# Patient Record
Sex: MALE | Race: WHITE | NOT HISPANIC OR LATINO | Employment: OTHER | URBAN - METROPOLITAN AREA
[De-identification: names, ages, dates, MRNs, and addresses within clinical notes are randomized per-mention and may not be internally consistent; named-entity substitution may affect disease eponyms.]

---

## 2017-01-06 ENCOUNTER — TELEPHONE (OUTPATIENT)
Dept: PAIN MEDICINE | Facility: CLINIC | Age: 77
End: 2017-01-06

## 2017-01-09 DIAGNOSIS — M48.061 LUMBAR SPINAL STENOSIS: Primary | ICD-10-CM

## 2017-01-09 NOTE — TELEPHONE ENCOUNTER
Just called patient and discussed new MRI results.  I recommend he try a caudal pari with catheter for placement of medication at higher levels where his lumbar stenosis is.  He will discuss this with his wife and call back to schedule injection if he   wants to proceed.  He might also call and schedule follow up to switch his care here from [facility].  Either is fine.   Thank you.

## 2017-01-11 RX ORDER — SIMVASTATIN 20 MG
TABLET ORAL
Qty: 90 TABLET | Refills: 0 | Status: SHIPPED | OUTPATIENT
Start: 2017-01-11 | End: 2017-04-10 | Stop reason: SDUPTHER

## 2017-01-24 ENCOUNTER — TELEPHONE (OUTPATIENT)
Dept: PAIN MEDICINE | Facility: CLINIC | Age: 77
End: 2017-01-24

## 2017-01-25 ENCOUNTER — DOCUMENTATION (OUTPATIENT)
Dept: PAIN MEDICINE | Facility: CLINIC | Age: 77
End: 2017-01-25

## 2017-01-25 NOTE — PROGRESS NOTES
"At approximately 11:10a nAt came to my office to inform me about an upset patient that was down in the surgery center, Mr. Derrek Estrada. She informed me that a nurse from the surgery center had arrived for his injection visit with Dr. Apple this afternoon, but that when they spoke to him 2 days ago he informed them he had not stopped his warfarin so they would have to cancel his procedure. About 3 minutes later Ksenia from the front office said there was a patient screaming and yelling at [facility], the MA. I went up there with Dr. Apple and Mr. Estrada and his wife were at the front window. As I walked up I heard Kenia say \"there is not need to get nasty\" and Mr. Estrada's wife started screaming, \"oh yes there is! I will get nasty!\" I walked up and asked what the problem was, and the wife and patient began yelling at me. I interrupted them and said \"okay, we are not going to start the conversation like this.\" To which Mr. Estrada said \"no one called to cancel my appointment! No one told me I had to stop my medicine! I have called up here time and time again and just get transferred all around. Finally someone answered and they said they called me back!\" So I asked, \"no one informed you that you needed to stop your warfarin?\" He said \"no, these nurses don't know their a** hole from a hole in the ground. Now I'm sorry, I am usually a good Hindu but I'm not going to be today.\" I began to tell him and his wife that I had been informed that at least 2 different individuals spoke to him about this situation, when his wife screamed \"so you're saying we are lying!\" I said \"no, I am simply trying to explain the situation as I understand it.\" They both then began to scream again about how they were going to have to wait another month to get the injection. I tried to tell them we would reschedule them for next week but they continued to scream about us calling them \"liars\" and how we don't care about Mr. " "Natalie who is in so much pain. Dr. Apple interrupted asking the patient again if they are sure no one called and told them to stop the medication and he screamed \"no!\" At this point, Max the  told the patient and his wife it's time to go. Max walked them out of the office and out of the building and informed me that they screamed at him the whole time.   "

## 2017-02-01 DIAGNOSIS — E11.9 DIABETES MELLITUS TYPE II, CONTROLLED, WITH NO COMPLICATIONS (HCC): ICD-10-CM

## 2017-02-01 RX ORDER — PIOGLITAZONEHYDROCHLORIDE 45 MG/1
TABLET ORAL
Qty: 90 TABLET | Refills: 1 | Status: SHIPPED | OUTPATIENT
Start: 2017-02-01 | End: 2017-07-21

## 2017-02-09 ENCOUNTER — OFFICE VISIT (OUTPATIENT)
Dept: INTERNAL MEDICINE | Age: 77
End: 2017-02-09

## 2017-02-09 VITALS
BODY MASS INDEX: 23.86 KG/M2 | HEIGHT: 75 IN | WEIGHT: 191.9 LBS | OXYGEN SATURATION: 98 % | TEMPERATURE: 96.7 F | SYSTOLIC BLOOD PRESSURE: 138 MMHG | DIASTOLIC BLOOD PRESSURE: 60 MMHG | HEART RATE: 64 BPM

## 2017-02-09 DIAGNOSIS — F09 MILD COGNITIVE DISORDER: ICD-10-CM

## 2017-02-09 DIAGNOSIS — Z00.00 ROUTINE HEALTH MAINTENANCE: ICD-10-CM

## 2017-02-09 DIAGNOSIS — I10 ESSENTIAL HYPERTENSION: Primary | ICD-10-CM

## 2017-02-09 DIAGNOSIS — E11.9 TYPE 2 DIABETES MELLITUS WITHOUT COMPLICATION, WITHOUT LONG-TERM CURRENT USE OF INSULIN (HCC): ICD-10-CM

## 2017-02-09 LAB — HBA1C MFR BLD: 6.94 % (ref 4.8–5.6)

## 2017-02-09 PROCEDURE — 99213 OFFICE O/P EST LOW 20 MIN: CPT | Performed by: INTERNAL MEDICINE

## 2017-02-09 RX ORDER — FERROUS SULFATE 325(65) MG
325 TABLET ORAL DAILY
Qty: 60 TABLET | Refills: 5 | COMMUNITY
Start: 2017-02-09 | End: 2018-01-19

## 2017-02-09 RX ORDER — DONEPEZIL HYDROCHLORIDE 5 MG/1
5 TABLET, FILM COATED ORAL NIGHTLY
Qty: 90 TABLET | Refills: 3 | Status: SHIPPED | OUTPATIENT
Start: 2017-02-09 | End: 2018-01-19

## 2017-02-09 NOTE — PROGRESS NOTES
"Derrek Ritter / 76 y.o. / male  02/09/2017    VITALS    Visit Vitals   • /60   • Pulse 64   • Temp 96.7 °F (35.9 °C)   • Ht 75\" (190.5 cm)   • Wt 191 lb 14.4 oz (87 kg)   • SpO2 98%   • BMI 23.99 kg/m2     BP Readings from Last 3 Encounters:   02/09/17 138/60   12/16/16 142/69   10/29/16 165/81     Wt Readings from Last 3 Encounters:   02/09/17 191 lb 14.4 oz (87 kg)   12/16/16 197 lb 6.4 oz (89.5 kg)   12/30/16 190 lb (86.2 kg)      Body mass index is 23.99 kg/(m^2).    CC:  Main reason(s) for today's visit: Hypertension and Diabetes      HPI:     Patient presents today for four-month follow-up of several medical issues.    Hypertension: He is compliant with medication, he is compliant also with low-salt diet and occasionally monitors blood pressure at home.  He is active on a regular basis.  There are no medication side effects noted.      Diabetes: He is compliant with medication,, when last done approximately 4 days ago value is 107 mg percent.  He denied any hypoglycemic reactions.    ______________________________________________________    ASSESSMENT & PLAN:    1. Essential hypertension    2. Type 2 diabetes mellitus without complication, without long-term current use of insulin    3. Mild cognitive disorder    4. Routine health maintenance      Orders Placed This Encounter   Procedures   • Hemoglobin A1c       Summary/Discussion:     · Number-one hypertension stable on current medical regimen.  Plan: Same meds, blood pressure check 4 months.  ·   · #2 diabetes type 2 stable based on home glucose monitoring.  Plan: Continue same, check A1c today.  ·   · #3 mild cognitive impairment, refill Aricept as above.  ·   · #4 routine health maintenance, annual wellness visit has been previously scheduled for June of this year.      No Follow-up on file.    Future Appointments  Date Time Provider Department Center   4/25/2017 11:40 AM Sherice Newman MD [facility] None   6/9/2017 1:00 PM Quinton Leal, " MD [facility] None       ______________________________________________________    REVIEW OF SYSTEMS    Review of Systems   Respiratory: Negative for chest tightness and shortness of breath.    Cardiovascular: Negative for chest pain and palpitations.   Neurological: Negative for dizziness, syncope, speech difficulty, weakness, light-headedness and headaches.         PHYSICAL EXAMINATION    Physical Exam   Constitutional: He is oriented to person, place, and time. He appears well-developed and well-nourished. No distress.   Cardiovascular: Normal rate, regular rhythm, normal heart sounds and intact distal pulses.  Exam reveals no gallop and no friction rub.    No murmur heard.  Pulmonary/Chest: Effort normal and breath sounds normal. He has no wheezes. He has no rales.   Musculoskeletal: He exhibits no edema.    Derrek had a diabetic foot exam performed (Vibration sense intact bilaterally great toe) today.   During the foot exam he had a monofilament test performed (Monofilament testing intact).     Neurological: He is alert and oriented to person, place, and time.   Skin: Skin is warm and dry. No rash noted.   Psychiatric: He has a normal mood and affect. His behavior is normal. Judgment and thought content normal.   Nursing note and vitals reviewed.      REVIEWED DATA:    Labs:   Lab Results   Component Value Date     10/29/2016    K 4.2 10/29/2016    AST 16 10/29/2016    ALT 13 10/29/2016    BUN 16 10/29/2016    CREATININE 0.70 12/30/2016    CREATININE 0.75 (L) 10/29/2016    CREATININE 0.75 (L) 10/06/2016    EGFRIFNONA 102 10/29/2016    EGFRIFAFRI 123 10/06/2016       Lab Results   Component Value Date     (H) 10/06/2016     (H) 06/06/2016    HGBA1C 7.08 (H) 10/06/2016    HGBA1C 7.20 (H) 06/06/2016    HGBA1C 7.3 (H) 02/02/2016       Lab Results   Component Value Date    LDL 77 10/06/2016    LDL 68 06/06/2016    LDL 87 09/28/2015    HDL 46 10/06/2016    TRIG 51 10/06/2016       Lab Results    Component Value Date    TSH 1.810 07/11/2016          Lab Results   Component Value Date    WBC 4.71 10/29/2016    HGB 12.2 (L) 10/29/2016     10/29/2016        Imaging:        Medical Tests:        Summary of old records / correspondence / consultant report:        Request outside records:          ALLERGIES:    Review of patient's allergies indicates no known allergies.    MEDICATIONS:    Current Outpatient Prescriptions   Medication Sig Dispense Refill   • ACCU-CHEK FASTCLIX LANCETS misc USE TO TEST TWICE DAILY 102 each 0   • Blood Glucose Monitoring Suppl (ACCU-CHEK FRANCISCO SMARTVIEW) W/DEVICE kit USE TO TEST BLOOD SUGAR TWICE DAILY 1 kit 0   • Blood Glucose Monitoring Suppl (FREESTYLE LITE) device Use to test blood once daily 1 each 0   • brimonidine (ALPHAGAN) 0.2 % ophthalmic solution      • donepezil (ARICEPT) 5 MG tablet Take 1 tablet by mouth Every Night. 90 tablet 3   • dorzolamide (TRUSOPT) 2 % ophthalmic solution Administer 1 drop to both eyes 2 (two) times a day.     • enalapril (VASOTEC) 20 MG tablet TAKE 1 TABLET BY MOUTH TWICE A  tablet 0   • esomeprazole (NexIUM) 40 MG capsule Take 1 capsule by mouth daily. 90 capsule 1   • ferrous sulfate 325 (65 FE) MG tablet Take 1 tablet by mouth Daily. 60 tablet 5   • furosemide (LASIX) 20 MG tablet Take 1 tablet by mouth daily. as needed     • glucose blood (FREESTYLE LITE) test strip Use to test blood once daily 100 each 12   • Glucose Blood disk Inject 1 Device as directed daily. TEST BLOOD SUGAR ONCE DAILY AS DIRECTED 100 each 3   • glucose blood test strip USE TO TEST TWICE DAILY 100 each 12   • Lancets (FREESTYLE) lancets Use to test blood sugar daily 100 each 12   • latanoprost (XALATAN) 0.005 % ophthalmic solution      • metFORMIN (GLUCOPHAGE) 1000 MG tablet 1000 mg in the morning 500 mg at noon and 1000 mg in the evening. 75 tablet 1   • ONE TOUCH ULTRA TEST test strip USE ONCE DAILY AS DIRECTED 100 each 3   • oxyCODONE-acetaminophen  (PERCOCET)  MG per tablet 1 tablet Every 8 (Eight) Hours As Needed.     • pioglitazone (ACTOS) 45 MG tablet TAKE 1 TABLET DAILY 90 tablet 1   • simvastatin (ZOCOR) 20 MG tablet TAKE 1 TABLET DAILY 90 tablet 0   • tamsulosin (FLOMAX) 0.4 MG capsule 24 hr capsule TAKE 2 CAPSULES BY MOUTH DAILY 60 capsule 3   • timolol (TIMOPTIC) 0.5 % ophthalmic solution      • warfarin (COUMADIN) 10 MG tablet TAKE 1 TABLET BY MOUTH EVERY OTHER DAY ,ALTERNATING WITH 1/2 TABLET EVERY OTHER DAY (Patient taking differently: 10 mg. monday through friday, 5 mg saturday and sunday) 90 tablet 2     No current facility-administered medications for this visit.        [facility]    The following portions of the patient's history were reviewed and updated as appropriate: Allergies / Current Medications / Past Medical History / Surgical History / Social History / Family History    PROBLEM LIST:    Patient Active Problem List   Diagnosis   • Cholelithiasis   • Diabetes mellitus   • Diverticulosis of intestine   • Hypercholesterolemia   • Hypertension   • Lumbar spinal stenosis   • Microalbuminuria   • Paroxysmal atrial fibrillation   • Medicare annual wellness visit, subsequent   • Anemia   • Iron deficiency anemia   • Osteoarthritis   • Impotence of organic origin   • Glaucoma   • Hiatal hernia   • Mild cognitive disorder   • Calculus of kidney   • Nocturia   • Chronic bilateral low back pain with bilateral sciatica   • Post laminectomy syndrome   • Routine health maintenance       PAST MEDICAL HX:    Past Medical History   Diagnosis Date   • Acute suppurative otitis media    • Acute upper respiratory infection    • Atrial fibrillation    • Cholelithiasis    • Diabetes mellitus    • Diverticulosis    • Dry eyes    • Earache    • Edema    • Encounter for screening for malignant neoplasm of prostate    • Esophagitis, reflux    • Glaucoma    • Glaucoma    • H/O echocardiogram 09/18/2013   • Health care maintenance    • Hiatal hernia    • History of  EKG 10/06/2015   • History of Holter monitoring 09/16/2013   • Hyperlipidemia    • Hypertension    • Lumbar canal stenosis    • Microalbuminuria    • Nephrolithiasis    • Osteoarthritis of hand      of thumb   • PAF (paroxysmal atrial fibrillation)    • Postoperative examination    • Pre-operative cardiovascular examination    • Spinal stenosis        PAST SURGICAL HX:    Past Surgical History   Procedure Laterality Date   • Cataract extraction     • Hernia repair     • Lumbar laminectomy       10.28.13  bilat L4/5 lami with Nyla and Belen   • Other surgical history       Lithotomy   • Lumbar fusion       Lumbar vertebral fusion   • Arthrodesis  10/28/2013     Spinal / Description: Repair spinal stenosis   • Tonsillectomy     • Amputation digit Left 10/29/2016     Procedure: TENDON AND NERVE REPAIR OF  LEFT THUMB;  Surgeon: Zak Hanson MD;  Location: [facility];  Service:        SOCIAL HX:    Social History     Social History   • Marital status:      Spouse name: Silvia   • Number of children: N/A   • Years of education: College     Occupational History   • Teacher Retired     Social History Main Topics   • Smoking status: Never Smoker   • Smokeless tobacco: None      Comment: caffeine use   • Alcohol use No   • Drug use: No   • Sexual activity: Not Asked     Other Topics Concern   • None     Social History Narrative       FAMILY HX:    Family History   Problem Relation Age of Onset   • Heart attack Mother      acute myocardial infarction   • Stroke Mother      hemorrhagic   • Emphysema Father    • Diabetes Other      mellitus   • Hypertension Other    • Heart disease Other

## 2017-02-20 DIAGNOSIS — K21.9 GASTROESOPHAGEAL REFLUX DISEASE, ESOPHAGITIS PRESENCE NOT SPECIFIED: ICD-10-CM

## 2017-02-20 RX ORDER — ESOMEPRAZOLE MAGNESIUM 40 MG/1
40 CAPSULE, DELAYED RELEASE ORAL DAILY
Qty: 90 CAPSULE | Refills: 1 | Status: SHIPPED | OUTPATIENT
Start: 2017-02-20 | End: 2017-09-11 | Stop reason: SDUPTHER

## 2017-03-06 ENCOUNTER — OFFICE VISIT (OUTPATIENT)
Dept: INTERNAL MEDICINE | Age: 77
End: 2017-03-06

## 2017-03-06 VITALS
TEMPERATURE: 97.6 F | OXYGEN SATURATION: 97 % | BODY MASS INDEX: 22.93 KG/M2 | HEIGHT: 75 IN | DIASTOLIC BLOOD PRESSURE: 58 MMHG | HEART RATE: 65 BPM | SYSTOLIC BLOOD PRESSURE: 120 MMHG | WEIGHT: 184.4 LBS

## 2017-03-06 DIAGNOSIS — R63.4 WEIGHT LOSS: ICD-10-CM

## 2017-03-06 DIAGNOSIS — J06.9 ACUTE URI: Primary | ICD-10-CM

## 2017-03-06 PROCEDURE — 99213 OFFICE O/P EST LOW 20 MIN: CPT | Performed by: INTERNAL MEDICINE

## 2017-03-06 RX ORDER — AZITHROMYCIN 250 MG/1
TABLET, FILM COATED ORAL
Qty: 6 TABLET | Refills: 0 | Status: SHIPPED | OUTPATIENT
Start: 2017-03-06 | End: 2017-04-17

## 2017-03-06 RX ORDER — BENZONATATE 200 MG/1
200 CAPSULE ORAL 3 TIMES DAILY PRN
Qty: 30 CAPSULE | Refills: 0 | Status: SHIPPED | OUTPATIENT
Start: 2017-03-06 | End: 2017-04-17

## 2017-03-06 NOTE — PROGRESS NOTES
Subjective   Derrek Ritter is a 76 y.o. male.     History of Present Illness 5 day history of respiratory illness, he notes nasal drainage, cough.  Nasal drainage is yellow, lung sputum is not discolored.  He denied facial or dental pain with chewing, fever, chills, sweats, anorexia.  He has tried some over-the-counter anti-histamine without any significant improvement.  His wife is not ill with any similar symptoms.    The following portions of the patient's history were reviewed and updated as appropriate: allergies, current medications, past family history, past medical history, past social history, past surgical history and problem list.    Review of Systems   Gastrointestinal: Negative for diarrhea, nausea and vomiting.   Skin: Negative for rash.   Neurological: Negative for headaches.   Hematological: Negative for adenopathy.       Objective   Physical Exam   Constitutional: He is oriented to person, place, and time. He appears well-developed and well-nourished. No distress.   HENT:   Nose: Right sinus exhibits no maxillary sinus tenderness and no frontal sinus tenderness. Left sinus exhibits no maxillary sinus tenderness and no frontal sinus tenderness.   Neck: Normal range of motion. Neck supple.   Pulmonary/Chest: Effort normal and breath sounds normal.   Lymphadenopathy:     He has no cervical adenopathy.   Neurological: He is alert and oriented to person, place, and time.   Skin: Skin is warm and dry. He is not diaphoretic.   Psychiatric: He has a normal mood and affect. His behavior is normal. Judgment and thought content normal.   Nursing note and vitals reviewed.      Assessment/Plan   Derrek was seen today for sinusitis.    Diagnoses and all orders for this visit:    Acute URI  -     benzonatate (TESSALON) 200 MG capsule; Take 1 capsule by mouth 3 (Three) Times a Day As Needed for cough.  -     azithromycin (ZITHROMAX Z-DENISE) 250 MG tablet; Take 2 tablets the first day, then 1 tablet daily for 4  days.    Weight loss      #1 acute upper respiratory infection, because of underlying diabetes, patient will be treated with medication as above.  Follow-up with me in 10 days if symptoms have not improved to his satisfaction.    #2 weight loss, patient has lost 13 pounds since the middle of December while eating approximate same.  At this point, since is less than 10% of his baseline weight, we will observe this and have him come back to see me in 6 weeks for another weight check

## 2017-03-13 RX ORDER — ENALAPRIL MALEATE 20 MG/1
TABLET ORAL
Qty: 180 TABLET | Refills: 0 | Status: SHIPPED | OUTPATIENT
Start: 2017-03-13 | End: 2017-06-12 | Stop reason: SDUPTHER

## 2017-04-10 DIAGNOSIS — E78.00 HYPERCHOLESTEROLEMIA: Primary | ICD-10-CM

## 2017-04-10 RX ORDER — SIMVASTATIN 20 MG
TABLET ORAL
Qty: 90 TABLET | Refills: 0 | Status: SHIPPED | OUTPATIENT
Start: 2017-04-10 | End: 2017-07-03 | Stop reason: SDUPTHER

## 2017-04-17 ENCOUNTER — TELEPHONE (OUTPATIENT)
Dept: INTERNAL MEDICINE | Age: 77
End: 2017-04-17

## 2017-04-17 ENCOUNTER — OFFICE VISIT (OUTPATIENT)
Dept: INTERNAL MEDICINE | Age: 77
End: 2017-04-17

## 2017-04-17 VITALS
TEMPERATURE: 98.2 F | HEART RATE: 56 BPM | BODY MASS INDEX: 23.18 KG/M2 | WEIGHT: 186.4 LBS | SYSTOLIC BLOOD PRESSURE: 140 MMHG | OXYGEN SATURATION: 98 % | DIASTOLIC BLOOD PRESSURE: 54 MMHG | HEIGHT: 75 IN

## 2017-04-17 DIAGNOSIS — Z00.00 ROUTINE HEALTH MAINTENANCE: ICD-10-CM

## 2017-04-17 DIAGNOSIS — I48.0 PAROXYSMAL ATRIAL FIBRILLATION (HCC): ICD-10-CM

## 2017-04-17 DIAGNOSIS — R63.4 WEIGHT LOSS: Primary | ICD-10-CM

## 2017-04-17 DIAGNOSIS — F09 MILD COGNITIVE DISORDER: ICD-10-CM

## 2017-04-17 PROBLEM — J06.9 ACUTE URI: Status: RESOLVED | Noted: 2017-03-06 | Resolved: 2017-04-17

## 2017-04-17 LAB — INR PPP: 2.1 (ref 2–3)

## 2017-04-17 PROCEDURE — 85610 PROTHROMBIN TIME: CPT | Performed by: INTERNAL MEDICINE

## 2017-04-17 PROCEDURE — 99214 OFFICE O/P EST MOD 30 MIN: CPT | Performed by: INTERNAL MEDICINE

## 2017-04-17 PROCEDURE — 36416 COLLJ CAPILLARY BLOOD SPEC: CPT | Performed by: INTERNAL MEDICINE

## 2017-04-17 NOTE — PROGRESS NOTES
Subjective   Derrek Ritter is a 77 y.o. male.     History of Present Illness patient presents today for 6 week follow-up. At our last visit, he has lost approximately 13 pounds.  He was asked by me return today for follow-up.  Patient is actually gained 2 pounds in the last 6 weeks.  We did review control of his glucose, last A1c was 6.97 in February, and his glucose readings at home over the past week and were all under 120 mg percent.  He has been on a  stable dosage of medication, has not made any recent changes.    Patient has paroxysmal atrial fibrillation, is due for an INR today as well.    Mild memory changes, patient's been on Aricept, he believes that the medication is actually helping and sees some improvement in his short-term memory.        The following portions of the patient's history were reviewed and updated as appropriate: allergies, current medications, past medical history and problem list.    Review of Systems   Respiratory: Negative for chest tightness and shortness of breath.         No evidence of PND nor orthopnea   Cardiovascular: Negative for chest pain and palpitations.   Neurological: Negative for dizziness, syncope, speech difficulty, weakness, light-headedness and headaches.       Objective   Physical Exam   Constitutional: He is oriented to person, place, and time. He appears well-developed and well-nourished. No distress.   Cardiovascular: Normal rate, regular rhythm, normal heart sounds and intact distal pulses.  Exam reveals no gallop and no friction rub.    No murmur heard.  Pulmonary/Chest: Effort normal and breath sounds normal. He has no wheezes. He has no rales.   Musculoskeletal: He exhibits edema.   1+ distal pitting edema bilaterally   Neurological: He is alert and oriented to person, place, and time.   Skin: Skin is warm and dry. No rash noted.   Psychiatric: He has a normal mood and affect. His behavior is normal. Judgment and thought content normal.   Nursing note  and vitals reviewed.      Assessment/Plan   Derrek was seen today for hypertension, back pain and weight loss.    Diagnoses and all orders for this visit:    Weight loss    Paroxysmal atrial fibrillation  -     POC INR    Mild cognitive disorder    Routine health maintenance        Number-one weight loss has resolved, patient is actually gaining weight at this time.  We will continue to observe this with subsequent office visits.    #2 paroxysmal atrial fibrillation, we'll check INR today and adjust dosage of Coumadin if indicated.    #3 mild cognitive impairment, improved subjectively while using Aricept.    #4 routine health maintenance follow-up with me 4 months for diabetes check and AWV

## 2017-04-17 NOTE — TELEPHONE ENCOUNTER
----- Message from Quinton Leal MD sent at 4/17/2017 11:07 AM EDT -----  INR is therapeutic, continue present dosage of Coumadin, and repeat INR in 4 weeks.

## 2017-04-21 ENCOUNTER — TELEPHONE (OUTPATIENT)
Dept: CARDIOLOGY | Facility: CLINIC | Age: 77
End: 2017-04-21

## 2017-04-21 NOTE — TELEPHONE ENCOUNTER
Pt called and has an OV on tues 4/25, and he wanted to know if you can also give him clearance for him to have an epidural injection?

## 2017-04-25 ENCOUNTER — OFFICE VISIT (OUTPATIENT)
Dept: CARDIOLOGY | Facility: CLINIC | Age: 77
End: 2017-04-25

## 2017-04-25 VITALS
HEART RATE: 41 BPM | SYSTOLIC BLOOD PRESSURE: 130 MMHG | HEIGHT: 75 IN | WEIGHT: 188 LBS | DIASTOLIC BLOOD PRESSURE: 82 MMHG | BODY MASS INDEX: 23.38 KG/M2

## 2017-04-25 DIAGNOSIS — I48.0 PAROXYSMAL ATRIAL FIBRILLATION (HCC): ICD-10-CM

## 2017-04-25 DIAGNOSIS — E11.9 TYPE 2 DIABETES MELLITUS WITHOUT COMPLICATION, WITHOUT LONG-TERM CURRENT USE OF INSULIN (HCC): ICD-10-CM

## 2017-04-25 DIAGNOSIS — E78.00 HYPERCHOLESTEROLEMIA: ICD-10-CM

## 2017-04-25 DIAGNOSIS — I10 ESSENTIAL HYPERTENSION: Primary | ICD-10-CM

## 2017-04-25 DIAGNOSIS — K44.9 HIATAL HERNIA: ICD-10-CM

## 2017-04-25 PROCEDURE — 93000 ELECTROCARDIOGRAM COMPLETE: CPT | Performed by: INTERNAL MEDICINE

## 2017-04-25 PROCEDURE — 99214 OFFICE O/P EST MOD 30 MIN: CPT | Performed by: INTERNAL MEDICINE

## 2017-04-25 NOTE — PROGRESS NOTES
Date of Office Visit: 2017  Encounter Provider: Sherice Newman MD  Place of Service: [facility] CARDIOLOGY  Patient Name: Derrek Ritter  :1940      Patient ID:  Derrek Ritter is a 77 y.o. male is here for  followup for HTN and PAF.       History of Present Illness  He had been seen in the past by Dr. Figueroa prior to back surgery for spinal stenosis  in . Prior to that surgery, he had a stress nuclear perfusion study performed on  10/01/2013 which showed no evidence of ischemia. He also had a 2-D echocardiogram with  Doppler performed on 2013 which revealed an ejection fraction of 55-60%,  indeterminate diastolic filling pattern, borderline left atrial enlargement, mild  posterior leaflet prolapse of the mitral valve with trace mitral insufficiency. He had  been seen in Dr. Glover's office and Dr. Glover had detected an irregular heart rhythm.  He was found to be in atrial fibrillation. A Holter monitor was performed on 2013  which showed persistent atrial fibrillation, a maximum heart rate of 164 and an average  heart rate of 98 beats per minute. He then had a second Holter recording on 2013  which showed no atrial fibrillation at all. He has been on warfarin since that time. He  was also placed on propafenone and Metoprolol then as well.       He does have a history of diabetes mellitus type 2, hypertension, and hyperlipidemia. He  has a hiatal hernia, history of diverticular disease and gastroesophageal reflux disease.  He has never had any thyroid disease.         He had back surgery in  and it did not help  at all.  He had ankle brachial indices done in 10/2014,  which were normal.     He cares for his wife who has cancer and other medical issues.  He is a retired  and .     He needs epidural therapy.  He has no chest pain or difficulty breathing.  He has had no dizziness or syncope.  He is  off the Toprol entirely and still has bradycardia, so I do think he has some underlying sick sinus syndrome; however, he has no symptoms with this.  He has chronic lower extremity edema, which is unchanged.  He has no orthopnea or paroxysmal nocturnal dyspnea.  His energy level is fair.  He would like to get his back a bit better just so he can do the activities that he wants to do.               Past Medical History:   Diagnosis Date   • Acute suppurative otitis media    • Acute upper respiratory infection    • Atrial fibrillation    • Cholelithiasis    • Diabetes mellitus    • Diverticulosis    • Dry eyes    • Earache    • Edema    • Encounter for screening for malignant neoplasm of prostate    • Esophagitis, reflux    • Glaucoma    • H/O echocardiogram 09/18/2013   • Health care maintenance    • Hiatal hernia    • History of EKG 10/06/2015   • History of Holter monitoring 09/16/2013   • Hyperlipidemia    • Hypertension    • Lumbar canal stenosis    • Microalbuminuria    • Nephrolithiasis    • Osteoarthritis of hand     of thumb   • PAF (paroxysmal atrial fibrillation)    • Postoperative examination    • Pre-operative cardiovascular examination    • Spinal stenosis          Past Surgical History:   Procedure Laterality Date   • AMPUTATION DIGIT Left 10/29/2016    Procedure: TENDON AND NERVE REPAIR OF  LEFT THUMB;  Surgeon: Zak Hanson MD;  Location: [facility];  Service:    • ARTHRODESIS  10/28/2013    Spinal / Description: Repair spinal stenosis   • CATARACT EXTRACTION     • HERNIA REPAIR     • LUMBAR FUSION      Lumbar vertebral fusion   • LUMBAR LAMINECTOMY      10.28.13  bilat L4/5 lami with Nyla and Belen   • OTHER SURGICAL HISTORY      Lithotomy   • TONSILLECTOMY         Current Outpatient Prescriptions on File Prior to Visit   Medication Sig Dispense Refill   • ACCU-CHEK FASTCLIX LANCETS [facility] USE TO TEST TWICE DAILY 102 each 0   • Blood Glucose Monitoring Suppl (ACCU-CHEK FRANCISCO SMARTVIEW)  W/DEVICE kit USE TO TEST BLOOD SUGAR TWICE DAILY 1 kit 0   • Blood Glucose Monitoring Suppl (FREESTYLE LITE) device Use to test blood once daily 1 each 0   • brimonidine (ALPHAGAN) 0.2 % ophthalmic solution      • donepezil (ARICEPT) 5 MG tablet Take 1 tablet by mouth Every Night. 90 tablet 3   • dorzolamide (TRUSOPT) 2 % ophthalmic solution Administer 1 drop to both eyes 2 (two) times a day.     • enalapril (VASOTEC) 20 MG tablet TAKE 1 TABLET BY MOUTH TWICE A  tablet 0   • esomeprazole (nexIUM) 40 MG capsule Take 1 capsule by mouth Daily. 90 capsule 1   • ferrous sulfate 325 (65 FE) MG tablet Take 1 tablet by mouth Daily. 60 tablet 5   • furosemide (LASIX) 20 MG tablet Take 1 tablet by mouth daily. as needed     • glucose blood (FREESTYLE LITE) test strip Use to test blood once daily 100 each 12   • Glucose Blood disk Inject 1 Device as directed daily. TEST BLOOD SUGAR ONCE DAILY AS DIRECTED 100 each 3   • glucose blood test strip USE TO TEST TWICE DAILY 100 each 12   • Lancets (FREESTYLE) lancets Use to test blood sugar daily 100 each 12   • latanoprost (XALATAN) 0.005 % ophthalmic solution      • metFORMIN (GLUCOPHAGE) 1000 MG tablet 1000 mg in the morning 500 mg at noon and 1000 mg in the evening. 75 tablet 1   • ONE TOUCH ULTRA TEST test strip USE ONCE DAILY AS DIRECTED 100 each 3   • oxyCODONE-acetaminophen (PERCOCET)  MG per tablet 1 tablet Every 8 (Eight) Hours As Needed.     • pioglitazone (ACTOS) 45 MG tablet TAKE 1 TABLET DAILY 90 tablet 1   • simvastatin (ZOCOR) 20 MG tablet TAKE 1 TABLET DAILY 90 tablet 0   • tamsulosin (FLOMAX) 0.4 MG capsule 24 hr capsule TAKE 2 CAPSULES BY MOUTH DAILY 60 capsule 3   • timolol (TIMOPTIC) 0.5 % ophthalmic solution      • warfarin (COUMADIN) 10 MG tablet TAKE 1 TABLET BY MOUTH EVERY OTHER DAY ,ALTERNATING WITH 1/2 TABLET EVERY OTHER DAY (Patient taking differently: 10 mg. monday through friday, 5 mg saturday and sunday) 90 tablet 2     No current  "facility-administered medications on file prior to visit.        Social History     Social History   • Marital status:      Spouse name: Silvia   • Number of children: N/A   • Years of education: College     Occupational History   • Teacher Retired     Social History Main Topics   • Smoking status: Never Smoker   • Smokeless tobacco: Not on file      Comment: caffeine use   • Alcohol use No   • Drug use: No   • Sexual activity: Not on file     Other Topics Concern   • Not on file     Social History Narrative           Review of Systems   Constitution: Negative.   HENT: Negative for congestion and headaches.    Eyes: Negative for vision loss in left eye and vision loss in right eye.   Respiratory: Negative.  Negative for cough, hemoptysis, shortness of breath, sleep disturbances due to breathing, snoring, sputum production and wheezing.    Endocrine: Negative.    Hematologic/Lymphatic: Negative.    Skin: Negative for poor wound healing and rash.   Musculoskeletal: Negative for falls, gout, muscle cramps and myalgias.   Gastrointestinal: Negative for abdominal pain, diarrhea, dysphagia, hematemesis, melena, nausea and vomiting.   Neurological: Negative for excessive daytime sleepiness, dizziness, light-headedness, loss of balance, seizures and vertigo.   Psychiatric/Behavioral: Negative for depression and substance abuse. The patient is not nervous/anxious.        Procedures    ECG 12 Lead  Date/Time: 4/25/2017 10:39 AM  Performed by: CHERYL ORTEGA  Authorized by: CHERYL ORTEGA   Comparison: compared with previous ECG   Similar to previous ECG  Rhythm: sinus bradycardia  Conduction: right bundle branch block and LAFB  Clinical impression: abnormal ECG               Objective:      Vitals:    04/25/17 1030   BP: 130/82   BP Location: Right arm   Pulse: (!) 41   Weight: 188 lb (85.3 kg)   Height: 75\" (190.5 cm)     Body mass index is 23.5 kg/(m^2).    Physical Exam   Constitutional: He is oriented to " person, place, and time. He appears well-developed and well-nourished. No distress.   HENT:   Head: Normocephalic and atraumatic.   Eyes: Conjunctivae are normal. No scleral icterus.   Neck: Neck supple. No JVD present. Carotid bruit is not present. No thyromegaly present.   Cardiovascular: Normal rate, regular rhythm, S1 normal, S2 normal, normal heart sounds and intact distal pulses.   No extrasystoles are present. PMI is not displaced.  Exam reveals no gallop.    No murmur heard.  Pulses:       Carotid pulses are 2+ on the right side, and 2+ on the left side.       Radial pulses are 2+ on the right side, and 2+ on the left side.        Dorsalis pedis pulses are 2+ on the right side, and 2+ on the left side.        Posterior tibial pulses are 2+ on the right side, and 2+ on the left side.   2+ RLE edema and 1+ LLE edema   Pulmonary/Chest: Effort normal and breath sounds normal. No respiratory distress. He has no wheezes. He has no rhonchi. He has no rales. He exhibits no tenderness.   Abdominal: Soft. Bowel sounds are normal. He exhibits no distension, no abdominal bruit and no mass. There is no tenderness.   Musculoskeletal: He exhibits no edema or deformity.   Lymphadenopathy:     He has no cervical adenopathy.   Neurological: He is alert and oriented to person, place, and time. No cranial nerve deficit.   Skin: Skin is warm and dry. No rash noted. He is not diaphoretic. No cyanosis. No pallor. Nails show no clubbing.   Psychiatric: He has a normal mood and affect. Judgment normal.   Vitals reviewed.      Lab Review:       Assessment:      Diagnosis Plan   1. Essential hypertension     2. Paroxysmal atrial fibrillation     3. Hypercholesterolemia     4. Hiatal hernia     5. Type 2 diabetes mellitus without complication, without long-term current use of insulin       1. Paroxysmal atrial fibrillation. We will maintain warfarin and metoprolol. His CHADS VASc score is high  with a score of three giving him a 3.2%  risk of stroke per year, and he is asymptomatic  with the atrial fibrillation so warfarin is indicated in this circumstance.   2. Hypertension under fair control.   3. Diabetes mellitus type 2. This has gotten better with weight loss. In fact, he said  his hemoglobin A1C has been running about 6.6%.   4. Hyperlipidemia. He is currently on simvastatin for this.   5. History of hiatal hernia, gastroesophageal reflux disease, and diverticular disease  stable at this time.   6. Chronic lower extremity edema with what appears to be venous insufficiency and may be  lymphedema as well. Stable.  7. RBBB and LAFB, chronic.   8. Bradycardia, off metoprolol due to this. He has no symptoms with this.       Plan:       See back in 6 months, no changes.  He is low CV risk for epidural therapy but needs to off of warfarin for 7 days prior.    Atrial Fibrillation and Atrial Flutter  Assessment  • The patient has paroxysmal atrial fibrillation  • This is non-valvular in etiology  • The patient's CHADS2-VASc score is 3  • A [id number] score of 2 or more is considered a high risk for a thromboembolic event  • Warfarin prescribed    Plan  • Continue in atrial fibrillation with rate control  • Continue warfarin for antithrombotic therapy, bleeding issues discussed

## 2017-04-26 NOTE — TELEPHONE ENCOUNTER
Pt called to make sure clearance letter will be sent to [facility].  Letter does not go to PCP.   I will fax Office note from 4/25/17 to [facility] which has clearance in office note.    Rick SMITH

## 2017-05-02 DIAGNOSIS — E11.9 TYPE 2 DIABETES MELLITUS WITHOUT COMPLICATION, WITHOUT LONG-TERM CURRENT USE OF INSULIN (HCC): Primary | ICD-10-CM

## 2017-06-01 RX ORDER — TAMSULOSIN HYDROCHLORIDE 0.4 MG/1
CAPSULE ORAL
Qty: 180 CAPSULE | Refills: 1 | Status: SHIPPED | OUTPATIENT
Start: 2017-06-01 | End: 2018-01-19

## 2017-06-08 DIAGNOSIS — I48.0 PAROXYSMAL ATRIAL FIBRILLATION (HCC): ICD-10-CM

## 2017-06-08 DIAGNOSIS — E11.9 DIABETES MELLITUS TYPE II, CONTROLLED, WITH NO COMPLICATIONS (HCC): ICD-10-CM

## 2017-06-08 RX ORDER — BLOOD-GLUCOSE METER
EACH MISCELLANEOUS
Qty: 1 KIT | Refills: 0 | Status: SHIPPED | OUTPATIENT
Start: 2017-06-08

## 2017-06-08 RX ORDER — WARFARIN SODIUM 10 MG/1
TABLET ORAL
Qty: 68 TABLET | Refills: 1 | Status: SHIPPED | OUTPATIENT
Start: 2017-06-08 | End: 2017-11-30 | Stop reason: SDUPTHER

## 2017-06-09 ENCOUNTER — OFFICE VISIT (OUTPATIENT)
Dept: INTERNAL MEDICINE | Age: 77
End: 2017-06-09

## 2017-06-09 VITALS
HEIGHT: 75 IN | OXYGEN SATURATION: 99 % | TEMPERATURE: 97.9 F | WEIGHT: 183.6 LBS | HEART RATE: 60 BPM | SYSTOLIC BLOOD PRESSURE: 155 MMHG | DIASTOLIC BLOOD PRESSURE: 80 MMHG | BODY MASS INDEX: 22.83 KG/M2

## 2017-06-09 DIAGNOSIS — E78.00 HYPERCHOLESTEROLEMIA: ICD-10-CM

## 2017-06-09 DIAGNOSIS — I10 ESSENTIAL HYPERTENSION: ICD-10-CM

## 2017-06-09 DIAGNOSIS — I48.91 ATRIAL FIBRILLATION, UNSPECIFIED TYPE (HCC): ICD-10-CM

## 2017-06-09 DIAGNOSIS — E11.9 TYPE 2 DIABETES MELLITUS WITHOUT COMPLICATION, WITHOUT LONG-TERM CURRENT USE OF INSULIN (HCC): ICD-10-CM

## 2017-06-09 DIAGNOSIS — Z00.00 MEDICARE ANNUAL WELLNESS VISIT, SUBSEQUENT: Primary | ICD-10-CM

## 2017-06-09 PROCEDURE — 99214 OFFICE O/P EST MOD 30 MIN: CPT | Performed by: INTERNAL MEDICINE

## 2017-06-09 PROCEDURE — G0439 PPPS, SUBSEQ VISIT: HCPCS | Performed by: INTERNAL MEDICINE

## 2017-06-09 RX ORDER — FUROSEMIDE 20 MG/1
20 TABLET ORAL DAILY
Qty: 90 TABLET | Refills: 3 | Status: SHIPPED | OUTPATIENT
Start: 2017-06-09 | End: 2018-01-01 | Stop reason: SDUPTHER

## 2017-06-09 NOTE — PATIENT INSTRUCTIONS
Medicare Wellness  Personal Prevention Plan of Service     Date of Office Visit:  2017  Encounter Provider:  Quinton Leal MD  Place of Service:  [facility] PRIMARY CARE  Patient Name: Derrek Ritter  :  1940    As part of the Medicare Wellness portion of your visit today, we are providing you with this personalized preventive plan of services (PPPS). This plan is based upon recommendations of the [place] Preventive Services Task Force (USPSTF) and the Advisory Committee on Immunization Practices (ACIP).    This lists the preventive care services that should be considered, and provides dates of when you are due. Items listed as completed are up-to-date and do not require any further intervention.    Health Maintenance   Topic Date Due   • PNEUMOCOCCAL VACCINES (65+ LOW/MEDIUM RISK) (2 of 2 - PPSV23) 2016   • INFLUENZA VACCINE  2017   • HEMOGLOBIN A1C  2017   • LIPID PANEL  10/06/2017   • DIABETIC EYE EXAM  2018   • DIABETIC FOOT EXAM  2018   • MEDICARE ANNUAL WELLNESS  2018   • TDAP/TD VACCINES (2 - Td) 2023   • COLONOSCOPY  2024   • ZOSTER VACCINE  Completed       Orders Placed This Encounter   Procedures   • Comprehensive Metabolic Panel   • Lipid Panel   • Hemoglobin A1c   • Microalbumin / Creatinine Urine Ratio   • Protime-INR   • CBC & Differential     Order Specific Question:   Manual Differential     Answer:   No       No Follow-up on file.

## 2017-06-09 NOTE — PROGRESS NOTES
QUICK REFERENCE INFORMATION:  The ABCs of the Annual Wellness Visit    Subsequent Medicare Wellness Visit    HEALTH RISK ASSESSMENT    1940    Recent Hospitalizations:  No hospitalization(s) within the last year..        Current Medical Providers:  Patient Care Team:  Quinton Leal MD as PCP - General  Quinton Leal MD as PCP - Family Medicine  Madhav Hdez MD as Consulting Physician (Hematology and Oncology)        Smoking Status:  History   Smoking Status   • Never Smoker   Smokeless Tobacco   • Never Used     Comment: caffeine use       Alcohol Consumption:  History   Alcohol Use No       Depression Screen:   PHQ-9 Depression Screening 6/9/2017   Little interest or pleasure in doing things 0   Feeling down, depressed, or hopeless 0   Trouble falling or staying asleep, or sleeping too much -   Feeling tired or having little energy -   Poor appetite or overeating -   Feeling bad about yourself - or that you are a failure or have let yourself or your family down -   Trouble concentrating on things, such as reading the newspaper or watching television -   Moving or speaking so slowly that other people could have noticed. Or the opposite - being so fidgety or restless that you have been moving around a lot more than usual -   Thoughts that you would be better off dead, or of hurting yourself in some way -   PHQ-9 Total Score 0   If you checked off any problems, how difficult have these problems made it for you to do your work, take care of things at home, or get along with other people? -       Health Habits and Functional and Cognitive Screening:  Functional & Cognitive Status 6/9/2017   Do you have difficulty preparing food and eating? No   Do you have difficulty bathing yourself? No   Do you have difficulty getting dressed? No   Do you have difficulty using the toilet? No   Do you have difficulty moving around from place to place? Yes   In the past year have you fallen or experienced a near fall? Yes    Do you need help using the phone?  No   Are you deaf or do you have serious difficulty hearing?  No   Do you need help with transportation? No   Do you need help shopping? No   Do you need help preparing meals?  No   Do you need help with housework?  No   Do you need help with laundry? No   Do you need help taking your medications? No   Do you need help managing money? No   Do you have difficulty concentrating, remembering or making decisions? -              Does the patient have evidence of cognitive impairment? No    Aspirin use counseling: Pt is on Coumadin      Recent Lab Results:  CMP:  Lab Results   Component Value Date     (H) 10/06/2016    BUN 16 10/29/2016    CREATININE 0.70 12/30/2016    EGFRIFNONA 102 10/29/2016    EGFRIFAFRI 123 10/06/2016    BCR 21.3 10/29/2016     10/29/2016    K 4.2 10/29/2016    CO2 27.4 10/29/2016    CALCIUM 9.2 10/29/2016    PROTENTOTREF 5.6 (L) 10/06/2016    ALBUMIN 3.80 10/29/2016    LABGLOBREF 1.9 10/06/2016    LABIL2 1.7 10/29/2016    BILITOT 0.3 10/29/2016    ALKPHOS 34 (L) 10/29/2016    AST 16 10/29/2016    ALT 13 10/29/2016     Lipid Panel:  Lab Results   Component Value Date    CHLPL 133 10/06/2016    TRIG 51 10/06/2016    HDL 46 10/06/2016    VLDL 10.2 10/06/2016    LDL 77 10/06/2016     HbA1c:  Lab Results   Component Value Date    HGBA1C 6.94 (H) 02/09/2017       Visual Acuity:  No exam data present    Age-appropriate Screening Schedule:  Refer to the list below for future screening recommendations based on patient's age, sex and/or medical conditions. Orders for these recommended tests are listed in the plan section. The patient has been provided with a written plan.    Health Maintenance   Topic Date Due   • PNEUMOCOCCAL VACCINES (65+ LOW/MEDIUM RISK) (2 of 2 - PPSV23) 09/28/2016   • INFLUENZA VACCINE  08/01/2017   • HEMOGLOBIN A1C  08/09/2017   • LIPID PANEL  10/06/2017   • DIABETIC EYE EXAM  02/01/2018   • DIABETIC FOOT EXAM  02/09/2018   • TDAP/TD  VACCINES (2 - Td) 09/19/2023   • COLONOSCOPY  07/01/2024   • ZOSTER VACCINE  Completed        Subjective   History of Present Illness    Derrek Ritter is a 77 y.o. male who presents for an Subsequent Wellness Visit.    The following portions of the patient's history were reviewed and updated as appropriate: allergies, current medications, past family history, past medical history, past social history, past surgical history and problem list.    Outpatient Medications Prior to Visit   Medication Sig Dispense Refill   • ACCU-CHEK FASTCLIX LANCETS misc USE TO TEST TWICE DAILY 102 each 0   • Blood Glucose Monitoring Suppl (ACCU-CHEK FRANCISCO SMARTVIEW) W/DEVICE kit USE TO TEST BLOOD SUGAR TWICE DAILY 1 kit 0   • Blood Glucose Monitoring Suppl (FREESTYLE LITE) device Use to test blood once daily 1 each 0   • brimonidine (ALPHAGAN) 0.2 % ophthalmic solution      • donepezil (ARICEPT) 5 MG tablet Take 1 tablet by mouth Every Night. 90 tablet 3   • dorzolamide (TRUSOPT) 2 % ophthalmic solution Administer 1 drop to both eyes 2 (two) times a day.     • enalapril (VASOTEC) 20 MG tablet TAKE 1 TABLET BY MOUTH TWICE A  tablet 0   • esomeprazole (nexIUM) 40 MG capsule Take 1 capsule by mouth Daily. 90 capsule 1   • ferrous sulfate 325 (65 FE) MG tablet Take 1 tablet by mouth Daily. 60 tablet 5   • furosemide (LASIX) 20 MG tablet Take 1 tablet by mouth daily. as needed     • glucose blood (FREESTYLE LITE) test strip Use to test blood once daily 100 each 12   • Glucose Blood disk Inject 1 Device as directed daily. TEST BLOOD SUGAR ONCE DAILY AS DIRECTED 100 each 3   • glucose blood test strip USE TO TEST TWICE DAILY 100 each 12   • Lancets (FREESTYLE) lancets Use to test blood sugar daily 100 each 12   • latanoprost (XALATAN) 0.005 % ophthalmic solution      • metFORMIN (GLUCOPHAGE) 1000 MG tablet Take 1 tablet every morning, 1/2 tablet at noon, and 1 tablet every evening. 75 tablet 2   • ONE TOUCH ULTRA TEST test strip USE  "ONCE DAILY AS DIRECTED 100 each 3   • oxyCODONE-acetaminophen (PERCOCET)  MG per tablet 1 tablet Every 8 (Eight) Hours As Needed.     • pioglitazone (ACTOS) 45 MG tablet TAKE 1 TABLET DAILY 90 tablet 1   • simvastatin (ZOCOR) 20 MG tablet TAKE 1 TABLET DAILY 90 tablet 0   • tamsulosin (FLOMAX) 0.4 MG capsule 24 hr capsule TAKE 2 CAPSULES BY MOUTH DAILY 180 capsule 1   • timolol (TIMOPTIC) 0.5 % ophthalmic solution      • warfarin (COUMADIN) 10 MG tablet TAKE 1 TABLET BY MOUTH EVERY OTHER DAY ,ALTERNATING WITH 1/2 TABLET EVERY OTHER DAY 68 tablet 1   • metFORMIN (GLUCOPHAGE) 1000 MG tablet 1000 mg in the morning 500 mg at noon and 1000 mg in the evening. 75 tablet 1     No facility-administered medications prior to visit.        Patient Active Problem List   Diagnosis   • Cholelithiasis   • Diabetes mellitus   • Diverticulosis of intestine   • Hypercholesterolemia   • Hypertension   • Spinal stenosis   • Microalbuminuria   • Atrial fibrillation   • Medicare annual wellness visit, subsequent   • Anemia   • Iron deficiency anemia   • Osteoarthritis   • Impotence of organic origin   • Glaucoma   • Hiatal hernia   • Mild cognitive disorder   • Calculus of kidney   • Nocturia   • Chronic bilateral low back pain with bilateral sciatica   • Post laminectomy syndrome   • Routine health maintenance   • Weight loss       Advance Care Planning:  has an advance directive - a copy HAS NOT been provided    Identification of Risk Factors:  Risk factors include: None noted.    Review of Systems    Compared to one year ago, the patient feels his physical health is the same.  Compared to one year ago, the patient feels his mental health is the same.    Objective     Physical Exam    Vitals:    06/09/17 1251   BP: 148/60   Pulse: (!) 43   Temp: 97.9 °F (36.6 °C)   SpO2: 99%   Weight: 183 lb 9.6 oz (83.3 kg)   Height: 75\" (190.5 cm)   PainSc:   8   PainLoc: Back       Body mass index is 22.95 kg/(m^2).  Discussed the patient's BMI " with him. The BMI is in the acceptable range.    Assessment/Plan   Patient Self-Management and Personalized Health Advice  The patient has been provided with information about: None needed. and preventive services including:   · Influenza vaccine.    Visit Diagnoses:    ICD-10-CM ICD-9-CM   1. Medicare annual wellness visit, subsequent Z00.00 V70.0   2. Essential hypertension I10 401.9   3. Hypercholesterolemia E78.00 272.0   4. Atrial fibrillation, unspecified type I48.91 427.31   5. Type 2 diabetes mellitus without complication, without long-term current use of insulin E11.9 250.00       No orders of the defined types were placed in this encounter.      Outpatient Encounter Prescriptions as of 6/9/2017   Medication Sig Dispense Refill   • ACCU-CHEK FASTCLIX LANCETS misc USE TO TEST TWICE DAILY 102 each 0   • Blood Glucose Monitoring Suppl (ACCU-CHEK FRANCISCO SMARTVIEW) W/DEVICE kit USE TO TEST BLOOD SUGAR TWICE DAILY 1 kit 0   • Blood Glucose Monitoring Suppl (FREESTYLE LITE) device Use to test blood once daily 1 each 0   • brimonidine (ALPHAGAN) 0.2 % ophthalmic solution      • donepezil (ARICEPT) 5 MG tablet Take 1 tablet by mouth Every Night. 90 tablet 3   • dorzolamide (TRUSOPT) 2 % ophthalmic solution Administer 1 drop to both eyes 2 (two) times a day.     • enalapril (VASOTEC) 20 MG tablet TAKE 1 TABLET BY MOUTH TWICE A  tablet 0   • esomeprazole (nexIUM) 40 MG capsule Take 1 capsule by mouth Daily. 90 capsule 1   • ferrous sulfate 325 (65 FE) MG tablet Take 1 tablet by mouth Daily. 60 tablet 5   • furosemide (LASIX) 20 MG tablet Take 1 tablet by mouth daily. as needed     • glucose blood (FREESTYLE LITE) test strip Use to test blood once daily 100 each 12   • Glucose Blood disk Inject 1 Device as directed daily. TEST BLOOD SUGAR ONCE DAILY AS DIRECTED 100 each 3   • glucose blood test strip USE TO TEST TWICE DAILY 100 each 12   • Lancets (FREESTYLE) lancets Use to test blood sugar daily 100 each 12   •  latanoprost (XALATAN) 0.005 % ophthalmic solution      • metFORMIN (GLUCOPHAGE) 1000 MG tablet Take 1 tablet every morning, 1/2 tablet at noon, and 1 tablet every evening. 75 tablet 2   • ONE TOUCH ULTRA TEST test strip USE ONCE DAILY AS DIRECTED 100 each 3   • oxyCODONE-acetaminophen (PERCOCET)  MG per tablet 1 tablet Every 8 (Eight) Hours As Needed.     • pioglitazone (ACTOS) 45 MG tablet TAKE 1 TABLET DAILY 90 tablet 1   • simvastatin (ZOCOR) 20 MG tablet TAKE 1 TABLET DAILY 90 tablet 0   • tamsulosin (FLOMAX) 0.4 MG capsule 24 hr capsule TAKE 2 CAPSULES BY MOUTH DAILY 180 capsule 1   • timolol (TIMOPTIC) 0.5 % ophthalmic solution      • warfarin (COUMADIN) 10 MG tablet TAKE 1 TABLET BY MOUTH EVERY OTHER DAY ,ALTERNATING WITH 1/2 TABLET EVERY OTHER DAY 68 tablet 1   • [DISCONTINUED] metFORMIN (GLUCOPHAGE) 1000 MG tablet 1000 mg in the morning 500 mg at noon and 1000 mg in the evening. 75 tablet 1   • [DISCONTINUED] metFORMIN (GLUCOPHAGE) 1000 MG tablet 1000 mg in the morning 500 mg at noon and 1000 mg in the evening. 75 tablet 1     No facility-administered encounter medications on file as of 6/9/2017.        Reviewed use of high risk medication in the elderly: yes  Reviewed for potential of harmful drug interactions in the elderly: yes    Follow Up:  1 year    An After Visit Summary and PPPS with all of these plans were given to the patient.

## 2017-06-09 NOTE — PROGRESS NOTES
Subjective   Derrek Ritter is a 77 y.o. male.     History of Present Illness     Hypertension: Patient is compliant with medication and dietary salt restriction.  He is walking for exercise, blood pressure at home runs in the 160/60 range.    Hyperlipidemia: Patient's compliant with medication, dietary fat restriction and is fasting today for lipid.    Atrial fibrillation on Coumadin, Coumadin is managed here at our office.    Diabetes:2, he is compliant with medication, he monitors blood sugar 2-3 times a week, blood sugar typically runs around 105 mg percent.      The following portions of the patient's history were reviewed and updated as appropriate: allergies, current medications, past family history, past medical history, past social history, past surgical history and problem list.    Review of Systems   Constitutional: Negative.    HENT: Negative.         Patient complains of dry mouth   Eyes: Negative.    Respiratory: Negative.    Cardiovascular: Positive for leg swelling.        Noted with use of Actos, this is resolved after sleeping overnight.   Gastrointestinal: Negative.         Constipation alternating with diarrhea with use of iron supplementation   Endocrine: Negative.    Genitourinary: Negative.    Musculoskeletal: Negative.    Skin: Negative.    Allergic/Immunologic: Negative for immunocompromised state.   Neurological: Negative.    Hematological: Negative.    Psychiatric/Behavioral: Negative.        Objective   Physical Exam   Constitutional: He is oriented to person, place, and time. He appears well-developed and well-nourished. No distress.   HENT:   Head: Normocephalic and atraumatic.   Right Ear: Tympanic membrane, external ear and ear canal normal.   Left Ear: Tympanic membrane, external ear and ear canal normal.   Mouth/Throat: Uvula is midline, oropharynx is clear and moist and mucous membranes are normal.   Eyes: Conjunctivae and EOM are normal. Pupils are equal, round, and reactive to  light.   Neck: Normal range of motion. Neck supple. No JVD present. Carotid bruit is not present. No thyromegaly present.   Cardiovascular: Normal rate, regular rhythm, normal heart sounds and intact distal pulses.  Exam reveals no gallop and no friction rub.    No murmur heard.  Pulmonary/Chest: Effort normal and breath sounds normal. He has no wheezes. He has no rales.   Abdominal: Soft. Bowel sounds are normal. There is no hepatosplenomegaly. There is no tenderness.   Genitourinary:   Genitourinary Comments: Deferred to urology   Musculoskeletal: Normal range of motion. He exhibits no edema or deformity.   Lymphadenopathy:     He has no cervical adenopathy.   Neurological: He is alert and oriented to person, place, and time. He has normal strength and normal reflexes. No cranial nerve deficit or sensory deficit. Coordination normal.   Skin: Skin is warm and dry. No rash noted.   Psychiatric: He has a normal mood and affect. His speech is normal and behavior is normal. Judgment and thought content normal. Cognition and memory are normal.   Nursing note and vitals reviewed.      Assessment/Plan   Derrek was seen today for annual exam.    Diagnoses and all orders for this visit:    Medicare annual wellness visit, subsequent  -     CBC & Differential    Essential hypertension  -     Comprehensive Metabolic Panel    Hypercholesterolemia  -     Lipid Panel    Atrial fibrillation, unspecified type    Type 2 diabetes mellitus without complication, without long-term current use of insulin  -     Hemoglobin A1c  -     Microalbumin / Creatinine Urine Ratio    Other orders  -     furosemide (LASIX) 20 MG tablet; Take 1 tablet by mouth Daily. as needed        #1 Medicare subsequent annual wellness visit, clinically stable.  See comments above, recommend repeat exam in one year.  I recommend CBC today, patient is aware this is a noncovered benefit and will sign an ABN for this test.    #2 hypertension stage II 1, needs improved  control, we will increase Lasix to 20 mg per day since patient has not been taking this on a regular basis.  I will not add potassium since he is taking 40 mg of enalapril as well should counteract the hypokalemic fact recommend repeat blood pressure in 6 weeks.    #3 hyperlipidemia on medication, status to be determined.  Plan: Continue same, check lipids, adjust dosage of medication if indicated by clinical results.    Number for atrial fibrillation, rate controlled adequately on current meds, will also check INR today.    #5 diabetes on medication, status to be determined check lab as above, adjust dosage of medication as indicated.

## 2017-06-10 LAB
ALBUMIN SERPL-MCNC: 3.9 G/DL (ref 3.5–4.8)
ALBUMIN/CREAT UR: 85.5 MG/G CREAT (ref 0–30)
ALBUMIN/GLOB SERPL: 1.6 {RATIO} (ref 1.2–2.2)
ALP SERPL-CCNC: 41 IU/L (ref 39–117)
ALT SERPL-CCNC: 12 IU/L (ref 0–44)
AST SERPL-CCNC: 13 IU/L (ref 0–40)
BASOPHILS # BLD AUTO: 0 X10E3/UL (ref 0–0.2)
BASOPHILS NFR BLD AUTO: 1 %
BILIRUB SERPL-MCNC: 0.3 MG/DL (ref 0–1.2)
BUN SERPL-MCNC: 16 MG/DL (ref 8–27)
BUN/CREAT SERPL: 23 (ref 10–24)
CALCIUM SERPL-MCNC: 9.2 MG/DL (ref 8.6–10.2)
CHLORIDE SERPL-SCNC: 105 MMOL/L (ref 96–106)
CHOLEST SERPL-MCNC: 136 MG/DL (ref 100–199)
CO2 SERPL-SCNC: 24 MMOL/L (ref 18–29)
CREAT SERPL-MCNC: 0.69 MG/DL (ref 0.76–1.27)
CREAT UR-MCNC: 51.1 MG/DL
EOSINOPHIL # BLD AUTO: 0.1 X10E3/UL (ref 0–0.4)
EOSINOPHIL NFR BLD AUTO: 1 %
ERYTHROCYTE [DISTWIDTH] IN BLOOD BY AUTOMATED COUNT: 13.5 % (ref 12.3–15.4)
GLOBULIN SER CALC-MCNC: 2.4 G/DL (ref 1.5–4.5)
GLUCOSE SERPL-MCNC: 139 MG/DL (ref 65–99)
HBA1C MFR BLD: 7.4 % (ref 4.8–5.6)
HCT VFR BLD AUTO: 34.3 % (ref 37.5–51)
HDLC SERPL-MCNC: 53 MG/DL
HGB BLD-MCNC: 11.8 G/DL (ref 12.6–17.7)
IMM GRANULOCYTES # BLD: 0 X10E3/UL (ref 0–0.1)
IMM GRANULOCYTES NFR BLD: 0 %
INR PPP: 1.2 (ref 0.8–1.2)
LDLC SERPL CALC-MCNC: 66 MG/DL (ref 0–99)
LYMPHOCYTES # BLD AUTO: 0.9 X10E3/UL (ref 0.7–3.1)
LYMPHOCYTES NFR BLD AUTO: 21 %
MCH RBC QN AUTO: 31.8 PG (ref 26.6–33)
MCHC RBC AUTO-ENTMCNC: 34.4 G/DL (ref 31.5–35.7)
MCV RBC AUTO: 93 FL (ref 79–97)
MICROALBUMIN UR-MCNC: 43.7 UG/ML
MONOCYTES # BLD AUTO: 0.3 X10E3/UL (ref 0.1–0.9)
MONOCYTES NFR BLD AUTO: 7 %
NEUTROPHILS # BLD AUTO: 3.1 X10E3/UL (ref 1.4–7)
NEUTROPHILS NFR BLD AUTO: 70 %
PLATELET # BLD AUTO: 248 X10E3/UL (ref 150–379)
POTASSIUM SERPL-SCNC: 4.1 MMOL/L (ref 3.5–5.2)
PROT SERPL-MCNC: 6.3 G/DL (ref 6–8.5)
PROTHROMBIN TIME: 12.6 SEC (ref 9.1–12)
RBC # BLD AUTO: 3.71 X10E6/UL (ref 4.14–5.8)
SODIUM SERPL-SCNC: 145 MMOL/L (ref 134–144)
TRIGL SERPL-MCNC: 85 MG/DL (ref 0–149)
VLDLC SERPL CALC-MCNC: 17 MG/DL (ref 5–40)
WBC # BLD AUTO: 4.4 X10E3/UL (ref 3.4–10.8)

## 2017-06-11 DIAGNOSIS — E11.9 TYPE 2 DIABETES MELLITUS WITHOUT COMPLICATION, WITHOUT LONG-TERM CURRENT USE OF INSULIN (HCC): Primary | ICD-10-CM

## 2017-06-11 DIAGNOSIS — I48.91 ATRIAL FIBRILLATION, UNSPECIFIED TYPE (HCC): ICD-10-CM

## 2017-06-11 DIAGNOSIS — D50.0 IRON DEFICIENCY ANEMIA DUE TO CHRONIC BLOOD LOSS: ICD-10-CM

## 2017-06-11 RX ORDER — GLIMEPIRIDE 2 MG/1
2 TABLET ORAL
Qty: 30 TABLET | Refills: 2 | Status: SHIPPED | OUTPATIENT
Start: 2017-06-11 | End: 2017-12-27 | Stop reason: SDUPTHER

## 2017-06-12 RX ORDER — ENALAPRIL MALEATE 20 MG/1
TABLET ORAL
Qty: 180 TABLET | Refills: 0 | Status: SHIPPED | OUTPATIENT
Start: 2017-06-12 | End: 2017-09-11 | Stop reason: SDUPTHER

## 2017-06-21 DIAGNOSIS — E11.9 DIABETES MELLITUS TYPE II, CONTROLLED, WITH NO COMPLICATIONS (HCC): ICD-10-CM

## 2017-06-21 RX ORDER — PIOGLITAZONEHYDROCHLORIDE 45 MG/1
TABLET ORAL
Qty: 90 TABLET | Refills: 1 | Status: SHIPPED | OUTPATIENT
Start: 2017-06-21 | End: 2017-12-27 | Stop reason: SDUPTHER

## 2017-07-03 DIAGNOSIS — E78.00 HYPERCHOLESTEROLEMIA: ICD-10-CM

## 2017-07-03 RX ORDER — SIMVASTATIN 20 MG
TABLET ORAL
Qty: 90 TABLET | Refills: 1 | Status: SHIPPED | OUTPATIENT
Start: 2017-07-03 | End: 2018-01-09 | Stop reason: SDUPTHER

## 2017-07-21 ENCOUNTER — OFFICE VISIT (OUTPATIENT)
Dept: INTERNAL MEDICINE | Age: 77
End: 2017-07-21

## 2017-07-21 ENCOUNTER — TELEPHONE (OUTPATIENT)
Dept: INTERNAL MEDICINE | Age: 77
End: 2017-07-21

## 2017-07-21 VITALS
TEMPERATURE: 98.2 F | SYSTOLIC BLOOD PRESSURE: 110 MMHG | HEART RATE: 63 BPM | OXYGEN SATURATION: 98 % | BODY MASS INDEX: 22.65 KG/M2 | HEIGHT: 75 IN | DIASTOLIC BLOOD PRESSURE: 60 MMHG | WEIGHT: 182.2 LBS

## 2017-07-21 DIAGNOSIS — I48.91 ATRIAL FIBRILLATION, UNSPECIFIED TYPE (HCC): ICD-10-CM

## 2017-07-21 DIAGNOSIS — I10 ESSENTIAL HYPERTENSION: Primary | ICD-10-CM

## 2017-07-21 LAB — INR PPP: 1.4 (ref 2–3)

## 2017-07-21 PROCEDURE — 99213 OFFICE O/P EST LOW 20 MIN: CPT | Performed by: INTERNAL MEDICINE

## 2017-07-21 PROCEDURE — 36416 COLLJ CAPILLARY BLOOD SPEC: CPT | Performed by: INTERNAL MEDICINE

## 2017-07-21 PROCEDURE — 85610 PROTHROMBIN TIME: CPT | Performed by: INTERNAL MEDICINE

## 2017-07-21 NOTE — TELEPHONE ENCOUNTER
Pt was made aware of the new orders prior to checking out. Pt also made 1wk f/u as ordered at that time. MM

## 2017-07-21 NOTE — TELEPHONE ENCOUNTER
----- Message from Quinton Leal MD sent at 7/21/2017  3:10 PM EDT -----  INR subtherapeutic, increase Coumadin to 10 mg Monday through Friday, 5 mg Saturday and Sunday, recheck INR one week.

## 2017-07-21 NOTE — PROGRESS NOTES
"Derrek Ritter / 77 y.o. / male  07/21/2017  VITALS    /60  Pulse 63  Temp 98.2 °F (36.8 °C)  Ht 75\" (190.5 cm)  Wt 182 lb 3.2 oz (82.6 kg)  SpO2 98%  BMI 22.77 kg/m2  BP Readings from Last 3 Encounters:   07/21/17 110/60   06/09/17 155/80   04/25/17 130/82     Wt Readings from Last 3 Encounters:   07/21/17 182 lb 3.2 oz (82.6 kg)   06/09/17 183 lb 9.6 oz (83.3 kg)   04/25/17 188 lb (85.3 kg)      Body mass index is 22.77 kg/(m^2).    CC:  Main reason(s) for today's visit: Blood Pressure Check      HPI:     Dense today in follow-up of our last office visit on June 9.  That time his blood pressures elevated 155/80.  Because of that we increase the Lasix to 20 mg daily.  He returns today for follow-up.  He has no side effect of medication, he has been monitoring his blood pressure at home and typically runs in the 120s over 60s range.  There are no medication side effects noted.    Since her last visit he has had epidural injection ×2.    Patient Care Team:  Quinton Leal MD as PCP - General  Quinton Leal MD as PCP - Family Medicine  Madhav Hdez MD as Consulting Physician (Hematology and Oncology)    ____________________________________________________________________    ASSESSMENT & PLAN:    Problem List Items Addressed This Visit        Unprioritized    Hypertension - Primary    Relevant Medications    furosemide (LASIX) 20 MG tablet    enalapril (VASOTEC) 20 MG tablet    Other Relevant Orders    Basic Metabolic Panel    Atrial fibrillation    Relevant Medications    warfarin (COUMADIN) 10 MG tablet    Other Relevant Orders    POC INR        Orders Placed This Encounter   Procedures   • Basic Metabolic Panel   • POC INR       Summary/Discussion:     · Number-one hypertension stable on current medical regimen.  Plan: Same meds, blood pressure check 4 months, we'll check BMP today to assess renal function and potassium status.      · #2 atrial fibrillation on anticoagulant, check INR today and " adjust dosage if indicated.  ·       Return in about 4 months (around 11/21/2017) for Blood pressure check.    Future Appointments  Date Time Provider Department Center   10/25/2017 11:20 AM Sherice Newman MD [facility] None       ______________________________________________________    REVIEW OF SYSTEMS    Review of Systems   Respiratory: Negative for chest tightness and shortness of breath.    Cardiovascular: Negative for chest pain and palpitations.   Neurological: Negative for dizziness, syncope, speech difficulty, weakness, light-headedness and headaches.            PHYSICAL EXAMINATION    Physical Exam   Constitutional: He is oriented to person, place, and time. He appears well-developed and well-nourished. No distress.   Cardiovascular: Normal rate, regular rhythm, normal heart sounds and intact distal pulses.  Exam reveals no gallop and no friction rub.    No murmur heard.  Pulmonary/Chest: Effort normal and breath sounds normal. He has no wheezes. He has no rales.   Musculoskeletal: He exhibits no edema.   Neurological: He is alert and oriented to person, place, and time.   Skin: Skin is warm and dry. No rash noted.   Psychiatric: He has a normal mood and affect. His behavior is normal. Judgment and thought content normal.   Nursing note and vitals reviewed.         REVIEWED DATA:    Labs:   Lab Results   Component Value Date     (H) 06/09/2017    K 4.1 06/09/2017    AST 13 06/09/2017    ALT 12 06/09/2017    BUN 16 06/09/2017    CREATININE 0.69 (L) 06/09/2017    CREATININE 0.70 12/30/2016    CREATININE 0.75 (L) 10/29/2016    EGFRIFNONA 92 06/09/2017    EGFRIFAFRI 107 06/09/2017          Lab Results   Component Value Date    HGBA1C 7.4 (H) 06/09/2017    HGBA1C 6.94 (H) 02/09/2017    HGBA1C 7.08 (H) 10/06/2016     (H) 06/09/2017     (H) 10/06/2016     (H) 06/06/2016    MICROALBUR 43.7 06/09/2017       Lab Results   Component Value Date    LDL 66 06/09/2017    LDL 77  10/06/2016    LDL 68 06/06/2016    HDL 53 06/09/2017    TRIG 85 06/09/2017       Lab Results   Component Value Date    TSH 1.810 07/11/2016          Lab Results   Component Value Date    WBC 4.4 06/09/2017    HGB 11.8 (L) 06/09/2017    HGB 12.2 (L) 10/29/2016    HGB 11.7 (L) 08/23/2016     06/09/2017        Imaging:        Medical Tests:        Summary of old records / correspondence / consultant report:        Request outside records:        No Known Allergies    Current Outpatient Prescriptions   Medication Sig Dispense Refill   • ACCU-CHEK FASTCLIX LANCETS misc USE TO TEST TWICE DAILY 102 each 0   • Blood Glucose Monitoring Suppl (ACCU-CHEK FRANCISCO SMARTVIEW) W/DEVICE kit USE TO TEST BLOOD SUGAR TWICE DAILY 1 kit 0   • Blood Glucose Monitoring Suppl (FREESTYLE LITE) device Use to test blood once daily 1 each 0   • brimonidine (ALPHAGAN) 0.2 % ophthalmic solution      • donepezil (ARICEPT) 5 MG tablet Take 1 tablet by mouth Every Night. 90 tablet 3   • dorzolamide (TRUSOPT) 2 % ophthalmic solution Administer 1 drop to both eyes 2 (two) times a day.     • enalapril (VASOTEC) 20 MG tablet TAKE 1 TABLET BY MOUTH TWICE A  tablet 0   • esomeprazole (nexIUM) 40 MG capsule Take 1 capsule by mouth Daily. 90 capsule 1   • ferrous sulfate 325 (65 FE) MG tablet Take 1 tablet by mouth Daily. 60 tablet 5   • furosemide (LASIX) 20 MG tablet Take 1 tablet by mouth Daily. as needed 90 tablet 3   • glimepiride (AMARYL) 2 MG tablet Take 1 tablet by mouth Every Morning Before Breakfast. 30 tablet 2   • glucose blood (FREESTYLE LITE) test strip Use to test blood once daily 100 each 12   • Glucose Blood disk Inject 1 Device as directed daily. TEST BLOOD SUGAR ONCE DAILY AS DIRECTED 100 each 3   • glucose blood test strip USE TO TEST TWICE DAILY 100 each 12   • Lancets (FREESTYLE) lancets Use to test blood sugar daily 100 each 12   • latanoprost (XALATAN) 0.005 % ophthalmic solution      • metFORMIN (GLUCOPHAGE) 1000 MG  tablet Take 1 tablet every morning, 1/2 tablet at noon, and 1 tablet every evening. 75 tablet 2   • ONE TOUCH ULTRA TEST test strip USE ONCE DAILY AS DIRECTED 100 each 3   • oxyCODONE-acetaminophen (PERCOCET)  MG per tablet 1 tablet Every 8 (Eight) Hours As Needed.     • pioglitazone (ACTOS) 45 MG tablet TAKE 1 TABLET DAILY 90 tablet 1   • simvastatin (ZOCOR) 20 MG tablet TAKE 1 TABLET DAILY 90 tablet 1   • tamsulosin (FLOMAX) 0.4 MG capsule 24 hr capsule TAKE 2 CAPSULES BY MOUTH DAILY 180 capsule 1   • timolol (TIMOPTIC) 0.5 % ophthalmic solution      • warfarin (COUMADIN) 10 MG tablet TAKE 1 TABLET BY MOUTH EVERY OTHER DAY ,ALTERNATING WITH 1/2 TABLET EVERY OTHER DAY 68 tablet 1     No current facility-administered medications for this visit.        PFSH:     The following portions of the patient's history were reviewed and updated as appropriate: Allergies / Current Medications / Past Medical History / Surgical History / Social History / Family History    Patient Active Problem List   Diagnosis   • Cholelithiasis   • DM (diabetes mellitus)   • Diverticulosis of intestine   • Hypercholesterolemia   • Hypertension   • Spinal stenosis   • Microalbuminuria   • Atrial fibrillation   • Medicare annual wellness visit, subsequent   • Anemia   • Iron deficiency anemia   • DJD (degenerative joint disease)   • Impotence of organic origin   • Glaucoma   • Hiatal hernia   • Mild cognitive disorder   • Calculus of kidney   • Nocturia   • Chronic bilateral low back pain with bilateral sciatica   • Post laminectomy syndrome   • Routine health maintenance   • Weight loss       Past Medical History:   Diagnosis Date   • Acute suppurative otitis media    • Acute upper respiratory infection    • Atrial fibrillation    • Cholelithiasis    • Diabetes mellitus    • Diverticulosis    • Dry eyes    • Earache    • Edema    • Encounter for screening for malignant neoplasm of prostate    • Esophagitis, reflux    • Glaucoma    • H/O  echocardiogram 09/18/2013   • Health care maintenance    • Hiatal hernia    • History of EKG 10/06/2015   • History of Holter monitoring 09/16/2013   • Hyperlipidemia    • Hypertension    • Lumbar canal stenosis    • Microalbuminuria    • Nephrolithiasis    • Osteoarthritis of hand     of thumb   • PAF (paroxysmal atrial fibrillation)    • Postoperative examination    • Pre-operative cardiovascular examination    • Spinal stenosis        Past Surgical History:   Procedure Laterality Date   • AMPUTATION DIGIT Left 10/29/2016    Procedure: TENDON AND NERVE REPAIR OF  LEFT THUMB;  Surgeon: Zak Hanson MD;  Location: [facility];  Service:    • ARTHRODESIS  10/28/2013    Spinal / Description: Repair spinal stenosis   • CATARACT EXTRACTION     • HERNIA REPAIR     • LUMBAR FUSION      Lumbar vertebral fusion   • LUMBAR LAMINECTOMY      10.28.13  bilat L4/5 lami with Nyla and Belen   • OTHER SURGICAL HISTORY      Lithotomy   • TONSILLECTOMY         Social History     Social History   • Marital status:      Spouse name: Silvia   • Number of children: 3   • Years of education: College     Occupational History   • Teacher RETIRED Retired     Social History Main Topics   • Smoking status: Never Smoker   • Smokeless tobacco: Never Used      Comment: caffeine use   • Alcohol use No   • Drug use: No   • Sexual activity: Not Asked     Other Topics Concern   • None     Social History Narrative       Family History   Problem Relation Age of Onset   • Heart attack Mother      acute myocardial infarction   • Stroke Mother      hemorrhagic   • Emphysema Father    • Diabetes Other      mellitus   • Hypertension Other    • Heart disease Other          **Dragon Disclaimer:   Much of this encounter note is an electronic transcription/translation of spoken language to printed text. The electronic translation of spoken language may permit erroneous, or at times, nonsensical words or phrases to be inadvertently transcribed.  Although I have reviewed the note for such errors, some may still exist.

## 2017-07-31 ENCOUNTER — ANTICOAGULATION VISIT (OUTPATIENT)
Dept: INTERNAL MEDICINE | Age: 77
End: 2017-07-31

## 2017-07-31 ENCOUNTER — TELEPHONE (OUTPATIENT)
Dept: INTERNAL MEDICINE | Age: 77
End: 2017-07-31

## 2017-07-31 DIAGNOSIS — I48.20 ATRIAL FIBRILLATION, CHRONIC (HCC): ICD-10-CM

## 2017-07-31 LAB — INR PPP: 1.7 (ref 2–3)

## 2017-07-31 PROCEDURE — 36416 COLLJ CAPILLARY BLOOD SPEC: CPT

## 2017-07-31 PROCEDURE — 85610 PROTHROMBIN TIME: CPT

## 2017-07-31 NOTE — TELEPHONE ENCOUNTER
----- Message from Quinton Leal MD sent at 7/31/2017 10:15 AM EDT -----  INR subtherapeutic, increase Coumadin to 10 mg Monday through Saturday, 5 mg on Sunday, repeat INR 2 weeks.

## 2017-08-11 ENCOUNTER — RESULTS ENCOUNTER (OUTPATIENT)
Dept: INTERNAL MEDICINE | Age: 77
End: 2017-08-11

## 2017-08-11 ENCOUNTER — TELEPHONE (OUTPATIENT)
Dept: INTERNAL MEDICINE | Age: 77
End: 2017-08-11

## 2017-08-11 ENCOUNTER — ANTICOAGULATION VISIT (OUTPATIENT)
Dept: INTERNAL MEDICINE | Age: 77
End: 2017-08-11

## 2017-08-11 DIAGNOSIS — E11.9 TYPE 2 DIABETES MELLITUS WITHOUT COMPLICATION, WITHOUT LONG-TERM CURRENT USE OF INSULIN (HCC): ICD-10-CM

## 2017-08-11 DIAGNOSIS — D50.0 IRON DEFICIENCY ANEMIA DUE TO CHRONIC BLOOD LOSS: ICD-10-CM

## 2017-08-11 DIAGNOSIS — I48.20 ATRIAL FIBRILLATION, CHRONIC (HCC): ICD-10-CM

## 2017-08-11 LAB — INR PPP: 2.7 (ref 2–3)

## 2017-08-11 PROCEDURE — 85610 PROTHROMBIN TIME: CPT

## 2017-08-11 PROCEDURE — 36416 COLLJ CAPILLARY BLOOD SPEC: CPT

## 2017-08-11 NOTE — TELEPHONE ENCOUNTER
Called pt, spoke w/ pt's wife regarding PT/INR results. Pt's wife was instructed to have pt continue current dose of coumadin per Dr. Suarez. Pt's wife was instructed to have pt return in 1 week for recheck per Dr. Suarez. Pt's wife demonstrated understanding. Appt was made for 1 week by pt's wife.    BRYCE

## 2017-08-18 ENCOUNTER — ANTICOAGULATION VISIT (OUTPATIENT)
Dept: INTERNAL MEDICINE | Age: 77
End: 2017-08-18

## 2017-08-18 DIAGNOSIS — I48.20 ATRIAL FIBRILLATION, CHRONIC (HCC): ICD-10-CM

## 2017-08-18 LAB — INR PPP: 2.2 (ref 2–3)

## 2017-08-18 PROCEDURE — 36416 COLLJ CAPILLARY BLOOD SPEC: CPT | Performed by: INTERNAL MEDICINE

## 2017-08-18 PROCEDURE — 85610 PROTHROMBIN TIME: CPT | Performed by: INTERNAL MEDICINE

## 2017-08-22 DIAGNOSIS — E11.9 TYPE 2 DIABETES MELLITUS WITHOUT COMPLICATION, WITHOUT LONG-TERM CURRENT USE OF INSULIN (HCC): ICD-10-CM

## 2017-09-11 DIAGNOSIS — K21.9 GASTROESOPHAGEAL REFLUX DISEASE, ESOPHAGITIS PRESENCE NOT SPECIFIED: ICD-10-CM

## 2017-09-11 RX ORDER — ENALAPRIL MALEATE 20 MG/1
TABLET ORAL
Qty: 180 TABLET | Refills: 0 | Status: SHIPPED | OUTPATIENT
Start: 2017-09-11 | End: 2017-12-13 | Stop reason: SDUPTHER

## 2017-09-11 RX ORDER — ESOMEPRAZOLE MAGNESIUM 40 MG/1
CAPSULE, DELAYED RELEASE ORAL
Qty: 90 CAPSULE | Refills: 1 | Status: SHIPPED | OUTPATIENT
Start: 2017-09-11 | End: 2018-01-19

## 2017-09-26 ENCOUNTER — ANTICOAGULATION VISIT (OUTPATIENT)
Dept: INTERNAL MEDICINE | Age: 77
End: 2017-09-26

## 2017-09-26 ENCOUNTER — TELEPHONE (OUTPATIENT)
Dept: INTERNAL MEDICINE | Age: 77
End: 2017-09-26

## 2017-09-26 DIAGNOSIS — I48.20 ATRIAL FIBRILLATION, CHRONIC (HCC): ICD-10-CM

## 2017-09-26 LAB — INR PPP: 2.3 (ref 2–3)

## 2017-09-26 PROCEDURE — 85610 PROTHROMBIN TIME: CPT | Performed by: INTERNAL MEDICINE

## 2017-09-26 PROCEDURE — 36416 COLLJ CAPILLARY BLOOD SPEC: CPT | Performed by: INTERNAL MEDICINE

## 2017-09-26 NOTE — TELEPHONE ENCOUNTER
----- Message from Quinton Leal MD sent at 9/26/2017 11:09 AM EDT -----  INR is therapeutic, continue present dosage of Coumadin, and repeat INR in 4 weeks.

## 2017-10-25 ENCOUNTER — TELEPHONE (OUTPATIENT)
Dept: INTERNAL MEDICINE | Age: 77
End: 2017-10-25

## 2017-10-25 ENCOUNTER — CLINICAL SUPPORT (OUTPATIENT)
Dept: INTERNAL MEDICINE | Age: 77
End: 2017-10-25

## 2017-10-25 ENCOUNTER — OFFICE VISIT (OUTPATIENT)
Dept: CARDIOLOGY | Facility: CLINIC | Age: 77
End: 2017-10-25

## 2017-10-25 VITALS
SYSTOLIC BLOOD PRESSURE: 120 MMHG | HEART RATE: 53 BPM | WEIGHT: 188 LBS | HEIGHT: 75 IN | DIASTOLIC BLOOD PRESSURE: 80 MMHG | BODY MASS INDEX: 23.38 KG/M2

## 2017-10-25 DIAGNOSIS — I10 ESSENTIAL HYPERTENSION: ICD-10-CM

## 2017-10-25 DIAGNOSIS — I48.0 PAROXYSMAL ATRIAL FIBRILLATION (HCC): Primary | ICD-10-CM

## 2017-10-25 DIAGNOSIS — E78.00 HYPERCHOLESTEROLEMIA: ICD-10-CM

## 2017-10-25 DIAGNOSIS — I48.20 ATRIAL FIBRILLATION, CHRONIC (HCC): ICD-10-CM

## 2017-10-25 DIAGNOSIS — E11.9 TYPE 2 DIABETES MELLITUS WITHOUT COMPLICATION, WITHOUT LONG-TERM CURRENT USE OF INSULIN (HCC): ICD-10-CM

## 2017-10-25 LAB — INR PPP: 2.3 (ref 2–3)

## 2017-10-25 PROCEDURE — 99214 OFFICE O/P EST MOD 30 MIN: CPT | Performed by: INTERNAL MEDICINE

## 2017-10-25 PROCEDURE — 85610 PROTHROMBIN TIME: CPT

## 2017-10-25 PROCEDURE — 93000 ELECTROCARDIOGRAM COMPLETE: CPT | Performed by: INTERNAL MEDICINE

## 2017-10-25 PROCEDURE — 36416 COLLJ CAPILLARY BLOOD SPEC: CPT

## 2017-10-25 NOTE — TELEPHONE ENCOUNTER
----- Message from Quinton Leal MD sent at 10/25/2017 10:50 AM EDT -----  INR is therapeutic, continue present dosage of Coumadin, and repeat INR in 4 weeks.

## 2017-10-25 NOTE — PROGRESS NOTES
Date of Office Visit: 10/25/2017  Encounter Provider: Sherice Newman MD  Place of Service: [facility] CARDIOLOGY  Patient Name: Derrek Ritter  :1940      Patient ID:  Derrek Ritter is a 77 y.o. male is here for  followup for PAF and HTN.         History of Present Illness    He had been seen in the past by Dr. Figueroa prior to back surgery for spinal stenosis  in . Prior to that surgery, he had a stress nuclear perfusion study performed on  10/01/2013 which showed no evidence of ischemia. He also had a 2-D echocardiogram with  Doppler performed on 2013 which revealed an ejection fraction of 55-60%,  indeterminate diastolic filling pattern, borderline left atrial enlargement, mild  posterior leaflet prolapse of the mitral valve with trace mitral insufficiency. He had  been seen in Dr. Glover's office and Dr. Glover had detected an irregular heart rhythm.  He was found to be in atrial fibrillation. A Holter monitor was performed on 2013  which showed persistent atrial fibrillation, a maximum heart rate of 164 and an average  heart rate of 98 beats per minute. He then had a second Holter recording on 2013  which showed no atrial fibrillation at all. He has been on warfarin since that time. He  was also placed on propafenone and Metoprolol then as well.       He does have a history of diabetes mellitus type 2, hypertension, and hyperlipidemia. He  has a hiatal hernia, history of diverticular disease and gastroesophageal reflux disease.  He has never had any thyroid disease.         He had back surgery in  and it did not help  at all.  He had ankle brachial indices done in 10/2014,  which were normal.      He cares for his wife who has cancer and other medical issues.  He is a retired  and .      He is feeling well.  He's had no tachycardia, dizziness or syncope.  He has No chest pain or pressure.  His  spinal stenosis bothers him quite a bit.  He is taking his medications as directed as had no issues with warfarin.    Past Medical History:   Diagnosis Date   • Acute suppurative otitis media    • Acute upper respiratory infection    • Atrial fibrillation    • Cholelithiasis    • Diabetes mellitus    • Diverticulosis    • Dry eyes    • Earache    • Edema    • Encounter for screening for malignant neoplasm of prostate    • Esophagitis, reflux    • Glaucoma    • H/O echocardiogram 09/18/2013   • Health care maintenance    • Hiatal hernia    • History of EKG 10/06/2015   • History of Holter monitoring 09/16/2013   • Hyperlipidemia    • Hypertension    • Lumbar canal stenosis    • Microalbuminuria    • Nephrolithiasis    • Osteoarthritis of hand     of thumb   • PAF (paroxysmal atrial fibrillation)    • Postoperative examination    • Pre-operative cardiovascular examination    • Spinal stenosis          Past Surgical History:   Procedure Laterality Date   • AMPUTATION DIGIT Left 10/29/2016    Procedure: TENDON AND NERVE REPAIR OF  LEFT THUMB;  Surgeon: Zak Hanson MD;  Location: [facility];  Service:    • ARTHRODESIS  10/28/2013    Spinal / Description: Repair spinal stenosis   • CATARACT EXTRACTION     • HERNIA REPAIR     • LUMBAR FUSION      Lumbar vertebral fusion   • LUMBAR LAMINECTOMY      10.28.13  bilat L4/5 lami with Nyla and Belen   • OTHER SURGICAL HISTORY      Lithotomy   • TONSILLECTOMY         Current Outpatient Prescriptions on File Prior to Visit   Medication Sig Dispense Refill   • ACCU-CHEK FASTCLIX LANCETS misc USE TO TEST TWICE DAILY 102 each 0   • Blood Glucose Monitoring Suppl (ACCU-CHEK FRANCISCO SMARTVIEW) W/DEVICE kit USE TO TEST BLOOD SUGAR TWICE DAILY 1 kit 0   • Blood Glucose Monitoring Suppl (FREESTYLE LITE) device Use to test blood once daily 1 each 0   • brimonidine (ALPHAGAN) 0.2 % ophthalmic solution      • donepezil (ARICEPT) 5 MG tablet Take 1 tablet by mouth Every Night. 90  tablet 3   • dorzolamide (TRUSOPT) 2 % ophthalmic solution Administer 1 drop to both eyes 2 (two) times a day.     • enalapril (VASOTEC) 20 MG tablet TAKE 1 TABLET BY MOUTH TWICE A  tablet 0   • esomeprazole (nexIUM) 40 MG capsule TAKE 1 CAPSULE DAILY 90 capsule 1   • ferrous sulfate 325 (65 FE) MG tablet Take 1 tablet by mouth Daily. 60 tablet 5   • furosemide (LASIX) 20 MG tablet Take 1 tablet by mouth Daily. as needed 90 tablet 3   • glimepiride (AMARYL) 2 MG tablet Take 1 tablet by mouth Every Morning Before Breakfast. 30 tablet 2   • glucose blood (FREESTYLE LITE) test strip Use to test blood once daily 100 each 12   • Glucose Blood disk Inject 1 Device as directed daily. TEST BLOOD SUGAR ONCE DAILY AS DIRECTED 100 each 3   • glucose blood test strip USE TO TEST TWICE DAILY 100 each 12   • Lancets (FREESTYLE) lancets Use to test blood sugar daily 100 each 12   • latanoprost (XALATAN) 0.005 % ophthalmic solution      • metFORMIN (GLUCOPHAGE) 1000 MG tablet TAKE 1 TABLET EVERY MORNING, 1/2 TABLET AT NOON, AND 1 TABLET EVERY EVENING. 75 tablet 2   • ONE TOUCH ULTRA TEST test strip USE ONCE DAILY AS DIRECTED 100 each 3   • oxyCODONE-acetaminophen (PERCOCET)  MG per tablet 1 tablet Every 8 (Eight) Hours As Needed.     • pioglitazone (ACTOS) 45 MG tablet TAKE 1 TABLET DAILY 90 tablet 1   • simvastatin (ZOCOR) 20 MG tablet TAKE 1 TABLET DAILY 90 tablet 1   • tamsulosin (FLOMAX) 0.4 MG capsule 24 hr capsule TAKE 2 CAPSULES BY MOUTH DAILY 180 capsule 1   • timolol (TIMOPTIC) 0.5 % ophthalmic solution      • warfarin (COUMADIN) 10 MG tablet TAKE 1 TABLET BY MOUTH EVERY OTHER DAY ,ALTERNATING WITH 1/2 TABLET EVERY OTHER DAY 68 tablet 1     No current facility-administered medications on file prior to visit.        Social History     Social History   • Marital status:      Spouse name: Silvia   • Number of children: 3   • Years of education: College     Occupational History   • Teacher RETIRED Retired  "    Social History Main Topics   • Smoking status: Never Smoker   • Smokeless tobacco: Never Used      Comment: caffeine use   • Alcohol use No   • Drug use: No   • Sexual activity: Not on file     Other Topics Concern   • Not on file     Social History Narrative           Review of Systems   Constitution: Negative.   HENT: Negative for congestion.    Eyes: Negative for vision loss in left eye and vision loss in right eye.   Respiratory: Negative.  Negative for cough, hemoptysis, shortness of breath, sleep disturbances due to breathing, snoring, sputum production and wheezing.    Endocrine: Negative.    Hematologic/Lymphatic: Negative.    Skin: Negative for poor wound healing and rash.   Musculoskeletal: Negative for falls, gout, muscle cramps and myalgias.   Gastrointestinal: Negative for abdominal pain, diarrhea, dysphagia, hematemesis, melena, nausea and vomiting.   Neurological: Negative for excessive daytime sleepiness, dizziness, headaches, light-headedness, loss of balance, seizures and vertigo.   Psychiatric/Behavioral: Negative for depression and substance abuse. The patient is not nervous/anxious.        Procedures    ECG 12 Lead  Date/Time: 10/25/2017 11:04 AM  Performed by: CHERYL ORTEGA  Authorized by: CHERYL ORTEGA   Comparison: compared with previous ECG   Similar to previous ECG  Rhythm: sinus rhythm  Conduction: right bundle branch block and LAFB  Clinical impression: abnormal ECG               Objective:      Vitals:    10/25/17 1055   BP: 120/80   BP Location: Right arm   Patient Position: Sitting   Pulse: 53   Weight: 188 lb (85.3 kg)   Height: 75\" (190.5 cm)     Body mass index is 23.5 kg/(m^2).    Physical Exam   Constitutional: He is oriented to person, place, and time. He appears well-developed and well-nourished. No distress.   HENT:   Head: Normocephalic and atraumatic.   Eyes: Conjunctivae are normal. No scleral icterus.   Neck: Neck supple. No JVD present. Carotid bruit is " not present. No thyromegaly present.   Cardiovascular: Normal rate, regular rhythm, S1 normal, S2 normal, normal heart sounds and intact distal pulses.   No extrasystoles are present. PMI is not displaced.  Exam reveals no gallop.    No murmur heard.  Pulses:       Carotid pulses are 2+ on the right side, and 2+ on the left side.       Radial pulses are 2+ on the right side, and 2+ on the left side.        Dorsalis pedis pulses are 2+ on the right side, and 2+ on the left side.        Posterior tibial pulses are 2+ on the right side, and 2+ on the left side.   Pulmonary/Chest: Effort normal and breath sounds normal. No respiratory distress. He has no wheezes. He has no rhonchi. He has no rales. He exhibits no tenderness.   Abdominal: Soft. Bowel sounds are normal. He exhibits no distension, no abdominal bruit and no mass. There is no tenderness.   Musculoskeletal: He exhibits no edema or deformity.   Lymphadenopathy:     He has no cervical adenopathy.   Neurological: He is alert and oriented to person, place, and time. No cranial nerve deficit.   Skin: Skin is warm and dry. No rash noted. He is not diaphoretic. No cyanosis. No pallor. Nails show no clubbing.   Psychiatric: He has a normal mood and affect. Judgment normal.   Vitals reviewed.      Lab Review:       Assessment:      Diagnosis Plan   1. Paroxysmal atrial fibrillation     2. Essential hypertension     3. Hypercholesterolemia     4. Type 2 diabetes mellitus without complication, without long-term current use of insulin       1. Paroxysmal atrial fibrillation. We will maintain warfarin. His CHADS VASc score is high  with a score of three giving him a 3.2% risk of stroke per year, and he is asymptomatic  with the atrial fibrillation so warfarin is indicated in this circumstance.   2. Hypertension under fair control.   3. Diabetes mellitus type 2. This has gotten better with weight loss. In fact, he said  his hemoglobin A1C has been running about 6.6%.   4.  Hyperlipidemia. He is currently on simvastatin for this.   5. History of hiatal hernia, gastroesophageal reflux disease, and diverticular disease  stable at this time.   6. Chronic lower extremity edema with what appears to be venous insufficiency and may be  lymphedema as well. Stable.  7. RBBB and LAFB, chronic.   8. Bradycardia, off metoprolol due to this.      Plan:       See back in 1 year, no changes.     Atrial Fibrillation and Atrial Flutter  Assessment  • The patient has paroxysmal atrial fibrillation  • This is non-valvular in etiology  • The patient's CHADS2-VASc score is 2  • A [id number] score of 2 or more is considered a high risk for a thromboembolic event  • Warfarin prescribed    Plan  • Continue in atrial fibrillation with rate control  • Continue warfarin for antithrombotic therapy, bleeding issues discussed

## 2017-11-02 ENCOUNTER — TELEPHONE (OUTPATIENT)
Dept: INTERNAL MEDICINE | Age: 77
End: 2017-11-02

## 2017-11-02 NOTE — TELEPHONE ENCOUNTER
Pt called Wednesday 11/01/17 afternoon just a few minutes before due to roll the phones at 4pm re: some info needed from  for Spinal Cord Stimulator Trial. I took the contact info from pt & called Nan beard/[facility] today.    Nan, Patient Care Coordinator at [facility] stated that she recd Cardiac Clearance from Dr.Rebecca Newman for the pt to be off coumadin for 7-days for Spinal Cord Stimulator Trial. Nan requested a copy of pt's last INR results to have in pt's chart for their records, so I sent over INR results from 10/25/17.     Nan can be reached #[phone] or via F#[phone].

## 2017-11-10 ENCOUNTER — TELEPHONE (OUTPATIENT)
Dept: INTERNAL MEDICINE | Age: 77
End: 2017-11-10

## 2017-11-10 NOTE — TELEPHONE ENCOUNTER
Patient stopped by the office. Wanted to let you know that he will be off his warfarin for quite a while due to the implant device.

## 2017-11-10 NOTE — TELEPHONE ENCOUNTER
He should not be off the Coumadin for a long period of time, because this increases his risk of stroke.  Find out exactly how long he plans on being off the medication.

## 2017-11-15 ENCOUNTER — TELEPHONE (OUTPATIENT)
Dept: INTERNAL MEDICINE | Age: 77
End: 2017-11-15

## 2017-11-15 NOTE — TELEPHONE ENCOUNTER
Pt called just to inform Dr Leal he started back on Metformin this past Monday 11-13-17.  Any questions call pt at # [phone]  Thanks SP

## 2017-11-27 ENCOUNTER — TELEPHONE (OUTPATIENT)
Dept: INTERNAL MEDICINE | Age: 77
End: 2017-11-27

## 2017-11-27 DIAGNOSIS — G89.29 CHRONIC LOW BACK PAIN, UNSPECIFIED BACK PAIN LATERALITY, WITH SCIATICA PRESENCE UNSPECIFIED: Primary | ICD-10-CM

## 2017-11-27 DIAGNOSIS — M54.5 CHRONIC LOW BACK PAIN, UNSPECIFIED BACK PAIN LATERALITY, WITH SCIATICA PRESENCE UNSPECIFIED: Primary | ICD-10-CM

## 2017-11-27 NOTE — TELEPHONE ENCOUNTER
Pt called wanting Dr. Leal to recommend a few Orthopedic surgeons for his back. He said to call him with those and if he doesn't answer to leave a detailed message.  Pt's # [phone]  Thanks SP

## 2017-11-30 DIAGNOSIS — I48.0 PAROXYSMAL ATRIAL FIBRILLATION (HCC): ICD-10-CM

## 2017-11-30 RX ORDER — WARFARIN SODIUM 10 MG/1
TABLET ORAL
Qty: 68 TABLET | Refills: 0 | Status: SHIPPED | OUTPATIENT
Start: 2017-11-30 | End: 2018-01-19

## 2017-12-05 ENCOUNTER — OFFICE VISIT (OUTPATIENT)
Dept: ORTHOPEDIC SURGERY | Facility: CLINIC | Age: 77
End: 2017-12-05

## 2017-12-05 VITALS — HEIGHT: 71 IN | TEMPERATURE: 98 F

## 2017-12-05 DIAGNOSIS — M43.16 SPONDYLOLISTHESIS OF LUMBAR REGION: ICD-10-CM

## 2017-12-05 DIAGNOSIS — M48.062 SPINAL STENOSIS OF LUMBAR REGION WITH NEUROGENIC CLAUDICATION: ICD-10-CM

## 2017-12-05 DIAGNOSIS — M41.9 ACQUIRED SCOLIOSIS: ICD-10-CM

## 2017-12-05 DIAGNOSIS — M40.15 OTHER SECONDARY KYPHOSIS, THORACOLUMBAR REGION: ICD-10-CM

## 2017-12-05 DIAGNOSIS — M54.50 LUMBAR SPINE PAIN: Primary | ICD-10-CM

## 2017-12-05 PROCEDURE — 72100 X-RAY EXAM L-S SPINE 2/3 VWS: CPT | Performed by: ORTHOPAEDIC SURGERY

## 2017-12-05 PROCEDURE — 99205 OFFICE O/P NEW HI 60 MIN: CPT | Performed by: ORTHOPAEDIC SURGERY

## 2017-12-05 RX ORDER — CEFAZOLIN SODIUM 2 G/100ML
2 INJECTION, SOLUTION INTRAVENOUS ONCE
Status: CANCELLED | OUTPATIENT
Start: 2018-01-29 | End: 2018-01-29

## 2017-12-05 RX ORDER — SODIUM CHLORIDE, SODIUM LACTATE, POTASSIUM CHLORIDE, CALCIUM CHLORIDE 600; 310; 30; 20 MG/100ML; MG/100ML; MG/100ML; MG/100ML
100 INJECTION, SOLUTION INTRAVENOUS CONTINUOUS
Status: CANCELLED | OUTPATIENT
Start: 2018-01-29

## 2017-12-05 NOTE — PROGRESS NOTES
New patient or new problem visit    Chief Complaint   Patient presents with   • Lumbar Spine - Pain       HPI: He complains of severe back pain ongoing for years, unrelieved by a 23 intervention by Dr. Redmond and Shonda which involved an L4 5 laminectomy and fusion with instrumentation and placement of bone stimulator.  He is also had leg pain she is sometimes worse than the back pain.  He has been on Percocet at times for pain and is in pain management were epidurals were tried without relief.  He is done physical therapy.  He's tried a trial spinal cord stimulator but it did not work.    PFSH: See chart- reviewed    Review of Systems   Constitutional: Negative for chills, fever and unexpected weight change.   HENT: Positive for hearing loss. Negative for trouble swallowing and voice change.    Eyes: Negative for visual disturbance.   Respiratory: Negative for cough and shortness of breath.    Cardiovascular: Negative for chest pain and leg swelling.   Gastrointestinal: Negative for abdominal pain, nausea and vomiting.   Endocrine: Negative for cold intolerance and heat intolerance.   Genitourinary: Negative for difficulty urinating, frequency and urgency.   Skin: Negative for rash and wound.   Allergic/Immunologic: Negative for immunocompromised state.   Neurological: Negative for weakness and numbness.   Hematological: Does not bruise/bleed easily.   Psychiatric/Behavioral: Negative for dysphoric mood. The patient is not nervous/anxious.        PE: Constitutional: Vital signs above-noted.  Awake, alert and oriented    Psychiatric: Affect and insight do not appear grossly disturbed.    Pulmonary: Breathing is unlabored, color is good.    Skin: Warm, dry and normal turgor    Cardiac:  pedal pulses intact.  2+ edema in the legs.    Eyesight and hearing appear adequate for examination purposes      Musculoskeletal:  There is moderate tenderness to percussion and palpation of the spine. Motion appears death.  Posture  is kyphotic to coronal and sagittal inspection.    The skin about the area is intact.  There is no palpable or visible deformity.  There is no local spasm.       Neurologic:   Reflexes are absent in the patellae and achilles.   Motor function is undisturbed in quadriceps, EHL, and gastrocnemius      Sensation appears symmetrically intact to light touch   .  In the bilateral lower extremities there is no evidence of atrophy.   Clonus is absent..  Gait appears undisturbed.  SLR test negative      MEDICAL DECISION MAKING    XRAY: Plain film x-rays of the lumbar spine ordered and obtained in my office today 2 views show an L4 5 instrumented healed fusion with the ALEXANDRU stimulator battery and leads in place.  There is a scoliosis above this and a 1 cm spondylolisthesis at 3 for and a 5 mm spondylolisthesis at L2-3.  There is an overall significant loss of sagittal balance because of the lumbar kyphosis.  MRI shows severe stenosis at L3-4 and mild changes at L2-3 and marketed disc degeneration at L5-S1.  I reviewed the radiologist's report with which I agree.    Other: n/a    Impression: Lumbar on the low listhesis, lumbar scoliosis lumbar kyphosis lumbar spinal stenosis status post L4 5 instrumented fusion with worsening pain    Plan: I plan L2-3, L3-4 laminectomy and fusion with instrumentation and we need to pay special attention to correcting the kyphosis and restore lordosis over the short segment.  I believe if we can do so than his back pain will be relieved as well as leg pain, the former probably not 100% but goodly amount.I discussed the risks and benefits of posterior spinal fusion, including where applicable, laminectomy.  Risks include adverse anesthetic events such as death, stroke and myocardial infarction.  More specific surgical risks include infection, nonunion, hardware failure, spinal fluid leakage, nerve root injury or paralysis, visceral or vascular injury, persistent pain, deep venous thrombosis, and  pulmonary embolism among others. There is a 70 to 90 % chance of success.   Alternatives have been discussed.  After careful consideration the patient wishes to proceed with surgery.

## 2017-12-08 ENCOUNTER — TELEPHONE (OUTPATIENT)
Dept: ORTHOPEDIC SURGERY | Facility: CLINIC | Age: 77
End: 2017-12-08

## 2017-12-08 NOTE — TELEPHONE ENCOUNTER
If you have not yet scheduled him, see if we can get him on to do with Dr. Redmond.  If he's already been scheduled I may not be able to do better than that time and just keep him on for me.

## 2017-12-12 DIAGNOSIS — E11.9 TYPE 2 DIABETES MELLITUS WITHOUT COMPLICATION, WITHOUT LONG-TERM CURRENT USE OF INSULIN (HCC): ICD-10-CM

## 2017-12-13 RX ORDER — ENALAPRIL MALEATE 20 MG/1
TABLET ORAL
Qty: 180 TABLET | Refills: 1 | Status: SHIPPED | OUTPATIENT
Start: 2017-12-13 | End: 2018-01-19

## 2017-12-15 DIAGNOSIS — M48.062 SPINAL STENOSIS OF LUMBAR REGION WITH NEUROGENIC CLAUDICATION: ICD-10-CM

## 2017-12-15 DIAGNOSIS — M43.16 SPONDYLOLISTHESIS OF LUMBAR REGION: Primary | ICD-10-CM

## 2017-12-27 DIAGNOSIS — E11.9 DIABETES MELLITUS TYPE II, CONTROLLED, WITH NO COMPLICATIONS (HCC): ICD-10-CM

## 2017-12-27 DIAGNOSIS — E11.9 TYPE 2 DIABETES MELLITUS WITHOUT COMPLICATION, WITHOUT LONG-TERM CURRENT USE OF INSULIN (HCC): ICD-10-CM

## 2017-12-27 RX ORDER — PIOGLITAZONEHYDROCHLORIDE 45 MG/1
TABLET ORAL
Qty: 90 TABLET | Refills: 0 | Status: SHIPPED | OUTPATIENT
Start: 2017-12-27 | End: 2018-01-19

## 2017-12-27 RX ORDER — GLIMEPIRIDE 2 MG/1
TABLET ORAL
Qty: 30 TABLET | Refills: 1 | Status: SHIPPED | OUTPATIENT
Start: 2017-12-27 | End: 2018-01-19

## 2017-12-29 ENCOUNTER — TELEPHONE (OUTPATIENT)
Dept: INTERNAL MEDICINE | Age: 77
End: 2017-12-29

## 2017-12-29 NOTE — TELEPHONE ENCOUNTER
Contact patient: I would suggest discontinuing Coumadin 5 days before surgery.  According to most recent literature, no bridging with heparin is indicated in his particular situation since he has no history of stroke or TIA (please correct me if I am wrong). DR Leal

## 2017-12-29 NOTE — TELEPHONE ENCOUNTER
Pt called asking when he should stop his warfarin before his surgery. He stated at this time it is scheduled for 1-10-18.  Pt's # [phone]  Thanks SP

## 2018-01-01 ENCOUNTER — HOSPITAL ENCOUNTER (OUTPATIENT)
Dept: MRI IMAGING | Facility: HOSPITAL | Age: 78
Discharge: HOME | End: 2018-12-27
Admitting: PSYCHIATRY & NEUROLOGY

## 2018-01-01 ENCOUNTER — TELEPHONE (OUTPATIENT)
Dept: CARDIOLOGY | Facility: CLINIC | Age: 78
End: 2018-01-01

## 2018-01-01 ENCOUNTER — TELEPHONE (OUTPATIENT)
Dept: ORTHOPEDIC SURGERY | Facility: CLINIC | Age: 78
End: 2018-01-01

## 2018-01-01 ENCOUNTER — OFFICE VISIT (OUTPATIENT)
Dept: FAMILY MEDICINE CLINIC | Facility: CLINIC | Age: 78
End: 2018-01-01

## 2018-01-01 ENCOUNTER — OFFICE VISIT (OUTPATIENT)
Dept: NEUROSURGERY | Facility: CLINIC | Age: 78
End: 2018-01-01

## 2018-01-01 ENCOUNTER — ANTICOAGULATION VISIT (OUTPATIENT)
Dept: PHARMACY | Facility: HOSPITAL | Age: 78
End: 2018-01-01

## 2018-01-01 ENCOUNTER — APPOINTMENT (OUTPATIENT)
Dept: PHARMACY | Facility: HOSPITAL | Age: 78
End: 2018-01-01

## 2018-01-01 ENCOUNTER — OFFICE VISIT (OUTPATIENT)
Dept: ORTHOPEDIC SURGERY | Facility: CLINIC | Age: 78
End: 2018-01-01

## 2018-01-01 ENCOUNTER — ANTICOAGULATION VISIT (OUTPATIENT)
Dept: INTERNAL MEDICINE | Age: 78
End: 2018-01-01

## 2018-01-01 ENCOUNTER — TELEPHONE (OUTPATIENT)
Dept: INTERNAL MEDICINE | Age: 78
End: 2018-01-01

## 2018-01-01 ENCOUNTER — HOSPITAL ENCOUNTER (OUTPATIENT)
Dept: GENERAL RADIOLOGY | Facility: HOSPITAL | Age: 78
Discharge: HOME | End: 2018-12-27
Attending: NEUROLOGICAL SURGERY

## 2018-01-01 ENCOUNTER — PREP FOR SURGERY (OUTPATIENT)
Dept: OTHER | Facility: HOSPITAL | Age: 78
End: 2018-01-01

## 2018-01-01 ENCOUNTER — OFFICE VISIT (OUTPATIENT)
Dept: CARDIOLOGY | Facility: CLINIC | Age: 78
End: 2018-01-01

## 2018-01-01 ENCOUNTER — TELEPHONE (OUTPATIENT)
Dept: PHARMACY | Facility: HOSPITAL | Age: 78
End: 2018-01-01

## 2018-01-01 ENCOUNTER — OFFICE VISIT (OUTPATIENT)
Dept: INTERNAL MEDICINE | Age: 78
End: 2018-01-01

## 2018-01-01 ENCOUNTER — HOSPITAL ENCOUNTER (OUTPATIENT)
Dept: CT IMAGING | Facility: HOSPITAL | Age: 78
Discharge: HOME | End: 2018-11-16
Attending: ORTHOPAEDIC SURGERY | Admitting: ORTHOPAEDIC SURGERY

## 2018-01-01 ENCOUNTER — TRANSCRIBE ORDERS (OUTPATIENT)
Dept: ADMINISTRATIVE | Facility: HOSPITAL | Age: 78
End: 2018-01-01

## 2018-01-01 VITALS — WEIGHT: 180 LBS | HEIGHT: 75 IN | TEMPERATURE: 97.8 F | BODY MASS INDEX: 22.38 KG/M2

## 2018-01-01 VITALS — BODY MASS INDEX: 21.76 KG/M2 | TEMPERATURE: 98.1 F | HEIGHT: 75 IN | WEIGHT: 175 LBS

## 2018-01-01 VITALS
BODY MASS INDEX: 23.38 KG/M2 | HEIGHT: 75 IN | HEART RATE: 66 BPM | SYSTOLIC BLOOD PRESSURE: 130 MMHG | DIASTOLIC BLOOD PRESSURE: 60 MMHG | WEIGHT: 188 LBS

## 2018-01-01 VITALS
DIASTOLIC BLOOD PRESSURE: 82 MMHG | HEART RATE: 58 BPM | SYSTOLIC BLOOD PRESSURE: 134 MMHG | BODY MASS INDEX: 24 KG/M2 | TEMPERATURE: 97.4 F | WEIGHT: 193 LBS | HEIGHT: 75 IN | OXYGEN SATURATION: 98 %

## 2018-01-01 VITALS
DIASTOLIC BLOOD PRESSURE: 58 MMHG | SYSTOLIC BLOOD PRESSURE: 120 MMHG | BODY MASS INDEX: 23.7 KG/M2 | TEMPERATURE: 97.3 F | HEART RATE: 82 BPM | HEIGHT: 75 IN | WEIGHT: 190.6 LBS | OXYGEN SATURATION: 98 %

## 2018-01-01 VITALS — HEART RATE: 59 BPM | DIASTOLIC BLOOD PRESSURE: 62 MMHG | SYSTOLIC BLOOD PRESSURE: 157 MMHG

## 2018-01-01 DIAGNOSIS — M48.062 SPINAL STENOSIS OF LUMBAR REGION WITH NEUROGENIC CLAUDICATION: ICD-10-CM

## 2018-01-01 DIAGNOSIS — Z98.890 STATUS POST LUMBAR SURGERY: ICD-10-CM

## 2018-01-01 DIAGNOSIS — I48.0 PAROXYSMAL ATRIAL FIBRILLATION (HCC): ICD-10-CM

## 2018-01-01 DIAGNOSIS — I10 ESSENTIAL HYPERTENSION: Primary | ICD-10-CM

## 2018-01-01 DIAGNOSIS — M40.05 POSTURAL KYPHOSIS OF LUMBAR REGION: Primary | ICD-10-CM

## 2018-01-01 DIAGNOSIS — R94.31 ABNORMAL EKG: ICD-10-CM

## 2018-01-01 DIAGNOSIS — M25.471 SWELLING OF BOTH ANKLES: ICD-10-CM

## 2018-01-01 DIAGNOSIS — I45.2 RBBB (RIGHT BUNDLE BRANCH BLOCK WITH LEFT ANTERIOR FASCICULAR BLOCK): ICD-10-CM

## 2018-01-01 DIAGNOSIS — D50.0 IRON DEFICIENCY ANEMIA DUE TO CHRONIC BLOOD LOSS: ICD-10-CM

## 2018-01-01 DIAGNOSIS — F09 MILD COGNITIVE DISORDER: ICD-10-CM

## 2018-01-01 DIAGNOSIS — Z98.1 HISTORY OF LUMBAR FUSION: Primary | ICD-10-CM

## 2018-01-01 DIAGNOSIS — R26.9 GAIT DISTURBANCE: Primary | ICD-10-CM

## 2018-01-01 DIAGNOSIS — Z79.01 ON WARFARIN THERAPY: ICD-10-CM

## 2018-01-01 DIAGNOSIS — I48.91 ATRIAL FIBRILLATION, UNSPECIFIED TYPE (HCC): Primary | ICD-10-CM

## 2018-01-01 DIAGNOSIS — M43.16 SPONDYLOLISTHESIS OF LUMBAR REGION: Primary | ICD-10-CM

## 2018-01-01 DIAGNOSIS — I48.91 ATRIAL FIBRILLATION, UNSPECIFIED TYPE (HCC): ICD-10-CM

## 2018-01-01 DIAGNOSIS — M43.16 SPONDYLOLISTHESIS OF LUMBAR REGION: ICD-10-CM

## 2018-01-01 DIAGNOSIS — M40.209 KYPHOSIS, UNSPECIFIED KYPHOSIS TYPE, UNSPECIFIED SPINAL REGION: ICD-10-CM

## 2018-01-01 DIAGNOSIS — F04 AMNESTIC DISORDER DUE TO KNOWN PHYSIOLOGICAL CONDITION (HCC): ICD-10-CM

## 2018-01-01 DIAGNOSIS — I48.0 PAROXYSMAL ATRIAL FIBRILLATION (HCC): Primary | ICD-10-CM

## 2018-01-01 DIAGNOSIS — E11.9 TYPE 2 DIABETES MELLITUS WITHOUT COMPLICATION, WITHOUT LONG-TERM CURRENT USE OF INSULIN (HCC): ICD-10-CM

## 2018-01-01 DIAGNOSIS — Z98.890 STATUS POST LUMBAR SURGERY: Primary | ICD-10-CM

## 2018-01-01 DIAGNOSIS — D75.89 MACROCYTOSIS WITHOUT ANEMIA: ICD-10-CM

## 2018-01-01 DIAGNOSIS — E78.00 HYPERCHOLESTEROLEMIA: ICD-10-CM

## 2018-01-01 DIAGNOSIS — F04 AMNESTIC DISORDER DUE TO KNOWN PHYSIOLOGICAL CONDITION (HCC): Primary | ICD-10-CM

## 2018-01-01 DIAGNOSIS — M25.472 SWELLING OF BOTH ANKLES: ICD-10-CM

## 2018-01-01 DIAGNOSIS — I10 ESSENTIAL HYPERTENSION: ICD-10-CM

## 2018-01-01 LAB
ALBUMIN SERPL ELPH-MCNC: 3.4 G/DL (ref 2.9–4.4)
ALBUMIN/GLOB SERPL: 1.2 {RATIO} (ref 0.7–1.7)
ALPHA1 GLOB SERPL ELPH-MCNC: 0.2 G/DL (ref 0–0.4)
ALPHA2 GLOB SERPL ELPH-MCNC: 0.8 G/DL (ref 0.4–1)
B-GLOBULIN SERPL ELPH-MCNC: 1 G/DL (ref 0.7–1.3)
BASOPHILS # BLD AUTO: 0.03 10*3/MM3 (ref 0–0.2)
BASOPHILS # BLD AUTO: 0.04 10*3/MM3 (ref 0–0.2)
BASOPHILS NFR BLD AUTO: 0.7 % (ref 0–1.5)
BASOPHILS NFR BLD AUTO: 1 % (ref 0–1.5)
EOSINOPHIL # BLD AUTO: 0.11 10*3/MM3 (ref 0–0.7)
EOSINOPHIL # BLD AUTO: 0.17 10*3/MM3 (ref 0–0.7)
EOSINOPHIL NFR BLD AUTO: 2.8 % (ref 0.3–6.2)
EOSINOPHIL NFR BLD AUTO: 4.1 % (ref 0.3–6.2)
ERYTHROCYTE [DISTWIDTH] IN BLOOD BY AUTOMATED COUNT: 14.7 % (ref 11.5–14.5)
ERYTHROCYTE [DISTWIDTH] IN BLOOD BY AUTOMATED COUNT: 16.3 % (ref 11.5–14.5)
FOLATE SERPL-MCNC: >20 NG/ML (ref 4.78–24.2)
GAMMA GLOB SERPL ELPH-MCNC: 0.8 G/DL (ref 0.4–1.8)
GLOBULIN SER CALC-MCNC: 2.8 G/DL (ref 2.2–3.9)
HBA1C MFR BLD: 7.1 % (ref 4.8–5.6)
HCT VFR BLD AUTO: 31.2 % (ref 40.4–52.2)
HCT VFR BLD AUTO: 31.9 % (ref 40.4–52.2)
HGB BLD-MCNC: 9.5 G/DL (ref 13.7–17.6)
HGB BLD-MCNC: 9.8 G/DL (ref 13.7–17.6)
IMM GRANULOCYTES # BLD: 0 10*3/MM3 (ref 0–0.03)
IMM GRANULOCYTES # BLD: 0.01 10*3/MM3 (ref 0–0.03)
IMM GRANULOCYTES NFR BLD: 0 % (ref 0–0.5)
IMM GRANULOCYTES NFR BLD: 0.2 % (ref 0–0.5)
INR PPP: 1.7 (ref 0.91–1.09)
INR PPP: 2 (ref 2–3)
INR PPP: 2.2 (ref 0.91–1.09)
INR PPP: 2.2 (ref 2–3)
INR PPP: 2.5 (ref 0.91–1.09)
INR PPP: 2.5 (ref 0.91–1.09)
IRON SATN MFR SERPL: 12 % (ref 20–50)
IRON SERPL-MCNC: 53 MCG/DL (ref 59–158)
LABORATORY COMMENT REPORT: NORMAL
LYMPHOCYTES # BLD AUTO: 0.88 10*3/MM3 (ref 0.9–4.8)
LYMPHOCYTES # BLD AUTO: 0.88 10*3/MM3 (ref 0.9–4.8)
LYMPHOCYTES NFR BLD AUTO: 21.5 % (ref 19.6–45.3)
LYMPHOCYTES NFR BLD AUTO: 22.2 % (ref 19.6–45.3)
Lab: NORMAL
M PROTEIN SERPL ELPH-MCNC: NORMAL G/DL
MCH RBC QN AUTO: 28.4 PG (ref 27–32.7)
MCH RBC QN AUTO: 29.2 PG (ref 27–32.7)
MCHC RBC AUTO-ENTMCNC: 30.4 G/DL (ref 32.6–36.4)
MCHC RBC AUTO-ENTMCNC: 30.7 G/DL (ref 32.6–36.4)
MCV RBC AUTO: 93.4 FL (ref 79.8–96.2)
MCV RBC AUTO: 94.9 FL (ref 79.8–96.2)
METHYLMALONATE SERPL-SCNC: 230 NMOL/L (ref 0–378)
MONOCYTES # BLD AUTO: 0.4 10*3/MM3 (ref 0.2–1.2)
MONOCYTES # BLD AUTO: 0.48 10*3/MM3 (ref 0.2–1.2)
MONOCYTES NFR BLD AUTO: 10.1 % (ref 5–12)
MONOCYTES NFR BLD AUTO: 11.7 % (ref 5–12)
NEUTROPHILS # BLD AUTO: 2.54 10*3/MM3 (ref 1.9–8.1)
NEUTROPHILS # BLD AUTO: 2.54 10*3/MM3 (ref 1.9–8.1)
NEUTROPHILS NFR BLD AUTO: 62 % (ref 42.7–76)
NEUTROPHILS NFR BLD AUTO: 63.9 % (ref 42.7–76)
PLATELET # BLD AUTO: 232 10*3/MM3 (ref 140–500)
PLATELET # BLD AUTO: 264 10*3/MM3 (ref 140–500)
PROT PATTERN SERPL ELPH-IMP: NORMAL
PROT SERPL-MCNC: 6.2 G/DL (ref 6–8.5)
PROTHROMBIN TIME: 21 SECONDS (ref 10–13.8)
PROTHROMBIN TIME: 26.1 SECONDS (ref 10–13.8)
PROTHROMBIN TIME: 29.4 SECONDS (ref 10–13.8)
PROTHROMBIN TIME: 30.2 SECONDS (ref 10–13.8)
RBC # BLD AUTO: 3.34 10*6/MM3 (ref 4.6–6)
RBC # BLD AUTO: 3.36 10*6/MM3 (ref 4.6–6)
RETICS/RBC NFR AUTO: 1.28 % (ref 0.5–1.5)
TIBC SERPL-MCNC: 436 MCG/DL
UIBC SERPL-MCNC: 383 MCG/DL
VIT B12 SERPL-MCNC: 411 PG/ML (ref 211–946)
WBC # BLD AUTO: 3.97 10*3/MM3 (ref 4.5–10.7)
WBC # BLD AUTO: 4.1 10*3/MM3 (ref 4.5–10.7)

## 2018-01-01 PROCEDURE — 85610 PROTHROMBIN TIME: CPT

## 2018-01-01 PROCEDURE — 36416 COLLJ CAPILLARY BLOOD SPEC: CPT

## 2018-01-01 PROCEDURE — 99213 OFFICE O/P EST LOW 20 MIN: CPT | Performed by: ORTHOPAEDIC SURGERY

## 2018-01-01 PROCEDURE — 82565 ASSAY OF CREATININE: CPT

## 2018-01-01 PROCEDURE — 85610 PROTHROMBIN TIME: CPT | Performed by: INTERNAL MEDICINE

## 2018-01-01 PROCEDURE — 99214 OFFICE O/P EST MOD 30 MIN: CPT | Performed by: INTERNAL MEDICINE

## 2018-01-01 PROCEDURE — 36416 COLLJ CAPILLARY BLOOD SPEC: CPT | Performed by: INTERNAL MEDICINE

## 2018-01-01 PROCEDURE — A9577 INJ MULTIHANCE: HCPCS | Performed by: PSYCHIATRY & NEUROLOGY

## 2018-01-01 PROCEDURE — 70553 MRI BRAIN STEM W/O & W/DYE: CPT

## 2018-01-01 PROCEDURE — 72131 CT LUMBAR SPINE W/O DYE: CPT

## 2018-01-01 PROCEDURE — 99214 OFFICE O/P EST MOD 30 MIN: CPT | Performed by: ORTHOPAEDIC SURGERY

## 2018-01-01 PROCEDURE — 72100 X-RAY EXAM L-S SPINE 2/3 VWS: CPT | Performed by: ORTHOPAEDIC SURGERY

## 2018-01-01 PROCEDURE — G0463 HOSPITAL OUTPT CLINIC VISIT: HCPCS

## 2018-01-01 PROCEDURE — 99214 OFFICE O/P EST MOD 30 MIN: CPT | Performed by: NURSE PRACTITIONER

## 2018-01-01 PROCEDURE — 93000 ELECTROCARDIOGRAM COMPLETE: CPT | Performed by: NURSE PRACTITIONER

## 2018-01-01 PROCEDURE — 0 GADOBENATE DIMEGLUMINE 529 MG/ML SOLUTION: Performed by: PSYCHIATRY & NEUROLOGY

## 2018-01-01 PROCEDURE — 99214 OFFICE O/P EST MOD 30 MIN: CPT | Performed by: NEUROLOGICAL SURGERY

## 2018-01-01 PROCEDURE — 72114 X-RAY EXAM L-S SPINE BENDING: CPT

## 2018-01-01 RX ORDER — GLIMEPIRIDE 2 MG/1
2 TABLET ORAL
Qty: 90 TABLET | Refills: 1 | Status: SHIPPED | OUTPATIENT
Start: 2018-01-01 | End: 2019-01-01 | Stop reason: HOSPADM

## 2018-01-01 RX ORDER — PIOGLITAZONEHYDROCHLORIDE 45 MG/1
TABLET ORAL
Qty: 90 TABLET | Refills: 0 | Status: SHIPPED | OUTPATIENT
Start: 2018-01-01 | End: 2018-01-01 | Stop reason: SDUPTHER

## 2018-01-01 RX ORDER — CEFAZOLIN SODIUM 2 G/100ML
2 INJECTION, SOLUTION INTRAVENOUS ONCE
Status: CANCELLED | OUTPATIENT
Start: 2018-01-01 | End: 2018-01-01

## 2018-01-01 RX ORDER — FERROUS SULFATE 325(65) MG
325 TABLET ORAL
Status: ON HOLD
Start: 2018-01-01 | End: 2019-01-01 | Stop reason: ALTCHOICE

## 2018-01-01 RX ORDER — ENALAPRIL MALEATE 20 MG/1
20 TABLET ORAL 2 TIMES DAILY
Qty: 180 TABLET | Refills: 1 | Status: SHIPPED | OUTPATIENT
Start: 2018-01-01 | End: 2019-01-01 | Stop reason: SDUPTHER

## 2018-01-01 RX ORDER — PIOGLITAZONEHYDROCHLORIDE 45 MG/1
45 TABLET ORAL EVERY MORNING
Qty: 90 TABLET | Refills: 0 | Status: SHIPPED | OUTPATIENT
Start: 2018-01-01 | End: 2019-01-01 | Stop reason: SDUPTHER

## 2018-01-01 RX ORDER — DONEPEZIL HYDROCHLORIDE 5 MG/1
5 TABLET, FILM COATED ORAL NIGHTLY
Qty: 90 TABLET | Refills: 2 | Status: SHIPPED | OUTPATIENT
Start: 2018-01-01 | End: 2018-01-01 | Stop reason: SDUPTHER

## 2018-01-01 RX ORDER — FUROSEMIDE 20 MG/1
20 TABLET ORAL DAILY
Qty: 90 TABLET | Refills: 3 | Status: SHIPPED | OUTPATIENT
Start: 2018-01-01 | End: 2019-01-01 | Stop reason: HOSPADM

## 2018-01-01 RX ORDER — ESOMEPRAZOLE MAGNESIUM 40 MG/1
CAPSULE, DELAYED RELEASE ORAL
Qty: 90 CAPSULE | Refills: 1 | Status: SHIPPED | OUTPATIENT
Start: 2018-01-01 | End: 2018-01-01 | Stop reason: SDUPTHER

## 2018-01-01 RX ORDER — ESOMEPRAZOLE MAGNESIUM 40 MG/1
40 CAPSULE, DELAYED RELEASE ORAL DAILY
Qty: 90 CAPSULE | Refills: 1 | Status: SHIPPED | OUTPATIENT
Start: 2018-01-01

## 2018-01-01 RX ORDER — DEXAMETHASONE 4 MG/1
8 TABLET ORAL TAKE AS DIRECTED
Qty: 2 TABLET | Refills: 0 | Status: SHIPPED | OUTPATIENT
Start: 2018-01-01 | End: 2019-01-01 | Stop reason: HOSPADM

## 2018-01-01 RX ADMIN — GADOBENATE DIMEGLUMINE 18 ML: 529 INJECTION, SOLUTION INTRAVENOUS at 17:20

## 2018-01-02 ENCOUNTER — OFFICE VISIT (OUTPATIENT)
Dept: NEUROSURGERY | Facility: CLINIC | Age: 78
End: 2018-01-02

## 2018-01-02 VITALS
SYSTOLIC BLOOD PRESSURE: 164 MMHG | BODY MASS INDEX: 25.48 KG/M2 | HEART RATE: 69 BPM | HEIGHT: 71 IN | WEIGHT: 182 LBS | DIASTOLIC BLOOD PRESSURE: 83 MMHG

## 2018-01-02 DIAGNOSIS — M43.16 SPONDYLOLISTHESIS OF LUMBAR REGION: Primary | ICD-10-CM

## 2018-01-02 DIAGNOSIS — M48.062 SPINAL STENOSIS OF LUMBAR REGION WITH NEUROGENIC CLAUDICATION: ICD-10-CM

## 2018-01-02 PROCEDURE — 99204 OFFICE O/P NEW MOD 45 MIN: CPT | Performed by: NEUROLOGICAL SURGERY

## 2018-01-02 NOTE — PROGRESS NOTES
Subjective   Patient ID: Derrek Ritter is a 77 y.o. male is being seen for consultation today at the request of Barron Knight MD  For back pain that radiates into lower extremity intermittently. He has had 2 ASHLEY.     History of Present Illness    This patient has been having fairly severe pain in his back with radiation into both of his legs for several months now.  The problem has been getting steadily worse.  The pain is located in the lower back on both sides.  Most of the pain is in his back but there is some radiation into his legs particularly when he walks any distance.  He has no difficulty with bowel or bladder control or other associated symptoms.    The following portions of the patient's history were reviewed and updated as appropriate: allergies, current medications, past family history, past medical history, past social history, past surgical history and problem list.    Review of Systems   Constitutional: Positive for activity change.   Respiratory: Negative for chest tightness and shortness of breath.    Cardiovascular: Negative for chest pain.   Musculoskeletal: Positive for back pain and gait problem.        Intermittent leg pain   Neurological: Positive for weakness ( Generalized ). Negative for numbness.   All other systems reviewed and are negative.      Objective   Physical Exam   Constitutional: He is oriented to person, place, and time. He appears well-developed and well-nourished.   HENT:   Head: Normocephalic and atraumatic.   Eyes: Conjunctivae and EOM are normal. Pupils are equal, round, and reactive to light.   Fundoscopic exam:       The right eye shows no papilledema. The right eye shows venous pulsations.        The left eye shows no papilledema. The left eye shows venous pulsations.   Neck: Carotid bruit is not present.   Neurological: He is oriented to person, place, and time. He has a normal Finger-Nose-Finger Test and a normal Heel to Shin Test. Gait normal.   Reflex  Scores:       Tricep reflexes are 2+ on the right side and 2+ on the left side.       Bicep reflexes are 2+ on the right side and 2+ on the left side.       Brachioradialis reflexes are 2+ on the right side and 2+ on the left side.       Patellar reflexes are 2+ on the right side and 2+ on the left side.       Achilles reflexes are 2+ on the right side and 2+ on the left side.  Psychiatric: His speech is normal.     Neurologic Exam     Mental Status   Oriented to person, place, and time.   Registration of memory: Good recent and remote memory.   Attention: normal. Concentration: normal.   Speech: speech is normal   Level of consciousness: alert  Knowledge: consistent with education.     Cranial Nerves     CN II   Visual fields full to confrontation.   Visual acuity: normal    CN III, IV, VI   Pupils are equal, round, and reactive to light.  Extraocular motions are normal.     CN V   Facial sensation intact.   Right corneal reflex: normal  Left corneal reflex: normal    CN VII   Facial expression full, symmetric.   Right facial weakness: none  Left facial weakness: none    CN VIII   Hearing: intact    CN IX, X   Palate: symmetric    CN XI   Right sternocleidomastoid strength: normal  Left sternocleidomastoid strength: normal    CN XII   Tongue: not atrophic  Tongue deviation: none    Motor Exam   Muscle bulk: normal  Right arm tone: normal  Left arm tone: normal  Right leg tone: normal  Left leg tone: normal    Strength   Strength 5/5 except as noted.     Sensory Exam   Light touch normal.     Gait, Coordination, and Reflexes     Gait  Gait: normal    Coordination   Finger to nose coordination: normal  Heel to shin coordination: normal    Reflexes   Right brachioradialis: 2+  Left brachioradialis: 2+  Right biceps: 2+  Left biceps: 2+  Right triceps: 2+  Left triceps: 2+  Right patellar: 2+  Left patellar: 2+  Right achilles: 2+  Left achilles: 2+  Right : 2+  Left : 2+      Assessment/Plan   Independent  Review of Radiographic Studies:      I reviewed an MRI of his lumbar spine done on 30 December myself.  This shows severe stenosis at L3 4 and moderately severe stenosis at L2-3.  He has had previous surgery at L4 5 and according to Dr. Knight's notes had plain films and his office which showed a solid fusion at that level.  There is also a bone growth stimulator still present.    Medical Decision Making:      I told the patient and his wife about the imaging.  I told him I thought it was reasonable to consider surgery at this point.  I would agree that extending the fusion and decompression up 2 levels from the previous surgery would be appropriate.  I told the patient about the risks, complications and expected outcome of the lumbar surgery.  I explained that there was an 80% chance of getting rid of the pain in the leg.  I explained that there would still be back pain after the surgery.  Initially this will be quite severe but will improve over time.  There is a 2 or 3% chance of infection, bleeding, CSF leak, damage to the nerve as a result of surgery, paralysis, as well as anesthetic risk.  There is a 10% chance of recurrent problems.  There is a 10% chance of nonunion or failure of the instrumentation.  We discussed the postoperative hospital and home course.  The patient does ask to proceed.    He will need to be scheduled for a: L2 to L4 laminectomy with a fusion by orthopedics    Derrek was seen today for back pain.    Diagnoses and all orders for this visit:    Spondylolisthesis of lumbar region  -     Case Request; Standing  -     ceFAZolin (ANCEF) 2 g in sodium chloride 0.9 % 100 mL IVPB; Infuse 2 g into a venous catheter 1 (One) Time.  -     Case Request    Spinal stenosis of lumbar region with neurogenic claudication  -     Case Request; Standing  -     ceFAZolin (ANCEF) 2 g in sodium chloride 0.9 % 100 mL IVPB; Infuse 2 g into a venous catheter 1 (One) Time.  -     Case Request    Other orders  -      Follow anesthesia standing orders.  -     Obtain informed consent  -     Follow anesthesia standing orders.; Standing  -     DEANN hose- To be placed on patient in pre-op; Standing  -     SCD (sequential compression device)- to be placed on patient in Pre-op; Standing    Return for 2-3 week post op.

## 2018-01-04 ENCOUNTER — TELEPHONE (OUTPATIENT)
Dept: ORTHOPEDIC SURGERY | Facility: CLINIC | Age: 78
End: 2018-01-04

## 2018-01-04 NOTE — TELEPHONE ENCOUNTER
Have spoken with the patient's wife at great length today.  Apparently has been some confusion about the date of his surgery and his preadmission testing.  Explained to the patient's wife that before the holidays I discussed with her in detail that the reading room did not have anything available on January 10 therefore his surgery would be scheduled for January 29.  His preadmission testing is scheduled for January 16.  All the information has been again discussed with the patient and his wife and they are agreeable to proceed.

## 2018-01-09 DIAGNOSIS — E78.00 HYPERCHOLESTEROLEMIA: ICD-10-CM

## 2018-01-09 RX ORDER — SIMVASTATIN 20 MG
TABLET ORAL
Qty: 90 TABLET | Refills: 1 | Status: SHIPPED | OUTPATIENT
Start: 2018-01-09 | End: 2018-01-19

## 2018-01-16 ENCOUNTER — APPOINTMENT (OUTPATIENT)
Dept: PREADMISSION TESTING | Facility: HOSPITAL | Age: 78
End: 2018-01-16

## 2018-01-16 ENCOUNTER — TELEPHONE (OUTPATIENT)
Dept: INTERNAL MEDICINE | Age: 78
End: 2018-01-16

## 2018-01-16 DIAGNOSIS — I48.19 PERSISTENT ATRIAL FIBRILLATION (HCC): Primary | ICD-10-CM

## 2018-01-16 NOTE — TELEPHONE ENCOUNTER
PT is going to [facility] on Friday for lab work. He would like for Dr. Leal to add on a protime order for him to have it done then.

## 2018-01-19 ENCOUNTER — APPOINTMENT (OUTPATIENT)
Dept: PREADMISSION TESTING | Facility: HOSPITAL | Age: 78
End: 2018-01-19

## 2018-01-19 VITALS
SYSTOLIC BLOOD PRESSURE: 150 MMHG | BODY MASS INDEX: 23 KG/M2 | HEIGHT: 75 IN | HEART RATE: 59 BPM | TEMPERATURE: 98.6 F | OXYGEN SATURATION: 100 % | WEIGHT: 185 LBS | DIASTOLIC BLOOD PRESSURE: 68 MMHG | RESPIRATION RATE: 20 BRPM

## 2018-01-19 LAB
ANION GAP SERPL CALCULATED.3IONS-SCNC: 12.5 MMOL/L
BUN BLD-MCNC: 14 MG/DL (ref 8–23)
BUN/CREAT SERPL: 17.1 (ref 7–25)
CALCIUM SPEC-SCNC: 9.2 MG/DL (ref 8.6–10.5)
CHLORIDE SERPL-SCNC: 104 MMOL/L (ref 98–107)
CO2 SERPL-SCNC: 26.5 MMOL/L (ref 22–29)
CREAT BLD-MCNC: 0.82 MG/DL (ref 0.76–1.27)
DEPRECATED RDW RBC AUTO: 50.2 FL (ref 37–54)
ERYTHROCYTE [DISTWIDTH] IN BLOOD BY AUTOMATED COUNT: 13.6 % (ref 11.5–14.5)
GFR SERPL CREATININE-BSD FRML MDRD: 91 ML/MIN/1.73
GLUCOSE BLD-MCNC: 156 MG/DL (ref 65–99)
HCT VFR BLD AUTO: 38.1 % (ref 40.4–52.2)
HGB BLD-MCNC: 11.8 G/DL (ref 13.7–17.6)
MCH RBC QN AUTO: 31.2 PG (ref 27–32.7)
MCHC RBC AUTO-ENTMCNC: 31 G/DL (ref 32.6–36.4)
MCV RBC AUTO: 100.8 FL (ref 79.8–96.2)
PLATELET # BLD AUTO: 242 10*3/MM3 (ref 140–500)
PMV BLD AUTO: 10.3 FL (ref 6–12)
POTASSIUM BLD-SCNC: 4.4 MMOL/L (ref 3.5–5.2)
RBC # BLD AUTO: 3.78 10*6/MM3 (ref 4.6–6)
SODIUM BLD-SCNC: 143 MMOL/L (ref 136–145)
WBC NRBC COR # BLD: 4.32 10*3/MM3 (ref 4.5–10.7)

## 2018-01-19 PROCEDURE — 36415 COLL VENOUS BLD VENIPUNCTURE: CPT

## 2018-01-19 PROCEDURE — 85027 COMPLETE CBC AUTOMATED: CPT | Performed by: ORTHOPAEDIC SURGERY

## 2018-01-19 PROCEDURE — 80048 BASIC METABOLIC PNL TOTAL CA: CPT | Performed by: ORTHOPAEDIC SURGERY

## 2018-01-19 RX ORDER — SIMVASTATIN 20 MG
20 TABLET ORAL EVERY MORNING
COMMUNITY
End: 2018-08-01 | Stop reason: SDUPTHER

## 2018-01-19 RX ORDER — WARFARIN SODIUM 5 MG/1
5 TABLET ORAL
Status: ON HOLD | COMMUNITY
End: 2018-02-02

## 2018-01-19 RX ORDER — PIOGLITAZONEHYDROCHLORIDE 45 MG/1
45 TABLET ORAL EVERY MORNING
COMMUNITY
End: 2018-04-19 | Stop reason: SDUPTHER

## 2018-01-19 RX ORDER — ESOMEPRAZOLE MAGNESIUM 40 MG/1
40 CAPSULE, DELAYED RELEASE ORAL
COMMUNITY
End: 2018-04-04 | Stop reason: SDUPTHER

## 2018-01-19 RX ORDER — GLIMEPIRIDE 2 MG/1
2 TABLET ORAL
COMMUNITY
End: 2018-01-01 | Stop reason: SDUPTHER

## 2018-01-19 RX ORDER — TAMSULOSIN HYDROCHLORIDE 0.4 MG/1
0.4 CAPSULE ORAL EVERY MORNING
COMMUNITY

## 2018-01-19 RX ORDER — DONEPEZIL HYDROCHLORIDE 5 MG/1
10 TABLET, FILM COATED ORAL EVERY MORNING
COMMUNITY
End: 2018-01-01 | Stop reason: SDUPTHER

## 2018-01-19 RX ORDER — ENALAPRIL MALEATE 20 MG/1
40 TABLET ORAL EVERY MORNING
COMMUNITY
End: 2018-07-05 | Stop reason: SDUPTHER

## 2018-01-19 RX ORDER — WARFARIN SODIUM 10 MG/1
10 TABLET ORAL
Status: ON HOLD | COMMUNITY
End: 2018-02-02

## 2018-01-19 NOTE — DISCHARGE INSTRUCTIONS
Take the following medications the morning of surgery with a small sip of water:NONE        General Instructions:  • Do not eat solid food after midnight the night before surgery.  • You may drink clear liquids day of surgery but must stop at least one hour before your hospital arrival time.  • It is beneficial for you to have a clear drink that contains carbohydrates the day of surgery.  We suggest a 12 to 20 ounce bottle of Gatorade or Powerade for non-diabetic patients or a 12 to 20 ounce bottle of G2 or Powerade Zero for diabetic patients. (Pediatric patients, are not advised to drink a 12 to 20 ounce carbohydrate drink)    Clear liquids are liquids you can see through.  Nothing red in color.     Plain water                               Sports drinks  Sodas                                   Gelatin (Jell-O)  Fruit juices without pulp such as white grape juice and apple juice  Popsicles that contain no fruit or yogurt  Tea or coffee (no cream or milk added)  Gatorade / Powerade  G2 / Powerade Zero    • Infants may have breast milk up to four hours before surgery.  • Infants drinking formula may drink formula up to six hours before surgery.   • Patients who avoid smoking, chewing tobacco and alcohol for 4 weeks prior to surgery have a reduced risk of post-operative complications.  Quit smoking as many days before surgery as you can.  • Do not smoke, use chewing tobacco or drink alcohol the day of surgery.   • If applicable bring your C-PAP/ BI-PAP machine.  • Bring any papers given to you in the doctor’s office.  • Wear clean comfortable clothes and socks.  • Do not wear contact lenses or make-up.  Bring a case for your glasses.   • Bring crutches or walker if applicable.  • Remove all piercings.  Leave jewelry and any other valuables at home.  • Hair extensions with metal clips must be removed prior to surgery.  • The Pre-Admission Testing nurse will instruct you to bring medications if unable to obtain an  accurate list in Pre-Admission Testing.        If you were given a blood bank ID arm band remember to bring it with you the day of surgery.    Preventing a Surgical Site Infection:  • For 2 to 3 days before surgery, avoid shaving with a razor because the razor can irritate skin and make it easier to develop an infection.  • The night prior to surgery sleep in a clean bed with clean clothing.  Do not allow pets to sleep with you.  • Shower on the morning of surgery using a fresh bar of anti-bacterial soap (such as Dial) and clean washcloth.  Dry with a clean towel and dress in clean clothing.  • Ask your surgeon if you will be receiving antibiotics prior to surgery.  • Make sure you, your family, and all healthcare providers clean their hands with soap and water or an alcohol based hand  before caring for you or your wound.    Day of surgery:  Upon arrival, a Pre-op nurse and Anesthesiologist will review your health history, obtain vital signs, and answer questions you may have.  The only belongings needed at this time will be your home medications and if applicable your C-PAP/BI-PAP machine.  If you are staying overnight your family can leave the rest of your belongings in the car and bring them to your room later.  A Pre-op nurse will start an IV and you may receive medication in preparation for surgery, including something to help you relax.  Your family will be able to see you in the Pre-op area.  While you are in surgery your family should notify the waiting room  if they leave the waiting room area and provide a contact phone number.    Please be aware that surgery does come with discomfort.  We want to make every effort to control your discomfort so please discuss any uncontrolled symptoms with your nurse.   Your doctor will most likely have prescribed pain medications.      If you are going home after surgery you will receive individualized written care instructions before being  discharged.  A responsible adult must drive you to and from the hospital on the day of your surgery and stay with you for 24 hours.    If you are staying overnight following surgery, you will be transported to your hospital room following the recovery period.  [facility] has all private rooms.    If you have any questions please call Pre-Admission Testing at [phone].  Deductibles and co-payments are collected on the day of service. Please be prepared to pay the required co-pay, deductible or deposit on the day of service as defined by your plan.

## 2018-01-29 ENCOUNTER — HOSPITAL ENCOUNTER (INPATIENT)
Facility: HOSPITAL | Age: 78
LOS: 4 days | Discharge: NURSING, REHAB OR LONG-TERM CARE | End: 2018-02-02
Attending: ORTHOPAEDIC SURGERY | Admitting: ORTHOPAEDIC SURGERY

## 2018-01-29 ENCOUNTER — ANESTHESIA EVENT (OUTPATIENT)
Dept: PERIOP | Facility: HOSPITAL | Age: 78
End: 2018-01-29

## 2018-01-29 ENCOUNTER — ANESTHESIA (OUTPATIENT)
Dept: PERIOP | Facility: HOSPITAL | Age: 78
End: 2018-01-29

## 2018-01-29 ENCOUNTER — APPOINTMENT (OUTPATIENT)
Dept: GENERAL RADIOLOGY | Facility: HOSPITAL | Age: 78
End: 2018-01-29

## 2018-01-29 DIAGNOSIS — M48.062 SPINAL STENOSIS OF LUMBAR REGION WITH NEUROGENIC CLAUDICATION: ICD-10-CM

## 2018-01-29 DIAGNOSIS — R53.1 DECREASED STRENGTH: Primary | ICD-10-CM

## 2018-01-29 DIAGNOSIS — D62 POSTOPERATIVE ANEMIA DUE TO ACUTE BLOOD LOSS: ICD-10-CM

## 2018-01-29 DIAGNOSIS — M43.16 SPONDYLOLISTHESIS OF LUMBAR REGION: ICD-10-CM

## 2018-01-29 DIAGNOSIS — I48.0 PAROXYSMAL ATRIAL FIBRILLATION (HCC): ICD-10-CM

## 2018-01-29 PROBLEM — M54.50 LUMBAR SPINE PAIN: Status: ACTIVE | Noted: 2018-01-29

## 2018-01-29 LAB
ABO GROUP BLD: NORMAL
BLD GP AB SCN SERPL QL: NEGATIVE
GLUCOSE BLDC GLUCOMTR-MCNC: 108 MG/DL (ref 70–130)
GLUCOSE BLDC GLUCOMTR-MCNC: 137 MG/DL (ref 70–130)
GLUCOSE BLDC GLUCOMTR-MCNC: 154 MG/DL (ref 70–130)
GLUCOSE BLDC GLUCOMTR-MCNC: 93 MG/DL (ref 70–130)
HCT VFR BLD AUTO: 31.9 % (ref 40.4–52.2)
HGB BLD-MCNC: 10 G/DL (ref 13.7–17.6)
INR PPP: 1.13 (ref 0.9–1.1)
PROTHROMBIN TIME: 14.1 SECONDS (ref 11.7–14.2)
RH BLD: NEGATIVE

## 2018-01-29 PROCEDURE — 25010000002 POTASSIUM CHLORIDE PER 2 MEQ OF POTASSIUM: Performed by: ORTHOPAEDIC SURGERY

## 2018-01-29 PROCEDURE — C1713 ANCHOR/SCREW BN/BN,TIS/BN: HCPCS | Performed by: ORTHOPAEDIC SURGERY

## 2018-01-29 PROCEDURE — 76000 FLUOROSCOPY <1 HR PHYS/QHP: CPT

## 2018-01-29 PROCEDURE — 0QP004Z REMOVAL OF INTERNAL FIXATION DEVICE FROM LUMBAR VERTEBRA, OPEN APPROACH: ICD-10-PCS | Performed by: ORTHOPAEDIC SURGERY

## 2018-01-29 PROCEDURE — 07DR3ZZ EXTRACTION OF ILIAC BONE MARROW, PERCUTANEOUS APPROACH: ICD-10-PCS | Performed by: ORTHOPAEDIC SURGERY

## 2018-01-29 PROCEDURE — 25010000002 HYDROMORPHONE PER 4 MG: Performed by: ORTHOPAEDIC SURGERY

## 2018-01-29 PROCEDURE — 22614 ARTHRD PST TQ 1NTRSPC EA ADD: CPT | Performed by: ORTHOPAEDIC SURGERY

## 2018-01-29 PROCEDURE — 63710000001 INSULIN ASPART PER 5 UNITS: Performed by: HOSPITALIST

## 2018-01-29 PROCEDURE — 85014 HEMATOCRIT: CPT | Performed by: ORTHOPAEDIC SURGERY

## 2018-01-29 PROCEDURE — 86850 RBC ANTIBODY SCREEN: CPT | Performed by: ORTHOPAEDIC SURGERY

## 2018-01-29 PROCEDURE — 85018 HEMOGLOBIN: CPT | Performed by: ORTHOPAEDIC SURGERY

## 2018-01-29 PROCEDURE — 63047 LAM FACETEC & FORAMOT LUMBAR: CPT | Performed by: NEUROLOGICAL SURGERY

## 2018-01-29 PROCEDURE — 0QPY0MZ REMOVAL OF BONE GROWTH STIMULATOR FROM LOWER BONE, OPEN APPROACH: ICD-10-PCS | Performed by: NEUROLOGICAL SURGERY

## 2018-01-29 PROCEDURE — 63048 LAM FACETEC &FORAMOT EA ADDL: CPT | Performed by: NEUROLOGICAL SURGERY

## 2018-01-29 PROCEDURE — 25010000002 HEPARIN (PORCINE) PER 1000 UNITS: Performed by: ORTHOPAEDIC SURGERY

## 2018-01-29 PROCEDURE — 25010000002 MIDAZOLAM PER 1 MG: Performed by: ANESTHESIOLOGY

## 2018-01-29 PROCEDURE — 94799 UNLISTED PULMONARY SVC/PX: CPT

## 2018-01-29 PROCEDURE — 25010000003 CEFAZOLIN PER 500 MG: Performed by: ORTHOPAEDIC SURGERY

## 2018-01-29 PROCEDURE — 0SG1071 FUSION OF 2 OR MORE LUMBAR VERTEBRAL JOINTS WITH AUTOLOGOUS TISSUE SUBSTITUTE, POSTERIOR APPROACH, POSTERIOR COLUMN, OPEN APPROACH: ICD-10-PCS | Performed by: ORTHOPAEDIC SURGERY

## 2018-01-29 PROCEDURE — 86900 BLOOD TYPING SEROLOGIC ABO: CPT | Performed by: ORTHOPAEDIC SURGERY

## 2018-01-29 PROCEDURE — 86901 BLOOD TYPING SEROLOGIC RH(D): CPT | Performed by: ORTHOPAEDIC SURGERY

## 2018-01-29 PROCEDURE — 22612 ARTHRD PST TQ 1NTRSPC LUMBAR: CPT | Performed by: ORTHOPAEDIC SURGERY

## 2018-01-29 PROCEDURE — 22842 INSERT SPINE FIXATION DEVICE: CPT | Performed by: ORTHOPAEDIC SURGERY

## 2018-01-29 PROCEDURE — 25010000002 ONDANSETRON PER 1 MG: Performed by: NURSE ANESTHETIST, CERTIFIED REGISTERED

## 2018-01-29 PROCEDURE — 25010000002 NEOSTIGMINE PER 0.5 MG: Performed by: NURSE ANESTHETIST, CERTIFIED REGISTERED

## 2018-01-29 PROCEDURE — 85610 PROTHROMBIN TIME: CPT | Performed by: NEUROLOGICAL SURGERY

## 2018-01-29 PROCEDURE — 82962 GLUCOSE BLOOD TEST: CPT

## 2018-01-29 PROCEDURE — 25010000003 CEFAZOLIN IN DEXTROSE 2-4 GM/100ML-% SOLUTION: Performed by: ORTHOPAEDIC SURGERY

## 2018-01-29 PROCEDURE — 20939 BONE MARROW ASPIR BONE GRFG: CPT | Performed by: ORTHOPAEDIC SURGERY

## 2018-01-29 PROCEDURE — 72100 X-RAY EXAM L-S SPINE 2/3 VWS: CPT

## 2018-01-29 PROCEDURE — 25010000002 FENTANYL CITRATE (PF) 100 MCG/2ML SOLUTION: Performed by: NURSE ANESTHETIST, CERTIFIED REGISTERED

## 2018-01-29 PROCEDURE — S0260 H&P FOR SURGERY: HCPCS | Performed by: ORTHOPAEDIC SURGERY

## 2018-01-29 PROCEDURE — 25010000002 PROPOFOL 10 MG/ML EMULSION: Performed by: NURSE ANESTHETIST, CERTIFIED REGISTERED

## 2018-01-29 DEVICE — NUT EXPEDIUM MCC HEX TI 3/8IN: Type: IMPLANTABLE DEVICE | Status: FUNCTIONAL

## 2018-01-29 DEVICE — SCRW SET EXPEDIUM MCC TI  X25: Type: IMPLANTABLE DEVICE | Status: FUNCTIONAL

## 2018-01-29 DEVICE — PLT EXPEDIUM MCC TI 40TO60: Type: IMPLANTABLE DEVICE | Status: FUNCTIONAL

## 2018-01-29 DEVICE — SCRW INNR EXPEDIUM: Type: IMPLANTABLE DEVICE | Status: FUNCTIONAL

## 2018-01-29 DEVICE — IMPLANTABLE DEVICE: Type: IMPLANTABLE DEVICE | Status: FUNCTIONAL

## 2018-01-29 DEVICE — JHOOK EXPEDIUM MCC  55 TI  X25: Type: IMPLANTABLE DEVICE | Status: FUNCTIONAL

## 2018-01-29 DEVICE — SCRW EXPEDIUM PA TI 7.5X45MM: Type: IMPLANTABLE DEVICE | Status: FUNCTIONAL

## 2018-01-29 DEVICE — STRIP 7800310 MASTERGRAFT STRIP 10CM
Type: IMPLANTABLE DEVICE | Status: FUNCTIONAL
Brand: MASTERGRAFT® STRIP

## 2018-01-29 DEVICE — SCRW EXPEDIUM PA TI 7X45MM: Type: IMPLANTABLE DEVICE | Status: FUNCTIONAL

## 2018-01-29 RX ORDER — SODIUM CHLORIDE AND POTASSIUM CHLORIDE 150; 450 MG/100ML; MG/100ML
100 INJECTION, SOLUTION INTRAVENOUS CONTINUOUS
Status: DISCONTINUED | OUTPATIENT
Start: 2018-01-29 | End: 2018-02-02 | Stop reason: HOSPADM

## 2018-01-29 RX ORDER — CEFAZOLIN SODIUM 2 G/100ML
2 INJECTION, SOLUTION INTRAVENOUS EVERY 8 HOURS
Status: COMPLETED | OUTPATIENT
Start: 2018-01-29 | End: 2018-01-29

## 2018-01-29 RX ORDER — CYCLOBENZAPRINE HCL 10 MG
10 TABLET ORAL 3 TIMES DAILY PRN
Status: DISCONTINUED | OUTPATIENT
Start: 2018-01-29 | End: 2018-01-31

## 2018-01-29 RX ORDER — FENTANYL CITRATE 50 UG/ML
50 INJECTION, SOLUTION INTRAMUSCULAR; INTRAVENOUS
Status: DISCONTINUED | OUTPATIENT
Start: 2018-01-29 | End: 2018-01-29 | Stop reason: HOSPADM

## 2018-01-29 RX ORDER — EPHEDRINE SULFATE 50 MG/ML
5 INJECTION, SOLUTION INTRAVENOUS ONCE AS NEEDED
Status: DISCONTINUED | OUTPATIENT
Start: 2018-01-29 | End: 2018-01-29 | Stop reason: HOSPADM

## 2018-01-29 RX ORDER — DORZOLAMIDE HCL 20 MG/ML
1 SOLUTION/ DROPS OPHTHALMIC 2 TIMES DAILY
Status: DISCONTINUED | OUTPATIENT
Start: 2018-01-29 | End: 2018-02-02 | Stop reason: HOSPADM

## 2018-01-29 RX ORDER — CEFAZOLIN SODIUM 2 G/100ML
2 INJECTION, SOLUTION INTRAVENOUS ONCE
Status: COMPLETED | OUTPATIENT
Start: 2018-01-29 | End: 2018-01-29

## 2018-01-29 RX ORDER — LIDOCAINE HYDROCHLORIDE 10 MG/ML
0.5 INJECTION, SOLUTION EPIDURAL; INFILTRATION; INTRACAUDAL; PERINEURAL ONCE AS NEEDED
Status: DISCONTINUED | OUTPATIENT
Start: 2018-01-29 | End: 2018-01-29 | Stop reason: HOSPADM

## 2018-01-29 RX ORDER — ONDANSETRON 4 MG/1
4 TABLET, FILM COATED ORAL EVERY 6 HOURS PRN
Status: DISCONTINUED | OUTPATIENT
Start: 2018-01-29 | End: 2018-02-02 | Stop reason: HOSPADM

## 2018-01-29 RX ORDER — MIDAZOLAM HYDROCHLORIDE 1 MG/ML
0.5 INJECTION INTRAMUSCULAR; INTRAVENOUS
Status: DISCONTINUED | OUTPATIENT
Start: 2018-01-29 | End: 2018-01-29 | Stop reason: HOSPADM

## 2018-01-29 RX ORDER — NALOXONE HCL 0.4 MG/ML
0.2 VIAL (ML) INJECTION AS NEEDED
Status: DISCONTINUED | OUTPATIENT
Start: 2018-01-29 | End: 2018-01-29 | Stop reason: HOSPADM

## 2018-01-29 RX ORDER — ONDANSETRON 4 MG/1
4 TABLET, ORALLY DISINTEGRATING ORAL EVERY 6 HOURS PRN
Status: DISCONTINUED | OUTPATIENT
Start: 2018-01-29 | End: 2018-02-02 | Stop reason: HOSPADM

## 2018-01-29 RX ORDER — PROPOFOL 10 MG/ML
VIAL (ML) INTRAVENOUS AS NEEDED
Status: DISCONTINUED | OUTPATIENT
Start: 2018-01-29 | End: 2018-01-29 | Stop reason: SURG

## 2018-01-29 RX ORDER — LIDOCAINE HYDROCHLORIDE 20 MG/ML
INJECTION, SOLUTION INFILTRATION; PERINEURAL AS NEEDED
Status: DISCONTINUED | OUTPATIENT
Start: 2018-01-29 | End: 2018-01-29 | Stop reason: SURG

## 2018-01-29 RX ORDER — TIMOLOL MALEATE 5 MG/ML
1 SOLUTION/ DROPS OPHTHALMIC 2 TIMES DAILY
Status: DISCONTINUED | OUTPATIENT
Start: 2018-01-29 | End: 2018-02-02 | Stop reason: HOSPADM

## 2018-01-29 RX ORDER — DEXTROSE MONOHYDRATE 25 G/50ML
25 INJECTION, SOLUTION INTRAVENOUS
Status: DISCONTINUED | OUTPATIENT
Start: 2018-01-29 | End: 2018-02-02 | Stop reason: HOSPADM

## 2018-01-29 RX ORDER — LABETALOL HYDROCHLORIDE 5 MG/ML
5 INJECTION, SOLUTION INTRAVENOUS
Status: DISCONTINUED | OUTPATIENT
Start: 2018-01-29 | End: 2018-01-29 | Stop reason: HOSPADM

## 2018-01-29 RX ORDER — HYDRALAZINE HYDROCHLORIDE 20 MG/ML
5 INJECTION INTRAMUSCULAR; INTRAVENOUS
Status: DISCONTINUED | OUTPATIENT
Start: 2018-01-29 | End: 2018-01-29 | Stop reason: HOSPADM

## 2018-01-29 RX ORDER — SODIUM CHLORIDE 0.9 % (FLUSH) 0.9 %
1-10 SYRINGE (ML) INJECTION AS NEEDED
Status: DISCONTINUED | OUTPATIENT
Start: 2018-01-29 | End: 2018-01-29 | Stop reason: HOSPADM

## 2018-01-29 RX ORDER — SENNA AND DOCUSATE SODIUM 50; 8.6 MG/1; MG/1
1 TABLET, FILM COATED ORAL NIGHTLY
Status: DISCONTINUED | OUTPATIENT
Start: 2018-01-29 | End: 2018-02-02 | Stop reason: HOSPADM

## 2018-01-29 RX ORDER — ONDANSETRON 2 MG/ML
4 INJECTION INTRAMUSCULAR; INTRAVENOUS EVERY 6 HOURS PRN
Status: DISCONTINUED | OUTPATIENT
Start: 2018-01-29 | End: 2018-02-02 | Stop reason: HOSPADM

## 2018-01-29 RX ORDER — TEMAZEPAM 15 MG/1
15 CAPSULE ORAL NIGHTLY PRN
Status: DISCONTINUED | OUTPATIENT
Start: 2018-01-29 | End: 2018-01-31

## 2018-01-29 RX ORDER — ATORVASTATIN CALCIUM 10 MG/1
10 TABLET, FILM COATED ORAL DAILY
Status: DISCONTINUED | OUTPATIENT
Start: 2018-01-29 | End: 2018-02-02 | Stop reason: HOSPADM

## 2018-01-29 RX ORDER — DONEPEZIL HYDROCHLORIDE 5 MG/1
5 TABLET, FILM COATED ORAL EVERY MORNING
Status: DISCONTINUED | OUTPATIENT
Start: 2018-01-30 | End: 2018-02-02 | Stop reason: HOSPADM

## 2018-01-29 RX ORDER — ENALAPRIL MALEATE 20 MG/1
40 TABLET ORAL EVERY MORNING
Status: DISCONTINUED | OUTPATIENT
Start: 2018-01-30 | End: 2018-02-02 | Stop reason: HOSPADM

## 2018-01-29 RX ORDER — PROMETHAZINE HYDROCHLORIDE 25 MG/ML
12.5 INJECTION, SOLUTION INTRAMUSCULAR; INTRAVENOUS ONCE AS NEEDED
Status: DISCONTINUED | OUTPATIENT
Start: 2018-01-29 | End: 2018-01-29 | Stop reason: HOSPADM

## 2018-01-29 RX ORDER — FAMOTIDINE 10 MG/ML
20 INJECTION, SOLUTION INTRAVENOUS ONCE
Status: COMPLETED | OUTPATIENT
Start: 2018-01-29 | End: 2018-01-29

## 2018-01-29 RX ORDER — OXYCODONE AND ACETAMINOPHEN 10; 325 MG/1; MG/1
2 TABLET ORAL EVERY 4 HOURS PRN
Status: DISCONTINUED | OUTPATIENT
Start: 2018-01-29 | End: 2018-01-30

## 2018-01-29 RX ORDER — ONDANSETRON 2 MG/ML
4 INJECTION INTRAMUSCULAR; INTRAVENOUS ONCE AS NEEDED
Status: DISCONTINUED | OUTPATIENT
Start: 2018-01-29 | End: 2018-01-29 | Stop reason: HOSPADM

## 2018-01-29 RX ORDER — SODIUM CHLORIDE, SODIUM LACTATE, POTASSIUM CHLORIDE, CALCIUM CHLORIDE 600; 310; 30; 20 MG/100ML; MG/100ML; MG/100ML; MG/100ML
100 INJECTION, SOLUTION INTRAVENOUS CONTINUOUS
Status: DISCONTINUED | OUTPATIENT
Start: 2018-01-29 | End: 2018-01-31

## 2018-01-29 RX ORDER — DIPHENHYDRAMINE HYDROCHLORIDE 50 MG/ML
12.5 INJECTION INTRAMUSCULAR; INTRAVENOUS
Status: DISCONTINUED | OUTPATIENT
Start: 2018-01-29 | End: 2018-01-29 | Stop reason: HOSPADM

## 2018-01-29 RX ORDER — HYDROMORPHONE HCL IN 0.9% NACL 10 MG/50ML
PATIENT CONTROLLED ANALGESIA SYRINGE INTRAVENOUS CONTINUOUS
Status: DISCONTINUED | OUTPATIENT
Start: 2018-01-29 | End: 2018-01-31

## 2018-01-29 RX ORDER — ONDANSETRON 2 MG/ML
INJECTION INTRAMUSCULAR; INTRAVENOUS AS NEEDED
Status: DISCONTINUED | OUTPATIENT
Start: 2018-01-29 | End: 2018-01-29 | Stop reason: SURG

## 2018-01-29 RX ORDER — GLYCOPYRROLATE 0.2 MG/ML
INJECTION INTRAMUSCULAR; INTRAVENOUS AS NEEDED
Status: DISCONTINUED | OUTPATIENT
Start: 2018-01-29 | End: 2018-01-29 | Stop reason: SURG

## 2018-01-29 RX ORDER — SODIUM CHLORIDE 0.9 % (FLUSH) 0.9 %
1-10 SYRINGE (ML) INJECTION AS NEEDED
Status: DISCONTINUED | OUTPATIENT
Start: 2018-01-29 | End: 2018-02-02 | Stop reason: HOSPADM

## 2018-01-29 RX ORDER — PROMETHAZINE HYDROCHLORIDE 25 MG/1
25 SUPPOSITORY RECTAL ONCE AS NEEDED
Status: DISCONTINUED | OUTPATIENT
Start: 2018-01-29 | End: 2018-01-29 | Stop reason: HOSPADM

## 2018-01-29 RX ORDER — FENTANYL CITRATE 50 UG/ML
INJECTION, SOLUTION INTRAMUSCULAR; INTRAVENOUS AS NEEDED
Status: DISCONTINUED | OUTPATIENT
Start: 2018-01-29 | End: 2018-01-29 | Stop reason: SURG

## 2018-01-29 RX ORDER — EPHEDRINE SULFATE 50 MG/ML
INJECTION, SOLUTION INTRAVENOUS AS NEEDED
Status: DISCONTINUED | OUTPATIENT
Start: 2018-01-29 | End: 2018-01-29 | Stop reason: SURG

## 2018-01-29 RX ORDER — FLUMAZENIL 0.1 MG/ML
0.2 INJECTION INTRAVENOUS AS NEEDED
Status: DISCONTINUED | OUTPATIENT
Start: 2018-01-29 | End: 2018-01-29 | Stop reason: HOSPADM

## 2018-01-29 RX ORDER — HYDROMORPHONE HCL 110MG/55ML
0.5 PATIENT CONTROLLED ANALGESIA SYRINGE INTRAVENOUS
Status: DISCONTINUED | OUTPATIENT
Start: 2018-01-29 | End: 2018-01-29 | Stop reason: HOSPADM

## 2018-01-29 RX ORDER — PANTOPRAZOLE SODIUM 40 MG/1
40 TABLET, DELAYED RELEASE ORAL EVERY MORNING
Status: DISCONTINUED | OUTPATIENT
Start: 2018-01-30 | End: 2018-02-02 | Stop reason: HOSPADM

## 2018-01-29 RX ORDER — LATANOPROST 50 UG/ML
1 SOLUTION/ DROPS OPHTHALMIC NIGHTLY
Status: DISCONTINUED | OUTPATIENT
Start: 2018-01-29 | End: 2018-02-02 | Stop reason: HOSPADM

## 2018-01-29 RX ORDER — ROCURONIUM BROMIDE 10 MG/ML
INJECTION, SOLUTION INTRAVENOUS AS NEEDED
Status: DISCONTINUED | OUTPATIENT
Start: 2018-01-29 | End: 2018-01-29 | Stop reason: SURG

## 2018-01-29 RX ORDER — MAGNESIUM HYDROXIDE 1200 MG/15ML
LIQUID ORAL AS NEEDED
Status: DISCONTINUED | OUTPATIENT
Start: 2018-01-29 | End: 2018-01-29 | Stop reason: HOSPADM

## 2018-01-29 RX ORDER — TAMSULOSIN HYDROCHLORIDE 0.4 MG/1
0.4 CAPSULE ORAL EVERY MORNING
Status: DISCONTINUED | OUTPATIENT
Start: 2018-01-30 | End: 2018-02-02 | Stop reason: HOSPADM

## 2018-01-29 RX ORDER — CEFAZOLIN SODIUM 2 G/100ML
2 INJECTION, SOLUTION INTRAVENOUS ONCE
Status: DISCONTINUED | OUTPATIENT
Start: 2018-01-29 | End: 2018-01-29

## 2018-01-29 RX ORDER — SODIUM CHLORIDE, SODIUM LACTATE, POTASSIUM CHLORIDE, CALCIUM CHLORIDE 600; 310; 30; 20 MG/100ML; MG/100ML; MG/100ML; MG/100ML
9 INJECTION, SOLUTION INTRAVENOUS CONTINUOUS
Status: DISCONTINUED | OUTPATIENT
Start: 2018-01-29 | End: 2018-01-31

## 2018-01-29 RX ORDER — BISACODYL 10 MG
10 SUPPOSITORY, RECTAL RECTAL DAILY PRN
Status: DISCONTINUED | OUTPATIENT
Start: 2018-01-29 | End: 2018-02-02 | Stop reason: HOSPADM

## 2018-01-29 RX ORDER — PROMETHAZINE HYDROCHLORIDE 25 MG/1
25 TABLET ORAL ONCE AS NEEDED
Status: DISCONTINUED | OUTPATIENT
Start: 2018-01-29 | End: 2018-01-29 | Stop reason: HOSPADM

## 2018-01-29 RX ORDER — BRIMONIDINE TARTRATE 2 MG/ML
1 SOLUTION/ DROPS OPHTHALMIC 2 TIMES DAILY
Status: DISCONTINUED | OUTPATIENT
Start: 2018-01-29 | End: 2018-02-02 | Stop reason: HOSPADM

## 2018-01-29 RX ORDER — NALOXONE HCL 0.4 MG/ML
0.1 VIAL (ML) INJECTION
Status: DISCONTINUED | OUTPATIENT
Start: 2018-01-29 | End: 2018-02-02 | Stop reason: HOSPADM

## 2018-01-29 RX ORDER — NICOTINE POLACRILEX 4 MG
15 LOZENGE BUCCAL
Status: DISCONTINUED | OUTPATIENT
Start: 2018-01-29 | End: 2018-02-02 | Stop reason: HOSPADM

## 2018-01-29 RX ADMIN — NEOSTIGMINE METHYLSULFATE 2 MG: 1 INJECTION INTRAMUSCULAR; INTRAVENOUS; SUBCUTANEOUS at 11:23

## 2018-01-29 RX ADMIN — CEFAZOLIN SODIUM 2 G: 2 INJECTION, SOLUTION INTRAVENOUS at 07:54

## 2018-01-29 RX ADMIN — NEOSTIGMINE METHYLSULFATE 3 MG: 1 INJECTION INTRAMUSCULAR; INTRAVENOUS; SUBCUTANEOUS at 11:15

## 2018-01-29 RX ADMIN — POTASSIUM CHLORIDE 100 ML/HR: 2 INJECTION, SOLUTION, CONCENTRATE INTRAVENOUS at 16:09

## 2018-01-29 RX ADMIN — ROCURONIUM BROMIDE 40 MG: 10 INJECTION INTRAVENOUS at 07:43

## 2018-01-29 RX ADMIN — ROCURONIUM BROMIDE 10 MG: 10 INJECTION INTRAVENOUS at 10:10

## 2018-01-29 RX ADMIN — PROPOFOL 150 MG: 10 INJECTION, EMULSION INTRAVENOUS at 07:43

## 2018-01-29 RX ADMIN — ONDANSETRON 4 MG: 2 INJECTION INTRAMUSCULAR; INTRAVENOUS at 10:47

## 2018-01-29 RX ADMIN — FENTANYL CITRATE 50 MCG: 50 INJECTION, SOLUTION INTRAMUSCULAR; INTRAVENOUS at 08:30

## 2018-01-29 RX ADMIN — MIDAZOLAM 0.5 MG: 1 INJECTION INTRAMUSCULAR; INTRAVENOUS at 06:58

## 2018-01-29 RX ADMIN — ATORVASTATIN CALCIUM 10 MG: 10 TABLET, FILM COATED ORAL at 21:35

## 2018-01-29 RX ADMIN — DOCUSATE SODIUM -SENNOSIDES 1 TABLET: 50; 8.6 TABLET, COATED ORAL at 21:05

## 2018-01-29 RX ADMIN — SODIUM CHLORIDE, POTASSIUM CHLORIDE, SODIUM LACTATE AND CALCIUM CHLORIDE 9 ML/HR: 600; 310; 30; 20 INJECTION, SOLUTION INTRAVENOUS at 06:14

## 2018-01-29 RX ADMIN — SODIUM CHLORIDE, POTASSIUM CHLORIDE, SODIUM LACTATE AND CALCIUM CHLORIDE: 600; 310; 30; 20 INJECTION, SOLUTION INTRAVENOUS at 09:45

## 2018-01-29 RX ADMIN — LATANOPROST 1 DROP: 50 SOLUTION OPHTHALMIC at 21:35

## 2018-01-29 RX ADMIN — CEFAZOLIN SODIUM 2 G: 2 INJECTION, SOLUTION INTRAVENOUS at 16:09

## 2018-01-29 RX ADMIN — TEMAZEPAM 15 MG: 15 CAPSULE ORAL at 21:06

## 2018-01-29 RX ADMIN — OXYCODONE HYDROCHLORIDE AND ACETAMINOPHEN 2 TABLET: 10; 325 TABLET ORAL at 21:04

## 2018-01-29 RX ADMIN — ROCURONIUM BROMIDE 20 MG: 10 INJECTION INTRAVENOUS at 09:06

## 2018-01-29 RX ADMIN — FAMOTIDINE 20 MG: 10 INJECTION, SOLUTION INTRAVENOUS at 06:59

## 2018-01-29 RX ADMIN — OXYCODONE HYDROCHLORIDE AND ACETAMINOPHEN 2 TABLET: 10; 325 TABLET ORAL at 16:25

## 2018-01-29 RX ADMIN — BRIMONIDINE TARTRATE 1 DROP: 2 SOLUTION OPHTHALMIC at 21:35

## 2018-01-29 RX ADMIN — LIDOCAINE HYDROCHLORIDE 60 MG: 20 INJECTION, SOLUTION INFILTRATION; PERINEURAL at 07:43

## 2018-01-29 RX ADMIN — GLYCOPYRROLATE 0.2 MG: 0.2 INJECTION INTRAMUSCULAR; INTRAVENOUS at 08:17

## 2018-01-29 RX ADMIN — HYDROMORPHONE HYDROCHLORIDE: 10 INJECTION INTRAMUSCULAR; INTRAVENOUS; SUBCUTANEOUS at 12:00

## 2018-01-29 RX ADMIN — FENTANYL CITRATE 100 MCG: 50 INJECTION, SOLUTION INTRAMUSCULAR; INTRAVENOUS at 07:43

## 2018-01-29 RX ADMIN — SODIUM CHLORIDE, POTASSIUM CHLORIDE, SODIUM LACTATE AND CALCIUM CHLORIDE 100 ML/HR: 600; 310; 30; 20 INJECTION, SOLUTION INTRAVENOUS at 12:04

## 2018-01-29 RX ADMIN — FENTANYL CITRATE 50 MCG: 50 INJECTION, SOLUTION INTRAMUSCULAR; INTRAVENOUS at 10:10

## 2018-01-29 RX ADMIN — CYCLOBENZAPRINE HYDROCHLORIDE 10 MG: 10 TABLET, FILM COATED ORAL at 18:40

## 2018-01-29 RX ADMIN — INSULIN ASPART 2 UNITS: 100 INJECTION, SOLUTION INTRAVENOUS; SUBCUTANEOUS at 21:06

## 2018-01-29 RX ADMIN — EPHEDRINE SULFATE 10 MG: 50 INJECTION INTRAMUSCULAR; INTRAVENOUS; SUBCUTANEOUS at 09:14

## 2018-01-29 RX ADMIN — GLYCOPYRROLATE 0.4 MG: 0.2 INJECTION INTRAMUSCULAR; INTRAVENOUS at 11:23

## 2018-01-29 RX ADMIN — DORZOLAMIDE HYDROCHLORIDE 1 DROP: 20 SOLUTION OPHTHALMIC at 21:35

## 2018-01-29 RX ADMIN — GLYCOPYRROLATE 0.5 MG: 0.2 INJECTION INTRAMUSCULAR; INTRAVENOUS at 11:15

## 2018-01-29 RX ADMIN — FENTANYL CITRATE 50 MCG: 50 INJECTION, SOLUTION INTRAMUSCULAR; INTRAVENOUS at 11:35

## 2018-01-29 RX ADMIN — CEFAZOLIN SODIUM 2 G: 2 INJECTION, SOLUTION INTRAVENOUS at 21:35

## 2018-01-29 NOTE — ANESTHESIA PROCEDURE NOTES
Airway  Urgency: elective    Airway not difficult    General Information and Staff    Patient location during procedure: OR  CRNA: LUCIANA BUTTS    Indications and Patient Condition  Indications for airway management: airway protection    Preoxygenated: yes  Mask difficulty assessment: 1 - vent by mask    Final Airway Details  Final airway type: endotracheal airway      Successful airway: ETT  Cuffed: yes   Successful intubation technique: direct laryngoscopy  Endotracheal tube insertion site: oral  Blade: Sarah  Blade size: #4  ETT size: 8.0 mm  Cormack-Lehane Classification: grade I - full view of glottis  Placement verified by: chest auscultation and capnometry   Measured from: lips  ETT to lips (cm): 23  Number of attempts at approach: 1    Additional Comments   ett cuff up at [facility]OP

## 2018-01-29 NOTE — ANESTHESIA PREPROCEDURE EVALUATION
Anesthesia Evaluation     Patient summary reviewed and Nursing notes reviewed   NPO Solid Status: > 8 hours  NPO Liquid Status: > 8 hours     Airway   Mallampati: II  TM distance: >3 FB  Neck ROM: full  no difficulty expected  Dental - normal exam         Pulmonary - normal exam    breath sounds clear to auscultation  (+) recent URI (sinus drainage no fevers, chills, productive cough) resolved,   (-) shortness of breath  Cardiovascular - normal exam  Exercise tolerance: poor (<4 METS)    ECG reviewed  PT is on anticoagulation therapy  Rhythm: regular  Rate: normal    (+) hypertension poorly controlled, dysrhythmias (last EKG 10/2017 SR, LAFB, RBBB) Atrial Fib, hyperlipidemia  (-) angina, CHF, orthopnea      Neuro/Psych  (+) dementia (mild cognitive disorder),     Weakness: lower extremity pain.  GI/Hepatic/Renal/Endo    (+)  hiatal hernia (takes nexium, controls sx well), diabetes mellitus (on metformin, OHA) type 2 well controlled,   (-) GERD    Musculoskeletal     (+) radiculopathy (spinal stenosis with failed back syndrome) Left lower extremity and Right lower extremity  Abdominal  - normal exam   Substance History      OB/GYN          Other   (+) arthritis (arthritis of hand and DDD spine)     Blood dyscrasia: iron defeciency anemia.                                      Anesthesia Plan    ASA 3     general     intravenous induction   Anesthetic plan and risks discussed with patient.    Plan discussed with CRNA.

## 2018-01-29 NOTE — ANESTHESIA POSTPROCEDURE EVALUATION
"Patient: Derrek Ritter    Procedure Summary     Date Anesthesia Start Anesthesia Stop Room / Location    01/29/18 0735 1136  [facility] / [facility]       Procedure Diagnosis Surgeon Provider    L2 to L5 fusion with instrumentation and removal of implants L4 5 (N/A Spine Lumbar); L2 to L4 laminectomy with a fusion by orthopedics (N/A Spine Lumbar) Spinal stenosis of lumbar region with neurogenic claudication; Spondylolisthesis of lumbar region  (Spinal stenosis of lumbar region with neurogenic claudication [M48.062]; Spondylolisthesis of lumbar region [M43.16]) Barron Knight MD; MD Shelley Colbert MD          Anesthesia Type: general  Last vitals  BP   136/60 (01/29/18 1251)   Temp   36.6 °C (97.8 °F) (01/29/18 1251)   Pulse   69 (01/29/18 1251)   Resp   16 (01/29/18 1251)     SpO2   100 % (01/29/18 1251)     Post Anesthesia Care and Evaluation      Comments: Patient discharged before being evaluated by an Anesthesiologist. No apparent complications per the record.  This case was not medically directed. I am completing this chart for medical records purposes; I personally have no medical involvement with this patient.    /60 (BP Location: Left arm, Patient Position: Lying)  Pulse 69  Temp 36.6 °C (97.8 °F) (Oral)   Resp 16  Ht 190.5 cm (75\")  Wt 89.4 kg (197 lb 1.6 oz)  SpO2 100%  BMI 24.64 kg/m2          "

## 2018-01-30 PROBLEM — G92.8 TOXIC METABOLIC ENCEPHALOPATHY: Status: ACTIVE | Noted: 2018-01-30

## 2018-01-30 LAB
ALBUMIN SERPL-MCNC: 3 G/DL (ref 3.5–5.2)
ALBUMIN/GLOB SERPL: 1.2 G/DL
ALP SERPL-CCNC: 33 U/L (ref 39–117)
ALT SERPL W P-5'-P-CCNC: 14 U/L (ref 1–41)
ANION GAP SERPL CALCULATED.3IONS-SCNC: 9.1 MMOL/L
AST SERPL-CCNC: 22 U/L (ref 1–40)
BACTERIA UR QL AUTO: ABNORMAL /HPF
BASOPHILS # BLD AUTO: 0.02 10*3/MM3 (ref 0–0.2)
BASOPHILS NFR BLD AUTO: 0.2 % (ref 0–1.5)
BILIRUB SERPL-MCNC: 0.4 MG/DL (ref 0.1–1.2)
BILIRUB UR QL STRIP: NEGATIVE
BUN BLD-MCNC: 13 MG/DL (ref 8–23)
BUN/CREAT SERPL: 18.3 (ref 7–25)
CALCIUM SPEC-SCNC: 8.4 MG/DL (ref 8.6–10.5)
CHLORIDE SERPL-SCNC: 101 MMOL/L (ref 98–107)
CLARITY UR: ABNORMAL
CO2 SERPL-SCNC: 27.9 MMOL/L (ref 22–29)
COLOR UR: ABNORMAL
CREAT BLD-MCNC: 0.71 MG/DL (ref 0.76–1.27)
DEPRECATED RDW RBC AUTO: 50.9 FL (ref 37–54)
EOSINOPHIL # BLD AUTO: 0.04 10*3/MM3 (ref 0–0.7)
EOSINOPHIL NFR BLD AUTO: 0.4 % (ref 0.3–6.2)
ERYTHROCYTE [DISTWIDTH] IN BLOOD BY AUTOMATED COUNT: 13.8 % (ref 11.5–14.5)
GFR SERPL CREATININE-BSD FRML MDRD: 108 ML/MIN/1.73
GLOBULIN UR ELPH-MCNC: 2.5 GM/DL
GLUCOSE BLD-MCNC: 136 MG/DL (ref 65–99)
GLUCOSE BLDC GLUCOMTR-MCNC: 120 MG/DL (ref 70–130)
GLUCOSE BLDC GLUCOMTR-MCNC: 131 MG/DL (ref 70–130)
GLUCOSE BLDC GLUCOMTR-MCNC: 138 MG/DL (ref 70–130)
GLUCOSE BLDC GLUCOMTR-MCNC: 177 MG/DL (ref 70–130)
GLUCOSE UR STRIP-MCNC: NEGATIVE MG/DL
HBA1C MFR BLD: 5.9 % (ref 4.8–5.6)
HCT VFR BLD AUTO: 32.5 % (ref 40.4–52.2)
HGB BLD-MCNC: 10.1 G/DL (ref 13.7–17.6)
HGB UR QL STRIP.AUTO: ABNORMAL
HYALINE CASTS UR QL AUTO: ABNORMAL /LPF
IMM GRANULOCYTES # BLD: 0.02 10*3/MM3 (ref 0–0.03)
IMM GRANULOCYTES NFR BLD: 0.2 % (ref 0–0.5)
KETONES UR QL STRIP: NEGATIVE
LEUKOCYTE ESTERASE UR QL STRIP.AUTO: NEGATIVE
LYMPHOCYTES # BLD AUTO: 0.99 10*3/MM3 (ref 0.9–4.8)
LYMPHOCYTES NFR BLD AUTO: 10.5 % (ref 19.6–45.3)
MCH RBC QN AUTO: 31.5 PG (ref 27–32.7)
MCHC RBC AUTO-ENTMCNC: 31.1 G/DL (ref 32.6–36.4)
MCV RBC AUTO: 101.2 FL (ref 79.8–96.2)
MONOCYTES # BLD AUTO: 0.85 10*3/MM3 (ref 0.2–1.2)
MONOCYTES NFR BLD AUTO: 9 % (ref 5–12)
NEUTROPHILS # BLD AUTO: 7.51 10*3/MM3 (ref 1.9–8.1)
NEUTROPHILS NFR BLD AUTO: 79.7 % (ref 42.7–76)
NITRITE UR QL STRIP: NEGATIVE
PH UR STRIP.AUTO: <=5 [PH] (ref 5–8)
PLATELET # BLD AUTO: 163 10*3/MM3 (ref 140–500)
PMV BLD AUTO: 10.2 FL (ref 6–12)
POTASSIUM BLD-SCNC: 4.4 MMOL/L (ref 3.5–5.2)
PROT SERPL-MCNC: 5.5 G/DL (ref 6–8.5)
PROT UR QL STRIP: ABNORMAL
RBC # BLD AUTO: 3.21 10*6/MM3 (ref 4.6–6)
RBC # UR: ABNORMAL /HPF
REF LAB TEST METHOD: ABNORMAL
SODIUM BLD-SCNC: 138 MMOL/L (ref 136–145)
SP GR UR STRIP: 1.02 (ref 1–1.03)
SQUAMOUS #/AREA URNS HPF: ABNORMAL /HPF
UROBILINOGEN UR QL STRIP: ABNORMAL
WBC NRBC COR # BLD: 9.43 10*3/MM3 (ref 4.5–10.7)
WBC UR QL AUTO: ABNORMAL /HPF

## 2018-01-30 PROCEDURE — 97162 PT EVAL MOD COMPLEX 30 MIN: CPT

## 2018-01-30 PROCEDURE — 83036 HEMOGLOBIN GLYCOSYLATED A1C: CPT | Performed by: HOSPITALIST

## 2018-01-30 PROCEDURE — 85025 COMPLETE CBC W/AUTO DIFF WBC: CPT | Performed by: HOSPITALIST

## 2018-01-30 PROCEDURE — 97110 THERAPEUTIC EXERCISES: CPT

## 2018-01-30 PROCEDURE — 63710000001 INSULIN ASPART PER 5 UNITS: Performed by: HOSPITALIST

## 2018-01-30 PROCEDURE — 82962 GLUCOSE BLOOD TEST: CPT

## 2018-01-30 PROCEDURE — 25010000002 DIGOXIN PER 500 MCG: Performed by: INTERNAL MEDICINE

## 2018-01-30 PROCEDURE — 25010000002 POTASSIUM CHLORIDE PER 2 MEQ OF POTASSIUM: Performed by: ORTHOPAEDIC SURGERY

## 2018-01-30 PROCEDURE — 93005 ELECTROCARDIOGRAM TRACING: CPT | Performed by: HOSPITALIST

## 2018-01-30 PROCEDURE — 81001 URINALYSIS AUTO W/SCOPE: CPT | Performed by: HOSPITALIST

## 2018-01-30 PROCEDURE — 99024 POSTOP FOLLOW-UP VISIT: CPT | Performed by: ORTHOPAEDIC SURGERY

## 2018-01-30 PROCEDURE — 80053 COMPREHEN METABOLIC PANEL: CPT | Performed by: HOSPITALIST

## 2018-01-30 PROCEDURE — 93010 ELECTROCARDIOGRAM REPORT: CPT | Performed by: INTERNAL MEDICINE

## 2018-01-30 PROCEDURE — 99024 POSTOP FOLLOW-UP VISIT: CPT | Performed by: PHYSICIAN ASSISTANT

## 2018-01-30 RX ORDER — DIGOXIN 0.25 MG/ML
0.25 INJECTION INTRAMUSCULAR; INTRAVENOUS ONCE
Status: COMPLETED | OUTPATIENT
Start: 2018-01-30 | End: 2018-01-30

## 2018-01-30 RX ORDER — OXYCODONE AND ACETAMINOPHEN 10; 325 MG/1; MG/1
1 TABLET ORAL EVERY 4 HOURS PRN
Status: DISCONTINUED | OUTPATIENT
Start: 2018-01-30 | End: 2018-01-31

## 2018-01-30 RX ADMIN — OXYCODONE HYDROCHLORIDE AND ACETAMINOPHEN 2 TABLET: 10; 325 TABLET ORAL at 05:17

## 2018-01-30 RX ADMIN — POTASSIUM CHLORIDE 100 ML/HR: 2 INJECTION, SOLUTION, CONCENTRATE INTRAVENOUS at 12:31

## 2018-01-30 RX ADMIN — DOCUSATE SODIUM -SENNOSIDES 1 TABLET: 50; 8.6 TABLET, COATED ORAL at 20:43

## 2018-01-30 RX ADMIN — DIGOXIN 0.25 MG: 0.25 INJECTION INTRAMUSCULAR; INTRAVENOUS at 20:26

## 2018-01-30 RX ADMIN — OXYCODONE HYDROCHLORIDE AND ACETAMINOPHEN 2 TABLET: 10; 325 TABLET ORAL at 11:23

## 2018-01-30 RX ADMIN — ENALAPRIL MALEATE 40 MG: 20 TABLET ORAL at 10:54

## 2018-01-30 RX ADMIN — INSULIN ASPART 2 UNITS: 100 INJECTION, SOLUTION INTRAVENOUS; SUBCUTANEOUS at 12:27

## 2018-01-30 RX ADMIN — PIOGLITAZONE 45 MG: 30 TABLET ORAL at 10:55

## 2018-01-30 RX ADMIN — BRIMONIDINE TARTRATE 1 DROP: 2 SOLUTION OPHTHALMIC at 11:00

## 2018-01-30 RX ADMIN — DORZOLAMIDE HYDROCHLORIDE 1 DROP: 20 SOLUTION OPHTHALMIC at 11:00

## 2018-01-30 RX ADMIN — POTASSIUM CHLORIDE 100 ML/HR: 2 INJECTION, SOLUTION, CONCENTRATE INTRAVENOUS at 23:17

## 2018-01-30 RX ADMIN — LATANOPROST 1 DROP: 50 SOLUTION OPHTHALMIC at 20:42

## 2018-01-30 RX ADMIN — PANTOPRAZOLE SODIUM 40 MG: 40 TABLET, DELAYED RELEASE ORAL at 11:23

## 2018-01-30 RX ADMIN — ATORVASTATIN CALCIUM 10 MG: 10 TABLET, FILM COATED ORAL at 10:55

## 2018-01-30 RX ADMIN — TAMSULOSIN HYDROCHLORIDE 0.4 MG: 0.4 CAPSULE ORAL at 10:55

## 2018-01-30 RX ADMIN — CYCLOBENZAPRINE HYDROCHLORIDE 10 MG: 10 TABLET, FILM COATED ORAL at 05:17

## 2018-01-30 RX ADMIN — DONEPEZIL HYDROCHLORIDE 5 MG: 5 TABLET, FILM COATED ORAL at 10:55

## 2018-01-30 RX ADMIN — TIMOLOL MALEATE 1 DROP: 5 SOLUTION OPHTHALMIC at 12:08

## 2018-01-30 RX ADMIN — BRIMONIDINE TARTRATE 1 DROP: 2 SOLUTION OPHTHALMIC at 20:43

## 2018-01-30 RX ADMIN — DORZOLAMIDE HYDROCHLORIDE 1 DROP: 20 SOLUTION OPHTHALMIC at 20:43

## 2018-01-31 ENCOUNTER — APPOINTMENT (OUTPATIENT)
Dept: GENERAL RADIOLOGY | Facility: HOSPITAL | Age: 78
End: 2018-01-31
Attending: HOSPITALIST

## 2018-01-31 ENCOUNTER — APPOINTMENT (OUTPATIENT)
Dept: CT IMAGING | Facility: HOSPITAL | Age: 78
End: 2018-01-31

## 2018-01-31 PROBLEM — D75.89 MACROCYTOSIS WITHOUT ANEMIA: Status: ACTIVE | Noted: 2018-01-31

## 2018-01-31 PROBLEM — R33.9 URINARY RETENTION WITH INCOMPLETE BLADDER EMPTYING: Status: ACTIVE | Noted: 2018-01-31

## 2018-01-31 LAB
ANION GAP SERPL CALCULATED.3IONS-SCNC: 11.8 MMOL/L
BUN BLD-MCNC: 16 MG/DL (ref 8–23)
BUN/CREAT SERPL: 18.8 (ref 7–25)
CALCIUM SPEC-SCNC: 8.9 MG/DL (ref 8.6–10.5)
CHLORIDE SERPL-SCNC: 103 MMOL/L (ref 98–107)
CO2 SERPL-SCNC: 25.2 MMOL/L (ref 22–29)
CREAT BLD-MCNC: 0.85 MG/DL (ref 0.76–1.27)
DEPRECATED RDW RBC AUTO: 52.4 FL (ref 37–54)
ERYTHROCYTE [DISTWIDTH] IN BLOOD BY AUTOMATED COUNT: 14 % (ref 11.5–14.5)
GFR SERPL CREATININE-BSD FRML MDRD: 88 ML/MIN/1.73
GLUCOSE BLD-MCNC: 147 MG/DL (ref 65–99)
GLUCOSE BLDC GLUCOMTR-MCNC: 101 MG/DL (ref 70–130)
GLUCOSE BLDC GLUCOMTR-MCNC: 117 MG/DL (ref 70–130)
GLUCOSE BLDC GLUCOMTR-MCNC: 123 MG/DL (ref 70–130)
GLUCOSE BLDC GLUCOMTR-MCNC: 138 MG/DL (ref 70–130)
GLUCOSE BLDC GLUCOMTR-MCNC: 156 MG/DL (ref 70–130)
GLUCOSE BLDC GLUCOMTR-MCNC: 169 MG/DL (ref 70–130)
HCT VFR BLD AUTO: 33.1 % (ref 40.4–52.2)
HGB BLD-MCNC: 10.2 G/DL (ref 13.7–17.6)
MCH RBC QN AUTO: 31.6 PG (ref 27–32.7)
MCHC RBC AUTO-ENTMCNC: 30.8 G/DL (ref 32.6–36.4)
MCV RBC AUTO: 102.5 FL (ref 79.8–96.2)
PLATELET # BLD AUTO: 172 10*3/MM3 (ref 140–500)
PMV BLD AUTO: 10.1 FL (ref 6–12)
POTASSIUM BLD-SCNC: 4.6 MMOL/L (ref 3.5–5.2)
RBC # BLD AUTO: 3.23 10*6/MM3 (ref 4.6–6)
SODIUM BLD-SCNC: 140 MMOL/L (ref 136–145)
WBC NRBC COR # BLD: 12.92 10*3/MM3 (ref 4.5–10.7)

## 2018-01-31 PROCEDURE — 85027 COMPLETE CBC AUTOMATED: CPT | Performed by: HOSPITALIST

## 2018-01-31 PROCEDURE — 99024 POSTOP FOLLOW-UP VISIT: CPT | Performed by: PHYSICIAN ASSISTANT

## 2018-01-31 PROCEDURE — 71045 X-RAY EXAM CHEST 1 VIEW: CPT

## 2018-01-31 PROCEDURE — 82962 GLUCOSE BLOOD TEST: CPT

## 2018-01-31 PROCEDURE — 80048 BASIC METABOLIC PNL TOTAL CA: CPT | Performed by: HOSPITALIST

## 2018-01-31 PROCEDURE — 25010000002 VANCOMYCIN 10 G RECONSTITUTED SOLUTION: Performed by: PHYSICIAN ASSISTANT

## 2018-01-31 PROCEDURE — 99232 SBSQ HOSP IP/OBS MODERATE 35: CPT | Performed by: INTERNAL MEDICINE

## 2018-01-31 PROCEDURE — 63710000001 INSULIN ASPART PER 5 UNITS: Performed by: HOSPITALIST

## 2018-01-31 PROCEDURE — 97110 THERAPEUTIC EXERCISES: CPT

## 2018-01-31 PROCEDURE — 70450 CT HEAD/BRAIN W/O DYE: CPT

## 2018-01-31 RX ORDER — OXYCODONE AND ACETAMINOPHEN 7.5; 325 MG/1; MG/1
1 TABLET ORAL EVERY 4 HOURS PRN
Status: DISCONTINUED | OUTPATIENT
Start: 2018-01-31 | End: 2018-01-31

## 2018-01-31 RX ORDER — OXYCODONE HYDROCHLORIDE AND ACETAMINOPHEN 5; 325 MG/1; MG/1
1 TABLET ORAL EVERY 4 HOURS PRN
Status: DISCONTINUED | OUTPATIENT
Start: 2018-01-31 | End: 2018-02-01

## 2018-01-31 RX ADMIN — BRIMONIDINE TARTRATE 1 DROP: 2 SOLUTION OPHTHALMIC at 09:05

## 2018-01-31 RX ADMIN — DORZOLAMIDE HYDROCHLORIDE 1 DROP: 20 SOLUTION OPHTHALMIC at 09:06

## 2018-01-31 RX ADMIN — METOPROLOL TARTRATE 25 MG: 25 TABLET ORAL at 21:10

## 2018-01-31 RX ADMIN — CYCLOBENZAPRINE HYDROCHLORIDE 10 MG: 10 TABLET, FILM COATED ORAL at 09:05

## 2018-01-31 RX ADMIN — VANCOMYCIN HYDROCHLORIDE 1500 MG: 10 INJECTION, POWDER, LYOPHILIZED, FOR SOLUTION INTRAVENOUS at 13:29

## 2018-01-31 RX ADMIN — METOPROLOL TARTRATE 25 MG: 25 TABLET ORAL at 11:01

## 2018-01-31 RX ADMIN — POLYETHYLENE GLYCOL 3350 17 G: 17 POWDER, FOR SOLUTION ORAL at 12:11

## 2018-01-31 RX ADMIN — OXYCODONE HYDROCHLORIDE AND ACETAMINOPHEN 1 TABLET: 10; 325 TABLET ORAL at 04:37

## 2018-01-31 RX ADMIN — PIOGLITAZONE 45 MG: 30 TABLET ORAL at 07:36

## 2018-01-31 RX ADMIN — DOCUSATE SODIUM -SENNOSIDES 1 TABLET: 50; 8.6 TABLET, COATED ORAL at 21:10

## 2018-01-31 RX ADMIN — TAMSULOSIN HYDROCHLORIDE 0.4 MG: 0.4 CAPSULE ORAL at 07:36

## 2018-01-31 RX ADMIN — ENALAPRIL MALEATE 40 MG: 20 TABLET ORAL at 07:36

## 2018-01-31 RX ADMIN — OXYCODONE HYDROCHLORIDE AND ACETAMINOPHEN 1 TABLET: 5; 325 TABLET ORAL at 23:41

## 2018-01-31 RX ADMIN — INSULIN ASPART 2 UNITS: 100 INJECTION, SOLUTION INTRAVENOUS; SUBCUTANEOUS at 22:00

## 2018-01-31 RX ADMIN — PANTOPRAZOLE SODIUM 40 MG: 40 TABLET, DELAYED RELEASE ORAL at 07:36

## 2018-01-31 RX ADMIN — ATORVASTATIN CALCIUM 10 MG: 10 TABLET, FILM COATED ORAL at 09:05

## 2018-01-31 RX ADMIN — VANCOMYCIN HYDROCHLORIDE 1500 MG: 10 INJECTION, POWDER, LYOPHILIZED, FOR SOLUTION INTRAVENOUS at 22:41

## 2018-01-31 RX ADMIN — DONEPEZIL HYDROCHLORIDE 5 MG: 5 TABLET, FILM COATED ORAL at 07:36

## 2018-01-31 RX ADMIN — DORZOLAMIDE HYDROCHLORIDE 1 DROP: 20 SOLUTION OPHTHALMIC at 22:00

## 2018-01-31 RX ADMIN — OXYCODONE HYDROCHLORIDE AND ACETAMINOPHEN 1 TABLET: 5; 325 TABLET ORAL at 15:48

## 2018-01-31 RX ADMIN — TIMOLOL MALEATE 1 DROP: 5 SOLUTION OPHTHALMIC at 09:05

## 2018-01-31 RX ADMIN — OXYCODONE HYDROCHLORIDE AND ACETAMINOPHEN 1 TABLET: 10; 325 TABLET ORAL at 09:05

## 2018-02-01 PROBLEM — F05 SUNDOWNING: Status: ACTIVE | Noted: 2018-02-01

## 2018-02-01 PROBLEM — G93.41 METABOLIC ENCEPHALOPATHY: Status: ACTIVE | Noted: 2018-01-30

## 2018-02-01 LAB
ANION GAP SERPL CALCULATED.3IONS-SCNC: 10 MMOL/L
BASOPHILS # BLD AUTO: 0.01 10*3/MM3 (ref 0–0.2)
BASOPHILS NFR BLD AUTO: 0.1 % (ref 0–1.5)
BUN BLD-MCNC: 16 MG/DL (ref 8–23)
BUN/CREAT SERPL: 23.5 (ref 7–25)
CALCIUM SPEC-SCNC: 8.7 MG/DL (ref 8.6–10.5)
CHLORIDE SERPL-SCNC: 106 MMOL/L (ref 98–107)
CO2 SERPL-SCNC: 25 MMOL/L (ref 22–29)
CREAT BLD-MCNC: 0.68 MG/DL (ref 0.76–1.27)
DEPRECATED RDW RBC AUTO: 51.1 FL (ref 37–54)
EOSINOPHIL # BLD AUTO: 0.08 10*3/MM3 (ref 0–0.7)
EOSINOPHIL NFR BLD AUTO: 0.9 % (ref 0.3–6.2)
ERYTHROCYTE [DISTWIDTH] IN BLOOD BY AUTOMATED COUNT: 13.7 % (ref 11.5–14.5)
FOLATE SERPL-MCNC: 16.42 NG/ML (ref 4.78–24.2)
GFR SERPL CREATININE-BSD FRML MDRD: 113 ML/MIN/1.73
GLUCOSE BLD-MCNC: 140 MG/DL (ref 65–99)
GLUCOSE BLDC GLUCOMTR-MCNC: 112 MG/DL (ref 70–130)
GLUCOSE BLDC GLUCOMTR-MCNC: 133 MG/DL (ref 70–130)
GLUCOSE BLDC GLUCOMTR-MCNC: 151 MG/DL (ref 70–130)
GLUCOSE BLDC GLUCOMTR-MCNC: 156 MG/DL (ref 70–130)
GLUCOSE BLDC GLUCOMTR-MCNC: 215 MG/DL (ref 70–130)
HCT VFR BLD AUTO: 29.3 % (ref 40.4–52.2)
HGB BLD-MCNC: 9.1 G/DL (ref 13.7–17.6)
IMM GRANULOCYTES # BLD: 0.02 10*3/MM3 (ref 0–0.03)
IMM GRANULOCYTES NFR BLD: 0.2 % (ref 0–0.5)
INR PPP: 1.2 (ref 0.9–1.1)
LYMPHOCYTES # BLD AUTO: 0.54 10*3/MM3 (ref 0.9–4.8)
LYMPHOCYTES NFR BLD AUTO: 6 % (ref 19.6–45.3)
MCH RBC QN AUTO: 31.4 PG (ref 27–32.7)
MCHC RBC AUTO-ENTMCNC: 31.1 G/DL (ref 32.6–36.4)
MCV RBC AUTO: 101 FL (ref 79.8–96.2)
MONOCYTES # BLD AUTO: 0.56 10*3/MM3 (ref 0.2–1.2)
MONOCYTES NFR BLD AUTO: 6.2 % (ref 5–12)
NEUTROPHILS # BLD AUTO: 7.84 10*3/MM3 (ref 1.9–8.1)
NEUTROPHILS NFR BLD AUTO: 86.6 % (ref 42.7–76)
PLATELET # BLD AUTO: 142 10*3/MM3 (ref 140–500)
PMV BLD AUTO: 10.4 FL (ref 6–12)
POTASSIUM BLD-SCNC: 4 MMOL/L (ref 3.5–5.2)
PROTHROMBIN TIME: 14.8 SECONDS (ref 11.7–14.2)
RBC # BLD AUTO: 2.9 10*6/MM3 (ref 4.6–6)
SODIUM BLD-SCNC: 141 MMOL/L (ref 136–145)
VIT B12 BLD-MCNC: 259 PG/ML (ref 211–946)
WBC NRBC COR # BLD: 9.05 10*3/MM3 (ref 4.5–10.7)

## 2018-02-01 PROCEDURE — 82962 GLUCOSE BLOOD TEST: CPT

## 2018-02-01 PROCEDURE — 82746 ASSAY OF FOLIC ACID SERUM: CPT | Performed by: HOSPITALIST

## 2018-02-01 PROCEDURE — 82607 VITAMIN B-12: CPT | Performed by: HOSPITALIST

## 2018-02-01 PROCEDURE — 80048 BASIC METABOLIC PNL TOTAL CA: CPT | Performed by: HOSPITALIST

## 2018-02-01 PROCEDURE — 85610 PROTHROMBIN TIME: CPT | Performed by: ORTHOPAEDIC SURGERY

## 2018-02-01 PROCEDURE — 97110 THERAPEUTIC EXERCISES: CPT

## 2018-02-01 PROCEDURE — 99024 POSTOP FOLLOW-UP VISIT: CPT | Performed by: PHYSICIAN ASSISTANT

## 2018-02-01 PROCEDURE — 63710000001 INSULIN ASPART PER 5 UNITS: Performed by: HOSPITALIST

## 2018-02-01 PROCEDURE — 99024 POSTOP FOLLOW-UP VISIT: CPT | Performed by: ORTHOPAEDIC SURGERY

## 2018-02-01 PROCEDURE — 25010000002 VANCOMYCIN 10 G RECONSTITUTED SOLUTION: Performed by: PHYSICIAN ASSISTANT

## 2018-02-01 PROCEDURE — 99232 SBSQ HOSP IP/OBS MODERATE 35: CPT | Performed by: INTERNAL MEDICINE

## 2018-02-01 PROCEDURE — 85025 COMPLETE CBC W/AUTO DIFF WBC: CPT | Performed by: PHYSICIAN ASSISTANT

## 2018-02-01 PROCEDURE — 99221 1ST HOSP IP/OBS SF/LOW 40: CPT | Performed by: PSYCHIATRY & NEUROLOGY

## 2018-02-01 RX ORDER — ZIPRASIDONE HYDROCHLORIDE 20 MG/1
20 CAPSULE ORAL
Start: 2018-02-01 | End: 2018-06-11

## 2018-02-01 RX ORDER — ZIPRASIDONE HYDROCHLORIDE 20 MG/1
20 CAPSULE ORAL
Status: DISCONTINUED | OUTPATIENT
Start: 2018-02-01 | End: 2018-02-02 | Stop reason: HOSPADM

## 2018-02-01 RX ORDER — SULFAMETHOXAZOLE AND TRIMETHOPRIM 800; 160 MG/1; MG/1
1 TABLET ORAL EVERY 12 HOURS SCHEDULED
Qty: 9 TABLET | Refills: 0 | Status: SHIPPED | OUTPATIENT
Start: 2018-02-01 | End: 2018-02-06

## 2018-02-01 RX ORDER — SENNA AND DOCUSATE SODIUM 50; 8.6 MG/1; MG/1
1 TABLET, FILM COATED ORAL NIGHTLY
Start: 2018-02-01 | End: 2018-06-11

## 2018-02-01 RX ORDER — HYDROCODONE BITARTRATE AND ACETAMINOPHEN 5; 325 MG/1; MG/1
1 TABLET ORAL EVERY 4 HOURS PRN
Qty: 50 TABLET | Refills: 0 | Status: SHIPPED | OUTPATIENT
Start: 2018-02-01 | End: 2018-02-11

## 2018-02-01 RX ORDER — WARFARIN SODIUM 10 MG/1
10 TABLET ORAL
Status: DISCONTINUED | OUTPATIENT
Start: 2018-02-01 | End: 2018-02-02 | Stop reason: HOSPADM

## 2018-02-01 RX ORDER — WARFARIN SODIUM 5 MG/1
5 TABLET ORAL
Status: DISCONTINUED | OUTPATIENT
Start: 2018-02-03 | End: 2018-02-02 | Stop reason: HOSPADM

## 2018-02-01 RX ORDER — HYDROCODONE BITARTRATE AND ACETAMINOPHEN 5; 325 MG/1; MG/1
1 TABLET ORAL EVERY 4 HOURS PRN
Status: DISCONTINUED | OUTPATIENT
Start: 2018-02-01 | End: 2018-02-02 | Stop reason: HOSPADM

## 2018-02-01 RX ORDER — SULFAMETHOXAZOLE AND TRIMETHOPRIM 800; 160 MG/1; MG/1
1 TABLET ORAL EVERY 12 HOURS SCHEDULED
Status: DISCONTINUED | OUTPATIENT
Start: 2018-02-01 | End: 2018-02-02 | Stop reason: HOSPADM

## 2018-02-01 RX ORDER — OXYCODONE HYDROCHLORIDE AND ACETAMINOPHEN 5; 325 MG/1; MG/1
1 TABLET ORAL EVERY 4 HOURS PRN
Qty: 50 TABLET | Refills: 0 | Status: SHIPPED | OUTPATIENT
Start: 2018-02-01 | End: 2018-02-01 | Stop reason: HOSPADM

## 2018-02-01 RX ORDER — SULFAMETHOXAZOLE AND TRIMETHOPRIM 800; 160 MG/1; MG/1
1 TABLET ORAL 2 TIMES DAILY
Qty: 10 TABLET | Refills: 0 | COMMUNITY
Start: 2018-02-01 | End: 2018-02-27

## 2018-02-01 RX ADMIN — ATORVASTATIN CALCIUM 10 MG: 10 TABLET, FILM COATED ORAL at 10:27

## 2018-02-01 RX ADMIN — DORZOLAMIDE HYDROCHLORIDE 1 DROP: 20 SOLUTION OPHTHALMIC at 10:28

## 2018-02-01 RX ADMIN — OXYCODONE HYDROCHLORIDE AND ACETAMINOPHEN 1 TABLET: 5; 325 TABLET ORAL at 07:48

## 2018-02-01 RX ADMIN — DORZOLAMIDE HYDROCHLORIDE 1 DROP: 20 SOLUTION OPHTHALMIC at 20:33

## 2018-02-01 RX ADMIN — WARFARIN SODIUM 10 MG: 10 TABLET ORAL at 18:07

## 2018-02-01 RX ADMIN — DONEPEZIL HYDROCHLORIDE 5 MG: 5 TABLET, FILM COATED ORAL at 07:48

## 2018-02-01 RX ADMIN — BRIMONIDINE TARTRATE 1 DROP: 2 SOLUTION OPHTHALMIC at 20:33

## 2018-02-01 RX ADMIN — TAMSULOSIN HYDROCHLORIDE 0.4 MG: 0.4 CAPSULE ORAL at 07:48

## 2018-02-01 RX ADMIN — DOCUSATE SODIUM -SENNOSIDES 1 TABLET: 50; 8.6 TABLET, COATED ORAL at 20:54

## 2018-02-01 RX ADMIN — INSULIN ASPART 2 UNITS: 100 INJECTION, SOLUTION INTRAVENOUS; SUBCUTANEOUS at 22:25

## 2018-02-01 RX ADMIN — INSULIN ASPART 4 UNITS: 100 INJECTION, SOLUTION INTRAVENOUS; SUBCUTANEOUS at 12:49

## 2018-02-01 RX ADMIN — METOPROLOL TARTRATE 25 MG: 25 TABLET ORAL at 10:27

## 2018-02-01 RX ADMIN — HYDROCODONE BITARTRATE AND ACETAMINOPHEN 1 TABLET: 5; 325 TABLET ORAL at 20:54

## 2018-02-01 RX ADMIN — PANTOPRAZOLE SODIUM 40 MG: 40 TABLET, DELAYED RELEASE ORAL at 07:48

## 2018-02-01 RX ADMIN — ZIPRASIDONE HCL 20 MG: 20 CAPSULE ORAL at 20:54

## 2018-02-01 RX ADMIN — POLYETHYLENE GLYCOL 3350 17 G: 17 POWDER, FOR SOLUTION ORAL at 10:28

## 2018-02-01 RX ADMIN — PIOGLITAZONE 45 MG: 30 TABLET ORAL at 07:48

## 2018-02-01 RX ADMIN — LATANOPROST 1 DROP: 50 SOLUTION OPHTHALMIC at 20:33

## 2018-02-01 RX ADMIN — BRIMONIDINE TARTRATE 1 DROP: 2 SOLUTION OPHTHALMIC at 10:28

## 2018-02-01 RX ADMIN — OXYCODONE HYDROCHLORIDE AND ACETAMINOPHEN 1 TABLET: 5; 325 TABLET ORAL at 12:50

## 2018-02-01 RX ADMIN — SULFAMETHOXAZOLE AND TRIMETHOPRIM 160 MG: 800; 160 TABLET ORAL at 20:54

## 2018-02-01 RX ADMIN — METOPROLOL TARTRATE 25 MG: 25 TABLET ORAL at 20:54

## 2018-02-01 RX ADMIN — INSULIN ASPART 2 UNITS: 100 INJECTION, SOLUTION INTRAVENOUS; SUBCUTANEOUS at 17:40

## 2018-02-01 RX ADMIN — ENALAPRIL MALEATE 40 MG: 20 TABLET ORAL at 07:48

## 2018-02-01 RX ADMIN — VANCOMYCIN HYDROCHLORIDE 1500 MG: 10 INJECTION, POWDER, LYOPHILIZED, FOR SOLUTION INTRAVENOUS at 11:54

## 2018-02-01 RX ADMIN — TIMOLOL MALEATE 1 DROP: 5 SOLUTION OPHTHALMIC at 10:28

## 2018-02-01 RX ADMIN — SULFAMETHOXAZOLE AND TRIMETHOPRIM 160 MG: 800; 160 TABLET ORAL at 14:03

## 2018-02-01 NOTE — PLAN OF CARE
Problem: Patient Care Overview (Adult)  Goal: Plan of Care Review   02/01/18 0853   Outcome Evaluation   Outcome Summary/Follow up Plan Pt ambulated further distance this date, able to maintain normal gait throughout 90% of walk. Crouched gait was able to be corrected with cueing. He was able to follow cues and carry on full conversation this session. PT will continue to see for acute stay to improve strength and ambulation ability.

## 2018-02-01 NOTE — THERAPY TREATMENT NOTE
Acute Care - Physical Therapy Treatment Note  [facility]     Patient Name: Derrek Ritter  : 1940  MRN: [id number]  Today's Date: 2018  Onset of Illness/Injury or Date of Surgery Date: 18  Date of Referral to PT: 18  Referring Physician: Eugene    Admit Date: 2018    Visit Dx:    ICD-10-CM ICD-9-CM   1. Decreased strength R53.1 780.79   2. Spondylolisthesis of lumbar region M43.16 738.4   3. Spinal stenosis of lumbar region with neurogenic claudication M48.062 724.03   4. Paroxysmal atrial fibrillation I48.0 427.31     Patient Active Problem List   Diagnosis   • Cholelithiasis   • DM (HgbA1c 5.9)   • Diverticulosis of intestine   • Hypercholesterolemia   • Hypertension   • Spinal stenosis   • Microalbuminuria   • Atrial fibrillation with RVR   • Medicare annual wellness visit, subsequent   • Anemia   • Iron deficiency anemia   • DJD (degenerative joint disease)   • Impotence of organic origin   • Glaucoma   • Hiatal hernia   • Mild cognitive disorder   • Calculus of kidney   • Nocturia   • Chronic bilateral low back pain with bilateral sciatica   • Post laminectomy syndrome   • Routine health maintenance   • Weight loss   • Spinal stenosis of lumbar region with neurogenic claudication   • Spondylolisthesis of lumbar region   • Lumbar spine pain   • Metabolic encephalopathy   • Urinary retention with incomplete bladder emptying   • Macrocytosis without anemia   • [facility]               Adult [facility] Note       18 1600 18 0844 18 1330    Rehab Assessment/Intervention    Discipline physical therapist  -SACHA MAR KH2 physical therapist  -SACHA MAR KH2 physical therapist  -SACHA MAR KH2    Document Type therapy note (daily note)  -SACHA MAR KH2 therapy note (daily note)  -SACHA MAR KH2 therapy note (daily note)  -SACHA MAR KH2    Subjective Information agree to therapy  -SACHA MAR KH2 agree to therapy;pain  -SACHA MAR KH2 agree to therapy;complains of;weakness  -SACHA MAR KH2    Patient Effort,  Rehab Treatment good  -KH,BJ,KH2 good  -KH,BJ,KH2 good  -KH,BJ,KH2    Precautions/Limitations brace on when up;fall precautions;spinal precautions  -KH,BJ,KH2 brace on when up;fall precautions;spinal precautions  -KH,BJ,KH2 brace on when up;fall precautions;spinal precautions  -KH,BJ,KH2    Patient Response to Treatment Pt tolerated tx  -KH,BJ,KH2 Pt tolerated tx  -KH,BJ,KH2  Pt tolerated tx  -KH,BJ,KH2    Recorded by [KH,BJ,KH2] Katarina Judd, PT (r) Mark Lovett, PT Student (t) Katarina Judd, PT (c) [KH,BJ,KH2] Katarina Judd, PT (r) Mark Lovett, PT Student (t) Katarina Judd, PT (c) [KH,BJ,KH2] Katarina Judd, PT (r) Mark Lovett PT Student (t) Katarina Judd, PT (c)    Vital Signs    Pretreatment Heart Rate (beats/min)  135  -KH,BJ,KH2     Posttreatment Heart Rate (beats/min)  137  -KH,BJ,KH2     Recorded by  [KH,BJ,KH2] Ktaarina Judd, PT (r) Mark Lovett PT Student (t) Katarina Judd, PT (c)     Pain Assessment    Pain Assessment Bragg-Barr FACES  -KH,BJ,KH2 Bragg-Baker FACES  -KH,BJ,KH2 Bragg-Baker FACES  -KH,BJ,KH2    Bragg-Barr FACES Pain Rating 2  -KH,BJ,KH2 2  -KH,BJ,KH2 2  -KH,BJ,KH2    Pain Type   Surgical pain  -KH,BJ,KH2    Pain Location Back  -KH,BJ,KH2 Back   back through buttocks  -KH,BJ,KH2 Back  -KH,BJ,KH2    Pain Orientation  Right;Left   Lateral  -KH,BJ,KH2     Patient's Stated Pain Goal No pain  -KH,BJ,KH2 No pain  -KH,BJ,KH2 No pain  -KH,BJ,KH2    Pain Intervention(s) Repositioned;Ambulation/increased activity  -KH,BJ,KH2 Repositioned;Ambulation/increased activity  -KH,BJ,KH2 Repositioned;Ambulation/increased activity  -KH,BJ,KH2    Response to Interventions Pt tolerated tx  -KH,BJ,KH2 Pt tolerated tx  -KH,BJ,KH2 Pt tolerated tx  -KH,BJ,KH2    Recorded by [KH,BJ,KH2] Katarina Judd, PT (r) Mark Lovett, PT Student (t) Katarina Judd, PT (c) [KH,BJ,KH2] Katarina Judd, PT (r) Mark  Bony, PT Student (t) Katarina Judd, PT (c) [KH,BJ,KH2] Katarina Judd, PT (r) Mark Lovett PT Student (t) Katarina Judd, PT (c)    Cognitive Assessment/Intervention    Orientation Status oriented x 4  -KH,BJ,KH2 oriented x 4  -KH,BJ,KH2     Follows Commands/Answers Questions 100% of the time  -KH,BJ,KH2 100% of the time  -KH,BJ,KH2     Personal Safety  other (see comments)   Seems more alert today, able to carry on conversation  -KH,BJ,KH2     Personal Safety Interventions fall prevention program maintained;gait belt;muscle strengthening facilitated;nonskid shoes/slippers when out of bed;supervised activity  -KH,BJ,KH2 fall prevention program maintained;gait belt;muscle strengthening facilitated;nonskid shoes/slippers when out of bed  -KH,BJ,KH2     Short/Long Term Memory --  -KH,BJ,KH2      Recorded by [KH,BJ,KH2] Katarina Judd, PT (r) Mark Lovett, PT Student (t) Katarina Judd, PT (c) [KH,BJ,KH2] Katarina Judd, PT (r) Mark Lovett PT Student (t) Katarina Judd, PT (c)     Bed Mobility, Assessment/Treatment    Bed Mobility, Roll Left, [place] not tested  -KH,BJ,KH2 minimum assist (75% patient effort)  -KH,BJ,KH2 minimum assist (75% patient effort)  -KH,BJ,KH2    Bed Mob, Sidelying to Sit, [place] not tested  -KH,BJ,KH2 moderate assist (50% patient effort)  -KH,BJ,KH2     Bed Mob, Sit to Sidelying, [place]   moderate assist (50% patient effort)  -KH,BJ,KH2    Recorded by [KH,BJ,KH2] Katarina Judd, PT (r) Mark Lovett PT Student (t) Katarina Judd, PT (c) [KH,BJ,KH2] Katarina Judd, PT (r) Mark Lovett PT Student (t) Katarina Judd, PT (c) [ISABELA,SACHA,KH2] Katarina Judd, PT (r) Mark Lovett, PT Student (t) Katarina Judd, PT (c)    Transfer Assessment/Treatment    Transfers, Sit-Stand [place] contact guard assist  -ISABELA,SACHA,KH2 minimum assist (75% patient effort)   -KH,BJ,KH2 minimum assist (75% patient effort)  -KH,BJ,KH2    Transfers, Stand-Sit [place] contact guard assist  -KH,BJ,KH2 minimum assist (75% patient effort)  -KH,BJ,KH2 minimum assist (75% patient effort)  -KH,BJ,KH2    Transfers, Sit-Stand-Sit, Assist Device rolling walker  -KH,BJ,KH2 rolling walker  -KH,BJ,KH2 rolling walker  -KH,BJ,KH2    Recorded by [KH,BJ,KH2] Katarina Judd, PT (r) Mark Lovett, PT Student (t) Katarina Judd, PT (c) [KH,BJ,KH2] Katarina Judd, PT (r) Mark Lovett, PT Student (t) Katarina Judd, PT (c) [KH,BJ,KH2] Katarina Judd, PT (r) Mark Lovett, PT Student (t) Katarina Judd, PT (c)    Gait Assessment/Treatment    Gait, [place] Level contact guard assist  -KH,BJ,KH2 contact guard assist  -KH,BJ,KH2 contact guard assist  -KH,BJ,KH2    Gait, Assistive Device rolling walker  -KH,BJ,KH2 rolling walker  -KH,BJ,KH2 rolling walker  -KH,BJ,KH2    Gait, Distance (Feet) 400   One standing rest break  -KH,BJ,KH2 350  -KH,BJ,KH2 250  -KH,BJ,KH2    Gait, Gait Deviations other (see comments)   Some crouched gait this walk (25%)  -KH,BJ,KH2 --   Some knee flexion with fatigue corrected by cueing  -KH,BJ,KH2 bilateral:;decreased heel strike;step length decreased;stride length decreased;toe-to-floor clearance decreased   Knees start to flex more with fatigue  -KH,BJ,KH2    Gait, Safety Issues step length decreased  -KH,BJ,KH2 step length decreased  -KH,BJ,KH2 step length decreased  -KH,BJ,KH2    Gait, Impairments ROM decreased;strength decreased;pain  -KH,BJ,KH2 ROM decreased;strength decreased;pain  -KH,BJ,KH2 ROM decreased;strength decreased;pain  -KH,BJ,KH2    Gait, Comment  Pt ambulated well this date, with his gait maintained through most of walk. Cueing is able to correct crouched gait.  -SACHA MAR KH2 Pt needed less cueing for gait, but increased as he fatigued to maintain improve gait pattern   -SACHA MAR KH2    Recorded by  [KH,BJ,KH2] Katarina Judd, PT (r) Mark Lovett, PT Student (t) Katarina Judd, PT (c) [KH,BJ,KH2] Katarina Judd, PT (r) Mark Lovett, PT Student (t) Katarina Judd, PT (c) [KH,BJ,KH2] Katarina Judd, PT (r) Mark Lovett, PT Student (t) Katarina Judd, PT (c)    Therapy Exercises    Bilateral Lower Extremities  AROM:;10 reps;ankle pumps/circles;LAQ  -KH,BJ,KH2 AROM:;10 reps;LAQ;ankle pumps/circles;hip flexion;heel raises  -KH,BJ,KH2    Recorded by  [KH,BJ,KH2] Katarina Judd, PT (r) Mark Lovett PT Student (t) Katarina Judd, PT (c) [KH,BJ,KH2] Katarina Judd, PT (r) Mark Lovett PT Student (t) Katarina Judd, PT (c)    Positioning and Restraints    Pre-Treatment Position sitting in chair/recliner  -KH,BJ,KH2 in bed  -KH,BJ,KH2 sitting in chair/recliner  -KH,BJ,KH2    Post Treatment Position bathroom  -KH,BJ,KH2 chair  -KH,BJ,KH2 bed  -KH,BJ,KH2    In Bed   supine;call light within reach;encouraged to call for assist;exit alarm on;with family/caregiver;with nsg  -KH,BJ,KH2    In Chair  sitting;notified nsg;call light within reach;encouraged to call for assist;exit alarm on;with family/caregiver  -KH,BJ,KH2     Bathroom sitting;notified nsg;call light within reach;encouraged to call for assist;with family/caregiver  -KH,BJ,KH2      Recorded by [KH,BJ,KH2] Katarina Judd, PT (r) Mark Lovett PT Student (t) Katarina Judd, PT (c) [KH,BJ,KH2] Katarina Judd, PT (r) Mark Lovett, PT Student (t) Katarina Judd, PT (c) [SACHA MAR,ISABELA2] Katarina Judd, PT (r) Mark Lovett, PT Student (t) Katarina Judd, PT (c)      01/31/18 1000 01/30/18 1600       Rehab Assessment/Intervention    Discipline physical therapist  -SACHA MAR,ISABELA2 physical therapist  -SACHA MAR,ISABELA2     Document Type therapy note (daily note)  -SACHA MAR,ISABELA2 therapy note (daily note)  -SACHA MAR,ISABELA2     Subjective  Information agree to therapy;complains of;fatigue  -KH,BJ,KH2 agree to therapy;complains of;fatigue  -KH,BJ,KH2     Patient Effort, Rehab Treatment good  -KH,BJ,KH2 good  -KH,BJ,KH2     Precautions/Limitations brace on when up;fall precautions;spinal precautions  -KH,BJ,KH2 brace on when up;fall precautions;spinal precautions  -KH,BJ,KH2     Patient Response to Treatment Pt tolerated tx  -KH,BJ,KH2 Pt tolerated tx  -KH,BJ,KH2     Recorded by [KH,BJ,KH2] Katarina Judd, PT (r) Mark Lovett, PT Student (t) Katarina Judd, PT (c) [KH,BJ,KH2] Katarina Judd, PT (r) Mark Lovett PT Student (t) Katarina Judd, PT (c)     Vital Signs    Pretreatment Heart Rate (beats/min) 92  -KH,BJ,KH2      Posttreatment Heart Rate (beats/min) 98  -KH,BJ,KH2      Recorded by [KH,BJ,KH2] Katarina Judd, PT (r) Mark Lovett PT Student (t) Katarina Judd, PT (c)      Pain Assessment    Pain Assessment Bragg-Barr FACES  -KH,BJ,KH2 Bragg-Baker FACES  -KH,BJ,KH2     Bragg-Barr FACES Pain Rating 4  -KH,BJ,KH2 4  -KH,BJ,KH2     Pain Type Surgical pain  -KH,BJ,KH2 Surgical pain  -KH,BJ,KH2     Pain Location Back  -KH,BJ,KH2 Back  -KH,BJ,KH2     Patient's Stated Pain Goal No pain  -KH,BJ,KH2 No pain  -KH,BJ,KH2     Pain Intervention(s) Repositioned;Ambulation/increased activity  -KH,BJ,KH2 Repositioned;Ambulation/increased activity  -KH,BJ,KH2     Response to Interventions Pt tolerated tx  -KH,BJ,KH2 Pt tolerated tx  -KH,BJ,KH2     Recorded by [KH,BJ,KH2] Katarina Judd, PT (r) Mark Lovett PT Student (t) Katarina Judd, PT (c) [KH,BJ,KH2] Katarina Judd, PT (r) Mark Lovett, PT Student (t) Katarina Judd, PT (c)     Cognitive Assessment/Intervention    Orientation Status oriented x 4  -SACHA MAR KH2      Follows Commands/Answers Questions 100% of the time  -SACHA MAR KH2      Personal Safety other (see comments)   When in bed, falls asleep often and zones  out while up  -KH,BJ,KH2 other (see comments)   he was less impulsive but more drowsy this visit.  -KH,BJ,KH2     Personal Safety Interventions fall prevention program maintained;gait belt;muscle strengthening facilitated;nonskid shoes/slippers when out of bed  -KH,BJ,KH2      Recorded by [KH,BJ,KH2] Katarina Judd, PT (r) Mark Lovett, PT Student (t) Katarina Judd, PT (c) [KH,BJ,KH2] Katarina Judd, PT (r) Mark Lovett, PT Student (t) Katarina Judd, PT (c)     Bed Mobility, Assessment/Treatment    Bed Mobility, Roll Right, [place] minimum assist (75% patient effort)  -KH,BJ,KH2 minimum assist (75% patient effort)  -KH,BJ,KH2     Bed Mob, Sidelying to Sit, [place] moderate assist (50% patient effort)  -KH,BJ,KH2 moderate assist (50% patient effort)  -KH,BJ,KH2     Bed Mobility, Comment Reinforced log rolling  -KH,BJ,KH2      Recorded by [ISABELA,BJ,KH2] Katarina Judd, PT (r) Mark Lovett, PT Student (t) Katarina Judd, PT (c) [KH,BJ,KH2] Katarina Judd, PT (r) Mark Lovett, PT Student (t) Katarina Judd, PT (c)     Transfer Assessment/Treatment    Transfers, Sit-Stand [place] minimum assist (75% patient effort);2 person assist required  -KH,BJ,KH2 minimum assist (75% patient effort);2 person assist required  -KH,BJ,KH2     Transfers, Stand-Sit [place] minimum assist (75% patient effort);2 person assist required  -KH,BJ,KH2 minimum assist (75% patient effort);2 person assist required  -KH,BJ,KH2     Transfers, Sit-Stand-Sit, Assist Device rolling walker  -KH,BJ,KH2 rolling walker  -KH,BJ,KH2     Recorded by [KH,BJ,KH2] Katarina Judd, PT (r) Mark Lovett, PT Student (t) Katarina Judd, PT (c) [KH,BJ,KH2] Katarina Judd, PT (r) Mark Lovett, PT Student (t) Katarina Judd, PT (c)     Gait Assessment/Treatment    Gait, [place] Level contact guard assist;1 person + 1 person to  manage equipment  -KH,BJ,KH2 contact guard assist  -KH,BJ,KH2     Gait, Assistive Device rolling walker  -KH,BJ,KH2 rolling walker  -KH,BJ,KH2     Gait, Distance (Feet) 175   10 w/o RW  -KH,BJ,KH2 95  -KH,BJ,KH2     Gait, Gait Deviations bilateral:;jody decreased;decreased heel strike;step length decreased;stride length decreased;toe-to-floor clearance decreased   Gait much improved, though pattern worse w/o RW  -KH,BJ,KH2 bilateral:;jody decreased;decreased heel strike;step length decreased;stride length decreased;toe-to-floor clearance decreased   R still worse than left.  -KH,BJ,KH2     Gait, Safety Issues step length decreased  -KH,BJ,KH2 step length decreased  -KH,BJ,KH2     Gait, Impairments ROM decreased;strength decreased;pain  -KH,BJ,KH2 ROM decreased;strength decreased;pain  -KH,BJ,KH2     Gait, Comment Gait much improved this date, able to walk with greater heel contact and toe clearance, while having longer steps. Attempted to walk 10 ft w/o RW and gait returned to prior gait.  -KH,BJ,KH2 Cued to take bigger steps, which helped with his heel strike and step length, but needed constant cueing and it became less effective as he fatigued.   -KH,BJ,KH2     Recorded by [ISABELA,BJ,KH2] Katarina Judd, PT (r) Mark Lovett, PT Student (t) Katarina Judd, PT (c) [KH,BJ,KH2] Katarina Judd, PT (r) Mark Lovett, PT Student (t) Katarina Judd, PT (c)     Therapy Exercises    Bilateral Lower Extremities AROM:;10 reps;LAQ;ankle pumps/circles;hip flexion;heel raises  -KH,BJ,KH2      Recorded by [ISABELA,BJ,KH2] Katarina Jdud, PT (r) Mark Lovett, PT Student (t) Katarina Judd, PT (c)      Positioning and Restraints    Pre-Treatment Position in bed  -ISABELA,BJ,KH2 in bed  -KH,BJ,KH2     Post Treatment Position chair  -ISABELA,[place]ER [place] chair  -SACHA MAR KH2     In Chair sitting;call light within reach;encouraged to call for assist;exit alarm on;with family/caregiver  -SACHA MAR KH2  sitting;call light within reach;encouraged to call for assist;with family/caregiver;exit alarm on  -KH,BJ,KH2     Recorded by [KH,BJ,KH2] Katarina Judd, PT (r) Mark Lovett, PT Student (t) Katarina Judd, PT (c) [KH,BJ,KH2] Katarina Judd, PT (r) Mark Lovett, PT Student (t) Katarina Judd, PT (c)       User Key  (r) = Recorded By, (t) = Taken By, (c) = Cosigned By    Initials Name Effective Dates    KH Katarina Judd, PT 05/18/15 -     SACHA Lovett, PT Student 01/08/18 -                 IP PT Goals       01/30/18 1102          Bed Mobility PT LTG    Bed Mobility PT LTG, Date Established 01/30/18  -KH (r) BJ (t) KH (c)      Bed Mobility PT LTG, Time to Achieve 1 wk  -KH (r) BJ (t) KH (c)      Bed Mobility PT LTG, Activity Type all bed mobility  -KH (r) BJ (t) KH (c)      Bed Mobility PT LTG, [place] Level conditional independence  -KH (r) BJ (t) KH (c)      Transfer Training PT LTG    Transfer Training PT LTG, Date Established 01/30/18  -KH (r) BJ (t) KH (c)      Transfer Training PT LTG, Time to Achieve 1 wk  -KH (r) BJ (t) KH (c)      Transfer Training PT LTG, Activity Type all transfers  -KH (r) BJ (t) KH (c)      Transfer Training PT LTG, [place] Level conditional independence  -KH (r) BJ (t) KH (c)      Gait Training PT LTG    Gait Training Goal PT LTG, Date Established 01/30/18  -KH (r) BJ (t) KH (c)      Gait Training Goal PT LTG, Time to Achieve 1 wk  -KH (r) BJ (t) KH (c)      Gait Training Goal PT LTG, [place] Level supervision required  -KH (r) BJ (t) KH (c)      Gait Training Goal PT LTG, Assist Device walker, rolling  -KH (r) BJ (t) KH (c)      Gait Training Goal PT LTG, Distance to Achieve 350  -KH (r) BJ (t) KH (c)      Stair Training PT LTG    Stair Training Goal PT LTG, Date Established 01/30/18  -KH (r) BJ (t) KH (c)      Stair Training Goal PT LTG, Time to Achieve 1 wk  -KH (r) BJ (t) KH (c)      Stair Training Goal PT LTG,  Number of Steps 5  -KH (r) SACHA (t) ISABELA (c)      Stair Training Goal PT LTG, [place] Level contact guard assist  -KH (r) SACHA (t) ISABELA (c)      Patient Education PT LTG    Patient Education PT LTG, Date Established 01/30/18  -KH (r) SACHA (t) ISABELA (c)      Patient Education PT LTG, Time to Achieve 1 wk  -KH (r) SACHA (t) ISABELA (c)      Patient Education PT LTG, Education Type HEP  -KH (r) BJ (t) ISABELA (c)        User Key  (r) = Recorded By, (t) = Taken By, (c) = Cosigned By    Initials Name Provider Type    ISABELA Judd, PT Physical Therapist    SACHA Lovett, PT Student PT Student          Physical Therapy Education     Title: PT OT SLP Therapies (Done)     Topic: Physical Therapy (Done)     Point: Mobility training (Done)    Learning Progress Summary    Learner Readiness Method Response Comment Documented by Status   Patient Acceptance E [facility] 02/01/18 1633 Done    Acceptance E [facility] 02/01/18 0853 Done    Acceptance E [facility] 01/31/18 1337 Done    Acceptance E [facility] 01/31/18 1031 Done    Acceptance E [facility] 01/30/18 1611 Done    Acceptance E [facility] 01/30/18 1101 Done   Family Acceptance E [facility] 01/30/18 1611 Done               Point: Home exercise program (Done)    Learning Progress Summary    Learner Readiness Method Response Comment Documented by Status   Patient Acceptance E [facility] 02/01/18 0853 Done    Acceptance E [facility] 01/31/18 1337 Done    Acceptance E [facility] 01/31/18 1031 Done    Acceptance E [facility] 01/30/18 1101 Done               Point: Body mechanics (Done)    Learning Progress Summary    Learner Readiness Method Response Comment Documented by Status   Patient Acceptance E [facility] 02/01/18 1633 Done    Acceptance E [facility] 02/01/18 0853 Done    Acceptance E [facility] 01/31/18 1337 Done    Acceptance E [facility] 01/31/18 1031 Done    Acceptance E [facility] 01/30/18 1611 Done   Family Acceptance E [facility] 01/30/18 1611 Done               Point: Precautions (Done)    Learning Progress Summary    Learner  Readiness Method Response Comment Documented by Status   Patient Acceptance E VU   02/01/18 1633 Done    Acceptance E VU BJ 01/31/18 1337 Done    Acceptance E   BJ 01/31/18 1031 Done    Acceptance E VU  BJ 01/30/18 1611 Done   Family Acceptance E   BJ 01/30/18 1611 Done                      User Key     Initials Effective Dates Name Provider Type Discipline     01/08/18 -  Mark Lovett, PT Student PT Student PT                    PT Recommendation and Plan  Anticipated Discharge Disposition: home with assist  Planned Therapy Interventions: balance training, bed mobility training, gait training, home exercise program, strengthening  PT Frequency: 2 times/day  Plan of Care Review  Outcome Summary/Follow up Plan: Pt ambulated well this session, increasing his distance ambulated. He did however require a brief standing rest break. Crouched gait was present more in this session, but he was able to correct with cueing. Will continue to see to progress strength and ambulation ability through acute stay.          Outcome Measures       02/01/18 1600 02/01/18 0800 01/31/18 1300    How much help from another person do you currently need...    Turning from your back to your side while in flat bed without using bedrails? 3  -KH (r) BJ (t) KH (c) 3  -KH (r) BJ (t) KH (c) 3  -KH (r) BJ (t) KH (c)    Moving from lying on back to sitting on the side of a flat bed without bedrails? 3  -KH (r) BJ (t) KH (c) 3  -KH (r) BJ (t) KH (c) 3  -KH (r) BJ (t) KH (c)    Moving to and from a bed to a chair (including a wheelchair)? 3  -KH (r) BJ (t) KH (c) 3  -KH (r) BJ (t) KH (c) 3  -KH (r) BJ (t) KH (c)    Standing up from a chair using your arms (e.g., wheelchair, bedside chair)? 3  -KH (r) BJ (t) KH (c) 3  -KH (r) BJ (t) KH (c) 3  -KH (r) BJ (t) KH (c)    Climbing 3-5 steps with a railing? 2  -KH (r) BJ (t) KH (c) 2  -KH (r) BJ (t) KH (c) 2  -KH (r) BJ (t) KH (c)    To walk in hospital room? 3  -KH (r) BJ (t) KH (c) 3  -KH (r)  BJ (t) KH (c) 3  -KH (r) BJ (t) KH (c)    AM-PAC 6 Clicks Score 17  -KH (r) BJ (t) 17  -KH (r) BJ (t) 17  -KH (r) BJ (t)    Functional Assessment    Outcome Measure Options AM-PAC 6 Clicks Basic Mobility (PT)  -KH (r) BJ (t) KH (c) AM-PAC 6 Clicks Basic Mobility (PT)  -KH (r) BJ (t) KH (c) AM-PAC 6 Clicks Basic Mobility (PT)  -KH (r) BJ (t) KH (c)      01/31/18 1000 01/30/18 1600 01/30/18 1100    How much help from another person do you currently need...    Turning from your back to your side while in flat bed without using bedrails? 3  -KH (r) BJ (t) KH (c) 3  -KH (r) BJ (t) KH (c) 3  -KH (r) BJ (t) KH (c)    Moving from lying on back to sitting on the side of a flat bed without bedrails? 3  -KH (r) BJ (t) KH (c) 3  -KH (r) BJ (t) KH (c) 3  -KH (r) BJ (t) KH (c)    Moving to and from a bed to a chair (including a wheelchair)? 3  -KH (r) BJ (t) KH (c) 3  -KH (r) BJ (t) KH (c) 3  -KH (r) BJ (t) KH (c)    Standing up from a chair using your arms (e.g., wheelchair, bedside chair)? 3  -KH (r) BJ (t) KH (c) 3  -KH (r) BJ (t) KH (c) 3  -KH (r) BJ (t) KH (c)    Climbing 3-5 steps with a railing? 2  -KH (r) BJ (t) KH (c) 2  -KH (r) BJ (t) KH (c) 2  -KH (r) BJ (t) KH (c)    To walk in hospital room? 3  -KH (r) BJ (t) KH (c) 3  -KH (r) BJ (t) KH (c) 3  -KH (r) BJ (t) KH (c)    AM-PAC 6 Clicks Score 17  -KH (r) BJ (t) 17  -KH (r) BJ (t) 17  -KH (r) BJ (t)    Functional Assessment    Outcome Measure Options  AM-PAC 6 Clicks Basic Mobility (PT)  -KH (r) BJ (t) ISABELA (c) AM-PAC 6 Clicks Basic Mobility (PT)  -KH (r) SACHA (t) ISABELA (c)      User Key  (r) = Recorded By, (t) = Taken By, (c) = Cosigned By    Initials Name Provider Type     Katarina Judd, PT Physical Therapist    SACHA Lovett, PT Student PT Student           Time Calculation:         PT Charges       02/01/18 1638 02/01/18 0858       Time Calculation    Start Time 1548  -KH (r) SACHA (t) ISABELA (c) 0822  -KH (r) SACHA (t) ISABELA (c)     Stop Time 1602  -KH (r) SACHA (t) KH  (c) 0842  -KH (r) BJ (t) KH (c)     Time Calculation (min) 14 min  -KH (r) BJ (t) 20 min  -KH (r) BJ (t)     PT Received On 02/01/18  -KH (r) BJ (t) KH (c) 02/01/18  -KH (r) BJ (t) KH (c)     PT - Next Appointment 02/02/18  -KH (r) BJ (t) KH (c) 02/01/18  -KH (r) BJ (t) KH (c)       User Key  (r) = Recorded By, (t) = Taken By, (c) = Cosigned By    Initials Name Provider Type    ISABELA Judd, PT Physical Therapist    SACHA Lovett, PT Student PT Student          Therapy Charges for Today     Code Description Service Date Service Provider Modifiers Qty    84869271915 HC PT THER PROC EA 15 MIN 1/31/2018 Mark Lovett, PT Student GP 2    03203042204 HC PT THER SUPP EA 15 MIN 1/31/2018 Mark Lovett PT Student GP 1    75898166764 HC PT THER PROC EA 15 MIN 1/31/2018 Mark Lovett PT Student GP 1    [id number] HC PT THER PROC EA 15 MIN 2/1/2018 Mark Lovett PT Student GP 1    46097029272 HC PT THER PROC EA 15 MIN 2/1/2018 Mark Lovett, PT Student GP 1          PT G-Codes  Outcome Measure Options: AM-PAC 6 Clicks Basic Mobility (PT)    Mark Lovett PT Student  2/1/2018

## 2018-02-01 NOTE — PLAN OF CARE
Problem: Patient Care Overview (Adult)  Goal: Plan of Care Review  Outcome: Ongoing (interventions implemented as appropriate)   02/01/18 0316   Coping/Psychosocial Response Interventions   Plan Of Care Reviewed With patient;family   Patient Care Overview   Progress progress toward functional goals as expected   Outcome Evaluation   Outcome Summary/Follow up Plan Pt is a post op day 2 of a L2 L5 fusion and a L4 L5 instrumental removal. Dressing is clean dry and iintact. Pt had increased confusion through out the day and family was concerned that it might have been a stroke. Multiple doctors have seen pt and thinks the confusion is from surgery and possibly pain meds. Family was not satisfied and wanted a cat scan done. A Team D was called and patient went for catscan. Preliminary result show no acute hemmorhaging. Pt is still confused, but goes in and out of it. Pt can be very calm and cooperative at times, and then very aggresive. Pt son is at bedside, but pt is still confused, Pt does not feel lik he is in a hospital. One episiode where pt got out of bed and would not get back in bed for 10-15min. Son was trying to talk to him, but he was not listening.. Pt eventually got back into bed. Pt was also running tachy with moments af running in AFIB during the day. At the beginning of he shift he was running tachy, but has since came down to the 80s to 90s. Pt is resting at this time, will continue to monitor.     Goal: Adult Individualization and Mutuality  Outcome: Ongoing (interventions implemented as appropriate)    Goal: Discharge Needs Assessment  Outcome: Ongoing (interventions implemented as appropriate)      Problem: Perioperative Period (Adult)  Goal: Signs and Symptoms of Listed Potential Problems Will be Absent or Manageable (Perioperative Period)  Outcome: Ongoing (interventions implemented as appropriate)      Problem: Fall Risk (Adult)  Goal: Identify Related Risk Factors and Signs and Symptoms  Outcome:  Ongoing (interventions implemented as appropriate)    Goal: Absence of Falls  Outcome: Ongoing (interventions implemented as appropriate)      Problem: Pain, Acute (Adult)  Goal: Identify Related Risk Factors and Signs and Symptoms  Outcome: Ongoing (interventions implemented as appropriate)    Goal: Acceptable Pain Control/Comfort Level  Outcome: Ongoing (interventions implemented as appropriate)      Problem: Laminectomy/Foraminotomy/Discectomy (Adult)  Goal: Signs and Symptoms of Listed Potential Problems Will be Absent or Manageable (Laminectomy/Foraminotomy/Discectomy)  Outcome: Ongoing (interventions implemented as appropriate)

## 2018-02-01 NOTE — PLAN OF CARE
Problem: Patient Care Overview (Adult)  Goal: Plan of Care Review   02/01/18 3305   Outcome Evaluation   Outcome Summary/Follow up Plan Pt ambulated well this session, increasing his distance ambulated. He did however require a brief standing rest break. Crouched gait was present more in this session, but he was able to correct with cueing. Will continue to see to progress strength and ambulation ability through acute stay.

## 2018-02-01 NOTE — PROGRESS NOTES
Continued Stay Note  [facility]     Patient Name: Derrek Ritter  MRN: [id number]  Today's Date: 2/1/2018    Admit Date: 1/29/2018          Discharge Plan       02/01/18 0829    Case Management/Social Work Plan    Additional Comments Met w/ patient and son in room.  Son states patient was up and down all night and not sure if he will be going today.  Adalgisa updated and will advise if Wesly Jack can hold the bed another day in case patient stays overnight.                     Expected Discharge Date and Time     Expected Discharge Date Expected Discharge Time    Feb 1-2, 2018             Darrion Jennings RN

## 2018-02-01 NOTE — PROGRESS NOTES
Hospital Follow Up    LOS:  LOS: 3 days   Patient Name: Derrek Ritter  Age/Sex: 77 y.o. male  : 1940  MRN: [id number]    Date of Hospital Visit: 18  Length of Stay: 3  Encounter Provider: Ezio Luis MD  Place of Service: [facility] CARDIOLOGY    Subjective:     Follow Up for: Atrial fibrillation    Interval History: Patient with further episodes of atrial fibrillation.  He will need to be back on his warfarin.    Objective:  Temp:  [98 °F (36.7 °C)-98.8 °F (37.1 °C)] 98.8 °F (37.1 °C)  Heart Rate:  [] 98  Resp:  [18-20] 18  BP: (112-153)/(56-84) 153/75  Body mass index is 24.64 kg/(m^2).    Intake/Output Summary (Last 24 hours) at 18 0839  Last data filed at 18 0800   Gross per 24 hour   Intake              500 ml   Output             1215 ml   Net             -715 ml     Last 3 weights    18  0601   Weight: 89.4 kg (197 lb 1.6 oz)     Weight change:     Physical Exam:   General Appearance: Alert, cooperative, in no acute distress. AAOx4.   HEENT: Normocephalic.  Neck: Supple. No JVD. No Carotid bruit. No thyromegaly  Lungs: CTAB. Normal respiratory effort and rate.  Heart:: Regular rate and rhythm, normal S1 and S2, no murmurs, gallops or rubs.  Abdomen: Soft, nontender, non-distended. positive bowel sounds  Extremities: Warm, no cyanosis, or clubbing. No edema.     Lab Review:     Results from last 7 days  Lab Units 18  0539 18  0358 18  0434   SODIUM mmol/L 141 140 138   POTASSIUM mmol/L 4.0 4.6 4.4   CHLORIDE mmol/L 106 103 101   CO2 mmol/L 25.0 25.2 27.9   BUN mg/dL 16 16 13   CREATININE mg/dL 0.68* 0.85 0.71*   GLUCOSE mg/dL 140* 147* 136*   CALCIUM mg/dL 8.7 8.9 8.4*             Results from last 7 days  Lab Units 18  0539 18  0358   WBC 10*3/mm3 9.05 12.92*   HEMOGLOBIN g/dL 9.1* 10.2*   HEMATOCRIT % 29.3* 33.1*   PLATELETS 10*3/mm3 142 172       Results from last 7 days  Lab Units 18  0617    INR  1.13*                         I reviewed the patient's new clinical results.          I personally viewed and interpreted the patient's EKG/Telemetry data.  Current Medications:   Scheduled Meds:  atorvastatin 10 mg Oral Daily   brimonidine 1 drop Both Eyes BID   donepezil 5 mg Oral QAM   dorzolamide 1 drop Both Eyes BID   enalapril 40 mg Oral QAM   insulin aspart 0-9 Units Subcutaneous 4x Daily With Meals & Nightly   latanoprost 1 drop Both Eyes Nightly   metoprolol tartrate 25 mg Oral Q12H   pantoprazole 40 mg Oral QAM   pioglitazone 45 mg Oral QAM   polyethylene glycol 17 g Oral Daily   sennosides-docusate sodium 1 tablet Oral Nightly   tamsulosin 0.4 mg Oral QAM   timolol 1 drop Both Eyes BID   vancomycin 1,500 mg Intravenous Q12H     Continuous Infusions:  Pharmacy to dose vancomycin     sodium chloride 0.45 % with KCl 20 mEq 100 mL/hr Last Rate: 100 mL/hr (01/30/18 4916)       Allergies:  No Known Allergies    Assessment & Plan     Principal Problem:    Spinal stenosis of lumbar region with neurogenic claudication  Active Problems:    DM (HgbA1c 5.9)    Hypercholesterolemia    Hypertension    Atrial fibrillation with RVR    Iron deficiency anemia    Spondylolisthesis of lumbar region    Lumbar spine pain    Toxic metabolic encephalopathy    Urinary retention with incomplete bladder emptying    Macrocytosis without anemia          Plan: Restart anticoagulation.  He might need to be placed back on Rythmol at a future date.  We'll have him follow back up with Dr. Newman.  From a cardiac standpoint he can be discharged to rehabilitation today.      Ezio Luis MD  02/01/18

## 2018-02-01 NOTE — PROGRESS NOTES
Postop day 3: AVSS awake alert oriented.  A couple of episodes of tachycardia.  WBC 9.0.  Hemoglobin 9.1.  He has back pain but no leg pain and moves the legs well.  Wound is clean without drainage.  He has some confusion but is clearly oriented ×3.  I think he is safe to discharge today if our medicine consultants are in agreement.

## 2018-02-01 NOTE — PLAN OF CARE
Problem: Patient Care Overview (Adult)  Goal: Plan of Care Review  Outcome: Ongoing (interventions implemented as appropriate)   02/01/18 8320   Coping/Psychosocial Response Interventions   Plan Of Care Reviewed With patient;spouse   Patient Care Overview   Progress improving   Outcome Evaluation   Outcome Summary/Follow up Plan Up ambulating in room and in jc with PT, making progress. Continues to be A& O x4, but confused to some medications, and during different conversations. Possible d/c to rehab tomorrow. Will continue to monitor.        Problem: Fall Risk (Adult)  Goal: Identify Related Risk Factors and Signs and Symptoms  Outcome: Ongoing (interventions implemented as appropriate)    Goal: Absence of Falls  Outcome: Ongoing (interventions implemented as appropriate)      Problem: Pain, Acute (Adult)  Goal: Identify Related Risk Factors and Signs and Symptoms  Outcome: Ongoing (interventions implemented as appropriate)    Goal: Acceptable Pain Control/Comfort Level  Outcome: Ongoing (interventions implemented as appropriate)

## 2018-02-01 NOTE — CONSULTS
Patient Identification:  NAME:  Derrek Ritter  Age:  77 y.o.   Sex:  male   :  1940   MRN:  [id number]       Chief complaint: He doesn't have one, reason for consult confusion    History of present illness:  This patient is a 77-year-old right-handed white male with a history of atrial fibrillation diabetes hypertension has had back surgery and done well subsequent to this she became confused in particular last night quality was confusion picking at things and becoming very agitated overnight there has been some definite improvement he still a little paranoid and wants to start a fight with his wife but is definitely much better than he was last night he is about to go to rehabilitation and everyone thinks that will be very helpful for his back and I would agree.  He is much more awake and alert today he denies any real pain at this time.  Location stop pertinent duration was confusion yesterday evening in particular but still a little bit today but better modifying factors overnight he probably got to sleep and I think that helped.  Context patient who does get some Percocet for pain quality was agitation      Past medical history:  Past Medical History:   Diagnosis Date   • Acute suppurative otitis media    • Acute upper respiratory infection    • Atrial fibrillation    • Cholelithiasis    • Diabetes mellitus     TYPE 2   • Diverticulosis    • Dry eyes    • Earache    • Edema    • Encounter for screening for malignant neoplasm of prostate    • Esophagitis, reflux    • Glaucoma    • H/O echocardiogram 2013   • Health care maintenance    • Hiatal hernia    • History of EKG 10/06/2015   • History of Holter monitoring 2013   • Hyperlipidemia    • Hypertension    • Lumbar canal stenosis    • Microalbuminuria    • Nephrolithiasis    • Osteoarthritis of hand     of thumb   • PAF (paroxysmal atrial fibrillation)    • Postoperative examination    • Pre-operative cardiovascular examination    •  Spinal stenosis        Past surgical history:  Past Surgical History:   Procedure Laterality Date   • AMPUTATION DIGIT Left 10/29/2016    Procedure: TENDON AND NERVE REPAIR OF  LEFT THUMB;  Surgeon: Zak Hanson MD;  Location: [facility];  Service:    • ARTHRODESIS  10/28/2013    Spinal / Description: Repair spinal stenosis   • CATARACT EXTRACTION     • EYE SURGERY      EYE MUSCLE SX   • HERNIA REPAIR     • LUMBAR DISCECTOMY FUSION INSTRUMENTATION N/A 1/29/2018    Procedure: L2 to L5 fusion with instrumentation and removal of implants L4 5;  Surgeon: Barron Knight MD;  Location: [facility];  Service:    • LUMBAR FUSION      Lumbar vertebral fusion   • LUMBAR LAMINECTOMY      10.28.13  bilat L4/5 lami with Nyla and Belen   • LUMBAR LAMINECTOMY DISCECTOMY DECOMPRESSION N/A 1/29/2018    Procedure: L2 to L4 laminectomy with a fusion by orthopedics;  Surgeon: Jeison Redmond MD;  Location: [facility];  Service:    • OTHER SURGICAL HISTORY      Lithotomy   • TONSILLECTOMY         Allergies:  Review of patient's allergies indicates no known allergies.    Home medications:  Prescriptions Prior to Admission   Medication Sig Dispense Refill Last Dose   • ACCU-CHEK FASTCLIX LANCETS misc USE TO TEST TWICE DAILY 102 each 0 1/28/2018 at 0800   • Blood Glucose Monitoring Suppl (ACCU-CHEK FRANCISCO SMARTVIEW) W/DEVICE kit USE TO TEST BLOOD SUGAR TWICE DAILY 1 kit 0 1/28/2018 at 0800   • Blood Glucose Monitoring Suppl (FREESTYLE LITE) device Use to test blood once daily 1 each 0 1/28/2018 at 0800   • brimonidine (ALPHAGAN) 0.2 % ophthalmic solution Administer 1 drop to both eyes 2 (Two) Times a Day.   1/28/2018 at 2100   • donepezil (ARICEPT) 5 MG tablet Take 5 mg by mouth Every Morning.   1/28/2018 at 0800   • dorzolamide (TRUSOPT) 2 % ophthalmic solution Administer 1 drop to both eyes 2 (two) times a day.   1/28/2018 at 2100   • enalapril (VASOTEC) 20 MG tablet Take 40 mg by mouth Every Morning.   1/28/2018 at  0800   • esomeprazole (nexIUM) 40 MG capsule Take 40 mg by mouth Every Morning Before Breakfast.   1/28/2018 at 0800   • glimepiride (AMARYL) 2 MG tablet Take 2 mg by mouth Every Morning Before Breakfast.   1/28/2018 at 0800   • glucose blood (FREESTYLE LITE) test strip Use to test blood once daily 100 each 12 1/28/2018 at 0800   • Glucose Blood disk Inject 1 Device as directed daily. TEST BLOOD SUGAR ONCE DAILY AS DIRECTED 100 each 3 1/28/2018 at 0800   • glucose blood test strip USE TO TEST TWICE DAILY 100 each 12 1/28/2018 at 0800   • Lancets (FREESTYLE) lancets Use to test blood sugar daily 100 each 12 1/28/2018 at 0800   • latanoprost (XALATAN) 0.005 % ophthalmic solution Administer 1 drop to both eyes Every Night.   1/28/2018 at 2100   • metFORMIN (GLUCOPHAGE) 1000 MG tablet Take 1,000 mg by mouth 2 (Two) Times a Day.   1/28/2018 at 0800   • metFORMIN (GLUCOPHAGE) 1000 MG tablet Take 500 mg by mouth Daily. At noon   1/28/2018 at 1200   • ONE TOUCH ULTRA TEST test strip USE ONCE DAILY AS DIRECTED 100 each 3 1/28/2018 at 0800   • pioglitazone (ACTOS) 45 MG tablet Take 45 mg by mouth Every Morning.   1/28/2018 at 0800   • simvastatin (ZOCOR) 20 MG tablet Take 20 mg by mouth Every Morning.   1/28/2018 at 0800   • furosemide (LASIX) 20 MG tablet Take 1 tablet by mouth Daily. as needed (Patient taking differently: Take 20 mg by mouth Daily As Needed.) 90 tablet 3 1/15/2018   • oxyCODONE-acetaminophen (PERCOCET)  MG per tablet 1 tablet Every 8 (Eight) Hours As Needed.   1/27/2018   • tamsulosin (FLOMAX) 0.4 MG capsule 24 hr capsule Take 0.4 mg by mouth Every Morning. 1-2 TABS AS NEEDED    1/28/2018 at 0800   • timolol (TIMOPTIC) 0.5 % ophthalmic solution Administer 1 drop to both eyes 2 (Two) Times a Day.   Taking   • warfarin (COUMADIN) 10 MG tablet Take 10 mg by mouth Daily. MON THRU FRI STOPPED 1/24/18 1/24/2018   • warfarin (COUMADIN) 5 MG tablet Take 5 mg by mouth Daily. Saturday Sunday STOPPED 1/24/18    1/21/2018        Hospital medications:    atorvastatin 10 mg Oral Daily   brimonidine 1 drop Both Eyes BID   donepezil 5 mg Oral QAM   dorzolamide 1 drop Both Eyes BID   enalapril 40 mg Oral QAM   insulin aspart 0-9 Units Subcutaneous 4x Daily With Meals & Nightly   latanoprost 1 drop Both Eyes Nightly   metoprolol tartrate 25 mg Oral Q12H   pantoprazole 40 mg Oral QAM   pioglitazone 45 mg Oral QAM   polyethylene glycol 17 g Oral Daily   sennosides-docusate sodium 1 tablet Oral Nightly   sulfamethoxazole-trimethoprim 1 tablet Oral Q12H   tamsulosin 0.4 mg Oral QAM   timolol 1 drop Both Eyes BID   ziprasidone 20 mg Oral Daily Before Supper       sodium chloride 0.45 % with KCl 20 mEq 100 mL/hr Last Rate: 100 mL/hr (01/30/18 4953)     bisacodyl  •  dextrose  •  dextrose  •  glucagon (human recombinant)  •  HYDROcodone-acetaminophen  •  naloxone  •  ondansetron **OR** ondansetron ODT **OR** ondansetron  •  sodium chloride    Family history:  Family History   Problem Relation Age of Onset   • Heart attack Mother      acute myocardial infarction   • Stroke Mother      hemorrhagic   • Emphysema Father    • Diabetes Other      mellitus   • Hypertension Other    • Heart disease Other    • Malig Hyperthermia Neg Hx        Social history:  Social History   Substance Use Topics   • Smoking status: Never Smoker   • Smokeless tobacco: Never Used      Comment: caffeine use   • Alcohol use No       Review of systems:    I ask him about the hallucinations he said I don't remember that but she said I had he seems to know a little bit about that but is coy, he denies hair eyes ears nose throat skin bone joint  lymphatic hematologic or oncologic complaints no neck pain chest pain he is had back surgery but doesn't really complain of back pain no fever chills or rash    Objective:  Vitals Ranges:   Temp:  [98.1 °F (36.7 °C)-98.9 °F (37.2 °C)] 98.9 °F (37.2 °C)  Heart Rate:  [] 64  Resp:  [18-20] 19  BP: (112-153)/(64-84)  113/64      Physical Exam:  Awake little bit lethargic oriented to person and place but not time fund of knowledge poor attention span and concentration fair recent and remote memory fair language function no dysarthria or a aphasia elderly appearing in no distress pupils 1-1/2 constricting to 1 able to count fingers extraocular movements full without nystagmus nasolabial folds palate tongue symmetrical decreased hearing nor the cranial nerves tested motor there is mild bradykinesia and no rigidity some mild distal atrophy but overall strength 5 minus out of 5 times for not the best effort and he is a little sleepy reflexes trace throughout essentially absent toes downgoing bilaterally sensation normal light touch face both arms both legs coordination normal in the upper extremity station and gait I did not test collected and will let him fall heart regular without murmur neck supple without bruits extremities no clubbing cyanosis edema visual acuity normal 3 feet    Results review:   I reviewed the patient's new clinical results.    Data review:  Lab Results (last 24 hours)     Procedure Component Value Units Date/Time    POC Glucose Once [[id number]]  (Normal) Collected:  01/31/18 1727    Specimen:  Blood Updated:  01/31/18 1729     Glucose 123 mg/dL     Narrative:       Meter: [id number] : [id number] Cesilia WALKER    POC Glucose Once [[id number]]  (Abnormal) Collected:  01/31/18 1927    Specimen:  Blood Updated:  01/31/18 1929     Glucose 156 (H) mg/dL     Narrative:       Meter: [id number] : [id number] Liyah Ogden RN    POC Glucose Once [[id number]]  (Abnormal) Collected:  01/31/18 2108    Specimen:  Blood Updated:  01/31/18 2109     Glucose 169 (H) mg/dL     Narrative:       Meter: [id number] : [id number] Wilfredo De La Rosa    CBC & Differential [[id number]] Collected:  02/01/18 0539    Specimen:  Blood Updated:  02/01/18 0636    Narrative:       The following orders were created for panel order  CBC & Differential.  Procedure                               Abnormality         Status                     ---------                               -----------         ------                     CBC Auto Differential[[id number]]        Abnormal            Final result                 Please view results for these tests on the individual orders.    CBC Auto Differential [[id number]]  (Abnormal) Collected:  02/01/18 0539    Specimen:  Blood Updated:  02/01/18 0636     WBC 9.05 10*3/mm3      RBC 2.90 (L) 10*6/mm3      Hemoglobin 9.1 (L) g/dL      Hematocrit 29.3 (L) %      .0 (H) fL      MCH 31.4 pg      MCHC 31.1 (L) g/dL      RDW 13.7 %      RDW-SD 51.1 fl      MPV 10.4 fL      Platelets 142 10*3/mm3      Neutrophil % 86.6 (H) %      Lymphocyte % 6.0 (L) %      Monocyte % 6.2 %      Eosinophil % 0.9 %      Basophil % 0.1 %      Immature Grans % 0.2 %      Neutrophils, Absolute 7.84 10*3/mm3      Lymphocytes, Absolute 0.54 (L) 10*3/mm3      Monocytes, Absolute 0.56 10*3/mm3      Eosinophils, Absolute 0.08 10*3/mm3      Basophils, Absolute 0.01 10*3/mm3      Immature Grans, Absolute 0.02 10*3/mm3     POC Glucose Once [[id number]]  (Abnormal) Collected:  02/01/18 0632    Specimen:  Blood Updated:  02/01/18 0644     Glucose 133 (H) mg/dL     Narrative:       Meter: [id number] : [id number] Zachary Jimenez    Basic Metabolic Panel [[id number]]  (Abnormal) Collected:  02/01/18 0539    Specimen:  Blood Updated:  02/01/18 0655     Glucose 140 (H) mg/dL      BUN 16 mg/dL      Creatinine 0.68 (L) mg/dL      Sodium 141 mmol/L      Potassium 4.0 mmol/L      Chloride 106 mmol/L      CO2 25.0 mmol/L      Calcium 8.7 mg/dL      eGFR Non African Amer 113 mL/min/1.73      BUN/Creatinine Ratio 23.5     Anion Gap 10.0 mmol/L     Narrative:       The MDRD GFR formula is only valid for adults with stable renal function between ages 18 and 70.    Folate [[id number]]  (Normal) Collected:  02/01/18 0539    Specimen:  Blood Updated:   02/01/18 0714     Folate 16.42 ng/mL     Vitamin B12 [[id number]]  (Normal) Collected:  02/01/18 0539    Specimen:  Blood Updated:  02/01/18 0714     Vitamin B-12 259 pg/mL     POC Glucose Once [[id number]]  (Normal) Collected:  02/01/18 0742    Specimen:  Blood Updated:  02/01/18 0743     Glucose 112 mg/dL     Narrative:       Meter: [id number] : [id number] Cesilia Oliver NA    POC Glucose Once [[id number]]  (Abnormal) Collected:  02/01/18 1203    Specimen:  Blood Updated:  02/01/18 1204     Glucose 215 (H) mg/dL     Narrative:       Meter: [id number] : [id number] Cesilia Olievr NA           Imaging:  Imaging Results (last 24 hours)     Procedure Component Value Units Date/Time    XR Chest 1 View [[id number]] Collected:  01/31/18 1446     Updated:  01/31/18 1450    Narrative:       SINGLE PORTABLE CHEST 14:30     HISTORY: 77-year-old male with leukocytosis postoperatively     COMPARISON: 10/29/2016     FINDINGS:  1. Cannot exclude developing retrocardiac opacification on somewhat  limited portable view.  2. Follow-up with lateral imaging would be helpful.  3. Right lung remains clear.     This report was finalized on 1/31/2018 2:47 PM by Dr. Jorge Gaming MD.       CT Head Without Contrast [[id number]] Collected:  01/31/18 2000     Updated:  01/31/18 2008    Narrative:       CT HEAD WO CONTRAST-     INDICATIONS: Encephalopathy     TECHNIQUE: Radiation dose reduction techniques were utilized, including  automated exposure control and exposure modulation based on body size.      Noncontrast head CT     COMPARISON: None available     FINDINGS:              No acute intracranial hemorrhage, midline shift or mass effect. No acute  territorial infarct is identified. Basal ganglia mineralization is seen.  A dural-based calcified focus at the anterior left temporal fossa, 7 mm  on image 20 of series 1 suggests meningioma.     Mild periventricular hypodensities suggest chronic small vessel ischemic  change in a  patient this age.           Ventricles, cisterns, cerebral sulci are unremarkable for patient age.     Minimal bilateral maxillary sinus mucosal thickening. Mild partial  opacification of left mastoid air cells, correlate clinically to exclude  possibility of mastoiditis. The visualized paranasal sinuses, mastoid  air cells are otherwise unremarkable. Procedural changes at the orbits.                   Impression:              No acute intracranial hemorrhage or hydrocephalus. Small dural based  calcified focus at the left temporal fossa suggesting meningioma. If  there is further clinical concern, MRI could be considered for further  evaluation.     This report was finalized on 1/31/2018 8:05 PM by Dr. Alvarez Diaz MD.                Assessment and Plan:     Principal Problem:    Spinal stenosis of lumbar region with neurogenic claudication  Active Problems:    DM (HgbA1c 5.9)    Hypercholesterolemia    Hypertension    Atrial fibrillation with RVR    Iron deficiency anemia    Spondylolisthesis of lumbar region    Lumbar spine pain    Toxic metabolic encephalopathy    Urinary retention with incomplete bladder emptying    Macrocytosis without anemia    Metabolic encephalopathy with sundowning he is still somewhat confused but better than last night we have the window shades open we will keep him awake all day and I will give him Geodon 20 mg by mouth every supper this should be very beneficial in helping his confusion clear he definitely has some baseline vascular dementia.  Evidence of a stroke TIA or seizure and I'm okay with him being transferred to any rehabilitation facility.      Kit Stokes MD  02/01/18  1:24 PM

## 2018-02-01 NOTE — PROGRESS NOTES
"   LOS: 3 days   Patient Care Team:  Quinton Leal MD as PCP - General  Quinton Leal MD as PCP - Family Medicine  Madhav Hdez MD as Consulting Physician (Hematology and Oncology)  Sherice Newman MD as Consulting Physician (Cardiology)    Chief Complaint: tired    Subjective     HPI Comments: Feels the same today. Still confused overnight per family      Subjective:  Symptoms:  Stable.  He reports weakness.  No shortness of breath, malaise, cough, chest pain, headache, chest pressure, anorexia, diarrhea or anxiety.    Diet:  Poor intake.  No nausea or vomiting.    Activity level: Impaired due to pain.    Pain:  He complains of pain that is mild.  He reports pain is improving.  Pain is well controlled.        History taken from: patient chart family RN    Objective     Vital Signs  Temp:  [98 °F (36.7 °C)-98.8 °F (37.1 °C)] 98.8 °F (37.1 °C)  Heart Rate:  [] 98  Resp:  [18-20] 18  BP: (112-153)/(56-84) 153/75    Objective:  General Appearance:  Comfortable and in no acute distress.    Vital signs: (most recent): Blood pressure 153/75, pulse 98, temperature 98.8 °F (37.1 °C), temperature source Oral, resp. rate 18, height 190.5 cm (75\"), weight 89.4 kg (197 lb 1.6 oz), SpO2 95 %.  Vital signs are normal.  No fever.    Output: Producing urine and producing stool.    HEENT: Normal HEENT exam.    Lungs:  Normal respiratory rate and normal effort.  Breath sounds clear to auscultation.    Heart: Normal rate.  Regular rhythm.    Abdomen: Abdomen is soft.  Bowel sounds are normal.   There is no abdominal tenderness.     Extremities: There is dependent edema (chronic, doughy).  (SCDs in place)  Pulses: Distal pulses are intact.    Neurological: Patient is alert and oriented to person, place and time.    Pupils:  Pupils are equal, round, and reactive to light.    Skin:  Warm and dry.              Results Review:     I reviewed the patient's new clinical results.  I reviewed the patient's new imaging results " and agree with the interpretation.  I reviewed the patient's other test results and agree with the interpretation  I personally viewed and interpreted the patient's EKG/Telemetry data  Discussed with patient, son, and RN    Medication Review: reviewed    Assessment/Plan     Principal Problem:    Spinal stenosis of lumbar region with neurogenic claudication  Active Problems:    DM (HgbA1c 5.9)    Hypercholesterolemia    Hypertension    Atrial fibrillation with RVR    Iron deficiency anemia    Spondylolisthesis of lumbar region    Lumbar spine pain    Toxic metabolic encephalopathy    Urinary retention with incomplete bladder emptying    Macrocytosis without anemia          Plan:   (Holding Metformin and sulfonylurea while inpt  Continuing Actos  SSI coverage  Sugars acceptable  Chronic anticoagulation (Coumadin) on hold for surgery,Card would like restarted today  Hgb has dropped again today  Card has added metoprolol for afib with RVR  I think pt will void better if he can stand, for now continue monitoring PVRs with PRN I&O cath  UA normal  Stop Flexeril, dc'd PCA  Monitor confusion  Checked B12 and folate, both okay  CXR unremarkable and lung exam benign  Continue SCDs  CT head okay  Neuro eval pending  I would suggest holding discharge until tomorrow in order to allow Neuro eval, restart of AC, and monitoring of Hgb for one more day (wouldn't want these things overlooked at facility)  ).       Kit Stroud MD  02/01/18  9:18 AM    Time: 20min

## 2018-02-01 NOTE — PROGRESS NOTES
Continued Stay Note  [facility]     Patient Name: Derrek Ritter  MRN: [id number]  Today's Date: 2/1/2018    Admit Date: 1/29/2018          Discharge Plan       02/01/18 1107    Case Management/Social Work Plan    Plan Wesly Jack    Patient/Family In Agreement With Plan yes    Additional Comments Confirmed a bed will be available tomorrow at [facility].                         Expected Discharge Date and Time     Expected Discharge Date Expected Discharge Time    Feb 2, 2018             Darrion Jennings RN

## 2018-02-01 NOTE — PROGRESS NOTES
Pt doing well  Had increased confusion last night, better this am. CT done, negative other than small 7mm likely meningioma in L temporal region. This is incidental   Pain improving as well  PT noticed improvement in gait and balance  Son at bs      AVSS  AA<Ox3  Incision ok    ..    Results from last 7 days  Lab Units 02/01/18  0539   WBC 10*3/mm3 9.05   HEMOGLOBIN g/dL 9.1*   HEMATOCRIT % 29.3*   PLATELETS 10*3/mm3 142       POD#3 s/p L2-4 lami/fusion  Mild confusion-has baseline dementia, not unusual postop with anesthesia and pain meds, new environment.   Afib-back on coumadin today, ok to go to NH per card  Blood loss anemia  Leukocytosis-resolved. Will stop vanc and start bactrim DS x 5 days    Can go to NH anytime from our standpoint. Follow up as scheduled. I will have Dr. Redmond review the brain MRI and determine if a brain MRI is indicated down the line to follow up on possible meningioma. This is not something that needs to be done this admission.

## 2018-02-01 NOTE — NURSING NOTE
Per Dr. Stroud, call Dr. Luis to see what dose of Coumadin to start and hold d/c.  Per LAMONT Kuhn with Dr. Luis, she states that patient can be restarted on home dose of Coumadin, d/c Lopressor and they are signing off.  S/W VICKY Davidson, with Dr. Knight re: Dr. Stroud's order to hold d/c and notified of Dr. Luis order for home dose of Coumadin, when ready.  VICKY Davidson states that they will restart Coumadin tomorrow and she will notify Dr. Knight of hold of d/c and let us know further orders.  Amanda Johnson RN

## 2018-02-01 NOTE — NURSING NOTE
Call out to Dr. Stokes to see when he might see patient.  Waiting on neurology to see to d/c.  S/W ELLIOTT Rodriguez and she will d/c Vanc, IV leaking.  Called and s/w VICKY Davidson for Dr. Knight and she will start Coumadin, if patient stays.  Amanda Johnson RN

## 2018-02-01 NOTE — THERAPY TREATMENT NOTE
Acute Care - Physical Therapy Treatment Note  [facility]     Patient Name: Derrek Ritter  : 1940  MRN: [id number]  Today's Date: 2018  Onset of Illness/Injury or Date of Surgery Date: 18  Date of Referral to PT: 18  Referring Physician: Eugene    Admit Date: 2018    Visit Dx:    ICD-10-CM ICD-9-CM   1. Decreased strength R53.1 780.79   2. Spondylolisthesis of lumbar region M43.16 738.4   3. Spinal stenosis of lumbar region with neurogenic claudication M48.062 724.03   4. Paroxysmal atrial fibrillation I48.0 427.31     Patient Active Problem List   Diagnosis   • Cholelithiasis   • DM (HgbA1c 5.9)   • Diverticulosis of intestine   • Hypercholesterolemia   • Hypertension   • Spinal stenosis   • Microalbuminuria   • Atrial fibrillation with RVR   • Medicare annual wellness visit, subsequent   • Anemia   • Iron deficiency anemia   • DJD (degenerative joint disease)   • Impotence of organic origin   • Glaucoma   • Hiatal hernia   • Mild cognitive disorder   • Calculus of kidney   • Nocturia   • Chronic bilateral low back pain with bilateral sciatica   • Post laminectomy syndrome   • Routine health maintenance   • Weight loss   • Spinal stenosis of lumbar region with neurogenic claudication   • Spondylolisthesis of lumbar region   • Lumbar spine pain   • Metabolic encephalopathy   • Urinary retention with incomplete bladder emptying   • Macrocytosis without anemia   • [facility]               Adult [facility] Note       18 0844 18 1330 18 1000    Rehab Assessment/Intervention    Discipline physical therapist  -SACHA MAR KH2 physical therapist  -SACHA MAR KH2 physical therapist  -SACHA MAR KH2    Document Type therapy note (daily note)  -SACHA MAR KH2 therapy note (daily note)  -SACHA MAR KH2 therapy note (daily note)  -SACHA MAR KH2    Subjective Information agree to therapy;pain  -SACHA MAR KH2 agree to therapy;complains of;weakness  -SACHA MAR KH2 agree to therapy;complains of;fatigue  -SACHA MAR KH2     Patient Effort, Rehab Treatment good  -KH,BJ,KH2 good  -KH,BJ,KH2 good  -KH,BJ,KH2    Precautions/Limitations brace on when up;fall precautions;spinal precautions  -KH,BJ,KH2 brace on when up;fall precautions;spinal precautions  -KH,BJ,KH2 brace on when up;fall precautions;spinal precautions  -KH,BJ,KH2    Patient Response to Treatment Pt tolerated tx  -KH,BJ,KH2  Pt tolerated tx  -KH,BJ,KH2 Pt tolerated tx  -KH,BJ,KH2    Recorded by [KH,BJ,KH2] Katarina Judd, PT (r) Mark Lovett, PT Student (t) Katarina Judd, PT (c) [KH,BJ,KH2] Katarina Judd, PT (r) Mark Lovett PT Student (t) Katarina Judd, PT (c) [KH,BJ,KH2] Katarina Judd, PT (r) Mark Lovett PT Student (t) Katarina Judd, PT (c)    Vital Signs    Pretreatment Heart Rate (beats/min) 135  -KH,BJ,KH2  92  -KH,BJ,KH2    Posttreatment Heart Rate (beats/min) 137  -KH,BJ,KH2  98  -KH,BJ,KH2    Recorded by [KH,BJ,KH2] Katarina Judd, PT (r) Mark Lovett PT Student (t) Katarina Judd, PT (c)  [KH,BJ,KH2] Katarina Judd, PT (r) Mark Lovett PT Student (t) Katarina Judd, PT (c)    Pain Assessment    Pain Assessment Bragg-Barr FACES  -KH,BJ,KH2 Bragg-Baker FACES  -KH,BJ,KH2 Bragg-Baker FACES  -KH,BJ,KH2    Bragg-Barr FACES Pain Rating 2  -KH,BJ,KH2 2  -KH,BJ,KH2 4  -KH,BJ,KH2    Pain Type  Surgical pain  -KH,BJ,KH2 Surgical pain  -KH,BJ,KH2    Pain Location Back   back through buttocks  -KH,BJ,KH2 Back  -KH,BJ,KH2 Back  -KH,BJ,KH2    Pain Orientation Right;Left   Lateral  -KH,BJ,KH2      Patient's Stated Pain Goal No pain  -KH,BJ,KH2 No pain  -KH,BJ,KH2 No pain  -KH,BJ,KH2    Pain Intervention(s) Repositioned;Ambulation/increased activity  -ISABELA,SACHA,KH2 Repositioned;Ambulation/increased activity  -ISABELA,BJ,KH2 Repositioned;Ambulation/increased activity  -ISABELA,SACHA,KH2    Response to Interventions Pt tolerated tx  -ISABELA,BJ,KH2 Pt tolerated tx  -KH,BJ,KH2 Pt tolerated tx   -KH,BJ,KH2    Recorded by [KH,BJ,KH2] Katarina Judd, PT (r) Mark Lovett, PT Student (t) Katarina Judd, PT (c) [KH,BJ,KH2] Katarina Judd, PT (r) Mark Lovett PT Student (t) Katarina Judd, PT (c) [KH,BJ,KH2] Katarina Judd, PT (r) Mark Lovett, PT Student (t) Katarina Judd, PT (c)    Cognitive Assessment/Intervention    Orientation Status oriented x 4  -KH,BJ,KH2  oriented x 4  -KH,BJ,KH2    Follows Commands/Answers Questions 100% of the time  -KH,BJ,KH2  100% of the time  -KH,BJ,KH2    Personal Safety other (see comments)   Seems more alert today, able to carry on conversation  -KH,BJ,KH2  other (see comments)   When in bed, falls asleep often and zones out while up  -KH,BJ,KH2    Personal Safety Interventions fall prevention program maintained;gait belt;muscle strengthening facilitated;nonskid shoes/slippers when out of bed  -KH,BJ,KH2  fall prevention program maintained;gait belt;muscle strengthening facilitated;nonskid shoes/slippers when out of bed  -KH,BJ,KH2    Recorded by [KH,BJ,KH2] Katarina Judd, PT (r) Mark Lovett, PT Student (t) Katarina Judd, PT (c)  [KH,BJ,KH2] Katarina Judd, PT (r) Mark Lovett, PT Student (t) Katarina Judd, PT (c)    Bed Mobility, Assessment/Treatment    Bed Mobility, Roll Left, [place] minimum assist (75% patient effort)  -KH,BJ,KH2 minimum assist (75% patient effort)  -KH,BJ,KH2     Bed Mobility, Roll Right, [place]   minimum assist (75% patient effort)  -KH,BJ,KH2    Bed Mob, Sidelying to Sit, [place] moderate assist (50% patient effort)  -KH,BJ,KH2  moderate assist (50% patient effort)  -KH,BJ,KH2    Bed Mob, Sit to Sidelying, [place]  moderate assist (50% patient effort)  -ISABELA,SACHA,ISABELA2     Bed Mobility, Comment   Reinforced log rolling  -ISABELA,SACHA,ISABELA2    Recorded by [ISABELA,SACHA,KH2] Katarina Judd, PT (r) Mark Lovett, PT Student (t) Katarina Price  Dutch, PT (c) [KH,BJ,KH2] Katarina Judd, PT (r) Mark Lovett, PT Student (t) Katarina Judd, PT (c) [KH,BJ,KH2] Katarina Judd, PT (r) Mark Lovett, PT Student (t) Katarina Judd, PT (c)    Transfer Assessment/Treatment    Transfers, Sit-Stand [place] minimum assist (75% patient effort)  -KH,BJ,KH2 minimum assist (75% patient effort)  -KH,BJ,KH2 minimum assist (75% patient effort);2 person assist required  -KH,BJ,KH2    Transfers, Stand-Sit [place] minimum assist (75% patient effort)  -KH,BJ,KH2 minimum assist (75% patient effort)  -KH,BJ,KH2 minimum assist (75% patient effort);2 person assist required  -KH,BJ,KH2    Transfers, Sit-Stand-Sit, Assist Device rolling walker  -KH,BJ,KH2 rolling walker  -KH,BJ,KH2 rolling walker  -KH,BJ,KH2    Recorded by [KH,BJ,KH2] Katarina Judd, PT (r) Mark Lovett, PT Student (t) Katarina Judd, PT (c) [KH,BJ,KH2] Katarina Judd, PT (r) Mark Lovett, PT Student (t) Katarina Judd, PT (c) [KH,BJ,KH2] Katarina Judd, PT (r) Mark Lovett, PT Student (t) Katarina Judd, PT (c)    Gait Assessment/Treatment    Gait, [place] Level contact guard assist  -KH,BJ,KH2 contact guard assist  -KH,BJ,KH2 contact guard assist;1 person + 1 person to manage equipment  -KH,BJ,KH2    Gait, Assistive Device rolling walker  -KH,BJ,KH2 rolling walker  -KH,BJ,KH2 rolling walker  -KH,BJ,KH2    Gait, Distance (Feet) 350  -KH,BJ,KH2 250  -KH,BJ,KH2 175   10 w/o RW  -KH,BJ,KH2    Gait, Gait Deviations --   Some knee flexion with fatigue corrected by cueing  -SACHA MAR,ISABELA2 bilateral:;decreased heel strike;step length decreased;stride length decreased;toe-to-floor clearance decreased   Knees start to flex more with fatigue  -SACHA MAR KH2 bilateral:;jody decreased;decreased heel strike;step length decreased;stride length decreased;toe-to-floor clearance decreased   Gait much improved, though pattern  worse w/o RW  -KH,BJ,KH2    Gait, Safety Issues step length decreased  -KH,BJ,KH2 step length decreased  -KH,BJ,KH2 step length decreased  -KH,BJ,KH2    Gait, Impairments ROM decreased;strength decreased;pain  -KH,BJ,KH2 ROM decreased;strength decreased;pain  -KH,BJ,KH2 ROM decreased;strength decreased;pain  -KH,BJ,KH2    Gait, Comment Pt ambulated well this date, with his gait maintained through most of walk. Cueing is able to correct crouched gait.  -KH,BJ,KH2 Pt needed less cueing for gait, but increased as he fatigued to maintain improve gait pattern   -KH,BJ,KH2 Gait much improved this date, able to walk with greater heel contact and toe clearance, while having longer steps. Attempted to walk 10 ft w/o RW and gait returned to prior gait.  -KH,BJ,KH2    Recorded by [KH,BJ,KH2] Katarina Judd, PT (r) Mark Lovett, PT Student (t) Katarina Judd, PT (c) [KH,BJ,KH2] Katarina Judd, PT (r) Mark Lovett PT Student (t) Katarina Judd, PT (c) [KH,BJ,KH2] Katarina Judd, PT (r) Mark Lovett PT Student (t) Katarina Judd, PT (c)    Therapy Exercises    Bilateral Lower Extremities AROM:;10 reps;ankle pumps/circles;LAQ  -KH,BJ,KH2 AROM:;10 reps;LAQ;ankle pumps/circles;hip flexion;heel raises  -KH,BJ,KH2 AROM:;10 reps;LAQ;ankle pumps/circles;hip flexion;heel raises  -KH,BJ,KH2    Recorded by [KH,BJ,KH2] Katarina Judd, PT (r) Mark Lovett PT Student (t) Katarina Judd, PT (c) [KH,BJ,KH2] Katarina Judd, PT (r) Mark Lovett PT Student (t) Katarina Judd, PT (c) [KH,BJ,KH2] Katarina Judd, PT (r) Mark Lovett, PT Student (t) Katarina Judd, PT (c)    Positioning and Restraints    Pre-Treatment Position in bed  -KH,BJ,KH2 sitting in chair/recliner  -KH,BJ,KH2 in bed  -KH,BJ,KH2    Post Treatment Position chair  -KH,BJ,KH2 bed  -KH,BJ,KH2 chair  -KH,BJ,KH2    In Bed  supine;call light within  reach;encouraged to call for assist;exit alarm on;with family/caregiver;with nsg  -KH,BJ,KH2     In Chair sitting;notified nsg;call light within reach;encouraged to call for assist;exit alarm on;with family/caregiver  -KH,BJ,KH2  sitting;call light within reach;encouraged to call for assist;exit alarm on;with family/caregiver  -KH,BJ,KH2    Recorded by [ISABELA,BJ,KH2] Katarina Judd, PT (r) Mark Lovett PT Student (t) Katarina Judd, PT (c) [ISABELA,BJ,KH2] Katarina Judd, PT (r) Mark Lovett PT Student (t) Katarina Judd, PT (c) [ISABELA,BJ,KH2] Katarina Judd, PT (r) Mark Lovett PT Student (t) Katarina Judd, PT (c)      01/30/18 1600          Rehab Assessment/Intervention    Discipline physical therapist  -SACHA MAR,ISABELA2      Document Type therapy note (daily note)  -ISABELA,BJ,KH2      Subjective Information agree to therapy;complains of;fatigue  -ISABELA,BJ,KH2      Patient Effort, Rehab Treatment good  -ISABELA,BJ,KH2      Precautions/Limitations brace on when up;fall precautions;spinal precautions  -KH,BJ,KH2      Patient Response to Treatment Pt tolerated tx  -KH,BJ,KH2      Recorded by [ISABELA,BJ,KH2] Katarina Judd, PT (r) Mark Lovett PT Student (t) Katarina Judd, PT (c)      Pain Assessment    Pain Assessment Bragg-Barr FACES  -KH,BJ,KH2      Bragg-Barr FACES Pain Rating 4  -ISABELA,BJ,KH2      Pain Type Surgical pain  -KH,BJ,KH2      Pain Location Back  -KH,BJ,KH2      Patient's Stated Pain Goal No pain  -KH,BJ,KH2      Pain Intervention(s) Repositioned;Ambulation/increased activity  -KH,BJ,KH2      Response to Interventions Pt tolerated tx  -KH,BJ,KH2      Recorded by [ISABELA,BJ,KH2] Katarina Judd, PT (r) Mark Lovett PT Student (t) Katarina Judd, PT (c)      Cognitive Assessment/Intervention    Personal Safety other (see comments)   he was less impulsive but more drowsy this visit.  -ISABELA,SACHA,KH2      Recorded by [ISABELA,SACHA,KH2] Katarina Price  Dutch, PT (r) Mark Lovett PT Student (t) Katarina Judd, PT (c)      Bed Mobility, Assessment/Treatment    Bed Mobility, Roll Right, [place] minimum assist (75% patient effort)  -KH,BJ,KH2      Bed Mob, Sidelying to Sit, [place] moderate assist (50% patient effort)  -KH,BJ,KH2      Recorded by [ISABELA,BJ,KH2] Katarina Judd, PT (r) Mark Lovett PT Student (t) Katarina Judd, PT (c)      Transfer Assessment/Treatment    Transfers, Sit-Stand [place] minimum assist (75% patient effort);2 person assist required  -ISABELA,BJ,KH2      Transfers, Stand-Sit [place] minimum assist (75% patient effort);2 person assist required  -KH,BJ,KH2      Transfers, Sit-Stand-Sit, Assist Device rolling walker  -ISABELA,BJ,KH2      Recorded by [ISABELA,BJ,KH2] Katarina Judd, PT (r) Mark Lovett PT Student (t) Katarina Judd, PT (c)      Gait Assessment/Treatment    Gait, [place] Level contact guard assist  -ISABELA,BJ,KH2      Gait, Assistive Device rolling walker  -KH,BJ,KH2      Gait, Distance (Feet) 95  -KH,BJ,KH2      Gait, Gait Deviations bilateral:;jody decreased;decreased heel strike;step length decreased;stride length decreased;toe-to-floor clearance decreased   R still worse than left.  -KH,BJ,KH2      Gait, Safety Issues step length decreased  -KH,BJ,KH2      Gait, Impairments ROM decreased;strength decreased;pain  -ISABELA,BJ,KH2      Gait, Comment Cued to take bigger steps, which helped with his heel strike and step length, but needed constant cueing and it became less effective as he fatigued.   -KH,BJ,KH2      Recorded by [ISABELA,BJ,KH2] Katarina Judd, PT (r) Mark Lovett PT Student (t) Katarina Judd, PT (c)      Positioning and Restraints    Pre-Treatment Position in bed  -ISABELA,BJ,KH2      Post Treatment Position chair  -ISABELA,BJ,KH2      In Chair sitting;call light within reach;encouraged to call for assist;with family/caregiver;exit alarm on   -KH,BJ,KH2      Recorded by [ISABELA,BJ,KH2] Katarina Judd, PT (r) Mark Lovett, PT Student (t) Katarina Judd, PT (c)        User Key  (r) = Recorded By, (t) = Taken By, (c) = Cosigned By    Initials Name Effective Dates    KH Katarina Judd, PT 05/18/15 -     BJ Mark Lovett, PT Student 01/08/18 -                 IP PT Goals       01/30/18 1102          Bed Mobility PT LTG    Bed Mobility PT LTG, Date Established 01/30/18  -KH (r) BJ (t) KH (c)      Bed Mobility PT LTG, Time to Achieve 1 wk  -KH (r) BJ (t) KH (c)      Bed Mobility PT LTG, Activity Type all bed mobility  -KH (r) BJ (t) KH (c)      Bed Mobility PT LTG, [place] Level conditional independence  -KH (r) BJ (t) KH (c)      Transfer Training PT LTG    Transfer Training PT LTG, Date Established 01/30/18  -KH (r) BJ (t) KH (c)      Transfer Training PT LTG, Time to Achieve 1 wk  -KH (r) BJ (t) KH (c)      Transfer Training PT LTG, Activity Type all transfers  -KH (r) BJ (t) KH (c)      Transfer Training PT LTG, [place] Level conditional independence  -KH (r) BJ (t) KH (c)      Gait Training PT LTG    Gait Training Goal PT LTG, Date Established 01/30/18  -KH (r) BJ (t) KH (c)      Gait Training Goal PT LTG, Time to Achieve 1 wk  -KH (r) BJ (t) KH (c)      Gait Training Goal PT LTG, [place] Level supervision required  -KH (r) BJ (t) KH (c)      Gait Training Goal PT LTG, Assist Device walker, rolling  -KH (r) BJ (t) KH (c)      Gait Training Goal PT LTG, Distance to Achieve 350  -KH (r) BJ (t) KH (c)      Stair Training PT LTG    Stair Training Goal PT LTG, Date Established 01/30/18  -KH (r) BJ (t) KH (c)      Stair Training Goal PT LTG, Time to Achieve 1 wk  -KH (r) BJ (t) ISABELA (c)      Stair Training Goal PT LTG, Number of Steps 5  -ISABELA (r) SACHA (t) ISABELA (c)      Stair Training Goal PT LTG, [place] Level contact guard assist  -ISABELA (r) SACHA (t) ISABELA (c)      Patient Education PT LTG    Patient Education PT LTG, Date  Established 01/30/18  -KH (r) BJ (t) KH (c)      Patient Education PT LTG, Time to Achieve 1 wk  -KH (r) BJ (t) KH (c)      Patient Education PT LTG, Education Type HEP  -KH (r) BJ (t) KH (c)        User Key  (r) = Recorded By, (t) = Taken By, (c) = Cosigned By    Initials Name Provider Type    ISABELA Judd, PT Physical Therapist    SACHA Lovett, PT Student PT Student          Physical Therapy Education     Title: PT OT SLP Therapies (Done)     Topic: Physical Therapy (Done)     Point: Mobility training (Done)    Learning Progress Summary    Learner Readiness Method Response Comment Documented by Status   Patient Acceptance E [facility] 02/01/18 0853 Done    Acceptance E [facility] 01/31/18 1337 Done    Acceptance E [facility] 01/31/18 1031 Done    Acceptance E [facility] 01/30/18 1611 Done    Acceptance E [facility] 01/30/18 1101 Done   Family Acceptance E [facility] 01/30/18 1611 Done               Point: Home exercise program (Done)    Learning Progress Summary    Learner Readiness Method Response Comment Documented by Status   Patient Acceptance E [facility] 02/01/18 0853 Done    Acceptance E [facility] 01/31/18 1337 Done    Acceptance E [facility] 01/31/18 1031 Done    Acceptance E [facility] 01/30/18 1101 Done               Point: Body mechanics (Done)    Learning Progress Summary    Learner Readiness Method Response Comment Documented by Status   Patient Acceptance E [facility] 02/01/18 0853 Done    Acceptance E [facility] 01/31/18 1337 Done    Acceptance E [facility] 01/31/18 1031 Done    Acceptance E [facility] 01/30/18 1611 Done   Family Acceptance E [facility] 01/30/18 1611 Done               Point: Precautions (Done)    Learning Progress Summary    Learner Readiness Method Response Comment Documented by Status   Patient Acceptance E [facility] 01/31/18 1337 Done    Acceptance E [facility] 01/31/18 1031 Done    Acceptance E [facility] 01/30/18 1611 Done   Family Acceptance E [facility] 01/30/18 1611 Done                      User Key     Initials Effective  Dates Name Provider Type Discipline     01/08/18 -  Mark Lovett, PT Student PT Student PT                    PT Recommendation and Plan  Anticipated Discharge Disposition: home with assist  Planned Therapy Interventions: balance training, bed mobility training, gait training, home exercise program, strengthening  PT Frequency: 2 times/day  Plan of Care Review  Outcome Summary/Follow up Plan: Pt ambulated further distance this date, able to maintain normal gait throughout 90% of walk. Crouched gait was able to be corrected with cueing. He was able to follow cues and carry on full conversation this session. PT will continue to see for acute stay to improve strength and ambulation ability.          Outcome Measures       02/01/18 0800 01/31/18 1300 01/31/18 1000    How much help from another person do you currently need...    Turning from your back to your side while in flat bed without using bedrails? 3  -KH (r) BJ (t) KH (c) 3  -KH (r) BJ (t) KH (c) 3  -KH (r) BJ (t) KH (c)    Moving from lying on back to sitting on the side of a flat bed without bedrails? 3  -KH (r) BJ (t) KH (c) 3  -KH (r) BJ (t) KH (c) 3  -KH (r) BJ (t) KH (c)    Moving to and from a bed to a chair (including a wheelchair)? 3  -KH (r) BJ (t) KH (c) 3  -KH (r) BJ (t) KH (c) 3  -KH (r) BJ (t) KH (c)    Standing up from a chair using your arms (e.g., wheelchair, bedside chair)? 3  -KH (r) BJ (t) KH (c) 3  -KH (r) BJ (t) KH (c) 3  -KH (r) BJ (t) KH (c)    Climbing 3-5 steps with a railing? 2  -KH (r) BJ (t) KH (c) 2  -KH (r) BJ (t) KH (c) 2  -KH (r) BJ (t) KH (c)    To walk in hospital room? 3  -KH (r) BJ (t) KH (c) 3  -KH (r) BJ (t) KH (c) 3  -KH (r) BJ (t) KH (c)    AM-PAC 6 Clicks Score 17  -ISABELA (r) SACHA (t) 17  -ISABELA (r) SACHA (t) 17  -ISABELA (r) SACHA (t)    Functional Assessment    Outcome Measure Options AM-PAC 6 Clicks Basic Mobility (PT)  -ISABELA (r) SACHA (t) ISABELA (c) AM-PAC 6 Clicks Basic Mobility (PT)  -ISABELA (r) SACHA (t) ISABELA (c)       01/30/18 1600 01/30/18  1100       How much help from another person do you currently need...    Turning from your back to your side while in flat bed without using bedrails? 3  -KH (r) BJ (t) KH (c) 3  -KH (r) BJ (t) KH (c)     Moving from lying on back to sitting on the side of a flat bed without bedrails? 3  -KH (r) BJ (t) KH (c) 3  -KH (r) BJ (t) KH (c)     Moving to and from a bed to a chair (including a wheelchair)? 3  -KH (r) BJ (t) KH (c) 3  -KH (r) BJ (t) KH (c)     Standing up from a chair using your arms (e.g., wheelchair, bedside chair)? 3  -KH (r) BJ (t) KH (c) 3  -KH (r) BJ (t) KH (c)     Climbing 3-5 steps with a railing? 2  -KH (r) BJ (t) KH (c) 2  -KH (r) BJ (t) KH (c)     To walk in hospital room? 3  -KH (r) BJ (t) KH (c) 3  -KH (r) BJ (t) KH (c)     AM-PAC 6 Clicks Score 17  -KH (r) BJ (t) 17  -KH (r) BJ (t)     Functional Assessment    Outcome Measure Options AM-PAC 6 Clicks Basic Mobility (PT)  -KH (r) BJ (t) KH (c) AM-PAC 6 Clicks Basic Mobility (PT)  -KH (r) BJ (t) KH (c)       User Key  (r) = Recorded By, (t) = Taken By, (c) = Cosigned By    Initials Name Provider Type    ISABELA Judd, PT Physical Therapist    SACHA Lovett, PT Student PT Student           Time Calculation:         PT Charges       02/01/18 0858          Time Calculation    Start Time 0822  -KH (r) BJ (t) KH (c)      Stop Time 0842  -KH (r) BJ (t) KH (c)      Time Calculation (min) 20 min  -KH (r) BJ (t)      PT Received On 02/01/18  -KH (r) BJ (t) KH (c)      PT - Next Appointment 02/01/18  -KH (r) BJ (t) KH (c)        User Key  (r) = Recorded By, (t) = Taken By, (c) = Cosigned By    Initials Name Provider Type    ISABELA Judd, PT Physical Therapist    SACHA Lovett, PT Student PT Student          Therapy Charges for Today     Code Description Service Date Service Provider Modifiers Qty    47004984829  PT THER PROC EA 15 MIN 1/31/2018 Mark Lovett, PT Student GP 2    [id number]  PT THER SUPP EA 15 MIN  1/31/2018 Mark Lovett, PT Student GP 1    66290202965 HC PT THER PROC EA 15 MIN 1/31/2018 Mark Lovett PT Student GP 1    [id number] HC PT THER PROC EA 15 MIN 2/1/2018 Mark Lovett, PT Student GP 1          PT G-Codes  Outcome Measure Options: AM-PAC 6 Clicks Basic Mobility (PT)    Mark Lovett PT Student  2/1/2018

## 2018-02-01 NOTE — DISCHARGE SUMMARY
Orthopedic Discharge Summary      Patient: Derrek Ritter  YOB: 1940  Medical Record Number: [id number]    Attending Physician: Barron Knight MD  Consulting Physician(s):   Consults     Date and Time Order Name Status Description    1/31/2018 1819 Inpatient Consult to Neurology      1/30/2018 1912 Inpatient Consult to Cardiology      1/29/2018 1315 Inpatient Consult to Hospitalist Completed           Date of Admission: 1/29/2018  5:35 AM  Date of Discharge:2/1/2018    Discharge Diagnosis: L2-3, L3-4 laminectomy and fusion with instrumentation and removal of implants L4 5,   Acute Blood Loss Anemia      Presenting Problem/History of Present Illness: Spinal stenosis of lumbar region with neurogenic claudication [M48.062]  Spondylolisthesis of lumbar region [M43.16]  Lumbar spine pain [M54.5]        Allergies: No Known Allergies    Discharge Medications   Derrek Ritter   Home Medication Instructions [id number]    Printed on:02/01/18 0813   Medication Information                      ACCU-CHEK FASTCLIX LANCETS misc  USE TO TEST TWICE DAILY             Blood Glucose Monitoring Suppl (ACCU-CHEK FRANCISCO SMARTVIEW) W/DEVICE kit  USE TO TEST BLOOD SUGAR TWICE DAILY             Blood Glucose Monitoring Suppl (FREESTYLE LITE) device  Use to test blood once daily             brimonidine (ALPHAGAN) 0.2 % ophthalmic solution  Administer 1 drop to both eyes 2 (Two) Times a Day.             donepezil (ARICEPT) 5 MG tablet  Take 5 mg by mouth Every Morning.             dorzolamide (TRUSOPT) 2 % ophthalmic solution  Administer 1 drop to both eyes 2 (two) times a day.             enalapril (VASOTEC) 20 MG tablet  Take 40 mg by mouth Every Morning.             esomeprazole (nexIUM) 40 MG capsule  Take 40 mg by mouth Every Morning Before Breakfast.             furosemide (LASIX) 20 MG tablet  Take 1 tablet by mouth Daily. as needed             glimepiride (AMARYL) 2 MG tablet  Take 2 mg by mouth Every Morning  Before Breakfast.             glucose blood (FREESTYLE LITE) test strip  Use to test blood once daily             Glucose Blood disk  Inject 1 Device as directed daily. TEST BLOOD SUGAR ONCE DAILY AS DIRECTED             glucose blood test strip  USE TO TEST TWICE DAILY             Lancets (FREESTYLE) lancets  Use to test blood sugar daily             latanoprost (XALATAN) 0.005 % ophthalmic solution  Administer 1 drop to both eyes Every Night.             metFORMIN (GLUCOPHAGE) 1000 MG tablet  Take 1,000 mg by mouth 2 (Two) Times a Day.             metFORMIN (GLUCOPHAGE) 1000 MG tablet  Take 500 mg by mouth Daily. At noon             ONE TOUCH ULTRA TEST test strip  USE ONCE DAILY AS DIRECTED             oxyCODONE-acetaminophen (PERCOCET)  MG per tablet  1 tablet Every 8 (Eight) Hours As Needed.             oxyCODONE-acetaminophen (PERCOCET) 5-325 MG per tablet  Take 1 tablet by mouth Every 4 (Four) Hours As Needed for Moderate Pain  for up to 9 days.             pioglitazone (ACTOS) 45 MG tablet  Take 45 mg by mouth Every Morning.             sennosides-docusate sodium (SENOKOT-S) 8.6-50 MG tablet  Take 1 tablet by mouth Every Night.             simvastatin (ZOCOR) 20 MG tablet  Take 20 mg by mouth Every Morning.             tamsulosin (FLOMAX) 0.4 MG capsule 24 hr capsule  Take 0.4 mg by mouth Every Morning. 1-2 TABS AS NEEDED              timolol (TIMOPTIC) 0.5 % ophthalmic solution  Administer 1 drop to both eyes 2 (Two) Times a Day.             warfarin (COUMADIN) 10 MG tablet  Take 10 mg by mouth Daily. MON THRU FRI STOPPED 1/24/18             warfarin (COUMADIN) 5 MG tablet  Take 5 mg by mouth Daily. Saturday Sunday STOPPED 1/24/18                   Past Medical History:   Diagnosis Date   • Acute suppurative otitis media    • Acute upper respiratory infection    • Atrial fibrillation    • Cholelithiasis    • Diabetes mellitus     TYPE 2   • Diverticulosis    • Dry eyes    • Earache    • Edema    •  Encounter for screening for malignant neoplasm of prostate    • Esophagitis, reflux    • Glaucoma    • H/O echocardiogram 09/18/2013   • Health care maintenance    • Hiatal hernia    • History of EKG 10/06/2015   • History of Holter monitoring 09/16/2013   • Hyperlipidemia    • Hypertension    • Lumbar canal stenosis    • Microalbuminuria    • Nephrolithiasis    • Osteoarthritis of hand     of thumb   • PAF (paroxysmal atrial fibrillation)    • Postoperative examination    • Pre-operative cardiovascular examination    • Spinal stenosis         Past Surgical History:   Procedure Laterality Date   • AMPUTATION DIGIT Left 10/29/2016    Procedure: TENDON AND NERVE REPAIR OF  LEFT THUMB;  Surgeon: Zak Hanson MD;  Location: [facility];  Service:    • ARTHRODESIS  10/28/2013    Spinal / Description: Repair spinal stenosis   • CATARACT EXTRACTION     • EYE SURGERY      EYE MUSCLE SX   • HERNIA REPAIR     • LUMBAR DISCECTOMY FUSION INSTRUMENTATION N/A 1/29/2018    Procedure: L2 to L5 fusion with instrumentation and removal of implants L4 5;  Surgeon: Barron Knight MD;  Location: [facility];  Service:    • LUMBAR FUSION      Lumbar vertebral fusion   • LUMBAR LAMINECTOMY      10.28.13  bilat L4/5 lami with Nyla and Belen   • LUMBAR LAMINECTOMY DISCECTOMY DECOMPRESSION N/A 1/29/2018    Procedure: L2 to L4 laminectomy with a fusion by orthopedics;  Surgeon: Jeison Redmond MD;  Location: [facility];  Service:    • OTHER SURGICAL HISTORY      Lithotomy   • TONSILLECTOMY          Social History     Occupational History   • Teacher RETIRED Retired     Social History Main Topics   • Smoking status: Never Smoker   • Smokeless tobacco: Never Used      Comment: caffeine use   • Alcohol use No   • Drug use: No   • Sexual activity: Not on file    Social History     Social History Narrative        Family History   Problem Relation Age of Onset   • Heart attack Mother      acute myocardial infarction   • Stroke  Mother      hemorrhagic   • Emphysema Father    • Diabetes Other      mellitus   • Hypertension Other    • Heart disease Other    • Malig Hyperthermia Neg Hx          Physical Exam: 77 y.o. male  General Appearance:    Alert, cooperative, in no acute distress                    Vitals:    01/31/18 1807 01/31/18 2130 01/31/18 2300 02/01/18 0635   BP:  112/84  153/75   BP Location:  Right arm  Right arm   Patient Position:  Lying  Lying   Pulse: 98 (!) 124 97 98   Resp:  20  18   Temp:  98.6 °F (37 °C)  98.8 °F (37.1 °C)   TempSrc:  Oral  Oral   SpO2:  95%  95%   Weight:       Height:            DIAGNOSTIC TESTS:   Admission on 01/29/2018   Component Date Value Ref Range Status   • ABO Type 01/29/2018 A   Final   • RH type 01/29/2018 Negative   Final   • Antibody Screen 01/29/2018 Negative   Final   • Protime 01/29/2018 14.1  11.7 - 14.2 Seconds Final   • INR 01/29/2018 1.13* 0.90 - 1.10 Final   • Glucose 01/29/2018 93  70 - 130 mg/dL Final   • Hemoglobin 01/29/2018 10.0* 13.7 - 17.6 g/dL Final   • Hematocrit 01/29/2018 31.9* 40.4 - 52.2 % Final   • Glucose 01/29/2018 137* 70 - 130 mg/dL Final   • Glucose 01/29/2018 108  70 - 130 mg/dL Final   • Glucose 01/29/2018 154* 70 - 130 mg/dL Final   • Hemoglobin A1C 01/30/2018 5.90* 4.80 - 5.60 % Final   • Glucose 01/30/2018 136* 65 - 99 mg/dL Final   • BUN 01/30/2018 13  8 - 23 mg/dL Final   • Creatinine 01/30/2018 0.71* 0.76 - 1.27 mg/dL Final   • Sodium 01/30/2018 138  136 - 145 mmol/L Final   • Potassium 01/30/2018 4.4  3.5 - 5.2 mmol/L Final   • Chloride 01/30/2018 101  98 - 107 mmol/L Final   • CO2 01/30/2018 27.9  22.0 - 29.0 mmol/L Final   • Calcium 01/30/2018 8.4* 8.6 - 10.5 mg/dL Final   • Total Protein 01/30/2018 5.5* 6.0 - 8.5 g/dL Final   • Albumin 01/30/2018 3.00* 3.50 - 5.20 g/dL Final   • ALT (SGPT) 01/30/2018 14  1 - 41 U/L Final   • AST (SGOT) 01/30/2018 22  1 - 40 U/L Final   • Alkaline Phosphatase 01/30/2018 33* 39 - 117 U/L Final   • Total Bilirubin  01/30/2018 0.4  0.1 - 1.2 mg/dL Final   • eGFR Non African Amer 01/30/2018 108  >60 mL/min/1.73 Final   • Globulin 01/30/2018 2.5  gm/dL Final   • A/G Ratio 01/30/2018 1.2  g/dL Final   • BUN/Creatinine Ratio 01/30/2018 18.3  7.0 - 25.0 Final   • Anion Gap 01/30/2018 9.1  mmol/L Final   • WBC 01/30/2018 9.43  4.50 - 10.70 10*3/mm3 Final   • RBC 01/30/2018 3.21* 4.60 - 6.00 10*6/mm3 Final   • Hemoglobin 01/30/2018 10.1* 13.7 - 17.6 g/dL Final   • Hematocrit 01/30/2018 32.5* 40.4 - 52.2 % Final   • MCV 01/30/2018 101.2* 79.8 - 96.2 fL Final   • MCH 01/30/2018 31.5  27.0 - 32.7 pg Final   • MCHC 01/30/2018 31.1* 32.6 - 36.4 g/dL Final   • RDW 01/30/2018 13.8  11.5 - 14.5 % Final   • RDW-SD 01/30/2018 50.9  37.0 - 54.0 fl Final   • MPV 01/30/2018 10.2  6.0 - 12.0 fL Final   • Platelets 01/30/2018 163  140 - 500 10*3/mm3 Final   • Neutrophil % 01/30/2018 79.7* 42.7 - 76.0 % Final   • Lymphocyte % 01/30/2018 10.5* 19.6 - 45.3 % Final   • Monocyte % 01/30/2018 9.0  5.0 - 12.0 % Final   • Eosinophil % 01/30/2018 0.4  0.3 - 6.2 % Final   • Basophil % 01/30/2018 0.2  0.0 - 1.5 % Final   • Immature Grans % 01/30/2018 0.2  0.0 - 0.5 % Final   • Neutrophils, Absolute 01/30/2018 7.51  1.90 - 8.10 10*3/mm3 Final   • Lymphocytes, Absolute 01/30/2018 0.99  0.90 - 4.80 10*3/mm3 Final   • Monocytes, Absolute 01/30/2018 0.85  0.20 - 1.20 10*3/mm3 Final   • Eosinophils, Absolute 01/30/2018 0.04  0.00 - 0.70 10*3/mm3 Final   • Basophils, Absolute 01/30/2018 0.02  0.00 - 0.20 10*3/mm3 Final   • Immature Grans, Absolute 01/30/2018 0.02  0.00 - 0.03 10*3/mm3 Final   • Glucose 01/30/2018 120  70 - 130 mg/dL Final   • Glucose 01/30/2018 177* 70 - 130 mg/dL Final   • Color, UA 01/30/2018 Dark Yellow* Yellow, Straw Final   • Appearance, UA 01/30/2018 Cloudy* Clear Final   • pH, UA 01/30/2018 <=5.0  5.0 - 8.0 Final   • Specific Gravity, UA 01/30/2018 1.017  1.005 - 1.030 Final   • Glucose, UA 01/30/2018 Negative  Negative Final   • Ketones, UA  01/30/2018 Negative  Negative Final   • Bilirubin, UA 01/30/2018 Negative  Negative Final   • Blood, UA 01/30/2018 Small (1+)* Negative Final   • Protein, UA 01/30/2018 Trace* Negative Final   • Leuk Esterase, UA 01/30/2018 Negative  Negative Final   • Nitrite, UA 01/30/2018 Negative  Negative Final   • Urobilinogen, UA 01/30/2018 0.2 E.U./dL  0.2 - 1.0 E.U./dL Final   • Glucose 01/30/2018 131* 70 - 130 mg/dL Final   • RBC, UA 01/30/2018 3-5* None Seen, 0-2 /HPF Final   • WBC, UA 01/30/2018 0-2  None Seen, 0-2 /HPF Final   • Bacteria, UA 01/30/2018 None Seen  None Seen /HPF Final   • Squamous Epithelial Cells, UA 01/30/2018 0-2  None Seen, 0-2 /HPF Final   • Hyaline Casts, UA 01/30/2018 7-12  None Seen /LPF Final   • Methodology 01/30/2018 Automated Microscopy   Final   • Glucose 01/30/2018 138* 70 - 130 mg/dL Final   • Glucose 01/31/2018 147* 65 - 99 mg/dL Final   • BUN 01/31/2018 16  8 - 23 mg/dL Final   • Creatinine 01/31/2018 0.85  0.76 - 1.27 mg/dL Final   • Sodium 01/31/2018 140  136 - 145 mmol/L Final   • Potassium 01/31/2018 4.6  3.5 - 5.2 mmol/L Final   • Chloride 01/31/2018 103  98 - 107 mmol/L Final   • CO2 01/31/2018 25.2  22.0 - 29.0 mmol/L Final   • Calcium 01/31/2018 8.9  8.6 - 10.5 mg/dL Final   • eGFR Non African Amer 01/31/2018 88  >60 mL/min/1.73 Final   • BUN/Creatinine Ratio 01/31/2018 18.8  7.0 - 25.0 Final   • Anion Gap 01/31/2018 11.8  mmol/L Final   • WBC 01/31/2018 12.92* 4.50 - 10.70 10*3/mm3 Final   • RBC 01/31/2018 3.23* 4.60 - 6.00 10*6/mm3 Final   • Hemoglobin 01/31/2018 10.2* 13.7 - 17.6 g/dL Final   • Hematocrit 01/31/2018 33.1* 40.4 - 52.2 % Final   • MCV 01/31/2018 102.5* 79.8 - 96.2 fL Final   • MCH 01/31/2018 31.6  27.0 - 32.7 pg Final   • MCHC 01/31/2018 30.8* 32.6 - 36.4 g/dL Final   • RDW 01/31/2018 14.0  11.5 - 14.5 % Final   • RDW-SD 01/31/2018 52.4  37.0 - 54.0 fl Final   • MPV 01/31/2018 10.1  6.0 - 12.0 fL Final   • Platelets 01/31/2018 172  140 - 500 10*3/mm3 Final   •  Glucose 01/31/2018 117  70 - 130 mg/dL Final   • Glucose 01/31/2018 138* 70 - 130 mg/dL Final   • Glucose 01/31/2018 101  70 - 130 mg/dL Final   • Glucose 01/31/2018 123  70 - 130 mg/dL Final   • Glucose 01/31/2018 156* 70 - 130 mg/dL Final   • Glucose 01/31/2018 169* 70 - 130 mg/dL Final   • Glucose 02/01/2018 140* 65 - 99 mg/dL Final   • BUN 02/01/2018 16  8 - 23 mg/dL Final   • Creatinine 02/01/2018 0.68* 0.76 - 1.27 mg/dL Final   • Sodium 02/01/2018 141  136 - 145 mmol/L Final   • Potassium 02/01/2018 4.0  3.5 - 5.2 mmol/L Final   • Chloride 02/01/2018 106  98 - 107 mmol/L Final   • CO2 02/01/2018 25.0  22.0 - 29.0 mmol/L Final   • Calcium 02/01/2018 8.7  8.6 - 10.5 mg/dL Final   • eGFR Non African Amer 02/01/2018 113  >60 mL/min/1.73 Final   • BUN/Creatinine Ratio 02/01/2018 23.5  7.0 - 25.0 Final   • Anion Gap 02/01/2018 10.0  mmol/L Final   • Folate 02/01/2018 16.42  4.78 - 24.20 ng/mL Final   • Vitamin B-12 02/01/2018 259  211 - 946 pg/mL Final   • WBC 02/01/2018 9.05  4.50 - 10.70 10*3/mm3 Final   • RBC 02/01/2018 2.90* 4.60 - 6.00 10*6/mm3 Final   • Hemoglobin 02/01/2018 9.1* 13.7 - 17.6 g/dL Final   • Hematocrit 02/01/2018 29.3* 40.4 - 52.2 % Final   • MCV 02/01/2018 101.0* 79.8 - 96.2 fL Final   • MCH 02/01/2018 31.4  27.0 - 32.7 pg Final   • MCHC 02/01/2018 31.1* 32.6 - 36.4 g/dL Final   • RDW 02/01/2018 13.7  11.5 - 14.5 % Final   • RDW-SD 02/01/2018 51.1  37.0 - 54.0 fl Final   • MPV 02/01/2018 10.4  6.0 - 12.0 fL Final   • Platelets 02/01/2018 142  140 - 500 10*3/mm3 Final   • Neutrophil % 02/01/2018 86.6* 42.7 - 76.0 % Final   • Lymphocyte % 02/01/2018 6.0* 19.6 - 45.3 % Final   • Monocyte % 02/01/2018 6.2  5.0 - 12.0 % Final   • Eosinophil % 02/01/2018 0.9  0.3 - 6.2 % Final   • Basophil % 02/01/2018 0.1  0.0 - 1.5 % Final   • Immature Grans % 02/01/2018 0.2  0.0 - 0.5 % Final   • Neutrophils, Absolute 02/01/2018 7.84  1.90 - 8.10 10*3/mm3 Final   • Lymphocytes, Absolute 02/01/2018 0.54* 0.90 -  4.80 10*3/mm3 Final   • Monocytes, Absolute 02/01/2018 0.56  0.20 - 1.20 10*3/mm3 Final   • Eosinophils, Absolute 02/01/2018 0.08  0.00 - 0.70 10*3/mm3 Final   • Basophils, Absolute 02/01/2018 0.01  0.00 - 0.20 10*3/mm3 Final   • Immature Grans, Absolute 02/01/2018 0.02  0.00 - 0.03 10*3/mm3 Final   • Glucose 02/01/2018 133* 70 - 130 mg/dL Final   • Glucose 02/01/2018 112  70 - 130 mg/dL Final       Xr Spine Lumbar 2 Or 3 View    Result Date: 1/29/2018  Narrative: LUMBAR SPINE OPERATIVE X-RAY  HISTORY: Back pain, fusion revision.  FINDINGS:  5 seconds fluoroscopy and 3 operative images were provided for Dr. Knight. The images provided show pedicle screws at 4 contiguous lower lumbar levels.      Impression: Operative imaging was provided for Dr. Knight during lumbar spine fusion.  This report was finalized on 1/29/2018 4:14 PM by Dr. Kit Awan MD.      Ct Head Without Contrast    Result Date: 1/31/2018  Narrative: CT HEAD WO CONTRAST-  INDICATIONS: Encephalopathy  TECHNIQUE: Radiation dose reduction techniques were utilized, including automated exposure control and exposure modulation based on body size.    Noncontrast head CT  COMPARISON: None available  FINDINGS:       No acute intracranial hemorrhage, midline shift or mass effect. No acute territorial infarct is identified. Basal ganglia mineralization is seen. A dural-based calcified focus at the anterior left temporal fossa, 7 mm on image 20 of series 1 suggests meningioma.  Mild periventricular hypodensities suggest chronic small vessel ischemic change in a patient this age.    Ventricles, cisterns, cerebral sulci are unremarkable for patient age.  Minimal bilateral maxillary sinus mucosal thickening. Mild partial opacification of left mastoid air cells, correlate clinically to exclude possibility of mastoiditis. The visualized paranasal sinuses, mastoid air cells are otherwise unremarkable. Procedural changes at the orbits.          Impression:      No  acute intracranial hemorrhage or hydrocephalus. Small dural based calcified focus at the left temporal fossa suggesting meningioma. If there is further clinical concern, MRI could be considered for further evaluation.  This report was finalized on 1/31/2018 8:05 PM by Dr. Alvarez Diaz MD.      Xr Chest 1 View    Result Date: 1/31/2018  Narrative: SINGLE PORTABLE CHEST 14:30  HISTORY: 77-year-old male with leukocytosis postoperatively  COMPARISON: 10/29/2016  FINDINGS: 1. Cannot exclude developing retrocardiac opacification on somewhat limited portable view. 2. Follow-up with lateral imaging would be helpful. 3. Right lung remains clear.  This report was finalized on 1/31/2018 2:47 PM by Dr. Jorge Gaming MD.      Fl C Arm During Surgery    Result Date: 1/29/2018  Narrative: This procedure was auto-finalized with no dictation required.      Hospital Course:  77 y.o. male admitted to [facility], operated by [facility] to services of Barron Knight MD with Spinal stenosis of lumbar region with neurogenic claudication [M48.062]  Spondylolisthesis of lumbar region [M43.16]  Lumbar spine pain [M54.5] on 1/29/2018 and underwent L2-3, L3-4 laminectomy and fusion with instrumentation and removal of implants L4 5  Per Barron Knight MD. Antibiotic and VTE prophylaxis were per SCIP protocols.  The patient was admitted to the floor where IV and/or oral pain medications were administered for postoperative pain.  At discharge the incisional pain was tolerable and preop neurologic function was intact.  The dressing was dry and the wound was clean.    Condition on Discharge:  Stable    Discharge Instructions: . Patient may weight bear as tolerated unless otherwise specified. Continue DEANN hose daily (for two weeks) and ice regularly. Patient also instructed on incentive spirometer during hospitalization and encouraged to continue to use at home regularly. Patient may shower on POD #3 if and when all wound drainage has stopped.  Where  applicable, the brace should be worn when up and about.  It need not be worn to the bathroom and certainly not in the shower.  A detailed list of instructions specific to the operation was given to the patient at the time of discharge.    Follow up Instructions:  Follow up in the office with Dr. Barron Knight Jr. in 2-3 weeks - patient to call the office at [phone] to schedule. Prescriptions were given for pain medication.    Follow-up Appointments  Future Appointments  Date Time Provider Department Center   2/13/2018 9:10 AM MD BRANDO Wise [facility] None   2/28/2018 3:30 PM NANDINI Montanez [facility] None   6/11/2018 1:00 PM MD BRANDO Painting [facility] None     Additional Instructions for the Follow-ups that You Need to Schedule     Protime-INR    Feb 06, 2018 (Approximate)    Is Patient on anti-coag:  Yes                     Discharge Disposition Plan:today to rehab    Date: 2/1/2018    Barron Knight MD  02/01/18  8:13 AM       Orthopedic Discharge Summary      Patient: Derrek Ritter  YOB: 1940  Medical Record Number: [id number]    Attending Physician: Barron Knight MD  Consulting Physician(s):   Consults     Date and Time Order Name Status Description    1/31/2018 1819 Inpatient Consult to Neurology      1/30/2018 1912 Inpatient Consult to Cardiology      1/29/2018 1315 Inpatient Consult to Hospitalist Completed           Date of Admission: 1/29/2018  5:35 AM  Date of Discharge:2/1/2018    Discharge Diagnosis: L2-3, L3-4 laminectomy and fusion with instrumentation and removal of implants L4 5,   Acute Blood Loss Anemia      Presenting Problem/History of Present Illness: Spinal stenosis of lumbar region with neurogenic claudication [M48.062]  Spondylolisthesis of lumbar region [M43.16]  Lumbar spine pain [M54.5]        Allergies: No Known Allergies    Discharge Medications   Derrek Ritter   Home Medication Instructions JAE:[id number]    Printed on:02/01/18 0813    Medication Information                      ACCU-CHEK FASTCLIX LANCETS [facility]  USE TO TEST TWICE DAILY             Blood Glucose Monitoring Suppl (ACCU-CHEK FRANCISCO SMARTVIEW) W/DEVICE kit  USE TO TEST BLOOD SUGAR TWICE DAILY             Blood Glucose Monitoring Suppl (FREESTYLE LITE) device  Use to test blood once daily             brimonidine (ALPHAGAN) 0.2 % ophthalmic solution  Administer 1 drop to both eyes 2 (Two) Times a Day.             donepezil (ARICEPT) 5 MG tablet  Take 5 mg by mouth Every Morning.             dorzolamide (TRUSOPT) 2 % ophthalmic solution  Administer 1 drop to both eyes 2 (two) times a day.             enalapril (VASOTEC) 20 MG tablet  Take 40 mg by mouth Every Morning.             esomeprazole (nexIUM) 40 MG capsule  Take 40 mg by mouth Every Morning Before Breakfast.             furosemide (LASIX) 20 MG tablet  Take 1 tablet by mouth Daily. as needed             glimepiride (AMARYL) 2 MG tablet  Take 2 mg by mouth Every Morning Before Breakfast.             glucose blood (FREESTYLE LITE) test strip  Use to test blood once daily             Glucose Blood disk  Inject 1 Device as directed daily. TEST BLOOD SUGAR ONCE DAILY AS DIRECTED             glucose blood test strip  USE TO TEST TWICE DAILY             Lancets (FREESTYLE) lancets  Use to test blood sugar daily             latanoprost (XALATAN) 0.005 % ophthalmic solution  Administer 1 drop to both eyes Every Night.             metFORMIN (GLUCOPHAGE) 1000 MG tablet  Take 1,000 mg by mouth 2 (Two) Times a Day.             metFORMIN (GLUCOPHAGE) 1000 MG tablet  Take 500 mg by mouth Daily. At noon             ONE TOUCH ULTRA TEST test strip  USE ONCE DAILY AS DIRECTED             oxyCODONE-acetaminophen (PERCOCET)  MG per tablet  1 tablet Every 8 (Eight) Hours As Needed.             oxyCODONE-acetaminophen (PERCOCET) 5-325 MG per tablet  Take 1 tablet by mouth Every 4 (Four) Hours As Needed for Moderate Pain  for up to 9 days.              pioglitazone (ACTOS) 45 MG tablet  Take 45 mg by mouth Every Morning.             sennosides-docusate sodium (SENOKOT-S) 8.6-50 MG tablet  Take 1 tablet by mouth Every Night.             simvastatin (ZOCOR) 20 MG tablet  Take 20 mg by mouth Every Morning.             tamsulosin (FLOMAX) 0.4 MG capsule 24 hr capsule  Take 0.4 mg by mouth Every Morning. 1-2 TABS AS NEEDED              timolol (TIMOPTIC) 0.5 % ophthalmic solution  Administer 1 drop to both eyes 2 (Two) Times a Day.             warfarin (COUMADIN) 10 MG tablet  Take 10 mg by mouth Daily. MON THRU FRI STOPPED 1/24/18             warfarin (COUMADIN) 5 MG tablet  Take 5 mg by mouth Daily. Saturday Sunday STOPPED 1/24/18                   Past Medical History:   Diagnosis Date   • Acute suppurative otitis media    • Acute upper respiratory infection    • Atrial fibrillation    • Cholelithiasis    • Diabetes mellitus     TYPE 2   • Diverticulosis    • Dry eyes    • Earache    • Edema    • Encounter for screening for malignant neoplasm of prostate    • Esophagitis, reflux    • Glaucoma    • H/O echocardiogram 09/18/2013   • Health care maintenance    • Hiatal hernia    • History of EKG 10/06/2015   • History of Holter monitoring 09/16/2013   • Hyperlipidemia    • Hypertension    • Lumbar canal stenosis    • Microalbuminuria    • Nephrolithiasis    • Osteoarthritis of hand     of thumb   • PAF (paroxysmal atrial fibrillation)    • Postoperative examination    • Pre-operative cardiovascular examination    • Spinal stenosis         Past Surgical History:   Procedure Laterality Date   • AMPUTATION DIGIT Left 10/29/2016    Procedure: TENDON AND NERVE REPAIR OF  LEFT THUMB;  Surgeon: Zak Hanson MD;  Location: [facility];  Service:    • ARTHRODESIS  10/28/2013    Spinal / Description: Repair spinal stenosis   • CATARACT EXTRACTION     • EYE SURGERY      EYE MUSCLE SX   • HERNIA REPAIR     • LUMBAR DISCECTOMY FUSION INSTRUMENTATION N/A 1/29/2018     Procedure: L2 to L5 fusion with instrumentation and removal of implants L4 5;  Surgeon: Barron Knight MD;  Location: [facility];  Service:    • LUMBAR FUSION      Lumbar vertebral fusion   • LUMBAR LAMINECTOMY      10.28.13  bilat L4/5 lami with Nyla and Belen   • LUMBAR LAMINECTOMY DISCECTOMY DECOMPRESSION N/A 1/29/2018    Procedure: L2 to L4 laminectomy with a fusion by orthopedics;  Surgeon: Jeison Redmond MD;  Location: [facility];  Service:    • OTHER SURGICAL HISTORY      Lithotomy   • TONSILLECTOMY          Social History     Occupational History   • Teacher RETIRED Retired     Social History Main Topics   • Smoking status: Never Smoker   • Smokeless tobacco: Never Used      Comment: caffeine use   • Alcohol use No   • Drug use: No   • Sexual activity: Not on file    Social History     Social History Narrative        Family History   Problem Relation Age of Onset   • Heart attack Mother      acute myocardial infarction   • Stroke Mother      hemorrhagic   • Emphysema Father    • Diabetes Other      mellitus   • Hypertension Other    • Heart disease Other    • Malig Hyperthermia Neg Hx          Physical Exam: 77 y.o. male  General Appearance:    Alert, cooperative, in no acute distress                    Vitals:    01/31/18 1807 01/31/18 2130 01/31/18 2300 02/01/18 0635   BP:  112/84  153/75   BP Location:  Right arm  Right arm   Patient Position:  Lying  Lying   Pulse: 98 (!) 124 97 98   Resp:  20  18   Temp:  98.6 °F (37 °C)  98.8 °F (37.1 °C)   TempSrc:  Oral  Oral   SpO2:  95%  95%   Weight:       Height:            DIAGNOSTIC TESTS:   Admission on 01/29/2018   Component Date Value Ref Range Status   • ABO Type 01/29/2018 A   Final   • RH type 01/29/2018 Negative   Final   • Antibody Screen 01/29/2018 Negative   Final   • Protime 01/29/2018 14.1  11.7 - 14.2 Seconds Final   • INR 01/29/2018 1.13* 0.90 - 1.10 Final   • Glucose 01/29/2018 93  70 - 130 mg/dL Final   • Hemoglobin 01/29/2018  10.0* 13.7 - 17.6 g/dL Final   • Hematocrit 01/29/2018 31.9* 40.4 - 52.2 % Final   • Glucose 01/29/2018 137* 70 - 130 mg/dL Final   • Glucose 01/29/2018 108  70 - 130 mg/dL Final   • Glucose 01/29/2018 154* 70 - 130 mg/dL Final   • Hemoglobin A1C 01/30/2018 5.90* 4.80 - 5.60 % Final   • Glucose 01/30/2018 136* 65 - 99 mg/dL Final   • BUN 01/30/2018 13  8 - 23 mg/dL Final   • Creatinine 01/30/2018 0.71* 0.76 - 1.27 mg/dL Final   • Sodium 01/30/2018 138  136 - 145 mmol/L Final   • Potassium 01/30/2018 4.4  3.5 - 5.2 mmol/L Final   • Chloride 01/30/2018 101  98 - 107 mmol/L Final   • CO2 01/30/2018 27.9  22.0 - 29.0 mmol/L Final   • Calcium 01/30/2018 8.4* 8.6 - 10.5 mg/dL Final   • Total Protein 01/30/2018 5.5* 6.0 - 8.5 g/dL Final   • Albumin 01/30/2018 3.00* 3.50 - 5.20 g/dL Final   • ALT (SGPT) 01/30/2018 14  1 - 41 U/L Final   • AST (SGOT) 01/30/2018 22  1 - 40 U/L Final   • Alkaline Phosphatase 01/30/2018 33* 39 - 117 U/L Final   • Total Bilirubin 01/30/2018 0.4  0.1 - 1.2 mg/dL Final   • eGFR Non African Amer 01/30/2018 108  >60 mL/min/1.73 Final   • Globulin 01/30/2018 2.5  gm/dL Final   • A/G Ratio 01/30/2018 1.2  g/dL Final   • BUN/Creatinine Ratio 01/30/2018 18.3  7.0 - 25.0 Final   • Anion Gap 01/30/2018 9.1  mmol/L Final   • WBC 01/30/2018 9.43  4.50 - 10.70 10*3/mm3 Final   • RBC 01/30/2018 3.21* 4.60 - 6.00 10*6/mm3 Final   • Hemoglobin 01/30/2018 10.1* 13.7 - 17.6 g/dL Final   • Hematocrit 01/30/2018 32.5* 40.4 - 52.2 % Final   • MCV 01/30/2018 101.2* 79.8 - 96.2 fL Final   • MCH 01/30/2018 31.5  27.0 - 32.7 pg Final   • MCHC 01/30/2018 31.1* 32.6 - 36.4 g/dL Final   • RDW 01/30/2018 13.8  11.5 - 14.5 % Final   • RDW-SD 01/30/2018 50.9  37.0 - 54.0 fl Final   • MPV 01/30/2018 10.2  6.0 - 12.0 fL Final   • Platelets 01/30/2018 163  140 - 500 10*3/mm3 Final   • Neutrophil % 01/30/2018 79.7* 42.7 - 76.0 % Final   • Lymphocyte % 01/30/2018 10.5* 19.6 - 45.3 % Final   • Monocyte % 01/30/2018 9.0  5.0 - 12.0  % Final   • Eosinophil % 01/30/2018 0.4  0.3 - 6.2 % Final   • Basophil % 01/30/2018 0.2  0.0 - 1.5 % Final   • Immature Grans % 01/30/2018 0.2  0.0 - 0.5 % Final   • Neutrophils, Absolute 01/30/2018 7.51  1.90 - 8.10 10*3/mm3 Final   • Lymphocytes, Absolute 01/30/2018 0.99  0.90 - 4.80 10*3/mm3 Final   • Monocytes, Absolute 01/30/2018 0.85  0.20 - 1.20 10*3/mm3 Final   • Eosinophils, Absolute 01/30/2018 0.04  0.00 - 0.70 10*3/mm3 Final   • Basophils, Absolute 01/30/2018 0.02  0.00 - 0.20 10*3/mm3 Final   • Immature Grans, Absolute 01/30/2018 0.02  0.00 - 0.03 10*3/mm3 Final   • Glucose 01/30/2018 120  70 - 130 mg/dL Final   • Glucose 01/30/2018 177* 70 - 130 mg/dL Final   • Color, UA 01/30/2018 Dark Yellow* Yellow, Straw Final   • Appearance, UA 01/30/2018 Cloudy* Clear Final   • pH, UA 01/30/2018 <=5.0  5.0 - 8.0 Final   • Specific Gravity, UA 01/30/2018 1.017  1.005 - 1.030 Final   • Glucose, UA 01/30/2018 Negative  Negative Final   • Ketones, UA 01/30/2018 Negative  Negative Final   • Bilirubin, UA 01/30/2018 Negative  Negative Final   • Blood, UA 01/30/2018 Small (1+)* Negative Final   • Protein, UA 01/30/2018 Trace* Negative Final   • Leuk Esterase, UA 01/30/2018 Negative  Negative Final   • Nitrite, UA 01/30/2018 Negative  Negative Final   • Urobilinogen, UA 01/30/2018 0.2 E.U./dL  0.2 - 1.0 E.U./dL Final   • Glucose 01/30/2018 131* 70 - 130 mg/dL Final   • RBC, UA 01/30/2018 3-5* None Seen, 0-2 /HPF Final   • WBC, UA 01/30/2018 0-2  None Seen, 0-2 /HPF Final   • Bacteria, UA 01/30/2018 None Seen  None Seen /HPF Final   • Squamous Epithelial Cells, UA 01/30/2018 0-2  None Seen, 0-2 /HPF Final   • Hyaline Casts, UA 01/30/2018 7-12  None Seen /LPF Final   • Methodology 01/30/2018 Automated Microscopy   Final   • Glucose 01/30/2018 138* 70 - 130 mg/dL Final   • Glucose 01/31/2018 147* 65 - 99 mg/dL Final   • BUN 01/31/2018 16  8 - 23 mg/dL Final   • Creatinine 01/31/2018 0.85  0.76 - 1.27 mg/dL Final   • Sodium  01/31/2018 140  136 - 145 mmol/L Final   • Potassium 01/31/2018 4.6  3.5 - 5.2 mmol/L Final   • Chloride 01/31/2018 103  98 - 107 mmol/L Final   • CO2 01/31/2018 25.2  22.0 - 29.0 mmol/L Final   • Calcium 01/31/2018 8.9  8.6 - 10.5 mg/dL Final   • eGFR Non African Amer 01/31/2018 88  >60 mL/min/1.73 Final   • BUN/Creatinine Ratio 01/31/2018 18.8  7.0 - 25.0 Final   • Anion Gap 01/31/2018 11.8  mmol/L Final   • WBC 01/31/2018 12.92* 4.50 - 10.70 10*3/mm3 Final   • RBC 01/31/2018 3.23* 4.60 - 6.00 10*6/mm3 Final   • Hemoglobin 01/31/2018 10.2* 13.7 - 17.6 g/dL Final   • Hematocrit 01/31/2018 33.1* 40.4 - 52.2 % Final   • MCV 01/31/2018 102.5* 79.8 - 96.2 fL Final   • MCH 01/31/2018 31.6  27.0 - 32.7 pg Final   • MCHC 01/31/2018 30.8* 32.6 - 36.4 g/dL Final   • RDW 01/31/2018 14.0  11.5 - 14.5 % Final   • RDW-SD 01/31/2018 52.4  37.0 - 54.0 fl Final   • MPV 01/31/2018 10.1  6.0 - 12.0 fL Final   • Platelets 01/31/2018 172  140 - 500 10*3/mm3 Final   • Glucose 01/31/2018 117  70 - 130 mg/dL Final   • Glucose 01/31/2018 138* 70 - 130 mg/dL Final   • Glucose 01/31/2018 101  70 - 130 mg/dL Final   • Glucose 01/31/2018 123  70 - 130 mg/dL Final   • Glucose 01/31/2018 156* 70 - 130 mg/dL Final   • Glucose 01/31/2018 169* 70 - 130 mg/dL Final   • Glucose 02/01/2018 140* 65 - 99 mg/dL Final   • BUN 02/01/2018 16  8 - 23 mg/dL Final   • Creatinine 02/01/2018 0.68* 0.76 - 1.27 mg/dL Final   • Sodium 02/01/2018 141  136 - 145 mmol/L Final   • Potassium 02/01/2018 4.0  3.5 - 5.2 mmol/L Final   • Chloride 02/01/2018 106  98 - 107 mmol/L Final   • CO2 02/01/2018 25.0  22.0 - 29.0 mmol/L Final   • Calcium 02/01/2018 8.7  8.6 - 10.5 mg/dL Final   • eGFR Non African Amer 02/01/2018 113  >60 mL/min/1.73 Final   • BUN/Creatinine Ratio 02/01/2018 23.5  7.0 - 25.0 Final   • Anion Gap 02/01/2018 10.0  mmol/L Final   • Folate 02/01/2018 16.42  4.78 - 24.20 ng/mL Final   • Vitamin B-12 02/01/2018 259  211 - 946 pg/mL Final   • WBC 02/01/2018  9.05  4.50 - 10.70 10*3/mm3 Final   • RBC 02/01/2018 2.90* 4.60 - 6.00 10*6/mm3 Final   • Hemoglobin 02/01/2018 9.1* 13.7 - 17.6 g/dL Final   • Hematocrit 02/01/2018 29.3* 40.4 - 52.2 % Final   • MCV 02/01/2018 101.0* 79.8 - 96.2 fL Final   • MCH 02/01/2018 31.4  27.0 - 32.7 pg Final   • MCHC 02/01/2018 31.1* 32.6 - 36.4 g/dL Final   • RDW 02/01/2018 13.7  11.5 - 14.5 % Final   • RDW-SD 02/01/2018 51.1  37.0 - 54.0 fl Final   • MPV 02/01/2018 10.4  6.0 - 12.0 fL Final   • Platelets 02/01/2018 142  140 - 500 10*3/mm3 Final   • Neutrophil % 02/01/2018 86.6* 42.7 - 76.0 % Final   • Lymphocyte % 02/01/2018 6.0* 19.6 - 45.3 % Final   • Monocyte % 02/01/2018 6.2  5.0 - 12.0 % Final   • Eosinophil % 02/01/2018 0.9  0.3 - 6.2 % Final   • Basophil % 02/01/2018 0.1  0.0 - 1.5 % Final   • Immature Grans % 02/01/2018 0.2  0.0 - 0.5 % Final   • Neutrophils, Absolute 02/01/2018 7.84  1.90 - 8.10 10*3/mm3 Final   • Lymphocytes, Absolute 02/01/2018 0.54* 0.90 - 4.80 10*3/mm3 Final   • Monocytes, Absolute 02/01/2018 0.56  0.20 - 1.20 10*3/mm3 Final   • Eosinophils, Absolute 02/01/2018 0.08  0.00 - 0.70 10*3/mm3 Final   • Basophils, Absolute 02/01/2018 0.01  0.00 - 0.20 10*3/mm3 Final   • Immature Grans, Absolute 02/01/2018 0.02  0.00 - 0.03 10*3/mm3 Final   • Glucose 02/01/2018 133* 70 - 130 mg/dL Final   • Glucose 02/01/2018 112  70 - 130 mg/dL Final       Xr Spine Lumbar 2 Or 3 View    Result Date: 1/29/2018  Narrative: LUMBAR SPINE OPERATIVE X-RAY  HISTORY: Back pain, fusion revision.  FINDINGS:  5 seconds fluoroscopy and 3 operative images were provided for Dr. Knight. The images provided show pedicle screws at 4 contiguous lower lumbar levels.      Impression: Operative imaging was provided for Dr. Knight during lumbar spine fusion.  This report was finalized on 1/29/2018 4:14 PM by Dr. Kit Awan MD.      Ct Head Without Contrast    Result Date: 1/31/2018  Narrative: CT HEAD WO CONTRAST-  INDICATIONS: Encephalopathy   TECHNIQUE: Radiation dose reduction techniques were utilized, including automated exposure control and exposure modulation based on body size.    Noncontrast head CT  COMPARISON: None available  FINDINGS:       No acute intracranial hemorrhage, midline shift or mass effect. No acute territorial infarct is identified. Basal ganglia mineralization is seen. A dural-based calcified focus at the anterior left temporal fossa, 7 mm on image 20 of series 1 suggests meningioma.  Mild periventricular hypodensities suggest chronic small vessel ischemic change in a patient this age.    Ventricles, cisterns, cerebral sulci are unremarkable for patient age.  Minimal bilateral maxillary sinus mucosal thickening. Mild partial opacification of left mastoid air cells, correlate clinically to exclude possibility of mastoiditis. The visualized paranasal sinuses, mastoid air cells are otherwise unremarkable. Procedural changes at the orbits.          Impression:      No acute intracranial hemorrhage or hydrocephalus. Small dural based calcified focus at the left temporal fossa suggesting meningioma. If there is further clinical concern, MRI could be considered for further evaluation.  This report was finalized on 1/31/2018 8:05 PM by Dr. Alvarez Diaz MD.      Xr Chest 1 View    Result Date: 1/31/2018  Narrative: SINGLE PORTABLE CHEST 14:30  HISTORY: 77-year-old male with leukocytosis postoperatively  COMPARISON: 10/29/2016  FINDINGS: 1. Cannot exclude developing retrocardiac opacification on somewhat limited portable view. 2. Follow-up with lateral imaging would be helpful. 3. Right lung remains clear.  This report was finalized on 1/31/2018 2:47 PM by Dr. Jorge Gaming MD.      Fl C Arm During Surgery    Result Date: 1/29/2018  Narrative: This procedure was auto-finalized with no dictation required.      Hospital Course:  77 y.o. 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 Antibiotic and VTE prophylaxis were per [facility]IP protocols.  The patient was admitted to the floor where IV and/or oral pain medications were administered for postoperative pain.  At discharge the incisional pain was tolerable and preop neurologic function was intact.  The dressing was dry and the wound was clean.  As he had some confusion yesterday we kept him an extra day and he was seen by Dr. Kit Stokes who felt the patient was the considerable condition for discharge.    Condition on Discharge:  Stable    Discharge Instructions: . Patient may weight bear as tolerated unless otherwise specified. Continue DEANN hose daily (for two weeks) and ice regularly. Patient also instructed on incentive spirometer during hospitalization and encouraged to continue to use at home regularly. Patient may shower on POD #3 if and when all wound drainage has stopped.  Where applicable, the brace should be worn when up and about.  It need not be worn to the bathroom and certainly not in the shower.  A detailed list of instructions specific to the operation was given to the patient at the time of discharge.  He will be started back on his Coumadin today and a PT INR should be obtained on Saturday and called to my office at [phone].    Follow up Instructions:  Follow up in the office with Dr. Barron Knight Jr. in 2-3 weeks - patient to call the office at [phone] to schedule. Prescriptions were given for pain medication.    Follow-up Appointments  Future Appointments  Date Time Provider Department Center   2/13/2018 9:10 AM MD BRANDO Wise [facility] None   2/28/2018 3:30 PM NANDINI Montanez [facility] None   6/11/2018 1:00 PM MD BRANDO [facility] None      Additional Instructions for the Follow-ups that You Need to Schedule     Protime-INR    Feb 06, 2018 (Approximate)    Is Patient on anti-coag:  Yes                     Discharge Disposition Plan:today to rehab    Date: 2/1/2018    Barron Knight MD  02/01/18  8:13 AM

## 2018-02-02 VITALS
SYSTOLIC BLOOD PRESSURE: 125 MMHG | HEART RATE: 96 BPM | DIASTOLIC BLOOD PRESSURE: 71 MMHG | WEIGHT: 197.1 LBS | HEIGHT: 75 IN | RESPIRATION RATE: 16 BRPM | OXYGEN SATURATION: 96 % | BODY MASS INDEX: 24.51 KG/M2 | TEMPERATURE: 99.8 F

## 2018-02-02 LAB
ANION GAP SERPL CALCULATED.3IONS-SCNC: 10.6 MMOL/L
BUN BLD-MCNC: 21 MG/DL (ref 8–23)
BUN/CREAT SERPL: 27.6 (ref 7–25)
CALCIUM SPEC-SCNC: 8.5 MG/DL (ref 8.6–10.5)
CHLORIDE SERPL-SCNC: 106 MMOL/L (ref 98–107)
CO2 SERPL-SCNC: 25.4 MMOL/L (ref 22–29)
CREAT BLD-MCNC: 0.76 MG/DL (ref 0.76–1.27)
DEPRECATED RDW RBC AUTO: 49.8 FL (ref 37–54)
ERYTHROCYTE [DISTWIDTH] IN BLOOD BY AUTOMATED COUNT: 13.6 % (ref 11.5–14.5)
GFR SERPL CREATININE-BSD FRML MDRD: 100 ML/MIN/1.73
GLUCOSE BLD-MCNC: 134 MG/DL (ref 65–99)
GLUCOSE BLDC GLUCOMTR-MCNC: 134 MG/DL (ref 70–130)
GLUCOSE BLDC GLUCOMTR-MCNC: 176 MG/DL (ref 70–130)
HCT VFR BLD AUTO: 27.5 % (ref 40.4–52.2)
HGB BLD-MCNC: 8.8 G/DL (ref 13.7–17.6)
INR PPP: 1.21 (ref 0.9–1.1)
MCH RBC QN AUTO: 32.1 PG (ref 27–32.7)
MCHC RBC AUTO-ENTMCNC: 32 G/DL (ref 32.6–36.4)
MCV RBC AUTO: 100.4 FL (ref 79.8–96.2)
PLATELET # BLD AUTO: 159 10*3/MM3 (ref 140–500)
PMV BLD AUTO: 10.1 FL (ref 6–12)
POTASSIUM BLD-SCNC: 3.6 MMOL/L (ref 3.5–5.2)
PROTHROMBIN TIME: 14.9 SECONDS (ref 11.7–14.2)
RBC # BLD AUTO: 2.74 10*6/MM3 (ref 4.6–6)
SODIUM BLD-SCNC: 142 MMOL/L (ref 136–145)
WBC NRBC COR # BLD: 5.98 10*3/MM3 (ref 4.5–10.7)

## 2018-02-02 PROCEDURE — 63710000001 INSULIN ASPART PER 5 UNITS: Performed by: HOSPITALIST

## 2018-02-02 PROCEDURE — 99024 POSTOP FOLLOW-UP VISIT: CPT | Performed by: ORTHOPAEDIC SURGERY

## 2018-02-02 PROCEDURE — 85610 PROTHROMBIN TIME: CPT | Performed by: ORTHOPAEDIC SURGERY

## 2018-02-02 PROCEDURE — 93010 ELECTROCARDIOGRAM REPORT: CPT | Performed by: INTERNAL MEDICINE

## 2018-02-02 PROCEDURE — 99232 SBSQ HOSP IP/OBS MODERATE 35: CPT | Performed by: INTERNAL MEDICINE

## 2018-02-02 PROCEDURE — 25010000002 DIGOXIN PER 500 MCG: Performed by: ORTHOPAEDIC SURGERY

## 2018-02-02 PROCEDURE — 82962 GLUCOSE BLOOD TEST: CPT

## 2018-02-02 PROCEDURE — 85027 COMPLETE CBC AUTOMATED: CPT | Performed by: HOSPITALIST

## 2018-02-02 PROCEDURE — 97110 THERAPEUTIC EXERCISES: CPT

## 2018-02-02 PROCEDURE — 93005 ELECTROCARDIOGRAM TRACING: CPT | Performed by: HOSPITALIST

## 2018-02-02 PROCEDURE — 80048 BASIC METABOLIC PNL TOTAL CA: CPT | Performed by: HOSPITALIST

## 2018-02-02 RX ORDER — DIGOXIN 0.25 MG/ML
0.25 INJECTION INTRAMUSCULAR; INTRAVENOUS ONCE
Status: COMPLETED | OUTPATIENT
Start: 2018-02-02 | End: 2018-02-02

## 2018-02-02 RX ORDER — METOPROLOL TARTRATE 50 MG/1
50 TABLET, FILM COATED ORAL EVERY 12 HOURS SCHEDULED
Status: DISCONTINUED | OUTPATIENT
Start: 2018-02-02 | End: 2018-02-02 | Stop reason: HOSPADM

## 2018-02-02 RX ORDER — WARFARIN SODIUM 10 MG/1
10 TABLET ORAL
Start: 2018-02-02 | End: 2018-06-05 | Stop reason: SDUPTHER

## 2018-02-02 RX ORDER — DOXYCYCLINE HYCLATE 50 MG/1
324 CAPSULE, GELATIN COATED ORAL
Start: 2018-02-02 | End: 2018-01-01 | Stop reason: SDUPTHER

## 2018-02-02 RX ORDER — METOPROLOL TARTRATE 50 MG/1
50 TABLET, FILM COATED ORAL EVERY 12 HOURS SCHEDULED
Start: 2018-02-02 | End: 2018-01-01

## 2018-02-02 RX ORDER — WARFARIN SODIUM 5 MG/1
5 TABLET ORAL
Start: 2018-02-02 | End: 2018-01-01 | Stop reason: SDUPTHER

## 2018-02-02 RX ADMIN — DONEPEZIL HYDROCHLORIDE 5 MG: 5 TABLET, FILM COATED ORAL at 07:18

## 2018-02-02 RX ADMIN — DORZOLAMIDE HYDROCHLORIDE 1 DROP: 20 SOLUTION OPHTHALMIC at 08:56

## 2018-02-02 RX ADMIN — METOPROLOL TARTRATE 25 MG: 25 TABLET ORAL at 08:55

## 2018-02-02 RX ADMIN — PIOGLITAZONE 45 MG: 30 TABLET ORAL at 07:19

## 2018-02-02 RX ADMIN — POLYETHYLENE GLYCOL 3350 17 G: 17 POWDER, FOR SOLUTION ORAL at 08:56

## 2018-02-02 RX ADMIN — TAMSULOSIN HYDROCHLORIDE 0.4 MG: 0.4 CAPSULE ORAL at 07:19

## 2018-02-02 RX ADMIN — BRIMONIDINE TARTRATE 1 DROP: 2 SOLUTION OPHTHALMIC at 08:57

## 2018-02-02 RX ADMIN — DIGOXIN 0.25 MG: 250 INJECTION, SOLUTION INTRAMUSCULAR; INTRAVENOUS at 04:00

## 2018-02-02 RX ADMIN — TIMOLOL MALEATE 1 DROP: 5 SOLUTION OPHTHALMIC at 09:55

## 2018-02-02 RX ADMIN — HYDROCODONE BITARTRATE AND ACETAMINOPHEN 1 TABLET: 5; 325 TABLET ORAL at 02:29

## 2018-02-02 RX ADMIN — ENALAPRIL MALEATE 40 MG: 20 TABLET ORAL at 07:18

## 2018-02-02 RX ADMIN — INSULIN ASPART 2 UNITS: 100 INJECTION, SOLUTION INTRAVENOUS; SUBCUTANEOUS at 12:21

## 2018-02-02 RX ADMIN — HYDROCODONE BITARTRATE AND ACETAMINOPHEN 1 TABLET: 5; 325 TABLET ORAL at 10:04

## 2018-02-02 RX ADMIN — SULFAMETHOXAZOLE AND TRIMETHOPRIM 160 MG: 800; 160 TABLET ORAL at 08:56

## 2018-02-02 RX ADMIN — ATORVASTATIN CALCIUM 10 MG: 10 TABLET, FILM COATED ORAL at 08:55

## 2018-02-02 RX ADMIN — HYDROCODONE BITARTRATE AND ACETAMINOPHEN 1 TABLET: 5; 325 TABLET ORAL at 14:00

## 2018-02-02 RX ADMIN — PANTOPRAZOLE SODIUM 40 MG: 40 TABLET, DELAYED RELEASE ORAL at 08:56

## 2018-02-02 NOTE — PLAN OF CARE
Problem: Patient Care Overview (Adult)  Goal: Plan of Care Review  Outcome: Ongoing (interventions implemented as appropriate)   02/02/18 0407   Coping/Psychosocial Response Interventions   Plan Of Care Reviewed With patient;daughter   Patient Care Overview   Progress no change   Outcome Evaluation   Outcome Summary/Follow up Plan Pt somewhat confused this shift but oriented x3. Had another episode of afib w/ RVR, see new orders. Pt will leave to go to rehab today, if okay w/ [facility].       Problem: Perioperative Period (Adult)  Goal: Signs and Symptoms of Listed Potential Problems Will be Absent or Manageable (Perioperative Period)  Outcome: Ongoing (interventions implemented as appropriate)      Problem: Fall Risk (Adult)  Goal: Identify Related Risk Factors and Signs and Symptoms  Outcome: Ongoing (interventions implemented as appropriate)    Goal: Absence of Falls  Outcome: Ongoing (interventions implemented as appropriate)      Problem: Pain, Acute (Adult)  Goal: Identify Related Risk Factors and Signs and Symptoms  Outcome: Ongoing (interventions implemented as appropriate)    Goal: Acceptable Pain Control/Comfort Level  Outcome: Ongoing (interventions implemented as appropriate)      Problem: Laminectomy/Foraminotomy/Discectomy (Adult)  Goal: Signs and Symptoms of Listed Potential Problems Will be Absent or Manageable (Laminectomy/Foraminotomy/Discectomy)  Outcome: Ongoing (interventions implemented as appropriate)

## 2018-02-02 NOTE — PLAN OF CARE
Problem: Patient Care Overview (Adult)  Goal: Plan of Care Review   02/02/18 5567   Outcome Evaluation   Outcome Summary/Follow up Plan Pt displayed increased ambulation ability this date. He was able to perform bed mobility with little assistance and ambulated with improved gait, without cueing or need for rest breaks. PT bib d/c as he transitions to rehab facility this date.

## 2018-02-02 NOTE — THERAPY DISCHARGE NOTE
Acute Care - Physical Therapy Treatment Note/Discharge  [facility]     Patient Name: Derrek Ritter  : 1940  MRN: [id number]  Today's Date: 2018  Onset of Illness/Injury or Date of Surgery Date: 18  Date of Referral to PT: 18  Referring Physician: Eugene    Admit Date: 2018    Visit Dx:    ICD-10-CM ICD-9-CM   1. Decreased strength R53.1 780.79   2. Spondylolisthesis of lumbar region M43.16 738.4   3. Spinal stenosis of lumbar region with neurogenic claudication M48.062 724.03   4. Paroxysmal atrial fibrillation I48.0 427.31   5. Postoperative anemia due to acute blood loss D62 285.1     Patient Active Problem List   Diagnosis   • Cholelithiasis   • DM (HgbA1c 5.9)   • Diverticulosis of intestine   • Hypercholesterolemia   • Hypertension   • Spinal stenosis   • Microalbuminuria   • Atrial fibrillation with RVR   • Medicare annual wellness visit, subsequent   • Anemia   • Iron deficiency anemia   • DJD (degenerative joint disease)   • Impotence of organic origin   • Glaucoma   • Hiatal hernia   • Mild cognitive disorder   • Calculus of kidney   • Nocturia   • Chronic bilateral low back pain with bilateral sciatica   • Post laminectomy syndrome   • Routine health maintenance   • Weight loss   • Spinal stenosis of lumbar region with neurogenic claudication   • Spondylolisthesis of lumbar region   • Lumbar spine pain   • Metabolic encephalopathy   • Urinary retention with incomplete bladder emptying   • Macrocytosis without anemia   • Sundowning       Physical Therapy Education     Title: PT OT SLP Therapies (Resolved)     Topic: Physical Therapy (Resolved)     Point: Mobility training (Resolved)    Learning Progress Summary    Learner Readiness Method Response Comment Documented by Status   Patient Acceptance E [facility] 18 1633 Done    Acceptance E [facility] 18 0853 Done    Acceptance E [facility] 18 1337 Done    Acceptance E [facility] 18 1031 Done    Acceptance E [facility]  01/30/18 1611 Done    Acceptance E VU   01/30/18 1101 Done   Family Acceptance E VU   01/30/18 1611 Done               Point: Home exercise program (Resolved)    Learning Progress Summary    Learner Readiness Method Response Comment Documented by Status   Patient Acceptance E VU   02/01/18 0853 Done    Acceptance E VU   01/31/18 1337 Done    Acceptance E VU   01/31/18 1031 Done    Acceptance E VU   01/30/18 1101 Done               Point: Body mechanics (Resolved)    Learning Progress Summary    Learner Readiness Method Response Comment Documented by Status   Patient Acceptance E VU   02/01/18 1633 Done    Acceptance E VU   02/01/18 0853 Done    Acceptance E VU   01/31/18 1337 Done    Acceptance E VU   01/31/18 1031 Done    Acceptance E VU   01/30/18 1611 Done   Family Acceptance E VU   01/30/18 1611 Done               Point: Precautions (Resolved)    Learning Progress Summary    Learner Readiness Method Response Comment Documented by Status   Patient Acceptance E VU   02/01/18 1633 Done    Acceptance E [facility] 01/31/18 1337 Done    Acceptance E VU   01/31/18 1031 Done    Acceptance E VU   01/30/18 1611 Done   Family Acceptance E [facility] 01/30/18 1611 Done                      User Key     Initials Effective Dates Name Provider Type Discipline     01/08/18 -  Mark Lovett, PT Student PT Student PT                    IP PT Goals       01/30/18 1102          Bed Mobility PT LTG    Bed Mobility PT LTG, Date Established 01/30/18  -KH (r) BJ (t) KH (c)      Bed Mobility PT LTG, Time to Achieve 1 wk  -KH (r) BJ (t) KH (c)      Bed Mobility PT LTG, Activity Type all bed mobility  -KH (r) BJ (t) KH (c)      Bed Mobility PT LTG, [place] Level conditional independence  -KH (r) BJ (t) KH (c)      Transfer Training PT LTG    Transfer Training PT LTG, Date Established 01/30/18  -KH (r) BJ (t) KH (c)      Transfer Training PT LTG, Time to Achieve 1 wk  -KH (r) BJ (t) KH (c)      Transfer  Training PT LTG, Activity Type all transfers  -KH (r) BJ (t) KH (c)      Transfer Training PT LTG, [place] Level conditional independence  -KH (r) BJ (t) KH (c)      Gait Training PT LTG    Gait Training Goal PT LTG, Date Established 01/30/18  -KH (r) BJ (t) KH (c)      Gait Training Goal PT LTG, Time to Achieve 1 wk  -KH (r) BJ (t) KH (c)      Gait Training Goal PT LTG, [place] Level supervision required  -KH (r) BJ (t) KH (c)      Gait Training Goal PT LTG, Assist Device walker, rolling  -KH (r) BJ (t) KH (c)      Gait Training Goal PT LTG, Distance to Achieve 350  -KH (r) BJ (t) KH (c)      Stair Training PT LTG    Stair Training Goal PT LTG, Date Established 01/30/18  -KH (r) BJ (t) KH (c)      Stair Training Goal PT LTG, Time to Achieve 1 wk  -KH (r) BJ (t) KH (c)      Stair Training Goal PT LTG, Number of Steps 5  -KH (r) BJ (t) KH (c)      Stair Training Goal PT LTG, [place] Level contact guard assist  -KH (r) BJ (t) KH (c)      Patient Education PT LTG    Patient Education PT LTG, Date Established 01/30/18  -KH (r) BJ (t) KH (c)      Patient Education PT LTG, Time to Achieve 1 wk  -KH (r) BJ (t) KH (c)      Patient Education PT LTG, Education Type HEP  -KH (r) BJ (t) KH (c)        User Key  (r) = Recorded By, (t) = Taken By, (c) = Cosigned By    Initials Name Provider Type    ISABELA Judd, PT Physical Therapist    SACHA Lovett, PT Student PT Student              Adult Rehabilitation Note       02/02/18 1250 02/01/18 1600 02/01/18 0844    Rehab Assessment/Intervention    Discipline physical therapist  -SACHA MAR,ISABELA2 physical therapist  -SACHA MAR,ISABELA2 physical therapist  -SACHA MAR,ISABELA2    Document Type therapy note (daily note);discharge summary  -SACHA MAR KH2 therapy note (daily note)  -SACHA MAR,GREER therapy note (daily note)  -SACHA MAR KH2    Subjective Information agree to therapy  -SACHA MAR KH2 agree to therapy  -SACHA MAR KH2 agree to therapy;pain  -SACHA MAR KH2    Patient Effort, Rehab Treatment good   -KH,BJ,KH2 good  -KH,BJ,KH2 good  -KH,BJ,KH2    Precautions/Limitations brace on when up;fall precautions;spinal precautions  -KH,BJ,KH2 brace on when up;fall precautions;spinal precautions  -KH,BJ,KH2 brace on when up;fall precautions;spinal precautions  -KH,BJ,KH2    Patient Response to Treatment Pt tolerated tx  -KH,BJ,KH2 Pt tolerated tx  -KH,BJ,KH2 Pt tolerated tx  -KH,BJ,KH2    Recorded by [KH,BJ,KH2] Katarina Judd, PT (r) Mark Lovett, PT Student (t) Katarina Judd, PT (c) [KH,BJ,KH2] Katarina Judd, PT (r) Mark Lovett, PT Student (t) aKtarina Judd, PT (c) [KH,BJ,KH2] Katarina Judd, PT (r) Mark Lovett PT Student (t) Katarina Judd, PT (c)    Vital Signs    Pretreatment Heart Rate (beats/min)   135  -KH,BJ,KH2    Posttreatment Heart Rate (beats/min)   137  -KH,BJ,KH2    Recorded by   [KH,BJ,KH2] Katarina Judd, PT (r) Mark Lovett PT Student (t) Katarina Judd, PT (c)    Pain Assessment    Pain Assessment Bragg-Barr FACES  -KH,BJ,KH2 Bragg-Baker FACES  -KH,BJ,KH2 Bragg-Baker FACES  -KH,BJ,KH2    Bragg-Barr FACES Pain Rating 2  -KH,BJ,KH2 2  -KH,BJ,KH2 2  -KH,BJ,KH2    Pain Location Back  -KH,BJ,KH2 Back  -KH,BJ,KH2 Back   back through buttocks  -KH,BJ,KH2    Pain Orientation   Right;Left   Lateral  -KH,BJ,KH2    Patient's Stated Pain Goal No pain  -KH,BJ,KH2 No pain  -KH,BJ,KH2 No pain  -KH,BJ,KH2    Pain Intervention(s) Repositioned;Ambulation/increased activity  -KH,BJ,KH2 Repositioned;Ambulation/increased activity  -KH,BJ,KH2 Repositioned;Ambulation/increased activity  -KH,BJ,KH2    Response to Interventions Pt tolerated tx  -KH,BJ,KH2 Pt tolerated tx  -KH,BJ,KH2 Pt tolerated tx  -KH,BJ,KH2    Recorded by [KH,BJ,KH2] Katarina Judd, PT (r) Mark Lovett, PT Student (t) Katarina Judd, PT (c) [KH,BJ,KH2] Katarina Judd, PT (r) Mark Lovett, PT Student (t) Katarina Judd, PT (c)  [KH,BJ,KH2] Katarina Judd, PT (r) Mark Lovett, PT Student (t) Katarina Judd, PT (c)    Cognitive Assessment/Intervention    Orientation Status oriented x 4  -KH,BJ,KH2 oriented x 4  -KH,BJ,KH2 oriented x 4  -KH,BJ,KH2    Follows Commands/Answers Questions 100% of the time  -KH,BJ,KH2 100% of the time  -KH,BJ,KH2 100% of the time  -KH,BJ,KH2    Personal Safety   other (see comments)   Seems more alert today, able to carry on conversation  -KH,BJ,KH2    Personal Safety Interventions fall prevention program maintained;gait belt;muscle strengthening facilitated;nonskid shoes/slippers when out of bed  -KH,BJ,KH2 fall prevention program maintained;gait belt;muscle strengthening facilitated;nonskid shoes/slippers when out of bed;supervised activity  -KH,BJ,KH2 fall prevention program maintained;gait belt;muscle strengthening facilitated;nonskid shoes/slippers when out of bed  -KH,BJ,KH2    Short/Long Term Memory  --  -KH,BJ,KH2     Recorded by [KH,BJ,KH2] Katarina Judd, PT (r) Mark Lovett, PT Student (t) aKtarina Judd, PT (c) [KH,BJ,KH2] Katarina Judd, PT (r) Mark Lovett, PT Student (t) Katarina Judd, PT (c) [KH,BJ,KH2] Katarina Judd, PT (r) Mark Lovett, PT Student (t) Katarina Judd, PT (c)    Bed Mobility, Assessment/Treatment    Bed Mobility, Roll Left, [place] contact guard assist;verbal cues required  -KH,BJ,KH2 not tested  -KH,BJ,KH2 minimum assist (75% patient effort)  -KH,BJ,KH2    Bed Mob, Sidelying to Sit, [place] minimum assist (75% patient effort);verbal cues required  -KH,BJ,KH2 not tested  -KH,BJ,KH2 moderate assist (50% patient effort)  -KH,BJ,KH2    Recorded by [KH,BJ,KH2] Katarina Judd, PT (r) Mark Lovett, PT Student (t) Katarina Judd, PT (c) [,,2] Katarina Judd, PT (r) Mark Lovett, PT Student (t) Katarina Judd, PT (c) [,,2] Katarina Judd,  PT (r) Mark Lovett, PT Student (t) Katarina Judd, PT (c)    Transfer Assessment/Treatment    Transfers, Sit-Stand [place] contact guard assist  -KH,BJ,KH2 contact guard assist  -KH,BJ,KH2 minimum assist (75% patient effort)  -KH,BJ,KH2    Transfers, Stand-Sit [place] contact guard assist  -KH,BJ,KH2 contact guard assist  -KH,BJ,KH2 minimum assist (75% patient effort)  -KH,BJ,KH2    Transfers, Sit-Stand-Sit, Assist Device rolling walker  -KH,BJ,KH2 rolling walker  -KH,BJ,KH2 rolling walker  -KH,BJ,KH2    Recorded by [KH,BJ,KH2] Katarina Judd, PT (r) Mark Lovett, PT Student (t) Katarina Judd, PT (c) [KH,BJ,KH2] Katarina Judd, PT (r) Mark Lovett, PT Student (t) Katarina Judd, PT (c) [KH,BJ,KH2] Katarina Judd, PT (r) Mark Lovett, PT Student (t) Katarina Judd, PT (c)    Gait Assessment/Treatment    Gait, [place] Level contact guard assist  -KH,BJ,KH2 contact guard assist  -KH,BJ,KH2 contact guard assist  -KH,BJ,KH2    Gait, Assistive Device rolling walker  -KH,BJ,KH2 rolling walker  -KH,BJ,KH2 rolling walker  -KH,BJ,KH2    Gait, Distance (Feet) 400  -KH,BJ,KH2 400   One standing rest break  -KH,BJ,KH2 350  -KH,BJ,KH2    Gait, Gait Deviations --   Slight knee bend, but held gait throughout w/o cues  -KH,BJ,KH2 other (see comments)   Some crouched gait this walk (25%)  -KH,BJ,KH2 --   Some knee flexion with fatigue corrected by cueing  -KH,BJ,KH2    Gait, Safety Issues  step length decreased  -KH,BJ,KH2 step length decreased  -KH,BJ,KH2    Gait, Impairments strength decreased;pain  -KH,BJ,KH2 ROM decreased;strength decreased;pain  -KH,BJ,KH2 ROM decreased;strength decreased;pain  -SACHA MAR KH2    Gait, Comment He was able to hold improved gait throughout session, with no or minimal cueing to correct.   -SACAH MAR,ISABELA2  Pt ambulated well this date, with his gait maintained through most of walk. Cueing is able to correct crouched gait.   -KH,BJ,KH2    Recorded by [KH,BJ,KH2] Katarina Judd, PT (r) Mark Lovett, PT Student (t) Katarina Judd, PT (c) [KH,BJ,KH2] Katarina Judd, PT (r) Mark Lovett PT Student (t) Katarina Judd, PT (c) [KH,BJ,KH2] Katarina Judd, PT (r) Mark Lovett PT Student (t) Katarina Judd, PT (c)    Therapy Exercises    Bilateral Lower Extremities   AROM:;10 reps;ankle pumps/circles;LAQ  -KH,BJ,KH2    Recorded by   [KH,BJ,KH2] Katarina Judd, PT (r) Mark Lovett PT Student (t) Katarina Judd, PT (c)    Positioning and Restraints    Pre-Treatment Position in bed  -KH,BJ,KH2 sitting in chair/recliner  -KH,BJ,KH2 in bed  -KH,BJ,KH2    Post Treatment Position chair  -KH,BJ,KH2 bathroom  -KH,BJ,KH2 chair  -KH,BJ,KH2    In Chair notified nsg;sitting;call light within reach;encouraged to call for assist;exit alarm on;with family/caregiver  -KH,BJ,KH2  sitting;notified nsg;call light within reach;encouraged to call for assist;exit alarm on;with family/caregiver  -KH,BJ,KH2    Bathroom  sitting;notified nsg;call light within reach;encouraged to call for assist;with family/caregiver  -KH,BJ,KH2     Recorded by [KH,BJ,KH2] Katarina Judd, PT (r) Mark Lovett, PT Student (t) Katarina Judd, PT (c) [KH,BJ,KH2] Katarina Judd, PT (r) Mark Lovett, PT Student (t) Katarina Judd, PT (c) [KH,BJ,KH2] Katarina Judd, PT (r) Mark Lovett PT Student (t) Katarina Judd, PT (c)      01/31/18 1330 01/31/18 1000 01/30/18 1600    Rehab Assessment/Intervention    Discipline physical therapist  -SACHA MAR KH2 physical therapist  -SACHA MAR KH2 physical therapist  -SACHA MAR KH2    Document Type therapy note (daily note)  -SACHA MAR KH2 therapy note (daily note)  -SACHA MAR KH2 therapy note (daily note)  -SACHA MAR KH2    Subjective Information agree to therapy;complains of;weakness  -SACHA MAR KH2 agree to therapy;complains of;fatigue   -KH,BJ,KH2 agree to therapy;complains of;fatigue  -KH,BJ,KH2    Patient Effort, Rehab Treatment good  -KH,BJ,KH2 good  -KH,BJ,KH2 good  -KH,BJ,KH2    Precautions/Limitations brace on when up;fall precautions;spinal precautions  -KH,BJ,KH2 brace on when up;fall precautions;spinal precautions  -KH,BJ,KH2 brace on when up;fall precautions;spinal precautions  -KH,BJ,KH2    Patient Response to Treatment  Pt tolerated tx  -KH,BJ,KH2 Pt tolerated tx  -KH,BJ,KH2 Pt tolerated tx  -KH,BJ,KH2    Recorded by [KH,BJ,KH2] Katarina Judd, PT (r) Mark Lovett, PT Student (t) Katarina Judd, PT (c) [KH,BJ,KH2] Katarina Judd, PT (r) Mark Lovett PT Student (t) Katarina Judd, PT (c) [KH,BJ,KH2] Katarina Judd, PT (r) Mark Lovett PT Student (t) Katarina Judd, PT (c)    Vital Signs    Pretreatment Heart Rate (beats/min)  92  -KH,BJ,KH2     Posttreatment Heart Rate (beats/min)  98  -KH,BJ,KH2     Recorded by  [KH,BJ,KH2] Katarina Judd, PT (r) Mark Lovett PT Student (t) Katarina Judd, PT (c)     Pain Assessment    Pain Assessment Bragg-Barr FACES  -KH,BJ,KH2 Bragg-Baker FACES  -KH,BJ,KH2 Bragg-Baker FACES  -KH,BJ,KH2    Bragg-Barr FACES Pain Rating 2  -KH,BJ,KH2 4  -KH,BJ,KH2 4  -KH,BJ,KH2    Pain Type Surgical pain  -KH,BJ,KH2 Surgical pain  -KH,BJ,KH2 Surgical pain  -KH,BJ,KH2    Pain Location Back  -KH,BJ,KH2 Back  -KH,BJ,KH2 Back  -KH,BJ,KH2    Patient's Stated Pain Goal No pain  -KH,BJ,KH2 No pain  -KH,BJ,KH2 No pain  -KH,BJ,KH2    Pain Intervention(s) Repositioned;Ambulation/increased activity  -KH,BJ,KH2 Repositioned;Ambulation/increased activity  -KH,BJ,KH2 Repositioned;Ambulation/increased activity  -KH,BJ,KH2    Response to Interventions Pt tolerated tx  -KH,BJ,KH2 Pt tolerated tx  -KH,BJ,KH2 Pt tolerated tx  -KH,BJ,KH2    Recorded by [KH,BJ,KH2] Katarina Judd, PT (r) Mark Lovett, PT Student (t) Katarina Judd, PT (c)  [KH,BJ,KH2] Katarina Judd, PT (r) Mark Lovett PT Student (t) Katarina Judd, PT (c) [KH,BJ,KH2] Katarina Judd, PT (r) Mark Lovett PT Student (t) Katarina Judd, PT (c)    Cognitive Assessment/Intervention    Orientation Status  oriented x 4  -ISABELA,BJ,KH2     Follows Commands/Answers Questions  100% of the time  -ISABELA,BJ,KH2     Personal Safety  other (see comments)   When in bed, falls asleep often and zones out while up  -SACHA MAR KH2 other (see comments)   he was less impulsive but more drowsy this visit.  -ISABELA,BJ,KH2    Personal Safety Interventions  fall prevention program maintained;gait belt;muscle strengthening facilitated;nonskid shoes/slippers when out of bed  -KH,BJ,KH2     Recorded by  [ISABELA,BJ,KH2] Katarina Judd, PT (r) Mark Lovett PT Student (t) Katarina Judd, PT (c) [ISABELA,BJ,KH2] Katarina Judd, PT (r) Mark Lovett PT Student (t) Katarina Judd, PT (c)    Bed Mobility, Assessment/Treatment    Bed Mobility, Roll Left, [place] minimum assist (75% patient effort)  -KH,BJ,KH2      Bed Mobility, Roll Right, [place]  minimum assist (75% patient effort)  -KH,BJ,KH2 minimum assist (75% patient effort)  -KH,BJ,KH2    Bed Mob, Sidelying to Sit, [place]  moderate assist (50% patient effort)  -KH,BJ,KH2 moderate assist (50% patient effort)  -KH,BJ,KH2    Bed Mob, Sit to Sidelying, [place] moderate assist (50% patient effort)  -KH,BJ,KH2      Bed Mobility, Comment  Reinforced log rolling  -KH,BJ,KH2     Recorded by [KH,BJ,KH2] Katarian Judd, PT (r) Mark Lovett PT Student (t) Katarina Judd, PT (c) [KH,BJ,KH2] Katarina Judd, PT (r) Mark Lovett, PT Student (t) Katarina Judd, PT (c) [KH,BJ,KH2] Katarina Judd, PT (r) Mark Lovett, PT Student (t) Katarina Judd, PT (c)    Transfer Assessment/Treatment    Transfers, Sit-Stand [place] minimum assist (75%  patient effort)  -KH,BJ,KH2 minimum assist (75% patient effort);2 person assist required  -KH,BJ,KH2 minimum assist (75% patient effort);2 person assist required  -KH,BJ,KH2    Transfers, Stand-Sit [place] minimum assist (75% patient effort)  -KH,BJ,KH2 minimum assist (75% patient effort);2 person assist required  -KH,BJ,KH2 minimum assist (75% patient effort);2 person assist required  -KH,BJ,KH2    Transfers, Sit-Stand-Sit, Assist Device rolling walker  -KH,BJ,KH2 rolling walker  -KH,BJ,KH2 rolling walker  -KH,BJ,KH2    Recorded by [KH,BJ,KH2] Katarina Judd, PT (r) Mark Lovett, PT Student (t) Katarina Judd, PT (c) [KH,BJ,KH2] Katarina Judd, PT (r) Mark Lovett, PT Student (t) Katarina Judd, PT (c) [KH,BJ,KH2] Katarnia Judd, PT (r) Mark Lovett, PT Student (t) Katarina Judd, PT (c)    Gait Assessment/Treatment    Gait, [place] Level contact guard assist  -KH,BJ,KH2 contact guard assist;1 person + 1 person to manage equipment  -KH,BJ,KH2 contact guard assist  -KH,BJ,KH2    Gait, Assistive Device rolling walker  -KH,BJ,KH2 rolling walker  -KH,BJ,KH2 rolling walker  -KH,BJ,KH2    Gait, Distance (Feet) 250  -KH,BJ,KH2 175   10 w/o RW  -KH,BJ,KH2 95  -KH,BJ,KH2    Gait, Gait Deviations bilateral:;decreased heel strike;step length decreased;stride length decreased;toe-to-floor clearance decreased   Knees start to flex more with fatigue  -KH,BJ,KH2 bilateral:;jody decreased;decreased heel strike;step length decreased;stride length decreased;toe-to-floor clearance decreased   Gait much improved, though pattern worse w/o RW  -KH,BJ,KH2 bilateral:;jody decreased;decreased heel strike;step length decreased;stride length decreased;toe-to-floor clearance decreased   R still worse than left.  -KH,BJ,KH2    Gait, Safety Issues step length decreased  -KH,BJ,KH2 step length decreased  -KH,BJ,KH2 step length decreased  -KH,BJ,KH2    Gait, Impairments  ROM decreased;strength decreased;pain  -KH,BJ,KH2 ROM decreased;strength decreased;pain  -KH,BJ,KH2 ROM decreased;strength decreased;pain  -KH,BJ,KH2    Gait, Comment Pt needed less cueing for gait, but increased as he fatigued to maintain improve gait pattern   -KH,BJ,KH2 Gait much improved this date, able to walk with greater heel contact and toe clearance, while having longer steps. Attempted to walk 10 ft w/o RW and gait returned to prior gait.  -KH,BJ,KH2 Cued to take bigger steps, which helped with his heel strike and step length, but needed constant cueing and it became less effective as he fatigued.   -KH,BJ,KH2    Recorded by [KH,BJ,KH2] Katarina Judd, PT (r) Mark Lovett, PT Student (t) Katarina Judd, PT (c) [KH,BJ,KH2] Katarina Judd, PT (r) Mark Lovett PT Student (t) Katarina Judd, PT (c) [KH,BJ,KH2] Katarina Judd, PT (r) Mark Lovett PT Student (t) Katarina Judd, PT (c)    Therapy Exercises    Bilateral Lower Extremities AROM:;10 reps;LAQ;ankle pumps/circles;hip flexion;heel raises  -KH,BJ,KH2 AROM:;10 reps;LAQ;ankle pumps/circles;hip flexion;heel raises  -KH,BJ,KH2     Recorded by [KH,BJ,KH2] Katarina Judd, PT (r) Mark Lovett PT Student (t) Katarina Judd, PT (c) [KH,BJ,KH2] Katarina Judd, PT (r) Mark Lovett PT Student (t) Katarina Judd, PT (c)     Positioning and Restraints    Pre-Treatment Position sitting in chair/recliner  -KH,BJ,KH2 in bed  -KH,BJ,KH2 in bed  -KH,BJ,KH2    Post Treatment Position bed  -KH,BJ,KH2 chair  -KH,BJ,KH2 chair  -KH,BJ,KH2    In Bed supine;call light within reach;encouraged to call for assist;exit alarm on;with family/caregiver;with nsg  -SACHA MAR KH2      In Chair  sitting;call light within reach;encouraged to call for assist;exit alarm on;with family/caregiver  -SACHA MAR KH2 sitting;call light within reach;encouraged to call for assist;with family/caregiver;exit  alarm on  -KH,BJ,KH2    Recorded by [KH,BJ,KH2] Katarina Judd, PT (r) Mark Lovett, PT Student (t) Katarina Judd, PT (c) [KH,BJ,KH2] Katarina Judd, PT (r) Mark Lovett, PT Student (t) Katarina Judd, PT (c) [KH,BJ,KH2] Katarina Judd, PT (r) Mark Lovett, PT Student (t) Katarina Judd, PT (c)      User Key  (r) = Recorded By, (t) = Taken By, (c) = Cosigned By    Initials Name Effective Dates    ISABELA Judd, PT 05/18/15 -     SACHA Lovett, PT Student 01/08/18 -           PT Recommendation and Plan  Anticipated Discharge Disposition: skilled nursing facility  Planned Therapy Interventions: balance training, bed mobility training, gait training, home exercise program, strengthening  PT Frequency: daily  Plan of Care Review  Outcome Summary/Follow up Plan: Pt displayed increased ambulation ability this date. He was able to perform bed mobility with little assistance and ambulated with improved gait, without cueing or need for rest breaks. PT bib d/c as he transitions to rehab facility this date.           Outcome Measures       02/02/18 1200 02/01/18 1600 02/01/18 0800    How much help from another person do you currently need...    Turning from your back to your side while in flat bed without using bedrails? 3  -KH (r) BJ (t) KH (c) 3  -KH (r) BJ (t) KH (c) 3  -KH (r) BJ (t) KH (c)    Moving from lying on back to sitting on the side of a flat bed without bedrails? 3  -KH (r) BJ (t) KH (c) 3  -KH (r) BJ (t) KH (c) 3  -KH (r) BJ (t) KH (c)    Moving to and from a bed to a chair (including a wheelchair)? 3  -KH (r) BJ (t) KH (c) 3  -KH (r) BJ (t) KH (c) 3  -KH (r) BJ (t) KH (c)    Standing up from a chair using your arms (e.g., wheelchair, bedside chair)? 3  -KH (r) BJ (t) KH (c) 3  -KH (r) BJ (t) KH (c) 3  -KH (r) BJ (t) KH (c)    Climbing 3-5 steps with a railing? 2  -KH (r) BJ (t) KH (c) 2  -KH (r) BJ (t) KH (c) 2  -KH (r) BJ (t) KH (c)     To walk in hospital room? 3  -KH (r) BJ (t) KH (c) 3  -KH (r) BJ (t) KH (c) 3  -KH (r) BJ (t) KH (c)    AM-PAC 6 Clicks Score 17  -KH (r) BJ (t) 17  -KH (r) BJ (t) 17  -KH (r) BJ (t)    Functional Assessment    Outcome Measure Options AM-PAC 6 Clicks Basic Mobility (PT)  -KH (r) BJ (t) KH (c) AM-PAC 6 Clicks Basic Mobility (PT)  -KH (r) BJ (t) KH (c) AM-PAC 6 Clicks Basic Mobility (PT)  -KH (r) BJ (t) KH (c)      01/31/18 1300 01/31/18 1000 01/30/18 1600    How much help from another person do you currently need...    Turning from your back to your side while in flat bed without using bedrails? 3  -KH (r) BJ (t) KH (c) 3  -KH (r) BJ (t) KH (c) 3  -KH (r) BJ (t) KH (c)    Moving from lying on back to sitting on the side of a flat bed without bedrails? 3  -KH (r) BJ (t) KH (c) 3  -KH (r) BJ (t) KH (c) 3  -KH (r) BJ (t) KH (c)    Moving to and from a bed to a chair (including a wheelchair)? 3  -KH (r) BJ (t) KH (c) 3  -KH (r) BJ (t) KH (c) 3  -KH (r) BJ (t) KH (c)    Standing up from a chair using your arms (e.g., wheelchair, bedside chair)? 3  -KH (r) BJ (t) KH (c) 3  -KH (r) BJ (t) KH (c) 3  -KH (r) BJ (t) KH (c)    Climbing 3-5 steps with a railing? 2  -KH (r) BJ (t) KH (c) 2  -KH (r) BJ (t) KH (c) 2  -KH (r) BJ (t) KH (c)    To walk in hospital room? 3  -KH (r) BJ (t) KH (c) 3  -KH (r) BJ (t) KH (c) 3  -KH (r) BJ (t) KH (c)    AM-PAC 6 Clicks Score 17  -KH (r) BJ (t) 17  -KH (r) BJ (t) 17  -KH (r) BJ (t)    Functional Assessment    Outcome Measure Options AM-PAC 6 Clicks Basic Mobility (PT)  -KH (r) BJ (t) KH (c)  AM-PAC 6 Clicks Basic Mobility (PT)  -KH (r) BJ (t) KH (c)      User Key  (r) = Recorded By, (t) = Taken By, (c) = Cosigned By    Initials Name Provider Type     Katarina Judd, PT Physical Therapist    BJ Mark Lovett, PT Student PT Student           Time Calculation:         PT Charges       02/02/18 1257          Time Calculation    Start Time 2436  -KH (r) BJ (t) KH (c)      Stop Time  1150  -ISABELA (r) SACHA (t) ISABELA (c)      Time Calculation (min) 14 min  -ISABELA (r) SACHA (t)      PT Received On 02/02/18  -ISABELA (r) SACHA (t) ISABELA (c)        User Key  (r) = Recorded By, (t) = Taken By, (c) = Cosigned By    Initials Name Provider Type    ISABELA Judd, PT Physical Therapist    SACHA Lovett, PT Student PT Student          Therapy Charges for Today     Code Description Service Date Service Provider Modifiers Qty    27811424765 HC PT THER PROC EA 15 MIN 2/1/2018 Mark Lovett, PT Student GP 1    57824857723 HC PT THER PROC EA 15 MIN 2/1/2018 Mark Lovett, PT Student GP 1    41179317974 HC PT THER PROC EA 15 MIN 2/2/2018 Mark Lovett, PT Student GP 1          PT G-Codes  Outcome Measure Options: AM-PAC 6 Clicks Basic Mobility (PT)    PT Discharge Summary  Anticipated Discharge Disposition: home with assist    Mark Lovett PT Student  2/2/2018

## 2018-02-02 NOTE — PROGRESS NOTES
Case Management Discharge Note    Final Note: skilled bed at [facility]; Met w/ patient and spouse in room.  Discussed ambulance transport and advised  spouse that patient may not meet criteria for ambulance/ not medically necessary.  They are waiting for PT to see today and if can transfer well she will agree to transport in her car.     Discharge Placement     Facility/Agency Request Status Selected? Address Phone Number Fax Number    [facility] Accepted    Yes [address] [address] [phone]        Other: Other (spouse- pvt car); patient did well w/ PT and able to transfer and travel via car.      Discharge Codes: 03  Discharged/transferred to skilled nursing facility (SNF) with Medicare certification in anticipation of skilled care

## 2018-02-02 NOTE — PROGRESS NOTES
[facility]    Physicians Statement of Medical Necessity for Ambulance Transportation    It is medically necessary for:    Patient Name: Derrek Ritter    Insurance Information:      To be transported by ambulance:    From (if nursing facility, specify level of care: skilled, skilled nursing, etc): [facility]    To (specify level of care if nursing facility): [facility]    Date of Service: 2/2/18    For dialysis patients state date dialysis began: n/a    Diagnosis:    Spondylolisthesis of lumbar region, Spinal stenosis of lumbar region with neurogenic claudication         Past Medical/Surgical History:  Past Medical History:   Diagnosis Date   • Acute suppurative otitis media    • Acute upper respiratory infection    • Atrial fibrillation    • Cholelithiasis    • Diabetes mellitus     TYPE 2   • Diverticulosis    • Dry eyes    • Earache    • Edema    • Encounter for screening for malignant neoplasm of prostate    • Esophagitis, reflux    • Glaucoma    • H/O echocardiogram 09/18/2013   • Health care maintenance    • Hiatal hernia    • History of EKG 10/06/2015   • History of Holter monitoring 09/16/2013   • Hyperlipidemia    • Hypertension    • Lumbar canal stenosis    • Microalbuminuria    • Nephrolithiasis    • Osteoarthritis of hand     of thumb   • PAF (paroxysmal atrial fibrillation)    • Postoperative examination    • Pre-operative cardiovascular examination    • Spinal stenosis       Past Surgical History:   Procedure Laterality Date   • AMPUTATION DIGIT Left 10/29/2016    Procedure: TENDON AND NERVE REPAIR OF  LEFT THUMB;  Surgeon: Zak Hanson MD;  Location: [facility];  Service:    • ARTHRODESIS  10/28/2013    Spinal / Description: Repair spinal stenosis   • CATARACT EXTRACTION     • EYE SURGERY      EYE MUSCLE SX   • HERNIA REPAIR     • LUMBAR DISCECTOMY FUSION INSTRUMENTATION N/A 1/29/2018    Procedure: L2 to L5 fusion with instrumentation and removal of implants L4 5;  Surgeon: Barron NARAYANAN  MD Eugene;  Location: [facility] OR;  Service:    • LUMBAR FUSION      Lumbar vertebral fusion   • LUMBAR LAMINECTOMY      10.28.13  bilat L4/5 lami with Nyla and Belen   • LUMBAR LAMINECTOMY DISCECTOMY DECOMPRESSION N/A 1/29/2018    Procedure: L2 to L4 laminectomy with a fusion by orthopedics;  Surgeon: Jeison Redmond MD;  Location: [facility] OR;  Service:    • OTHER SURGICAL HISTORY      Lithotomy   • TONSILLECTOMY          Current Objective Medical Evidence(including physical exam finding to support reason for limitations):    Back brace    Other: brace on when up;fall precautions;spinal precautions, contact guard assist      Physician Signature:           (RN,NP,PA,CAN, Discharge Planner) Date/Time: 2/2/18 @ 1022     Printed Name:    ___Darrion Jennings RN/GATO__________________________    [facility]   Phone: [phone] Phone: [phone] Phone: [phone]   Fax: [phone] Fax: [phone] Fax: [phone]

## 2018-02-02 NOTE — PROGRESS NOTES
Hospital Follow Up    LOS:  LOS: 4 days   Patient Name: Derrek Ritter  Age/Sex: 77 y.o. male  : 1940  MRN: [id number]    Date of Hospital Visit: 18  Length of Stay: 4  Encounter Provider: Ezio Luis MD  Place of Service: [facility]    Subjective:     Follow Up for: Re-consulted for atrial fib with rvr     Interval History: patient has increased hr (130-140) this am and received one dose of Digoxin 0.25 mg IV.  Currently in the 120's with a BP of 151/81. On Lopressor 25 q 12 and on coumadin.     Objective:     Objective:  Temp:  [99 °F (37.2 °C)-99.9 °F (37.7 °C)] 99.8 °F (37.7 °C)  Heart Rate:  [] 141  Resp:  [18] 18  BP: (111-151)/(61-86) 151/81  Body mass index is 24.64 kg/(m^2).    Intake/Output Summary (Last 24 hours) at 18 1024  Last data filed at 18 0900   Gross per 24 hour   Intake                0 ml   Output              275 ml   Net             -275 ml     Last 3 weights    18  0601   Weight: 89.4 kg (197 lb 1.6 oz)     Weight change:     Physical Exam:   General Appearance: Alert, cooperative, in no acute distress. AAOx4.   HEENT: Normocephalic.  Neck: Supple. No JVD. No Carotid bruit. No thyromegaly  Lungs: CTAB. Normal respiratory effort and rate.  Heart:: Regular rate and rhythm, normal S1 and S2, no murmurs, gallops or rubs.  Abdomen: Soft, nontender, non-distended. positive bowel sounds  Extremities: Warm, no cyanosis, or clubbing. No edema.     Lab Review:     Results from last 7 days  Lab Units 18  0610 18  0539 18  0358   SODIUM mmol/L 142 141 140   POTASSIUM mmol/L 3.6 4.0 4.6   CHLORIDE mmol/L 106 106 103   CO2 mmol/L 25.4 25.0 25.2   BUN mg/dL 21 16 16   CREATININE mg/dL 0.76 0.68* 0.85   GLUCOSE mg/dL 134* 140* 147*   CALCIUM mg/dL 8.5* 8.7 8.9             Results from last 7 days  Lab Units 18  0610 18  0539   WBC 10*3/mm3 5.98 9.05   HEMOGLOBIN g/dL 8.8* 9.1*    HEMATOCRIT % 27.5* 29.3*   PLATELETS 10*3/mm3 159 142       Results from last 7 days  Lab Units 02/02/18  0610 02/01/18  1629   INR  1.21* 1.20*                         I reviewed the patient's new clinical results.          I personally viewed and interpreted the patient's EKG/Telemetry data.  Current Medications:   Scheduled Meds:    atorvastatin 10 mg Oral Daily   brimonidine 1 drop Both Eyes BID   donepezil 5 mg Oral QAM   dorzolamide 1 drop Both Eyes BID   enalapril 40 mg Oral QAM   insulin aspart 0-9 Units Subcutaneous 4x Daily With Meals & Nightly   latanoprost 1 drop Both Eyes Nightly   metoprolol tartrate 25 mg Oral Q12H   pantoprazole 40 mg Oral QAM   pioglitazone 45 mg Oral QAM   polyethylene glycol 17 g Oral Daily   sennosides-docusate sodium 1 tablet Oral Nightly   sulfamethoxazole-trimethoprim 1 tablet Oral Q12H   tamsulosin 0.4 mg Oral QAM   timolol 1 drop Both Eyes BID   warfarin 10 mg Oral Once per day on Mon Tue Wed Thu Fri   [START ON 2/3/2018] warfarin 5 mg Oral Once per day on Sun Sat   ziprasidone 20 mg Oral Daily Before Supper     Continuous Infusions:    sodium chloride 0.45 % with KCl 20 mEq 100 mL/hr Last Rate: 100 mL/hr (01/30/18 8722)       Allergies:  No Known Allergies    Assessment & Plan     Principal Problem:    Spinal stenosis of lumbar region with neurogenic claudication  Active Problems:    DM (HgbA1c 5.9)    Hypercholesterolemia    Hypertension    Atrial fibrillation with RVR    Iron deficiency anemia    Spondylolisthesis of lumbar region    Lumbar spine pain    Metabolic encephalopathy    Urinary retention with incomplete bladder emptying    Macrocytosis without anemia    Sundowning          Plan: will increase Metoprolol      Ezio Luis MD  02/02/18

## 2018-02-02 NOTE — DISCHARGE SUMMARY
Orthopedic Discharge Summary      Patient: Derrek Ritter  YOB: 1940  Medical Record Number: [id number]    Attending Physician: Barron Knight MD  Consulting Physician(s):   Consults     Date and Time Order Name Status Description    1/31/2018 1819 Inpatient Consult to Neurology      1/30/2018 1912 Inpatient Consult to Cardiology      1/29/2018 1315 Inpatient Consult to Hospitalist Completed           Date of Admission: 1/29/2018  5:35 AM     Date of Discharge:2/2/2018    Discharge Diagnosis: L2-3, L3-4 laminectomy and fusion with instrumentation and removal of implants L4 5,   Acute Blood Loss Anemia      Presenting Problem/History of Present Illness: Spinal stenosis of lumbar region with neurogenic claudication [M48.062]  Spondylolisthesis of lumbar region [M43.16]  Lumbar spine pain [M54.5]        Allergies: No Known Allergies    Discharge Medications   Derrek Ritter   Home Medication Instructions JAE:[id number]    Printed on:02/02/18 1100   Medication Information                      ACCU-CHEK FASTCLIX LANCETS misc  USE TO TEST TWICE DAILY             Blood Glucose Monitoring Suppl (ACCU-CHEK FRANCISCO SMARTVIEW) W/DEVICE kit  USE TO TEST BLOOD SUGAR TWICE DAILY             Blood Glucose Monitoring Suppl (FREESTYLE LITE) device  Use to test blood once daily             brimonidine (ALPHAGAN) 0.2 % ophthalmic solution  Administer 1 drop to both eyes 2 (Two) Times a Day.             donepezil (ARICEPT) 5 MG tablet  Take 5 mg by mouth Every Morning.             dorzolamide (TRUSOPT) 2 % ophthalmic solution  Administer 1 drop to both eyes 2 (two) times a day.             enalapril (VASOTEC) 20 MG tablet  Take 40 mg by mouth Every Morning.             esomeprazole (nexIUM) 40 MG capsule  Take 40 mg by mouth Every Morning Before Breakfast.             ferrous gluconate (FERGON) 324 MG tablet  Take 1 tablet by mouth Daily With Breakfast.             furosemide (LASIX) 20 MG tablet  Take 1 tablet by  mouth Daily. as needed             glimepiride (AMARYL) 2 MG tablet  Take 2 mg by mouth Every Morning Before Breakfast.             glucose blood (FREESTYLE LITE) test strip  Use to test blood once daily             Glucose Blood disk  Inject 1 Device as directed daily. TEST BLOOD SUGAR ONCE DAILY AS DIRECTED             glucose blood test strip  USE TO TEST TWICE DAILY             HYDROcodone-acetaminophen (NORCO) 5-325 MG per tablet  Take 1 tablet by mouth Every 4 (Four) Hours As Needed for Moderate Pain  for up to 10 days.             Lancets (FREESTYLE) lancets  Use to test blood sugar daily             latanoprost (XALATAN) 0.005 % ophthalmic solution  Administer 1 drop to both eyes Every Night.             metFORMIN (GLUCOPHAGE) 1000 MG tablet  Take 1,000 mg by mouth 2 (Two) Times a Day.             metFORMIN (GLUCOPHAGE) 1000 MG tablet  Take 500 mg by mouth Daily. At noon             metoprolol tartrate (LOPRESSOR) 50 MG tablet  Take 1 tablet by mouth Every 12 (Twelve) Hours.             ONE TOUCH ULTRA TEST test strip  USE ONCE DAILY AS DIRECTED             pioglitazone (ACTOS) 45 MG tablet  Take 45 mg by mouth Every Morning.             sennosides-docusate sodium (SENOKOT-S) 8.6-50 MG tablet  Take 1 tablet by mouth Every Night.             simvastatin (ZOCOR) 20 MG tablet  Take 20 mg by mouth Every Morning.             sulfamethoxazole-trimethoprim (BACTRIM DS) 800-160 MG per tablet  Take 1 tablet by mouth 2 (Two) Times a Day.             sulfamethoxazole-trimethoprim (BACTRIM DS,SEPTRA DS) 800-160 MG per tablet  Take 1 tablet by mouth Every 12 (Twelve) Hours for 9 doses. Indications: Preventative Medication Therapy Used Around Surgery             tamsulosin (FLOMAX) 0.4 MG capsule 24 hr capsule  Take 0.4 mg by mouth Every Morning. 1-2 TABS AS NEEDED              timolol (TIMOPTIC) 0.5 % ophthalmic solution  Administer 1 drop to both eyes 2 (Two) Times a Day.             warfarin (COUMADIN) 10 MG  tablet  Take 1 tablet by mouth Daily.             warfarin (COUMADIN) 5 MG tablet  Take 1 tablet by mouth Daily.             ziprasidone (GEODON) 20 MG capsule  Take 1 capsule by mouth Daily Before Supper.                   Past Medical History:   Diagnosis Date   • Acute suppurative otitis media    • Acute upper respiratory infection    • Atrial fibrillation    • Cholelithiasis    • Diabetes mellitus     TYPE 2   • Diverticulosis    • Dry eyes    • Earache    • Edema    • Encounter for screening for malignant neoplasm of prostate    • Esophagitis, reflux    • Glaucoma    • H/O echocardiogram 09/18/2013   • Health care maintenance    • Hiatal hernia    • History of EKG 10/06/2015   • History of Holter monitoring 09/16/2013   • Hyperlipidemia    • Hypertension    • Lumbar canal stenosis    • Microalbuminuria    • Nephrolithiasis    • Osteoarthritis of hand     of thumb   • PAF (paroxysmal atrial fibrillation)    • Postoperative examination    • Pre-operative cardiovascular examination    • Spinal stenosis         Past Surgical History:   Procedure Laterality Date   • AMPUTATION DIGIT Left 10/29/2016    Procedure: TENDON AND NERVE REPAIR OF  LEFT THUMB;  Surgeon: Zak Hanson MD;  Location: [facility];  Service:    • ARTHRODESIS  10/28/2013    Spinal / Description: Repair spinal stenosis   • CATARACT EXTRACTION     • EYE SURGERY      EYE MUSCLE SX   • HERNIA REPAIR     • LUMBAR DISCECTOMY FUSION INSTRUMENTATION N/A 1/29/2018    Procedure: L2 to L5 fusion with instrumentation and removal of implants L4 5;  Surgeon: Barron Knight MD;  Location: [facility] OR;  Service:    • LUMBAR FUSION      Lumbar vertebral fusion   • LUMBAR LAMINECTOMY      10.28.13  bilat L4/5 lami with Nyla and Belen   • LUMBAR LAMINECTOMY DISCECTOMY DECOMPRESSION N/A 1/29/2018    Procedure: L2 to L4 laminectomy with a fusion by orthopedics;  Surgeon: Jeison Redmond MD;  Location: [facility];  Service:    • OTHER SURGICAL  HISTORY      Lithotomy   • TONSILLECTOMY          Social History     Occupational History   • Teacher RETIRED Retired     Social History Main Topics   • Smoking status: Never Smoker   • Smokeless tobacco: Never Used      Comment: caffeine use   • Alcohol use No   • Drug use: No   • Sexual activity: Not on file    Social History     Social History Narrative        Family History   Problem Relation Age of Onset   • Heart attack Mother      acute myocardial infarction   • Stroke Mother      hemorrhagic   • Emphysema Father    • Diabetes Other      mellitus   • Hypertension Other    • Heart disease Other    • Malig Hyperthermia Neg Hx          Physical Exam: 77 y.o. male  General Appearance:    Alert, cooperative, in no acute distress                    Vitals:    02/02/18 0619 02/02/18 0700 02/02/18 0855 02/02/18 1043   BP: 120/86 151/81 151/81 125/71   BP Location: Right arm Right arm  Right arm   Patient Position: Lying Lying  Lying   Pulse: 115  (!) 141 96   Resp: 18   16   Temp: 99.8 °F (37.7 °C)      TempSrc: Oral      SpO2: 95% 96%  96%   Weight:       Height:            DIAGNOSTIC TESTS:   Admission on 01/29/2018   Component Date Value Ref Range Status   • ABO Type 01/29/2018 A   Final   • RH type 01/29/2018 Negative   Final   • Antibody Screen 01/29/2018 Negative   Final   • Protime 01/29/2018 14.1  11.7 - 14.2 Seconds Final   • INR 01/29/2018 1.13* 0.90 - 1.10 Final   • Glucose 01/29/2018 93  70 - 130 mg/dL Final   • Hemoglobin 01/29/2018 10.0* 13.7 - 17.6 g/dL Final   • Hematocrit 01/29/2018 31.9* 40.4 - 52.2 % Final   • Glucose 01/29/2018 137* 70 - 130 mg/dL Final   • Glucose 01/29/2018 108  70 - 130 mg/dL Final   • Glucose 01/29/2018 154* 70 - 130 mg/dL Final   • Hemoglobin A1C 01/30/2018 5.90* 4.80 - 5.60 % Final   • Glucose 01/30/2018 136* 65 - 99 mg/dL Final   • BUN 01/30/2018 13  8 - 23 mg/dL Final   • Creatinine 01/30/2018 0.71* 0.76 - 1.27 mg/dL Final   • Sodium 01/30/2018 138  136 - 145 mmol/L Final    • Potassium 01/30/2018 4.4  3.5 - 5.2 mmol/L Final   • Chloride 01/30/2018 101  98 - 107 mmol/L Final   • CO2 01/30/2018 27.9  22.0 - 29.0 mmol/L Final   • Calcium 01/30/2018 8.4* 8.6 - 10.5 mg/dL Final   • Total Protein 01/30/2018 5.5* 6.0 - 8.5 g/dL Final   • Albumin 01/30/2018 3.00* 3.50 - 5.20 g/dL Final   • ALT (SGPT) 01/30/2018 14  1 - 41 U/L Final   • AST (SGOT) 01/30/2018 22  1 - 40 U/L Final   • Alkaline Phosphatase 01/30/2018 33* 39 - 117 U/L Final   • Total Bilirubin 01/30/2018 0.4  0.1 - 1.2 mg/dL Final   • eGFR Non African Amer 01/30/2018 108  >60 mL/min/1.73 Final   • Globulin 01/30/2018 2.5  gm/dL Final   • A/G Ratio 01/30/2018 1.2  g/dL Final   • BUN/Creatinine Ratio 01/30/2018 18.3  7.0 - 25.0 Final   • Anion Gap 01/30/2018 9.1  mmol/L Final   • WBC 01/30/2018 9.43  4.50 - 10.70 10*3/mm3 Final   • RBC 01/30/2018 3.21* 4.60 - 6.00 10*6/mm3 Final   • Hemoglobin 01/30/2018 10.1* 13.7 - 17.6 g/dL Final   • Hematocrit 01/30/2018 32.5* 40.4 - 52.2 % Final   • MCV 01/30/2018 101.2* 79.8 - 96.2 fL Final   • MCH 01/30/2018 31.5  27.0 - 32.7 pg Final   • MCHC 01/30/2018 31.1* 32.6 - 36.4 g/dL Final   • RDW 01/30/2018 13.8  11.5 - 14.5 % Final   • RDW-SD 01/30/2018 50.9  37.0 - 54.0 fl Final   • MPV 01/30/2018 10.2  6.0 - 12.0 fL Final   • Platelets 01/30/2018 163  140 - 500 10*3/mm3 Final   • Neutrophil % 01/30/2018 79.7* 42.7 - 76.0 % Final   • Lymphocyte % 01/30/2018 10.5* 19.6 - 45.3 % Final   • Monocyte % 01/30/2018 9.0  5.0 - 12.0 % Final   • Eosinophil % 01/30/2018 0.4  0.3 - 6.2 % Final   • Basophil % 01/30/2018 0.2  0.0 - 1.5 % Final   • Immature Grans % 01/30/2018 0.2  0.0 - 0.5 % Final   • Neutrophils, Absolute 01/30/2018 7.51  1.90 - 8.10 10*3/mm3 Final   • Lymphocytes, Absolute 01/30/2018 0.99  0.90 - 4.80 10*3/mm3 Final   • Monocytes, Absolute 01/30/2018 0.85  0.20 - 1.20 10*3/mm3 Final   • Eosinophils, Absolute 01/30/2018 0.04  0.00 - 0.70 10*3/mm3 Final   • Basophils, Absolute 01/30/2018 0.02   0.00 - 0.20 10*3/mm3 Final   • Immature Grans, Absolute 01/30/2018 0.02  0.00 - 0.03 10*3/mm3 Final   • Glucose 01/30/2018 120  70 - 130 mg/dL Final   • Glucose 01/30/2018 177* 70 - 130 mg/dL Final   • Color, UA 01/30/2018 Dark Yellow* Yellow, Straw Final   • Appearance, UA 01/30/2018 Cloudy* Clear Final   • pH, UA 01/30/2018 <=5.0  5.0 - 8.0 Final   • Specific Gravity, UA 01/30/2018 1.017  1.005 - 1.030 Final   • Glucose, UA 01/30/2018 Negative  Negative Final   • Ketones, UA 01/30/2018 Negative  Negative Final   • Bilirubin, UA 01/30/2018 Negative  Negative Final   • Blood, UA 01/30/2018 Small (1+)* Negative Final   • Protein, UA 01/30/2018 Trace* Negative Final   • Leuk Esterase, UA 01/30/2018 Negative  Negative Final   • Nitrite, UA 01/30/2018 Negative  Negative Final   • Urobilinogen, UA 01/30/2018 0.2 E.U./dL  0.2 - 1.0 E.U./dL Final   • Glucose 01/30/2018 131* 70 - 130 mg/dL Final   • RBC, UA 01/30/2018 3-5* None Seen, 0-2 /HPF Final   • WBC, UA 01/30/2018 0-2  None Seen, 0-2 /HPF Final   • Bacteria, UA 01/30/2018 None Seen  None Seen /HPF Final   • Squamous Epithelial Cells, UA 01/30/2018 0-2  None Seen, 0-2 /HPF Final   • Hyaline Casts, UA 01/30/2018 7-12  None Seen /LPF Final   • Methodology 01/30/2018 Automated Microscopy   Final   • Glucose 01/30/2018 138* 70 - 130 mg/dL Final   • Glucose 01/31/2018 147* 65 - 99 mg/dL Final   • BUN 01/31/2018 16  8 - 23 mg/dL Final   • Creatinine 01/31/2018 0.85  0.76 - 1.27 mg/dL Final   • Sodium 01/31/2018 140  136 - 145 mmol/L Final   • Potassium 01/31/2018 4.6  3.5 - 5.2 mmol/L Final   • Chloride 01/31/2018 103  98 - 107 mmol/L Final   • CO2 01/31/2018 25.2  22.0 - 29.0 mmol/L Final   • Calcium 01/31/2018 8.9  8.6 - 10.5 mg/dL Final   • eGFR Non African Amer 01/31/2018 88  >60 mL/min/1.73 Final   • BUN/Creatinine Ratio 01/31/2018 18.8  7.0 - 25.0 Final   • Anion Gap 01/31/2018 11.8  mmol/L Final   • WBC 01/31/2018 12.92* 4.50 - 10.70 10*3/mm3 Final   • RBC  01/31/2018 3.23* 4.60 - 6.00 10*6/mm3 Final   • Hemoglobin 01/31/2018 10.2* 13.7 - 17.6 g/dL Final   • Hematocrit 01/31/2018 33.1* 40.4 - 52.2 % Final   • MCV 01/31/2018 102.5* 79.8 - 96.2 fL Final   • MCH 01/31/2018 31.6  27.0 - 32.7 pg Final   • MCHC 01/31/2018 30.8* 32.6 - 36.4 g/dL Final   • RDW 01/31/2018 14.0  11.5 - 14.5 % Final   • RDW-SD 01/31/2018 52.4  37.0 - 54.0 fl Final   • MPV 01/31/2018 10.1  6.0 - 12.0 fL Final   • Platelets 01/31/2018 172  140 - 500 10*3/mm3 Final   • Glucose 01/31/2018 117  70 - 130 mg/dL Final   • Glucose 01/31/2018 138* 70 - 130 mg/dL Final   • Glucose 01/31/2018 101  70 - 130 mg/dL Final   • Glucose 01/31/2018 123  70 - 130 mg/dL Final   • Glucose 01/31/2018 156* 70 - 130 mg/dL Final   • Glucose 01/31/2018 169* 70 - 130 mg/dL Final   • Glucose 02/01/2018 140* 65 - 99 mg/dL Final   • BUN 02/01/2018 16  8 - 23 mg/dL Final   • Creatinine 02/01/2018 0.68* 0.76 - 1.27 mg/dL Final   • Sodium 02/01/2018 141  136 - 145 mmol/L Final   • Potassium 02/01/2018 4.0  3.5 - 5.2 mmol/L Final   • Chloride 02/01/2018 106  98 - 107 mmol/L Final   • CO2 02/01/2018 25.0  22.0 - 29.0 mmol/L Final   • Calcium 02/01/2018 8.7  8.6 - 10.5 mg/dL Final   • eGFR Non African Amer 02/01/2018 113  >60 mL/min/1.73 Final   • BUN/Creatinine Ratio 02/01/2018 23.5  7.0 - 25.0 Final   • Anion Gap 02/01/2018 10.0  mmol/L Final   • Folate 02/01/2018 16.42  4.78 - 24.20 ng/mL Final   • Vitamin B-12 02/01/2018 259  211 - 946 pg/mL Final   • WBC 02/01/2018 9.05  4.50 - 10.70 10*3/mm3 Final   • RBC 02/01/2018 2.90* 4.60 - 6.00 10*6/mm3 Final   • Hemoglobin 02/01/2018 9.1* 13.7 - 17.6 g/dL Final   • Hematocrit 02/01/2018 29.3* 40.4 - 52.2 % Final   • MCV 02/01/2018 101.0* 79.8 - 96.2 fL Final   • MCH 02/01/2018 31.4  27.0 - 32.7 pg Final   • MCHC 02/01/2018 31.1* 32.6 - 36.4 g/dL Final   • RDW 02/01/2018 13.7  11.5 - 14.5 % Final   • RDW-SD 02/01/2018 51.1  37.0 - 54.0 fl Final   • MPV 02/01/2018 10.4  6.0 - 12.0 fL Final    • Platelets 02/01/2018 142  140 - 500 10*3/mm3 Final   • Neutrophil % 02/01/2018 86.6* 42.7 - 76.0 % Final   • Lymphocyte % 02/01/2018 6.0* 19.6 - 45.3 % Final   • Monocyte % 02/01/2018 6.2  5.0 - 12.0 % Final   • Eosinophil % 02/01/2018 0.9  0.3 - 6.2 % Final   • Basophil % 02/01/2018 0.1  0.0 - 1.5 % Final   • Immature Grans % 02/01/2018 0.2  0.0 - 0.5 % Final   • Neutrophils, Absolute 02/01/2018 7.84  1.90 - 8.10 10*3/mm3 Final   • Lymphocytes, Absolute 02/01/2018 0.54* 0.90 - 4.80 10*3/mm3 Final   • Monocytes, Absolute 02/01/2018 0.56  0.20 - 1.20 10*3/mm3 Final   • Eosinophils, Absolute 02/01/2018 0.08  0.00 - 0.70 10*3/mm3 Final   • Basophils, Absolute 02/01/2018 0.01  0.00 - 0.20 10*3/mm3 Final   • Immature Grans, Absolute 02/01/2018 0.02  0.00 - 0.03 10*3/mm3 Final   • Glucose 02/01/2018 133* 70 - 130 mg/dL Final   • Glucose 02/01/2018 112  70 - 130 mg/dL Final   • Glucose 02/01/2018 215* 70 - 130 mg/dL Final   • Protime 02/01/2018 14.8* 11.7 - 14.2 Seconds Final   • INR 02/01/2018 1.20* 0.90 - 1.10 Final   • Glucose 02/01/2018 156* 70 - 130 mg/dL Final   • Glucose 02/01/2018 151* 70 - 130 mg/dL Final   • Glucose 02/02/2018 134* 65 - 99 mg/dL Final   • BUN 02/02/2018 21  8 - 23 mg/dL Final   • Creatinine 02/02/2018 0.76  0.76 - 1.27 mg/dL Final   • Sodium 02/02/2018 142  136 - 145 mmol/L Final   • Potassium 02/02/2018 3.6  3.5 - 5.2 mmol/L Final   • Chloride 02/02/2018 106  98 - 107 mmol/L Final   • CO2 02/02/2018 25.4  22.0 - 29.0 mmol/L Final   • Calcium 02/02/2018 8.5* 8.6 - 10.5 mg/dL Final   • eGFR Non African Amer 02/02/2018 100  >60 mL/min/1.73 Final   • BUN/Creatinine Ratio 02/02/2018 27.6* 7.0 - 25.0 Final   • Anion Gap 02/02/2018 10.6  mmol/L Final   • WBC 02/02/2018 5.98  4.50 - 10.70 10*3/mm3 Final   • RBC 02/02/2018 2.74* 4.60 - 6.00 10*6/mm3 Final   • Hemoglobin 02/02/2018 8.8* 13.7 - 17.6 g/dL Final   • Hematocrit 02/02/2018 27.5* 40.4 - 52.2 % Final   • MCV 02/02/2018 100.4* 79.8 - 96.2 fL  Final   • MCH 02/02/2018 32.1  27.0 - 32.7 pg Final   • MCHC 02/02/2018 32.0* 32.6 - 36.4 g/dL Final   • RDW 02/02/2018 13.6  11.5 - 14.5 % Final   • RDW-SD 02/02/2018 49.8  37.0 - 54.0 fl Final   • MPV 02/02/2018 10.1  6.0 - 12.0 fL Final   • Platelets 02/02/2018 159  140 - 500 10*3/mm3 Final   • Protime 02/02/2018 14.9* 11.7 - 14.2 Seconds Final   • INR 02/02/2018 1.21* 0.90 - 1.10 Final   • Glucose 02/02/2018 134* 70 - 130 mg/dL Final       Xr Spine Lumbar 2 Or 3 View    Result Date: 1/29/2018  Narrative: LUMBAR SPINE OPERATIVE X-RAY  HISTORY: Back pain, fusion revision.  FINDINGS:  5 seconds fluoroscopy and 3 operative images were provided for Dr. Knight. The images provided show pedicle screws at 4 contiguous lower lumbar levels.      Impression: Operative imaging was provided for Dr. Knight during lumbar spine fusion.  This report was finalized on 1/29/2018 4:14 PM by Dr. Kit Awan MD.      Ct Head Without Contrast    Result Date: 1/31/2018  Narrative: CT HEAD WO CONTRAST-  INDICATIONS: Encephalopathy  TECHNIQUE: Radiation dose reduction techniques were utilized, including automated exposure control and exposure modulation based on body size.    Noncontrast head CT  COMPARISON: None available  FINDINGS:       No acute intracranial hemorrhage, midline shift or mass effect. No acute territorial infarct is identified. Basal ganglia mineralization is seen. A dural-based calcified focus at the anterior left temporal fossa, 7 mm on image 20 of series 1 suggests meningioma.  Mild periventricular hypodensities suggest chronic small vessel ischemic change in a patient this age.    Ventricles, cisterns, cerebral sulci are unremarkable for patient age.  Minimal bilateral maxillary sinus mucosal thickening. Mild partial opacification of left mastoid air cells, correlate clinically to exclude possibility of mastoiditis. The visualized paranasal sinuses, mastoid air cells are otherwise unremarkable. Procedural changes  at the orbits.          Impression:      No acute intracranial hemorrhage or hydrocephalus. Small dural based calcified focus at the left temporal fossa suggesting meningioma. If there is further clinical concern, MRI could be considered for further evaluation.  This report was finalized on 1/31/2018 8:05 PM by Dr. Alvarez Diaz MD.      Xr Chest 1 View    Result Date: 1/31/2018  Narrative: SINGLE PORTABLE CHEST 14:30  HISTORY: 77-year-old male with leukocytosis postoperatively  COMPARISON: 10/29/2016  FINDINGS: 1. Cannot exclude developing retrocardiac opacification on somewhat limited portable view. 2. Follow-up with lateral imaging would be helpful. 3. Right lung remains clear.  This report was finalized on 1/31/2018 2:47 PM by Dr. Jorge Gaming MD.      Fl C Arm During Surgery    Result Date: 1/29/2018  Narrative: This procedure was auto-finalized with no dictation required.      Hospital Course:  77 y.o. male admitted to [facility] to services of Barron Knight MD with Spinal stenosis of lumbar region with neurogenic claudication [M48.062]  Spondylolisthesis of lumbar region [M43.16]  Lumbar spine pain [M54.5] on 1/29/2018 and underwent L2-3, L3-4 laminectomy and fusion with instrumentation and removal of implants L4 5  Per Barron Knight MD. Antibiotic and VTE prophylaxis were per SCIP protocols.  The patient was admitted to the floor where IV and/or oral pain medications were administered for postoperative pain.  At discharge the incisional pain was tolerable and preop neurologic function was intact.  The dressing was dry and the wound was clean.    Condition on Discharge:  Stable    Discharge Instructions: . Patient may weight bear as tolerated unless otherwise specified. Continue DEANN hose daily (for two weeks) and ice regularly. Patient also instructed on incentive spirometer during hospitalization and encouraged to continue to use at home regularly. Patient may shower on POD #3 if and when  all wound drainage has stopped.  Where applicable, the brace should be worn when up and about.  It need not be worn to the bathroom and certainly not in the shower.  A detailed list of instructions specific to the operation was given to the patient at the time of discharge.    Patient will need CBC & Protime with INR drawn on Monday 2/5/18.      Follow up Instructions:  Follow up in the office with Dr. Barron Knight Jr. in 2-3 weeks - patient to call the office at [phone] to schedule. Prescriptions were given for pain medication.    Follow-up Appointments  Future Appointments  Date Time Provider Department Center   2/13/2018 9:10 AM MD BRANDO Wise [facility] None   2/28/2018 3:30 PM Sayda Lewis PA-C [facility] None   6/11/2018 1:00 PM Quinton Leal MD [facility] None     Additional Instructions for the Follow-ups that You Need to Schedule     Protime-INR    Feb 06, 2018 (Approximate)    Is Patient on anti-coag:  Yes                     Discharge Disposition Plan:today to [facility]    Date: 2/2/2018    Lety Baez, RN  02/02/18  11:04 AM      Barron Knight MD

## 2018-02-13 ENCOUNTER — OFFICE VISIT (OUTPATIENT)
Dept: ORTHOPEDIC SURGERY | Facility: CLINIC | Age: 78
End: 2018-02-13

## 2018-02-13 DIAGNOSIS — Z98.1 STATUS POST LUMBAR SPINAL FUSION: Primary | ICD-10-CM

## 2018-02-13 PROCEDURE — 99024 POSTOP FOLLOW-UP VISIT: CPT | Performed by: ORTHOPAEDIC SURGERY

## 2018-02-13 RX ORDER — OXYCODONE AND ACETAMINOPHEN 10; 325 MG/1; MG/1
TABLET ORAL
COMMUNITY
Start: 2018-02-12 | End: 2018-02-13 | Stop reason: SDUPTHER

## 2018-02-13 RX ORDER — OXYCODONE AND ACETAMINOPHEN 10; 325 MG/1; MG/1
1 TABLET ORAL EVERY 6 HOURS PRN
Qty: 50 TABLET | Refills: 0 | Status: SHIPPED | OUTPATIENT
Start: 2018-02-13 | End: 2019-01-01 | Stop reason: HOSPADM

## 2018-02-13 NOTE — PROGRESS NOTES
Postoperatively the patient expresses normal incisional pain.  There is little or no radiating pain.  Good strength on exam.  The wound is intact.  2 view x-rays of the lumbar spine obtained to evaluate hardware position and fusion bone show good position thereof and are unchanged from intraoperative images.  Instructions given.  We'll need lumbar x-rays on return visit.  He wants to leave rehabilitation and I think he is ready for that and I refilled his pain medicine for now.  Also while he was here he missed the stool for x-ray and nearly landed on the ground.  The patient looks no worse for the wear.

## 2018-02-27 ENCOUNTER — OFFICE VISIT (OUTPATIENT)
Dept: CARDIOLOGY | Facility: CLINIC | Age: 78
End: 2018-02-27

## 2018-02-27 VITALS
HEIGHT: 75 IN | BODY MASS INDEX: 23.57 KG/M2 | SYSTOLIC BLOOD PRESSURE: 112 MMHG | WEIGHT: 189.6 LBS | HEART RATE: 51 BPM | DIASTOLIC BLOOD PRESSURE: 56 MMHG

## 2018-02-27 DIAGNOSIS — I10 ESSENTIAL HYPERTENSION: ICD-10-CM

## 2018-02-27 DIAGNOSIS — I48.0 PAROXYSMAL ATRIAL FIBRILLATION (HCC): Primary | ICD-10-CM

## 2018-02-27 DIAGNOSIS — E78.00 HYPERCHOLESTEROLEMIA: ICD-10-CM

## 2018-02-27 PROCEDURE — 93000 ELECTROCARDIOGRAM COMPLETE: CPT | Performed by: INTERNAL MEDICINE

## 2018-02-27 PROCEDURE — 99214 OFFICE O/P EST MOD 30 MIN: CPT | Performed by: INTERNAL MEDICINE

## 2018-02-27 NOTE — PROGRESS NOTES
Date of Office Visit: 2018  Encounter Provider: Sherice Newman MD  Place of Service: [facility] CARDIOLOGY  Patient Name: Derrek Ritter  :1940      Patient ID:  Derrek Ritter is a 77 y.o. male is here for  followup for hospitalization and atrial fibrillation        History of Present Illness    He had been seen in the past by Dr. Figueroa prior to back surgery for spinal stenosis  in . Prior to that surgery, he had a stress nuclear perfusion study performed on  10/01/2013 which showed no evidence of ischemia. He also had a 2-D echocardiogram with  Doppler performed on 2013 which revealed an ejection fraction of 55-60%,  indeterminate diastolic filling pattern, borderline left atrial enlargement, mild  posterior leaflet prolapse of the mitral valve with trace mitral insufficiency. He had  been seen in Dr. Glover's office and Dr. Glover had detected an irregular heart rhythm.  He was found to be in atrial fibrillation. A Holter monitor was performed on 2013  which showed persistent atrial fibrillation, a maximum heart rate of 164 and an average  heart rate of 98 beats per minute. He then had a second Holter recording on 2013  which showed no atrial fibrillation at all. He has been on warfarin since that time. He  was also placed on propafenone and Metoprolol then as well.       He does have a history of diabetes mellitus type 2, hypertension, and hyperlipidemia. He  has a hiatal hernia, history of diverticular disease and gastroesophageal reflux disease.  He has never had any thyroid disease.         He had back surgery in  and it did not help  at all.  He had ankle brachial indices done in 10/2014,  which were normal.       He cares for his wife who has cancer and other medical issues.  He is a retired  and .    He was discharged from the hospital 18.  He was status post lumbar laminectomy  with fusion.  We were consulted during the hospitalization because he did go into atrial fibrillation.  He was placed on metoprolol and warfarin which she had been on prior.  He feels really good at this point.  His back is healing well.  He doesn't feels heart racing or skipping.  No vomiting blood or blood in the stool.  He has no chest pain or pressure.  He has no difficulty breathing.    Past Medical History:   Diagnosis Date   • Acute suppurative otitis media    • Acute upper respiratory infection    • Atrial fibrillation    • Cholelithiasis    • Diabetes mellitus     TYPE 2   • Diverticulosis    • Dry eyes    • Earache    • Edema    • Encounter for screening for malignant neoplasm of prostate    • Esophagitis, reflux    • Glaucoma    • H/O echocardiogram 09/18/2013   • Health care maintenance    • Hiatal hernia    • History of EKG 10/06/2015   • History of Holter monitoring 09/16/2013   • Hyperlipidemia    • Hypertension    • Lumbar canal stenosis    • Microalbuminuria    • Nephrolithiasis    • Osteoarthritis of hand     of thumb   • PAF (paroxysmal atrial fibrillation)    • Postoperative examination    • Pre-operative cardiovascular examination    • Spinal stenosis          Past Surgical History:   Procedure Laterality Date   • AMPUTATION DIGIT Left 10/29/2016    Procedure: TENDON AND NERVE REPAIR OF  LEFT THUMB;  Surgeon: Zak Hanson MD;  Location: [facility];  Service:    • ARTHRODESIS  10/28/2013    Spinal / Description: Repair spinal stenosis   • CATARACT EXTRACTION     • EYE SURGERY      EYE MUSCLE SX   • HERNIA REPAIR     • LUMBAR DISCECTOMY FUSION INSTRUMENTATION N/A 1/29/2018    Procedure: L2 to L5 fusion with instrumentation and removal of implants L4 5;  Surgeon: Barron Knight MD;  Location: [facility];  Service:    • LUMBAR FUSION      Lumbar vertebral fusion   • LUMBAR LAMINECTOMY      10.28.13  bilat L4/5 lami with Nyla and Belen   • LUMBAR LAMINECTOMY DISCECTOMY DECOMPRESSION  N/A 1/29/2018    Procedure: L2 to L4 laminectomy with a fusion by orthopedics;  Surgeon: Jeison Redmond MD;  Location: [facility];  Service:    • OTHER SURGICAL HISTORY      Lithotomy   • TONSILLECTOMY         Current Outpatient Prescriptions on File Prior to Visit   Medication Sig Dispense Refill   • ACCU-CHEK FASTCLIX LANCETS misc USE TO TEST TWICE DAILY 102 each 0   • Blood Glucose Monitoring Suppl (ACCU-CHEK FRANCISCO SMARTVIEW) W/DEVICE kit USE TO TEST BLOOD SUGAR TWICE DAILY 1 kit 0   • Blood Glucose Monitoring Suppl (FREESTYLE LITE) device Use to test blood once daily 1 each 0   • brimonidine (ALPHAGAN) 0.2 % ophthalmic solution Administer 1 drop to both eyes 2 (Two) Times a Day.     • donepezil (ARICEPT) 5 MG tablet Take 5 mg by mouth Every Morning.     • dorzolamide (TRUSOPT) 2 % ophthalmic solution Administer 1 drop to both eyes 2 (two) times a day.     • enalapril (VASOTEC) 20 MG tablet Take 40 mg by mouth Every Morning.     • esomeprazole (nexIUM) 40 MG capsule Take 40 mg by mouth Every Morning Before Breakfast.     • ferrous gluconate (FERGON) 324 MG tablet Take 1 tablet by mouth Daily With Breakfast.     • furosemide (LASIX) 20 MG tablet Take 1 tablet by mouth Daily. as needed (Patient taking differently: Take 20 mg by mouth Daily As Needed.) 90 tablet 3   • glimepiride (AMARYL) 2 MG tablet Take 2 mg by mouth Every Morning Before Breakfast.     • glucose blood (FREESTYLE LITE) test strip Use to test blood once daily 100 each 12   • Glucose Blood disk Inject 1 Device as directed daily. TEST BLOOD SUGAR ONCE DAILY AS DIRECTED 100 each 3   • glucose blood test strip USE TO TEST TWICE DAILY 100 each 12   • Lancets (FREESTYLE) lancets Use to test blood sugar daily 100 each 12   • latanoprost (XALATAN) 0.005 % ophthalmic solution Administer 1 drop to both eyes Every Night.     • metFORMIN (GLUCOPHAGE) 1000 MG tablet Take 1,000 mg by mouth 2 (Two) Times a Day.     • metoprolol tartrate (LOPRESSOR) 50 MG  tablet Take 1 tablet by mouth Every 12 (Twelve) Hours.     • ONE TOUCH ULTRA TEST test strip USE ONCE DAILY AS DIRECTED 100 each 3   • oxyCODONE-acetaminophen (PERCOCET)  MG per tablet Take 1 tablet by mouth Every 6 (Six) Hours As Needed for Moderate Pain . 50 tablet 0   • pioglitazone (ACTOS) 45 MG tablet Take 45 mg by mouth Every Morning.     • sennosides-docusate sodium (SENOKOT-S) 8.6-50 MG tablet Take 1 tablet by mouth Every Night.     • simvastatin (ZOCOR) 20 MG tablet Take 20 mg by mouth Every Morning.     • tamsulosin (FLOMAX) 0.4 MG capsule 24 hr capsule Take 0.4 mg by mouth Every Morning. 1-2 TABS AS NEEDED      • timolol (TIMOPTIC) 0.5 % ophthalmic solution Administer 1 drop to both eyes 2 (Two) Times a Day.     • warfarin (COUMADIN) 10 MG tablet Take 1 tablet by mouth Daily.     • warfarin (COUMADIN) 5 MG tablet Take 1 tablet by mouth Daily.     • ziprasidone (GEODON) 20 MG capsule Take 1 capsule by mouth Daily Before Supper.     • [DISCONTINUED] metFORMIN (GLUCOPHAGE) 1000 MG tablet Take 500 mg by mouth Daily. At noon     • [DISCONTINUED] sulfamethoxazole-trimethoprim (BACTRIM DS) 800-160 MG per tablet Take 1 tablet by mouth 2 (Two) Times a Day. 10 tablet 0     No current facility-administered medications on file prior to visit.        Social History     Social History   • Marital status:      Spouse name: Silvia   • Number of children: 3   • Years of education: College     Occupational History   • Teacher RETIRED Retired     Social History Main Topics   • Smoking status: Never Smoker   • Smokeless tobacco: Never Used      Comment: caffeine use   • Alcohol use No   • Drug use: No   • Sexual activity: Not on file     Other Topics Concern   • Not on file     Social History Narrative           Review of Systems   Constitution: Negative.   HENT: Negative for congestion.    Eyes: Negative for vision loss in left eye and vision loss in right eye.   Respiratory: Negative.  Negative for cough,  "hemoptysis, shortness of breath, sleep disturbances due to breathing, snoring, sputum production and wheezing.    Endocrine: Negative.    Hematologic/Lymphatic: Negative.    Skin: Negative for poor wound healing and rash.   Musculoskeletal: Negative for falls, gout, muscle cramps and myalgias.   Gastrointestinal: Negative for abdominal pain, diarrhea, dysphagia, hematemesis, melena, nausea and vomiting.   Neurological: Negative for excessive daytime sleepiness, dizziness, headaches, light-headedness, loss of balance, seizures and vertigo.   Psychiatric/Behavioral: Negative for depression and substance abuse. The patient is not nervous/anxious.        Procedures    ECG 12 Lead  Date/Time: 2/27/2018 12:37 PM  Performed by: CHERYL ORTEGA  Authorized by: CHERYL ORTEGA   Comparison: compared with previous ECG   Comparison to previous ECG: A fib no longer seen  Rhythm: sinus rhythm  Conduction: right bundle branch block and LAFB  Clinical impression: abnormal ECG               Objective:      Vitals:    02/27/18 1204   BP: 112/56   BP Location: Left arm   Patient Position: Sitting   Pulse: 51   Weight: 86 kg (189 lb 9.6 oz)   Height: 190.5 cm (75\")     Body mass index is 23.7 kg/(m^2).    Physical Exam   Constitutional: He is oriented to person, place, and time. He appears well-developed and well-nourished. No distress.   HENT:   Head: Normocephalic and atraumatic.   Eyes: Conjunctivae are normal. No scleral icterus.   Neck: Neck supple. No JVD present. Carotid bruit is not present. No thyromegaly present.   Cardiovascular: Normal rate, regular rhythm, S1 normal, S2 normal, normal heart sounds and intact distal pulses.   No extrasystoles are present. PMI is not displaced.  Exam reveals no gallop.    No murmur heard.  Pulses:       Carotid pulses are 2+ on the right side, and 2+ on the left side.       Radial pulses are 2+ on the right side, and 2+ on the left side.        Dorsalis pedis pulses are 2+ on the " right side, and 2+ on the left side.        Posterior tibial pulses are 2+ on the right side, and 2+ on the left side.   Pulmonary/Chest: Effort normal and breath sounds normal. No respiratory distress. He has no wheezes. He has no rhonchi. He has no rales. He exhibits no tenderness.   Abdominal: Soft. Bowel sounds are normal. He exhibits no distension, no abdominal bruit and no mass. There is no tenderness.   Musculoskeletal: He exhibits no edema or deformity.   Lymphadenopathy:     He has no cervical adenopathy.   Neurological: He is alert and oriented to person, place, and time. No cranial nerve deficit.   Skin: Skin is warm and dry. No rash noted. He is not diaphoretic. No cyanosis. No pallor. Nails show no clubbing.   Psychiatric: He has a normal mood and affect. Judgment normal.   Vitals reviewed.      Lab Review:       Assessment:      Diagnosis Plan   1. Paroxysmal atrial fibrillation     2. Essential hypertension     3. Hypercholesterolemia       1. Paroxysmal atrial fibrillation. We will maintain warfarin.   2. Hypertension under fair control.   3. Diabetes mellitus type 2. This has gotten better with weight loss. In fact, he said  his hemoglobin A1C has been running about 6.6%.   4. Hyperlipidemia. He is currently on simvastatin for this.   5. History of hiatal hernia, gastroesophageal reflux disease, and diverticular disease  stable at this time.   6. Chronic lower extremity edema with what appears to be venous insufficiency and may be  lymphedema as well. Stable.  7. RBBB and LAFB, chronic.   8. Bradycardia, off metoprolol due to this.      Plan:       See caleb in 6 months.     Atrial Fibrillation and Atrial Flutter  Assessment  • The patient has paroxysmal atrial fibrillation  • This is non-valvular in etiology  • The patient's CHADS2-VASc score is 3  • A [id number] score of 2 or more is considered a high risk for a thromboembolic event  • Warfarin prescribed    Plan  • Continue in atrial  fibrillation with rate control  • Continue warfarin for antithrombotic therapy, bleeding issues discussed  • Continue beta blocker for rate control

## 2018-03-06 ENCOUNTER — OFFICE VISIT (OUTPATIENT)
Dept: NEUROSURGERY | Facility: CLINIC | Age: 78
End: 2018-03-06

## 2018-03-06 VITALS
WEIGHT: 189 LBS | DIASTOLIC BLOOD PRESSURE: 71 MMHG | HEART RATE: 68 BPM | SYSTOLIC BLOOD PRESSURE: 133 MMHG | BODY MASS INDEX: 23.5 KG/M2 | HEIGHT: 75 IN

## 2018-03-06 DIAGNOSIS — Z09 FOLLOW-UP EXAMINATION FOLLOWING SURGERY: Primary | ICD-10-CM

## 2018-03-06 PROCEDURE — 99024 POSTOP FOLLOW-UP VISIT: CPT | Performed by: NEUROLOGICAL SURGERY

## 2018-03-06 NOTE — PROGRESS NOTES
Subjective   Patient ID: Derrek Ritter is a 77 y.o. male is here today for follow-up. The patient is 1 month out from having an L2-L4 laminectomy by Dr. Redmond and fusion by Dr. Knight on 1/29/18. Patient is still having some left sided back pain.    History of Present Illness    This patient returns today.  He is well.  He has some occasional pain in the left side of lower back but nothing on a consistent basis.    The following portions of the patient's history were reviewed and updated as appropriate: allergies, current medications, past family history, past medical history, past social history, past surgical history and problem list.    Review of Systems   Respiratory: Negative for shortness of breath.    Cardiovascular: Negative for chest pain.   Musculoskeletal: Positive for back pain.   All other systems reviewed and are negative.      Objective   Physical Exam   Constitutional: He appears well-developed and well-nourished.     Neurologic Exam    Assessment/Plan   Independent Review of Radiographic Studies:      Medical Decision Making:      I told the patient to stay on his walking program.  I will see him back in about 2 months.    Derrek was seen today for post-op.    Diagnoses and all orders for this visit:    Follow-up examination following surgery    Return in about 2 months (around 5/6/2018).

## 2018-03-19 DIAGNOSIS — I48.0 PAROXYSMAL ATRIAL FIBRILLATION (HCC): ICD-10-CM

## 2018-03-20 RX ORDER — WARFARIN SODIUM 10 MG/1
TABLET ORAL
Qty: 68 TABLET | Refills: 0 | Status: SHIPPED | OUTPATIENT
Start: 2018-03-20 | End: 2018-06-11

## 2018-04-02 ENCOUNTER — TELEPHONE (OUTPATIENT)
Dept: INTERNAL MEDICINE | Age: 78
End: 2018-04-02

## 2018-04-02 ENCOUNTER — ANTICOAGULATION VISIT (OUTPATIENT)
Dept: INTERNAL MEDICINE | Age: 78
End: 2018-04-02

## 2018-04-02 DIAGNOSIS — I48.0 PAROXYSMAL ATRIAL FIBRILLATION (HCC): Primary | ICD-10-CM

## 2018-04-02 LAB — INR PPP: 2.6 (ref 2–3)

## 2018-04-02 PROCEDURE — 85610 PROTHROMBIN TIME: CPT | Performed by: INTERNAL MEDICINE

## 2018-04-02 PROCEDURE — 99211 OFF/OP EST MAY X REQ PHY/QHP: CPT | Performed by: INTERNAL MEDICINE

## 2018-04-02 PROCEDURE — 36416 COLLJ CAPILLARY BLOOD SPEC: CPT | Performed by: INTERNAL MEDICINE

## 2018-04-02 NOTE — TELEPHONE ENCOUNTER
Pt states last PT/INR was checked approx 3 weeks ago while in rehab.    When is pt due for next PT/INR check?    Pls call pt #[phone].

## 2018-04-03 ENCOUNTER — OFFICE VISIT (OUTPATIENT)
Dept: ORTHOPEDIC SURGERY | Facility: CLINIC | Age: 78
End: 2018-04-03

## 2018-04-03 VITALS — HEIGHT: 75 IN | TEMPERATURE: 98.9 F | BODY MASS INDEX: 22.88 KG/M2 | WEIGHT: 184 LBS

## 2018-04-03 DIAGNOSIS — M48.062 SPINAL STENOSIS OF LUMBAR REGION WITH NEUROGENIC CLAUDICATION: Primary | ICD-10-CM

## 2018-04-03 PROCEDURE — 99024 POSTOP FOLLOW-UP VISIT: CPT | Performed by: ORTHOPAEDIC SURGERY

## 2018-04-03 NOTE — PROGRESS NOTES
Back pain is improved.  No significant leg pain. .  We will need an AP and lateral lumbar x-ray on return visit.  Instructions were given.

## 2018-04-05 DIAGNOSIS — F09 MILD COGNITIVE DISORDER: ICD-10-CM

## 2018-04-05 DIAGNOSIS — E11.9 TYPE 2 DIABETES MELLITUS WITHOUT COMPLICATION, WITHOUT LONG-TERM CURRENT USE OF INSULIN (HCC): ICD-10-CM

## 2018-04-05 RX ORDER — DONEPEZIL HYDROCHLORIDE 5 MG/1
5 TABLET, FILM COATED ORAL NIGHTLY
Qty: 90 TABLET | Refills: 1 | Status: SHIPPED | OUTPATIENT
Start: 2018-04-05 | End: 2018-06-05 | Stop reason: SDUPTHER

## 2018-04-05 RX ORDER — ESOMEPRAZOLE MAGNESIUM 40 MG/1
CAPSULE, DELAYED RELEASE ORAL
Qty: 90 CAPSULE | Refills: 1 | Status: SHIPPED | OUTPATIENT
Start: 2018-04-05 | End: 2018-01-01 | Stop reason: SDUPTHER

## 2018-04-19 DIAGNOSIS — E11.9 DIABETES MELLITUS TYPE II, CONTROLLED, WITH NO COMPLICATIONS (HCC): ICD-10-CM

## 2018-04-19 RX ORDER — PIOGLITAZONEHYDROCHLORIDE 45 MG/1
45 TABLET ORAL EVERY MORNING
Qty: 90 TABLET | Refills: 0 | Status: SHIPPED | OUTPATIENT
Start: 2018-04-19 | End: 2018-07-12 | Stop reason: SDUPTHER

## 2018-04-19 RX ORDER — BLOOD SUGAR DIAGNOSTIC
STRIP MISCELLANEOUS
Qty: 100 EACH | Refills: 1 | Status: SHIPPED | OUTPATIENT
Start: 2018-04-19 | End: 2019-01-01

## 2018-05-14 NOTE — PROGRESS NOTES
Subjective   Patient ID: Derrek Ritter is a 78 y.o. male is here today for follow-up. The patient is almost 4 month's out from having an L2-L4 laminectomy done on 1/29/18. Patient is having back pain still.    History of Present Illness    This patient returns today.  He is almost 4 months out from his lumbar laminectomy and fusion.  He still complains of back pain but he does have days where he has no pain at all.    The following portions of the patient's history were reviewed and updated as appropriate: allergies, current medications, past family history, past medical history, past social history, past surgical history and problem list.    Review of Systems   Respiratory: Negative for chest tightness and shortness of breath.    Cardiovascular: Negative for chest pain.   Musculoskeletal: Positive for back pain.   All other systems reviewed and are negative.      Objective   Physical Exam   Constitutional: He is oriented to person, place, and time. He appears well-developed and well-nourished.   Neurological: He is oriented to person, place, and time.     Neurologic Exam     Mental Status   Oriented to person, place, and time.       Assessment/Plan   Independent Review of Radiographic Studies:      Medical Decision Making:      I told the patient that overall I think he is doing quite well considering what he had done.  I encouraged him to continue to press forward with this many activities as he can other than heavy lifting or activities they keep him all bent over.  He is to call me if any problems develop in the future otherwise we will see him back on an as-needed basis.    Derrek was seen today for back pain.    Diagnoses and all orders for this visit:    Chronic bilateral low back pain with bilateral sciatica      Return if symptoms worsen or fail to improve.

## 2018-05-22 ENCOUNTER — OFFICE VISIT (OUTPATIENT)
Dept: NEUROSURGERY | Facility: CLINIC | Age: 78
End: 2018-05-22

## 2018-05-22 VITALS — HEART RATE: 62 BPM | DIASTOLIC BLOOD PRESSURE: 69 MMHG | SYSTOLIC BLOOD PRESSURE: 141 MMHG

## 2018-05-22 DIAGNOSIS — M54.41 CHRONIC BILATERAL LOW BACK PAIN WITH BILATERAL SCIATICA: Primary | ICD-10-CM

## 2018-05-22 DIAGNOSIS — G89.29 CHRONIC BILATERAL LOW BACK PAIN WITH BILATERAL SCIATICA: Primary | ICD-10-CM

## 2018-05-22 DIAGNOSIS — M54.42 CHRONIC BILATERAL LOW BACK PAIN WITH BILATERAL SCIATICA: Primary | ICD-10-CM

## 2018-05-22 PROCEDURE — 99212 OFFICE O/P EST SF 10 MIN: CPT | Performed by: NEUROLOGICAL SURGERY

## 2018-06-05 ENCOUNTER — OFFICE VISIT (OUTPATIENT)
Dept: ORTHOPEDIC SURGERY | Facility: CLINIC | Age: 78
End: 2018-06-05

## 2018-06-05 VITALS — WEIGHT: 185 LBS | TEMPERATURE: 98 F | BODY MASS INDEX: 23 KG/M2 | HEIGHT: 75 IN

## 2018-06-05 DIAGNOSIS — Z98.1 HISTORY OF LUMBAR FUSION: ICD-10-CM

## 2018-06-05 DIAGNOSIS — Z98.1 S/P LUMBAR FUSION: Primary | ICD-10-CM

## 2018-06-05 PROCEDURE — 72100 X-RAY EXAM L-S SPINE 2/3 VWS: CPT | Performed by: ORTHOPAEDIC SURGERY

## 2018-06-05 PROCEDURE — 99213 OFFICE O/P EST LOW 20 MIN: CPT | Performed by: ORTHOPAEDIC SURGERY

## 2018-06-05 NOTE — PROGRESS NOTES
4 months out and not doing as well as a helped.  She feels like he is not standing upright or at least having trouble doing so.  No leg pain good strength on exam.  Two-view x-rays the lumbar spine are about the same slight loss of lordosis since last visit he does have lucency around the cephalad screws some concern for possibility of an L2-3 nonunion.  I'm going to see him back in 2 months to continue to use the bone stimulator meantime.  We'll need repeat x-rays on return visit.

## 2018-06-11 ENCOUNTER — OFFICE VISIT (OUTPATIENT)
Dept: INTERNAL MEDICINE | Age: 78
End: 2018-06-11

## 2018-06-11 VITALS
OXYGEN SATURATION: 98 % | TEMPERATURE: 97.5 F | HEIGHT: 75 IN | HEART RATE: 92 BPM | BODY MASS INDEX: 23.72 KG/M2 | DIASTOLIC BLOOD PRESSURE: 78 MMHG | WEIGHT: 190.8 LBS | SYSTOLIC BLOOD PRESSURE: 132 MMHG

## 2018-06-11 DIAGNOSIS — R41.3 MEMORY CHANGES: ICD-10-CM

## 2018-06-11 DIAGNOSIS — Z23 ENCOUNTER FOR IMMUNIZATION: ICD-10-CM

## 2018-06-11 DIAGNOSIS — I10 ESSENTIAL HYPERTENSION: ICD-10-CM

## 2018-06-11 DIAGNOSIS — Z00.00 MEDICARE ANNUAL WELLNESS VISIT, SUBSEQUENT: Primary | ICD-10-CM

## 2018-06-11 DIAGNOSIS — E78.00 HYPERCHOLESTEROLEMIA: ICD-10-CM

## 2018-06-11 DIAGNOSIS — E11.9 TYPE 2 DIABETES MELLITUS WITHOUT COMPLICATION, WITHOUT LONG-TERM CURRENT USE OF INSULIN (HCC): ICD-10-CM

## 2018-06-11 DIAGNOSIS — D64.9 ANEMIA, UNSPECIFIED TYPE: ICD-10-CM

## 2018-06-11 PROCEDURE — G0009 ADMIN PNEUMOCOCCAL VACCINE: HCPCS | Performed by: INTERNAL MEDICINE

## 2018-06-11 PROCEDURE — G0439 PPPS, SUBSEQ VISIT: HCPCS | Performed by: INTERNAL MEDICINE

## 2018-06-11 PROCEDURE — 90670 PCV13 VACCINE IM: CPT | Performed by: INTERNAL MEDICINE

## 2018-06-11 PROCEDURE — 99214 OFFICE O/P EST MOD 30 MIN: CPT | Performed by: INTERNAL MEDICINE

## 2018-06-11 RX ORDER — DONEPEZIL HYDROCHLORIDE 10 MG/1
5 TABLET, FILM COATED ORAL EVERY MORNING
COMMUNITY

## 2018-06-11 NOTE — PATIENT INSTRUCTIONS
Medicare Wellness  Personal Prevention Plan of Service     Date of Office Visit:  2018  Encounter Provider:  Quinton Leal MD  Place of Service:  [facility] PRIMARY CARE  Patient Name: Derrek Ritter  :  1940    As part of the Medicare Wellness portion of your visit today, we are providing you with this personalized preventive plan of services (PPPS). This plan is based upon recommendations of the [place] Preventive Services Task Force (USPSTF) and the Advisory Committee on Immunization Practices (ACIP).    This lists the preventive care services that should be considered, and provides dates of when you are due. Items listed as completed are up-to-date and do not require any further intervention.    Health Maintenance   Topic Date Due   • ZOSTER VACCINE (2 of 3) 2011   • PNEUMOCOCCAL VACCINES (65+ LOW/MEDIUM RISK) (2 of 2 - PCV13) 2013   • HEMOGLOBIN A1C  2018   • INFLUENZA VACCINE  2018   • MEDICARE ANNUAL WELLNESS  2019   • DIABETIC FOOT EXAM  2019   • LIPID PANEL  2019   • URINE MICROALBUMIN  2019   • COLONOSCOPY  2019   • TDAP/TD VACCINES (2 - Td) 2023       Orders Placed This Encounter   Procedures   • Pneumococcal Conjugate Vaccine 13-Valent All (PCV13)   • Comprehensive Metabolic Panel   • Lipid Panel   • Hemoglobin A1c   • Microalbumin / Creatinine Urine Ratio - Urine, Clean Catch   • CBC & Differential     Order Specific Question:   Manual Differential     Answer:   No       No Follow-up on file.

## 2018-06-11 NOTE — PROGRESS NOTES
QUICK REFERENCE INFORMATION:  The ABCs of the Annual Wellness Visit    Subsequent Medicare Wellness Visit    HEALTH RISK ASSESSMENT    1940    Recent Hospitalizations:  Recently treated at the following:  [facility].        Current Medical Providers:  Patient Care Team:  Quinton Leal MD as PCP - General  Quinton Leal MD as PCP - Family Medicine  Madhav Hdez MD as Consulting Physician (Hematology and Oncology)  Sherice Newman MD as Consulting Physician (Cardiology)        Smoking Status:  History   Smoking Status   • Never Smoker   Smokeless Tobacco   • Never Used     Comment: caffeine use       Alcohol Consumption:  History   Alcohol Use No       Depression Screen:   PHQ-2/PHQ-9 Depression Screening 6/11/2018   Little interest or pleasure in doing things 0   Feeling down, depressed, or hopeless 0   Trouble falling or staying asleep, or sleeping too much -   Feeling tired or having little energy -   Poor appetite or overeating -   Feeling bad about yourself - or that you are a failure or have let yourself or your family down -   Trouble concentrating on things, such as reading the newspaper or watching television -   Moving or speaking so slowly that other people could have noticed. Or the opposite - being so fidgety or restless that you have been moving around a lot more than usual -   Thoughts that you would be better off dead, or of hurting yourself in some way -   Total Score 0   If you checked off any problems, how difficult have these problems made it for you to do your work, take care of things at home, or get along with other people? -       Health Habits and Functional and Cognitive Screening:  Functional & Cognitive Status 6/11/2018   Do you have difficulty preparing food and eating? No   Do you have difficulty bathing yourself, getting dressed or grooming yourself? No   Do you have difficulty using the toilet? No   Do you have difficulty moving around from place to place?  No   Do you have trouble with steps or getting out of a bed or a chair? No   In the past year have you fallen or experienced a near fall? Yes   Do you need help using the phone?  No   Are you deaf or do you have serious difficulty hearing?  No   Do you need help with transportation? No   Do you need help shopping? No   Do you need help preparing meals?  No   Do you need help with housework?  No   Do you need help with laundry? No   Do you need help taking your medications? No   Do you need help managing money? No   Do you ever drive or ride in a car without wearing a seat belt? No   Do you have difficulty concentrating, remembering or making decisions? -           Does the patient have evidence of cognitive impairment? No    Aspirin use counseling: On Coumadin      Recent Lab Results:  CMP:  Lab Results   Component Value Date     (H) 06/09/2017    BUN 21 02/02/2018    CREATININE 0.76 02/02/2018    EGFRIFNONA 100 02/02/2018    EGFRIFAFRI 107 06/09/2017    BCR 27.6 (H) 02/02/2018     02/02/2018    K 3.6 02/02/2018    CO2 25.4 02/02/2018    CALCIUM 8.5 (L) 02/02/2018    PROTENTOTREF 6.3 06/09/2017    ALBUMIN 3.00 (L) 01/30/2018    LABGLOBREF 2.4 06/09/2017    LABIL2 1.2 01/30/2018    BILITOT 0.4 01/30/2018    ALKPHOS 33 (L) 01/30/2018    AST 22 01/30/2018    ALT 14 01/30/2018     Lipid Panel:  Lab Results   Component Value Date    TRIG 85 06/09/2017    HDL 53 06/09/2017    VLDL 17 06/09/2017     HbA1c:  Lab Results   Component Value Date    HGBA1C 5.90 (H) 01/30/2018       Visual Acuity:  No exam data present    Age-appropriate Screening Schedule:  Refer to the list below for future screening recommendations based on patient's age, sex and/or medical conditions. Orders for these recommended tests are listed in the plan section. The patient has been provided with a written plan.    Health Maintenance   Topic Date Due   • ZOSTER VACCINE (2 of 3) 11/26/2011   • PNEUMOCOCCAL VACCINES (65+ LOW/MEDIUM RISK) (2  of 2 - PCV13) 08/14/2013   • HEMOGLOBIN A1C  07/30/2018   • INFLUENZA VACCINE  08/01/2018   • DIABETIC FOOT EXAM  06/11/2019   • LIPID PANEL  06/11/2019   • URINE MICROALBUMIN  06/11/2019   • TDAP/TD VACCINES (2 - Td) 09/19/2023   • COLONOSCOPY  07/01/2024        Subjective   History of Present Illness    Derrek Ritter is a 78 y.o. male who presents for an Subsequent Wellness Visit.    The following portions of the patient's history were reviewed and updated as appropriate: allergies, current medications, past family history, past medical history, past social history, past surgical history and problem list.    Outpatient Medications Prior to Visit   Medication Sig Dispense Refill   • ACCU-CHEK FASTCLIX LANCETS misc USE TO TEST TWICE DAILY 102 each 0   • ACCU-CHEK SMARTVIEW test strip USE TO TEST TWICE DAILY 100 each 1   • Blood Glucose Monitoring Suppl (ACCU-CHEK FRANCISCO SMARTVIEW) W/DEVICE kit USE TO TEST BLOOD SUGAR TWICE DAILY 1 kit 0   • Blood Glucose Monitoring Suppl (FREESTYLE LITE) device Use to test blood once daily 1 each 0   • brimonidine (ALPHAGAN) 0.2 % ophthalmic solution Administer 1 drop to both eyes 2 (Two) Times a Day.     • donepezil (ARICEPT) 5 MG tablet Take 5 mg by mouth Every Morning.     • dorzolamide (TRUSOPT) 2 % ophthalmic solution Administer 1 drop to both eyes 2 (two) times a day.     • enalapril (VASOTEC) 20 MG tablet Take 40 mg by mouth Every Morning.     • esomeprazole (nexIUM) 40 MG capsule TAKE 1 CAPSULE DAILY 90 capsule 1   • ferrous gluconate (FERGON) 324 MG tablet Take 1 tablet by mouth Daily With Breakfast.     • furosemide (LASIX) 20 MG tablet Take 1 tablet by mouth Daily. as needed (Patient taking differently: Take 20 mg by mouth Daily As Needed.) 90 tablet 3   • glimepiride (AMARYL) 2 MG tablet Take 2 mg by mouth Every Morning Before Breakfast.     • glucose blood (FREESTYLE LITE) test strip Use to test blood once daily 100 each 12   • Glucose Blood disk Inject 1 Device as  directed daily. TEST BLOOD SUGAR ONCE DAILY AS DIRECTED 100 each 3   • Lancets (FREESTYLE) lancets Use to test blood sugar daily 100 each 12   • latanoprost (XALATAN) 0.005 % ophthalmic solution Administer 1 drop to both eyes Every Night.     • metFORMIN (GLUCOPHAGE) 1000 MG tablet TAKE 1 TABLET EVERY MORNING, 1/2 TABLET AT NOON, AND 1 TABLET EVERY EVENING. 75 tablet 1   • metoprolol tartrate (LOPRESSOR) 50 MG tablet Take 1 tablet by mouth Every 12 (Twelve) Hours.     • ONE TOUCH ULTRA TEST test strip USE ONCE DAILY AS DIRECTED 100 each 3   • oxyCODONE-acetaminophen (PERCOCET)  MG per tablet Take 1 tablet by mouth Every 6 (Six) Hours As Needed for Moderate Pain . 50 tablet 0   • pioglitazone (ACTOS) 45 MG tablet Take 1 tablet by mouth Every Morning. 90 tablet 0   • simvastatin (ZOCOR) 20 MG tablet Take 20 mg by mouth Every Morning.     • tamsulosin (FLOMAX) 0.4 MG capsule 24 hr capsule Take 0.4 mg by mouth Every Morning. 1-2 TABS AS NEEDED      • timolol (TIMOPTIC) 0.5 % ophthalmic solution Administer 1 drop to both eyes Daily.     • warfarin (COUMADIN) 5 MG tablet Take 1 tablet by mouth Daily. (Patient taking differently: Take 5 mg by mouth Daily. Sat & Sun only)     • metFORMIN (GLUCOPHAGE) 1000 MG tablet Take 1,000 mg by mouth 2 (Two) Times a Day.     • sennosides-docusate sodium (SENOKOT-S) 8.6-50 MG tablet Take 1 tablet by mouth Every Night.     • warfarin (COUMADIN) 10 MG tablet TAKE 1 TABLET BY MOUTH EVERY OTHER DAY ,ALTERNATING WITH 1/2 TABLET EVERY OTHER DAY (Patient taking differently: 1 tablet qd Mon-Fri) 68 tablet 0   • ziprasidone (GEODON) 20 MG capsule Take 1 capsule by mouth Daily Before Supper.       No facility-administered medications prior to visit.        Patient Active Problem List   Diagnosis   • Cholelithiasis   • DM (HgbA1c 5.9)   • Diverticulosis of intestine   • Hypercholesterolemia   • Hypertension   • Spinal stenosis   • Microalbuminuria   • Atrial fibrillation   • Medicare annual  "wellness visit, subsequent   • Anemia   • Iron deficiency anemia   • DJD (degenerative joint disease)   • Impotence of organic origin   • Glaucoma   • Hiatal hernia   • Mild cognitive disorder   • Calculus of kidney   • Nocturia   • Chronic bilateral low back pain with bilateral sciatica   • Post laminectomy syndrome   • Routine health maintenance   • Weight loss   • Spinal stenosis of lumbar region with neurogenic claudication   • Spondylolisthesis of lumbar region   • Lumbar spine pain   • Metabolic encephalopathy   • Urinary retention with incomplete bladder emptying   • Macrocytosis without anemia   • Sundowning       Advance Care Planning:  has an advance directive - a copy HAS NOT been provided    Identification of Risk Factors:  Risk factors include: None noted.    Review of Systems    Compared to one year ago, the patient feels his physical health is the same.  Compared to one year ago, the patient feels his mental health is the same.    Objective     Physical Exam    Vitals:    06/11/18 0754   BP: 132/78   Pulse: 92   Temp: 97.5 °F (36.4 °C)   SpO2: 98%   Weight: 86.5 kg (190 lb 12.8 oz)   Height: 190.5 cm (75\")   PainSc: 0-No pain       Patient's Body mass index is 23.85 kg/m². BMI is within normal parameters. No follow-up required.      Assessment/Plan   Patient Self-Management and Personalized Health Advice  The patient has been provided with information about: none noted and preventive services including:   · Advance directive, Prostate cancer screening discussed, Shingrix advised.    Visit Diagnoses:    ICD-10-CM ICD-9-CM   1. Essential hypertension I10 401.9   2. Hypercholesterolemia E78.00 272.0   3. Type 2 diabetes mellitus without complication, without long-term current use of insulin E11.9 250.00   4. Anemia, unspecified type D64.9 285.9   5. Encounter for immunization Z23 V03.89       No orders of the defined types were placed in this encounter.      Outpatient Encounter Prescriptions as of " 6/11/2018   Medication Sig Dispense Refill   • ACCU-CHEK FASTCLIX LANCETS misc USE TO TEST TWICE DAILY 102 each 0   • ACCU-CHEK SMARTVIEW test strip USE TO TEST TWICE DAILY 100 each 1   • Blood Glucose Monitoring Suppl (ACCU-CHEK FRANCISCO SMARTVIEW) W/DEVICE kit USE TO TEST BLOOD SUGAR TWICE DAILY 1 kit 0   • Blood Glucose Monitoring Suppl (FREESTYLE LITE) device Use to test blood once daily 1 each 0   • brimonidine (ALPHAGAN) 0.2 % ophthalmic solution Administer 1 drop to both eyes 2 (Two) Times a Day.     • donepezil (ARICEPT) 10 MG tablet Take 10 mg by mouth Every Night.     • donepezil (ARICEPT) 5 MG tablet Take 5 mg by mouth Every Morning.     • dorzolamide (TRUSOPT) 2 % ophthalmic solution Administer 1 drop to both eyes 2 (two) times a day.     • enalapril (VASOTEC) 20 MG tablet Take 40 mg by mouth Every Morning.     • esomeprazole (nexIUM) 40 MG capsule TAKE 1 CAPSULE DAILY 90 capsule 1   • ferrous gluconate (FERGON) 324 MG tablet Take 1 tablet by mouth Daily With Breakfast.     • furosemide (LASIX) 20 MG tablet Take 1 tablet by mouth Daily. as needed (Patient taking differently: Take 20 mg by mouth Daily As Needed.) 90 tablet 3   • glimepiride (AMARYL) 2 MG tablet Take 2 mg by mouth Every Morning Before Breakfast.     • glucose blood (FREESTYLE LITE) test strip Use to test blood once daily 100 each 12   • Glucose Blood disk Inject 1 Device as directed daily. TEST BLOOD SUGAR ONCE DAILY AS DIRECTED 100 each 3   • Lancets (FREESTYLE) lancets Use to test blood sugar daily 100 each 12   • latanoprost (XALATAN) 0.005 % ophthalmic solution Administer 1 drop to both eyes Every Night.     • metFORMIN (GLUCOPHAGE) 1000 MG tablet TAKE 1 TABLET EVERY MORNING, 1/2 TABLET AT NOON, AND 1 TABLET EVERY EVENING. 75 tablet 1   • metoprolol tartrate (LOPRESSOR) 50 MG tablet Take 1 tablet by mouth Every 12 (Twelve) Hours.     • ONE TOUCH ULTRA TEST test strip USE ONCE DAILY AS DIRECTED 100 each 3   • oxyCODONE-acetaminophen  (PERCOCET)  MG per tablet Take 1 tablet by mouth Every 6 (Six) Hours As Needed for Moderate Pain . 50 tablet 0   • pioglitazone (ACTOS) 45 MG tablet Take 1 tablet by mouth Every Morning. 90 tablet 0   • simvastatin (ZOCOR) 20 MG tablet Take 20 mg by mouth Every Morning.     • tamsulosin (FLOMAX) 0.4 MG capsule 24 hr capsule Take 0.4 mg by mouth Every Morning. 1-2 TABS AS NEEDED      • timolol (TIMOPTIC) 0.5 % ophthalmic solution Administer 1 drop to both eyes Daily.     • warfarin (COUMADIN) 5 MG tablet Take 1 tablet by mouth Daily. (Patient taking differently: Take 5 mg by mouth Daily. Sat & Sun only)     • [DISCONTINUED] metFORMIN (GLUCOPHAGE) 1000 MG tablet Take 1,000 mg by mouth 2 (Two) Times a Day.     • [DISCONTINUED] sennosides-docusate sodium (SENOKOT-S) 8.6-50 MG tablet Take 1 tablet by mouth Every Night.     • [DISCONTINUED] warfarin (COUMADIN) 10 MG tablet TAKE 1 TABLET BY MOUTH EVERY OTHER DAY ,ALTERNATING WITH 1/2 TABLET EVERY OTHER DAY (Patient taking differently: 1 tablet qd Mon-Fri) 68 tablet 0   • [DISCONTINUED] ziprasidone (GEODON) 20 MG capsule Take 1 capsule by mouth Daily Before Supper.       No facility-administered encounter medications on file as of 6/11/2018.        Reviewed use of high risk medication in the elderly: yes  Reviewed for potential of harmful drug interactions in the elderly: yes    Follow Up:  1 year    An After Visit Summary and PPPS with all of these plans were given to the patient.

## 2018-06-12 DIAGNOSIS — D64.9 ANEMIA, UNSPECIFIED TYPE: Primary | ICD-10-CM

## 2018-06-12 LAB
ALBUMIN SERPL-MCNC: 3.5 G/DL (ref 3.5–5.2)
ALBUMIN/CREAT UR: 141.8 MG/G CREAT (ref 0–30)
ALBUMIN/GLOB SERPL: 1.5 G/DL
ALP SERPL-CCNC: 54 U/L (ref 39–117)
ALT SERPL-CCNC: 11 U/L (ref 1–41)
AST SERPL-CCNC: 13 U/L (ref 1–40)
BASOPHILS # BLD AUTO: 0.04 10*3/MM3 (ref 0–0.2)
BASOPHILS NFR BLD AUTO: 0.8 % (ref 0–1.5)
BILIRUB SERPL-MCNC: <0.2 MG/DL (ref 0.1–1.2)
BUN SERPL-MCNC: 26 MG/DL (ref 8–23)
BUN/CREAT SERPL: 34.2 (ref 7–25)
CALCIUM SERPL-MCNC: 8.9 MG/DL (ref 8.6–10.5)
CHLORIDE SERPL-SCNC: 106 MMOL/L (ref 98–107)
CHOLEST SERPL-MCNC: 117 MG/DL (ref 0–200)
CO2 SERPL-SCNC: 24.2 MMOL/L (ref 22–29)
CREAT SERPL-MCNC: 0.76 MG/DL (ref 0.76–1.27)
CREAT UR-MCNC: 64.4 MG/DL
EOSINOPHIL # BLD AUTO: 0.21 10*3/MM3 (ref 0–0.7)
EOSINOPHIL NFR BLD AUTO: 4.3 % (ref 0.3–6.2)
ERYTHROCYTE [DISTWIDTH] IN BLOOD BY AUTOMATED COUNT: 14.1 % (ref 11.5–14.5)
GFR SERPLBLD CREATININE-BSD FMLA CKD-EPI: 121 ML/MIN/1.73
GFR SERPLBLD CREATININE-BSD FMLA CKD-EPI: 99 ML/MIN/1.73
GLOBULIN SER CALC-MCNC: 2.3 GM/DL
GLUCOSE SERPL-MCNC: 140 MG/DL (ref 65–99)
HBA1C MFR BLD: 7.22 % (ref 4.8–5.6)
HCT VFR BLD AUTO: 31.1 % (ref 40.4–52.2)
HDLC SERPL-MCNC: 43 MG/DL (ref 40–60)
HGB BLD-MCNC: 9.3 G/DL (ref 13.7–17.6)
IMM GRANULOCYTES # BLD: 0 10*3/MM3 (ref 0–0.03)
IMM GRANULOCYTES NFR BLD: 0 % (ref 0–0.5)
LDLC SERPL CALC-MCNC: 62 MG/DL (ref 0–100)
LYMPHOCYTES # BLD AUTO: 1.22 10*3/MM3 (ref 0.9–4.8)
LYMPHOCYTES NFR BLD AUTO: 24.7 % (ref 19.6–45.3)
MCH RBC QN AUTO: 27.9 PG (ref 27–32.7)
MCHC RBC AUTO-ENTMCNC: 29.9 G/DL (ref 32.6–36.4)
MCV RBC AUTO: 93.4 FL (ref 79.8–96.2)
MICROALBUMIN UR-MCNC: 91.3 UG/ML
MONOCYTES # BLD AUTO: 0.37 10*3/MM3 (ref 0.2–1.2)
MONOCYTES NFR BLD AUTO: 7.5 % (ref 5–12)
NEUTROPHILS # BLD AUTO: 3.09 10*3/MM3 (ref 1.9–8.1)
NEUTROPHILS NFR BLD AUTO: 62.7 % (ref 42.7–76)
PLATELET # BLD AUTO: 275 10*3/MM3 (ref 140–500)
POTASSIUM SERPL-SCNC: 4.2 MMOL/L (ref 3.5–5.2)
PROT SERPL-MCNC: 5.8 G/DL (ref 6–8.5)
RBC # BLD AUTO: 3.33 10*6/MM3 (ref 4.6–6)
SODIUM SERPL-SCNC: 143 MMOL/L (ref 136–145)
TRIGL SERPL-MCNC: 59 MG/DL (ref 0–150)
VLDLC SERPL CALC-MCNC: 11.8 MG/DL (ref 5–40)
WBC # BLD AUTO: 4.93 10*3/MM3 (ref 4.5–10.7)

## 2018-06-13 NOTE — PROGRESS NOTES
Here are your recent laboratory results. #1 Anemia has been persistent since 2012, but seems to be worsening. I would suggest additional laboratory evaluation at this time. Please schedule this with my office. #2 Control of diabetes has adequate over the past two months.#3 There is protein loss in the urine (Microalbumin / creatinine ratio). This is directly attributable to diabetes. The use of Vasotec should keep this from getting worse.#4 All remaining results are acceptable.

## 2018-06-20 RX ORDER — TAMSULOSIN HYDROCHLORIDE 0.4 MG/1
CAPSULE ORAL
Qty: 180 CAPSULE | Refills: 1 | Status: SHIPPED | OUTPATIENT
Start: 2018-06-20 | End: 2018-08-07 | Stop reason: SDUPTHER

## 2018-06-26 ENCOUNTER — RESULTS ENCOUNTER (OUTPATIENT)
Dept: INTERNAL MEDICINE | Age: 78
End: 2018-06-26

## 2018-06-26 DIAGNOSIS — D64.9 ANEMIA, UNSPECIFIED TYPE: ICD-10-CM

## 2018-06-27 DIAGNOSIS — I48.0 PAROXYSMAL ATRIAL FIBRILLATION (HCC): ICD-10-CM

## 2018-06-27 RX ORDER — WARFARIN SODIUM 10 MG/1
TABLET ORAL
Qty: 68 TABLET | Refills: 2 | Status: SHIPPED | OUTPATIENT
Start: 2018-06-27 | End: 2019-01-01

## 2018-07-05 RX ORDER — ENALAPRIL MALEATE 20 MG/1
TABLET ORAL
Qty: 180 TABLET | Refills: 1 | Status: SHIPPED | OUTPATIENT
Start: 2018-07-05 | End: 2018-01-01 | Stop reason: SDUPTHER

## 2018-07-12 RX ORDER — PIOGLITAZONEHYDROCHLORIDE 45 MG/1
TABLET ORAL
Qty: 90 TABLET | Refills: 0 | Status: SHIPPED | OUTPATIENT
Start: 2018-07-12 | End: 2018-01-01 | Stop reason: SDUPTHER

## 2018-07-20 DIAGNOSIS — E11.9 TYPE 2 DIABETES MELLITUS WITHOUT COMPLICATION, WITHOUT LONG-TERM CURRENT USE OF INSULIN (HCC): ICD-10-CM

## 2018-08-01 DIAGNOSIS — E78.00 HYPERCHOLESTEROLEMIA: Primary | ICD-10-CM

## 2018-08-01 RX ORDER — SIMVASTATIN 20 MG
TABLET ORAL
Qty: 90 TABLET | Refills: 1 | Status: SHIPPED | OUTPATIENT
Start: 2018-08-01 | End: 2019-01-01

## 2018-08-07 ENCOUNTER — ANTICOAGULATION VISIT (OUTPATIENT)
Dept: INTERNAL MEDICINE | Age: 78
End: 2018-08-07

## 2018-08-07 ENCOUNTER — OFFICE VISIT (OUTPATIENT)
Dept: ORTHOPEDIC SURGERY | Facility: CLINIC | Age: 78
End: 2018-08-07

## 2018-08-07 VITALS — BODY MASS INDEX: 22.75 KG/M2 | WEIGHT: 183 LBS | TEMPERATURE: 98.2 F | HEIGHT: 75 IN

## 2018-08-07 DIAGNOSIS — I48.91 ATRIAL FIBRILLATION, UNSPECIFIED TYPE (HCC): ICD-10-CM

## 2018-08-07 DIAGNOSIS — Z98.1 HISTORY OF LUMBAR FUSION: Primary | ICD-10-CM

## 2018-08-07 DIAGNOSIS — Z79.01 ANTICOAGULANT LONG-TERM USE: Primary | ICD-10-CM

## 2018-08-07 LAB
CRP SERPL-MCNC: 0.5 MG/DL (ref 0–0.5)
FERRITIN SERPL-MCNC: 10.02 NG/ML (ref 30–400)
INR PPP: 3.8 (ref 2–3)
IRON SATN MFR SERPL: 11 % (ref 20–50)
IRON SERPL-MCNC: 48 MCG/DL (ref 59–158)
RETICS/RBC NFR AUTO: 0.86 % (ref 0.5–1.5)
TIBC SERPL-MCNC: 427 MCG/DL
TSH SERPL DL<=0.005 MIU/L-ACNC: 1.2 MIU/ML (ref 0.27–4.2)
UIBC SERPL-MCNC: 379 MCG/DL

## 2018-08-07 PROCEDURE — 72100 X-RAY EXAM L-S SPINE 2/3 VWS: CPT | Performed by: ORTHOPAEDIC SURGERY

## 2018-08-07 PROCEDURE — 36416 COLLJ CAPILLARY BLOOD SPEC: CPT | Performed by: INTERNAL MEDICINE

## 2018-08-07 PROCEDURE — 99213 OFFICE O/P EST LOW 20 MIN: CPT | Performed by: ORTHOPAEDIC SURGERY

## 2018-08-07 PROCEDURE — 85610 PROTHROMBIN TIME: CPT | Performed by: INTERNAL MEDICINE

## 2018-08-07 NOTE — PROGRESS NOTES
He is having some sensation of the kyphosis and some intermittent back pain but having good days as well no leg pain.  Posture is fairly unremarkable slight prominence at the superior aspect he's had slight lucency of the around the cephalad screws on plain film x-rays which is not significantly changed from last x-ray but which didn't change slightly from the initial one.  Overall doing well and I think this will improve with time a want to see him back in 3 months with repeat lumbar x-rays.

## 2018-08-08 NOTE — PROGRESS NOTES
Laboratory studies suggest an iron deficiency. You have mentioned to me that this is being followed by hematology, however I can find no record of this consultation. Please let me know who is following this problem and when was the last time that you saw them.

## 2018-08-27 PROBLEM — I45.2 RBBB (RIGHT BUNDLE BRANCH BLOCK WITH LEFT ANTERIOR FASCICULAR BLOCK): Status: ACTIVE | Noted: 2018-01-01

## 2018-08-27 NOTE — PROGRESS NOTES
Date of Office Visit: 2018  Encounter Provider: LAMONT aGllo  Place of Service: [facility] CARDIOLOGY  Patient Name: Derrek Ritter  :1940    Chief Complaint   Patient presents with   • Paroxysmal atrial fibrillation     6 month follow    • Essential hypertension   :     HPI: Derrek Ritter is a 78 y.o. male who presents today for 6 month follow-up.  He has a history of paroxysmal atrial fibrillation (), hypertension, hyperlipidemia, right bundle branch block, type 2 diabetes mellitus, and glaucoma.   His beta blocker metoprolol had been stopped in the past due to bradycardia.  His last echocardiogram 2013 revealed the following: EF 55-60%, indeterminate diastolic function, borderline left atrial enlargement, mild mitral valve prolapse with trace mitral insufficiency.    He was last seen in the office by Dr. Sherice Newman on 2018 for hospital follow-up for atrial fibrillation. His metoprolol and warfarin was restarted during the hospitalization. When he followed up with Dr. Newman she thought he was taking the metoprolol.     At this time he denies any chest pain, shortness of breath, orthopnea, palpitations, dizziness, syncope or falls.  He does report bilateral lower extremity edema that has been present for over 19 years. His leg swelling been worked up by his primary care physician and has been reported as related to the use of Actos for his diabetes management.  He states that his swelling in his legs is better today than it normally is.  He continues on Enalapril 20 mg twice daily, Lasix 20 mg daily, Simvastatin 20 mg daily, and Coumadin daily. His INR levels are followed by Dr. Leal. He denies any bleeding.  At today's visit his EKG reveals that he is in sinus rhythm with right bundle-branch block and a left anterior fascicular block.     The following portion of the patient's history were reviewed and updated as appropriate: past  medical history, past surgical history, past social history, past family history, allergies, current medications, and problem list.    Past Medical History:   Diagnosis Date   • Acute suppurative otitis media    • Acute upper respiratory infection    • Cholelithiasis    • Diabetes mellitus (CMS/HCC)     TYPE 2   • Diverticulosis    • Dry eyes    • Earache    • Edema    • Encounter for screening for malignant neoplasm of prostate    • Esophagitis, reflux    • Glaucoma    • H/O echocardiogram 09/18/2013   • Health care maintenance    • Hiatal hernia    • History of EKG 10/06/2015   • History of Holter monitoring 09/16/2013   • Hyperlipidemia    • Hypertension    • Lumbar canal stenosis    • Microalbuminuria    • Mitral valve prolapse    • Nephrolithiasis    • Osteoarthritis of hand     of thumb   • PAF (paroxysmal atrial fibrillation) (CMS/HCC)    • 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   • Spinal stenosis        Past Surgical History:   Procedure Laterality Date   • AMPUTATION DIGIT Left 10/29/2016    Procedure: TENDON AND NERVE REPAIR OF  LEFT THUMB;  Surgeon: Zak Hanson MD;  Location: [facility];  Service:    • ARTHRODESIS  10/28/2013    Spinal / Description: Repair spinal stenosis   • CATARACT EXTRACTION     • EYE SURGERY      EYE MUSCLE SX   • HERNIA REPAIR     • LUMBAR DISCECTOMY FUSION INSTRUMENTATION N/A 1/29/2018    Procedure: L2 to L5 fusion with instrumentation and removal of implants L4 5;  Surgeon: Barron Knight MD;  Location: [facility] OR;  Service:    • LUMBAR FUSION      Lumbar vertebral fusion   • LUMBAR LAMINECTOMY      10.28.13  bilat L4/5 lami with Nyla and Belen   • LUMBAR LAMINECTOMY DISCECTOMY DECOMPRESSION N/A 1/29/2018    Procedure: L2 to L4 laminectomy with a fusion by orthopedics;  Surgeon: Jeison Redmond MD;  Location: [facility];  Service:    • OTHER SURGICAL HISTORY      Lithotomy   • TONSILLECTOMY         Social History      Social History   • Marital status:      Spouse name: Silvia   • Number of children: 3   • Years of education: College     Occupational History   • Teacher RETIRED Retired     Social History Main Topics   • Smoking status: Never Smoker   • Smokeless tobacco: Never Used      Comment: caffeine use   • Alcohol use Yes      Comment: Occasional   • Drug use: No   • Sexual activity: Defer       Family History   Problem Relation Age of Onset   • Heart attack Mother         acute myocardial infarction   • Stroke Mother         hemorrhagic   • Emphysema Father    • Diabetes Other         mellitus   • Hypertension Other    • Heart disease Other    • Malig Hyperthermia Neg Hx        Review of Systems   Constitution: Negative for chills, diaphoresis, fever, malaise/fatigue, night sweats, weight gain and weight loss.   HENT: Negative for hearing loss, nosebleeds, sore throat and tinnitus.    Eyes: Negative for blurred vision, double vision, pain and visual disturbance.   Cardiovascular: Positive for leg swelling (bilateral). Negative for chest pain, claudication, cyanosis, dyspnea on exertion, irregular heartbeat, near-syncope, orthopnea, palpitations, paroxysmal nocturnal dyspnea and syncope.   Respiratory: Positive for snoring. Negative for cough, hemoptysis, shortness of breath and wheezing.    Endocrine: Negative for cold intolerance, heat intolerance and polyuria.   Hematologic/Lymphatic: Negative for bleeding problem. Does not bruise/bleed easily.   Skin: Negative for color change, dry skin, flushing and itching.   Musculoskeletal: Negative for falls, joint pain, joint swelling, muscle cramps, muscle weakness and myalgias.   Gastrointestinal: Negative for abdominal pain, constipation, heartburn, melena, nausea and vomiting.   Genitourinary: Negative for dysuria and hematuria.        Erectile dysfunction   Neurological: Negative for excessive daytime sleepiness, dizziness, light-headedness, loss of balance,  numbness, paresthesias, seizures and vertigo.   Psychiatric/Behavioral: Negative for altered mental status, depression, memory loss and substance abuse. The patient does not have insomnia and is not nervous/anxious.    Allergic/Immunologic: Negative for environmental allergies.       No Known Allergies      Current Outpatient Prescriptions:   •  ACCU-CHEK FASTCLIX LANCETS misc, USE TO TEST TWICE DAILY, Disp: 102 each, Rfl: 0  •  ACCU-CHEK SMARTVIEW test strip, USE TO TEST TWICE DAILY, Disp: 100 each, Rfl: 1  •  Blood Glucose Monitoring Suppl (ACCU-CHEK FRANCISCO SMARTVIEW) W/DEVICE kit, USE TO TEST BLOOD SUGAR TWICE DAILY, Disp: 1 kit, Rfl: 0  •  Blood Glucose Monitoring Suppl (FREESTYLE LITE) device, Use to test blood once daily, Disp: 1 each, Rfl: 0  •  brimonidine (ALPHAGAN) 0.2 % ophthalmic solution, Administer 1 drop to both eyes 2 (Two) Times a Day., Disp: , Rfl:   •  donepezil (ARICEPT) 10 MG tablet, Take 10 mg by mouth Every Night., Disp: , Rfl:   •  donepezil (ARICEPT) 5 MG tablet, Take 10 mg by mouth Every Morning., Disp: , Rfl:   •  dorzolamide (TRUSOPT) 2 % ophthalmic solution, Administer 1 drop to both eyes 2 (two) times a day., Disp: , Rfl:   •  enalapril (VASOTEC) 20 MG tablet, TAKE 1 TABLET BY MOUTH TWICE A DAY, Disp: 180 tablet, Rfl: 1  •  esomeprazole (nexIUM) 40 MG capsule, TAKE 1 CAPSULE DAILY, Disp: 90 capsule, Rfl: 1  •  ferrous sulfate (IRON SUPPLEMENT) 325 (65 FE) MG tablet, Take 1 tablet by mouth Daily With Breakfast., Disp: , Rfl:   •  furosemide (LASIX) 20 MG tablet, Take 1 tablet by mouth Daily. as needed (Patient taking differently: Take 20 mg by mouth Daily As Needed.), Disp: 90 tablet, Rfl: 3  •  glimepiride (AMARYL) 2 MG tablet, Take 2 mg by mouth Every Morning Before Breakfast., Disp: , Rfl:   •  glucose blood (FREESTYLE LITE) test strip, Use to test blood once daily, Disp: 100 each, Rfl: 12  •  Glucose Blood disk, Inject 1 Device as directed daily. TEST BLOOD SUGAR ONCE DAILY AS  "DIRECTED, Disp: 100 each, Rfl: 3  •  Lancets (FREESTYLE) lancets, Use to test blood sugar daily, Disp: 100 each, Rfl: 12  •  latanoprost (XALATAN) 0.005 % ophthalmic solution, Administer 1 drop to both eyes Every Night., Disp: , Rfl:   •  metFORMIN (GLUCOPHAGE) 1000 MG tablet, TAKE 1 TABLET EVERY MORNING, 1/2 TABLET AT NOON, AND 1 TABLET EVERY EVENING., Disp: 225 tablet, Rfl: 1  •  ONE TOUCH ULTRA TEST test strip, USE ONCE DAILY AS DIRECTED, Disp: 100 each, Rfl: 3  •  oxyCODONE-acetaminophen (PERCOCET)  MG per tablet, Take 1 tablet by mouth Every 6 (Six) Hours As Needed for Moderate Pain ., Disp: 50 tablet, Rfl: 0  •  pioglitazone (ACTOS) 45 MG tablet, TAKE 1 TABLET EVERY MORNING, Disp: 90 tablet, Rfl: 0  •  simvastatin (ZOCOR) 20 MG tablet, TAKE 1 TABLET DAILY, Disp: 90 tablet, Rfl: 1  •  tamsulosin (FLOMAX) 0.4 MG capsule 24 hr capsule, Take 0.4 mg by mouth Every Morning. 1-2 TABS AS NEEDED , Disp: , Rfl:   •  timolol (TIMOPTIC) 0.5 % ophthalmic solution, Administer 1 drop to both eyes Daily., Disp: , Rfl:   •  warfarin (COUMADIN) 10 MG tablet, TAKE 1 TABLET BY MOUTH EVERY OTHER DAY ,ALTERNATING WITH 1/2 TABLET EVERY OTHER DAY, Disp: 68 tablet, Rfl: 2  •  warfarin (COUMADIN) 5 MG tablet, Take 1 tablet by mouth Daily. (Patient taking differently: Take 5 mg by mouth Daily. Sat & Sun only), Disp: , Rfl:   •  ferrous gluconate (FERGON) 324 MG tablet, Take 1 tablet by mouth Daily With Breakfast., Disp: , Rfl:   •  metoprolol tartrate (LOPRESSOR) 50 MG tablet, Take 1 tablet by mouth Every 12 (Twelve) Hours., Disp: , Rfl:       Objective:     Vitals:    08/27/18 1240   BP: 130/60   BP Location: Left arm   Pulse: 66   Weight: 85.3 kg (188 lb)   Height: 190.5 cm (75\")     Body mass index is 23.5 kg/m².    PHYSICAL EXAM:    Vitals Reviewed.   General Appearance: No acute distress, well developed and well nourished.   Eyes: Conjunctiva and lids: No erythema, swelling, or discharge. Sclera non-icteric.   HENT: " Atraumatic, normocephalic. External eyes, ears, and nose normal. No hearing loss noted. Mucous membranes normal. Lips not cyanotic. Neck supple with no tenderness.  Respiratory: No signs of respiratory distress. Respiration rhythm and depth normal.   Clear to auscultation. No rales, crackles, rhonchi, or wheezing auscultated.   Cardiovascular:  Jugular Venous Pressure: Normal  Heart Rate and Rhythm: Normal, Heart Sounds: Normal S1 and S2. No S3 or S4 noted.  Murmurs: No murmurs noted. No rubs, thrills, or gallops.   Arterial Pulses: Carotid pulses normal. No carotid bruit noted. Posterior tibialis and dorsalis pedis pulses normal.   Lower Extremities: 2+ Bilateral lower extremity edema (chronic according to patient)  Gastrointestinal:  Abdomen soft, non-distended, non-tender. Normal bowel sounds. No hepatomegaly.   Musculoskeletal: Normal movement of extremities  Skin and Nails: General appearance normal. No pallor, cyanosis, diaphoresis. Skin temperature normal. No clubbing of fingernails.   Psychiatric: Patient alert and oriented to person, place, and time. Speech and behavior appropriate. Normal mood and affect.       ECG 12 Lead  Date/Time: 8/27/2018 12:38 PM  Performed by: NILESH DIAZ  Authorized by: NILESH DIAZ   Comparison: compared with previous ECG from 2/27/2018  Similar to previous ECG  Rhythm: sinus rhythm  Rate: normal  Conduction: right bundle branch block and LAFB  Clinical impression: abnormal ECG  Comments: Sinus Rhythm, RBBB, LAFB              Assessment:       Diagnosis Plan   1. Paroxysmal atrial fibrillation (CMS/HCC)  ECG 12 Lead   2. On warfarin therapy     3. Abnormal EKG  ECG 12 Lead   4. RBBB (right bundle branch block with left anterior fascicular block)  ECG 12 Lead   5. Essential hypertension     6. Hypercholesterolemia            Plan:       1.  Paroxysmal atrial fibrillation: He remains in normal sinus rhythm today and is rate controlled.  He said he never restarted the  metoprolol although it appears that Dr. Newman and Dr. Leal felt that he was taking it at one point.  The pharmacy was contacted and he never filled the metoprolol as directed.  He is rate controlled at this time so I will not add it.  He remains on warfarin and denies any bleeding. Warfarin therapy: Managed by patient's PCP Dr. Leal.  Patient educated to notify provider if any bleeding episodes, or blood in the stool or urine.  Patient also educated try to avoid NSAID medications such as naproxen sodium as it can increase his bleeding risk on warfarin therapy.  Patient demonstrated understanding.       Atrial Fibrillation and Atrial Flutter  Assessment  • The patient has paroxysmal atrial fibrillation  • This is non-valvular in etiology  • The patient's CHADS2-VASc score is 3  • A [id number] score of 2 or more is considered a high risk for a thromboembolic event  • Warfarin prescribed    Plan  • Continue in atrial fibrillation with rate control  • Continue warfarin for antithrombotic therapy, bleeding issues discussed      2.  Right Bundle Branch Block/LAFB: Abnormal EKG; chronic and unchanged.    3.  Hypertension: Blood pressure well-controlled today.  Continue on Enalapril.  Patient educated on good blood pressure control.    4.  Hypercholesterolemia: Continue on Simvastatin.  Patient's lipid panel 6/11/18 was within normal limits.     5.  Lymphedema: Bilateral +2 lower extremity edema.  The patient states this is been chronic for 19 years.  I have recommended low-sodium diet and elevation of legs.    5.  Follow-up with Dr. Newman in 6 months, unless otherwise needed sooner.    As always, it has been a pleasure to participate in your patient's care.      Sincerely,         LAMONT Muñoz

## 2018-10-10 NOTE — PROGRESS NOTES
[facility] INTERNAL MEDICINE  MD Derrek MCKEON Stone / 78 y.o. / male  10/10/2018      ASSESSMENT & PLAN:    Problem List Items Addressed This Visit        Unprioritized    DM (HgbA1c 5.9)    Relevant Medications    glimepiride (AMARYL) 2 MG tablet    pioglitazone (ACTOS) 45 MG tablet    metFORMIN (GLUCOPHAGE) 1000 MG tablet    Other Relevant Orders    Hemoglobin A1c    Hypertension - Primary    Relevant Medications    enalapril (VASOTEC) 20 MG tablet    furosemide (LASIX) 20 MG tablet    Atrial fibrillation (CMS/HCC)    Overview     Followed by cardiology         Relevant Medications    warfarin (COUMADIN) 10 MG tablet    Other Relevant Orders    POC INR    Iron deficiency anemia    Relevant Medications    ferrous sulfate (IRON SUPPLEMENT) 325 (65 FE) MG tablet    Other Relevant Orders    CBC & Differential      Other Visit Diagnoses     Swelling of both ankles            Orders Placed This Encounter   Procedures   • Hemoglobin A1c   • POC INR   • CBC & Differential       Summary/Discussion:    Number-one hypertension stable on current meds, continue same, blood pressure check 4 months.    #2 diabetes status to be determined good control based on home glucose monitoring will check A1c today follow-up results online adjust treatment if need be.    #3 paroxysmal atrial fibrillation currently on quite relation, we'll check INR today and adjust treatment as needed.    Number for bilateral lower extremity swelling.  Patient is elevating his legs and watching his salt, we will replace his Lasix prescription which was dated, and provide him with a new prescription for Jobst support stockings.    #5 history of iron deficiency anemia in the postoperative state, patient is been maintained on iron, we'll check current CBC and decide if iron can be discontinued.          Return in about 4 months (around 2/10/2019) for Blood pressure  "check.    ____________________________________________________________________    VITALS    Visit Vitals  /58   Pulse 82   Temp 97.3 °F (36.3 °C)   Ht 190.5 cm (75\")   Wt 86.5 kg (190 lb 9.6 oz)   SpO2 98%   BMI 23.82 kg/m²       BP Readings from Last 3 Encounters:   10/10/18 120/58   08/27/18 130/60   06/11/18 132/78     Wt Readings from Last 3 Encounters:   10/10/18 86.5 kg (190 lb 9.6 oz)   08/27/18 85.3 kg (188 lb)   08/07/18 83 kg (183 lb)      Body mass index is 23.82 kg/m².    CC:  Main reason(s) for today's visit: Hypertension and Edema      HPI:     Patient presents this morning for follow-up of several medical issues.    Hypertension: He is compliant with medication, dietary salt restriction and home blood pressure typically runs in the 120s over 50s to 60s range.  There are no medication side effects noted.    Diabetes: Patient's compliant with meds, no hypoglycemic reactions, home blood sugar typically runs in the 100 range.  He is current with his ophthalmological follow-up along with use of ACE inhibitor.  He does  have a podiatrist at this time.  We did discuss the importance of regular podiatric care light of his diabetes.    Atrial fibrillation on Coumadin, patient needs updated INR today.    Interval history he had recurrent back surgery in January of this year, unfortunate still in pain.  This requires treatment with narcotics.  He is followed for this issue by both neurosurgery and pain management.    Laboratory review June 2018 CMP normal lipids normal, CBC hemoglobin 9.3.  Iron studies August 2018 with decreased iron level ferritin and saturation 11%.  Patient is been maintained on iron since then.    Patient Care Team:  Quinton Leal MD as PCP - General  Quinton Leal MD as PCP - Family Medicine  HdezMadhav pollack MD as Consulting Physician (Hematology and Oncology)  Sherice Newman MD as Consulting Physician " (Cardiology)  ____________________________________________________________________    REVIEW OF SYSTEMS    Review of Systems   Respiratory: Negative for chest tightness and shortness of breath.    Cardiovascular: Negative for chest pain and palpitations.   Neurological: Negative for dizziness, syncope, speech difficulty, weakness, light-headedness and headaches.         PHYSICAL EXAMINATION    Physical Exam   Constitutional: He is oriented to person, place, and time. He appears well-developed and well-nourished. No distress.   Cardiovascular: Normal rate, regular rhythm, normal heart sounds and intact distal pulses.  Exam reveals no gallop and no friction rub.    No murmur heard.  Pulmonary/Chest: Effort normal and breath sounds normal. He has no wheezes. He has no rales.   Musculoskeletal: He exhibits edema.   Chronic bilateral edema.  Patient has not been using stockings because of the warm weather.   Neurological: He is alert and oriented to person, place, and time.   Skin: Skin is warm and dry. No rash noted.   Psychiatric: He has a normal mood and affect. His behavior is normal. Judgment and thought content normal.   Nursing note and vitals reviewed.        REVIEWED DATA:    Labs:     Lab Results   Component Value Date     06/11/2018    K 4.2 06/11/2018    AST 13 06/11/2018    ALT 11 06/11/2018    BUN 26 (H) 06/11/2018    CREATININE 0.76 06/11/2018    CREATININE 0.76 02/02/2018    CREATININE 0.68 (L) 02/01/2018    EGFRIFNONA 99 06/11/2018    EGFRIFAFRI 121 06/11/2018       Lab Results   Component Value Date    HGBA1C 7.22 (H) 06/11/2018    HGBA1C 5.90 (H) 01/30/2018    HGBA1C 7.4 (H) 06/09/2017    GLUCOSE 134 (H) 02/02/2018    GLUCOSE 140 (H) 02/01/2018    GLUCOSE 147 (H) 01/31/2018    MICROALBUR 91.3 06/11/2018       Lab Results   Component Value Date    LDL 62 06/11/2018    LDL 66 06/09/2017    LDL 77 10/06/2016    HDL 43 06/11/2018    TRIG 59 06/11/2018       Lab Results   Component Value Date    TSH  1.200 08/07/2018       Lab Results   Component Value Date    WBC 5.98 02/02/2018    HGB 9.3 (L) 06/11/2018    HGB 8.8 (L) 02/02/2018    HGB 9.1 (L) 02/01/2018     06/11/2018       Lab Results   Component Value Date    PSA 2.23 09/28/2015         Imaging:         Medical Tests:         Summary of old records / correspondence / consultant report:         Request outside records:         ALLERGIES  No Known Allergies     MEDICATIONS  Current Outpatient Prescriptions   Medication Sig Dispense Refill   • ACCU-CHEK FASTCLIX LANCETS misc USE TO TEST TWICE DAILY 102 each 0   • ACCU-CHEK SMARTVIEW test strip USE TO TEST TWICE DAILY 100 each 1   • Blood Glucose Monitoring Suppl (ACCU-CHEK FRANCISCO SMARTVIEW) W/DEVICE kit USE TO TEST BLOOD SUGAR TWICE DAILY 1 kit 0   • Blood Glucose Monitoring Suppl (FREESTYLE LITE) device Use to test blood once daily 1 each 0   • brimonidine (ALPHAGAN) 0.2 % ophthalmic solution Administer 1 drop to both eyes 2 (Two) Times a Day.     • donepezil (ARICEPT) 10 MG tablet Take 10 mg by mouth Every Night.     • dorzolamide (TRUSOPT) 2 % ophthalmic solution Administer 1 drop to both eyes 2 (two) times a day.     • enalapril (VASOTEC) 20 MG tablet TAKE 1 TABLET BY MOUTH TWICE A  tablet 1   • esomeprazole (nexIUM) 40 MG capsule TAKE 1 CAPSULE DAILY 90 capsule 1   • ferrous sulfate (IRON SUPPLEMENT) 325 (65 FE) MG tablet Take 1 tablet by mouth Daily With Breakfast.     • furosemide (LASIX) 20 MG tablet Take 1 tablet by mouth Daily. as needed 90 tablet 3   • glimepiride (AMARYL) 2 MG tablet Take 2 mg by mouth Every Morning Before Breakfast.     • glucose blood (FREESTYLE LITE) test strip Use to test blood once daily 100 each 12   • Glucose Blood disk Inject 1 Device as directed daily. TEST BLOOD SUGAR ONCE DAILY AS DIRECTED 100 each 3   • Lancets (FREESTYLE) lancets Use to test blood sugar daily 100 each 12   • latanoprost (XALATAN) 0.005 % ophthalmic solution Administer 1 drop to both eyes  Every Night.     • metFORMIN (GLUCOPHAGE) 1000 MG tablet TAKE 1 TABLET EVERY MORNING, 1/2 TABLET AT NOON, AND 1 TABLET EVERY EVENING. 225 tablet 1   • ONE TOUCH ULTRA TEST test strip USE ONCE DAILY AS DIRECTED 100 each 3   • oxyCODONE-acetaminophen (PERCOCET)  MG per tablet Take 1 tablet by mouth Every 6 (Six) Hours As Needed for Moderate Pain . 50 tablet 0   • pioglitazone (ACTOS) 45 MG tablet TAKE 1 TABLET EVERY MORNING 90 tablet 0   • simvastatin (ZOCOR) 20 MG tablet TAKE 1 TABLET DAILY 90 tablet 1   • tamsulosin (FLOMAX) 0.4 MG capsule 24 hr capsule Take 0.4 mg by mouth Every Morning. 1-2 TABS AS NEEDED      • timolol (TIMOPTIC) 0.5 % ophthalmic solution Administer 1 drop to both eyes Daily.     • warfarin (COUMADIN) 10 MG tablet TAKE 1 TABLET BY MOUTH EVERY OTHER DAY ,ALTERNATING WITH 1/2 TABLET EVERY OTHER DAY 68 tablet 2     No current facility-administered medications for this visit.        PFSH:     The following portions of the patient's history were reviewed and updated as appropriate: Allergies / Current Medications / Past Medical History / Surgical History / Social History / Family History    PROBLEM LIST   Patient Active Problem List   Diagnosis   • Cholelithiasis   • DM (HgbA1c 5.9)   • Diverticulosis of intestine   • Hypercholesterolemia   • Hypertension   • Spinal stenosis   • Microalbuminuria   • Atrial fibrillation (CMS/HCC)   • Medicare annual wellness visit, subsequent   • Anemia   • Iron deficiency anemia   • DJD (degenerative joint disease)   • Impotence of organic origin   • Glaucoma   • Hiatal hernia   • Mild cognitive disorder   • Calculus of kidney   • Nocturia   • Chronic bilateral low back pain with bilateral sciatica   • Post laminectomy syndrome   • Routine health maintenance   • Weight loss   • Spinal stenosis of lumbar region with neurogenic claudication   • Spondylolisthesis of lumbar region   • Lumbar spine pain   • Metabolic encephalopathy   • Urinary retention with  incomplete bladder emptying   • Macrocytosis without anemia   • Sundowning   • RBBB (right bundle branch block with left anterior fascicular block)       PAST MEDICAL HISTORY  Past Medical History:   Diagnosis Date   • Acute suppurative otitis media    • Acute upper respiratory infection    • Cholelithiasis    • Diabetes mellitus (CMS/HCC)     TYPE 2   • Diverticulosis    • Dry eyes    • Earache    • Edema    • Encounter for screening for malignant neoplasm of prostate    • Esophagitis, reflux    • Glaucoma    • H/O echocardiogram 09/18/2013   • Health care maintenance    • Hiatal hernia    • History of EKG 10/06/2015   • History of Holter monitoring 09/16/2013   • Hyperlipidemia    • Hypertension    • Lumbar canal stenosis    • Microalbuminuria    • Mitral valve prolapse    • Nephrolithiasis    • Osteoarthritis of hand     of thumb   • PAF (paroxysmal atrial fibrillation) (CMS/HCC)    • 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   • Spinal stenosis        SURGICAL HISTORY  Past Surgical History:   Procedure Laterality Date   • AMPUTATION DIGIT Left 10/29/2016    Procedure: TENDON AND NERVE REPAIR OF  LEFT THUMB;  Surgeon: Zak Hanson MD;  Location: [facility];  Service:    • ARTHRODESIS  10/28/2013    Spinal / Description: Repair spinal stenosis   • CATARACT EXTRACTION     • EYE SURGERY      EYE MUSCLE SX   • HERNIA REPAIR     • LUMBAR DISCECTOMY FUSION INSTRUMENTATION N/A 1/29/2018    Procedure: L2 to L5 fusion with instrumentation and removal of implants L4 5;  Surgeon: Barron Knight MD;  Location: [facility] OR;  Service:    • LUMBAR FUSION      Lumbar vertebral fusion   • LUMBAR LAMINECTOMY      10.28.13  bilat L4/5 lami with Nyla and Belen   • LUMBAR LAMINECTOMY DISCECTOMY DECOMPRESSION N/A 1/29/2018    Procedure: L2 to L4 laminectomy with a fusion by orthopedics;  Surgeon: Jeison Redmond MD;  Location: [facility] OR;  Service:    • OTHER SURGICAL HISTORY       Lithotomy   • TONSILLECTOMY         SOCIAL HISTORY  Social History     Social History   • Marital status:      Spouse name: Silvia   • Number of children: 3   • Years of education: College     Occupational History   • Teacher RETIRED Retired     Social History Main Topics   • Smoking status: Never Smoker   • Smokeless tobacco: Never Used      Comment: caffeine use   • Alcohol use Yes      Comment: Occasional   • Drug use: No   • Sexual activity: Defer     Other Topics Concern   • Not on file       FAMILY HISTORY  Family History   Problem Relation Age of Onset   • Heart attack Mother         acute myocardial infarction   • Stroke Mother         hemorrhagic   • Emphysema Father    • Diabetes Other         mellitus   • Hypertension Other    • Heart disease Other    • Malig Hyperthermia Neg Hx        Health Maintenance Due   Topic   • ZOSTER VACCINE (2 of 3)       Overdue health maintenance interventions  reviewed with patient.    Dictated utilizing Dragon voice recognition software

## 2018-10-11 NOTE — PROGRESS NOTES
A1c 7.1--No significant change from 4 months ago; Hemoglobin = anemic at 9.5. Current treatment seems to be maintaining stability but is not resulting in a great deal of improvement. Continue all meds as they are currently being prescribed.Follow up with Dr Leal if any problems occur or persist or as per his discretionary advice regarding your next scheduled visit. If there are any questions or concerns I would encourage you to direct them to Dr Leal during normal office hours.

## 2018-10-23 NOTE — PROGRESS NOTES
This visit is for documentation purposes only. Patient was not seen in the Medication Management Clinic. We have updated the episode for the referral from Dr. Newman to take responsibility of this patient's warfarin at his next INR check due to his switch in primary care doctors.    Thank you.

## 2018-10-23 NOTE — PROGRESS NOTES
Anticoagulation Clinic Progress Note  Anticoagulation Summary  As of 10/23/2018    INR goal:   2.0-3.0   TTR:   --   Today's INR:   1.7!   Warfarin maintenance plan:   5 mg on Mon, Wed, Fri; 10 mg all other days   Weekly warfarin total:   55 mg   Plan last modified:   Silvia Robin [facility] (10/23/2018)   Next INR check:   2018   Priority:   Maintenance   Target end date:       Indications    Atrial fibrillation (CMS/HCC) [I48.91]             Anticoagulation Episode Summary     INR check location:       Preferred lab:       Send INR reminders to:    [facility] [place]    Comments:         Anticoagulation Care Providers     Provider Role Specialty Phone number    Sherice Newman MD Referring Cardiology [phone]    Silvia Robin [facility] Responsible Pharm[facility]             Drug interactions: no new medications  Diet: consistent    Clinic Interview:  Patient Findings     Negatives:   Signs/symptoms of thrombosis, Signs/symptoms of bleeding,   Laboratory test error suspected, Change in health, Change in alcohol use,   Change in activity, Upcoming invasive procedure, Emergency department   visit, Upcoming dental procedure, Missed doses, Extra doses, Change in   medications, Change in diet/appetite, Hospital admission, Bruising, Other   complaints      Clinical Outcomes     Negatives:   Major bleeding event, Thromboembolic event,   Anticoagulation-related hospital admission, Anticoagulation-related ED   visit, Anticoagulation-related fatality        Education:  Derrek Ritter is a new start in the Medication Management Clinic. We discussed the followin) Warfarin's indication, mechanism, and dosing  2) Enforced the importance of taking warfarin as instructed and at the same time every day, preferably in the evening so that we can make dose adjustments more easily following subsequent clinic visits  3) What he should do about a missed dose; pts can take missed doses within about 12 hours of  their usual scheduled dose, but he was instructed on the importance of not doubling up on doses unless told to do so by the Medication Management Clinic  4) Explained possible side effects of warfarin therapy, including increased risk of bleeding, s/sx of bleeding and s/sx of any additional clots/PE/CVA.   5) Discussed monitoring of warfarin, the INR, goal INR range, and the frequency of monitoring  6) Reviewed drug/food/tobacco/EtOH interactions and provided written information covering these topics in more detail, explaining that green, leafy vegetables interact most heavily with warfarin  7) Instructed the pt not to take or discontinue any medications without informing his physician/pharmacist and reminded him to inform us of any dietary changes, as well  8) Explained that he would be coming into the clinic more frequently in these first few weeks of therapy as we try to adjust his dose and achieve a therapeutic INR x 2 consecutive readings. Once that is achieved, patient will follow up in clinic every 4 weeks, on average.    He stated no problems with transportation or scheduling clinic appts in this manner. he expressed understanding of the information provided and has no additional questions at this time.    Derrek Ritter was presented with a copy of the Patients Rights and Responsibilities. he expressed verbal consent and agreement to receive care in the Medication Management Clinic under the current collaborative care agreement with [facility].       INR History:  Previous INR data from [facility] is recorded in [facility] and scanned into the patient's chart in [employer]. These results have been analyzed and reviewed.      Plan:  1. INR is Subtherapeutic today- see above in Anticoagulation Summary.   Will instruct Derrek JONATHAN Liuon to Change their warfarin regimen- changed timing of dose to 5mg on M,W,F and 10mg on Tu,Thur, Sat, Sun. Will recheck dose in 2 weeks.   2. Follow up in 2  weeks  3. Patient declines warfarin refills.  4. Verbal and written information provided. Patient expresses understanding and has no further questions at this time.    Silvia Robin [facility]

## 2018-11-09 NOTE — PROGRESS NOTES
He has persistent pain in the back and legs.  Neurologically is unchanged and he has a persistent right foot drop and the very clumsy gait on account of this.  but I'm concerned that he is developing a nonunion.  Two-view x-rays of lumbar spine obtained today` show the hardware to be unchanged in position there is a slight kyphosis above the fusion and the overall loss of lordosis.  Not significantly changed before except some further posterior lateral consolidation.  Still am concerned he may have a nonunion.  Going to get a CT scan of the lumbar spine for further evaluation and will have Dr. pompa to evaluate him to see if anything else is going on with his neurologic function.

## 2018-11-16 NOTE — PROGRESS NOTES
Anticoagulation Clinic Progress Note    Anticoagulation Summary  As of 2018    INR goal:   2.0-3.0   TTR:   100.0 % (2 wk)   INR used for dosin.2 (2018)   Warfarin maintenance plan:   5 mg on Mon, Wed, Fri; 10 mg all other days   Weekly warfarin total:   55 mg   No change documented:   Rachel Olsen   Plan last modified:   Silvia Robin [facility] (10/23/2018)   Next INR check:   2018   Priority:   Maintenance   Target end date:       Indications    Atrial fibrillation (CMS/HCC) [I48.91]             Anticoagulation Episode Summary     INR check location:       Preferred lab:       Send INR reminders to:    [facility]    Comments:         Anticoagulation Care Providers     Provider Role Specialty Phone number    Sherice Newman MD Referring Cardiology [phone]    Silvia Robin [facility] Responsible Pharmacy [phone]          Drug interactions: has remained unchanged.  Diet: has remained unchanged.    Clinic Interview:  Patient Findings     Negatives:   Signs/symptoms of thrombosis, Signs/symptoms of bleeding,   Laboratory test error suspected, Change in health, Change in alcohol use,   Change in activity, Upcoming invasive procedure, Emergency department   visit, Upcoming dental procedure, Missed doses, Extra doses, Change in   medications, Change in diet/appetite, Hospital admission, Bruising, Other   complaints      Clinical Outcomes     Negatives:   Major bleeding event, Thromboembolic event,   Anticoagulation-related hospital admission, Anticoagulation-related ED   visit, Anticoagulation-related fatality        INR History:  Anticoagulation Monitoring 10/23/2018 2018 2018   INR 1.7 2.5 2.2   INR Date 10/23/2018 2018 2018   INR Goal 2.0-3.0 2.0-3.0 2.0-3.0   Trend Same Same Same   Last Week Total 55 mg 55 mg 55 mg   Next Week Total 55 mg 55 mg 55 mg   Sun 10 mg 10 mg 10 mg   Mon 5 mg 5 mg 5 mg   Tue 10 mg 10 mg 10 mg   Wed 5 mg 5 mg 5  mg   Thu 10 mg 10 mg 10 mg   Fri 5 mg 5 mg 5 mg   Sat 10 mg 10 mg 10 mg   Visit Report - - -   Some recent data might be hidden       Plan:  1. INR is therapeutic today- see above in Anticoagulation Summary.   Will instruct Derrek JONATHAN Liuon to continue their warfarin regimen- see above in Anticoagulation Summary.  2. Follow up in 4 weeks.  3. Patient declines warfarin refills.  4. Verbal and written information provided. Patient expresses understanding and has no further questions at this time.    Rachel Olsen

## 2018-11-21 NOTE — TELEPHONE ENCOUNTER
----- Message from Jasmin Stroud MA sent at 11/21/2018  3:20 PM EST -----      ----- Message -----  From: Barron Knight MD  Sent: 11/21/2018   2:51 PM  To: Jasmin Stroud MA    Tell him that the bone has failed to heal at the L2-3 level and that there is some wear and tear at the unoperated level above this.  For now let's see what Dr. pompa has to say about the nerves.

## 2018-12-04 NOTE — PROGRESS NOTES
New patient or new problem visit    CC: Lumbar back pain    HPI: He has a long-standing history of back and leg pain now presently more midline low lumbar back pain and he walks with the right foot drop.  Dr. Jacques Redmond now performed an L2 to 5 laminectomy fusion instrumentation nearly a year ago from which she did not improve dramatically, at least with regard to the back.  Some of his preoperative leg pain is better but he remains with a dense foot drop on the right.    PFSH: See attached.  History of atrial fibrillation for which she is on anticoagulants.    ROS: See attached    PE: Constitutional: Vital signs above-noted.  Awake, alert and oriented    Psychiatric: Affect and insight do not appear grossly disturbed.    Pulmonary: Breathing is unlabored, color is good.    Skin: Warm, dry and normal turgor    Cardiac:  pedal pulses intact.  No edema.    Eyesight and hearing appear adequate for examination purposes      Musculoskeletal:  There is mild tenderness to percussion and palpation of the spine. Motion appears somewhat.  Posture is quite stooped to coronal and sagittal inspection.    The skin about the area is intact.  There is no palpable or visible deformity.  There is no local spasm.       Neurologic:   Reflexes are absent in the patellae and achilles.   Motor function is undisturbed in quadriceps, EHL, and gastrocnemius on the right EHL which shows shows some 3/5 strength but flops on ambulation   Sensation appears symmetrically intact to light touch   .  In the bilateral lower extremities there is no evidence of atrophy.   Clonus is absent..  Gait appears undisturbed.  SLR test negative    XRAY: Plain film x-rays of the lumbar spine show basically a neutral posture over the fused segment prepped minimal lordosis but overall kyphotic lumbar spine due to slight collapse at L2 to 3 with resultant mild kyphosis as well as degenerative changes and kyphosis at 1-2 above.  Recent CT scan shows a nonunion at  L2-3 and the other levels appear fused.    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  He has a chronic foot drop on the right for which further decompression is unlikely to help.    Plan: He is going to see Dr. Dang for the leg but his main complaint is back pain.  I think he would be a reasonable candidate for repair of nonunion at to 3 and extension of the fusion to 12.  In order to the balance of back biomechanically the best bet would be to perform a pedicle subtraction osteotomy in the fused augment, perhaps at the L4-5 and then fuse him from T12 to the sacrum.  I'm going to invite Dr. Redmond to evaluate him and consider decompressing of for 5 or at least the performing a neural lysis of the roots and dural sac at that level so that I can mobilize this in order to perform the pedicle subtraction osteotomy.  See Cyril Redmond and all and get back to me.  I told him I would go ahead and tentatively schedule meantime.

## 2018-12-13 NOTE — PROGRESS NOTES
Anticoagulation Clinic Progress Note    Anticoagulation Summary  As of 2018    INR goal:   2.0-3.0   TTR:   100.0 % (1.4 mo)   INR used for dosin.5 (2018)   Warfarin maintenance plan:   5 mg on Mon, Wed, Fri; 10 mg all other days   Weekly warfarin total:   55 mg   No change documented:   Rachel Olsen   Plan last modified:   Silvia Robin [facility] (10/23/2018)   Next INR check:   1/10/2019   Priority:   Maintenance   Target end date:       Indications    Atrial fibrillation (CMS/HCC) [I48.91]             Anticoagulation Episode Summary     INR check location:       Preferred lab:       Send INR reminders to:    [facility]    Comments:         Anticoagulation Care Providers     Provider Role Specialty Phone number    Sherice Newman MD Referring Cardiology [phone]    Silvia Robin [facility] Responsible Pharmacy [phone]          Clinic Interview:  Patient Findings     Negatives:   Signs/symptoms of thrombosis, Signs/symptoms of bleeding,   Laboratory test error suspected, Change in health, Change in alcohol use,   Change in activity, Upcoming invasive procedure, Emergency department   visit, Upcoming dental procedure, Missed doses, Extra doses, Change in   medications, Change in diet/appetite, Hospital admission, Bruising, Other   complaints      Clinical Outcomes     Negatives:   Major bleeding event, Thromboembolic event,   Anticoagulation-related hospital admission, Anticoagulation-related ED   visit, Anticoagulation-related fatality        INR History:  Anticoagulation Monitoring 2018   INR 2.5 2.2 2.5   INR Date 2018   INR Goal 2.0-3.0 2.0-3.0 2.0-3.0   Trend Same Same Same   Last Week Total 55 mg 55 mg 55 mg   Next Week Total 55 mg 55 mg 55 mg   Sun 10 mg 10 mg 10 mg   Mon 5 mg 5 mg 5 mg   Tue 10 mg 10 mg 10 mg   Wed 5 mg 5 mg 5 mg   Thu 10 mg 10 mg 10 mg   Fri 5 mg 5 mg 5 mg   Sat 10 mg 10 mg 10 mg    Visit Report - - -   Some recent data might be hidden       Plan:  1. INR is therapeutic today- see above in Anticoagulation Summary.   Will instruct Derrek Ritter to continue their warfarin regimen- see above in Anticoagulation Summary.  2. Follow up in 2 weeks.  3. Patient declines warfarin refills.  4. Verbal and written information provided. Patient expresses understanding and has no further questions at this time.    Rachel Olsen

## 2018-12-17 NOTE — PROGRESS NOTES
Subjective   Patient ID: Derrek Ritter is a 78 y.o. male is here today for follow-up back pain.    History of Present Illness    This patient returns today.  He continues with pain in his back primarily.  I asked him about leg pain but he says he doesn't have any leg pain.  He had an L2 to L5 laminectomy and fusion in January of this year.  He got good relief of the pain in his legs but remains with some back pain.  This waxes and wanes some.    The following portions of the patient's history were reviewed and updated as appropriate: allergies, current medications, past family history, past medical history, past social history, past surgical history and problem list.    Review of Systems   Respiratory: Negative for chest tightness and shortness of breath.    Cardiovascular: Negative for chest pain.   Musculoskeletal: Positive for back pain and gait problem.   All other systems reviewed and are negative.      Objective   Physical Exam   Constitutional: He is oriented to person, place, and time. He appears well-developed and well-nourished.   HENT:   Head: Normocephalic and atraumatic.   Eyes: Conjunctivae and EOM are normal. Pupils are equal, round, and reactive to light.   Fundoscopic exam:       The right eye shows no papilledema. The right eye shows venous pulsations.        The left eye shows no papilledema. The left eye shows venous pulsations.   Neck: Carotid bruit is not present.   Neurological: He is oriented to person, place, and time. He has a normal Finger-Nose-Finger Test and a normal Heel to Shin Test. Gait normal.   Reflex Scores:       Tricep reflexes are 2+ on the right side and 2+ on the left side.       Bicep reflexes are 2+ on the right side and 2+ on the left side.       Brachioradialis reflexes are 2+ on the right side and 2+ on the left side.       Patellar reflexes are 2+ on the right side and 2+ on the left side.       Achilles reflexes are 2+ on the right side and 2+ on the left  side.  Psychiatric: His speech is normal.     Neurologic Exam     Mental Status   Oriented to person, place, and time.   Registration of memory: Good recent and remote memory.   Attention: normal. Concentration: normal.   Speech: speech is normal   Level of consciousness: alert  Knowledge: consistent with education.     Cranial Nerves     CN II   Visual fields full to confrontation.   Visual acuity: normal    CN III, IV, VI   Pupils are equal, round, and reactive to light.  Extraocular motions are normal.     CN V   Facial sensation intact.   Right corneal reflex: normal  Left corneal reflex: normal    CN VII   Facial expression full, symmetric.   Right facial weakness: none  Left facial weakness: none    CN VIII   Hearing: intact    CN IX, X   Palate: symmetric    CN XI   Right sternocleidomastoid strength: normal  Left sternocleidomastoid strength: normal    CN XII   Tongue: not atrophic  Tongue deviation: none    Motor Exam   Muscle bulk: normal  Right arm tone: normal  Left arm tone: normal  Right leg tone: normal  Left leg tone: normal    Strength   Strength 5/5 except as noted.   Right anterior tibial: 2/5  Right posterior tibial: 2/5  Right peroneal: 2/5    Sensory Exam   Light touch normal.     Gait, Coordination, and Reflexes     Gait  Gait: normal    Coordination   Finger to nose coordination: normal  Heel to shin coordination: normal    Reflexes   Right brachioradialis: 2+  Left brachioradialis: 2+  Right biceps: 2+  Left biceps: 2+  Right triceps: 2+  Left triceps: 2+  Right patellar: 2+  Left patellar: 2+  Right achilles: 2+  Left achilles: 2+  Right : 2+  Left : 2+      Assessment/Plan   Independent Review of Radiographic Studies:      I reviewed his CT scan which was done on 16 November.  This shows a question of a nonunion at L2-3 and some kyphosis at L1 2.  There is some narrowing at L1 to as well as well.    Medical Decision Making:      I told the patient I see little option at this point  but to proceed with a lumbar myelogram.  I told the patient what a myelogram involves.  I explained that there is a 50% chance of developing a bad headache and nausea as a result of the test.  I explained that there is also a very small chance of infection, seizures, and bleeding.  I explained how we would treat a post myelogram headache including bedrest, caffeinated fluids, steroids, and blood patch.  The patient does ask to proceed.    Derrek was seen today for back pain.    Diagnoses and all orders for this visit:    Spondylolisthesis of lumbar region  -     Obtain Informed Consent; Standing  -     IR Myelogram Lumbar Spine; Future  -     CT Lumbar Spine Without Contrast; Future  -     XR Spine Lumbar Complete With Flex & Ext; Future  -     No Lab Testing Needed; Standing  -     dexamethasone (DECADRON) 4 MG tablet; Take 2 tablets by mouth Take As Directed. Take both tablets by mouth 2 hours before myelogram    Spinal stenosis of lumbar region with neurogenic claudication  -     Obtain Informed Consent; Standing  -     IR Myelogram Lumbar Spine; Future  -     CT Lumbar Spine Without Contrast; Future  -     XR Spine Lumbar Complete With Flex & Ext; Future  -     No Lab Testing Needed; Standing  -     dexamethasone (DECADRON) 4 MG tablet; Take 2 tablets by mouth Take As Directed. Take both tablets by mouth 2 hours before myelogram      Return for After radiology test.

## 2018-12-19 PROBLEM — M79.89 LEG SWELLING: Status: ACTIVE | Noted: 2018-01-01

## 2018-12-19 NOTE — PROGRESS NOTES
Subjective if complaint is to establish care  Derrek Ritter is a 78 y.o. male.     History of Present Illness   Derrek is here today to establish care.  He is a previous patient of Dr. Damian.  He does have a prior history of non-insulin-dependent diabetes.  His last hemoglobin A1c was 7.1 in October.  He is on several different medications including metformin, glimepiride, and Actos for this.  He does get some swelling in his legs secondary to the Actos.  He does have hypertension.  He is on enalapril 20 mg twice daily.  He has had some chronic anemia.  This seemed to get worse approximately 8 months ago.  He has been started on some Aricept which can sometimes cause some gastritis.  He has not noticed any blood in his bowel movements.  It did look like his reticulocyte count was a little bit low in relation to his degree of anemia.   May not get the iron and is much as he should because of some side effects.  He has had some problems with memory.  He does report that the 10 mg of Aricept has seemed to help.  He does have some chronic back pain and has had 3 prior back surgeries.  He has another surgery coming up.  Social history family, family history, and past medical history were reviewed on a another form.    The following portions of the patient's history were reviewed and updated as appropriate: allergies, current medications, past family history, past medical history, past social history, past surgical history and problem list.    Review of Systems   Respiratory: Negative for chest tightness and shortness of breath.    Cardiovascular: Positive for leg swelling. Negative for chest pain.   Musculoskeletal: Positive for back pain.   Neurological: Negative for dizziness, light-headedness and headache.       Objective   Physical Exam   Constitutional: He appears well-developed and well-nourished.   Neck: Carotid bruit is not present.   Cardiovascular: Normal rate, regular rhythm, normal heart sounds and intact  distal pulses. Exam reveals no gallop and no friction rub.   No murmur heard.  Pulmonary/Chest: Effort normal and breath sounds normal. No respiratory distress. He has no wheezes. He has no rales.   Abdominal: Soft. Bowel sounds are normal. He exhibits no distension and no mass. There is no tenderness. There is no guarding.   Musculoskeletal: He exhibits edema.   As 2+ edema to the calves.   Skin:   Have some stasis dermatitis skin changes.   Nursing note and vitals reviewed.        Assessment/Plan   Derrek was seen today for establish care.    Diagnoses and all orders for this visit:    Essential hypertension    Type 2 diabetes mellitus without complication, without long-term current use of insulin (CMS/[facility])  -     metFORMIN (GLUCOPHAGE) 1000 MG tablet; Take one am, 1/2 noon, and 1 pm    Iron deficiency anemia due to chronic blood loss  -     Methylmalonic Acid, Serum  -     Reticulocytes  -     Vitamin B12  -     Folate  -     Iron Profile  -     CBC & Differential  -     Protein Elec + Interp, Serum    Macrocytosis without anemia  -     Methylmalonic Acid, Serum  -     Reticulocytes  -     Vitamin B12  -     Folate  -     Iron Profile  -     CBC & Differential  -     Protein Elec + Interp, Serum    Other orders  -     enalapril (VASOTEC) 20 MG tablet; Take 1 tablet by mouth 2 (Two) Times a Day.  -     esomeprazole (nexIUM) 40 MG capsule; Take 1 capsule by mouth Daily.  -     glimepiride (AMARYL) 2 MG tablet; Take 1 tablet by mouth Every Morning Before Breakfast.  -     pioglitazone (ACTOS) 45 MG tablet; Take 1 tablet by mouth Every Morning.      Derrek is here today to establish care.  I am a little bit concerned about his anemia.  I would be concerned that he is getting some mild gastritis from the Aricept and that the warfarin could be making this worse.  I am going to check a stool for occult blood.  I'm going to repeat some studies for his anemia but also do a protein electrophoresis because of his back  pain.

## 2018-12-28 PROBLEM — M40.05 POSTURAL KYPHOSIS OF LUMBAR REGION: Status: ACTIVE | Noted: 2018-01-01

## 2018-12-28 NOTE — TELEPHONE ENCOUNTER
RM is out until 12/31/18.    12/28/18  12:51 PM  Derrek Ritter  1940    Home Phone [phone]   Mobile [phone]       Derrek Ritter will be having Spine fusion with Dr Knight on 1/9/18. They are calling for cardiac clearance. Pt is taking warfarin.    Does anticoagulant need to be held? Does pt need to be seen ? ......Please advise      Thanks  Faith PHAN

## 2018-12-31 NOTE — TELEPHONE ENCOUNTER
Derrek Ritter will be having Spine fusion with Dr Knight on 1/9/18. They are calling for cardiac clearance. Pt is taking warfarin.     Does anticoagulant need to be held? Does pt need to be seen ? ......Please advise        Thanks  Faith PHAN

## 2019-01-01 ENCOUNTER — TELEPHONE (OUTPATIENT)
Dept: CARDIOLOGY | Facility: CLINIC | Age: 79
End: 2019-01-01

## 2019-01-01 ENCOUNTER — APPOINTMENT (OUTPATIENT)
Dept: CT IMAGING | Facility: HOSPITAL | Age: 79
End: 2019-01-01

## 2019-01-01 ENCOUNTER — TELEPHONE (OUTPATIENT)
Dept: FAMILY MEDICINE CLINIC | Facility: CLINIC | Age: 79
End: 2019-01-01

## 2019-01-01 ENCOUNTER — HOSPITAL ENCOUNTER (INPATIENT)
Facility: HOSPITAL | Age: 79
LOS: 6 days | Discharge: NURSING, REHAB OR LONG-TERM CARE | End: 2019-01-15
Attending: ORTHOPAEDIC SURGERY | Admitting: ORTHOPAEDIC SURGERY

## 2019-01-01 ENCOUNTER — OFFICE VISIT (OUTPATIENT)
Dept: FAMILY MEDICINE CLINIC | Facility: CLINIC | Age: 79
End: 2019-01-01

## 2019-01-01 ENCOUNTER — ANTICOAGULATION VISIT (OUTPATIENT)
Dept: PHARMACY | Facility: HOSPITAL | Age: 79
End: 2019-01-01

## 2019-01-01 ENCOUNTER — TELEPHONE (OUTPATIENT)
Dept: ORTHOPEDIC SURGERY | Facility: CLINIC | Age: 79
End: 2019-01-01

## 2019-01-01 ENCOUNTER — TELEPHONE (OUTPATIENT)
Dept: INFECTIOUS DISEASES | Facility: CLINIC | Age: 79
End: 2019-01-01

## 2019-01-01 ENCOUNTER — ANESTHESIA EVENT (OUTPATIENT)
Dept: PERIOP | Facility: HOSPITAL | Age: 79
End: 2019-01-01

## 2019-01-01 ENCOUNTER — OFFICE VISIT (OUTPATIENT)
Dept: CARDIOLOGY | Facility: CLINIC | Age: 79
End: 2019-01-01

## 2019-01-01 ENCOUNTER — APPOINTMENT (OUTPATIENT)
Dept: ONCOLOGY | Facility: CLINIC | Age: 79
End: 2019-01-01

## 2019-01-01 ENCOUNTER — APPOINTMENT (OUTPATIENT)
Dept: GENERAL RADIOLOGY | Facility: HOSPITAL | Age: 79
End: 2019-01-01

## 2019-01-01 ENCOUNTER — TELEPHONE (OUTPATIENT)
Dept: ONCOLOGY | Facility: HOSPITAL | Age: 79
End: 2019-01-01

## 2019-01-01 ENCOUNTER — APPOINTMENT (OUTPATIENT)
Dept: CARDIOLOGY | Facility: HOSPITAL | Age: 79
End: 2019-01-01

## 2019-01-01 ENCOUNTER — OFFICE VISIT (OUTPATIENT)
Dept: ONCOLOGY | Facility: CLINIC | Age: 79
End: 2019-01-01

## 2019-01-01 ENCOUNTER — LAB REQUISITION (OUTPATIENT)
Dept: LAB | Facility: HOSPITAL | Age: 79
End: 2019-01-01

## 2019-01-01 ENCOUNTER — APPOINTMENT (OUTPATIENT)
Dept: MRI IMAGING | Facility: HOSPITAL | Age: 79
End: 2019-01-01

## 2019-01-01 ENCOUNTER — INFUSION (OUTPATIENT)
Dept: ONCOLOGY | Facility: HOSPITAL | Age: 79
End: 2019-01-01

## 2019-01-01 ENCOUNTER — OFFICE VISIT (OUTPATIENT)
Dept: NEUROSURGERY | Facility: CLINIC | Age: 79
End: 2019-01-01

## 2019-01-01 ENCOUNTER — CLINICAL SUPPORT (OUTPATIENT)
Dept: FAMILY MEDICINE CLINIC | Facility: CLINIC | Age: 79
End: 2019-01-01

## 2019-01-01 ENCOUNTER — LAB (OUTPATIENT)
Dept: LAB | Facility: HOSPITAL | Age: 79
End: 2019-01-01

## 2019-01-01 ENCOUNTER — OFFICE VISIT (OUTPATIENT)
Dept: WOUND CARE | Facility: HOSPITAL | Age: 79
End: 2019-01-01

## 2019-01-01 ENCOUNTER — ANESTHESIA (OUTPATIENT)
Dept: PERIOP | Facility: HOSPITAL | Age: 79
End: 2019-01-01

## 2019-01-01 ENCOUNTER — TELEPHONE (OUTPATIENT)
Dept: INTERVENTIONAL RADIOLOGY/VASCULAR | Facility: HOSPITAL | Age: 79
End: 2019-01-01

## 2019-01-01 ENCOUNTER — APPOINTMENT (OUTPATIENT)
Dept: WOUND CARE | Facility: HOSPITAL | Age: 79
End: 2019-01-01

## 2019-01-01 ENCOUNTER — CONSULT (OUTPATIENT)
Dept: ONCOLOGY | Facility: CLINIC | Age: 79
End: 2019-01-01

## 2019-01-01 ENCOUNTER — TELEPHONE (OUTPATIENT)
Dept: ONCOLOGY | Facility: CLINIC | Age: 79
End: 2019-01-01

## 2019-01-01 ENCOUNTER — TELEPHONE (OUTPATIENT)
Dept: PHARMACY | Facility: HOSPITAL | Age: 79
End: 2019-01-01

## 2019-01-01 ENCOUNTER — APPOINTMENT (OUTPATIENT)
Dept: LAB | Facility: HOSPITAL | Age: 79
End: 2019-01-01

## 2019-01-01 ENCOUNTER — OFFICE VISIT (OUTPATIENT)
Dept: ORTHOPEDIC SURGERY | Facility: CLINIC | Age: 79
End: 2019-01-01

## 2019-01-01 ENCOUNTER — TELEPHONE (OUTPATIENT)
Dept: NEUROSURGERY | Facility: CLINIC | Age: 79
End: 2019-01-01

## 2019-01-01 ENCOUNTER — TRANSCRIBE ORDERS (OUTPATIENT)
Dept: ADMINISTRATIVE | Facility: HOSPITAL | Age: 79
End: 2019-01-01

## 2019-01-01 ENCOUNTER — HOSPITAL ENCOUNTER (OUTPATIENT)
Dept: GENERAL RADIOLOGY | Facility: HOSPITAL | Age: 79
Discharge: HOME | End: 2019-01-08
Attending: NEUROLOGICAL SURGERY

## 2019-01-01 ENCOUNTER — APPOINTMENT (OUTPATIENT)
Dept: ONCOLOGY | Facility: HOSPITAL | Age: 79
End: 2019-01-01

## 2019-01-01 ENCOUNTER — HOSPITAL ENCOUNTER (OUTPATIENT)
Dept: CT IMAGING | Facility: HOSPITAL | Age: 79
Discharge: HOME | End: 2019-01-08
Attending: NEUROLOGICAL SURGERY

## 2019-01-01 ENCOUNTER — APPOINTMENT (OUTPATIENT)
Dept: PHARMACY | Facility: HOSPITAL | Age: 79
End: 2019-01-01

## 2019-01-01 ENCOUNTER — LAB (OUTPATIENT)
Dept: OTHER | Facility: HOSPITAL | Age: 79
End: 2019-01-01

## 2019-01-01 ENCOUNTER — HOSPITAL ENCOUNTER (INPATIENT)
Facility: HOSPITAL | Age: 79
LOS: 6 days | Discharge: NURSING, REHAB OR LONG-TERM CARE | End: 2019-06-07
Attending: EMERGENCY MEDICINE | Admitting: SURGERY

## 2019-01-01 ENCOUNTER — OFFICE VISIT (OUTPATIENT)
Dept: INFECTIOUS DISEASES | Facility: CLINIC | Age: 79
End: 2019-01-01

## 2019-01-01 ENCOUNTER — HOSPITAL ENCOUNTER (INPATIENT)
Facility: HOSPITAL | Age: 79
LOS: 4 days | Discharge: NURSING, REHAB OR LONG-TERM CARE | End: 2019-04-05
Attending: EMERGENCY MEDICINE | Admitting: INTERNAL MEDICINE

## 2019-01-01 ENCOUNTER — APPOINTMENT (OUTPATIENT)
Dept: GENERAL RADIOLOGY | Facility: HOSPITAL | Age: 79
End: 2019-01-01
Attending: ORTHOPAEDIC SURGERY

## 2019-01-01 ENCOUNTER — APPOINTMENT (OUTPATIENT)
Dept: PREADMISSION TESTING | Facility: HOSPITAL | Age: 79
End: 2019-01-01

## 2019-01-01 VITALS
DIASTOLIC BLOOD PRESSURE: 76 MMHG | SYSTOLIC BLOOD PRESSURE: 150 MMHG | TEMPERATURE: 99.2 F | HEIGHT: 75 IN | HEART RATE: 73 BPM | BODY MASS INDEX: 23.47 KG/M2 | RESPIRATION RATE: 20 BRPM | OXYGEN SATURATION: 97 % | WEIGHT: 188.79 LBS

## 2019-01-01 VITALS — WEIGHT: 190 LBS | HEIGHT: 75 IN | TEMPERATURE: 97.4 F | BODY MASS INDEX: 23.62 KG/M2

## 2019-01-01 VITALS
DIASTOLIC BLOOD PRESSURE: 74 MMHG | HEIGHT: 75 IN | SYSTOLIC BLOOD PRESSURE: 167 MMHG | WEIGHT: 208.9 LBS | BODY MASS INDEX: 25.98 KG/M2 | OXYGEN SATURATION: 98 % | RESPIRATION RATE: 14 BRPM | HEART RATE: 59 BPM | TEMPERATURE: 98.2 F

## 2019-01-01 VITALS
OXYGEN SATURATION: 100 % | BODY MASS INDEX: 23.62 KG/M2 | SYSTOLIC BLOOD PRESSURE: 180 MMHG | DIASTOLIC BLOOD PRESSURE: 62 MMHG | HEIGHT: 75 IN | TEMPERATURE: 98.3 F | HEART RATE: 56 BPM | RESPIRATION RATE: 16 BRPM | WEIGHT: 190 LBS

## 2019-01-01 VITALS
WEIGHT: 188.9 LBS | SYSTOLIC BLOOD PRESSURE: 162 MMHG | BODY MASS INDEX: 24.24 KG/M2 | OXYGEN SATURATION: 96 % | HEIGHT: 74 IN | TEMPERATURE: 98.6 F | HEART RATE: 57 BPM | RESPIRATION RATE: 16 BRPM | DIASTOLIC BLOOD PRESSURE: 69 MMHG

## 2019-01-01 VITALS
HEART RATE: 87 BPM | TEMPERATURE: 99.3 F | BODY MASS INDEX: 23.6 KG/M2 | SYSTOLIC BLOOD PRESSURE: 117 MMHG | HEIGHT: 75 IN | DIASTOLIC BLOOD PRESSURE: 64 MMHG

## 2019-01-01 VITALS
SYSTOLIC BLOOD PRESSURE: 144 MMHG | HEIGHT: 75 IN | BODY MASS INDEX: 23.62 KG/M2 | DIASTOLIC BLOOD PRESSURE: 70 MMHG | RESPIRATION RATE: 16 BRPM | WEIGHT: 190 LBS | TEMPERATURE: 97.2 F | HEART RATE: 73 BPM | OXYGEN SATURATION: 98 %

## 2019-01-01 VITALS
TEMPERATURE: 98.1 F | HEART RATE: 96 BPM | HEIGHT: 74 IN | OXYGEN SATURATION: 97 % | DIASTOLIC BLOOD PRESSURE: 84 MMHG | SYSTOLIC BLOOD PRESSURE: 146 MMHG | WEIGHT: 179 LBS | BODY MASS INDEX: 22.97 KG/M2

## 2019-01-01 VITALS — WEIGHT: 201 LBS | BODY MASS INDEX: 24.99 KG/M2 | TEMPERATURE: 98.5 F | HEIGHT: 75 IN

## 2019-01-01 VITALS
OXYGEN SATURATION: 98 % | BODY MASS INDEX: 24.42 KG/M2 | WEIGHT: 196.43 LBS | DIASTOLIC BLOOD PRESSURE: 89 MMHG | RESPIRATION RATE: 18 BRPM | HEIGHT: 75 IN | SYSTOLIC BLOOD PRESSURE: 138 MMHG | TEMPERATURE: 97.2 F | HEART RATE: 128 BPM

## 2019-01-01 VITALS
WEIGHT: 189.4 LBS | DIASTOLIC BLOOD PRESSURE: 60 MMHG | BODY MASS INDEX: 23.55 KG/M2 | HEART RATE: 76 BPM | SYSTOLIC BLOOD PRESSURE: 136 MMHG | HEIGHT: 75 IN

## 2019-01-01 VITALS
RESPIRATION RATE: 20 BRPM | BODY MASS INDEX: 23.78 KG/M2 | OXYGEN SATURATION: 96 % | HEART RATE: 56 BPM | HEIGHT: 74 IN | DIASTOLIC BLOOD PRESSURE: 71 MMHG | WEIGHT: 185.3 LBS | SYSTOLIC BLOOD PRESSURE: 139 MMHG | TEMPERATURE: 98.7 F

## 2019-01-01 VITALS
HEIGHT: 75 IN | BODY MASS INDEX: 24.84 KG/M2 | WEIGHT: 199.8 LBS | DIASTOLIC BLOOD PRESSURE: 64 MMHG | HEART RATE: 64 BPM | SYSTOLIC BLOOD PRESSURE: 120 MMHG

## 2019-01-01 VITALS
BODY MASS INDEX: 23.62 KG/M2 | SYSTOLIC BLOOD PRESSURE: 141 MMHG | DIASTOLIC BLOOD PRESSURE: 63 MMHG | HEIGHT: 75 IN | WEIGHT: 190 LBS | HEART RATE: 56 BPM

## 2019-01-01 VITALS — SYSTOLIC BLOOD PRESSURE: 123 MMHG | TEMPERATURE: 98.3 F | HEART RATE: 70 BPM | DIASTOLIC BLOOD PRESSURE: 59 MMHG

## 2019-01-01 VITALS
HEIGHT: 75 IN | SYSTOLIC BLOOD PRESSURE: 107 MMHG | BODY MASS INDEX: 23.38 KG/M2 | HEART RATE: 94 BPM | WEIGHT: 188 LBS | DIASTOLIC BLOOD PRESSURE: 58 MMHG

## 2019-01-01 VITALS — SYSTOLIC BLOOD PRESSURE: 168 MMHG | TEMPERATURE: 98.1 F | HEART RATE: 63 BPM | DIASTOLIC BLOOD PRESSURE: 64 MMHG

## 2019-01-01 DIAGNOSIS — M54.2 NECK PAIN, ACUTE: ICD-10-CM

## 2019-01-01 DIAGNOSIS — I10 ESSENTIAL HYPERTENSION: ICD-10-CM

## 2019-01-01 DIAGNOSIS — M40.05 POSTURAL KYPHOSIS OF LUMBAR REGION: ICD-10-CM

## 2019-01-01 DIAGNOSIS — M43.16 SPONDYLOLISTHESIS OF LUMBAR REGION: ICD-10-CM

## 2019-01-01 DIAGNOSIS — Z00.00 ROUTINE GENERAL MEDICAL EXAMINATION AT A HEALTH CARE FACILITY: ICD-10-CM

## 2019-01-01 DIAGNOSIS — Z98.1 HISTORY OF LUMBAR FUSION: Primary | ICD-10-CM

## 2019-01-01 DIAGNOSIS — E03.9 HYPOTHYROIDISM, UNSPECIFIED TYPE: ICD-10-CM

## 2019-01-01 DIAGNOSIS — M79.89 LEG SWELLING: ICD-10-CM

## 2019-01-01 DIAGNOSIS — E11.9 DIABETES MELLITUS TYPE II, CONTROLLED, WITH NO COMPLICATIONS (HCC): ICD-10-CM

## 2019-01-01 DIAGNOSIS — R50.9 FEVER IN ADULT: ICD-10-CM

## 2019-01-01 DIAGNOSIS — D50.8 OTHER IRON DEFICIENCY ANEMIA: Primary | ICD-10-CM

## 2019-01-01 DIAGNOSIS — E78.00 HYPERCHOLESTEROLEMIA: ICD-10-CM

## 2019-01-01 DIAGNOSIS — Z79.2 LONG TERM (CURRENT) USE OF ANTIBIOTICS: ICD-10-CM

## 2019-01-01 DIAGNOSIS — L89.92 PRESSURE INJURY, STAGE 2, UNSPECIFIED LOCATION (HCC): Primary | ICD-10-CM

## 2019-01-01 DIAGNOSIS — D50.0 IRON DEFICIENCY ANEMIA DUE TO CHRONIC BLOOD LOSS: Primary | ICD-10-CM

## 2019-01-01 DIAGNOSIS — Z74.09 IMPAIRED MOBILITY: ICD-10-CM

## 2019-01-01 DIAGNOSIS — E11.8 TYPE 2 DIABETES MELLITUS WITH COMPLICATION, WITHOUT LONG-TERM CURRENT USE OF INSULIN (HCC): ICD-10-CM

## 2019-01-01 DIAGNOSIS — D50.8 OTHER IRON DEFICIENCY ANEMIA: ICD-10-CM

## 2019-01-01 DIAGNOSIS — I48.91 ATRIAL FIBRILLATION, UNSPECIFIED TYPE (HCC): Primary | ICD-10-CM

## 2019-01-01 DIAGNOSIS — R26.9 GAIT DISTURBANCE: Primary | ICD-10-CM

## 2019-01-01 DIAGNOSIS — I48.0 PAROXYSMAL ATRIAL FIBRILLATION (HCC): Primary | ICD-10-CM

## 2019-01-01 DIAGNOSIS — R26.2 DIFFICULTY WALKING: ICD-10-CM

## 2019-01-01 DIAGNOSIS — IMO0002 WOUND ABSCESS: ICD-10-CM

## 2019-01-01 DIAGNOSIS — R73.03 PREDIABETES: ICD-10-CM

## 2019-01-01 DIAGNOSIS — I31.39 PERICARDIAL EFFUSION: ICD-10-CM

## 2019-01-01 DIAGNOSIS — M40.209 KYPHOSIS, UNSPECIFIED KYPHOSIS TYPE, UNSPECIFIED SPINAL REGION: ICD-10-CM

## 2019-01-01 DIAGNOSIS — M48.062 SPINAL STENOSIS OF LUMBAR REGION WITH NEUROGENIC CLAUDICATION: ICD-10-CM

## 2019-01-01 DIAGNOSIS — R53.1 GENERALIZED WEAKNESS: ICD-10-CM

## 2019-01-01 DIAGNOSIS — L03.317 CELLULITIS OF BUTTOCK: Primary | ICD-10-CM

## 2019-01-01 DIAGNOSIS — D64.9 ANEMIA, UNSPECIFIED TYPE: ICD-10-CM

## 2019-01-01 DIAGNOSIS — S09.90XA INJURY OF HEAD, INITIAL ENCOUNTER: ICD-10-CM

## 2019-01-01 DIAGNOSIS — Z79.01 CHRONIC ANTICOAGULATION: Chronic | ICD-10-CM

## 2019-01-01 DIAGNOSIS — M54.50 ACUTE LOW BACK PAIN WITHOUT SCIATICA, UNSPECIFIED BACK PAIN LATERALITY: ICD-10-CM

## 2019-01-01 DIAGNOSIS — Z79.01 ON WARFARIN THERAPY: ICD-10-CM

## 2019-01-01 DIAGNOSIS — M54.6 THORACIC SPINE PAIN: ICD-10-CM

## 2019-01-01 DIAGNOSIS — I45.2 RIGHT BUNDLE BRANCH BLOCK (RBBB) WITH LEFT ANTERIOR FASCICULAR BLOCK (LAFB): ICD-10-CM

## 2019-01-01 DIAGNOSIS — I48.91 ATRIAL FIBRILLATION, UNSPECIFIED TYPE (HCC): ICD-10-CM

## 2019-01-01 DIAGNOSIS — Z74.09 IMPAIRED FUNCTIONAL MOBILITY AND ACTIVITY TOLERANCE: Primary | ICD-10-CM

## 2019-01-01 DIAGNOSIS — M40.05 POSTURAL KYPHOSIS OF LUMBAR REGION: Primary | ICD-10-CM

## 2019-01-01 DIAGNOSIS — L03.317 CELLULITIS OF BUTTOCK: ICD-10-CM

## 2019-01-01 DIAGNOSIS — I45.2 RBBB (RIGHT BUNDLE BRANCH BLOCK WITH LEFT ANTERIOR FASCICULAR BLOCK): ICD-10-CM

## 2019-01-01 DIAGNOSIS — E53.8 B12 DEFICIENCY: ICD-10-CM

## 2019-01-01 DIAGNOSIS — Z98.1 STATUS POST LAMINECTOMY WITH SPINAL FUSION: Primary | ICD-10-CM

## 2019-01-01 DIAGNOSIS — A41.02 MRSA (METHICILLIN RESISTANT STAPHYLOCOCCUS AUREUS) SEPTICEMIA (HCC): Primary | ICD-10-CM

## 2019-01-01 DIAGNOSIS — E53.8 B12 DEFICIENCY: Primary | ICD-10-CM

## 2019-01-01 DIAGNOSIS — I48.0 PAROXYSMAL ATRIAL FIBRILLATION (HCC): ICD-10-CM

## 2019-01-01 DIAGNOSIS — D64.9 ANEMIA, UNSPECIFIED TYPE: Primary | ICD-10-CM

## 2019-01-01 DIAGNOSIS — M43.16 SPONDYLOLISTHESIS OF LUMBAR REGION: Primary | ICD-10-CM

## 2019-01-01 DIAGNOSIS — Z98.1 S/P LUMBAR FUSION: Primary | ICD-10-CM

## 2019-01-01 DIAGNOSIS — D75.89 MACROCYTOSIS WITHOUT ANEMIA: ICD-10-CM

## 2019-01-01 DIAGNOSIS — M54.2 NECK PAIN: ICD-10-CM

## 2019-01-01 DIAGNOSIS — R50.9 FEVER AND CHILLS: Primary | ICD-10-CM

## 2019-01-01 DIAGNOSIS — E87.6 HYPOKALEMIA: ICD-10-CM

## 2019-01-01 DIAGNOSIS — R09.02 HYPOXIA: ICD-10-CM

## 2019-01-01 DIAGNOSIS — D50.0 IRON DEFICIENCY ANEMIA DUE TO CHRONIC BLOOD LOSS: ICD-10-CM

## 2019-01-01 DIAGNOSIS — M86.9 OSTEOMYELITIS, UNSPECIFIED SITE, UNSPECIFIED TYPE (HCC): ICD-10-CM

## 2019-01-01 LAB
ABO + RH BLD: NORMAL
ABO + RH BLD: NORMAL
ABO GROUP BLD: NORMAL
ABO GROUP BLD: NORMAL
ALBUMIN SERPL-MCNC: 2.1 G/DL (ref 3.5–5.2)
ALBUMIN SERPL-MCNC: 2.2 G/DL (ref 3.5–5.2)
ALBUMIN SERPL-MCNC: 2.2 G/DL (ref 3.5–5.2)
ALBUMIN SERPL-MCNC: 2.4 G/DL (ref 3.5–5.2)
ALBUMIN SERPL-MCNC: 2.9 G/DL (ref 3.5–5.2)
ALBUMIN SERPL-MCNC: 2.9 G/DL (ref 3.5–5.2)
ALBUMIN SERPL-MCNC: 3 G/DL (ref 3.5–5.2)
ALBUMIN SERPL-MCNC: 3.1 G/DL (ref 2.9–4.4)
ALBUMIN SERPL-MCNC: 3.1 G/DL (ref 3.5–5.2)
ALBUMIN SERPL-MCNC: 3.6 G/DL (ref 3.5–5.2)
ALBUMIN SERPL-MCNC: 3.6 G/DL (ref 3.5–5.2)
ALBUMIN/GLOB SERPL: 0.7 G/DL
ALBUMIN/GLOB SERPL: 0.8 G/DL
ALBUMIN/GLOB SERPL: 0.8 G/DL
ALBUMIN/GLOB SERPL: 1 G/DL
ALBUMIN/GLOB SERPL: 1.1 G/DL (ref 1.1–2.4)
ALBUMIN/GLOB SERPL: 1.1 {RATIO} (ref 0.7–1.7)
ALBUMIN/GLOB SERPL: 1.2 G/DL
ALBUMIN/GLOB SERPL: 1.2 G/DL
ALP SERPL-CCNC: 134 U/L (ref 39–117)
ALP SERPL-CCNC: 143 U/L (ref 39–117)
ALP SERPL-CCNC: 152 U/L (ref 38–116)
ALP SERPL-CCNC: 35 U/L (ref 39–117)
ALP SERPL-CCNC: 78 U/L (ref 39–117)
ALP SERPL-CCNC: 78 U/L (ref 39–117)
ALP SERPL-CCNC: 80 U/L (ref 39–117)
ALP SERPL-CCNC: 93 U/L (ref 39–117)
ALP SERPL-CCNC: 93 U/L (ref 39–117)
ALPHA1 GLOB FLD ELPH-MCNC: 0.3 G/DL (ref 0–0.4)
ALPHA2 GLOB SERPL ELPH-MCNC: 0.9 G/DL (ref 0.4–1)
ALT SERPL W P-5'-P-CCNC: 10 U/L (ref 1–41)
ALT SERPL W P-5'-P-CCNC: 11 U/L (ref 0–41)
ALT SERPL W P-5'-P-CCNC: 11 U/L (ref 1–41)
ALT SERPL W P-5'-P-CCNC: 11 U/L (ref 1–41)
ALT SERPL W P-5'-P-CCNC: 12 U/L (ref 1–41)
ALT SERPL W P-5'-P-CCNC: 12 U/L (ref 1–41)
ALT SERPL W P-5'-P-CCNC: 16 U/L (ref 1–41)
ALT SERPL W P-5'-P-CCNC: 18 U/L (ref 1–41)
ALT SERPL W P-5'-P-CCNC: 21 U/L (ref 1–41)
ANION GAP SERPL CALCULATED.3IONS-SCNC: 10.4 MMOL/L
ANION GAP SERPL CALCULATED.3IONS-SCNC: 10.8 MMOL/L
ANION GAP SERPL CALCULATED.3IONS-SCNC: 11 MMOL/L
ANION GAP SERPL CALCULATED.3IONS-SCNC: 11.3 MMOL/L
ANION GAP SERPL CALCULATED.3IONS-SCNC: 12 MMOL/L
ANION GAP SERPL CALCULATED.3IONS-SCNC: 12.6 MMOL/L
ANION GAP SERPL CALCULATED.3IONS-SCNC: 14.3 MMOL/L
ANION GAP SERPL CALCULATED.3IONS-SCNC: 19 MMOL/L (ref 5–15)
ANION GAP SERPL CALCULATED.3IONS-SCNC: 7.2 MMOL/L
ANION GAP SERPL CALCULATED.3IONS-SCNC: 7.9 MMOL/L
ANION GAP SERPL CALCULATED.3IONS-SCNC: 8.2 MMOL/L
ANION GAP SERPL CALCULATED.3IONS-SCNC: 8.5 MMOL/L
ANION GAP SERPL CALCULATED.3IONS-SCNC: 9.4 MMOL/L
ANION GAP SERPL CALCULATED.3IONS-SCNC: 9.4 MMOL/L
ANION GAP SERPL CALCULATED.3IONS-SCNC: 9.5 MMOL/L
ANION GAP SERPL CALCULATED.3IONS-SCNC: 9.8 MMOL/L
ANION GAP SERPL CALCULATED.3IONS-SCNC: 9.9 MMOL/L
ARTERIAL PATENCY WRIST A: POSITIVE
AST SERPL-CCNC: 14 U/L (ref 1–40)
AST SERPL-CCNC: 15 U/L (ref 1–40)
AST SERPL-CCNC: 16 U/L (ref 0–40)
AST SERPL-CCNC: 17 U/L (ref 1–40)
AST SERPL-CCNC: 17 U/L (ref 1–40)
AST SERPL-CCNC: 18 U/L (ref 1–40)
AST SERPL-CCNC: 18 U/L (ref 1–40)
AST SERPL-CCNC: 28 U/L (ref 1–40)
AST SERPL-CCNC: 41 U/L (ref 1–40)
ATMOSPHERIC PRESS: 758.7 MMHG
B PARAPERT DNA SPEC QL NAA+PROBE: NOT DETECTED
B PERT DNA SPEC QL NAA+PROBE: NOT DETECTED
B-GLOBULIN SERPL ELPH-MCNC: 1 G/DL (ref 0.7–1.3)
BACTERIA BLD CULT: ABNORMAL
BACTERIA SPEC AEROBE CULT: ABNORMAL
BACTERIA SPEC AEROBE CULT: NORMAL
BACTERIA SPEC ANAEROBE CULT: NORMAL
BASE EXCESS BLDA CALC-SCNC: 3.1 MMOL/L (ref 0–2)
BASOPHILS # BLD AUTO: 0.01 10*3/MM3 (ref 0–0.2)
BASOPHILS # BLD AUTO: 0.02 10*3/MM3 (ref 0–0.2)
BASOPHILS # BLD AUTO: 0.03 10*3/MM3 (ref 0–0.2)
BASOPHILS # BLD AUTO: 0.03 10*3/MM3 (ref 0–0.2)
BASOPHILS # BLD AUTO: 0.04 10*3/MM3 (ref 0–0.2)
BASOPHILS # BLD AUTO: 0.05 10*3/MM3 (ref 0–0.2)
BASOPHILS NFR BLD AUTO: 0.1 % (ref 0–1.5)
BASOPHILS NFR BLD AUTO: 0.3 % (ref 0–1.5)
BASOPHILS NFR BLD AUTO: 0.3 % (ref 0–1.5)
BASOPHILS NFR BLD AUTO: 0.4 % (ref 0–1.5)
BASOPHILS NFR BLD AUTO: 0.9 % (ref 0–1.5)
BASOPHILS NFR BLD AUTO: 1 % (ref 0–1.5)
BDY SITE: ABNORMAL
BH BB BLOOD EXPIRATION DATE: NORMAL
BH BB BLOOD EXPIRATION DATE: NORMAL
BH BB BLOOD TYPE BARCODE: 600
BH BB BLOOD TYPE BARCODE: 600
BH BB DISPENSE STATUS: NORMAL
BH BB DISPENSE STATUS: NORMAL
BH BB PRODUCT CODE: NORMAL
BH BB PRODUCT CODE: NORMAL
BH BB UNIT NUMBER: NORMAL
BH BB UNIT NUMBER: NORMAL
BH CV ECHO MEAS - ACS: 2 CM
BH CV ECHO MEAS - ACS: 2.5 CM
BH CV ECHO MEAS - AO MAX PG (FULL): 3.9 MMHG
BH CV ECHO MEAS - AO MAX PG (FULL): 4.7 MMHG
BH CV ECHO MEAS - AO MAX PG: 8.2 MMHG
BH CV ECHO MEAS - AO MAX PG: 9.6 MMHG
BH CV ECHO MEAS - AO MEAN PG (FULL): 2 MMHG
BH CV ECHO MEAS - AO MEAN PG (FULL): 2 MMHG
BH CV ECHO MEAS - AO MEAN PG: 4 MMHG
BH CV ECHO MEAS - AO MEAN PG: 5 MMHG
BH CV ECHO MEAS - AO ROOT AREA (BSA CORRECTED): 1.5
BH CV ECHO MEAS - AO ROOT AREA (BSA CORRECTED): 1.6
BH CV ECHO MEAS - AO ROOT AREA: 8 CM^2
BH CV ECHO MEAS - AO ROOT AREA: 9.6 CM^2
BH CV ECHO MEAS - AO ROOT DIAM: 3.2 CM
BH CV ECHO MEAS - AO ROOT DIAM: 3.5 CM
BH CV ECHO MEAS - AO V2 MAX: 143 CM/SEC
BH CV ECHO MEAS - AO V2 MAX: 155 CM/SEC
BH CV ECHO MEAS - AO V2 MEAN: 101 CM/SEC
BH CV ECHO MEAS - AO V2 MEAN: 96.2 CM/SEC
BH CV ECHO MEAS - AO V2 VTI: 32.8 CM
BH CV ECHO MEAS - AO V2 VTI: 33.2 CM
BH CV ECHO MEAS - ASC AORTA: 3.7 CM
BH CV ECHO MEAS - AVA(I,A): 2.7 CM^2
BH CV ECHO MEAS - AVA(I,A): 2.7 CM^2
BH CV ECHO MEAS - AVA(I,D): 2.7 CM^2
BH CV ECHO MEAS - AVA(I,D): 2.7 CM^2
BH CV ECHO MEAS - AVA(V,A): 2.5 CM^2
BH CV ECHO MEAS - AVA(V,A): 2.8 CM^2
BH CV ECHO MEAS - AVA(V,D): 2.5 CM^2
BH CV ECHO MEAS - AVA(V,D): 2.8 CM^2
BH CV ECHO MEAS - BSA(HAYCOCK): 2.2 M^2
BH CV ECHO MEAS - BSA(HAYCOCK): 2.2 M^2
BH CV ECHO MEAS - BSA: 2.2 M^2
BH CV ECHO MEAS - BSA: 2.2 M^2
BH CV ECHO MEAS - BZI_BMI: 24.2 KILOGRAMS/M^2
BH CV ECHO MEAS - BZI_BMI: 26.2 KILOGRAMS/M^2
BH CV ECHO MEAS - BZI_METRIC_HEIGHT: 188 CM
BH CV ECHO MEAS - BZI_METRIC_HEIGHT: 190.5 CM
BH CV ECHO MEAS - BZI_METRIC_WEIGHT: 88 KG
BH CV ECHO MEAS - BZI_METRIC_WEIGHT: 92.5 KG
BH CV ECHO MEAS - EDV(CUBED): 148.9 ML
BH CV ECHO MEAS - EDV(CUBED): 85.2 ML
BH CV ECHO MEAS - EDV(MOD-SP2): 104 ML
BH CV ECHO MEAS - EDV(MOD-SP2): 125 ML
BH CV ECHO MEAS - EDV(MOD-SP4): 132 ML
BH CV ECHO MEAS - EDV(MOD-SP4): 166 ML
BH CV ECHO MEAS - EDV(TEICH): 135.3 ML
BH CV ECHO MEAS - EDV(TEICH): 87.7 ML
BH CV ECHO MEAS - EF(CUBED): 66 %
BH CV ECHO MEAS - EF(CUBED): 76.9 %
BH CV ECHO MEAS - EF(MOD-BP): 54 %
BH CV ECHO MEAS - EF(MOD-BP): 58 %
BH CV ECHO MEAS - EF(MOD-SP2): 53.8 %
BH CV ECHO MEAS - EF(MOD-SP2): 57.6 %
BH CV ECHO MEAS - EF(MOD-SP4): 53 %
BH CV ECHO MEAS - EF(MOD-SP4): 64.5 %
BH CV ECHO MEAS - EF(TEICH): 57.1 %
BH CV ECHO MEAS - EF(TEICH): 69.2 %
BH CV ECHO MEAS - ESV(CUBED): 19.7 ML
BH CV ECHO MEAS - ESV(CUBED): 50.7 ML
BH CV ECHO MEAS - ESV(MOD-SP2): 48 ML
BH CV ECHO MEAS - ESV(MOD-SP2): 53 ML
BH CV ECHO MEAS - ESV(MOD-SP4): 59 ML
BH CV ECHO MEAS - ESV(MOD-SP4): 62 ML
BH CV ECHO MEAS - ESV(TEICH): 27 ML
BH CV ECHO MEAS - ESV(TEICH): 58.1 ML
BH CV ECHO MEAS - FS: 30.2 %
BH CV ECHO MEAS - FS: 38.6 %
BH CV ECHO MEAS - IVS/LVPW: 0.92
BH CV ECHO MEAS - IVS/LVPW: 0.93
BH CV ECHO MEAS - IVSD: 1.1 CM
BH CV ECHO MEAS - IVSD: 1.4 CM
BH CV ECHO MEAS - LAT PEAK E' VEL: 8 CM/SEC
BH CV ECHO MEAS - LAT PEAK E' VEL: 8.7 CM/SEC
BH CV ECHO MEAS - LV DIASTOLIC VOL/BSA (35-75): 60.2 ML/M^2
BH CV ECHO MEAS - LV DIASTOLIC VOL/BSA (35-75): 76.6 ML/M^2
BH CV ECHO MEAS - LV MASS(C)D: 242 GRAMS
BH CV ECHO MEAS - LV MASS(C)D: 253.4 GRAMS
BH CV ECHO MEAS - LV MASS(C)DI: 110.4 GRAMS/M^2
BH CV ECHO MEAS - LV MASS(C)DI: 117 GRAMS/M^2
BH CV ECHO MEAS - LV MAX PG: 4.3 MMHG
BH CV ECHO MEAS - LV MAX PG: 4.9 MMHG
BH CV ECHO MEAS - LV MEAN PG: 2 MMHG
BH CV ECHO MEAS - LV MEAN PG: 3 MMHG
BH CV ECHO MEAS - LV SYSTOLIC VOL/BSA (12-30): 27.2 ML/M^2
BH CV ECHO MEAS - LV SYSTOLIC VOL/BSA (12-30): 28.3 ML/M^2
BH CV ECHO MEAS - LV V1 MAX: 104 CM/SEC
BH CV ECHO MEAS - LV V1 MAX: 111 CM/SEC
BH CV ECHO MEAS - LV V1 MEAN: 67.5 CM/SEC
BH CV ECHO MEAS - LV V1 MEAN: 77.7 CM/SEC
BH CV ECHO MEAS - LV V1 VTI: 23.2 CM
BH CV ECHO MEAS - LV V1 VTI: 26.1 CM
BH CV ECHO MEAS - LVIDD: 4.4 CM
BH CV ECHO MEAS - LVIDD: 5.3 CM
BH CV ECHO MEAS - LVIDS: 2.7 CM
BH CV ECHO MEAS - LVIDS: 3.7 CM
BH CV ECHO MEAS - LVLD AP2: 7.1 CM
BH CV ECHO MEAS - LVLD AP2: 8.3 CM
BH CV ECHO MEAS - LVLD AP4: 7.6 CM
BH CV ECHO MEAS - LVLD AP4: 8.2 CM
BH CV ECHO MEAS - LVLS AP2: 6.2 CM
BH CV ECHO MEAS - LVLS AP2: 7.9 CM
BH CV ECHO MEAS - LVLS AP4: 6.6 CM
BH CV ECHO MEAS - LVLS AP4: 6.7 CM
BH CV ECHO MEAS - LVOT AREA (M): 3.5 CM^2
BH CV ECHO MEAS - LVOT AREA (M): 3.8 CM^2
BH CV ECHO MEAS - LVOT AREA: 3.5 CM^2
BH CV ECHO MEAS - LVOT AREA: 3.8 CM^2
BH CV ECHO MEAS - LVOT DIAM: 2.1 CM
BH CV ECHO MEAS - LVOT DIAM: 2.2 CM
BH CV ECHO MEAS - LVPWD: 1.2 CM
BH CV ECHO MEAS - LVPWD: 1.5 CM
BH CV ECHO MEAS - MED PEAK E' VEL: 5 CM/SEC
BH CV ECHO MEAS - MED PEAK E' VEL: 5.3 CM/SEC
BH CV ECHO MEAS - MV A DUR: 0.12 SEC
BH CV ECHO MEAS - MV A DUR: 0.24 SEC
BH CV ECHO MEAS - MV A MAX VEL: 82 CM/SEC
BH CV ECHO MEAS - MV A MAX VEL: 90.2 CM/SEC
BH CV ECHO MEAS - MV DEC SLOPE: 208 CM/SEC^2
BH CV ECHO MEAS - MV DEC SLOPE: 238 CM/SEC^2
BH CV ECHO MEAS - MV DEC TIME: 0.25 SEC
BH CV ECHO MEAS - MV DEC TIME: 0.35 SEC
BH CV ECHO MEAS - MV E MAX VEL: 61.6 CM/SEC
BH CV ECHO MEAS - MV E MAX VEL: 74.2 CM/SEC
BH CV ECHO MEAS - MV E/A: 0.68
BH CV ECHO MEAS - MV E/A: 0.9
BH CV ECHO MEAS - MV MAX PG: 4.8 MMHG
BH CV ECHO MEAS - MV MAX PG: 5.7 MMHG
BH CV ECHO MEAS - MV MEAN PG: 2 MMHG
BH CV ECHO MEAS - MV MEAN PG: 3 MMHG
BH CV ECHO MEAS - MV P1/2T MAX VEL: 107 CM/SEC
BH CV ECHO MEAS - MV P1/2T MAX VEL: 86.9 CM/SEC
BH CV ECHO MEAS - MV P1/2T: 106.9 MSEC
BH CV ECHO MEAS - MV P1/2T: 150.7 MSEC
BH CV ECHO MEAS - MV V2 MAX: 109 CM/SEC
BH CV ECHO MEAS - MV V2 MAX: 119 CM/SEC
BH CV ECHO MEAS - MV V2 MEAN: 64.6 CM/SEC
BH CV ECHO MEAS - MV V2 MEAN: 77.4 CM/SEC
BH CV ECHO MEAS - MV V2 VTI: 30.7 CM
BH CV ECHO MEAS - MV V2 VTI: 46.7 CM
BH CV ECHO MEAS - MVA P1/2T LCG: 2.1 CM^2
BH CV ECHO MEAS - MVA P1/2T LCG: 2.5 CM^2
BH CV ECHO MEAS - MVA(P1/2T): 1.5 CM^2
BH CV ECHO MEAS - MVA(P1/2T): 2.1 CM^2
BH CV ECHO MEAS - MVA(VTI): 1.9 CM^2
BH CV ECHO MEAS - MVA(VTI): 2.9 CM^2
BH CV ECHO MEAS - PA ACC TIME: 0.12 SEC
BH CV ECHO MEAS - PA ACC TIME: 0.18 SEC
BH CV ECHO MEAS - PA MAX PG (FULL): 0.77 MMHG
BH CV ECHO MEAS - PA MAX PG (FULL): 1.2 MMHG
BH CV ECHO MEAS - PA MAX PG: 2.5 MMHG
BH CV ECHO MEAS - PA MAX PG: 3.9 MMHG
BH CV ECHO MEAS - PA PR(ACCEL): -3.4 MMHG
BH CV ECHO MEAS - PA PR(ACCEL): 26.8 MMHG
BH CV ECHO MEAS - PA V2 MAX: 78.4 CM/SEC
BH CV ECHO MEAS - PA V2 MAX: 99.1 CM/SEC
BH CV ECHO MEAS - PULM A REVS DUR: 0.13 SEC
BH CV ECHO MEAS - PULM A REVS DUR: 0.18 SEC
BH CV ECHO MEAS - PULM A REVS VEL: 30.1 CM/SEC
BH CV ECHO MEAS - PULM A REVS VEL: 32.9 CM/SEC
BH CV ECHO MEAS - PULM DIAS VEL: 28 CM/SEC
BH CV ECHO MEAS - PULM DIAS VEL: 53.6 CM/SEC
BH CV ECHO MEAS - PULM S/D: 0.84
BH CV ECHO MEAS - PULM S/D: 1
BH CV ECHO MEAS - PULM SYS VEL: 23.5 CM/SEC
BH CV ECHO MEAS - PULM SYS VEL: 54.5 CM/SEC
BH CV ECHO MEAS - PVA(V,A): 2.6 CM^2
BH CV ECHO MEAS - PVA(V,A): 3.4 CM^2
BH CV ECHO MEAS - PVA(V,D): 2.6 CM^2
BH CV ECHO MEAS - PVA(V,D): 3.4 CM^2
BH CV ECHO MEAS - QP/QS: 0.52
BH CV ECHO MEAS - QP/QS: 0.86
BH CV ECHO MEAS - RAP SYSTOLE: 8 MMHG
BH CV ECHO MEAS - RV MAX PG: 1.7 MMHG
BH CV ECHO MEAS - RV MAX PG: 2.7 MMHG
BH CV ECHO MEAS - RV MEAN PG: 1 MMHG
BH CV ECHO MEAS - RV MEAN PG: 1 MMHG
BH CV ECHO MEAS - RV V1 MAX: 65 CM/SEC
BH CV ECHO MEAS - RV V1 MAX: 82 CM/SEC
BH CV ECHO MEAS - RV V1 MEAN: 43.1 CM/SEC
BH CV ECHO MEAS - RV V1 MEAN: 50.7 CM/SEC
BH CV ECHO MEAS - RV V1 VTI: 14.9 CM
BH CV ECHO MEAS - RV V1 VTI: 18.3 CM
BH CV ECHO MEAS - RVOT AREA: 3.1 CM^2
BH CV ECHO MEAS - RVOT AREA: 4.2 CM^2
BH CV ECHO MEAS - RVOT DIAM: 2 CM
BH CV ECHO MEAS - RVOT DIAM: 2.3 CM
BH CV ECHO MEAS - RVSP: 48 MMHG
BH CV ECHO MEAS - SI(AO): 121.8 ML/M^2
BH CV ECHO MEAS - SI(AO): 145.7 ML/M^2
BH CV ECHO MEAS - SI(CUBED): 30.2 ML/M^2
BH CV ECHO MEAS - SI(CUBED): 44.8 ML/M^2
BH CV ECHO MEAS - SI(LVOT): 40.7 ML/M^2
BH CV ECHO MEAS - SI(LVOT): 41.3 ML/M^2
BH CV ECHO MEAS - SI(MOD-SP2): 25.6 ML/M^2
BH CV ECHO MEAS - SI(MOD-SP2): 33.2 ML/M^2
BH CV ECHO MEAS - SI(MOD-SP4): 31.9 ML/M^2
BH CV ECHO MEAS - SI(MOD-SP4): 49.4 ML/M^2
BH CV ECHO MEAS - SI(TEICH): 28 ML/M^2
BH CV ECHO MEAS - SI(TEICH): 35.2 ML/M^2
BH CV ECHO MEAS - SV(AO): 267 ML
BH CV ECHO MEAS - SV(AO): 315.6 ML
BH CV ECHO MEAS - SV(CUBED): 65.5 ML
BH CV ECHO MEAS - SV(CUBED): 98.2 ML
BH CV ECHO MEAS - SV(LVOT): 88.2 ML
BH CV ECHO MEAS - SV(LVOT): 90.4 ML
BH CV ECHO MEAS - SV(MOD-SP2): 56 ML
BH CV ECHO MEAS - SV(MOD-SP2): 72 ML
BH CV ECHO MEAS - SV(MOD-SP4): 107 ML
BH CV ECHO MEAS - SV(MOD-SP4): 70 ML
BH CV ECHO MEAS - SV(RVOT): 46.8 ML
BH CV ECHO MEAS - SV(RVOT): 76 ML
BH CV ECHO MEAS - SV(TEICH): 60.7 ML
BH CV ECHO MEAS - SV(TEICH): 77.2 ML
BH CV ECHO MEAS - TAPSE (>1.6): 1.9 CM2
BH CV ECHO MEAS - TAPSE (>1.6): 2.9 CM2
BH CV ECHO MEAS - TR MAX VEL: 317 CM/SEC
BH CV ECHO MEASUREMENTS AVERAGE E/E' RATIO: 10.6
BH CV ECHO MEASUREMENTS AVERAGE E/E' RATIO: 9.48
BH CV LOWER VASCULAR LEFT COMMON FEMORAL AUGMENT: NORMAL
BH CV LOWER VASCULAR LEFT COMMON FEMORAL COMPETENT: NORMAL
BH CV LOWER VASCULAR LEFT COMMON FEMORAL COMPRESS: NORMAL
BH CV LOWER VASCULAR LEFT COMMON FEMORAL PHASIC: NORMAL
BH CV LOWER VASCULAR LEFT COMMON FEMORAL SPONT: NORMAL
BH CV LOWER VASCULAR LEFT DISTAL FEMORAL COMPRESS: NORMAL
BH CV LOWER VASCULAR LEFT GASTRONEMIUS COMPRESS: NORMAL
BH CV LOWER VASCULAR LEFT GREATER SAPH AK COMPRESS: NORMAL
BH CV LOWER VASCULAR LEFT GREATER SAPH BK COMPRESS: NORMAL
BH CV LOWER VASCULAR LEFT MID FEMORAL AUGMENT: NORMAL
BH CV LOWER VASCULAR LEFT MID FEMORAL COMPETENT: NORMAL
BH CV LOWER VASCULAR LEFT MID FEMORAL COMPRESS: NORMAL
BH CV LOWER VASCULAR LEFT MID FEMORAL PHASIC: NORMAL
BH CV LOWER VASCULAR LEFT MID FEMORAL SPONT: NORMAL
BH CV LOWER VASCULAR LEFT PERONEAL COMPRESS: NORMAL
BH CV LOWER VASCULAR LEFT POPLITEAL AUGMENT: NORMAL
BH CV LOWER VASCULAR LEFT POPLITEAL COMPETENT: NORMAL
BH CV LOWER VASCULAR LEFT POPLITEAL COMPRESS: NORMAL
BH CV LOWER VASCULAR LEFT POPLITEAL PHASIC: NORMAL
BH CV LOWER VASCULAR LEFT POPLITEAL SPONT: NORMAL
BH CV LOWER VASCULAR LEFT POSTERIOR TIBIAL COMPRESS: NORMAL
BH CV LOWER VASCULAR LEFT PROXIMAL FEMORAL COMPRESS: NORMAL
BH CV LOWER VASCULAR LEFT SAPHENOFEMORAL JUNCTION COMPRESS: NORMAL
BH CV LOWER VASCULAR LEFT SAPHENOFEMORAL JUNCTION PHASIC: NORMAL
BH CV LOWER VASCULAR LEFT SAPHENOFEMORAL JUNCTION SPONT: NORMAL
BH CV LOWER VASCULAR RIGHT COMMON FEMORAL AUGMENT: NORMAL
BH CV LOWER VASCULAR RIGHT COMMON FEMORAL COMPETENT: NORMAL
BH CV LOWER VASCULAR RIGHT COMMON FEMORAL COMPRESS: NORMAL
BH CV LOWER VASCULAR RIGHT COMMON FEMORAL PHASIC: NORMAL
BH CV LOWER VASCULAR RIGHT COMMON FEMORAL SPONT: NORMAL
BH CV LOWER VASCULAR RIGHT DISTAL FEMORAL COMPRESS: NORMAL
BH CV LOWER VASCULAR RIGHT GASTRONEMIUS COMPRESS: NORMAL
BH CV LOWER VASCULAR RIGHT GREATER SAPH AK COMPRESS: NORMAL
BH CV LOWER VASCULAR RIGHT GREATER SAPH BK COMPRESS: NORMAL
BH CV LOWER VASCULAR RIGHT MID FEMORAL AUGMENT: NORMAL
BH CV LOWER VASCULAR RIGHT MID FEMORAL COMPETENT: NORMAL
BH CV LOWER VASCULAR RIGHT MID FEMORAL COMPRESS: NORMAL
BH CV LOWER VASCULAR RIGHT MID FEMORAL PHASIC: NORMAL
BH CV LOWER VASCULAR RIGHT MID FEMORAL SPONT: NORMAL
BH CV LOWER VASCULAR RIGHT PERONEAL COMPRESS: NORMAL
BH CV LOWER VASCULAR RIGHT POPLITEAL AUGMENT: NORMAL
BH CV LOWER VASCULAR RIGHT POPLITEAL COMPETENT: NORMAL
BH CV LOWER VASCULAR RIGHT POPLITEAL COMPRESS: NORMAL
BH CV LOWER VASCULAR RIGHT POPLITEAL PHASIC: NORMAL
BH CV LOWER VASCULAR RIGHT POPLITEAL SPONT: NORMAL
BH CV LOWER VASCULAR RIGHT POSTERIOR TIBIAL COMPRESS: NORMAL
BH CV LOWER VASCULAR RIGHT PROXIMAL FEMORAL COMPRESS: NORMAL
BH CV LOWER VASCULAR RIGHT SAPHENOFEMORAL JUNCTION COMPRESS: NORMAL
BH CV LOWER VASCULAR RIGHT SAPHENOFEMORAL JUNCTION PHASIC: NORMAL
BH CV LOWER VASCULAR RIGHT SAPHENOFEMORAL JUNCTION SPONT: NORMAL
BH CV VAS BP RIGHT ARM: NORMAL MMHG
BH CV XLRA - RV BASE: 3.9 CM
BH CV XLRA - RV BASE: 4.2 CM
BH CV XLRA - RV LENGTH: 7.8 CM
BH CV XLRA - RV MID: 3.6 CM
BH CV XLRA - TDI S': 11.4 CM/SEC
BH CV XLRA - TDI S': 14 CM/SEC
BILIRUB SERPL-MCNC: 0.2 MG/DL (ref 0.1–1.2)
BILIRUB SERPL-MCNC: 0.2 MG/DL (ref 0.2–1.2)
BILIRUB SERPL-MCNC: 0.3 MG/DL (ref 0.2–1.2)
BILIRUB SERPL-MCNC: 0.3 MG/DL (ref 0.2–1.2)
BILIRUB SERPL-MCNC: 0.4 MG/DL (ref 0.2–1.2)
BILIRUB UR QL STRIP: NEGATIVE
BLD GP AB SCN SERPL QL: NEGATIVE
BLD GP AB SCN SERPL QL: NEGATIVE
BUN BLD-MCNC: 14 MG/DL (ref 8–23)
BUN BLD-MCNC: 14 MG/DL (ref 8–23)
BUN BLD-MCNC: 17 MG/DL (ref 8–23)
BUN BLD-MCNC: 18 MG/DL (ref 8–23)
BUN BLD-MCNC: 18 MG/DL (ref 8–23)
BUN BLD-MCNC: 19 MG/DL (ref 8–23)
BUN BLD-MCNC: 20 MG/DL (ref 8–23)
BUN BLD-MCNC: 22 MG/DL (ref 8–23)
BUN BLD-MCNC: 26 MG/DL (ref 6–20)
BUN BLD-MCNC: 28 MG/DL (ref 8–23)
BUN BLD-MCNC: 32 MG/DL (ref 8–23)
BUN BLD-MCNC: 33 MG/DL (ref 8–23)
BUN BLD-MCNC: 76 MG/DL (ref 8–23)
BUN SERPL-MCNC: 15 MG/DL (ref 8–23)
BUN/CREAT SERPL: 19.7 (ref 7–25)
BUN/CREAT SERPL: 22.5 (ref 7–25)
BUN/CREAT SERPL: 25.3 (ref 7–25)
BUN/CREAT SERPL: 26.8 (ref 7–25)
BUN/CREAT SERPL: 27.4 (ref 7–25)
BUN/CREAT SERPL: 28.2 (ref 7–25)
BUN/CREAT SERPL: 32.1 (ref 7.3–30)
BUN/CREAT SERPL: 35.2 (ref 7–25)
BUN/CREAT SERPL: 35.3 (ref 7–25)
BUN/CREAT SERPL: 35.8 (ref 7–25)
BUN/CREAT SERPL: 36 (ref 7–25)
BUN/CREAT SERPL: 36.4 (ref 7–25)
BUN/CREAT SERPL: 36.7 (ref 7–25)
BUN/CREAT SERPL: 38.1 (ref 7–25)
BUN/CREAT SERPL: 38.8 (ref 7–25)
BUN/CREAT SERPL: 41.6 (ref 7–25)
C PNEUM DNA NPH QL NAA+NON-PROBE: NOT DETECTED
CALCIUM SERPL-MCNC: 9.3 MG/DL (ref 8.6–10.5)
CALCIUM SPEC-SCNC: 7.6 MG/DL (ref 8.6–10.5)
CALCIUM SPEC-SCNC: 7.8 MG/DL (ref 8.6–10.5)
CALCIUM SPEC-SCNC: 7.9 MG/DL (ref 8.6–10.5)
CALCIUM SPEC-SCNC: 7.9 MG/DL (ref 8.6–10.5)
CALCIUM SPEC-SCNC: 8 MG/DL (ref 8.6–10.5)
CALCIUM SPEC-SCNC: 8.1 MG/DL (ref 8.6–10.5)
CALCIUM SPEC-SCNC: 8.3 MG/DL (ref 8.6–10.5)
CALCIUM SPEC-SCNC: 8.4 MG/DL (ref 8.6–10.5)
CALCIUM SPEC-SCNC: 8.5 MG/DL (ref 8.6–10.5)
CALCIUM SPEC-SCNC: 8.6 MG/DL (ref 8.6–10.5)
CALCIUM SPEC-SCNC: 8.9 MG/DL (ref 8.6–10.5)
CALCIUM SPEC-SCNC: 9.2 MG/DL (ref 8.6–10.5)
CALCIUM SPEC-SCNC: 9.4 MG/DL (ref 8.5–10.2)
CALCIUM SPEC-SCNC: 9.5 MG/DL (ref 8.6–10.5)
CHLORIDE SERPL-SCNC: 103 MMOL/L (ref 98–107)
CHLORIDE SERPL-SCNC: 103 MMOL/L (ref 98–107)
CHLORIDE SERPL-SCNC: 104 MMOL/L (ref 98–107)
CHLORIDE SERPL-SCNC: 106 MMOL/L (ref 98–107)
CHLORIDE SERPL-SCNC: 106 MMOL/L (ref 98–107)
CHLORIDE SERPL-SCNC: 107 MMOL/L (ref 98–107)
CHLORIDE SERPL-SCNC: 108 MMOL/L (ref 98–107)
CHLORIDE SERPL-SCNC: 109 MMOL/L (ref 98–107)
CHLORIDE SERPL-SCNC: 110 MMOL/L (ref 98–107)
CHLORIDE SERPL-SCNC: 111 MMOL/L (ref 98–107)
CLARITY UR: CLEAR
CO2 SERPL-SCNC: 20 MMOL/L (ref 22–29)
CO2 SERPL-SCNC: 21.7 MMOL/L (ref 22–29)
CO2 SERPL-SCNC: 24.5 MMOL/L (ref 22–29)
CO2 SERPL-SCNC: 24.6 MMOL/L (ref 22–29)
CO2 SERPL-SCNC: 25 MMOL/L (ref 22–29)
CO2 SERPL-SCNC: 25 MMOL/L (ref 22–29)
CO2 SERPL-SCNC: 25.2 MMOL/L (ref 22–29)
CO2 SERPL-SCNC: 25.2 MMOL/L (ref 22–29)
CO2 SERPL-SCNC: 25.6 MMOL/L (ref 22–29)
CO2 SERPL-SCNC: 25.6 MMOL/L (ref 22–29)
CO2 SERPL-SCNC: 26.4 MMOL/L (ref 22–29)
CO2 SERPL-SCNC: 26.5 MMOL/L (ref 22–29)
CO2 SERPL-SCNC: 26.6 MMOL/L (ref 22–29)
CO2 SERPL-SCNC: 26.7 MMOL/L (ref 22–29)
CO2 SERPL-SCNC: 27.1 MMOL/L (ref 22–29)
CO2 SERPL-SCNC: 27.8 MMOL/L (ref 22–29)
CO2 SERPL-SCNC: 28.1 MMOL/L (ref 22–29)
CO2 SERPL-SCNC: 31.8 MMOL/L (ref 22–29)
COLOR UR: YELLOW
CREAT BLD-MCNC: 0.49 MG/DL (ref 0.76–1.27)
CREAT BLD-MCNC: 0.53 MG/DL (ref 0.76–1.27)
CREAT BLD-MCNC: 0.54 MG/DL (ref 0.76–1.27)
CREAT BLD-MCNC: 0.62 MG/DL (ref 0.76–1.27)
CREAT BLD-MCNC: 0.71 MG/DL (ref 0.76–1.27)
CREAT BLD-MCNC: 0.77 MG/DL (ref 0.76–1.27)
CREAT BLD-MCNC: 0.77 MG/DL (ref 0.76–1.27)
CREAT BLD-MCNC: 0.8 MG/DL (ref 0.76–1.27)
CREAT BLD-MCNC: 0.81 MG/DL (ref 0.7–1.3)
CREAT BLD-MCNC: 0.84 MG/DL (ref 0.76–1.27)
CREAT BLD-MCNC: 0.85 MG/DL (ref 0.76–1.27)
CREAT BLD-MCNC: 0.87 MG/DL (ref 0.76–1.27)
CREAT BLD-MCNC: 0.89 MG/DL (ref 0.76–1.27)
CREAT BLD-MCNC: 2.15 MG/DL (ref 0.76–1.27)
CREAT BLDA-MCNC: 0.8 MG/DL (ref 0.6–1.3)
CREAT SERPL-MCNC: 0.76 MG/DL (ref 0.76–1.27)
CREAT UR-MCNC: 87.5 MG/DL
CRP SERPL-MCNC: 15.42 MG/DL (ref 0–0.5)
CRP SERPL-MCNC: 20.54 MG/DL (ref 0–0.5)
CYTO UR: NORMAL
D-LACTATE SERPL-SCNC: 1.2 MMOL/L (ref 0.5–2)
D-LACTATE SERPL-SCNC: 1.7 MMOL/L (ref 0.5–2)
DAT POLY-SP REAG RBC QL: NEGATIVE
DEPRECATED RDW RBC AUTO: 50.1 FL (ref 37–54)
DEPRECATED RDW RBC AUTO: 53.4 FL (ref 37–54)
DEPRECATED RDW RBC AUTO: 54.4 FL (ref 37–54)
DEPRECATED RDW RBC AUTO: 54.6 FL (ref 37–54)
DEPRECATED RDW RBC AUTO: 54.7 FL (ref 37–54)
DEPRECATED RDW RBC AUTO: 55.1 FL (ref 37–54)
DEPRECATED RDW RBC AUTO: 55.4 FL (ref 37–54)
DEPRECATED RDW RBC AUTO: 55.8 FL (ref 37–54)
DEPRECATED RDW RBC AUTO: 56 FL (ref 37–54)
DEPRECATED RDW RBC AUTO: 56.8 FL (ref 37–54)
DEPRECATED RDW RBC AUTO: 56.9 FL (ref 37–54)
DEPRECATED RDW RBC AUTO: 57.2 FL (ref 37–54)
DEPRECATED RDW RBC AUTO: 57.7 FL (ref 37–54)
DEPRECATED RDW RBC AUTO: 57.9 FL (ref 37–54)
DEPRECATED RDW RBC AUTO: 57.9 FL (ref 37–54)
DEPRECATED RDW RBC AUTO: 58.4 FL (ref 37–54)
DEPRECATED RDW RBC AUTO: 58.8 FL (ref 37–54)
EOSINOPHIL # BLD AUTO: 0 10*3/MM3 (ref 0–0.4)
EOSINOPHIL # BLD AUTO: 0 10*3/MM3 (ref 0–0.4)
EOSINOPHIL # BLD AUTO: 0.01 10*3/MM3 (ref 0–0.7)
EOSINOPHIL # BLD AUTO: 0.03 10*3/MM3 (ref 0–0.4)
EOSINOPHIL # BLD AUTO: 0.05 10*3/MM3 (ref 0–0.7)
EOSINOPHIL # BLD AUTO: 0.07 10*3/MM3 (ref 0–0.4)
EOSINOPHIL # BLD AUTO: 0.09 10*3/MM3 (ref 0–0.4)
EOSINOPHIL # BLD AUTO: 0.09 10*3/MM3 (ref 0–0.4)
EOSINOPHIL # BLD AUTO: 0.11 10*3/MM3 (ref 0–0.7)
EOSINOPHIL # BLD AUTO: 0.24 10*3/MM3 (ref 0–0.4)
EOSINOPHIL NFR BLD AUTO: 0 % (ref 0.3–6.2)
EOSINOPHIL NFR BLD AUTO: 0 % (ref 0.3–6.2)
EOSINOPHIL NFR BLD AUTO: 0.1 % (ref 0.3–6.2)
EOSINOPHIL NFR BLD AUTO: 0.3 % (ref 0.3–6.2)
EOSINOPHIL NFR BLD AUTO: 0.7 % (ref 0.3–6.2)
EOSINOPHIL NFR BLD AUTO: 1.3 % (ref 0.3–6.2)
EOSINOPHIL NFR BLD AUTO: 1.3 % (ref 0.3–6.2)
EOSINOPHIL NFR BLD AUTO: 1.4 % (ref 0.3–6.2)
EOSINOPHIL NFR BLD AUTO: 1.5 % (ref 0.3–6.2)
EOSINOPHIL NFR BLD AUTO: 4.9 % (ref 0.3–6.2)
ERYTHROCYTE [DISTWIDTH] IN BLOOD BY AUTOMATED COUNT: 14.9 % (ref 11.5–14.5)
ERYTHROCYTE [DISTWIDTH] IN BLOOD BY AUTOMATED COUNT: 15.3 % (ref 11.5–14.5)
ERYTHROCYTE [DISTWIDTH] IN BLOOD BY AUTOMATED COUNT: 15.3 % (ref 12.3–15.4)
ERYTHROCYTE [DISTWIDTH] IN BLOOD BY AUTOMATED COUNT: 15.4 % (ref 11.5–14.5)
ERYTHROCYTE [DISTWIDTH] IN BLOOD BY AUTOMATED COUNT: 15.4 % (ref 12.3–15.4)
ERYTHROCYTE [DISTWIDTH] IN BLOOD BY AUTOMATED COUNT: 15.5 % (ref 12.3–15.4)
ERYTHROCYTE [DISTWIDTH] IN BLOOD BY AUTOMATED COUNT: 15.9 % (ref 12.3–15.4)
ERYTHROCYTE [DISTWIDTH] IN BLOOD BY AUTOMATED COUNT: 15.9 % (ref 12.3–15.4)
ERYTHROCYTE [DISTWIDTH] IN BLOOD BY AUTOMATED COUNT: 16.2 % (ref 12.3–15.4)
ERYTHROCYTE [DISTWIDTH] IN BLOOD BY AUTOMATED COUNT: 16.3 % (ref 12.3–15.4)
ERYTHROCYTE [DISTWIDTH] IN BLOOD BY AUTOMATED COUNT: 16.4 % (ref 12.3–15.4)
ERYTHROCYTE [DISTWIDTH] IN BLOOD BY AUTOMATED COUNT: 16.5 % (ref 12.3–15.4)
ERYTHROCYTE [DISTWIDTH] IN BLOOD BY AUTOMATED COUNT: 16.6 % (ref 11.5–14.5)
ERYTHROCYTE [DISTWIDTH] IN BLOOD BY AUTOMATED COUNT: 16.6 % (ref 12.3–15.4)
ERYTHROCYTE [DISTWIDTH] IN BLOOD BY AUTOMATED COUNT: 16.8 % (ref 12.3–15.4)
ERYTHROCYTE [SEDIMENTATION RATE] IN BLOOD: 55 MM/HR (ref 0–20)
ERYTHROCYTE [SEDIMENTATION RATE] IN BLOOD: 78 MM/HR (ref 0–20)
ETHNIC BACKGROUND STATED: 20.6 MIU/ML (ref 2.6–18.5)
FERRITIN SERPL-MCNC: 337.2 NG/ML (ref 30–400)
FERRITIN SERPL-MCNC: 47.4 NG/ML (ref 30–400)
FERRITIN SERPL-MCNC: 583 NG/ML (ref 30–400)
FLUAV H1 2009 PAND RNA NPH QL NAA+PROBE: NOT DETECTED
FLUAV H1 HA GENE NPH QL NAA+PROBE: NOT DETECTED
FLUAV H3 RNA NPH QL NAA+PROBE: NOT DETECTED
FLUAV SUBTYP SPEC NAA+PROBE: NOT DETECTED
FLUBV RNA ISLT QL NAA+PROBE: NOT DETECTED
FOLATE SERPL-MCNC: 10.2 NG/ML (ref 4.78–24.2)
FOLATE SERPL-MCNC: >20 NG/ML (ref 4.78–24.2)
GAMMA GLOB SERPL ELPH-MCNC: 0.8 G/DL (ref 0.4–1.8)
GAS FLOW AIRWAY: 10 LPM
GFR SERPL CREATININE-BSD FRML MDRD: 107 ML/MIN/1.73
GFR SERPL CREATININE-BSD FRML MDRD: 108 ML/MIN/1.73
GFR SERPL CREATININE-BSD FRML MDRD: 125 ML/MIN/1.73
GFR SERPL CREATININE-BSD FRML MDRD: 147 ML/MIN/1.73
GFR SERPL CREATININE-BSD FRML MDRD: 150 ML/MIN/1.73
GFR SERPL CREATININE-BSD FRML MDRD: 30 ML/MIN/1.73
GFR SERPL CREATININE-BSD FRML MDRD: 83 ML/MIN/1.73
GFR SERPL CREATININE-BSD FRML MDRD: 85 ML/MIN/1.73
GFR SERPL CREATININE-BSD FRML MDRD: 87 ML/MIN/1.73
GFR SERPL CREATININE-BSD FRML MDRD: 88 ML/MIN/1.73
GFR SERPL CREATININE-BSD FRML MDRD: 92 ML/MIN/1.73
GFR SERPL CREATININE-BSD FRML MDRD: 93 ML/MIN/1.73
GFR SERPL CREATININE-BSD FRML MDRD: 98 ML/MIN/1.73
GFR SERPL CREATININE-BSD FRML MDRD: 98 ML/MIN/1.73
GFR SERPL CREATININE-BSD FRML MDRD: >150 ML/MIN/1.73
GLOBULIN SER CALC-MCNC: 3 G/DL (ref 2.2–3.9)
GLOBULIN UR ELPH-MCNC: 2.4 GM/DL
GLOBULIN UR ELPH-MCNC: 2.8 GM/DL
GLOBULIN UR ELPH-MCNC: 2.9 GM/DL
GLOBULIN UR ELPH-MCNC: 2.9 GM/DL
GLOBULIN UR ELPH-MCNC: 3 GM/DL
GLOBULIN UR ELPH-MCNC: 3.1 GM/DL
GLOBULIN UR ELPH-MCNC: 3.2 GM/DL (ref 1.8–3.5)
GLUCOSE BLD-MCNC: 105 MG/DL (ref 65–99)
GLUCOSE BLD-MCNC: 110 MG/DL (ref 65–99)
GLUCOSE BLD-MCNC: 115 MG/DL (ref 65–99)
GLUCOSE BLD-MCNC: 116 MG/DL (ref 65–99)
GLUCOSE BLD-MCNC: 129 MG/DL (ref 65–99)
GLUCOSE BLD-MCNC: 136 MG/DL (ref 65–99)
GLUCOSE BLD-MCNC: 146 MG/DL (ref 74–124)
GLUCOSE BLD-MCNC: 149 MG/DL (ref 65–99)
GLUCOSE BLD-MCNC: 162 MG/DL (ref 65–99)
GLUCOSE BLD-MCNC: 170 MG/DL (ref 65–99)
GLUCOSE BLD-MCNC: 190 MG/DL (ref 65–99)
GLUCOSE BLD-MCNC: 231 MG/DL (ref 65–99)
GLUCOSE BLD-MCNC: 78 MG/DL (ref 65–99)
GLUCOSE BLD-MCNC: 80 MG/DL (ref 65–99)
GLUCOSE BLD-MCNC: 83 MG/DL (ref 65–99)
GLUCOSE BLD-MCNC: 91 MG/DL (ref 65–99)
GLUCOSE BLD-MCNC: 99 MG/DL (ref 65–99)
GLUCOSE BLDC GLUCOMTR-MCNC: 107 MG/DL (ref 70–130)
GLUCOSE BLDC GLUCOMTR-MCNC: 108 MG/DL (ref 70–130)
GLUCOSE BLDC GLUCOMTR-MCNC: 110 MG/DL (ref 70–130)
GLUCOSE BLDC GLUCOMTR-MCNC: 115 MG/DL (ref 70–130)
GLUCOSE BLDC GLUCOMTR-MCNC: 115 MG/DL (ref 70–130)
GLUCOSE BLDC GLUCOMTR-MCNC: 116 MG/DL (ref 70–130)
GLUCOSE BLDC GLUCOMTR-MCNC: 121 MG/DL (ref 70–130)
GLUCOSE BLDC GLUCOMTR-MCNC: 122 MG/DL (ref 70–130)
GLUCOSE BLDC GLUCOMTR-MCNC: 125 MG/DL (ref 70–130)
GLUCOSE BLDC GLUCOMTR-MCNC: 128 MG/DL (ref 70–130)
GLUCOSE BLDC GLUCOMTR-MCNC: 129 MG/DL (ref 70–130)
GLUCOSE BLDC GLUCOMTR-MCNC: 129 MG/DL (ref 70–130)
GLUCOSE BLDC GLUCOMTR-MCNC: 130 MG/DL (ref 70–130)
GLUCOSE BLDC GLUCOMTR-MCNC: 132 MG/DL (ref 70–130)
GLUCOSE BLDC GLUCOMTR-MCNC: 134 MG/DL (ref 70–130)
GLUCOSE BLDC GLUCOMTR-MCNC: 137 MG/DL (ref 70–130)
GLUCOSE BLDC GLUCOMTR-MCNC: 137 MG/DL (ref 70–130)
GLUCOSE BLDC GLUCOMTR-MCNC: 140 MG/DL (ref 70–130)
GLUCOSE BLDC GLUCOMTR-MCNC: 143 MG/DL (ref 70–130)
GLUCOSE BLDC GLUCOMTR-MCNC: 144 MG/DL (ref 70–130)
GLUCOSE BLDC GLUCOMTR-MCNC: 144 MG/DL (ref 70–130)
GLUCOSE BLDC GLUCOMTR-MCNC: 145 MG/DL (ref 70–130)
GLUCOSE BLDC GLUCOMTR-MCNC: 146 MG/DL (ref 70–130)
GLUCOSE BLDC GLUCOMTR-MCNC: 148 MG/DL (ref 70–130)
GLUCOSE BLDC GLUCOMTR-MCNC: 151 MG/DL (ref 70–130)
GLUCOSE BLDC GLUCOMTR-MCNC: 152 MG/DL (ref 70–130)
GLUCOSE BLDC GLUCOMTR-MCNC: 154 MG/DL (ref 70–130)
GLUCOSE BLDC GLUCOMTR-MCNC: 154 MG/DL (ref 70–130)
GLUCOSE BLDC GLUCOMTR-MCNC: 156 MG/DL (ref 70–130)
GLUCOSE BLDC GLUCOMTR-MCNC: 164 MG/DL (ref 70–130)
GLUCOSE BLDC GLUCOMTR-MCNC: 167 MG/DL (ref 70–130)
GLUCOSE BLDC GLUCOMTR-MCNC: 167 MG/DL (ref 70–130)
GLUCOSE BLDC GLUCOMTR-MCNC: 169 MG/DL (ref 70–130)
GLUCOSE BLDC GLUCOMTR-MCNC: 171 MG/DL (ref 70–130)
GLUCOSE BLDC GLUCOMTR-MCNC: 173 MG/DL (ref 70–130)
GLUCOSE BLDC GLUCOMTR-MCNC: 179 MG/DL (ref 70–130)
GLUCOSE BLDC GLUCOMTR-MCNC: 180 MG/DL (ref 70–130)
GLUCOSE BLDC GLUCOMTR-MCNC: 184 MG/DL (ref 70–130)
GLUCOSE BLDC GLUCOMTR-MCNC: 189 MG/DL (ref 70–130)
GLUCOSE BLDC GLUCOMTR-MCNC: 190 MG/DL (ref 70–130)
GLUCOSE BLDC GLUCOMTR-MCNC: 191 MG/DL (ref 70–130)
GLUCOSE BLDC GLUCOMTR-MCNC: 191 MG/DL (ref 70–130)
GLUCOSE BLDC GLUCOMTR-MCNC: 192 MG/DL (ref 70–130)
GLUCOSE BLDC GLUCOMTR-MCNC: 198 MG/DL (ref 70–130)
GLUCOSE BLDC GLUCOMTR-MCNC: 198 MG/DL (ref 70–130)
GLUCOSE BLDC GLUCOMTR-MCNC: 199 MG/DL (ref 70–130)
GLUCOSE BLDC GLUCOMTR-MCNC: 200 MG/DL (ref 70–130)
GLUCOSE BLDC GLUCOMTR-MCNC: 209 MG/DL (ref 70–130)
GLUCOSE BLDC GLUCOMTR-MCNC: 216 MG/DL (ref 70–130)
GLUCOSE BLDC GLUCOMTR-MCNC: 216 MG/DL (ref 70–130)
GLUCOSE BLDC GLUCOMTR-MCNC: 222 MG/DL (ref 70–130)
GLUCOSE BLDC GLUCOMTR-MCNC: 246 MG/DL (ref 70–130)
GLUCOSE BLDC GLUCOMTR-MCNC: 267 MG/DL (ref 70–130)
GLUCOSE BLDC GLUCOMTR-MCNC: 30 MG/DL (ref 70–130)
GLUCOSE BLDC GLUCOMTR-MCNC: 308 MG/DL (ref 70–130)
GLUCOSE BLDC GLUCOMTR-MCNC: 43 MG/DL (ref 70–130)
GLUCOSE BLDC GLUCOMTR-MCNC: 46 MG/DL (ref 70–130)
GLUCOSE BLDC GLUCOMTR-MCNC: 47 MG/DL (ref 70–130)
GLUCOSE BLDC GLUCOMTR-MCNC: 47 MG/DL (ref 70–130)
GLUCOSE BLDC GLUCOMTR-MCNC: 48 MG/DL (ref 70–130)
GLUCOSE BLDC GLUCOMTR-MCNC: 51 MG/DL (ref 70–130)
GLUCOSE BLDC GLUCOMTR-MCNC: 52 MG/DL (ref 70–130)
GLUCOSE BLDC GLUCOMTR-MCNC: 55 MG/DL (ref 70–130)
GLUCOSE BLDC GLUCOMTR-MCNC: 56 MG/DL (ref 70–130)
GLUCOSE BLDC GLUCOMTR-MCNC: 64 MG/DL (ref 70–130)
GLUCOSE BLDC GLUCOMTR-MCNC: 71 MG/DL (ref 70–130)
GLUCOSE BLDC GLUCOMTR-MCNC: 71 MG/DL (ref 70–130)
GLUCOSE BLDC GLUCOMTR-MCNC: 79 MG/DL (ref 70–130)
GLUCOSE BLDC GLUCOMTR-MCNC: 80 MG/DL (ref 70–130)
GLUCOSE BLDC GLUCOMTR-MCNC: 82 MG/DL (ref 70–130)
GLUCOSE BLDC GLUCOMTR-MCNC: 84 MG/DL (ref 70–130)
GLUCOSE BLDC GLUCOMTR-MCNC: 85 MG/DL (ref 70–130)
GLUCOSE BLDC GLUCOMTR-MCNC: 89 MG/DL (ref 70–130)
GLUCOSE BLDC GLUCOMTR-MCNC: 89 MG/DL (ref 70–130)
GLUCOSE BLDC GLUCOMTR-MCNC: 92 MG/DL (ref 70–130)
GLUCOSE BLDC GLUCOMTR-MCNC: 92 MG/DL (ref 70–130)
GLUCOSE BLDC GLUCOMTR-MCNC: 94 MG/DL (ref 70–130)
GLUCOSE BLDC GLUCOMTR-MCNC: 98 MG/DL (ref 70–130)
GLUCOSE SERPL-MCNC: 172 MG/DL (ref 65–99)
GLUCOSE UR STRIP-MCNC: ABNORMAL MG/DL
GRAM STN SPEC: ABNORMAL
HADV DNA SPEC NAA+PROBE: NOT DETECTED
HAPTOGLOB SERPL-MCNC: 318 MG/DL (ref 30–200)
HBA1C MFR BLD: 5.7 % (ref 4.8–5.6)
HCO3 BLDA-SCNC: 27.2 MMOL/L (ref 22–28)
HCOV 229E RNA SPEC QL NAA+PROBE: NOT DETECTED
HCOV HKU1 RNA SPEC QL NAA+PROBE: NOT DETECTED
HCOV NL63 RNA SPEC QL NAA+PROBE: NOT DETECTED
HCOV OC43 RNA SPEC QL NAA+PROBE: NOT DETECTED
HCT VFR BLD AUTO: 24.7 % (ref 40.4–52.2)
HCT VFR BLD AUTO: 25 % (ref 40.4–52.2)
HCT VFR BLD AUTO: 25.8 % (ref 40.4–52.2)
HCT VFR BLD AUTO: 26.7 % (ref 37.5–51)
HCT VFR BLD AUTO: 27.1 % (ref 37.5–51)
HCT VFR BLD AUTO: 27.2 % (ref 40.4–52.2)
HCT VFR BLD AUTO: 27.4 % (ref 37.5–51)
HCT VFR BLD AUTO: 27.4 % (ref 37.5–51)
HCT VFR BLD AUTO: 27.6 % (ref 37.5–51)
HCT VFR BLD AUTO: 28.5 % (ref 37.5–51)
HCT VFR BLD AUTO: 28.7 % (ref 37.5–51)
HCT VFR BLD AUTO: 29 % (ref 40.4–52.2)
HCT VFR BLD AUTO: 29.6 % (ref 37.5–51)
HCT VFR BLD AUTO: 30.3 % (ref 37.5–51)
HCT VFR BLD AUTO: 30.7 % (ref 37.5–51)
HCT VFR BLD AUTO: 31.5 % (ref 40.4–52.2)
HCT VFR BLD AUTO: 33.4 % (ref 37.5–51)
HCT VFR BLD AUTO: 34.5 % (ref 37.5–51)
HCT VFR BLD AUTO: 35.7 % (ref 37.5–51)
HGB BLD-MCNC: 10.5 G/DL (ref 13–17.7)
HGB BLD-MCNC: 10.7 G/DL (ref 13–17.7)
HGB BLD-MCNC: 11 G/DL (ref 13–17.7)
HGB BLD-MCNC: 7.5 G/DL (ref 13.7–17.6)
HGB BLD-MCNC: 7.8 G/DL (ref 13.7–17.6)
HGB BLD-MCNC: 7.9 G/DL (ref 13–17.7)
HGB BLD-MCNC: 8 G/DL (ref 13.7–17.6)
HGB BLD-MCNC: 8.3 G/DL (ref 13–17.7)
HGB BLD-MCNC: 8.4 G/DL (ref 13–17.7)
HGB BLD-MCNC: 8.4 G/DL (ref 13–17.7)
HGB BLD-MCNC: 8.5 G/DL (ref 13.7–17.6)
HGB BLD-MCNC: 8.5 G/DL (ref 13–17.7)
HGB BLD-MCNC: 8.6 G/DL (ref 13–17.7)
HGB BLD-MCNC: 8.6 G/DL (ref 13–17.7)
HGB BLD-MCNC: 9.1 G/DL (ref 13–17.7)
HGB BLD-MCNC: 9.2 G/DL (ref 13.7–17.6)
HGB BLD-MCNC: 9.3 G/DL (ref 13.7–17.6)
HGB BLD-MCNC: 9.4 G/DL (ref 13–17.7)
HGB BLD-MCNC: 9.5 G/DL (ref 13–17.7)
HGB RETIC QN AUTO: 33.9 PG (ref 29.8–36.1)
HGB UR QL STRIP.AUTO: NEGATIVE
HMPV RNA NPH QL NAA+NON-PROBE: NOT DETECTED
HPIV1 RNA SPEC QL NAA+PROBE: NOT DETECTED
HPIV2 RNA SPEC QL NAA+PROBE: NOT DETECTED
HPIV3 RNA NPH QL NAA+PROBE: NOT DETECTED
HPIV4 P GENE NPH QL NAA+PROBE: NOT DETECTED
IGA SERPL-MCNC: 370 MG/DL (ref 61–437)
IGG SERPL-MCNC: 758 MG/DL (ref 700–1600)
IGM SERPL-MCNC: 62 MG/DL (ref 15–143)
IMM GRANULOCYTES # BLD AUTO: 0.02 10*3/MM3 (ref 0–0.03)
IMM GRANULOCYTES # BLD AUTO: 0.03 10*3/MM3 (ref 0–0.03)
IMM GRANULOCYTES # BLD AUTO: 0.03 10*3/MM3 (ref 0–0.03)
IMM GRANULOCYTES # BLD AUTO: 0.03 10*3/MM3 (ref 0–0.05)
IMM GRANULOCYTES # BLD AUTO: 0.03 10*3/MM3 (ref 0–0.05)
IMM GRANULOCYTES # BLD AUTO: 0.09 10*3/MM3 (ref 0–0.05)
IMM GRANULOCYTES # BLD AUTO: 0.09 10*3/MM3 (ref 0–0.05)
IMM GRANULOCYTES # BLD AUTO: 0.13 10*3/MM3 (ref 0–0.05)
IMM GRANULOCYTES # BLD AUTO: 0.14 10*3/MM3 (ref 0–0.05)
IMM GRANULOCYTES # BLD AUTO: 0.21 10*3/MM3 (ref 0–0.05)
IMM GRANULOCYTES NFR BLD AUTO: 0.3 % (ref 0–0.5)
IMM GRANULOCYTES NFR BLD AUTO: 0.4 % (ref 0–0.5)
IMM GRANULOCYTES NFR BLD AUTO: 0.4 % (ref 0–0.5)
IMM GRANULOCYTES NFR BLD AUTO: 0.6 % (ref 0–0.5)
IMM GRANULOCYTES NFR BLD AUTO: 0.6 % (ref 0–0.5)
IMM GRANULOCYTES NFR BLD AUTO: 0.9 % (ref 0–0.5)
IMM GRANULOCYTES NFR BLD AUTO: 1.3 % (ref 0–0.5)
IMM GRANULOCYTES NFR BLD AUTO: 1.3 % (ref 0–0.5)
IMM GRANULOCYTES NFR BLD AUTO: 1.5 % (ref 0–0.5)
IMM GRANULOCYTES NFR BLD AUTO: 3 % (ref 0–0.5)
IMM RETICS NFR: 14 % (ref 3–15.8)
INR PPP: 1.05 (ref 0.9–1.1)
INR PPP: 1.06 (ref 0.9–1.1)
INR PPP: 1.07 (ref 0.9–1.1)
INR PPP: 1.09 (ref 0.9–1.1)
INR PPP: 1.11 (ref 0.9–1.1)
INR PPP: 1.19 (ref 0.9–1.1)
INR PPP: 1.2 (ref 0.8–1.2)
INR PPP: 1.3 (ref 0.91–1.09)
INR PPP: 1.37 (ref 0.9–1.1)
INR PPP: 1.8 (ref 0.91–1.09)
INR PPP: 1.9 (ref 0.91–1.09)
INR PPP: 2.3
INR PPP: 2.9 (ref 0.91–1.09)
INR PPP: 3.1 (ref 0.91–1.09)
INR PPP: 3.2
INR PPP: 4.1
INR PPP: 5.58 (ref 0.9–1.1)
INR PPP: 7.4 (ref 0.91–1.09)
INTERPRETATION SERPL IEP-IMP: ABNORMAL
IRON 24H UR-MRATE: 39 MCG/DL (ref 59–158)
IRON 24H UR-MRATE: 51 MCG/DL (ref 59–158)
IRON 24H UR-MRATE: 66 MCG/DL (ref 59–158)
IRON SATN MFR SERPL: 14 % (ref 20–50)
IRON SATN MFR SERPL: 23 % (ref 20–50)
IRON SATN MFR SERPL: 29 % (ref 14–48)
ISOLATED FROM: ABNORMAL
ISOLATED FROM: ABNORMAL
KAPPA LC SERPL-MCNC: 28.8 MG/L (ref 3.3–19.4)
KAPPA LC/LAMBDA SER: 1.09 {RATIO} (ref 0.26–1.65)
KETONES UR QL STRIP: NEGATIVE
LAB AP CASE REPORT: NORMAL
LAB AP DIAGNOSIS COMMENT: NORMAL
LAMBDA LC FREE SERPL-MCNC: 26.4 MG/L (ref 5.7–26.3)
LDH SERPL-CCNC: 238 U/L (ref 135–225)
LEFT ATRIUM VOLUME INDEX: 25 ML/M2
LEFT ATRIUM VOLUME INDEX: 32 ML/M2
LEUKOCYTE ESTERASE UR QL STRIP.AUTO: NEGATIVE
LYMPHOCYTES # BLD AUTO: 0.16 10*3/MM3 (ref 0.7–3.1)
LYMPHOCYTES # BLD AUTO: 0.26 10*3/MM3 (ref 0.7–3.1)
LYMPHOCYTES # BLD AUTO: 0.37 10*3/MM3 (ref 0.7–3.1)
LYMPHOCYTES # BLD AUTO: 0.54 10*3/MM3 (ref 0.7–3.1)
LYMPHOCYTES # BLD AUTO: 0.63 10*3/MM3 (ref 0.7–3.1)
LYMPHOCYTES # BLD AUTO: 0.65 10*3/MM3 (ref 0.9–4.8)
LYMPHOCYTES # BLD AUTO: 0.71 10*3/MM3 (ref 0.9–4.8)
LYMPHOCYTES # BLD AUTO: 0.8 10*3/MM3 (ref 0.9–4.8)
LYMPHOCYTES # BLD AUTO: 0.99 10*3/MM3 (ref 0.7–3.1)
LYMPHOCYTES # BLD AUTO: 1.05 10*3/MM3 (ref 0.7–3.1)
LYMPHOCYTES NFR BLD AUTO: 1.5 % (ref 19.6–45.3)
LYMPHOCYTES NFR BLD AUTO: 11.8 % (ref 19.6–45.3)
LYMPHOCYTES NFR BLD AUTO: 2.6 % (ref 19.6–45.3)
LYMPHOCYTES NFR BLD AUTO: 21.3 % (ref 19.6–45.3)
LYMPHOCYTES NFR BLD AUTO: 21.4 % (ref 19.6–45.3)
LYMPHOCYTES NFR BLD AUTO: 3.9 % (ref 19.6–45.3)
LYMPHOCYTES NFR BLD AUTO: 7.8 % (ref 19.6–45.3)
LYMPHOCYTES NFR BLD AUTO: 8.4 % (ref 19.6–45.3)
LYMPHOCYTES NFR BLD AUTO: 8.9 % (ref 19.6–45.3)
LYMPHOCYTES NFR BLD AUTO: 9.3 % (ref 19.6–45.3)
Lab: ABNORMAL
Lab: ABNORMAL
M PNEUMO IGG SER IA-ACNC: NOT DETECTED
M-SPIKE: ABNORMAL G/DL
MAGNESIUM SERPL-MCNC: 1.5 MG/DL (ref 1.6–2.4)
MAGNESIUM SERPL-MCNC: 1.6 MG/DL (ref 1.6–2.4)
MAXIMAL PREDICTED HEART RATE: 142 BPM
MAXIMAL PREDICTED HEART RATE: 142 BPM
MCH RBC QN AUTO: 28.2 PG (ref 27–32.7)
MCH RBC QN AUTO: 28.3 PG (ref 26.6–33)
MCH RBC QN AUTO: 29.3 PG (ref 26.6–33)
MCH RBC QN AUTO: 29.4 PG (ref 26.6–33)
MCH RBC QN AUTO: 29.4 PG (ref 26.6–33)
MCH RBC QN AUTO: 29.7 PG (ref 26.6–33)
MCH RBC QN AUTO: 29.7 PG (ref 26.6–33)
MCH RBC QN AUTO: 29.7 PG (ref 27–32.7)
MCH RBC QN AUTO: 29.8 PG (ref 26.6–33)
MCH RBC QN AUTO: 29.8 PG (ref 26.6–33)
MCH RBC QN AUTO: 29.8 PG (ref 27–32.7)
MCH RBC QN AUTO: 29.9 PG (ref 26.6–33)
MCH RBC QN AUTO: 30.1 PG (ref 26.6–33)
MCH RBC QN AUTO: 30.4 PG (ref 27–32.7)
MCH RBC QN AUTO: 30.5 PG (ref 26.6–33)
MCHC RBC AUTO-ENTMCNC: 29.5 G/DL (ref 32.6–36.4)
MCHC RBC AUTO-ENTMCNC: 29.6 G/DL (ref 31.5–35.7)
MCHC RBC AUTO-ENTMCNC: 29.6 G/DL (ref 31.5–35.7)
MCHC RBC AUTO-ENTMCNC: 30.2 G/DL (ref 31.5–35.7)
MCHC RBC AUTO-ENTMCNC: 30.6 G/DL (ref 31.5–35.7)
MCHC RBC AUTO-ENTMCNC: 30.7 G/DL (ref 31.5–35.7)
MCHC RBC AUTO-ENTMCNC: 30.8 G/DL (ref 31.5–35.7)
MCHC RBC AUTO-ENTMCNC: 30.9 G/DL (ref 31.5–35.7)
MCHC RBC AUTO-ENTMCNC: 31 G/DL (ref 31.5–35.7)
MCHC RBC AUTO-ENTMCNC: 31 G/DL (ref 31.5–35.7)
MCHC RBC AUTO-ENTMCNC: 31 G/DL (ref 32.6–36.4)
MCHC RBC AUTO-ENTMCNC: 31.2 G/DL (ref 31.5–35.7)
MCHC RBC AUTO-ENTMCNC: 31.3 G/DL (ref 32.6–36.4)
MCHC RBC AUTO-ENTMCNC: 31.4 G/DL (ref 31.5–35.7)
MCHC RBC AUTO-ENTMCNC: 31.7 G/DL (ref 32.6–36.4)
MCV RBC AUTO: 100.4 FL (ref 79–97)
MCV RBC AUTO: 90 FL (ref 79–97)
MCV RBC AUTO: 93.9 FL (ref 79.8–96.2)
MCV RBC AUTO: 94.4 FL (ref 79–97)
MCV RBC AUTO: 94.8 FL (ref 79–97)
MCV RBC AUTO: 94.8 FL (ref 79–97)
MCV RBC AUTO: 95.1 FL (ref 79.8–96.2)
MCV RBC AUTO: 95.2 FL (ref 79–97)
MCV RBC AUTO: 95.5 FL (ref 79.8–96.2)
MCV RBC AUTO: 96.7 FL (ref 79–97)
MCV RBC AUTO: 96.8 FL (ref 79–97)
MCV RBC AUTO: 97.5 FL (ref 79–97)
MCV RBC AUTO: 97.9 FL (ref 79–97)
MCV RBC AUTO: 98.1 FL (ref 79.8–96.2)
MCV RBC AUTO: 98.2 FL (ref 79–97)
MCV RBC AUTO: 98.6 FL (ref 79–97)
MCV RBC AUTO: 99.3 FL (ref 79–97)
METHYLMALONATE SERPL-SCNC: 506 NMOL/L (ref 0–378)
MODALITY: ABNORMAL
MONOCYTES # BLD AUTO: 0.33 10*3/MM3 (ref 0.1–0.9)
MONOCYTES # BLD AUTO: 0.34 10*3/MM3 (ref 0.1–0.9)
MONOCYTES # BLD AUTO: 0.41 10*3/MM3 (ref 0.1–0.9)
MONOCYTES # BLD AUTO: 0.43 10*3/MM3 (ref 0.1–0.9)
MONOCYTES # BLD AUTO: 0.45 10*3/MM3 (ref 0.1–0.9)
MONOCYTES # BLD AUTO: 0.49 10*3/MM3 (ref 0.1–0.9)
MONOCYTES # BLD AUTO: 0.5 10*3/MM3 (ref 0.1–0.9)
MONOCYTES # BLD AUTO: 0.52 10*3/MM3 (ref 0.2–1.2)
MONOCYTES # BLD AUTO: 0.52 10*3/MM3 (ref 0.2–1.2)
MONOCYTES # BLD AUTO: 0.65 10*3/MM3 (ref 0.2–1.2)
MONOCYTES NFR BLD AUTO: 3.3 % (ref 5–12)
MONOCYTES NFR BLD AUTO: 4.3 % (ref 5–12)
MONOCYTES NFR BLD AUTO: 4.4 % (ref 5–12)
MONOCYTES NFR BLD AUTO: 6.5 % (ref 5–12)
MONOCYTES NFR BLD AUTO: 7.1 % (ref 5–12)
MONOCYTES NFR BLD AUTO: 7.1 % (ref 5–12)
MONOCYTES NFR BLD AUTO: 7.4 % (ref 5–12)
MONOCYTES NFR BLD AUTO: 7.7 % (ref 5–12)
MONOCYTES NFR BLD AUTO: 8.4 % (ref 5–12)
MONOCYTES NFR BLD AUTO: 9.1 % (ref 5–12)
MRSA SPEC QL CULT: NORMAL
NEUTROPHILS # BLD AUTO: 3.1 10*3/MM3 (ref 1.4–7)
NEUTROPHILS # BLD AUTO: 3.17 10*3/MM3 (ref 1.7–7)
NEUTROPHILS # BLD AUTO: 5.37 10*3/MM3 (ref 1.9–8.1)
NEUTROPHILS # BLD AUTO: 5.41 10*3/MM3 (ref 1.4–7)
NEUTROPHILS # BLD AUTO: 5.59 10*3/MM3 (ref 1.7–7)
NEUTROPHILS # BLD AUTO: 6.37 10*3/MM3 (ref 1.9–8.1)
NEUTROPHILS # BLD AUTO: 6.64 10*3/MM3 (ref 1.9–8.1)
NEUTROPHILS # BLD AUTO: 8.41 10*3/MM3 (ref 1.7–7)
NEUTROPHILS # BLD AUTO: 9.27 10*3/MM3 (ref 1.7–7)
NEUTROPHILS # BLD AUTO: 9.67 10*3/MM3 (ref 1.7–7)
NEUTROPHILS NFR BLD AUTO: 63.1 % (ref 42.7–76)
NEUTROPHILS NFR BLD AUTO: 68.5 % (ref 42.7–76)
NEUTROPHILS NFR BLD AUTO: 79.1 % (ref 42.7–76)
NEUTROPHILS NFR BLD AUTO: 80.4 % (ref 42.7–76)
NEUTROPHILS NFR BLD AUTO: 80.7 % (ref 42.7–76)
NEUTROPHILS NFR BLD AUTO: 82.6 % (ref 42.7–76)
NEUTROPHILS NFR BLD AUTO: 82.7 % (ref 42.7–76)
NEUTROPHILS NFR BLD AUTO: 89.8 % (ref 42.7–76)
NEUTROPHILS NFR BLD AUTO: 92.1 % (ref 42.7–76)
NEUTROPHILS NFR BLD AUTO: 93.6 % (ref 42.7–76)
NITRITE UR QL STRIP: NEGATIVE
NRBC BLD AUTO-RTO: 0 /100 WBC (ref 0–0)
NRBC BLD AUTO-RTO: 0 /100 WBC (ref 0–0)
NRBC BLD AUTO-RTO: 0 /100 WBC (ref 0–0.2)
NT-PROBNP SERPL-MCNC: 5415 PG/ML (ref 5–1800)
OSMOLALITY SERPL: 309 MOSM/KG (ref 280–301)
OSMOLALITY UR: 473 MOSM/KG
PATH REPORT.ADDENDUM SPEC: NORMAL
PATH REPORT.FINAL DX SPEC: NORMAL
PATH REPORT.GROSS SPEC: NORMAL
PCO2 BLDA: 38.6 MM HG (ref 35–45)
PH BLDA: 7.46 PH UNITS (ref 7.35–7.45)
PH UR STRIP.AUTO: <=5 [PH] (ref 5–8)
PHOSPHATE SERPL-MCNC: 2.4 MG/DL (ref 2.5–4.5)
PLATELET # BLD AUTO: 152 10*3/MM3 (ref 140–500)
PLATELET # BLD AUTO: 216 10*3/MM3 (ref 140–500)
PLATELET # BLD AUTO: 224 10*3/MM3 (ref 140–450)
PLATELET # BLD AUTO: 229 10*3/MM3 (ref 140–450)
PLATELET # BLD AUTO: 242 10*3/MM3 (ref 140–450)
PLATELET # BLD AUTO: 245 10*3/MM3 (ref 140–450)
PLATELET # BLD AUTO: 253 10*3/MM3 (ref 140–450)
PLATELET # BLD AUTO: 254 10*3/MM3 (ref 140–450)
PLATELET # BLD AUTO: 257 10*3/MM3 (ref 140–450)
PLATELET # BLD AUTO: 265 10*3/MM3 (ref 140–450)
PLATELET # BLD AUTO: 265 10*3/MM3 (ref 140–450)
PLATELET # BLD AUTO: 265 10*3/MM3 (ref 140–500)
PLATELET # BLD AUTO: 275 10*3/MM3 (ref 140–450)
PLATELET # BLD AUTO: 291 10*3/MM3 (ref 140–450)
PLATELET # BLD AUTO: 295 10*3/MM3 (ref 140–450)
PLATELET # BLD AUTO: 302 10*3/MM3 (ref 140–450)
PLATELET # BLD AUTO: 440 10*3/MM3 (ref 140–500)
PMV BLD AUTO: 10 FL (ref 6–12)
PMV BLD AUTO: 10.1 FL (ref 6–12)
PMV BLD AUTO: 10.1 FL (ref 6–12)
PMV BLD AUTO: 10.2 FL (ref 6–12)
PMV BLD AUTO: 10.2 FL (ref 6–12)
PMV BLD AUTO: 10.3 FL (ref 6–12)
PMV BLD AUTO: 10.4 FL (ref 6–12)
PMV BLD AUTO: 10.5 FL (ref 6–12)
PMV BLD AUTO: 10.5 FL (ref 6–12)
PMV BLD AUTO: 8.8 FL (ref 6–12)
PMV BLD AUTO: 9.1 FL (ref 6–12)
PMV BLD AUTO: 9.7 FL (ref 6–12)
PMV BLD AUTO: 9.7 FL (ref 6–12)
PMV BLD AUTO: 9.8 FL (ref 6–12)
PMV BLD AUTO: 9.9 FL (ref 6–12)
PO2 BLDA: 181 MM HG (ref 80–100)
POTASSIUM BLD-SCNC: 3.3 MMOL/L (ref 3.5–5.2)
POTASSIUM BLD-SCNC: 3.4 MMOL/L (ref 3.5–5.2)
POTASSIUM BLD-SCNC: 3.5 MMOL/L (ref 3.5–5.2)
POTASSIUM BLD-SCNC: 3.7 MMOL/L (ref 3.5–5.2)
POTASSIUM BLD-SCNC: 3.7 MMOL/L (ref 3.5–5.2)
POTASSIUM BLD-SCNC: 3.8 MMOL/L (ref 3.5–5.2)
POTASSIUM BLD-SCNC: 3.8 MMOL/L (ref 3.5–5.2)
POTASSIUM BLD-SCNC: 3.9 MMOL/L (ref 3.5–5.2)
POTASSIUM BLD-SCNC: 3.9 MMOL/L (ref 3.5–5.2)
POTASSIUM BLD-SCNC: 4 MMOL/L (ref 3.5–5.2)
POTASSIUM BLD-SCNC: 4.1 MMOL/L (ref 3.5–5.2)
POTASSIUM BLD-SCNC: 4.2 MMOL/L (ref 3.5–5.2)
POTASSIUM BLD-SCNC: 4.3 MMOL/L (ref 3.5–5.2)
POTASSIUM BLD-SCNC: 4.8 MMOL/L (ref 3.5–4.7)
POTASSIUM BLD-SCNC: 4.8 MMOL/L (ref 3.5–5.2)
POTASSIUM SERPL-SCNC: 4.5 MMOL/L (ref 3.5–5.2)
PROCALCITONIN SERPL-MCNC: 0.27 NG/ML (ref 0.1–0.25)
PROCALCITONIN SERPL-MCNC: 0.56 NG/ML (ref 0.1–0.25)
PROT SERPL-MCNC: 5 G/DL (ref 6–8.5)
PROT SERPL-MCNC: 5.1 G/DL (ref 6–8.5)
PROT SERPL-MCNC: 5.3 G/DL (ref 6–8.5)
PROT SERPL-MCNC: 5.3 G/DL (ref 6–8.5)
PROT SERPL-MCNC: 5.8 G/DL (ref 6–8.5)
PROT SERPL-MCNC: 6.1 G/DL (ref 6–8.5)
PROT SERPL-MCNC: 6.6 G/DL (ref 6–8.5)
PROT SERPL-MCNC: 6.8 G/DL (ref 6.3–8)
PROT UR QL STRIP: NEGATIVE
PROTHROMBIN TIME: 13.5 SECONDS (ref 11.7–14.2)
PROTHROMBIN TIME: 13.6 SECONDS (ref 11.7–14.2)
PROTHROMBIN TIME: 13.6 SECONDS (ref 11.7–14.2)
PROTHROMBIN TIME: 13.9 SECONDS (ref 11.7–14.2)
PROTHROMBIN TIME: 14.1 SECONDS (ref 11.7–14.2)
PROTHROMBIN TIME: 14.6 SECONDS (ref 12.8–15.2)
PROTHROMBIN TIME: 14.9 SECONDS (ref 11.7–14.2)
PROTHROMBIN TIME: 16.1 SECONDS (ref 10–13.8)
PROTHROMBIN TIME: 16.6 SECONDS (ref 11.7–14.2)
PROTHROMBIN TIME: 22 SECONDS (ref 10–13.8)
PROTHROMBIN TIME: 22.3 SECONDS (ref 10–13.8)
PROTHROMBIN TIME: 35.3 SECONDS (ref 10–13.8)
PROTHROMBIN TIME: 37.3 SECONDS (ref 10–13.8)
PROTHROMBIN TIME: 49.8 SECONDS (ref 11.7–14.2)
PROTHROMBIN TIME: 89.3 SECONDS (ref 10–13.8)
RBC # BLD AUTO: 2.63 10*6/MM3 (ref 4.6–6)
RBC # BLD AUTO: 2.66 10*6/MM3 (ref 4.14–5.8)
RBC # BLD AUTO: 2.76 10*6/MM3 (ref 4.14–5.8)
RBC # BLD AUTO: 2.81 10*6/MM3 (ref 4.14–5.8)
RBC # BLD AUTO: 2.82 10*6/MM3 (ref 4.14–5.8)
RBC # BLD AUTO: 2.83 10*6/MM3 (ref 4.14–5.8)
RBC # BLD AUTO: 2.86 10*6/MM3 (ref 4.6–6)
RBC # BLD AUTO: 2.89 10*6/MM3 (ref 4.14–5.8)
RBC # BLD AUTO: 2.89 10*6/MM3 (ref 4.14–5.8)
RBC # BLD AUTO: 3.09 10*6/MM3 (ref 4.6–6)
RBC # BLD AUTO: 3.11 10*6/MM3 (ref 4.14–5.8)
RBC # BLD AUTO: 3.21 10*6/MM3 (ref 4.14–5.8)
RBC # BLD AUTO: 3.24 10*6/MM3 (ref 4.14–5.8)
RBC # BLD AUTO: 3.3 10*6/MM3 (ref 4.6–6)
RBC # BLD AUTO: 3.64 10*6/MM3 (ref 4.14–5.8)
RBC # BLD AUTO: 3.69 10*6/MM3 (ref 4.14–5.8)
RBC # BLD AUTO: 3.71 10*6/MM3 (ref 4.14–5.8)
RETICS/RBC NFR AUTO: 0.88 % (ref 0.5–1.5)
RETICS/RBC NFR AUTO: 1.95 % (ref 0.5–1.5)
RH BLD: NEGATIVE
RH BLD: NEGATIVE
RHINOVIRUS RNA SPEC NAA+PROBE: NOT DETECTED
RSV RNA NPH QL NAA+NON-PROBE: NOT DETECTED
SAO2 % BLDCOA: 99.7 % (ref 92–99)
SODIUM BLD-SCNC: 141 MMOL/L (ref 136–145)
SODIUM BLD-SCNC: 142 MMOL/L (ref 136–145)
SODIUM BLD-SCNC: 142 MMOL/L (ref 136–145)
SODIUM BLD-SCNC: 143 MMOL/L (ref 136–145)
SODIUM BLD-SCNC: 144 MMOL/L (ref 134–145)
SODIUM BLD-SCNC: 144 MMOL/L (ref 136–145)
SODIUM BLD-SCNC: 145 MMOL/L (ref 136–145)
SODIUM BLD-SCNC: 147 MMOL/L (ref 136–145)
SODIUM SERPL-SCNC: 142 MMOL/L (ref 136–145)
SODIUM UR-SCNC: 26 MMOL/L
SP GR UR STRIP: 1.02 (ref 1–1.03)
STRESS TARGET HR: 121 BPM
STRESS TARGET HR: 121 BPM
T&S EXPIRATION DATE: NORMAL
T&S EXPIRATION DATE: NORMAL
T4 FREE SERPL-MCNC: 1.16 NG/DL (ref 0.93–1.7)
TIBC SERPL-MCNC: 167 MCG/DL (ref 298–536)
TIBC SERPL-MCNC: 227 MCG/DL (ref 249–505)
TIBC SERPL-MCNC: 368 MCG/DL (ref 298–536)
TRANSFERRIN SERPL-MCNC: 112 MG/DL (ref 200–360)
TRANSFERRIN SERPL-MCNC: 162 MG/DL (ref 200–360)
TRANSFERRIN SERPL-MCNC: 247 MG/DL (ref 200–360)
TROPONIN T SERPL-MCNC: <0.01 NG/ML (ref 0–0.03)
TSH SERPL DL<=0.05 MIU/L-ACNC: 0.86 MIU/ML (ref 0.27–4.2)
TSH SERPL DL<=0.05 MIU/L-ACNC: 1.44 MIU/ML (ref 0.27–4.2)
TSH SERPL DL<=0.05 MIU/L-ACNC: 2.02 MIU/ML (ref 0.27–4.2)
UNIT  ABO: NORMAL
UNIT  ABO: NORMAL
UNIT  RH: NORMAL
UNIT  RH: NORMAL
UROBILINOGEN UR QL STRIP: ABNORMAL
VANCOMYCIN SERPL-MCNC: 19.1 MCG/ML (ref 5–40)
VANCOMYCIN TROUGH SERPL-MCNC: 15.8 MCG/ML (ref 5–20)
VANCOMYCIN TROUGH SERPL-MCNC: 19.8 MCG/ML (ref 5–20)
VANCOMYCIN TROUGH SERPL-MCNC: 24.1 MCG/ML (ref 5–20)
VANCOMYCIN TROUGH SERPL-MCNC: 27.9 MCG/ML (ref 5–20)
VANCOMYCIN TROUGH SERPL-MCNC: 8.8 MCG/ML (ref 5–20)
VANCOMYCIN TROUGH SERPL-MCNC: 9.9 MCG/ML (ref 5–20)
VIT B12 BLD-MCNC: 1297 PG/ML (ref 211–946)
VIT B12 BLD-MCNC: 289 PG/ML (ref 211–946)
VIT B12 BLD-MCNC: 362 PG/ML (ref 211–946)
WBC NRBC COR # BLD: 10.06 10*3/MM3 (ref 3.4–10.8)
WBC NRBC COR # BLD: 10.33 10*3/MM3 (ref 3.4–10.8)
WBC NRBC COR # BLD: 13.22 10*3/MM3 (ref 3.4–10.8)
WBC NRBC COR # BLD: 3.92 10*3/MM3 (ref 4.5–10.7)
WBC NRBC COR # BLD: 4.63 10*3/MM3 (ref 3.4–10.8)
WBC NRBC COR # BLD: 4.92 10*3/MM3 (ref 3.4–10.8)
WBC NRBC COR # BLD: 5.35 10*3/MM3 (ref 3.4–10.8)
WBC NRBC COR # BLD: 5.55 10*3/MM3 (ref 3.4–10.8)
WBC NRBC COR # BLD: 5.6 10*3/MM3 (ref 3.4–10.8)
WBC NRBC COR # BLD: 5.68 10*3/MM3 (ref 3.4–10.8)
WBC NRBC COR # BLD: 5.86 10*3/MM3 (ref 3.4–10.8)
WBC NRBC COR # BLD: 6.74 10*3/MM3 (ref 3.4–10.8)
WBC NRBC COR # BLD: 6.79 10*3/MM3 (ref 4.5–10.7)
WBC NRBC COR # BLD: 6.93 10*3/MM3 (ref 3.4–10.8)
WBC NRBC COR # BLD: 7.72 10*3/MM3 (ref 4.5–10.7)
WBC NRBC COR # BLD: 8.02 10*3/MM3 (ref 4.5–10.7)
WBC NRBC COR # BLD: 9.37 10*3/MM3 (ref 3.4–10.8)

## 2019-01-01 PROCEDURE — 93010 ELECTROCARDIOGRAM REPORT: CPT | Performed by: INTERNAL MEDICINE

## 2019-01-01 PROCEDURE — C1713 ANCHOR/SCREW BN/BN,TIS/BN: HCPCS | Performed by: ORTHOPAEDIC SURGERY

## 2019-01-01 PROCEDURE — 99024 POSTOP FOLLOW-UP VISIT: CPT | Performed by: ORTHOPAEDIC SURGERY

## 2019-01-01 PROCEDURE — 96372 THER/PROPH/DIAG INJ SC/IM: CPT | Performed by: INTERNAL MEDICINE

## 2019-01-01 PROCEDURE — 87147 CULTURE TYPE IMMUNOLOGIC: CPT | Performed by: SURGERY

## 2019-01-01 PROCEDURE — 85027 COMPLETE CBC AUTOMATED: CPT | Performed by: NURSE PRACTITIONER

## 2019-01-01 PROCEDURE — 99223 1ST HOSP IP/OBS HIGH 75: CPT | Performed by: INTERNAL MEDICINE

## 2019-01-01 PROCEDURE — 80053 COMPREHEN METABOLIC PANEL: CPT

## 2019-01-01 PROCEDURE — 82570 ASSAY OF URINE CREATININE: CPT | Performed by: NURSE PRACTITIONER

## 2019-01-01 PROCEDURE — 36415 COLL VENOUS BLD VENIPUNCTURE: CPT

## 2019-01-01 PROCEDURE — 93005 ELECTROCARDIOGRAM TRACING: CPT | Performed by: HOSPITALIST

## 2019-01-01 PROCEDURE — 22853 INSJ BIOMECHANICAL DEVICE: CPT | Performed by: ORTHOPAEDIC SURGERY

## 2019-01-01 PROCEDURE — 85025 COMPLETE CBC W/AUTO DIFF WBC: CPT | Performed by: INTERNAL MEDICINE

## 2019-01-01 PROCEDURE — S0260 H&P FOR SURGERY: HCPCS | Performed by: ORTHOPAEDIC SURGERY

## 2019-01-01 PROCEDURE — 25010000002 CEFEPIME PER 500 MG: Performed by: INTERNAL MEDICINE

## 2019-01-01 PROCEDURE — 99231 SBSQ HOSP IP/OBS SF/LOW 25: CPT | Performed by: INTERNAL MEDICINE

## 2019-01-01 PROCEDURE — 86923 COMPATIBILITY TEST ELECTRIC: CPT

## 2019-01-01 PROCEDURE — G0463 HOSPITAL OUTPT CLINIC VISIT: HCPCS

## 2019-01-01 PROCEDURE — 80048 BASIC METABOLIC PNL TOTAL CA: CPT

## 2019-01-01 PROCEDURE — 99024 POSTOP FOLLOW-UP VISIT: CPT | Performed by: NURSE PRACTITIONER

## 2019-01-01 PROCEDURE — 82784 ASSAY IGA/IGD/IGG/IGM EACH: CPT | Performed by: INTERNAL MEDICINE

## 2019-01-01 PROCEDURE — 25010000002 HYDROMORPHONE PER 4 MG: Performed by: NURSE ANESTHETIST, CERTIFIED REGISTERED

## 2019-01-01 PROCEDURE — 97110 THERAPEUTIC EXERCISES: CPT | Performed by: PHYSICAL THERAPIST

## 2019-01-01 PROCEDURE — 87486 CHLMYD PNEUM DNA AMP PROBE: CPT | Performed by: PHYSICIAN ASSISTANT

## 2019-01-01 PROCEDURE — 85018 HEMOGLOBIN: CPT | Performed by: ORTHOPAEDIC SURGERY

## 2019-01-01 PROCEDURE — 80202 ASSAY OF VANCOMYCIN: CPT | Performed by: INTERNAL MEDICINE

## 2019-01-01 PROCEDURE — 97162 PT EVAL MOD COMPLEX 30 MIN: CPT

## 2019-01-01 PROCEDURE — 82728 ASSAY OF FERRITIN: CPT | Performed by: INTERNAL MEDICINE

## 2019-01-01 PROCEDURE — 63710000001 INSULIN LISPRO (HUMAN) PER 5 UNITS: Performed by: INTERNAL MEDICINE

## 2019-01-01 PROCEDURE — 83540 ASSAY OF IRON: CPT | Performed by: INTERNAL MEDICINE

## 2019-01-01 PROCEDURE — 85610 PROTHROMBIN TIME: CPT | Performed by: HOSPITALIST

## 2019-01-01 PROCEDURE — 25010000002 PIPERACILLIN SOD-TAZOBACTAM PER 1 G: Performed by: NURSE PRACTITIONER

## 2019-01-01 PROCEDURE — 72020 X-RAY EXAM OF SPINE 1 VIEW: CPT | Performed by: ORTHOPAEDIC SURGERY

## 2019-01-01 PROCEDURE — 84484 ASSAY OF TROPONIN QUANT: CPT | Performed by: PHYSICIAN ASSISTANT

## 2019-01-01 PROCEDURE — 36415 COLL VENOUS BLD VENIPUNCTURE: CPT | Performed by: HOSPITALIST

## 2019-01-01 PROCEDURE — 97110 THERAPEUTIC EXERCISES: CPT

## 2019-01-01 PROCEDURE — 94799 UNLISTED PULMONARY SVC/PX: CPT

## 2019-01-01 PROCEDURE — 25010000002 FUROSEMIDE PER 20 MG: Performed by: INTERNAL MEDICINE

## 2019-01-01 PROCEDURE — 22843 INSERT SPINE FIXATION DEVICE: CPT | Performed by: ORTHOPAEDIC SURGERY

## 2019-01-01 PROCEDURE — 99233 SBSQ HOSP IP/OBS HIGH 50: CPT | Performed by: INTERNAL MEDICINE

## 2019-01-01 PROCEDURE — 93306 TTE W/DOPPLER COMPLETE: CPT

## 2019-01-01 PROCEDURE — 63710000001 INSULIN LISPRO (HUMAN) PER 5 UNITS: Performed by: HOSPITALIST

## 2019-01-01 PROCEDURE — 99232 SBSQ HOSP IP/OBS MODERATE 35: CPT | Performed by: INTERNAL MEDICINE

## 2019-01-01 PROCEDURE — 96374 THER/PROPH/DIAG INJ IV PUSH: CPT | Performed by: INTERNAL MEDICINE

## 2019-01-01 PROCEDURE — 99232 SBSQ HOSP IP/OBS MODERATE 35: CPT | Performed by: NURSE PRACTITIONER

## 2019-01-01 PROCEDURE — 72100 X-RAY EXAM L-S SPINE 2/3 VWS: CPT

## 2019-01-01 PROCEDURE — 82962 GLUCOSE BLOOD TEST: CPT

## 2019-01-01 PROCEDURE — 85610 PROTHROMBIN TIME: CPT | Performed by: EMERGENCY MEDICINE

## 2019-01-01 PROCEDURE — 85025 COMPLETE CBC W/AUTO DIFF WBC: CPT | Performed by: ORTHOPAEDIC SURGERY

## 2019-01-01 PROCEDURE — 83615 LACTATE (LD) (LDH) ENZYME: CPT | Performed by: INTERNAL MEDICINE

## 2019-01-01 PROCEDURE — 25010000003 CEFAZOLIN PER 500 MG: Performed by: NEUROLOGICAL SURGERY

## 2019-01-01 PROCEDURE — 83883 ASSAY NEPHELOMETRY NOT SPEC: CPT | Performed by: INTERNAL MEDICINE

## 2019-01-01 PROCEDURE — 25010000002 VANCOMYCIN PER 500 MG: Performed by: INTERNAL MEDICINE

## 2019-01-01 PROCEDURE — 36415 COLL VENOUS BLD VENIPUNCTURE: CPT | Performed by: ORTHOPAEDIC SURGERY

## 2019-01-01 PROCEDURE — 97530 THERAPEUTIC ACTIVITIES: CPT

## 2019-01-01 PROCEDURE — 99213 OFFICE O/P EST LOW 20 MIN: CPT | Performed by: NEUROLOGICAL SURGERY

## 2019-01-01 PROCEDURE — 93005 ELECTROCARDIOGRAM TRACING: CPT | Performed by: PHYSICIAN ASSISTANT

## 2019-01-01 PROCEDURE — 84466 ASSAY OF TRANSFERRIN: CPT | Performed by: INTERNAL MEDICINE

## 2019-01-01 PROCEDURE — 85027 COMPLETE CBC AUTOMATED: CPT | Performed by: HOSPITALIST

## 2019-01-01 PROCEDURE — 11042 DBRDMT SUBQ TIS 1ST 20SQCM/<: CPT | Performed by: SURGERY

## 2019-01-01 PROCEDURE — 82607 VITAMIN B-12: CPT | Performed by: INTERNAL MEDICINE

## 2019-01-01 PROCEDURE — 87186 SC STD MICRODIL/AGAR DIL: CPT | Performed by: SURGERY

## 2019-01-01 PROCEDURE — 99232 SBSQ HOSP IP/OBS MODERATE 35: CPT | Performed by: PHYSICIAN ASSISTANT

## 2019-01-01 PROCEDURE — 85652 RBC SED RATE AUTOMATED: CPT | Performed by: PHYSICIAN ASSISTANT

## 2019-01-01 PROCEDURE — 99024 POSTOP FOLLOW-UP VISIT: CPT | Performed by: PHYSICIAN ASSISTANT

## 2019-01-01 PROCEDURE — 36600 WITHDRAWAL OF ARTERIAL BLOOD: CPT

## 2019-01-01 PROCEDURE — 36415 COLL VENOUS BLD VENIPUNCTURE: CPT | Performed by: INTERNAL MEDICINE

## 2019-01-01 PROCEDURE — 83605 ASSAY OF LACTIC ACID: CPT | Performed by: PHYSICIAN ASSISTANT

## 2019-01-01 PROCEDURE — 85610 PROTHROMBIN TIME: CPT

## 2019-01-01 PROCEDURE — 25010000002 FENTANYL CITRATE (PF) 100 MCG/2ML SOLUTION: Performed by: NURSE ANESTHETIST, CERTIFIED REGISTERED

## 2019-01-01 PROCEDURE — 25010000002 ENOXAPARIN PER 10 MG: Performed by: NURSE PRACTITIONER

## 2019-01-01 PROCEDURE — 84439 ASSAY OF FREE THYROXINE: CPT | Performed by: INTERNAL MEDICINE

## 2019-01-01 PROCEDURE — 85027 COMPLETE CBC AUTOMATED: CPT | Performed by: INTERNAL MEDICINE

## 2019-01-01 PROCEDURE — 0 IOPAMIDOL PER 1 ML: Performed by: PHYSICIAN ASSISTANT

## 2019-01-01 PROCEDURE — 83935 ASSAY OF URINE OSMOLALITY: CPT | Performed by: NURSE PRACTITIONER

## 2019-01-01 PROCEDURE — 83880 ASSAY OF NATRIURETIC PEPTIDE: CPT | Performed by: HOSPITALIST

## 2019-01-01 PROCEDURE — 72125 CT NECK SPINE W/O DYE: CPT

## 2019-01-01 PROCEDURE — 80048 BASIC METABOLIC PNL TOTAL CA: CPT | Performed by: INTERNAL MEDICINE

## 2019-01-01 PROCEDURE — 84165 PROTEIN E-PHORESIS SERUM: CPT | Performed by: INTERNAL MEDICINE

## 2019-01-01 PROCEDURE — 87633 RESP VIRUS 12-25 TARGETS: CPT | Performed by: PHYSICIAN ASSISTANT

## 2019-01-01 PROCEDURE — 84145 PROCALCITONIN (PCT): CPT | Performed by: PHYSICIAN ASSISTANT

## 2019-01-01 PROCEDURE — 99213 OFFICE O/P EST LOW 20 MIN: CPT | Performed by: INTERNAL MEDICINE

## 2019-01-01 PROCEDURE — 80053 COMPREHEN METABOLIC PANEL: CPT | Performed by: INTERNAL MEDICINE

## 2019-01-01 PROCEDURE — 93005 ELECTROCARDIOGRAM TRACING: CPT | Performed by: ORTHOPAEDIC SURGERY

## 2019-01-01 PROCEDURE — 72193 CT PELVIS W/DYE: CPT

## 2019-01-01 PROCEDURE — 0JB90ZZ EXCISION OF BUTTOCK SUBCUTANEOUS TISSUE AND FASCIA, OPEN APPROACH: ICD-10-PCS | Performed by: SURGERY

## 2019-01-01 PROCEDURE — 83921 ORGANIC ACID SINGLE QUANT: CPT | Performed by: INTERNAL MEDICINE

## 2019-01-01 PROCEDURE — 80053 COMPREHEN METABOLIC PANEL: CPT | Performed by: HOSPITALIST

## 2019-01-01 PROCEDURE — 25010000002 FERRIC CARBOXYMALTOSE 750 MG/15ML SOLUTION 15 ML VIAL: Performed by: INTERNAL MEDICINE

## 2019-01-01 PROCEDURE — 93970 EXTREMITY STUDY: CPT

## 2019-01-01 PROCEDURE — 36416 COLLJ CAPILLARY BLOOD SPEC: CPT

## 2019-01-01 PROCEDURE — 25010000002 CYANOCOBALAMIN PER 1000 MCG: Performed by: INTERNAL MEDICINE

## 2019-01-01 PROCEDURE — 63048 LAM FACETEC &FORAMOT EA ADDL: CPT | Performed by: NEUROLOGICAL SURGERY

## 2019-01-01 PROCEDURE — 22207 INCIS SPINE 3 COLUMN LUMBAR: CPT | Performed by: ORTHOPAEDIC SURGERY

## 2019-01-01 PROCEDURE — 99285 EMERGENCY DEPT VISIT HI MDM: CPT

## 2019-01-01 PROCEDURE — P9016 RBC LEUKOCYTES REDUCED: HCPCS

## 2019-01-01 PROCEDURE — 99221 1ST HOSP IP/OBS SF/LOW 40: CPT | Performed by: INTERNAL MEDICINE

## 2019-01-01 PROCEDURE — 87075 CULTR BACTERIA EXCEPT BLOOD: CPT | Performed by: SURGERY

## 2019-01-01 PROCEDURE — 36430 TRANSFUSION BLD/BLD COMPNT: CPT

## 2019-01-01 PROCEDURE — 71250 CT THORAX DX C-: CPT

## 2019-01-01 PROCEDURE — 25010000002 FENTANYL CITRATE (PF) 100 MCG/2ML SOLUTION: Performed by: INTERNAL MEDICINE

## 2019-01-01 PROCEDURE — 72110 X-RAY EXAM L-2 SPINE 4/>VWS: CPT

## 2019-01-01 PROCEDURE — 85025 COMPLETE CBC W/AUTO DIFF WBC: CPT | Performed by: PHYSICIAN ASSISTANT

## 2019-01-01 PROCEDURE — 88304 TISSUE EXAM BY PATHOLOGIST: CPT | Performed by: SURGERY

## 2019-01-01 PROCEDURE — 99214 OFFICE O/P EST MOD 30 MIN: CPT | Performed by: INTERNAL MEDICINE

## 2019-01-01 PROCEDURE — 85652 RBC SED RATE AUTOMATED: CPT | Performed by: EMERGENCY MEDICINE

## 2019-01-01 PROCEDURE — 25010000002 PROPOFOL 10 MG/ML EMULSION: Performed by: NURSE ANESTHETIST, CERTIFIED REGISTERED

## 2019-01-01 PROCEDURE — 63710000001 PROCHLORPERAZINE MALEATE PER 5 MG: Performed by: INTERNAL MEDICINE

## 2019-01-01 PROCEDURE — 82746 ASSAY OF FOLIC ACID SERUM: CPT | Performed by: INTERNAL MEDICINE

## 2019-01-01 PROCEDURE — 36415 COLL VENOUS BLD VENIPUNCTURE: CPT | Performed by: NURSE PRACTITIONER

## 2019-01-01 PROCEDURE — 01NB0ZZ RELEASE LUMBAR NERVE, OPEN APPROACH: ICD-10-PCS | Performed by: NEUROLOGICAL SURGERY

## 2019-01-01 PROCEDURE — 72240 MYELOGRAPHY NECK SPINE: CPT

## 2019-01-01 PROCEDURE — 86334 IMMUNOFIX E-PHORESIS SERUM: CPT | Performed by: INTERNAL MEDICINE

## 2019-01-01 PROCEDURE — 99214 OFFICE O/P EST MOD 30 MIN: CPT | Performed by: NURSE PRACTITIONER

## 2019-01-01 PROCEDURE — 25010000002 VANCOMYCIN 10 G RECONSTITUTED SOLUTION: Performed by: INTERNAL MEDICINE

## 2019-01-01 PROCEDURE — 88312 SPECIAL STAINS GROUP 1: CPT | Performed by: SURGERY

## 2019-01-01 PROCEDURE — 99221 1ST HOSP IP/OBS SF/LOW 40: CPT | Performed by: NURSE PRACTITIONER

## 2019-01-01 PROCEDURE — 85025 COMPLETE CBC W/AUTO DIFF WBC: CPT | Performed by: EMERGENCY MEDICINE

## 2019-01-01 PROCEDURE — 72131 CT LUMBAR SPINE W/O DYE: CPT

## 2019-01-01 PROCEDURE — 11045 DBRDMT SUBQ TISS EACH ADDL: CPT | Performed by: SURGERY

## 2019-01-01 PROCEDURE — 93005 ELECTROCARDIOGRAM TRACING: CPT | Performed by: INTERNAL MEDICINE

## 2019-01-01 PROCEDURE — 80202 ASSAY OF VANCOMYCIN: CPT

## 2019-01-01 PROCEDURE — 86900 BLOOD TYPING SEROLOGIC ABO: CPT | Performed by: ANESTHESIOLOGY

## 2019-01-01 PROCEDURE — 82607 VITAMIN B-12: CPT | Performed by: HOSPITALIST

## 2019-01-01 PROCEDURE — 99222 1ST HOSP IP/OBS MODERATE 55: CPT | Performed by: INTERNAL MEDICINE

## 2019-01-01 PROCEDURE — 80053 COMPREHEN METABOLIC PANEL: CPT | Performed by: EMERGENCY MEDICINE

## 2019-01-01 PROCEDURE — 93000 ELECTROCARDIOGRAM COMPLETE: CPT | Performed by: NURSE PRACTITIONER

## 2019-01-01 PROCEDURE — 82668 ASSAY OF ERYTHROPOIETIN: CPT | Performed by: INTERNAL MEDICINE

## 2019-01-01 PROCEDURE — 85014 HEMATOCRIT: CPT | Performed by: ORTHOPAEDIC SURGERY

## 2019-01-01 PROCEDURE — 25010000002 HEPARIN (PORCINE) PER 1000 UNITS: Performed by: NEUROLOGICAL SURGERY

## 2019-01-01 PROCEDURE — 81003 URINALYSIS AUTO W/O SCOPE: CPT | Performed by: EMERGENCY MEDICINE

## 2019-01-01 PROCEDURE — 83010 ASSAY OF HAPTOGLOBIN QUANT: CPT | Performed by: INTERNAL MEDICINE

## 2019-01-01 PROCEDURE — 05HY33Z INSERTION OF INFUSION DEVICE INTO UPPER VEIN, PERCUTANEOUS APPROACH: ICD-10-PCS | Performed by: SURGERY

## 2019-01-01 PROCEDURE — 83930 ASSAY OF BLOOD OSMOLALITY: CPT | Performed by: NURSE PRACTITIONER

## 2019-01-01 PROCEDURE — 86900 BLOOD TYPING SEROLOGIC ABO: CPT | Performed by: ORTHOPAEDIC SURGERY

## 2019-01-01 PROCEDURE — 73502 X-RAY EXAM HIP UNI 2-3 VIEWS: CPT

## 2019-01-01 PROCEDURE — 25010000002 ONDANSETRON PER 1 MG: Performed by: EMERGENCY MEDICINE

## 2019-01-01 PROCEDURE — 25010000003 CEFAZOLIN IN DEXTROSE 2-4 GM/100ML-% SOLUTION: Performed by: ORTHOPAEDIC SURGERY

## 2019-01-01 PROCEDURE — 82746 ASSAY OF FOLIC ACID SERUM: CPT | Performed by: HOSPITALIST

## 2019-01-01 PROCEDURE — 25010000002 ONDANSETRON PER 1 MG: Performed by: NURSE ANESTHETIST, CERTIFIED REGISTERED

## 2019-01-01 PROCEDURE — 87798 DETECT AGENT NOS DNA AMP: CPT | Performed by: PHYSICIAN ASSISTANT

## 2019-01-01 PROCEDURE — 22633 ARTHRD CMBN 1NTRSPC LUMBAR: CPT | Performed by: ORTHOPAEDIC SURGERY

## 2019-01-01 PROCEDURE — 25010000002 VANCOMYCIN 10 G RECONSTITUTED SOLUTION: Performed by: PHYSICIAN ASSISTANT

## 2019-01-01 PROCEDURE — 84484 ASSAY OF TROPONIN QUANT: CPT | Performed by: HOSPITALIST

## 2019-01-01 PROCEDURE — 93303 ECHO TRANSTHORACIC: CPT | Performed by: INTERNAL MEDICINE

## 2019-01-01 PROCEDURE — 83735 ASSAY OF MAGNESIUM: CPT | Performed by: HOSPITALIST

## 2019-01-01 PROCEDURE — 0 IOPAMIDOL 41 % SOLUTION: Performed by: NEUROLOGICAL SURGERY

## 2019-01-01 PROCEDURE — 25010000003 CEFAZOLIN 1-4 GM/50ML-% SOLUTION: Performed by: NURSE ANESTHETIST, CERTIFIED REGISTERED

## 2019-01-01 PROCEDURE — 72100 X-RAY EXAM L-S SPINE 2/3 VWS: CPT | Performed by: ORTHOPAEDIC SURGERY

## 2019-01-01 PROCEDURE — 86850 RBC ANTIBODY SCREEN: CPT | Performed by: ANESTHESIOLOGY

## 2019-01-01 PROCEDURE — 87581 M.PNEUMON DNA AMP PROBE: CPT | Performed by: PHYSICIAN ASSISTANT

## 2019-01-01 PROCEDURE — 0SG30AJ FUSION OF LUMBOSACRAL JOINT WITH INTERBODY FUSION DEVICE, POSTERIOR APPROACH, ANTERIOR COLUMN, OPEN APPROACH: ICD-10-PCS | Performed by: ORTHOPAEDIC SURGERY

## 2019-01-01 PROCEDURE — 82803 BLOOD GASES ANY COMBINATION: CPT

## 2019-01-01 PROCEDURE — 87205 SMEAR GRAM STAIN: CPT | Performed by: SURGERY

## 2019-01-01 PROCEDURE — 71275 CT ANGIOGRAPHY CHEST: CPT

## 2019-01-01 PROCEDURE — 85025 COMPLETE CBC W/AUTO DIFF WBC: CPT | Performed by: HOSPITALIST

## 2019-01-01 PROCEDURE — 36597 REPOSITION VENOUS CATHETER: CPT

## 2019-01-01 PROCEDURE — 71045 X-RAY EXAM CHEST 1 VIEW: CPT

## 2019-01-01 PROCEDURE — 85045 AUTOMATED RETICULOCYTE COUNT: CPT | Performed by: INTERNAL MEDICINE

## 2019-01-01 PROCEDURE — 76000 FLUOROSCOPY <1 HR PHYS/QHP: CPT

## 2019-01-01 PROCEDURE — 99231 SBSQ HOSP IP/OBS SF/LOW 25: CPT | Performed by: SURGERY

## 2019-01-01 PROCEDURE — 25010000002 MIDAZOLAM PER 1 MG: Performed by: ANESTHESIOLOGY

## 2019-01-01 PROCEDURE — 86901 BLOOD TYPING SEROLOGIC RH(D): CPT | Performed by: ANESTHESIOLOGY

## 2019-01-01 PROCEDURE — 80053 COMPREHEN METABOLIC PANEL: CPT | Performed by: PHYSICIAN ASSISTANT

## 2019-01-01 PROCEDURE — 22614 ARTHRD PST TQ 1NTRSPC EA ADD: CPT | Performed by: ORTHOPAEDIC SURGERY

## 2019-01-01 PROCEDURE — 86900 BLOOD TYPING SEROLOGIC ABO: CPT

## 2019-01-01 PROCEDURE — 87186 SC STD MICRODIL/AGAR DIL: CPT | Performed by: EMERGENCY MEDICINE

## 2019-01-01 PROCEDURE — 86140 C-REACTIVE PROTEIN: CPT | Performed by: EMERGENCY MEDICINE

## 2019-01-01 PROCEDURE — 25010000002 DEXAMETHASONE PER 1 MG: Performed by: NURSE ANESTHETIST, CERTIFIED REGISTERED

## 2019-01-01 PROCEDURE — 83036 HEMOGLOBIN GLYCOSYLATED A1C: CPT | Performed by: HOSPITALIST

## 2019-01-01 PROCEDURE — 86850 RBC ANTIBODY SCREEN: CPT | Performed by: ORTHOPAEDIC SURGERY

## 2019-01-01 PROCEDURE — 87081 CULTURE SCREEN ONLY: CPT | Performed by: HOSPITALIST

## 2019-01-01 PROCEDURE — 25010000002 PIPERACILLIN SOD-TAZOBACTAM PER 1 G: Performed by: EMERGENCY MEDICINE

## 2019-01-01 PROCEDURE — 80069 RENAL FUNCTION PANEL: CPT | Performed by: HOSPITALIST

## 2019-01-01 PROCEDURE — 63047 LAM FACETEC & FORAMOT LUMBAR: CPT | Performed by: NEUROLOGICAL SURGERY

## 2019-01-01 PROCEDURE — 72072 X-RAY EXAM THORAC SPINE 3VWS: CPT

## 2019-01-01 PROCEDURE — 71046 X-RAY EXAM CHEST 2 VIEWS: CPT

## 2019-01-01 PROCEDURE — 0QB30ZZ EXCISION OF LEFT PELVIC BONE, OPEN APPROACH: ICD-10-PCS | Performed by: ORTHOPAEDIC SURGERY

## 2019-01-01 PROCEDURE — 99231 SBSQ HOSP IP/OBS SF/LOW 25: CPT | Performed by: NURSE PRACTITIONER

## 2019-01-01 PROCEDURE — 02WA33Z REVISION OF INFUSION DEVICE IN HEART, PERCUTANEOUS APPROACH: ICD-10-PCS | Performed by: SURGERY

## 2019-01-01 PROCEDURE — 0QP00JZ REMOVAL OF SYNTHETIC SUBSTITUTE FROM LUMBAR VERTEBRA, OPEN APPROACH: ICD-10-PCS | Performed by: ORTHOPAEDIC SURGERY

## 2019-01-01 PROCEDURE — 25010000002 IOPAMIDOL 61 % SOLUTION: Performed by: EMERGENCY MEDICINE

## 2019-01-01 PROCEDURE — 25010000002 SUCCINYLCHOLINE PER 20 MG: Performed by: NURSE ANESTHETIST, CERTIFIED REGISTERED

## 2019-01-01 PROCEDURE — 70450 CT HEAD/BRAIN W/O DYE: CPT

## 2019-01-01 PROCEDURE — 87040 BLOOD CULTURE FOR BACTERIA: CPT | Performed by: PHYSICIAN ASSISTANT

## 2019-01-01 PROCEDURE — 84300 ASSAY OF URINE SODIUM: CPT | Performed by: NURSE PRACTITIONER

## 2019-01-01 PROCEDURE — 83605 ASSAY OF LACTIC ACID: CPT | Performed by: EMERGENCY MEDICINE

## 2019-01-01 PROCEDURE — 84443 ASSAY THYROID STIM HORMONE: CPT | Performed by: INTERNAL MEDICINE

## 2019-01-01 PROCEDURE — 86140 C-REACTIVE PROTEIN: CPT | Performed by: PHYSICIAN ASSISTANT

## 2019-01-01 PROCEDURE — 99222 1ST HOSP IP/OBS MODERATE 55: CPT | Performed by: SURGERY

## 2019-01-01 PROCEDURE — 84443 ASSAY THYROID STIM HORMONE: CPT

## 2019-01-01 PROCEDURE — 62304 MYELOGRAPHY LUMBAR INJECTION: CPT

## 2019-01-01 PROCEDURE — 87040 BLOOD CULTURE FOR BACTERIA: CPT | Performed by: EMERGENCY MEDICINE

## 2019-01-01 PROCEDURE — 93306 TTE W/DOPPLER COMPLETE: CPT | Performed by: INTERNAL MEDICINE

## 2019-01-01 PROCEDURE — 25010000002 VANCOMYCIN: Performed by: INTERNAL MEDICINE

## 2019-01-01 PROCEDURE — 86880 COOMBS TEST DIRECT: CPT | Performed by: INTERNAL MEDICINE

## 2019-01-01 PROCEDURE — 20939 BONE MARROW ASPIR BONE GRFG: CPT | Performed by: ORTHOPAEDIC SURGERY

## 2019-01-01 PROCEDURE — 85025 COMPLETE CBC W/AUTO DIFF WBC: CPT

## 2019-01-01 PROCEDURE — 87070 CULTURE OTHR SPECIMN AEROBIC: CPT | Performed by: SURGERY

## 2019-01-01 PROCEDURE — 85610 PROTHROMBIN TIME: CPT | Performed by: ORTHOPAEDIC SURGERY

## 2019-01-01 PROCEDURE — 0QH004Z INSERTION OF INTERNAL FIXATION DEVICE INTO LUMBAR VERTEBRA, OPEN APPROACH: ICD-10-PCS | Performed by: ORTHOPAEDIC SURGERY

## 2019-01-01 PROCEDURE — 86901 BLOOD TYPING SEROLOGIC RH(D): CPT | Performed by: ORTHOPAEDIC SURGERY

## 2019-01-01 PROCEDURE — 87147 CULTURE TYPE IMMUNOLOGIC: CPT | Performed by: EMERGENCY MEDICINE

## 2019-01-01 PROCEDURE — 72070 X-RAY EXAM THORAC SPINE 2VWS: CPT

## 2019-01-01 PROCEDURE — 87150 DNA/RNA AMPLIFIED PROBE: CPT | Performed by: EMERGENCY MEDICINE

## 2019-01-01 PROCEDURE — 85027 COMPLETE CBC AUTOMATED: CPT | Performed by: ORTHOPAEDIC SURGERY

## 2019-01-01 PROCEDURE — 93000 ELECTROCARDIOGRAM COMPLETE: CPT | Performed by: INTERNAL MEDICINE

## 2019-01-01 PROCEDURE — 84145 PROCALCITONIN (PCT): CPT | Performed by: INTERNAL MEDICINE

## 2019-01-01 PROCEDURE — 87040 BLOOD CULTURE FOR BACTERIA: CPT | Performed by: INTERNAL MEDICINE

## 2019-01-01 PROCEDURE — 25010000002 ONDANSETRON PER 1 MG: Performed by: ORTHOPAEDIC SURGERY

## 2019-01-01 PROCEDURE — 25010000002 CEFEPIME PER 500 MG: Performed by: PHYSICIAN ASSISTANT

## 2019-01-01 PROCEDURE — 25010000002 HYDROMORPHONE PER 4 MG: Performed by: EMERGENCY MEDICINE

## 2019-01-01 PROCEDURE — 25010000002 LORAZEPAM PER 2 MG: Performed by: HOSPITALIST

## 2019-01-01 PROCEDURE — 80048 BASIC METABOLIC PNL TOTAL CA: CPT | Performed by: HOSPITALIST

## 2019-01-01 PROCEDURE — 80048 BASIC METABOLIC PNL TOTAL CA: CPT | Performed by: ORTHOPAEDIC SURGERY

## 2019-01-01 PROCEDURE — 85046 RETICYTE/HGB CONCENTRATE: CPT | Performed by: INTERNAL MEDICINE

## 2019-01-01 PROCEDURE — C1751 CATH, INF, PER/CENT/MIDLINE: HCPCS

## 2019-01-01 PROCEDURE — 0SB40ZZ EXCISION OF LUMBOSACRAL DISC, OPEN APPROACH: ICD-10-PCS | Performed by: ORTHOPAEDIC SURGERY

## 2019-01-01 DEVICE — IMPLANTABLE DEVICE: Type: IMPLANTABLE DEVICE | Site: SPINE LUMBAR | Status: FUNCTIONAL

## 2019-01-01 DEVICE — NUT EXPEDIUM MCC HEX TI 3/8IN: Type: IMPLANTABLE DEVICE | Site: SPINE LUMBAR | Status: FUNCTIONAL

## 2019-01-01 DEVICE — PLT EXPEDIUM MCC TI 30TO50MM: Type: IMPLANTABLE DEVICE | Site: SPINE LUMBAR | Status: FUNCTIONAL

## 2019-01-01 DEVICE — JHOOK EXPEDIUM MCC  55 TI  X25: Type: IMPLANTABLE DEVICE | Site: SPINE LUMBAR | Status: FUNCTIONAL

## 2019-01-01 DEVICE — SCRW SET EXPEDIUM MCC TI  X25: Type: IMPLANTABLE DEVICE | Site: SPINE LUMBAR | Status: FUNCTIONAL

## 2019-01-01 DEVICE — PLT EXPEDIUM MCC TI 40TO60: Type: IMPLANTABLE DEVICE | Site: SPINE LUMBAR | Status: FUNCTIONAL

## 2019-01-01 DEVICE — SCRW EXPEDIUM PA TI 7.5X45MM: Type: IMPLANTABLE DEVICE | Site: SPINE LUMBAR | Status: FUNCTIONAL

## 2019-01-01 DEVICE — ALLOGRFT BONE VIVIGEN CELLUAR MATRX FZ 10CC: Type: IMPLANTABLE DEVICE | Site: SPINE LUMBAR | Status: FUNCTIONAL

## 2019-01-01 DEVICE — CAGE SPINE CONCORDE BULLET LRD 9X8X21MM: Type: IMPLANTABLE DEVICE | Site: SPINE LUMBAR | Status: FUNCTIONAL

## 2019-01-01 DEVICE — SCRW INNR EXPEDIUM: Type: IMPLANTABLE DEVICE | Site: SPINE LUMBAR | Status: FUNCTIONAL

## 2019-01-01 RX ORDER — WARFARIN SODIUM 5 MG/1
5 TABLET ORAL NIGHTLY
Start: 2019-01-01 | End: 2019-01-01 | Stop reason: SDUPTHER

## 2019-01-01 RX ORDER — ATORVASTATIN CALCIUM 20 MG/1
20 TABLET, FILM COATED ORAL DAILY
Status: DISCONTINUED | OUTPATIENT
Start: 2019-01-01 | End: 2019-01-01 | Stop reason: HOSPADM

## 2019-01-01 RX ORDER — DEXAMETHASONE SODIUM PHOSPHATE 10 MG/ML
INJECTION INTRAMUSCULAR; INTRAVENOUS AS NEEDED
Status: DISCONTINUED | OUTPATIENT
Start: 2019-01-01 | End: 2019-01-01 | Stop reason: SURG

## 2019-01-01 RX ORDER — FAMOTIDINE 10 MG/ML
20 INJECTION, SOLUTION INTRAVENOUS ONCE
Status: COMPLETED | OUTPATIENT
Start: 2019-01-01 | End: 2019-01-01

## 2019-01-01 RX ORDER — FUROSEMIDE 10 MG/ML
40 INJECTION INTRAMUSCULAR; INTRAVENOUS ONCE
Status: COMPLETED | OUTPATIENT
Start: 2019-01-01 | End: 2019-01-01

## 2019-01-01 RX ORDER — TIMOLOL MALEATE 5 MG/ML
1 SOLUTION/ DROPS OPHTHALMIC DAILY
Status: DISCONTINUED | OUTPATIENT
Start: 2019-01-01 | End: 2019-01-01

## 2019-01-01 RX ORDER — POTASSIUM CHLORIDE 750 MG/1
20 CAPSULE, EXTENDED RELEASE ORAL DAILY
Status: DISCONTINUED | OUTPATIENT
Start: 2019-01-01 | End: 2019-01-01 | Stop reason: HOSPADM

## 2019-01-01 RX ORDER — OXYCODONE AND ACETAMINOPHEN 10; 325 MG/1; MG/1
1 TABLET ORAL EVERY 4 HOURS PRN
Status: DISCONTINUED | OUTPATIENT
Start: 2019-01-01 | End: 2019-01-01 | Stop reason: HOSPADM

## 2019-01-01 RX ORDER — DORZOLAMIDE HCL 20 MG/ML
1 SOLUTION/ DROPS OPHTHALMIC 2 TIMES DAILY
Status: DISCONTINUED | OUTPATIENT
Start: 2019-01-01 | End: 2019-01-01 | Stop reason: HOSPADM

## 2019-01-01 RX ORDER — OXYCODONE AND ACETAMINOPHEN 7.5; 325 MG/1; MG/1
1 TABLET ORAL ONCE AS NEEDED
Status: DISCONTINUED | OUTPATIENT
Start: 2019-01-01 | End: 2019-01-01 | Stop reason: HOSPADM

## 2019-01-01 RX ORDER — ACETAMINOPHEN 650 MG
TABLET, EXTENDED RELEASE ORAL AS NEEDED
Status: DISCONTINUED | OUTPATIENT
Start: 2019-01-01 | End: 2019-01-01 | Stop reason: HOSPADM

## 2019-01-01 RX ORDER — SODIUM CHLORIDE 0.9 % (FLUSH) 0.9 %
3 SYRINGE (ML) INJECTION EVERY 12 HOURS SCHEDULED
Status: DISCONTINUED | OUTPATIENT
Start: 2019-01-01 | End: 2019-01-01 | Stop reason: HOSPADM

## 2019-01-01 RX ORDER — ONDANSETRON 2 MG/ML
4 INJECTION INTRAMUSCULAR; INTRAVENOUS EVERY 6 HOURS PRN
Status: DISCONTINUED | OUTPATIENT
Start: 2019-01-01 | End: 2019-01-01

## 2019-01-01 RX ORDER — HYDROCODONE BITARTRATE AND ACETAMINOPHEN 5; 325 MG/1; MG/1
1 TABLET ORAL EVERY 4 HOURS PRN
Status: DISCONTINUED | OUTPATIENT
Start: 2019-01-01 | End: 2019-01-01 | Stop reason: HOSPADM

## 2019-01-01 RX ORDER — OXYCODONE AND ACETAMINOPHEN 10; 325 MG/1; MG/1
2 TABLET ORAL EVERY 4 HOURS PRN
Qty: 60 TABLET | Refills: 0 | Status: SHIPPED | OUTPATIENT
Start: 2019-01-01 | End: 2019-01-01

## 2019-01-01 RX ORDER — SODIUM CHLORIDE 0.9 % (FLUSH) 0.9 %
3-10 SYRINGE (ML) INJECTION AS NEEDED
Status: DISCONTINUED | OUTPATIENT
Start: 2019-01-01 | End: 2019-01-01 | Stop reason: HOSPADM

## 2019-01-01 RX ORDER — LATANOPROST 50 UG/ML
1 SOLUTION/ DROPS OPHTHALMIC NIGHTLY
Status: DISCONTINUED | OUTPATIENT
Start: 2019-01-01 | End: 2019-01-01 | Stop reason: HOSPADM

## 2019-01-01 RX ORDER — OXYCODONE AND ACETAMINOPHEN 10; 325 MG/1; MG/1
2 TABLET ORAL EVERY 4 HOURS PRN
Status: DISCONTINUED | OUTPATIENT
Start: 2019-01-01 | End: 2019-01-01 | Stop reason: HOSPADM

## 2019-01-01 RX ORDER — ONDANSETRON 2 MG/ML
4 INJECTION INTRAMUSCULAR; INTRAVENOUS EVERY 6 HOURS PRN
Status: DISCONTINUED | OUTPATIENT
Start: 2019-01-01 | End: 2019-01-01 | Stop reason: SDUPTHER

## 2019-01-01 RX ORDER — FERROUS SULFATE 325(65) MG
325 TABLET ORAL
Status: DISCONTINUED | OUTPATIENT
Start: 2019-01-01 | End: 2019-01-01 | Stop reason: HOSPADM

## 2019-01-01 RX ORDER — ENALAPRIL MALEATE 20 MG/1
20 TABLET ORAL EVERY MORNING
Status: DISCONTINUED | OUTPATIENT
Start: 2019-01-01 | End: 2019-01-01

## 2019-01-01 RX ORDER — SENNA AND DOCUSATE SODIUM 50; 8.6 MG/1; MG/1
1 TABLET, FILM COATED ORAL 2 TIMES DAILY
Status: DISCONTINUED | OUTPATIENT
Start: 2019-01-01 | End: 2019-01-01 | Stop reason: HOSPADM

## 2019-01-01 RX ORDER — BISACODYL 10 MG
10 SUPPOSITORY, RECTAL RECTAL DAILY PRN
Status: DISCONTINUED | OUTPATIENT
Start: 2019-01-01 | End: 2019-01-01

## 2019-01-01 RX ORDER — CEFAZOLIN SODIUM 2 G/100ML
2 INJECTION, SOLUTION INTRAVENOUS EVERY 8 HOURS
Status: COMPLETED | OUTPATIENT
Start: 2019-01-01 | End: 2019-01-01

## 2019-01-01 RX ORDER — NALOXONE HCL 0.4 MG/ML
0.2 VIAL (ML) INJECTION AS NEEDED
Status: DISCONTINUED | OUTPATIENT
Start: 2019-01-01 | End: 2019-01-01 | Stop reason: HOSPADM

## 2019-01-01 RX ORDER — FENTANYL CITRATE 50 UG/ML
50 INJECTION, SOLUTION INTRAMUSCULAR; INTRAVENOUS
Status: DISCONTINUED | OUTPATIENT
Start: 2019-01-01 | End: 2019-01-01 | Stop reason: HOSPADM

## 2019-01-01 RX ORDER — CYANOCOBALAMIN 1000 UG/ML
1000 INJECTION, SOLUTION INTRAMUSCULAR; SUBCUTANEOUS WEEKLY
Qty: 3 ML | Refills: 0 | Status: SHIPPED | OUTPATIENT
Start: 2019-01-01 | End: 2019-01-01

## 2019-01-01 RX ORDER — CYCLOSPORINE 0.5 MG/ML
1 EMULSION OPHTHALMIC 2 TIMES DAILY
Status: ON HOLD | COMMUNITY
End: 2019-01-01

## 2019-01-01 RX ORDER — DEXTROSE MONOHYDRATE 25 G/50ML
25 INJECTION, SOLUTION INTRAVENOUS
Status: DISCONTINUED | OUTPATIENT
Start: 2019-01-01 | End: 2019-01-01 | Stop reason: HOSPADM

## 2019-01-01 RX ORDER — HYDRALAZINE HYDROCHLORIDE 20 MG/ML
5 INJECTION INTRAMUSCULAR; INTRAVENOUS
Status: DISCONTINUED | OUTPATIENT
Start: 2019-01-01 | End: 2019-01-01 | Stop reason: HOSPADM

## 2019-01-01 RX ORDER — ONDANSETRON 4 MG/1
4 TABLET, FILM COATED ORAL EVERY 6 HOURS PRN
Status: DISCONTINUED | OUTPATIENT
Start: 2019-01-01 | End: 2019-01-01 | Stop reason: HOSPADM

## 2019-01-01 RX ORDER — POTASSIUM CHLORIDE 750 MG/1
40 CAPSULE, EXTENDED RELEASE ORAL DAILY
Status: DISCONTINUED | OUTPATIENT
Start: 2019-01-01 | End: 2019-01-01 | Stop reason: HOSPADM

## 2019-01-01 RX ORDER — OXYCODONE HYDROCHLORIDE AND ACETAMINOPHEN 5; 325 MG/1; MG/1
2 TABLET ORAL EVERY 6 HOURS PRN
Status: COMPLETED | OUTPATIENT
Start: 2019-01-01 | End: 2019-01-01

## 2019-01-01 RX ORDER — AMIODARONE HYDROCHLORIDE 200 MG/1
200 TABLET ORAL DAILY
Status: DISCONTINUED | OUTPATIENT
Start: 2019-01-01 | End: 2019-01-01 | Stop reason: HOSPADM

## 2019-01-01 RX ORDER — FUROSEMIDE 20 MG/1
20 TABLET ORAL DAILY PRN
Status: DISCONTINUED | OUTPATIENT
Start: 2019-01-01 | End: 2019-01-01 | Stop reason: HOSPADM

## 2019-01-01 RX ORDER — BISACODYL 10 MG
10 SUPPOSITORY, RECTAL RECTAL DAILY PRN
Status: DISCONTINUED | OUTPATIENT
Start: 2019-01-01 | End: 2019-01-01 | Stop reason: HOSPADM

## 2019-01-01 RX ORDER — FENTANYL CITRATE 50 UG/ML
INJECTION, SOLUTION INTRAMUSCULAR; INTRAVENOUS
Status: DISCONTINUED
Start: 2019-01-01 | End: 2019-01-01 | Stop reason: WASHOUT

## 2019-01-01 RX ORDER — ACETAMINOPHEN 325 MG/1
650 TABLET ORAL EVERY 4 HOURS PRN
Status: DISCONTINUED | OUTPATIENT
Start: 2019-01-01 | End: 2019-01-01 | Stop reason: HOSPADM

## 2019-01-01 RX ORDER — NICOTINE POLACRILEX 4 MG
15 LOZENGE BUCCAL
Status: DISCONTINUED | OUTPATIENT
Start: 2019-01-01 | End: 2019-01-01 | Stop reason: HOSPADM

## 2019-01-01 RX ORDER — DONEPEZIL HYDROCHLORIDE 5 MG/1
5 TABLET, FILM COATED ORAL EVERY MORNING
Status: DISCONTINUED | OUTPATIENT
Start: 2019-01-01 | End: 2019-01-01 | Stop reason: HOSPADM

## 2019-01-01 RX ORDER — TAMSULOSIN HYDROCHLORIDE 0.4 MG/1
0.4 CAPSULE ORAL EVERY MORNING
Status: DISCONTINUED | OUTPATIENT
Start: 2019-01-01 | End: 2019-01-01

## 2019-01-01 RX ORDER — SIMVASTATIN 20 MG
20 TABLET ORAL NIGHTLY
Qty: 90 TABLET | Refills: 1 | Status: SHIPPED | OUTPATIENT
Start: 2019-01-01 | End: 2019-01-01

## 2019-01-01 RX ORDER — LORAZEPAM 2 MG/ML
0.5 INJECTION INTRAMUSCULAR ONCE
Status: COMPLETED | OUTPATIENT
Start: 2019-01-01 | End: 2019-01-01

## 2019-01-01 RX ORDER — MIDAZOLAM HYDROCHLORIDE 1 MG/ML
1 INJECTION INTRAMUSCULAR; INTRAVENOUS
Status: DISCONTINUED | OUTPATIENT
Start: 2019-01-01 | End: 2019-01-01 | Stop reason: HOSPADM

## 2019-01-01 RX ORDER — DIPHENHYDRAMINE HYDROCHLORIDE 50 MG/ML
12.5 INJECTION INTRAMUSCULAR; INTRAVENOUS
Status: DISCONTINUED | OUTPATIENT
Start: 2019-01-01 | End: 2019-01-01 | Stop reason: HOSPADM

## 2019-01-01 RX ORDER — ENALAPRIL MALEATE 20 MG/1
20 TABLET ORAL EVERY 12 HOURS SCHEDULED
Status: DISCONTINUED | OUTPATIENT
Start: 2019-01-01 | End: 2019-01-01 | Stop reason: HOSPADM

## 2019-01-01 RX ORDER — PROMETHAZINE HYDROCHLORIDE 25 MG/ML
12.5 INJECTION, SOLUTION INTRAMUSCULAR; INTRAVENOUS ONCE AS NEEDED
Status: DISCONTINUED | OUTPATIENT
Start: 2019-01-01 | End: 2019-01-01 | Stop reason: HOSPADM

## 2019-01-01 RX ORDER — LIDOCAINE HYDROCHLORIDE 20 MG/ML
INJECTION, SOLUTION INFILTRATION; PERINEURAL AS NEEDED
Status: DISCONTINUED | OUTPATIENT
Start: 2019-01-01 | End: 2019-01-01 | Stop reason: SURG

## 2019-01-01 RX ORDER — NICOTINE POLACRILEX 4 MG
15 LOZENGE BUCCAL
Status: DISCONTINUED | OUTPATIENT
Start: 2019-01-01 | End: 2019-01-01

## 2019-01-01 RX ORDER — OXYCODONE AND ACETAMINOPHEN 10; 325 MG/1; MG/1
1 TABLET ORAL EVERY 4 HOURS PRN
Qty: 12 TABLET | Refills: 0 | Status: ON HOLD | OUTPATIENT
Start: 2019-01-01 | End: 2019-01-01 | Stop reason: SDUPTHER

## 2019-01-01 RX ORDER — SODIUM CHLORIDE 9 MG/ML
250 INJECTION, SOLUTION INTRAVENOUS ONCE
Status: CANCELLED | OUTPATIENT
Start: 2019-01-01

## 2019-01-01 RX ORDER — HYDROMORPHONE HYDROCHLORIDE 1 MG/ML
0.5 INJECTION, SOLUTION INTRAMUSCULAR; INTRAVENOUS; SUBCUTANEOUS
Status: DISCONTINUED | OUTPATIENT
Start: 2019-01-01 | End: 2019-01-01 | Stop reason: HOSPADM

## 2019-01-01 RX ORDER — CEFAZOLIN SODIUM 1 G/50ML
INJECTION, SOLUTION INTRAVENOUS AS NEEDED
Status: DISCONTINUED | OUTPATIENT
Start: 2019-01-01 | End: 2019-01-01 | Stop reason: SURG

## 2019-01-01 RX ORDER — FUROSEMIDE 20 MG/1
20 TABLET ORAL DAILY
Status: DISCONTINUED | OUTPATIENT
Start: 2019-01-01 | End: 2019-01-01

## 2019-01-01 RX ORDER — AMIODARONE HYDROCHLORIDE 200 MG/1
200 TABLET ORAL EVERY 12 HOURS SCHEDULED
Start: 2019-01-01 | End: 2019-01-01

## 2019-01-01 RX ORDER — ONDANSETRON 4 MG/1
4 TABLET, FILM COATED ORAL EVERY 6 HOURS PRN
Status: DISCONTINUED | OUTPATIENT
Start: 2019-01-01 | End: 2019-01-01

## 2019-01-01 RX ORDER — CYCLOBENZAPRINE HCL 10 MG
10 TABLET ORAL 3 TIMES DAILY PRN
COMMUNITY

## 2019-01-01 RX ORDER — DONEPEZIL HYDROCHLORIDE 10 MG/1
10 TABLET, FILM COATED ORAL EVERY MORNING
Status: DISCONTINUED | OUTPATIENT
Start: 2019-01-01 | End: 2019-01-01 | Stop reason: HOSPADM

## 2019-01-01 RX ORDER — ATORVASTATIN CALCIUM 10 MG/1
10 TABLET, FILM COATED ORAL NIGHTLY
COMMUNITY

## 2019-01-01 RX ORDER — PROMETHAZINE HYDROCHLORIDE 25 MG/1
25 TABLET ORAL ONCE AS NEEDED
Status: DISCONTINUED | OUTPATIENT
Start: 2019-01-01 | End: 2019-01-01 | Stop reason: HOSPADM

## 2019-01-01 RX ORDER — WARFARIN SODIUM 10 MG/1
10 TABLET ORAL
COMMUNITY
End: 2019-01-01 | Stop reason: HOSPADM

## 2019-01-01 RX ORDER — PROCHLORPERAZINE MALEATE 5 MG/1
10 TABLET ORAL ONCE
Status: COMPLETED | OUTPATIENT
Start: 2019-01-01 | End: 2019-01-01

## 2019-01-01 RX ORDER — TAMSULOSIN HYDROCHLORIDE 0.4 MG/1
0.4 CAPSULE ORAL DAILY
Status: DISCONTINUED | OUTPATIENT
Start: 2019-01-01 | End: 2019-01-01 | Stop reason: HOSPADM

## 2019-01-01 RX ORDER — VANCOMYCIN HYDROCHLORIDE 1 G/200ML
1000 INJECTION, SOLUTION INTRAVENOUS EVERY 8 HOURS
Qty: 5400 ML | Refills: 0
Start: 2019-01-01 | End: 2019-01-01

## 2019-01-01 RX ORDER — CYANOCOBALAMIN 1000 UG/ML
1000 INJECTION, SOLUTION INTRAMUSCULAR; SUBCUTANEOUS WEEKLY
Status: COMPLETED | OUTPATIENT
Start: 2019-01-01 | End: 2019-01-01

## 2019-01-01 RX ORDER — SODIUM CHLORIDE, SODIUM LACTATE, POTASSIUM CHLORIDE, CALCIUM CHLORIDE 600; 310; 30; 20 MG/100ML; MG/100ML; MG/100ML; MG/100ML
INJECTION, SOLUTION INTRAVENOUS CONTINUOUS PRN
Status: DISCONTINUED | OUTPATIENT
Start: 2019-01-01 | End: 2019-01-01 | Stop reason: SURG

## 2019-01-01 RX ORDER — HYDROMORPHONE HCL 110MG/55ML
PATIENT CONTROLLED ANALGESIA SYRINGE INTRAVENOUS AS NEEDED
Status: DISCONTINUED | OUTPATIENT
Start: 2019-01-01 | End: 2019-01-01 | Stop reason: SURG

## 2019-01-01 RX ORDER — WARFARIN SODIUM 5 MG/1
5 TABLET ORAL
Status: COMPLETED | OUTPATIENT
Start: 2019-01-01 | End: 2019-01-01

## 2019-01-01 RX ORDER — OXYCODONE AND ACETAMINOPHEN 10; 325 MG/1; MG/1
1 TABLET ORAL EVERY 4 HOURS PRN
Status: ON HOLD | COMMUNITY
End: 2019-01-01 | Stop reason: SDUPTHER

## 2019-01-01 RX ORDER — IPRATROPIUM BROMIDE AND ALBUTEROL SULFATE 2.5; .5 MG/3ML; MG/3ML
3 SOLUTION RESPIRATORY (INHALATION) EVERY 6 HOURS PRN
Status: DISCONTINUED | OUTPATIENT
Start: 2019-01-01 | End: 2019-01-01

## 2019-01-01 RX ORDER — ONDANSETRON 2 MG/ML
4 INJECTION INTRAMUSCULAR; INTRAVENOUS ONCE AS NEEDED
Status: DISCONTINUED | OUTPATIENT
Start: 2019-01-01 | End: 2019-01-01 | Stop reason: HOSPADM

## 2019-01-01 RX ORDER — PANTOPRAZOLE SODIUM 40 MG/1
40 TABLET, DELAYED RELEASE ORAL EVERY MORNING
Status: DISCONTINUED | OUTPATIENT
Start: 2019-01-01 | End: 2019-01-01 | Stop reason: HOSPADM

## 2019-01-01 RX ORDER — DIPHENHYDRAMINE HCL 25 MG
25 CAPSULE ORAL
Status: DISCONTINUED | OUTPATIENT
Start: 2019-01-01 | End: 2019-01-01 | Stop reason: HOSPADM

## 2019-01-01 RX ORDER — MIDAZOLAM HYDROCHLORIDE 1 MG/ML
2 INJECTION INTRAMUSCULAR; INTRAVENOUS
Status: DISCONTINUED | OUTPATIENT
Start: 2019-01-01 | End: 2019-01-01 | Stop reason: HOSPADM

## 2019-01-01 RX ORDER — METOPROLOL TARTRATE 50 MG/1
50 TABLET, FILM COATED ORAL 3 TIMES DAILY
Status: DISCONTINUED | OUTPATIENT
Start: 2019-01-01 | End: 2019-01-01 | Stop reason: HOSPADM

## 2019-01-01 RX ORDER — OXYCODONE HYDROCHLORIDE AND ACETAMINOPHEN 5; 325 MG/1; MG/1
2 TABLET ORAL EVERY 6 HOURS PRN
Status: DISCONTINUED | OUTPATIENT
Start: 2019-01-01 | End: 2019-01-01

## 2019-01-01 RX ORDER — POTASSIUM CHLORIDE 750 MG/1
20 CAPSULE, EXTENDED RELEASE ORAL DAILY
Qty: 180 CAPSULE | Refills: 0 | Status: SHIPPED | OUTPATIENT
Start: 2019-01-01

## 2019-01-01 RX ORDER — POTASSIUM CHLORIDE 750 MG/1
40 CAPSULE, EXTENDED RELEASE ORAL DAILY
Start: 2019-01-01 | End: 2019-01-01

## 2019-01-01 RX ORDER — FUROSEMIDE 40 MG/1
40 TABLET ORAL DAILY
Status: DISCONTINUED | OUTPATIENT
Start: 2019-01-01 | End: 2019-01-01 | Stop reason: HOSPADM

## 2019-01-01 RX ORDER — ROCURONIUM BROMIDE 10 MG/ML
INJECTION, SOLUTION INTRAVENOUS AS NEEDED
Status: DISCONTINUED | OUTPATIENT
Start: 2019-01-01 | End: 2019-01-01 | Stop reason: SURG

## 2019-01-01 RX ORDER — BISACODYL 5 MG/1
5 TABLET, DELAYED RELEASE ORAL DAILY PRN
Status: DISCONTINUED | OUTPATIENT
Start: 2019-01-01 | End: 2019-01-01

## 2019-01-01 RX ORDER — LIDOCAINE HYDROCHLORIDE 10 MG/ML
0.5 INJECTION, SOLUTION EPIDURAL; INFILTRATION; INTRACAUDAL; PERINEURAL ONCE AS NEEDED
Status: DISCONTINUED | OUTPATIENT
Start: 2019-01-01 | End: 2019-01-01 | Stop reason: HOSPADM

## 2019-01-01 RX ORDER — AMIODARONE HYDROCHLORIDE 200 MG/1
200 TABLET ORAL EVERY 12 HOURS SCHEDULED
Status: DISCONTINUED | OUTPATIENT
Start: 2019-01-01 | End: 2019-01-01 | Stop reason: HOSPADM

## 2019-01-01 RX ORDER — FLUMAZENIL 0.1 MG/ML
0.2 INJECTION INTRAVENOUS AS NEEDED
Status: DISCONTINUED | OUTPATIENT
Start: 2019-01-01 | End: 2019-01-01 | Stop reason: HOSPADM

## 2019-01-01 RX ORDER — FAMOTIDINE 10 MG/ML
20 INJECTION, SOLUTION INTRAVENOUS ONCE
Status: DISCONTINUED | OUTPATIENT
Start: 2019-01-01 | End: 2019-01-01 | Stop reason: HOSPADM

## 2019-01-01 RX ORDER — ONDANSETRON 2 MG/ML
4 INJECTION INTRAMUSCULAR; INTRAVENOUS EVERY 6 HOURS PRN
Status: CANCELLED | OUTPATIENT
Start: 2019-01-01

## 2019-01-01 RX ORDER — PIOGLITAZONEHYDROCHLORIDE 45 MG/1
TABLET ORAL
Qty: 90 TABLET | Refills: 2 | Status: SHIPPED | OUTPATIENT
Start: 2019-01-01 | End: 2019-01-01 | Stop reason: HOSPADM

## 2019-01-01 RX ORDER — LIDOCAINE HYDROCHLORIDE 40 MG/ML
SOLUTION TOPICAL AS NEEDED
Status: DISCONTINUED | OUTPATIENT
Start: 2019-01-01 | End: 2019-01-01 | Stop reason: SURG

## 2019-01-01 RX ORDER — AMIODARONE HYDROCHLORIDE 200 MG/1
200 TABLET ORAL DAILY
Qty: 30 TABLET | Refills: 0 | Status: SHIPPED | OUTPATIENT
Start: 2019-01-01 | End: 2019-01-01 | Stop reason: HOSPADM

## 2019-01-01 RX ORDER — FENTANYL CITRATE 50 UG/ML
50 INJECTION, SOLUTION INTRAMUSCULAR; INTRAVENOUS
Status: DISCONTINUED | OUTPATIENT
Start: 2019-01-01 | End: 2019-01-01

## 2019-01-01 RX ORDER — DEXTROSE, SODIUM CHLORIDE, AND POTASSIUM CHLORIDE 5; .45; .15 G/100ML; G/100ML; G/100ML
75 INJECTION INTRAVENOUS CONTINUOUS
Status: DISCONTINUED | OUTPATIENT
Start: 2019-01-01 | End: 2019-01-01

## 2019-01-01 RX ORDER — DEXTROSE MONOHYDRATE 25 G/50ML
25 INJECTION, SOLUTION INTRAVENOUS
Status: DISCONTINUED | OUTPATIENT
Start: 2019-01-01 | End: 2019-01-01

## 2019-01-01 RX ORDER — SODIUM CHLORIDE 0.9 % (FLUSH) 0.9 %
10 SYRINGE (ML) INJECTION AS NEEDED
Status: DISCONTINUED | OUTPATIENT
Start: 2019-01-01 | End: 2019-01-01 | Stop reason: HOSPADM

## 2019-01-01 RX ORDER — ENALAPRIL MALEATE 20 MG/1
TABLET ORAL
Qty: 180 TABLET | Refills: 1 | Status: SHIPPED | OUTPATIENT
Start: 2019-01-01

## 2019-01-01 RX ORDER — SODIUM CHLORIDE 9 MG/ML
250 INJECTION, SOLUTION INTRAVENOUS ONCE
Status: COMPLETED | OUTPATIENT
Start: 2019-01-01 | End: 2019-01-01

## 2019-01-01 RX ORDER — VANCOMYCIN HYDROCHLORIDE 1 G/200ML
1000 INJECTION, SOLUTION INTRAVENOUS ONCE
Status: COMPLETED | OUTPATIENT
Start: 2019-01-01 | End: 2019-01-01

## 2019-01-01 RX ORDER — PROPOFOL 10 MG/ML
VIAL (ML) INTRAVENOUS AS NEEDED
Status: DISCONTINUED | OUTPATIENT
Start: 2019-01-01 | End: 2019-01-01 | Stop reason: SURG

## 2019-01-01 RX ORDER — OXYCODONE AND ACETAMINOPHEN 10; 325 MG/1; MG/1
2 TABLET ORAL EVERY 4 HOURS PRN
COMMUNITY
End: 2019-01-01 | Stop reason: HOSPADM

## 2019-01-01 RX ORDER — BRIMONIDINE TARTRATE 2 MG/ML
1 SOLUTION/ DROPS OPHTHALMIC 2 TIMES DAILY
Status: DISCONTINUED | OUTPATIENT
Start: 2019-01-01 | End: 2019-01-01 | Stop reason: HOSPADM

## 2019-01-01 RX ORDER — ACETAMINOPHEN 325 MG/1
650 TABLET ORAL ONCE AS NEEDED
Status: DISCONTINUED | OUTPATIENT
Start: 2019-01-01 | End: 2019-01-01 | Stop reason: HOSPADM

## 2019-01-01 RX ORDER — HYDROCODONE BITARTRATE AND ACETAMINOPHEN 7.5; 325 MG/1; MG/1
1 TABLET ORAL ONCE AS NEEDED
Status: DISCONTINUED | OUTPATIENT
Start: 2019-01-01 | End: 2019-01-01 | Stop reason: HOSPADM

## 2019-01-01 RX ORDER — DEXTROSE MONOHYDRATE 25 G/50ML
25 INJECTION, SOLUTION INTRAVENOUS
Status: CANCELLED | OUTPATIENT
Start: 2019-01-01

## 2019-01-01 RX ORDER — VANCOMYCIN HYDROCHLORIDE 1 G/200ML
1000 INJECTION, SOLUTION INTRAVENOUS EVERY 8 HOURS
Status: DISCONTINUED | OUTPATIENT
Start: 2019-01-01 | End: 2019-01-01 | Stop reason: DRUGHIGH

## 2019-01-01 RX ORDER — ENALAPRIL MALEATE 20 MG/1
40 TABLET ORAL EVERY MORNING
Status: DISCONTINUED | OUTPATIENT
Start: 2019-01-01 | End: 2019-01-01

## 2019-01-01 RX ORDER — NICOTINE POLACRILEX 4 MG
15 LOZENGE BUCCAL
Status: CANCELLED | OUTPATIENT
Start: 2019-01-01

## 2019-01-01 RX ORDER — HYDROMORPHONE HCL IN 0.9% NACL 10 MG/50ML
PATIENT CONTROLLED ANALGESIA SYRINGE INTRAVENOUS CONTINUOUS
Status: DISCONTINUED | OUTPATIENT
Start: 2019-01-01 | End: 2019-01-01

## 2019-01-01 RX ORDER — CYCLOSPORINE 0.5 MG/ML
1 EMULSION OPHTHALMIC 2 TIMES DAILY
Status: DISCONTINUED | OUTPATIENT
Start: 2019-01-01 | End: 2019-01-01

## 2019-01-01 RX ORDER — EPHEDRINE SULFATE 50 MG/ML
5 INJECTION, SOLUTION INTRAVENOUS ONCE AS NEEDED
Status: DISCONTINUED | OUTPATIENT
Start: 2019-01-01 | End: 2019-01-01 | Stop reason: HOSPADM

## 2019-01-01 RX ORDER — SODIUM CHLORIDE 0.9 % (FLUSH) 0.9 %
1-10 SYRINGE (ML) INJECTION AS NEEDED
Status: DISCONTINUED | OUTPATIENT
Start: 2019-01-01 | End: 2019-01-01 | Stop reason: HOSPADM

## 2019-01-01 RX ORDER — ACETAMINOPHEN 325 MG/1
650 TABLET ORAL EVERY 4 HOURS PRN
Status: DISCONTINUED | OUTPATIENT
Start: 2019-01-01 | End: 2019-01-01

## 2019-01-01 RX ORDER — PROMETHAZINE HYDROCHLORIDE 25 MG/ML
6.25 INJECTION, SOLUTION INTRAMUSCULAR; INTRAVENOUS
Status: DISCONTINUED | OUTPATIENT
Start: 2019-01-01 | End: 2019-01-01 | Stop reason: HOSPADM

## 2019-01-01 RX ORDER — NALOXONE HCL 0.4 MG/ML
0.1 VIAL (ML) INJECTION
Status: DISCONTINUED | OUTPATIENT
Start: 2019-01-01 | End: 2019-01-01 | Stop reason: HOSPADM

## 2019-01-01 RX ORDER — ONDANSETRON 2 MG/ML
4 INJECTION INTRAMUSCULAR; INTRAVENOUS ONCE
Status: COMPLETED | OUTPATIENT
Start: 2019-01-01 | End: 2019-01-01

## 2019-01-01 RX ORDER — FENTANYL CITRATE 50 UG/ML
INJECTION, SOLUTION INTRAMUSCULAR; INTRAVENOUS AS NEEDED
Status: DISCONTINUED | OUTPATIENT
Start: 2019-01-01 | End: 2019-01-01 | Stop reason: SURG

## 2019-01-01 RX ORDER — LABETALOL HYDROCHLORIDE 5 MG/ML
5 INJECTION, SOLUTION INTRAVENOUS
Status: DISCONTINUED | OUTPATIENT
Start: 2019-01-01 | End: 2019-01-01 | Stop reason: HOSPADM

## 2019-01-01 RX ORDER — CYANOCOBALAMIN 1000 UG/ML
1000 INJECTION, SOLUTION INTRAMUSCULAR; SUBCUTANEOUS
Status: SHIPPED | OUTPATIENT
Start: 2019-01-01 | End: 2020-05-28

## 2019-01-01 RX ORDER — TIMOLOL MALEATE 5 MG/ML
1 SOLUTION/ DROPS OPHTHALMIC DAILY
Status: DISCONTINUED | OUTPATIENT
Start: 2019-01-01 | End: 2019-01-01 | Stop reason: HOSPADM

## 2019-01-01 RX ORDER — SIMVASTATIN 20 MG
20 TABLET ORAL NIGHTLY
COMMUNITY
End: 2019-01-01 | Stop reason: SDUPTHER

## 2019-01-01 RX ORDER — CALCIUM CARBONATE 200(500)MG
2 TABLET,CHEWABLE ORAL 3 TIMES DAILY PRN
Start: 2019-01-01

## 2019-01-01 RX ORDER — GLIPIZIDE 5 MG/1
5 TABLET ORAL
Status: DISCONTINUED | OUTPATIENT
Start: 2019-01-01 | End: 2019-01-01

## 2019-01-01 RX ORDER — SODIUM CHLORIDE 9 MG/ML
100 INJECTION, SOLUTION INTRAVENOUS CONTINUOUS
Status: DISCONTINUED | OUTPATIENT
Start: 2019-01-01 | End: 2019-01-01

## 2019-01-01 RX ORDER — DEXTROSE MONOHYDRATE 50 MG/ML
30 INJECTION, SOLUTION INTRAVENOUS CONTINUOUS
Status: DISCONTINUED | OUTPATIENT
Start: 2019-01-01 | End: 2019-01-01

## 2019-01-01 RX ORDER — ALUMINA, MAGNESIA, AND SIMETHICONE 2400; 2400; 240 MG/30ML; MG/30ML; MG/30ML
15 SUSPENSION ORAL EVERY 6 HOURS PRN
Status: DISCONTINUED | OUTPATIENT
Start: 2019-01-01 | End: 2019-01-01

## 2019-01-01 RX ORDER — ONDANSETRON 2 MG/ML
4 INJECTION INTRAMUSCULAR; INTRAVENOUS EVERY 6 HOURS PRN
Status: DISCONTINUED | OUTPATIENT
Start: 2019-01-01 | End: 2019-01-01 | Stop reason: HOSPADM

## 2019-01-01 RX ORDER — HYDROMORPHONE HYDROCHLORIDE 1 MG/ML
0.5 INJECTION, SOLUTION INTRAMUSCULAR; INTRAVENOUS; SUBCUTANEOUS ONCE
Status: COMPLETED | OUTPATIENT
Start: 2019-01-01 | End: 2019-01-01

## 2019-01-01 RX ORDER — RIFAMPIN 300 MG/1
300 CAPSULE ORAL EVERY 12 HOURS SCHEDULED
Qty: 49 CAPSULE | Refills: 0 | Status: SHIPPED | OUTPATIENT
Start: 2019-01-01 | End: 2019-01-01

## 2019-01-01 RX ORDER — PIOGLITAZONEHYDROCHLORIDE 45 MG/1
45 TABLET ORAL EVERY MORNING
Qty: 90 TABLET | Refills: 0 | Status: SHIPPED | OUTPATIENT
Start: 2019-01-01 | End: 2019-01-01 | Stop reason: SDUPTHER

## 2019-01-01 RX ORDER — SODIUM CHLORIDE, SODIUM LACTATE, POTASSIUM CHLORIDE, CALCIUM CHLORIDE 600; 310; 30; 20 MG/100ML; MG/100ML; MG/100ML; MG/100ML
9 INJECTION, SOLUTION INTRAVENOUS CONTINUOUS
Status: DISCONTINUED | OUTPATIENT
Start: 2019-01-01 | End: 2019-01-01

## 2019-01-01 RX ORDER — SUCCINYLCHOLINE CHLORIDE 20 MG/ML
INJECTION INTRAMUSCULAR; INTRAVENOUS AS NEEDED
Status: DISCONTINUED | OUTPATIENT
Start: 2019-01-01 | End: 2019-01-01 | Stop reason: SURG

## 2019-01-01 RX ORDER — OXYCODONE AND ACETAMINOPHEN 10; 325 MG/1; MG/1
1 TABLET ORAL EVERY 4 HOURS PRN
Qty: 24 TABLET | Refills: 0 | Status: SHIPPED | OUTPATIENT
Start: 2019-01-01

## 2019-01-01 RX ORDER — ACETAMINOPHEN 650 MG/1
650 SUPPOSITORY RECTAL EVERY 4 HOURS PRN
Status: DISCONTINUED | OUTPATIENT
Start: 2019-01-01 | End: 2019-01-01

## 2019-01-01 RX ORDER — SODIUM CHLORIDE 0.9 % (FLUSH) 0.9 %
3 SYRINGE (ML) INJECTION EVERY 12 HOURS SCHEDULED
Status: CANCELLED | OUTPATIENT
Start: 2019-01-01

## 2019-01-01 RX ORDER — POTASSIUM CHLORIDE 750 MG/1
20 CAPSULE, EXTENDED RELEASE ORAL DAILY
Start: 2019-01-01 | End: 2019-01-01 | Stop reason: SDUPTHER

## 2019-01-01 RX ORDER — EPHEDRINE SULFATE 50 MG/ML
INJECTION, SOLUTION INTRAVENOUS AS NEEDED
Status: DISCONTINUED | OUTPATIENT
Start: 2019-01-01 | End: 2019-01-01 | Stop reason: SURG

## 2019-01-01 RX ORDER — ONDANSETRON 4 MG/1
4 TABLET, ORALLY DISINTEGRATING ORAL EVERY 6 HOURS PRN
Status: DISCONTINUED | OUTPATIENT
Start: 2019-01-01 | End: 2019-01-01

## 2019-01-01 RX ORDER — DOCUSATE SODIUM 100 MG/1
100 CAPSULE, LIQUID FILLED ORAL 2 TIMES DAILY
Status: ON HOLD | COMMUNITY
End: 2019-01-01

## 2019-01-01 RX ORDER — FERROUS SULFATE 325(65) MG
325 TABLET ORAL
Status: DISCONTINUED | OUTPATIENT
Start: 2019-01-01 | End: 2019-01-01

## 2019-01-01 RX ORDER — CYANOCOBALAMIN 1000 UG/ML
1000 INJECTION, SOLUTION INTRAMUSCULAR; SUBCUTANEOUS WEEKLY
Qty: 3 ML | Refills: 0
Start: 2019-01-01 | End: 2019-01-01 | Stop reason: SDUPTHER

## 2019-01-01 RX ORDER — ACETAMINOPHEN 325 MG/1
650 TABLET ORAL EVERY 4 HOURS PRN
Status: CANCELLED | OUTPATIENT
Start: 2019-01-01

## 2019-01-01 RX ORDER — TAMSULOSIN HYDROCHLORIDE 0.4 MG/1
0.4 CAPSULE ORAL EVERY MORNING
Status: DISCONTINUED | OUTPATIENT
Start: 2019-01-01 | End: 2019-01-01 | Stop reason: HOSPADM

## 2019-01-01 RX ORDER — ENALAPRIL MALEATE 10 MG/1
10 TABLET ORAL EVERY MORNING
Status: DISCONTINUED | OUTPATIENT
Start: 2019-01-01 | End: 2019-01-01 | Stop reason: HOSPADM

## 2019-01-01 RX ORDER — CYCLOBENZAPRINE HCL 10 MG
10 TABLET ORAL 3 TIMES DAILY PRN
Status: DISCONTINUED | OUTPATIENT
Start: 2019-01-01 | End: 2019-01-01 | Stop reason: HOSPADM

## 2019-01-01 RX ORDER — SENNA AND DOCUSATE SODIUM 50; 8.6 MG/1; MG/1
1 TABLET, FILM COATED ORAL 2 TIMES DAILY
Status: ON HOLD
Start: 2019-01-01 | End: 2019-01-01

## 2019-01-01 RX ORDER — SODIUM CHLORIDE 450 MG/100ML
100 INJECTION, SOLUTION INTRAVENOUS CONTINUOUS
Status: DISCONTINUED | OUTPATIENT
Start: 2019-01-01 | End: 2019-01-01

## 2019-01-01 RX ORDER — TEMAZEPAM 15 MG/1
15 CAPSULE ORAL NIGHTLY PRN
Status: DISCONTINUED | OUTPATIENT
Start: 2019-01-01 | End: 2019-01-01 | Stop reason: HOSPADM

## 2019-01-01 RX ORDER — ROSUVASTATIN CALCIUM 5 MG/1
5 TABLET, COATED ORAL DAILY
Status: ON HOLD | COMMUNITY
End: 2019-01-01 | Stop reason: ALTCHOICE

## 2019-01-01 RX ORDER — LORAZEPAM 4 MG/ML
0.5 INJECTION, SOLUTION INTRAMUSCULAR; INTRAVENOUS ONCE
Status: DISCONTINUED | OUTPATIENT
Start: 2019-01-01 | End: 2019-01-01

## 2019-01-01 RX ORDER — AMIODARONE HYDROCHLORIDE 200 MG/1
200 TABLET ORAL 2 TIMES DAILY
Qty: 60 TABLET | Refills: 0 | Status: SHIPPED | OUTPATIENT
Start: 2019-01-01 | End: 2019-01-01 | Stop reason: SDUPTHER

## 2019-01-01 RX ORDER — CALCIUM CARBONATE 200(500)MG
2 TABLET,CHEWABLE ORAL 3 TIMES DAILY PRN
Status: DISCONTINUED | OUTPATIENT
Start: 2019-01-01 | End: 2019-01-01 | Stop reason: HOSPADM

## 2019-01-01 RX ORDER — ATORVASTATIN CALCIUM 10 MG/1
10 TABLET, FILM COATED ORAL NIGHTLY
Status: DISCONTINUED | OUTPATIENT
Start: 2019-01-01 | End: 2019-01-01 | Stop reason: HOSPADM

## 2019-01-01 RX ORDER — POTASSIUM CHLORIDE 750 MG/1
40 CAPSULE, EXTENDED RELEASE ORAL ONCE
Status: COMPLETED | OUTPATIENT
Start: 2019-01-01 | End: 2019-01-01

## 2019-01-01 RX ORDER — ONDANSETRON 4 MG/1
4 TABLET, ORALLY DISINTEGRATING ORAL EVERY 6 HOURS PRN
Status: DISCONTINUED | OUTPATIENT
Start: 2019-01-01 | End: 2019-01-01 | Stop reason: HOSPADM

## 2019-01-01 RX ORDER — ENALAPRIL MALEATE 10 MG/1
10 TABLET ORAL EVERY 12 HOURS SCHEDULED
Status: DISCONTINUED | OUTPATIENT
Start: 2019-01-01 | End: 2019-01-01 | Stop reason: HOSPADM

## 2019-01-01 RX ORDER — VANCOMYCIN HYDROCHLORIDE 1 G/200ML
1000 INJECTION, SOLUTION INTRAVENOUS EVERY 12 HOURS
Status: DISCONTINUED | OUTPATIENT
Start: 2019-01-01 | End: 2019-01-01

## 2019-01-01 RX ORDER — GABAPENTIN 100 MG/1
300 CAPSULE ORAL 3 TIMES DAILY
COMMUNITY
End: 2019-01-01 | Stop reason: SDUPTHER

## 2019-01-01 RX ORDER — GABAPENTIN 100 MG/1
300 CAPSULE ORAL 3 TIMES DAILY
Qty: 90 CAPSULE | Refills: 1 | Status: SHIPPED | OUTPATIENT
Start: 2019-01-01

## 2019-01-01 RX ORDER — WARFARIN SODIUM 10 MG/1
10 TABLET ORAL
Status: DISCONTINUED | OUTPATIENT
Start: 2019-01-01 | End: 2019-01-01 | Stop reason: HOSPADM

## 2019-01-01 RX ORDER — WARFARIN SODIUM 5 MG/1
5 TABLET ORAL NIGHTLY
Qty: 10 TABLET | Refills: 0 | Status: SHIPPED | OUTPATIENT
Start: 2019-01-01

## 2019-01-01 RX ORDER — CYANOCOBALAMIN 1000 UG/ML
1000 INJECTION, SOLUTION INTRAMUSCULAR; SUBCUTANEOUS DAILY
Status: DISCONTINUED | OUTPATIENT
Start: 2019-01-01 | End: 2019-01-01 | Stop reason: HOSPADM

## 2019-01-01 RX ORDER — ONDANSETRON 4 MG/1
4 TABLET, FILM COATED ORAL EVERY 6 HOURS PRN
Status: CANCELLED | OUTPATIENT
Start: 2019-01-01

## 2019-01-01 RX ORDER — RIFAMPIN 300 MG/1
300 CAPSULE ORAL EVERY 12 HOURS SCHEDULED
Status: DISCONTINUED | OUTPATIENT
Start: 2019-01-01 | End: 2019-01-01 | Stop reason: HOSPADM

## 2019-01-01 RX ORDER — WARFARIN SODIUM 5 MG/1
5 TABLET ORAL
COMMUNITY
End: 2019-01-01 | Stop reason: SDUPTHER

## 2019-01-01 RX ORDER — ONDANSETRON 4 MG/1
4 TABLET, ORALLY DISINTEGRATING ORAL EVERY 6 HOURS PRN
Status: CANCELLED | OUTPATIENT
Start: 2019-01-01

## 2019-01-01 RX ORDER — CEFAZOLIN SODIUM 2 G/100ML
2 INJECTION, SOLUTION INTRAVENOUS ONCE
Status: COMPLETED | OUTPATIENT
Start: 2019-01-01 | End: 2019-01-01

## 2019-01-01 RX ORDER — PROMETHAZINE HYDROCHLORIDE 25 MG/1
25 SUPPOSITORY RECTAL ONCE AS NEEDED
Status: DISCONTINUED | OUTPATIENT
Start: 2019-01-01 | End: 2019-01-01 | Stop reason: HOSPADM

## 2019-01-01 RX ORDER — MAGNESIUM HYDROXIDE 1200 MG/15ML
LIQUID ORAL AS NEEDED
Status: DISCONTINUED | OUTPATIENT
Start: 2019-01-01 | End: 2019-01-01 | Stop reason: HOSPADM

## 2019-01-01 RX ORDER — DEXTROSE MONOHYDRATE 50 MG/ML
INJECTION, SOLUTION INTRAVENOUS CONTINUOUS PRN
Status: DISCONTINUED | OUTPATIENT
Start: 2019-01-01 | End: 2019-01-01 | Stop reason: SURG

## 2019-01-01 RX ORDER — WARFARIN SODIUM 5 MG/1
TABLET ORAL
Qty: 30 TABLET | Refills: 0 | Status: SHIPPED | OUTPATIENT
Start: 2019-01-01 | End: 2019-01-01 | Stop reason: HOSPADM

## 2019-01-01 RX ORDER — GABAPENTIN 300 MG/1
300 CAPSULE ORAL 3 TIMES DAILY
Status: DISCONTINUED | OUTPATIENT
Start: 2019-01-01 | End: 2019-01-01 | Stop reason: HOSPADM

## 2019-01-01 RX ORDER — PANTOPRAZOLE SODIUM 40 MG/1
40 TABLET, DELAYED RELEASE ORAL
Status: DISCONTINUED | OUTPATIENT
Start: 2019-01-01 | End: 2019-01-01 | Stop reason: HOSPADM

## 2019-01-01 RX ORDER — SODIUM CHLORIDE 9 MG/ML
INJECTION, SOLUTION INTRAVENOUS AS NEEDED
Status: DISCONTINUED | OUTPATIENT
Start: 2019-01-01 | End: 2019-01-01 | Stop reason: HOSPADM

## 2019-01-01 RX ORDER — SODIUM CHLORIDE 0.9 % (FLUSH) 0.9 %
1-10 SYRINGE (ML) INJECTION AS NEEDED
Status: CANCELLED | OUTPATIENT
Start: 2019-01-01

## 2019-01-01 RX ORDER — AMIODARONE HYDROCHLORIDE 200 MG/1
200 TABLET ORAL DAILY
Qty: 90 TABLET | Refills: 1 | Status: SHIPPED
Start: 2019-01-01

## 2019-01-01 RX ORDER — FUROSEMIDE 40 MG/1
40 TABLET ORAL DAILY
Start: 2019-01-01

## 2019-01-01 RX ORDER — BRIMONIDINE TARTRATE AND TIMOLOL MALEATE 2; 5 MG/ML; MG/ML
1 SOLUTION OPHTHALMIC DAILY
Status: ON HOLD | COMMUNITY
End: 2019-01-01

## 2019-01-01 RX ORDER — ONDANSETRON 2 MG/ML
INJECTION INTRAMUSCULAR; INTRAVENOUS AS NEEDED
Status: DISCONTINUED | OUTPATIENT
Start: 2019-01-01 | End: 2019-01-01 | Stop reason: SURG

## 2019-01-01 RX ORDER — LIDOCAINE HYDROCHLORIDE 10 MG/ML
10 INJECTION, SOLUTION INFILTRATION; PERINEURAL ONCE
Status: COMPLETED | OUTPATIENT
Start: 2019-01-01 | End: 2019-01-01

## 2019-01-01 RX ORDER — LIDOCAINE HYDROCHLORIDE 40 MG/ML
SOLUTION TOPICAL
Status: COMPLETED
Start: 2019-01-01 | End: 2019-01-01

## 2019-01-01 RX ADMIN — PANTOPRAZOLE SODIUM 40 MG: 40 TABLET, DELAYED RELEASE ORAL at 10:19

## 2019-01-01 RX ADMIN — TAMSULOSIN HYDROCHLORIDE 0.4 MG: 0.4 CAPSULE ORAL at 06:26

## 2019-01-01 RX ADMIN — SODIUM CHLORIDE, PRESERVATIVE FREE 3 ML: 5 INJECTION INTRAVENOUS at 20:03

## 2019-01-01 RX ADMIN — OXYCODONE HYDROCHLORIDE AND ACETAMINOPHEN 1 TABLET: 10; 325 TABLET ORAL at 09:16

## 2019-01-01 RX ADMIN — ATORVASTATIN CALCIUM 20 MG: 20 TABLET, FILM COATED ORAL at 08:45

## 2019-01-01 RX ADMIN — TAMSULOSIN HYDROCHLORIDE 0.4 MG: 0.4 CAPSULE ORAL at 07:51

## 2019-01-01 RX ADMIN — GABAPENTIN 300 MG: 300 CAPSULE ORAL at 09:43

## 2019-01-01 RX ADMIN — INSULIN LISPRO 2 UNITS: 100 INJECTION, SOLUTION INTRAVENOUS; SUBCUTANEOUS at 08:25

## 2019-01-01 RX ADMIN — OXYCODONE AND ACETAMINOPHEN 2 TABLET: 5; 325 TABLET ORAL at 03:38

## 2019-01-01 RX ADMIN — CYANOCOBALAMIN 1000 MCG: 1000 INJECTION, SOLUTION INTRAMUSCULAR; SUBCUTANEOUS at 11:34

## 2019-01-01 RX ADMIN — ONDANSETRON 4 MG: 2 INJECTION INTRAMUSCULAR; INTRAVENOUS at 18:39

## 2019-01-01 RX ADMIN — TIMOLOL MALEATE 1 DROP: 5 SOLUTION OPHTHALMIC at 09:41

## 2019-01-01 RX ADMIN — PANTOPRAZOLE SODIUM 40 MG: 40 TABLET, DELAYED RELEASE ORAL at 07:00

## 2019-01-01 RX ADMIN — SODIUM CHLORIDE, PRESERVATIVE FREE 10 ML: 5 INJECTION INTRAVENOUS at 20:58

## 2019-01-01 RX ADMIN — OXYCODONE HYDROCHLORIDE AND ACETAMINOPHEN 1 TABLET: 10; 325 TABLET ORAL at 21:48

## 2019-01-01 RX ADMIN — DORZOLAMIDE HYDROCHLORIDE 1 DROP: 20 SOLUTION/ DROPS OPHTHALMIC at 21:41

## 2019-01-01 RX ADMIN — OXYCODONE HYDROCHLORIDE AND ACETAMINOPHEN 1 TABLET: 10; 325 TABLET ORAL at 17:38

## 2019-01-01 RX ADMIN — VANCOMYCIN HYDROCHLORIDE 1000 MG: 1 INJECTION, SOLUTION INTRAVENOUS at 00:37

## 2019-01-01 RX ADMIN — INSULIN LISPRO 3 UNITS: 100 INJECTION, SOLUTION INTRAVENOUS; SUBCUTANEOUS at 19:09

## 2019-01-01 RX ADMIN — METOPROLOL TARTRATE 25 MG: 25 TABLET ORAL at 19:27

## 2019-01-01 RX ADMIN — SENNOSIDES AND DOCUSATE SODIUM 1 TABLET: 8.6; 5 TABLET ORAL at 21:44

## 2019-01-01 RX ADMIN — DONEPEZIL HYDROCHLORIDE 5 MG: 5 TABLET, FILM COATED ORAL at 06:26

## 2019-01-01 RX ADMIN — ATORVASTATIN CALCIUM 20 MG: 20 TABLET, FILM COATED ORAL at 07:55

## 2019-01-01 RX ADMIN — HYDROMORPHONE HYDROCHLORIDE 0.5 MG: 1 INJECTION, SOLUTION INTRAMUSCULAR; INTRAVENOUS; SUBCUTANEOUS at 14:45

## 2019-01-01 RX ADMIN — ENALAPRIL MALEATE 10 MG: 10 TABLET ORAL at 06:42

## 2019-01-01 RX ADMIN — SODIUM CHLORIDE, PRESERVATIVE FREE 3 ML: 5 INJECTION INTRAVENOUS at 09:40

## 2019-01-01 RX ADMIN — METOPROLOL TARTRATE 12.5 MG: 25 TABLET ORAL at 12:44

## 2019-01-01 RX ADMIN — HYDROMORPHONE HYDROCHLORIDE 0.5 MG: 1 INJECTION, SOLUTION INTRAMUSCULAR; INTRAVENOUS; SUBCUTANEOUS at 15:01

## 2019-01-01 RX ADMIN — DORZOLAMIDE HYDROCHLORIDE 1 DROP: 20 SOLUTION/ DROPS OPHTHALMIC at 09:42

## 2019-01-01 RX ADMIN — CEFEPIME HYDROCHLORIDE 2 G: 2 INJECTION, POWDER, FOR SOLUTION INTRAVENOUS at 03:38

## 2019-01-01 RX ADMIN — CEFAZOLIN SODIUM 2 G: 2 INJECTION, SOLUTION INTRAVENOUS at 17:30

## 2019-01-01 RX ADMIN — DORZOLAMIDE HYDROCHLORIDE 1 DROP: 20 SOLUTION/ DROPS OPHTHALMIC at 09:33

## 2019-01-01 RX ADMIN — TAMSULOSIN HYDROCHLORIDE 0.4 MG: 0.4 CAPSULE ORAL at 06:09

## 2019-01-01 RX ADMIN — TIMOLOL MALEATE 1 DROP: 5 SOLUTION OPHTHALMIC at 09:02

## 2019-01-01 RX ADMIN — POLYETHYLENE GLYCOL 3350 17 G: 17 POWDER, FOR SOLUTION ORAL at 09:03

## 2019-01-01 RX ADMIN — SODIUM CHLORIDE, PRESERVATIVE FREE 3 ML: 5 INJECTION INTRAVENOUS at 20:59

## 2019-01-01 RX ADMIN — LATANOPROST 1 DROP: 50 SOLUTION OPHTHALMIC at 20:59

## 2019-01-01 RX ADMIN — GLIPIZIDE 5 MG: 5 TABLET ORAL at 07:53

## 2019-01-01 RX ADMIN — DONEPEZIL HYDROCHLORIDE 10 MG: 10 TABLET, FILM COATED ORAL at 07:55

## 2019-01-01 RX ADMIN — AMIODARONE HYDROCHLORIDE 200 MG: 200 TABLET ORAL at 09:34

## 2019-01-01 RX ADMIN — CYCLOBENZAPRINE 10 MG: 10 TABLET, FILM COATED ORAL at 20:19

## 2019-01-01 RX ADMIN — BRIMONIDINE TARTRATE 1 DROP: 2 SOLUTION OPHTHALMIC at 09:04

## 2019-01-01 RX ADMIN — TIMOLOL MALEATE 1 DROP: 5 SOLUTION OPHTHALMIC at 10:20

## 2019-01-01 RX ADMIN — SENNOSIDES AND DOCUSATE SODIUM 1 TABLET: 8.6; 5 TABLET ORAL at 20:59

## 2019-01-01 RX ADMIN — OXYCODONE HYDROCHLORIDE AND ACETAMINOPHEN 2 TABLET: 10; 325 TABLET ORAL at 04:42

## 2019-01-01 RX ADMIN — SODIUM CHLORIDE, PRESERVATIVE FREE 3 ML: 5 INJECTION INTRAVENOUS at 20:23

## 2019-01-01 RX ADMIN — SODIUM CHLORIDE, PRESERVATIVE FREE 3 ML: 5 INJECTION INTRAVENOUS at 20:01

## 2019-01-01 RX ADMIN — OXYCODONE HYDROCHLORIDE AND ACETAMINOPHEN 1 TABLET: 10; 325 TABLET ORAL at 00:11

## 2019-01-01 RX ADMIN — INSULIN LISPRO 3 UNITS: 100 INJECTION, SOLUTION INTRAVENOUS; SUBCUTANEOUS at 12:20

## 2019-01-01 RX ADMIN — BRIMONIDINE TARTRATE 1 DROP: 2 SOLUTION OPHTHALMIC at 09:33

## 2019-01-01 RX ADMIN — DORZOLAMIDE HYDROCHLORIDE 1 DROP: 20 SOLUTION/ DROPS OPHTHALMIC at 09:09

## 2019-01-01 RX ADMIN — DORZOLAMIDE HYDROCHLORIDE 1 DROP: 20 SOLUTION/ DROPS OPHTHALMIC at 09:49

## 2019-01-01 RX ADMIN — GABAPENTIN 300 MG: 300 CAPSULE ORAL at 21:06

## 2019-01-01 RX ADMIN — ENOXAPARIN SODIUM 90 MG: 100 INJECTION SUBCUTANEOUS at 06:09

## 2019-01-01 RX ADMIN — DORZOLAMIDE HYDROCHLORIDE 1 DROP: 20 SOLUTION/ DROPS OPHTHALMIC at 09:01

## 2019-01-01 RX ADMIN — METOPROLOL TARTRATE 25 MG: 25 TABLET ORAL at 08:41

## 2019-01-01 RX ADMIN — FENTANYL CITRATE 50 MCG: 50 INJECTION, SOLUTION INTRAMUSCULAR; INTRAVENOUS at 07:27

## 2019-01-01 RX ADMIN — DONEPEZIL HYDROCHLORIDE 5 MG: 5 TABLET, FILM COATED ORAL at 07:47

## 2019-01-01 RX ADMIN — LORAZEPAM 0.5 MG: 2 INJECTION INTRAMUSCULAR; INTRAVENOUS at 21:48

## 2019-01-01 RX ADMIN — VANCOMYCIN HYDROCHLORIDE 1750 MG: 10 INJECTION, POWDER, LYOPHILIZED, FOR SOLUTION INTRAVENOUS at 12:40

## 2019-01-01 RX ADMIN — DORZOLAMIDE HCL 1 DROP: 20 SOLUTION/ DROPS OPHTHALMIC at 20:59

## 2019-01-01 RX ADMIN — ENALAPRIL MALEATE 10 MG: 10 TABLET ORAL at 20:02

## 2019-01-01 RX ADMIN — CEFEPIME HYDROCHLORIDE 2 G: 2 INJECTION, POWDER, FOR SOLUTION INTRAVENOUS at 20:54

## 2019-01-01 RX ADMIN — INSULIN LISPRO 3 UNITS: 100 INJECTION, SOLUTION INTRAVENOUS; SUBCUTANEOUS at 20:22

## 2019-01-01 RX ADMIN — GLIPIZIDE 5 MG: 5 TABLET ORAL at 08:57

## 2019-01-01 RX ADMIN — METOPROLOL TARTRATE 25 MG: 25 TABLET ORAL at 20:52

## 2019-01-01 RX ADMIN — METOPROLOL TARTRATE 25 MG: 25 TABLET ORAL at 09:02

## 2019-01-01 RX ADMIN — BRIMONIDINE TARTRATE 1 DROP: 2 SOLUTION OPHTHALMIC at 08:09

## 2019-01-01 RX ADMIN — TIMOLOL MALEATE 1 DROP: 5 SOLUTION OPHTHALMIC at 17:51

## 2019-01-01 RX ADMIN — AMIODARONE HYDROCHLORIDE 200 MG: 200 TABLET ORAL at 12:26

## 2019-01-01 RX ADMIN — PANTOPRAZOLE SODIUM 40 MG: 40 TABLET, DELAYED RELEASE ORAL at 09:49

## 2019-01-01 RX ADMIN — CYANOCOBALAMIN 1000 MCG: 1000 INJECTION, SOLUTION INTRAMUSCULAR; SUBCUTANEOUS at 15:19

## 2019-01-01 RX ADMIN — PIOGLITAZONE 45 MG: 30 TABLET ORAL at 07:53

## 2019-01-01 RX ADMIN — FERROUS SULFATE TAB 325 MG (65 MG ELEMENTAL FE) 325 MG: 325 (65 FE) TAB at 10:18

## 2019-01-01 RX ADMIN — INSULIN LISPRO 3 UNITS: 100 INJECTION, SOLUTION INTRAVENOUS; SUBCUTANEOUS at 21:06

## 2019-01-01 RX ADMIN — BRIMONIDINE TARTRATE 1 DROP: 2 SOLUTION OPHTHALMIC at 21:44

## 2019-01-01 RX ADMIN — ATORVASTATIN CALCIUM 10 MG: 10 TABLET, FILM COATED ORAL at 20:44

## 2019-01-01 RX ADMIN — BRIMONIDINE TARTRATE 1 DROP: 2 SOLUTION OPHTHALMIC at 17:52

## 2019-01-01 RX ADMIN — PANTOPRAZOLE SODIUM 40 MG: 40 TABLET, DELAYED RELEASE ORAL at 06:11

## 2019-01-01 RX ADMIN — VANCOMYCIN HYDROCHLORIDE 1000 MG: 1 INJECTION, SOLUTION INTRAVENOUS at 04:17

## 2019-01-01 RX ADMIN — TAMSULOSIN HYDROCHLORIDE 0.4 MG: 0.4 CAPSULE ORAL at 09:34

## 2019-01-01 RX ADMIN — SODIUM CHLORIDE, PRESERVATIVE FREE 3 ML: 5 INJECTION INTRAVENOUS at 21:45

## 2019-01-01 RX ADMIN — LATANOPROST 1 DROP: 50 SOLUTION OPHTHALMIC at 20:52

## 2019-01-01 RX ADMIN — TIMOLOL MALEATE 1 DROP: 5 SOLUTION/ DROPS OPHTHALMIC at 08:39

## 2019-01-01 RX ADMIN — SODIUM CHLORIDE 100 ML/HR: 9 INJECTION, SOLUTION INTRAVENOUS at 04:42

## 2019-01-01 RX ADMIN — OXYCODONE HYDROCHLORIDE AND ACETAMINOPHEN 1 TABLET: 10; 325 TABLET ORAL at 01:30

## 2019-01-01 RX ADMIN — INSULIN LISPRO 10 UNITS: 100 INJECTION, SOLUTION INTRAVENOUS; SUBCUTANEOUS at 12:10

## 2019-01-01 RX ADMIN — OXYCODONE AND ACETAMINOPHEN 2 TABLET: 5; 325 TABLET ORAL at 07:47

## 2019-01-01 RX ADMIN — DONEPEZIL HYDROCHLORIDE 10 MG: 10 TABLET, FILM COATED ORAL at 06:42

## 2019-01-01 RX ADMIN — PANTOPRAZOLE SODIUM 40 MG: 40 TABLET, DELAYED RELEASE ORAL at 06:42

## 2019-01-01 RX ADMIN — ENALAPRIL MALEATE 40 MG: 20 TABLET ORAL at 10:17

## 2019-01-01 RX ADMIN — DEXTROSE MONOHYDRATE 25 G: 70 INJECTION, SOLUTION INTRAVENOUS at 16:14

## 2019-01-01 RX ADMIN — SODIUM CHLORIDE, PRESERVATIVE FREE 3 ML: 5 INJECTION INTRAVENOUS at 21:08

## 2019-01-01 RX ADMIN — CYCLOBENZAPRINE 10 MG: 10 TABLET, FILM COATED ORAL at 10:15

## 2019-01-01 RX ADMIN — BRIMONIDINE TARTRATE 1 DROP: 2 SOLUTION OPHTHALMIC at 21:01

## 2019-01-01 RX ADMIN — FUROSEMIDE 40 MG: 40 TABLET ORAL at 09:33

## 2019-01-01 RX ADMIN — DORZOLAMIDE HYDROCHLORIDE 1 DROP: 20 SOLUTION/ DROPS OPHTHALMIC at 09:39

## 2019-01-01 RX ADMIN — SODIUM CHLORIDE 100 ML/HR: 9 INJECTION, SOLUTION INTRAVENOUS at 06:02

## 2019-01-01 RX ADMIN — TAMSULOSIN HYDROCHLORIDE 0.4 MG: 0.4 CAPSULE ORAL at 06:03

## 2019-01-01 RX ADMIN — OXYCODONE HYDROCHLORIDE AND ACETAMINOPHEN 2 TABLET: 10; 325 TABLET ORAL at 19:32

## 2019-01-01 RX ADMIN — DORZOLAMIDE HYDROCHLORIDE 1 DROP: 20 SOLUTION/ DROPS OPHTHALMIC at 21:49

## 2019-01-01 RX ADMIN — SODIUM CHLORIDE, POTASSIUM CHLORIDE, SODIUM LACTATE AND CALCIUM CHLORIDE 9 ML/HR: 600; 310; 30; 20 INJECTION, SOLUTION INTRAVENOUS at 06:52

## 2019-01-01 RX ADMIN — AMIODARONE HYDROCHLORIDE 200 MG: 200 TABLET ORAL at 09:49

## 2019-01-01 RX ADMIN — OXYCODONE AND ACETAMINOPHEN 2 TABLET: 5; 325 TABLET ORAL at 23:00

## 2019-01-01 RX ADMIN — BRIMONIDINE TARTRATE 1 DROP: 2 SOLUTION OPHTHALMIC at 20:54

## 2019-01-01 RX ADMIN — OXYCODONE AND ACETAMINOPHEN 2 TABLET: 5; 325 TABLET ORAL at 02:16

## 2019-01-01 RX ADMIN — PROPOFOL 120 MG: 10 INJECTION, EMULSION INTRAVENOUS at 12:34

## 2019-01-01 RX ADMIN — OXYCODONE HYDROCHLORIDE AND ACETAMINOPHEN 1 TABLET: 10; 325 TABLET ORAL at 08:36

## 2019-01-01 RX ADMIN — CEFEPIME HYDROCHLORIDE 2 G: 2 INJECTION, POWDER, FOR SOLUTION INTRAVENOUS at 05:13

## 2019-01-01 RX ADMIN — TIMOLOL MALEATE 1 DROP: 5 SOLUTION OPHTHALMIC at 09:00

## 2019-01-01 RX ADMIN — ENALAPRIL MALEATE 20 MG: 20 TABLET ORAL at 20:20

## 2019-01-01 RX ADMIN — POTASSIUM CHLORIDE 40 MEQ: 750 CAPSULE, EXTENDED RELEASE ORAL at 08:35

## 2019-01-01 RX ADMIN — LATANOPROST 1 DROP: 50 SOLUTION OPHTHALMIC at 23:01

## 2019-01-01 RX ADMIN — SUGAMMADEX 100 MG: 100 INJECTION, SOLUTION INTRAVENOUS at 13:22

## 2019-01-01 RX ADMIN — DORZOLAMIDE HCL 1 DROP: 20 SOLUTION/ DROPS OPHTHALMIC at 20:29

## 2019-01-01 RX ADMIN — FUROSEMIDE 40 MG: 10 INJECTION, SOLUTION INTRAMUSCULAR; INTRAVENOUS at 12:20

## 2019-01-01 RX ADMIN — INSULIN LISPRO 3 UNITS: 100 INJECTION, SOLUTION INTRAVENOUS; SUBCUTANEOUS at 12:28

## 2019-01-01 RX ADMIN — SODIUM CHLORIDE 100 ML/HR: 9 INJECTION, SOLUTION INTRAVENOUS at 06:08

## 2019-01-01 RX ADMIN — TIMOLOL MALEATE 1 DROP: 5 SOLUTION/ DROPS OPHTHALMIC at 09:05

## 2019-01-01 RX ADMIN — WARFARIN SODIUM 5 MG: 5 TABLET ORAL at 17:10

## 2019-01-01 RX ADMIN — CYCLOBENZAPRINE 10 MG: 10 TABLET, FILM COATED ORAL at 12:04

## 2019-01-01 RX ADMIN — APIXABAN 5 MG: 5 TABLET, FILM COATED ORAL at 08:35

## 2019-01-01 RX ADMIN — GLIPIZIDE 5 MG: 5 TABLET ORAL at 10:17

## 2019-01-01 RX ADMIN — CYANOCOBALAMIN 1000 MCG: 1000 INJECTION, SOLUTION INTRAMUSCULAR; SUBCUTANEOUS at 11:40

## 2019-01-01 RX ADMIN — CEFEPIME HYDROCHLORIDE 2 G: 2 INJECTION, POWDER, FOR SOLUTION INTRAVENOUS at 16:38

## 2019-01-01 RX ADMIN — AMIODARONE HYDROCHLORIDE 200 MG: 200 TABLET ORAL at 08:35

## 2019-01-01 RX ADMIN — SODIUM CHLORIDE 100 ML/HR: 9 INJECTION, SOLUTION INTRAVENOUS at 17:34

## 2019-01-01 RX ADMIN — SODIUM CHLORIDE, PRESERVATIVE FREE 3 ML: 5 INJECTION INTRAVENOUS at 20:49

## 2019-01-01 RX ADMIN — TIMOLOL MALEATE 1 DROP: 5 SOLUTION OPHTHALMIC at 08:36

## 2019-01-01 RX ADMIN — DONEPEZIL HYDROCHLORIDE 5 MG: 5 TABLET, FILM COATED ORAL at 12:16

## 2019-01-01 RX ADMIN — ENALAPRIL MALEATE 20 MG: 20 TABLET ORAL at 21:47

## 2019-01-01 RX ADMIN — ENALAPRIL MALEATE 10 MG: 10 TABLET ORAL at 21:06

## 2019-01-01 RX ADMIN — ENALAPRIL MALEATE 40 MG: 20 TABLET ORAL at 06:46

## 2019-01-01 RX ADMIN — POLYETHYLENE GLYCOL 3350 17 G: 17 POWDER, FOR SOLUTION ORAL at 20:29

## 2019-01-01 RX ADMIN — ROCURONIUM BROMIDE 10 MG: 10 INJECTION INTRAVENOUS at 09:46

## 2019-01-01 RX ADMIN — ATORVASTATIN CALCIUM 20 MG: 20 TABLET, FILM COATED ORAL at 09:02

## 2019-01-01 RX ADMIN — ROCURONIUM BROMIDE 20 MG: 10 INJECTION INTRAVENOUS at 09:19

## 2019-01-01 RX ADMIN — SUCCINYLCHOLINE CHLORIDE 100 MG: 20 INJECTION, SOLUTION INTRAMUSCULAR; INTRAVENOUS; PARENTERAL at 12:34

## 2019-01-01 RX ADMIN — BRIMONIDINE TARTRATE 1 DROP: 2 SOLUTION OPHTHALMIC at 08:36

## 2019-01-01 RX ADMIN — TIMOLOL MALEATE 1 DROP: 5 SOLUTION OPHTHALMIC at 08:56

## 2019-01-01 RX ADMIN — HYDROMORPHONE HYDROCHLORIDE: 10 INJECTION, SOLUTION INTRAMUSCULAR; INTRAVENOUS; SUBCUTANEOUS at 13:57

## 2019-01-01 RX ADMIN — ENALAPRIL MALEATE 20 MG: 20 TABLET ORAL at 08:34

## 2019-01-01 RX ADMIN — LATANOPROST 1 DROP: 50 SOLUTION OPHTHALMIC at 21:41

## 2019-01-01 RX ADMIN — SODIUM CHLORIDE, PRESERVATIVE FREE 3 ML: 5 INJECTION INTRAVENOUS at 07:54

## 2019-01-01 RX ADMIN — VANCOMYCIN HYDROCHLORIDE 1250 MG: 10 INJECTION, POWDER, LYOPHILIZED, FOR SOLUTION INTRAVENOUS at 00:19

## 2019-01-01 RX ADMIN — SODIUM CHLORIDE, PRESERVATIVE FREE 3 ML: 5 INJECTION INTRAVENOUS at 08:25

## 2019-01-01 RX ADMIN — DORZOLAMIDE HYDROCHLORIDE 1 DROP: 20 SOLUTION/ DROPS OPHTHALMIC at 10:20

## 2019-01-01 RX ADMIN — DORZOLAMIDE HYDROCHLORIDE 1 DROP: 20 SOLUTION/ DROPS OPHTHALMIC at 20:54

## 2019-01-01 RX ADMIN — PIOGLITAZONE 45 MG: 30 TABLET ORAL at 08:43

## 2019-01-01 RX ADMIN — APIXABAN 5 MG: 5 TABLET, FILM COATED ORAL at 09:34

## 2019-01-01 RX ADMIN — PIOGLITAZONE 45 MG: 30 TABLET ORAL at 10:17

## 2019-01-01 RX ADMIN — BRIMONIDINE TARTRATE 1 DROP: 2 SOLUTION OPHTHALMIC at 09:49

## 2019-01-01 RX ADMIN — POTASSIUM CHLORIDE 40 MEQ: 750 CAPSULE, EXTENDED RELEASE ORAL at 09:04

## 2019-01-01 RX ADMIN — SENNOSIDES AND DOCUSATE SODIUM 1 TABLET: 8.6; 5 TABLET ORAL at 08:41

## 2019-01-01 RX ADMIN — OXYCODONE AND ACETAMINOPHEN 2 TABLET: 5; 325 TABLET ORAL at 13:53

## 2019-01-01 RX ADMIN — VANCOMYCIN HYDROCHLORIDE 1250 MG: 10 INJECTION, POWDER, LYOPHILIZED, FOR SOLUTION INTRAVENOUS at 16:32

## 2019-01-01 RX ADMIN — POLYETHYLENE GLYCOL 3350 17 G: 17 POWDER, FOR SOLUTION ORAL at 08:41

## 2019-01-01 RX ADMIN — POLYETHYLENE GLYCOL 3350 17 G: 17 POWDER, FOR SOLUTION ORAL at 20:52

## 2019-01-01 RX ADMIN — GABAPENTIN 300 MG: 300 CAPSULE ORAL at 16:36

## 2019-01-01 RX ADMIN — ROCURONIUM BROMIDE 10 MG: 10 INJECTION INTRAVENOUS at 12:34

## 2019-01-01 RX ADMIN — INSULIN LISPRO 3 UNITS: 100 INJECTION, SOLUTION INTRAVENOUS; SUBCUTANEOUS at 22:36

## 2019-01-01 RX ADMIN — ROCURONIUM BROMIDE 10 MG: 10 INJECTION INTRAVENOUS at 08:02

## 2019-01-01 RX ADMIN — INSULIN LISPRO 3 UNITS: 100 INJECTION, SOLUTION INTRAVENOUS; SUBCUTANEOUS at 17:34

## 2019-01-01 RX ADMIN — BRIMONIDINE TARTRATE 1 DROP: 2 SOLUTION OPHTHALMIC at 21:41

## 2019-01-01 RX ADMIN — PIOGLITAZONE 45 MG: 30 TABLET ORAL at 09:03

## 2019-01-01 RX ADMIN — METOPROLOL TARTRATE 25 MG: 25 TABLET ORAL at 00:06

## 2019-01-01 RX ADMIN — METOPROLOL TARTRATE 12.5 MG: 25 TABLET ORAL at 21:02

## 2019-01-01 RX ADMIN — BRIMONIDINE TARTRATE 1 DROP: 2 SOLUTION OPHTHALMIC at 10:20

## 2019-01-01 RX ADMIN — LIDOCAINE HYDROCHLORIDE 100 MG: 20 INJECTION, SOLUTION INFILTRATION; PERINEURAL at 07:45

## 2019-01-01 RX ADMIN — HYDROMORPHONE HYDROCHLORIDE 0.25 MG: 2 INJECTION INTRAMUSCULAR; INTRAVENOUS; SUBCUTANEOUS at 09:22

## 2019-01-01 RX ADMIN — DONEPEZIL HYDROCHLORIDE 10 MG: 10 TABLET, FILM COATED ORAL at 06:46

## 2019-01-01 RX ADMIN — ATORVASTATIN CALCIUM 10 MG: 10 TABLET, FILM COATED ORAL at 20:02

## 2019-01-01 RX ADMIN — LATANOPROST 1 DROP: 50 SOLUTION OPHTHALMIC at 20:29

## 2019-01-01 RX ADMIN — PROCHLORPERAZINE MALEATE 10 MG: 5 TABLET, FILM COATED ORAL at 14:12

## 2019-01-01 RX ADMIN — SODIUM CHLORIDE 250 ML: 900 INJECTION, SOLUTION INTRAVENOUS at 14:12

## 2019-01-01 RX ADMIN — INSULIN LISPRO 3 UNITS: 100 INJECTION, SOLUTION INTRAVENOUS; SUBCUTANEOUS at 19:22

## 2019-01-01 RX ADMIN — AMIODARONE HYDROCHLORIDE 200 MG: 200 TABLET ORAL at 08:36

## 2019-01-01 RX ADMIN — WARFARIN SODIUM 10 MG: 10 TABLET ORAL at 17:03

## 2019-01-01 RX ADMIN — EPHEDRINE SULFATE 10 MG: 50 INJECTION INTRAMUSCULAR; INTRAVENOUS; SUBCUTANEOUS at 11:10

## 2019-01-01 RX ADMIN — SENNOSIDES AND DOCUSATE SODIUM 1 TABLET: 8.6; 5 TABLET ORAL at 10:19

## 2019-01-01 RX ADMIN — GABAPENTIN 300 MG: 300 CAPSULE ORAL at 08:56

## 2019-01-01 RX ADMIN — LIDOCAINE HYDROCHLORIDE 1 EACH: 40 SOLUTION TOPICAL at 12:34

## 2019-01-01 RX ADMIN — SODIUM CHLORIDE, POTASSIUM CHLORIDE, SODIUM LACTATE AND CALCIUM CHLORIDE: 600; 310; 30; 20 INJECTION, SOLUTION INTRAVENOUS at 13:09

## 2019-01-01 RX ADMIN — POTASSIUM CHLORIDE 20 MEQ: 750 CAPSULE, EXTENDED RELEASE ORAL at 09:09

## 2019-01-01 RX ADMIN — PROPOFOL 150 MG: 10 INJECTION, EMULSION INTRAVENOUS at 07:45

## 2019-01-01 RX ADMIN — FERRIC CARBOXYMALTOSE INJECTION 750 MG: 50 INJECTION, SOLUTION INTRAVENOUS at 14:20

## 2019-01-01 RX ADMIN — FERROUS SULFATE TAB 325 MG (65 MG ELEMENTAL FE) 325 MG: 325 (65 FE) TAB at 05:46

## 2019-01-01 RX ADMIN — ENALAPRIL MALEATE 40 MG: 20 TABLET ORAL at 06:49

## 2019-01-01 RX ADMIN — PANTOPRAZOLE SODIUM 40 MG: 40 TABLET, DELAYED RELEASE ORAL at 06:02

## 2019-01-01 RX ADMIN — OXYCODONE HYDROCHLORIDE AND ACETAMINOPHEN 2 TABLET: 10; 325 TABLET ORAL at 21:02

## 2019-01-01 RX ADMIN — OXYCODONE HYDROCHLORIDE AND ACETAMINOPHEN 1 TABLET: 10; 325 TABLET ORAL at 00:20

## 2019-01-01 RX ADMIN — ENALAPRIL MALEATE 20 MG: 20 TABLET ORAL at 09:49

## 2019-01-01 RX ADMIN — GLIPIZIDE 5 MG: 5 TABLET ORAL at 08:30

## 2019-01-01 RX ADMIN — OXYCODONE HYDROCHLORIDE AND ACETAMINOPHEN 2 TABLET: 10; 325 TABLET ORAL at 20:52

## 2019-01-01 RX ADMIN — INSULIN LISPRO 3 UNITS: 100 INJECTION, SOLUTION INTRAVENOUS; SUBCUTANEOUS at 12:40

## 2019-01-01 RX ADMIN — CEFEPIME HYDROCHLORIDE 2 G: 2 INJECTION, POWDER, FOR SOLUTION INTRAVENOUS at 04:42

## 2019-01-01 RX ADMIN — ENALAPRIL MALEATE 40 MG: 20 TABLET ORAL at 06:03

## 2019-01-01 RX ADMIN — APIXABAN 5 MG: 5 TABLET, FILM COATED ORAL at 12:26

## 2019-01-01 RX ADMIN — PROCHLORPERAZINE MALEATE 10 MG: 5 TABLET, FILM COATED ORAL at 14:38

## 2019-01-01 RX ADMIN — BRIMONIDINE TARTRATE 1 DROP: 2 SOLUTION OPHTHALMIC at 09:09

## 2019-01-01 RX ADMIN — ENALAPRIL MALEATE 10 MG: 10 TABLET ORAL at 08:35

## 2019-01-01 RX ADMIN — ATORVASTATIN CALCIUM 20 MG: 20 TABLET, FILM COATED ORAL at 10:15

## 2019-01-01 RX ADMIN — TAMSULOSIN HYDROCHLORIDE 0.4 MG: 0.4 CAPSULE ORAL at 06:02

## 2019-01-01 RX ADMIN — RIFAMPIN 300 MG: 300 CAPSULE ORAL at 09:01

## 2019-01-01 RX ADMIN — SODIUM CHLORIDE, PRESERVATIVE FREE 3 ML: 5 INJECTION INTRAVENOUS at 08:58

## 2019-01-01 RX ADMIN — ROCURONIUM BROMIDE 40 MG: 10 INJECTION INTRAVENOUS at 07:45

## 2019-01-01 RX ADMIN — ATORVASTATIN CALCIUM 10 MG: 10 TABLET, FILM COATED ORAL at 21:06

## 2019-01-01 RX ADMIN — VANCOMYCIN HYDROCHLORIDE 1000 MG: 1 INJECTION, SOLUTION INTRAVENOUS at 17:39

## 2019-01-01 RX ADMIN — TAMSULOSIN HYDROCHLORIDE 0.4 MG: 0.4 CAPSULE ORAL at 06:42

## 2019-01-01 RX ADMIN — SODIUM CHLORIDE, PRESERVATIVE FREE 3 ML: 5 INJECTION INTRAVENOUS at 08:30

## 2019-01-01 RX ADMIN — OXYCODONE HYDROCHLORIDE AND ACETAMINOPHEN 1 TABLET: 10; 325 TABLET ORAL at 09:01

## 2019-01-01 RX ADMIN — LATANOPROST 1 DROP: 50 SOLUTION OPHTHALMIC at 21:07

## 2019-01-01 RX ADMIN — DORZOLAMIDE HCL 1 DROP: 20 SOLUTION/ DROPS OPHTHALMIC at 09:05

## 2019-01-01 RX ADMIN — GABAPENTIN 300 MG: 300 CAPSULE ORAL at 09:39

## 2019-01-01 RX ADMIN — GABAPENTIN 300 MG: 300 CAPSULE ORAL at 20:00

## 2019-01-01 RX ADMIN — POTASSIUM CHLORIDE 20 MEQ: 750 CAPSULE, EXTENDED RELEASE ORAL at 09:01

## 2019-01-01 RX ADMIN — BRIMONIDINE TARTRATE 1 DROP: 2 SOLUTION OPHTHALMIC at 20:52

## 2019-01-01 RX ADMIN — FERRIC CARBOXYMALTOSE INJECTION 750 MG: 50 INJECTION, SOLUTION INTRAVENOUS at 14:28

## 2019-01-01 RX ADMIN — SODIUM CHLORIDE, PRESERVATIVE FREE 3 ML: 5 INJECTION INTRAVENOUS at 09:48

## 2019-01-01 RX ADMIN — OXYCODONE AND ACETAMINOPHEN 2 TABLET: 5; 325 TABLET ORAL at 15:07

## 2019-01-01 RX ADMIN — IOPAMIDOL 20 ML: 408 INJECTION, SOLUTION INTRATHECAL at 07:53

## 2019-01-01 RX ADMIN — RIFAMPIN 300 MG: 300 CAPSULE ORAL at 14:47

## 2019-01-01 RX ADMIN — POLYETHYLENE GLYCOL 3350 17 G: 17 POWDER, FOR SOLUTION ORAL at 10:19

## 2019-01-01 RX ADMIN — OXYCODONE AND ACETAMINOPHEN 2 TABLET: 5; 325 TABLET ORAL at 21:46

## 2019-01-01 RX ADMIN — BRIMONIDINE TARTRATE 1 DROP: 2 SOLUTION OPHTHALMIC at 08:41

## 2019-01-01 RX ADMIN — DORZOLAMIDE HYDROCHLORIDE 1 DROP: 20 SOLUTION/ DROPS OPHTHALMIC at 20:49

## 2019-01-01 RX ADMIN — INSULIN LISPRO 3 UNITS: 100 INJECTION, SOLUTION INTRAVENOUS; SUBCUTANEOUS at 22:10

## 2019-01-01 RX ADMIN — METOPROLOL TARTRATE 25 MG: 25 TABLET ORAL at 04:51

## 2019-01-01 RX ADMIN — DORZOLAMIDE HCL 1 DROP: 20 SOLUTION/ DROPS OPHTHALMIC at 20:52

## 2019-01-01 RX ADMIN — TAZOBACTAM SODIUM AND PIPERACILLIN SODIUM 3.38 G: 375; 3 INJECTION, SOLUTION INTRAVENOUS at 16:18

## 2019-01-01 RX ADMIN — DEXTROSE MONOHYDRATE 25 G: 70 INJECTION, SOLUTION INTRAVENOUS at 11:52

## 2019-01-01 RX ADMIN — ATORVASTATIN CALCIUM 20 MG: 20 TABLET, FILM COATED ORAL at 10:18

## 2019-01-01 RX ADMIN — TIMOLOL MALEATE 1 DROP: 5 SOLUTION OPHTHALMIC at 09:39

## 2019-01-01 RX ADMIN — OXYCODONE AND ACETAMINOPHEN 2 TABLET: 5; 325 TABLET ORAL at 19:22

## 2019-01-01 RX ADMIN — PANTOPRAZOLE SODIUM 40 MG: 40 TABLET, DELAYED RELEASE ORAL at 06:09

## 2019-01-01 RX ADMIN — DORZOLAMIDE HYDROCHLORIDE 1 DROP: 20 SOLUTION/ DROPS OPHTHALMIC at 21:32

## 2019-01-01 RX ADMIN — FENTANYL CITRATE 50 MCG: 50 INJECTION, SOLUTION INTRAMUSCULAR; INTRAVENOUS at 09:19

## 2019-01-01 RX ADMIN — DONEPEZIL HYDROCHLORIDE 5 MG: 5 TABLET, FILM COATED ORAL at 06:03

## 2019-01-01 RX ADMIN — OXYCODONE HYDROCHLORIDE AND ACETAMINOPHEN 1 TABLET: 10; 325 TABLET ORAL at 09:47

## 2019-01-01 RX ADMIN — SENNOSIDES AND DOCUSATE SODIUM 1 TABLET: 8.6; 5 TABLET ORAL at 09:03

## 2019-01-01 RX ADMIN — POTASSIUM CHLORIDE 20 MEQ: 750 CAPSULE, EXTENDED RELEASE ORAL at 08:37

## 2019-01-01 RX ADMIN — DORZOLAMIDE HCL 1 DROP: 20 SOLUTION/ DROPS OPHTHALMIC at 08:39

## 2019-01-01 RX ADMIN — APIXABAN 5 MG: 5 TABLET, FILM COATED ORAL at 21:48

## 2019-01-01 RX ADMIN — FAMOTIDINE 20 MG: 10 INJECTION, SOLUTION INTRAVENOUS at 06:53

## 2019-01-01 RX ADMIN — DORZOLAMIDE HYDROCHLORIDE 1 DROP: 20 SOLUTION/ DROPS OPHTHALMIC at 20:01

## 2019-01-01 RX ADMIN — VANCOMYCIN HYDROCHLORIDE 1000 MG: 1 INJECTION, SOLUTION INTRAVENOUS at 09:39

## 2019-01-01 RX ADMIN — ENOXAPARIN SODIUM 90 MG: 100 INJECTION SUBCUTANEOUS at 16:36

## 2019-01-01 RX ADMIN — SODIUM CHLORIDE 100 ML/HR: 9 INJECTION, SOLUTION INTRAVENOUS at 00:35

## 2019-01-01 RX ADMIN — LATANOPROST 1 DROP: 50 SOLUTION OPHTHALMIC at 21:44

## 2019-01-01 RX ADMIN — TAMSULOSIN HYDROCHLORIDE 0.4 MG: 0.4 CAPSULE ORAL at 08:43

## 2019-01-01 RX ADMIN — HYDROMORPHONE HYDROCHLORIDE 0.5 MG: 1 INJECTION, SOLUTION INTRAMUSCULAR; INTRAVENOUS; SUBCUTANEOUS at 03:16

## 2019-01-01 RX ADMIN — DEXTROSE MONOHYDRATE 250 ML: 50 INJECTION, SOLUTION INTRAVENOUS at 12:11

## 2019-01-01 RX ADMIN — ENALAPRIL MALEATE 20 MG: 20 TABLET ORAL at 20:58

## 2019-01-01 RX ADMIN — SODIUM CHLORIDE, POTASSIUM CHLORIDE, SODIUM LACTATE AND CALCIUM CHLORIDE 500 ML: 600; 310; 30; 20 INJECTION, SOLUTION INTRAVENOUS at 02:05

## 2019-01-01 RX ADMIN — GLIPIZIDE 5 MG: 5 TABLET ORAL at 08:43

## 2019-01-01 RX ADMIN — OXYCODONE AND ACETAMINOPHEN 2 TABLET: 5; 325 TABLET ORAL at 11:46

## 2019-01-01 RX ADMIN — SODIUM CHLORIDE 100 ML/HR: 4.5 INJECTION, SOLUTION INTRAVENOUS at 16:35

## 2019-01-01 RX ADMIN — VANCOMYCIN HYDROCHLORIDE 1500 MG: 10 INJECTION, POWDER, LYOPHILIZED, FOR SOLUTION INTRAVENOUS at 20:21

## 2019-01-01 RX ADMIN — POLYETHYLENE GLYCOL 3350 17 G: 17 POWDER, FOR SOLUTION ORAL at 07:52

## 2019-01-01 RX ADMIN — AMIODARONE HYDROCHLORIDE 200 MG: 200 TABLET ORAL at 09:43

## 2019-01-01 RX ADMIN — METOPROLOL TARTRATE 25 MG: 25 TABLET ORAL at 20:26

## 2019-01-01 RX ADMIN — VANCOMYCIN HYDROCHLORIDE 1000 MG: 1 INJECTION, SOLUTION INTRAVENOUS at 04:12

## 2019-01-01 RX ADMIN — FUROSEMIDE 40 MG: 40 TABLET ORAL at 09:43

## 2019-01-01 RX ADMIN — SODIUM CHLORIDE 100 ML/HR: 9 INJECTION, SOLUTION INTRAVENOUS at 04:46

## 2019-01-01 RX ADMIN — TAMSULOSIN HYDROCHLORIDE 0.4 MG: 0.4 CAPSULE ORAL at 12:16

## 2019-01-01 RX ADMIN — DORZOLAMIDE HCL 1 DROP: 20 SOLUTION/ DROPS OPHTHALMIC at 21:44

## 2019-01-01 RX ADMIN — FERROUS SULFATE TAB 325 MG (65 MG ELEMENTAL FE) 325 MG: 325 (65 FE) TAB at 06:42

## 2019-01-01 RX ADMIN — SODIUM CHLORIDE 100 ML/HR: 9 INJECTION, SOLUTION INTRAVENOUS at 17:56

## 2019-01-01 RX ADMIN — VANCOMYCIN HYDROCHLORIDE 1750 MG: 10 INJECTION, POWDER, LYOPHILIZED, FOR SOLUTION INTRAVENOUS at 05:21

## 2019-01-01 RX ADMIN — POLYETHYLENE GLYCOL 3350 17 G: 17 POWDER, FOR SOLUTION ORAL at 16:36

## 2019-01-01 RX ADMIN — DEXAMETHASONE SODIUM PHOSPHATE 6 MG: 10 INJECTION INTRAMUSCULAR; INTRAVENOUS at 12:45

## 2019-01-01 RX ADMIN — DONEPEZIL HYDROCHLORIDE 5 MG: 5 TABLET, FILM COATED ORAL at 06:09

## 2019-01-01 RX ADMIN — INSULIN LISPRO 5 UNITS: 100 INJECTION, SOLUTION INTRAVENOUS; SUBCUTANEOUS at 22:41

## 2019-01-01 RX ADMIN — INSULIN LISPRO 2 UNITS: 100 INJECTION, SOLUTION INTRAVENOUS; SUBCUTANEOUS at 13:27

## 2019-01-01 RX ADMIN — OXYCODONE HYDROCHLORIDE AND ACETAMINOPHEN 2 TABLET: 10; 325 TABLET ORAL at 12:06

## 2019-01-01 RX ADMIN — OXYCODONE HYDROCHLORIDE AND ACETAMINOPHEN 2 TABLET: 10; 325 TABLET ORAL at 12:04

## 2019-01-01 RX ADMIN — APIXABAN 5 MG: 5 TABLET, FILM COATED ORAL at 20:58

## 2019-01-01 RX ADMIN — CEFEPIME HYDROCHLORIDE 2 G: 2 INJECTION, POWDER, FOR SOLUTION INTRAVENOUS at 19:22

## 2019-01-01 RX ADMIN — ENOXAPARIN SODIUM 90 MG: 100 INJECTION SUBCUTANEOUS at 17:30

## 2019-01-01 RX ADMIN — DONEPEZIL HYDROCHLORIDE 5 MG: 5 TABLET, FILM COATED ORAL at 08:34

## 2019-01-01 RX ADMIN — SODIUM CHLORIDE, PRESERVATIVE FREE 3 ML: 5 INJECTION INTRAVENOUS at 08:37

## 2019-01-01 RX ADMIN — SENNOSIDES AND DOCUSATE SODIUM 1 TABLET: 8.6; 5 TABLET ORAL at 20:19

## 2019-01-01 RX ADMIN — VANCOMYCIN HYDROCHLORIDE 1000 MG: 1 INJECTION, SOLUTION INTRAVENOUS at 16:35

## 2019-01-01 RX ADMIN — POTASSIUM CHLORIDE 20 MEQ: 750 CAPSULE, EXTENDED RELEASE ORAL at 09:43

## 2019-01-01 RX ADMIN — ENALAPRIL MALEATE 20 MG: 20 TABLET ORAL at 06:26

## 2019-01-01 RX ADMIN — DORZOLAMIDE HCL 1 DROP: 20 SOLUTION/ DROPS OPHTHALMIC at 08:41

## 2019-01-01 RX ADMIN — PIOGLITAZONE 45 MG: 30 TABLET ORAL at 06:42

## 2019-01-01 RX ADMIN — ENOXAPARIN SODIUM 90 MG: 100 INJECTION SUBCUTANEOUS at 06:25

## 2019-01-01 RX ADMIN — ENALAPRIL MALEATE 20 MG: 20 TABLET ORAL at 21:42

## 2019-01-01 RX ADMIN — PANTOPRAZOLE SODIUM 40 MG: 40 TABLET, DELAYED RELEASE ORAL at 06:03

## 2019-01-01 RX ADMIN — SODIUM CHLORIDE 100 ML/HR: 9 INJECTION, SOLUTION INTRAVENOUS at 04:14

## 2019-01-01 RX ADMIN — FUROSEMIDE 40 MG: 40 TABLET ORAL at 08:56

## 2019-01-01 RX ADMIN — LATANOPROST 1 DROP: 50 SOLUTION OPHTHALMIC at 21:01

## 2019-01-01 RX ADMIN — LATANOPROST 1 DROP: 50 SOLUTION OPHTHALMIC at 20:45

## 2019-01-01 RX ADMIN — AMIODARONE HYDROCHLORIDE 200 MG: 200 TABLET ORAL at 09:38

## 2019-01-01 RX ADMIN — PANTOPRAZOLE SODIUM 40 MG: 40 TABLET, DELAYED RELEASE ORAL at 06:55

## 2019-01-01 RX ADMIN — POTASSIUM CHLORIDE, DEXTROSE MONOHYDRATE AND SODIUM CHLORIDE 75 ML/HR: 150; 5; 450 INJECTION, SOLUTION INTRAVENOUS at 08:02

## 2019-01-01 RX ADMIN — POTASSIUM CHLORIDE 40 MEQ: 750 CAPSULE, EXTENDED RELEASE ORAL at 09:49

## 2019-01-01 RX ADMIN — VANCOMYCIN HYDROCHLORIDE 1000 MG: 1 INJECTION, SOLUTION INTRAVENOUS at 08:45

## 2019-01-01 RX ADMIN — GABAPENTIN 300 MG: 300 CAPSULE ORAL at 09:01

## 2019-01-01 RX ADMIN — INSULIN LISPRO 2 UNITS: 100 INJECTION, SOLUTION INTRAVENOUS; SUBCUTANEOUS at 14:13

## 2019-01-01 RX ADMIN — OXYCODONE HYDROCHLORIDE AND ACETAMINOPHEN 2 TABLET: 10; 325 TABLET ORAL at 04:24

## 2019-01-01 RX ADMIN — SODIUM CHLORIDE, PRESERVATIVE FREE 3 ML: 5 INJECTION INTRAVENOUS at 20:00

## 2019-01-01 RX ADMIN — SODIUM CHLORIDE, PRESERVATIVE FREE 3 ML: 5 INJECTION INTRAVENOUS at 20:45

## 2019-01-01 RX ADMIN — RIFAMPIN 300 MG: 300 CAPSULE ORAL at 09:09

## 2019-01-01 RX ADMIN — PANTOPRAZOLE SODIUM 40 MG: 40 TABLET, DELAYED RELEASE ORAL at 09:39

## 2019-01-01 RX ADMIN — SODIUM CHLORIDE, PRESERVATIVE FREE 3 ML: 5 INJECTION INTRAVENOUS at 20:28

## 2019-01-01 RX ADMIN — TAZOBACTAM SODIUM AND PIPERACILLIN SODIUM 3.38 G: 375; 3 INJECTION, SOLUTION INTRAVENOUS at 08:56

## 2019-01-01 RX ADMIN — DONEPEZIL HYDROCHLORIDE 5 MG: 5 TABLET, FILM COATED ORAL at 08:42

## 2019-01-01 RX ADMIN — GABAPENTIN 300 MG: 300 CAPSULE ORAL at 20:49

## 2019-01-01 RX ADMIN — SODIUM CHLORIDE, POTASSIUM CHLORIDE, SODIUM LACTATE AND CALCIUM CHLORIDE: 600; 310; 30; 20 INJECTION, SOLUTION INTRAVENOUS at 11:59

## 2019-01-01 RX ADMIN — ENOXAPARIN SODIUM 90 MG: 100 INJECTION SUBCUTANEOUS at 17:38

## 2019-01-01 RX ADMIN — LATANOPROST 1 DROP: 50 SOLUTION OPHTHALMIC at 21:49

## 2019-01-01 RX ADMIN — RIFAMPIN 300 MG: 300 CAPSULE ORAL at 08:35

## 2019-01-01 RX ADMIN — OXYCODONE HYDROCHLORIDE AND ACETAMINOPHEN 2 TABLET: 10; 325 TABLET ORAL at 12:45

## 2019-01-01 RX ADMIN — FENTANYL CITRATE 50 MCG: 50 INJECTION, SOLUTION INTRAMUSCULAR; INTRAVENOUS at 14:39

## 2019-01-01 RX ADMIN — APIXABAN 5 MG: 5 TABLET, FILM COATED ORAL at 21:46

## 2019-01-01 RX ADMIN — FERROUS SULFATE TAB 325 MG (65 MG ELEMENTAL FE) 325 MG: 325 (65 FE) TAB at 07:00

## 2019-01-01 RX ADMIN — TIMOLOL MALEATE 1 DROP: 5 SOLUTION OPHTHALMIC at 09:09

## 2019-01-01 RX ADMIN — OXYCODONE HYDROCHLORIDE AND ACETAMINOPHEN 2 TABLET: 10; 325 TABLET ORAL at 10:18

## 2019-01-01 RX ADMIN — BRIMONIDINE TARTRATE 1 DROP: 2 SOLUTION OPHTHALMIC at 08:38

## 2019-01-01 RX ADMIN — LIDOCAINE HYDROCHLORIDE 8 ML: 10 INJECTION, SOLUTION INFILTRATION; PERINEURAL at 07:51

## 2019-01-01 RX ADMIN — FUROSEMIDE 40 MG: 40 TABLET ORAL at 09:38

## 2019-01-01 RX ADMIN — GABAPENTIN 300 MG: 300 CAPSULE ORAL at 16:19

## 2019-01-01 RX ADMIN — TAMSULOSIN HYDROCHLORIDE 0.4 MG: 0.4 CAPSULE ORAL at 06:49

## 2019-01-01 RX ADMIN — SODIUM CHLORIDE, POTASSIUM CHLORIDE, SODIUM LACTATE AND CALCIUM CHLORIDE: 600; 310; 30; 20 INJECTION, SOLUTION INTRAVENOUS at 09:55

## 2019-01-01 RX ADMIN — VANCOMYCIN HYDROCHLORIDE 1500 MG: 10 INJECTION, POWDER, LYOPHILIZED, FOR SOLUTION INTRAVENOUS at 09:40

## 2019-01-01 RX ADMIN — GABAPENTIN 300 MG: 300 CAPSULE ORAL at 20:45

## 2019-01-01 RX ADMIN — FUROSEMIDE 40 MG: 40 TABLET ORAL at 08:43

## 2019-01-01 RX ADMIN — FENTANYL CITRATE 100 MCG: 50 INJECTION, SOLUTION INTRAMUSCULAR; INTRAVENOUS at 07:45

## 2019-01-01 RX ADMIN — OXYCODONE AND ACETAMINOPHEN 2 TABLET: 5; 325 TABLET ORAL at 20:20

## 2019-01-01 RX ADMIN — AMIODARONE HYDROCHLORIDE 200 MG: 200 TABLET ORAL at 09:09

## 2019-01-01 RX ADMIN — OXYCODONE HYDROCHLORIDE AND ACETAMINOPHEN 2 TABLET: 10; 325 TABLET ORAL at 16:28

## 2019-01-01 RX ADMIN — SENNOSIDES AND DOCUSATE SODIUM 1 TABLET: 8.6; 5 TABLET ORAL at 08:30

## 2019-01-01 RX ADMIN — SODIUM CHLORIDE, PRESERVATIVE FREE 3 ML: 5 INJECTION INTRAVENOUS at 09:02

## 2019-01-01 RX ADMIN — OXYCODONE HYDROCHLORIDE AND ACETAMINOPHEN 1 TABLET: 10; 325 TABLET ORAL at 20:45

## 2019-01-01 RX ADMIN — TAMSULOSIN HYDROCHLORIDE 0.4 MG: 0.4 CAPSULE ORAL at 06:46

## 2019-01-01 RX ADMIN — INSULIN LISPRO 2 UNITS: 100 INJECTION, SOLUTION INTRAVENOUS; SUBCUTANEOUS at 21:44

## 2019-01-01 RX ADMIN — ENALAPRIL MALEATE 10 MG: 10 TABLET ORAL at 09:38

## 2019-01-01 RX ADMIN — FERROUS SULFATE TAB 325 MG (65 MG ELEMENTAL FE) 325 MG: 325 (65 FE) TAB at 06:11

## 2019-01-01 RX ADMIN — ENOXAPARIN SODIUM 90 MG: 100 INJECTION SUBCUTANEOUS at 06:21

## 2019-01-01 RX ADMIN — VANCOMYCIN HYDROCHLORIDE 1250 MG: 10 INJECTION, POWDER, LYOPHILIZED, FOR SOLUTION INTRAVENOUS at 06:08

## 2019-01-01 RX ADMIN — POTASSIUM CHLORIDE 40 MEQ: 750 CAPSULE, EXTENDED RELEASE ORAL at 09:33

## 2019-01-01 RX ADMIN — SUGAMMADEX 100 MG: 100 INJECTION, SOLUTION INTRAVENOUS at 12:55

## 2019-01-01 RX ADMIN — LATANOPROST 1 DROP: 50 SOLUTION OPHTHALMIC at 20:01

## 2019-01-01 RX ADMIN — ENALAPRIL MALEATE 40 MG: 20 TABLET ORAL at 06:54

## 2019-01-01 RX ADMIN — ONDANSETRON 4 MG: 2 INJECTION INTRAMUSCULAR; INTRAVENOUS at 03:16

## 2019-01-01 RX ADMIN — ENOXAPARIN SODIUM 90 MG: 100 INJECTION SUBCUTANEOUS at 17:11

## 2019-01-01 RX ADMIN — CEFAZOLIN SODIUM 2 G: 2 INJECTION, SOLUTION INTRAVENOUS at 08:04

## 2019-01-01 RX ADMIN — LATANOPROST 1 DROP: 50 SOLUTION OPHTHALMIC at 20:50

## 2019-01-01 RX ADMIN — SODIUM CHLORIDE, PRESERVATIVE FREE 3 ML: 5 INJECTION INTRAVENOUS at 21:07

## 2019-01-01 RX ADMIN — SODIUM CHLORIDE, PRESERVATIVE FREE 3 ML: 5 INJECTION INTRAVENOUS at 21:03

## 2019-01-01 RX ADMIN — OXYCODONE HYDROCHLORIDE AND ACETAMINOPHEN 2 TABLET: 10; 325 TABLET ORAL at 02:13

## 2019-01-01 RX ADMIN — DEXTROSE MONOHYDRATE 25 G: 70 INJECTION, SOLUTION INTRAVENOUS at 11:42

## 2019-01-01 RX ADMIN — APIXABAN 5 MG: 5 TABLET, FILM COATED ORAL at 09:49

## 2019-01-01 RX ADMIN — PANTOPRAZOLE SODIUM 40 MG: 40 TABLET, DELAYED RELEASE ORAL at 06:22

## 2019-01-01 RX ADMIN — ENALAPRIL MALEATE 10 MG: 10 TABLET ORAL at 20:48

## 2019-01-01 RX ADMIN — PANTOPRAZOLE SODIUM 40 MG: 40 TABLET, DELAYED RELEASE ORAL at 06:26

## 2019-01-01 RX ADMIN — DONEPEZIL HYDROCHLORIDE 5 MG: 5 TABLET, FILM COATED ORAL at 06:22

## 2019-01-01 RX ADMIN — ENOXAPARIN SODIUM 90 MG: 100 INJECTION SUBCUTANEOUS at 06:02

## 2019-01-01 RX ADMIN — GABAPENTIN 300 MG: 300 CAPSULE ORAL at 17:10

## 2019-01-01 RX ADMIN — INSULIN LISPRO 5 UNITS: 100 INJECTION, SOLUTION INTRAVENOUS; SUBCUTANEOUS at 22:43

## 2019-01-01 RX ADMIN — FENTANYL CITRATE 50 MCG: 50 INJECTION, SOLUTION INTRAMUSCULAR; INTRAVENOUS at 13:22

## 2019-01-01 RX ADMIN — ENALAPRIL MALEATE 20 MG: 20 TABLET ORAL at 08:09

## 2019-01-01 RX ADMIN — POLYETHYLENE GLYCOL 3350 17 G: 17 POWDER, FOR SOLUTION ORAL at 08:24

## 2019-01-01 RX ADMIN — AMIODARONE HYDROCHLORIDE 200 MG: 200 TABLET ORAL at 21:47

## 2019-01-01 RX ADMIN — TAZOBACTAM SODIUM AND PIPERACILLIN SODIUM 3.38 G: 375; 3 INJECTION, SOLUTION INTRAVENOUS at 00:01

## 2019-01-01 RX ADMIN — POLYETHYLENE GLYCOL 3350 17 G: 17 POWDER, FOR SOLUTION ORAL at 08:30

## 2019-01-01 RX ADMIN — EPHEDRINE SULFATE 10 MG: 50 INJECTION INTRAMUSCULAR; INTRAVENOUS; SUBCUTANEOUS at 12:25

## 2019-01-01 RX ADMIN — BRIMONIDINE TARTRATE 1 DROP: 2 SOLUTION OPHTHALMIC at 09:39

## 2019-01-01 RX ADMIN — MIDAZOLAM HYDROCHLORIDE 1 MG: 2 INJECTION, SOLUTION INTRAMUSCULAR; INTRAVENOUS at 06:52

## 2019-01-01 RX ADMIN — SODIUM CHLORIDE, PRESERVATIVE FREE 3 ML: 5 INJECTION INTRAVENOUS at 20:46

## 2019-01-01 RX ADMIN — TIMOLOL MALEATE 1 DROP: 5 SOLUTION/ DROPS OPHTHALMIC at 08:41

## 2019-01-01 RX ADMIN — OXYCODONE AND ACETAMINOPHEN 2 TABLET: 5; 325 TABLET ORAL at 13:52

## 2019-01-01 RX ADMIN — PIOGLITAZONE 45 MG: 30 TABLET ORAL at 08:30

## 2019-01-01 RX ADMIN — TIMOLOL MALEATE 1 DROP: 5 SOLUTION OPHTHALMIC at 09:32

## 2019-01-01 RX ADMIN — RIFAMPIN 300 MG: 300 CAPSULE ORAL at 09:38

## 2019-01-01 RX ADMIN — DONEPEZIL HYDROCHLORIDE 5 MG: 5 TABLET, FILM COATED ORAL at 06:55

## 2019-01-01 RX ADMIN — SODIUM CHLORIDE, PRESERVATIVE FREE 3 ML: 5 INJECTION INTRAVENOUS at 03:51

## 2019-01-01 RX ADMIN — SENNOSIDES AND DOCUSATE SODIUM 1 TABLET: 8.6; 5 TABLET ORAL at 20:52

## 2019-01-01 RX ADMIN — ATORVASTATIN CALCIUM 10 MG: 10 TABLET, FILM COATED ORAL at 20:48

## 2019-01-01 RX ADMIN — ENALAPRIL MALEATE 40 MG: 20 TABLET ORAL at 07:00

## 2019-01-01 RX ADMIN — POTASSIUM CHLORIDE 20 MEQ: 750 CAPSULE, EXTENDED RELEASE ORAL at 09:38

## 2019-01-01 RX ADMIN — SODIUM CHLORIDE, PRESERVATIVE FREE 3 ML: 5 INJECTION INTRAVENOUS at 08:44

## 2019-01-01 RX ADMIN — SENNOSIDES AND DOCUSATE SODIUM 1 TABLET: 8.6; 5 TABLET ORAL at 07:54

## 2019-01-01 RX ADMIN — DORZOLAMIDE HYDROCHLORIDE 1 DROP: 20 SOLUTION/ DROPS OPHTHALMIC at 08:56

## 2019-01-01 RX ADMIN — SODIUM CHLORIDE, PRESERVATIVE FREE 3 ML: 5 INJECTION INTRAVENOUS at 09:41

## 2019-01-01 RX ADMIN — FENTANYL CITRATE 50 MCG: 50 INJECTION, SOLUTION INTRAMUSCULAR; INTRAVENOUS at 13:27

## 2019-01-01 RX ADMIN — OXYCODONE HYDROCHLORIDE AND ACETAMINOPHEN 2 TABLET: 10; 325 TABLET ORAL at 10:16

## 2019-01-01 RX ADMIN — OXYCODONE HYDROCHLORIDE AND ACETAMINOPHEN 1 TABLET: 10; 325 TABLET ORAL at 20:02

## 2019-01-01 RX ADMIN — SODIUM CHLORIDE, PRESERVATIVE FREE 3 ML: 5 INJECTION INTRAVENOUS at 16:27

## 2019-01-01 RX ADMIN — INSULIN LISPRO 5 UNITS: 100 INJECTION, SOLUTION INTRAVENOUS; SUBCUTANEOUS at 21:46

## 2019-01-01 RX ADMIN — SODIUM CHLORIDE, PRESERVATIVE FREE 3 ML: 5 INJECTION INTRAVENOUS at 08:45

## 2019-01-01 RX ADMIN — INSULIN LISPRO 3 UNITS: 100 INJECTION, SOLUTION INTRAVENOUS; SUBCUTANEOUS at 17:04

## 2019-01-01 RX ADMIN — SODIUM CHLORIDE, PRESERVATIVE FREE 3 ML: 5 INJECTION INTRAVENOUS at 20:48

## 2019-01-01 RX ADMIN — INSULIN LISPRO 3 UNITS: 100 INJECTION, SOLUTION INTRAVENOUS; SUBCUTANEOUS at 17:56

## 2019-01-01 RX ADMIN — SENNOSIDES AND DOCUSATE SODIUM 1 TABLET: 8.6; 5 TABLET ORAL at 20:27

## 2019-01-01 RX ADMIN — VANCOMYCIN HYDROCHLORIDE 1500 MG: 10 INJECTION, POWDER, LYOPHILIZED, FOR SOLUTION INTRAVENOUS at 21:50

## 2019-01-01 RX ADMIN — INSULIN LISPRO 3 UNITS: 100 INJECTION, SOLUTION INTRAVENOUS; SUBCUTANEOUS at 13:30

## 2019-01-01 RX ADMIN — TAMSULOSIN HYDROCHLORIDE 0.4 MG: 0.4 CAPSULE ORAL at 06:55

## 2019-01-01 RX ADMIN — VANCOMYCIN HYDROCHLORIDE 1250 MG: 10 INJECTION, POWDER, LYOPHILIZED, FOR SOLUTION INTRAVENOUS at 04:45

## 2019-01-01 RX ADMIN — ENALAPRIL MALEATE 40 MG: 20 TABLET ORAL at 07:52

## 2019-01-01 RX ADMIN — TAMSULOSIN HYDROCHLORIDE 0.4 MG: 0.4 CAPSULE ORAL at 09:49

## 2019-01-01 RX ADMIN — OXYCODONE HYDROCHLORIDE AND ACETAMINOPHEN 2 TABLET: 10; 325 TABLET ORAL at 08:32

## 2019-01-01 RX ADMIN — CEFAZOLIN SODIUM 2 G: 2 INJECTION, SOLUTION INTRAVENOUS at 01:59

## 2019-01-01 RX ADMIN — OXYCODONE HYDROCHLORIDE AND ACETAMINOPHEN 2 TABLET: 10; 325 TABLET ORAL at 13:05

## 2019-01-01 RX ADMIN — SENNOSIDES AND DOCUSATE SODIUM 1 TABLET: 8.6; 5 TABLET ORAL at 08:24

## 2019-01-01 RX ADMIN — GABAPENTIN 300 MG: 300 CAPSULE ORAL at 16:35

## 2019-01-01 RX ADMIN — GABAPENTIN 300 MG: 300 CAPSULE ORAL at 08:42

## 2019-01-01 RX ADMIN — DONEPEZIL HYDROCHLORIDE 10 MG: 10 TABLET, FILM COATED ORAL at 06:49

## 2019-01-01 RX ADMIN — AMIODARONE HYDROCHLORIDE 200 MG: 200 TABLET ORAL at 21:46

## 2019-01-01 RX ADMIN — ENALAPRIL MALEATE 10 MG: 10 TABLET ORAL at 09:09

## 2019-01-01 RX ADMIN — SODIUM CHLORIDE, PRESERVATIVE FREE 3 ML: 5 INJECTION INTRAVENOUS at 09:11

## 2019-01-01 RX ADMIN — GABAPENTIN 300 MG: 300 CAPSULE ORAL at 20:02

## 2019-01-01 RX ADMIN — VANCOMYCIN HYDROCHLORIDE 1750 MG: 10 INJECTION, POWDER, LYOPHILIZED, FOR SOLUTION INTRAVENOUS at 00:47

## 2019-01-01 RX ADMIN — INSULIN LISPRO 8 UNITS: 100 INJECTION, SOLUTION INTRAVENOUS; SUBCUTANEOUS at 17:38

## 2019-01-01 RX ADMIN — INSULIN LISPRO 5 UNITS: 100 INJECTION, SOLUTION INTRAVENOUS; SUBCUTANEOUS at 08:57

## 2019-01-01 RX ADMIN — GABAPENTIN 300 MG: 300 CAPSULE ORAL at 08:36

## 2019-01-01 RX ADMIN — OXYCODONE HYDROCHLORIDE AND ACETAMINOPHEN 2 TABLET: 10; 325 TABLET ORAL at 16:35

## 2019-01-01 RX ADMIN — ATORVASTATIN CALCIUM 20 MG: 20 TABLET, FILM COATED ORAL at 08:24

## 2019-01-01 RX ADMIN — INSULIN LISPRO 8 UNITS: 100 INJECTION, SOLUTION INTRAVENOUS; SUBCUTANEOUS at 20:58

## 2019-01-01 RX ADMIN — TIMOLOL MALEATE 1 DROP: 5 SOLUTION OPHTHALMIC at 08:09

## 2019-01-01 RX ADMIN — DORZOLAMIDE HYDROCHLORIDE 1 DROP: 20 SOLUTION/ DROPS OPHTHALMIC at 20:03

## 2019-01-01 RX ADMIN — ONDANSETRON 4 MG: 2 INJECTION INTRAMUSCULAR; INTRAVENOUS at 12:53

## 2019-01-01 RX ADMIN — OXYCODONE AND ACETAMINOPHEN 2 TABLET: 5; 325 TABLET ORAL at 09:00

## 2019-01-01 RX ADMIN — OXYCODONE AND ACETAMINOPHEN 2 TABLET: 5; 325 TABLET ORAL at 06:30

## 2019-01-01 RX ADMIN — DORZOLAMIDE HYDROCHLORIDE 1 DROP: 20 SOLUTION/ DROPS OPHTHALMIC at 20:45

## 2019-01-01 RX ADMIN — POTASSIUM CHLORIDE 20 MEQ: 750 CAPSULE, EXTENDED RELEASE ORAL at 08:56

## 2019-01-01 RX ADMIN — TAMSULOSIN HYDROCHLORIDE 0.4 MG: 0.4 CAPSULE ORAL at 06:22

## 2019-01-01 RX ADMIN — INSULIN LISPRO 3 UNITS: 100 INJECTION, SOLUTION INTRAVENOUS; SUBCUTANEOUS at 12:44

## 2019-01-01 RX ADMIN — RIFAMPIN 300 MG: 300 CAPSULE ORAL at 20:02

## 2019-01-01 RX ADMIN — ROCURONIUM BROMIDE 10 MG: 10 INJECTION INTRAVENOUS at 10:26

## 2019-01-01 RX ADMIN — ACETAMINOPHEN 650 MG: 325 TABLET, FILM COATED ORAL at 20:02

## 2019-01-01 RX ADMIN — TIMOLOL MALEATE 1 DROP: 5 SOLUTION/ DROPS OPHTHALMIC at 07:56

## 2019-01-01 RX ADMIN — SODIUM CHLORIDE 100 ML/HR: 9 INJECTION, SOLUTION INTRAVENOUS at 14:47

## 2019-01-01 RX ADMIN — PANTOPRAZOLE SODIUM 40 MG: 40 TABLET, DELAYED RELEASE ORAL at 07:51

## 2019-01-01 RX ADMIN — TAMSULOSIN HYDROCHLORIDE 0.4 MG: 0.4 CAPSULE ORAL at 10:16

## 2019-01-01 RX ADMIN — BRIMONIDINE TARTRATE 1 DROP: 2 SOLUTION OPHTHALMIC at 21:49

## 2019-01-01 RX ADMIN — IOPAMIDOL 100 ML: 755 INJECTION, SOLUTION INTRAVENOUS at 03:30

## 2019-01-01 RX ADMIN — METOPROLOL TARTRATE 25 MG: 25 TABLET ORAL at 08:24

## 2019-01-01 RX ADMIN — DEXTROSE: 5 SOLUTION INTRAVENOUS at 12:03

## 2019-01-01 RX ADMIN — FUROSEMIDE 40 MG: 40 TABLET ORAL at 08:36

## 2019-01-01 RX ADMIN — CEFAZOLIN SODIUM 1 G: 1 INJECTION, SOLUTION INTRAVENOUS at 11:15

## 2019-01-01 RX ADMIN — SODIUM CHLORIDE 250 ML: 900 INJECTION, SOLUTION INTRAVENOUS at 14:24

## 2019-01-01 RX ADMIN — TAMSULOSIN HYDROCHLORIDE 0.4 MG: 0.4 CAPSULE ORAL at 07:00

## 2019-01-01 RX ADMIN — BRIMONIDINE TARTRATE 1 DROP: 2 SOLUTION OPHTHALMIC at 20:49

## 2019-01-01 RX ADMIN — HYDROMORPHONE HYDROCHLORIDE 0.25 MG: 2 INJECTION INTRAMUSCULAR; INTRAVENOUS; SUBCUTANEOUS at 10:04

## 2019-01-01 RX ADMIN — VANCOMYCIN HYDROCHLORIDE 1250 MG: 10 INJECTION, POWDER, LYOPHILIZED, FOR SOLUTION INTRAVENOUS at 16:54

## 2019-01-01 RX ADMIN — TAMSULOSIN HYDROCHLORIDE 0.4 MG: 0.4 CAPSULE ORAL at 08:34

## 2019-01-01 RX ADMIN — LATANOPROST 1 DROP: 50 SOLUTION OPHTHALMIC at 17:51

## 2019-01-01 RX ADMIN — VANCOMYCIN HYDROCHLORIDE 1500 MG: 10 INJECTION, POWDER, LYOPHILIZED, FOR SOLUTION INTRAVENOUS at 18:32

## 2019-01-01 RX ADMIN — ATORVASTATIN CALCIUM 10 MG: 10 TABLET, FILM COATED ORAL at 20:00

## 2019-01-01 RX ADMIN — RIFAMPIN 300 MG: 300 CAPSULE ORAL at 20:48

## 2019-01-01 RX ADMIN — INSULIN LISPRO 5 UNITS: 100 INJECTION, SOLUTION INTRAVENOUS; SUBCUTANEOUS at 09:48

## 2019-01-01 RX ADMIN — INSULIN LISPRO 3 UNITS: 100 INJECTION, SOLUTION INTRAVENOUS; SUBCUTANEOUS at 20:57

## 2019-01-01 RX ADMIN — SODIUM CHLORIDE 100 ML/HR: 9 INJECTION, SOLUTION INTRAVENOUS at 16:33

## 2019-01-01 RX ADMIN — LATANOPROST 1 DROP: 50 SOLUTION OPHTHALMIC at 22:41

## 2019-01-01 RX ADMIN — CEFEPIME HYDROCHLORIDE 2 G: 2 INJECTION, POWDER, FOR SOLUTION INTRAVENOUS at 04:47

## 2019-01-01 RX ADMIN — BRIMONIDINE TARTRATE 1 DROP: 2 SOLUTION OPHTHALMIC at 20:28

## 2019-01-01 RX ADMIN — BRIMONIDINE TARTRATE 1 DROP: 2 SOLUTION OPHTHALMIC at 20:59

## 2019-01-01 RX ADMIN — POTASSIUM CHLORIDE, DEXTROSE MONOHYDRATE AND SODIUM CHLORIDE 75 ML/HR: 150; 5; 450 INJECTION, SOLUTION INTRAVENOUS at 16:22

## 2019-01-01 RX ADMIN — PANTOPRAZOLE SODIUM 40 MG: 40 TABLET, DELAYED RELEASE ORAL at 07:19

## 2019-01-01 RX ADMIN — BRIMONIDINE TARTRATE 1 DROP: 2 SOLUTION OPHTHALMIC at 07:56

## 2019-01-01 RX ADMIN — SODIUM CHLORIDE, PRESERVATIVE FREE 3 ML: 5 INJECTION INTRAVENOUS at 20:05

## 2019-01-01 RX ADMIN — OXYCODONE HYDROCHLORIDE AND ACETAMINOPHEN 2 TABLET: 10; 325 TABLET ORAL at 09:03

## 2019-01-01 RX ADMIN — OXYCODONE HYDROCHLORIDE AND ACETAMINOPHEN 1 TABLET: 10; 325 TABLET ORAL at 05:45

## 2019-01-01 RX ADMIN — METOPROLOL TARTRATE 12.5 MG: 25 TABLET ORAL at 10:16

## 2019-01-01 RX ADMIN — SODIUM CHLORIDE, POTASSIUM CHLORIDE, SODIUM LACTATE AND CALCIUM CHLORIDE: 600; 310; 30; 20 INJECTION, SOLUTION INTRAVENOUS at 08:36

## 2019-01-01 RX ADMIN — OXYCODONE HYDROCHLORIDE AND ACETAMINOPHEN 1 TABLET: 10; 325 TABLET ORAL at 17:57

## 2019-01-01 RX ADMIN — GABAPENTIN 300 MG: 300 CAPSULE ORAL at 16:32

## 2019-01-01 RX ADMIN — AMIODARONE HYDROCHLORIDE 200 MG: 200 TABLET ORAL at 09:01

## 2019-01-01 RX ADMIN — RIFAMPIN 300 MG: 300 CAPSULE ORAL at 20:00

## 2019-01-01 RX ADMIN — ACETAMINOPHEN 650 MG: 325 TABLET, FILM COATED ORAL at 21:06

## 2019-01-01 RX ADMIN — PANTOPRAZOLE SODIUM 40 MG: 40 TABLET, DELAYED RELEASE ORAL at 08:42

## 2019-01-01 RX ADMIN — LIDOCAINE HYDROCHLORIDE 100 MG: 20 INJECTION, SOLUTION INFILTRATION; PERINEURAL at 12:34

## 2019-01-01 RX ADMIN — SENNOSIDES AND DOCUSATE SODIUM 1 TABLET: 8.6; 5 TABLET ORAL at 21:02

## 2019-01-01 RX ADMIN — FUROSEMIDE 40 MG: 40 TABLET ORAL at 09:09

## 2019-01-01 RX ADMIN — AMIODARONE HYDROCHLORIDE 200 MG: 200 TABLET ORAL at 10:55

## 2019-01-01 RX ADMIN — DORZOLAMIDE HCL 1 DROP: 20 SOLUTION/ DROPS OPHTHALMIC at 07:56

## 2019-01-01 RX ADMIN — WARFARIN SODIUM 10 MG: 10 TABLET ORAL at 17:45

## 2019-01-01 RX ADMIN — DONEPEZIL HYDROCHLORIDE 5 MG: 5 TABLET, FILM COATED ORAL at 06:02

## 2019-01-01 RX ADMIN — ENALAPRIL MALEATE 20 MG: 20 TABLET ORAL at 09:34

## 2019-01-01 RX ADMIN — OXYCODONE HYDROCHLORIDE AND ACETAMINOPHEN 1 TABLET: 10; 325 TABLET ORAL at 17:10

## 2019-01-01 RX ADMIN — WARFARIN SODIUM 10 MG: 10 TABLET ORAL at 19:27

## 2019-01-01 RX ADMIN — HYDROMORPHONE HYDROCHLORIDE 0.5 MG: 2 INJECTION INTRAMUSCULAR; INTRAVENOUS; SUBCUTANEOUS at 13:18

## 2019-01-01 RX ADMIN — SODIUM CHLORIDE, PRESERVATIVE FREE 3 ML: 5 INJECTION INTRAVENOUS at 21:35

## 2019-01-01 RX ADMIN — IOPAMIDOL 85 ML: 612 INJECTION, SOLUTION INTRAVENOUS at 01:28

## 2019-01-01 RX ADMIN — DORZOLAMIDE HCL 1 DROP: 20 SOLUTION/ DROPS OPHTHALMIC at 21:01

## 2019-01-01 RX ADMIN — OXYCODONE AND ACETAMINOPHEN 2 TABLET: 5; 325 TABLET ORAL at 09:04

## 2019-01-01 RX ADMIN — SODIUM CHLORIDE 100 ML/HR: 4.5 INJECTION, SOLUTION INTRAVENOUS at 18:46

## 2019-01-01 RX ADMIN — CYCLOBENZAPRINE 10 MG: 10 TABLET, FILM COATED ORAL at 10:19

## 2019-01-01 RX ADMIN — FUROSEMIDE 40 MG: 40 TABLET ORAL at 09:01

## 2019-01-01 RX ADMIN — FERROUS SULFATE TAB 325 MG (65 MG ELEMENTAL FE) 325 MG: 325 (65 FE) TAB at 07:53

## 2019-01-01 RX ADMIN — AMIODARONE HYDROCHLORIDE 200 MG: 200 TABLET ORAL at 20:54

## 2019-01-01 RX ADMIN — POTASSIUM CHLORIDE 20 MEQ: 750 CAPSULE, EXTENDED RELEASE ORAL at 08:42

## 2019-01-01 RX ADMIN — RIFAMPIN 300 MG: 300 CAPSULE ORAL at 21:06

## 2019-01-01 RX ADMIN — TAZOBACTAM SODIUM AND PIPERACILLIN SODIUM 3.38 G: 375; 3 INJECTION, SOLUTION INTRAVENOUS at 08:43

## 2019-01-01 RX ADMIN — DORZOLAMIDE HYDROCHLORIDE 1 DROP: 20 SOLUTION/ DROPS OPHTHALMIC at 08:36

## 2019-01-01 RX ADMIN — DONEPEZIL HYDROCHLORIDE 10 MG: 10 TABLET, FILM COATED ORAL at 10:16

## 2019-01-01 RX ADMIN — SODIUM CHLORIDE, PRESERVATIVE FREE 3 ML: 5 INJECTION INTRAVENOUS at 09:10

## 2019-01-01 RX ADMIN — DONEPEZIL HYDROCHLORIDE 5 MG: 5 TABLET, FILM COATED ORAL at 06:27

## 2019-01-01 RX ADMIN — INSULIN LISPRO 2 UNITS: 100 INJECTION, SOLUTION INTRAVENOUS; SUBCUTANEOUS at 22:12

## 2019-01-01 RX ADMIN — DONEPEZIL HYDROCHLORIDE 10 MG: 10 TABLET, FILM COATED ORAL at 07:00

## 2019-01-01 RX ADMIN — SODIUM CHLORIDE, PRESERVATIVE FREE 3 ML: 5 INJECTION INTRAVENOUS at 09:44

## 2019-01-01 RX ADMIN — TAZOBACTAM SODIUM AND PIPERACILLIN SODIUM 3.38 G: 375; 3 INJECTION, SOLUTION INTRAVENOUS at 01:16

## 2019-01-01 RX ADMIN — PANTOPRAZOLE SODIUM 40 MG: 40 TABLET, DELAYED RELEASE ORAL at 06:49

## 2019-01-07 NOTE — DISCHARGE INSTRUCTIONS
Take the following medications the morning of surgery with a small sip of water:    TAKE ALL EYE DROPS, ENALAPRIL, AND NEXIUM    General Instructions:  • Do not eat solid food after midnight the night before surgery.  • You may drink clear liquids day of surgery but must stop at least one hour before your hospital arrival time.  • It is beneficial for you to have a clear drink that contains carbohydrates the day of surgery.  We suggest  a 12 to 20 ounce bottle of G2 or Powerade Zero for diabetic patients.    Clear liquids are liquids you can see through.  Nothing red in color.     Plain water                               Sports drinks  Sodas                                   Gelatin (Jell-O)  Fruit juices without pulp such as white grape juice and apple juice  Popsicles that contain no fruit or yogurt  Tea or coffee (no cream or milk added)  Gatorade / Powerade  G2 / Powerade Zero        • Bring any papers given to you in the doctor’s office.  • Wear clean comfortable clothes and socks.  • Do not wear contact lenses or make-up.  Bring a case for your glasses. .  • Remove all piercings.  Leave jewelry and any other valuables at home.  • Hair extensions with metal clips must be removed prior to surgery.  • The Pre-Admission Testing nurse will instruct you to bring medications if unable to obtain an accurate list in Pre-Admission Testing  • REPORT TO MAIN SURGERY ON 1-9-2019 AT 0530 AM.            Preventing a Surgical Site Infection:  • For 2 to 3 days before surgery, avoid shaving with a razor because the razor can irritate skin and make it easier to develop an infection.    • Any areas of open skin can increase the risk of a post-operative wound infection by allowing bacteria to enter and travel throughout the body.  Notify your surgeon if you have any skin wounds / rashes even if it is not near the expected surgical site.  The area will need assessed to determine if surgery should be delayed until it is  healed.  • The night prior to surgery sleep in a clean bed with clean clothing.  Do not allow pets to sleep with you.  • Shower on the morning of surgery using a fresh bar of anti-bacterial soap (such as Dial) and clean washcloth.  Dry with a clean towel and dress in clean clothing.  • Ask your surgeon if you will be receiving antibiotics prior to surgery.  • Make sure you, your family, and all healthcare providers clean their hands with soap and water or an alcohol based hand  before caring for you or your wound.    Day of surgery:  Upon arrival, a Pre-op nurse and Anesthesiologist will review your health history, obtain vital signs, and answer questions you may have.  The only belongings needed at this time will be your home medications and if applicable your C-PAP/BI-PAP machine.  If you are staying overnight your family can leave the rest of your belongings in the car and bring them to your room later.  A Pre-op nurse will start an IV and you may receive medication in preparation for surgery, including something to help you relax.  Your family will be able to see you in the Pre-op area.  While you are in surgery your family should notify the waiting room  if they leave the waiting room area and provide a contact phone number.    Please be aware that surgery does come with discomfort.  We want to make every effort to control your discomfort so please discuss any uncontrolled symptoms with your nurse.   Your doctor will most likely have prescribed pain medications.          If you are staying overnight following surgery, you will be transported to your hospital room following the recovery period.  [facility] has all private rooms.    You have received a list of surgical assistants for your reference.  If you have any questions please call Pre-Admission Testing at [phone].  Deductibles and co-payments are collected on the day of service. Please be prepared to pay the required  co-pay, deductible or deposit on the day of service as defined by your plan.

## 2019-01-08 NOTE — NURSING NOTE
Verbal and written D/C instructions given to patient and wife.  They voice understanding and are able to teach back D/C instructions.

## 2019-01-08 NOTE — DISCHARGE INSTRUCTIONS
EDUCATION /DISCHARGE INSTRUCTIONS:    A myelogram is a special radiology procedure of the spinal cord, spinal nerves and other related structures.  You will be awake during the examination.  An area of your lower back will be cleansed with an antiseptic solution.  The physician will inject a numbing medication in your lower back.  While your back is numb, a needle will be placed in the lower back area.  A small amount of spinal fluid may be withdrawn and sent to the lab if ordered by your physician. While the needle is in the back, an injection of a contrast material (xray dye) will be given through the needle.  The contrast material will allow the physician to see the spinal cord and spinal nerves.  Once injected, the needle will be removed and a band aid will be placed over the injection site.  The table will be tilted during the process to allow the contrast material to flow to particular areas in the spine.  Following the injection and xrays, you will be taken to the CT scan where more pictures will be taken. After the procedure is finished, the contrast material will be absorbed by your body and eliminated through your kidneys.  The radiologist will study and interpret your myelogram and send the results to your physician.    Procedure risks of a myelogram include, but are not limited to:  *  Bleeding   *  seizure  *  Infection   *  Headache, possibly severe requiring  *  Contrast reaction      a blood patch  *  Nerve or cord injury  *  Paralysis and death    Benefits of the procedure:  *  Best examination for delineating pathology related to spinal cord compression from a disc and/or nerve root compression    Alternatives to the procedure:  MRI - a non invasive procedure requiring intravenous contrast injection.  Cannot be done on patients with certain pacemakers or metal in the body.  MRI risks include possible reaction to the contrast material, movement of metal located in the body.  Benefit to MRI:   Non-invasive and usually painless procedure.  THIS EDUCATION INFORMATION WAS REVIEWED PRIOR TO PROCEDURE AND CONSENT. Patient initials________________Time____0704______________    Important information following your myelogram:    24 hour rest period ends __1100____1/9/19______________.    * ACTIVITY:   *  Lie down with your head elevated no more than 2 pillows high today & tonight  *  Sit up to eat your meals and use the restroom, otherwise, lie down.  *  Remain less active for two to three days.  *  Do not drive for 48 hours following a myelogram  *  You may remove the bandage and shower in the morning  *  Increase your fluids for the next 24 hours.  Caffeinated drinks are encouraged.    Resume taking diabetic medication  (Glucophage/Metformin) on _resume  1/11/18 Friday the morning dose pending surgery. _    Resume taking blood thinner or aspirin on _Coumadin (Warfarin) 1/9/18 pending surgery after 1100_    CALL YOUR PHYSICIAN FOR THE FOLLOWING:  * Pain at the injections site  * Reddness, swelling, bruising or drainage at the injection site.  * A fever by mouth of 101.0  * Any new symptoms    Headaches are a common side effect after a myelogram.  If you get a headache, you should stay flat in bed and drink plenty of fluids. If the headache persist and does not go away with rest/medication, CALL Dr. Redmond at [phone]

## 2019-01-08 NOTE — H&P
CC: Lumbar back pain     HPI: He has a long-standing history of back and leg pain now presently more midline low lumbar back pain and he walks with the right foot drop.  Dr. Jacques Redmond now performed an L2 to 5 laminectomy fusion instrumentation nearly a year ago from which she did not improve dramatically, at least with regard to the back.  Some of his preoperative leg pain is better but he remains with a dense foot drop on the right.     PFSH: See attached.  History of atrial fibrillation for which she is on anticoagulants.     ROS: See attached     PE: Constitutional: Vital signs above-noted.  Awake, alert and oriented     Psychiatric: Affect and insight do not appear grossly disturbed.     Pulmonary: Breathing is unlabored, color is good.     Skin: Warm, dry and normal turgor     Cardiac:  pedal pulses intact.  No edema.     Eyesight and hearing appear adequate for examination purposes        Musculoskeletal:  There is mild tenderness to percussion and palpation of the spine. Motion appears somewhat.  Posture is quite stooped to coronal and sagittal inspection.    The skin about the area is intact.  There is no palpable or visible deformity.  There is no local spasm.       Neurologic:   Reflexes are absent in the patellae and achilles.   Motor function is undisturbed in quadriceps, EHL, and gastrocnemius on the right EHL which shows shows some 3/5 strength but flops on ambulation   Sensation appears symmetrically intact to light touch   .  In the bilateral lower extremities there is no evidence of atrophy.   Clonus is absent..  Gait appears undisturbed.  SLR test negative     XRAY: Plain film x-rays of the lumbar spine show basically a neutral posture over the fused segment prepped minimal lordosis but overall kyphotic lumbar spine due to slight collapse at L2 to 3 with resultant mild kyphosis as well as degenerative changes and kyphosis at 1-2 above.  Recent CT scan shows a nonunion at L2-3 and the other levels  appear fused.     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  He has a chronic foot drop on the right for which further decompression is unlikely to help.     Plan: He is going to see Dr. Dang for the leg but his main complaint is back pain.  I think he would be a reasonable candidate for repair of nonunion at to 3 and extension of the fusion to 12.  In order to the balance of back biomechanically the best bet would be to perform a pedicle subtraction osteotomy in the fused augment, perhaps at the L4-5 and then fuse him from T12 to the sacrum.  I'm going to invite Dr. Redmond to evaluate him and consider decompressing of for 5 or at least the performing a neural lysis of the roots and dural sac at that level so that I can mobilize this in order to perform the pedicle subtraction osteotomy.  See Cyril Redmond and all and get back to me.  I told him I would go ahead and tentatively schedule meantime.

## 2019-01-08 NOTE — H&P (VIEW-ONLY)
Subjective   Patient ID: Derrek Ritter is a 78 y.o. male is here today for follow-up. with a new Lumbar Myelogram that was ordered for back pain.  He denies leg pain or leg weakness.    History of Present Illness     This patient returns today.  He continues with pain in his back but no pain in his legs.    The following portions of the patient's history were reviewed and updated as appropriate: allergies, current medications, past family history, past medical history, past social history, past surgical history and problem list.    Review of Systems   Respiratory: Negative for chest tightness and shortness of breath.    Cardiovascular: Negative for chest pain.   Musculoskeletal: Positive for back pain. Negative for gait problem.   All other systems reviewed and are negative.      Objective   Physical Exam   Constitutional: He is oriented to person, place, and time. He appears well-developed and well-nourished.   Neurological: He is oriented to person, place, and time.     Neurologic Exam     Mental Status   Oriented to person, place, and time.       Assessment/Plan   Independent Review of Radiographic Studies:      I reviewed his plain films, myelogram, and CT scan myself.  The plain films show instrumentation from L2 to L5.  There is no evidence of abnormal movement on flexion and extension.  On the AP films there appears to be solid bony fusion along the lateral gutters at all of those levels.  On the myelogram itself there is excellent nerve root filling throughout the region of the fusion.  On the standing films in particular there is excellent nerve root filling.  The level above L2 is not well filled with contrast on the myelogram films.  On the post myelographic CT scan the lower thoracic spine down to T12-L1 looks okay.  L1 2 shows a little stenosis but not severe.  All of the levels in the lumbar spine look okay.  There is widely patent canal and neural foramina.  There is some foraminal narrowing at  "L5-S1 but not severe.    Medical Decision Making:      I told this patient about the test.  I told him that the primary reason for doing the test was to be sure that no nerves needed to be decompressed addition to the nerves that need to be freed for the osteotomy.  I told her that my primary purpose and this surgery is to free up the nerves on that there is always a risk of damaging them by the primary reason for doing the surgery is for Dr. Mooney to do his osteotomy and fusion.  They seem to understand all this fairly well and do ask to proceed.    He will need to be scheduled for a: L2 to L4 neural lysis with a fusion by orthopedics    Derrek was seen today for back pain.    Diagnoses and all orders for this visit:    Postural kyphosis of lumbar region  -     Case Request; Standing  -     Case Request    Other orders  -     Follow anesthesia standing orders.  -     Obtain informed consent  -     Provide NPO Instructions to Patient; Future  -     Provide Patient with Enhanced Recovery Booklet(s) or Handout; Future  -     Provide \"Carbo Loading\" Instructions to Patient; Future  -     Follow anesthesia standing orders.; Standing  -     SCD (sequential compression device)- to be placed on patient in Pre-op; Standing      Return for 2-3 week post op.                 "

## 2019-01-08 NOTE — NURSING NOTE
To Dr. Redmond' office by W/C with N/A.  Wife accompanying patient.  No problems or concerns noted.

## 2019-01-09 PROBLEM — Z87.898 H/O URINARY RETENTION: Chronic | Status: ACTIVE | Noted: 2019-01-01

## 2019-01-09 PROBLEM — Z79.01 CHRONIC ANTICOAGULATION: Chronic | Status: ACTIVE | Noted: 2019-01-01

## 2019-01-09 NOTE — ANESTHESIA POSTPROCEDURE EVALUATION
Patient: Derrek Ritter    Procedure Summary     Date:  01/09/19 Room / Location:  [facility] OR [address] [place] [address] MAIN OR    Anesthesia Start:  0738 Anesthesia Stop:  1339    Procedures:       T 11 to L3, L5-S1 fusion with T11 to S1 instrumentation, L4 pedicle subtraction osteotomy, Right L5-S1 interbody fusion with cage (N/A Spine Lumbar)      L4-L5, L5-S1 LAMINECTOMY WITH NEURAL LYSIS (N/A Spine Lumbar) Diagnosis:       Postural kyphosis of lumbar region      (Postural kyphosis of lumbar region [M40.05])    Surgeon:  Barron Knight MD; Jeison Redmond MD Provider:  Jeevan Ryan MD    Anesthesia Type:  general ASA Status:  3          Anesthesia Type: general  Last vitals  BP   138/65 (01/09/19 1420)   Temp   36.6 °C (97.9 °F) (01/09/19 1337)   Pulse   57 (01/09/19 1420)   Resp   16 (01/09/19 1420)     SpO2   100 % (01/09/19 1420)     Post Anesthesia Care and Evaluation    Patient location during evaluation: PACU  Patient participation: complete - patient participated  Level of consciousness: awake and alert  Pain management: adequate  Airway patency: patent  Anesthetic complications: No anesthetic complications    Cardiovascular status: acceptable  Respiratory status: acceptable  Hydration status: acceptable    Comments: --------------------            01/09/19 1420     --------------------   BP:       138/65     Pulse:      57       Resp:       16       Temp:                SpO2:      100%     --------------------

## 2019-01-09 NOTE — BRIEF OP NOTE
LUMBAR LAMINECTOMY DISCECTOMY DECOMPRESSION POSTERIOR 1-2 LEVELS  Progress Note    Derrek Ritter  1/9/2019    Pre-op Diagnosis:   Postural kyphosis of lumbar region [M40.05]       Post-Op Diagnosis Codes:     * Postural kyphosis of lumbar region [M40.05]    Procedure/CPT® Codes:      Procedure(s):  T 11 to L3, L5-S1 fusion with T11 to S1 instrumentation, L4 pedicle subtraction osteotomy, Right L5-S1 interbody fusion with cage  L4-L5, L5-S1 LAMINECTOMY WITH NEURAL LYSIS    Surgeon(s):  Jeison Redmond MD Werner, Joseph G, MD    Anesthesia: General    Staff:   Cell Saver : Kyler Garcia  Circulator: Stephanie Ardon RN; Marisa Nava RN; Margie Bravo RN  Radiology Technologist: Marisa Suero  Scrub Person: Jeff Martin; Amy Velasco; Stacie Michel  Assistant: Kenia Poon CSA; Anuradha Bertrand RNFA    Estimated Blood Loss: 425 mL    Urine Voided: 660 mL    Specimens:                None      Drains:   Urethral Catheter Double-lumen 16 Fr. (Active)       Findings: epidural fibrosis    Complications: none      Jeison Redmond MD     Date: 1/9/2019  Time: 2:09 PM

## 2019-01-09 NOTE — OP NOTE
Preoperative diagnosis:  Flat back syndrome    Postoperative diagnosis:  Same    Procedure performed:  Bilateral L3 4 and L4 5 laminectomy with neural lysis at both levels.    Surgeon: Jeison Redmond M.D.    Asst.:  Kenia Poon CFA who was instrumental in helping with hemostasis, visualization of neural structures and retraction of neural structures    Estimated blood loss, crystalloid, colloid, blood: Please see anesthesia record    Material to lab:   None    Drains:  None    Complications:  None    Indications for the procedure:  This is a man who had a previous fusion and was thought to have symptoms secondary to a flat back.  Consequently plans were made to do a subtraction osteotomy it was necessary to mobilize the nerve roots at L3 4 and L4 5 in order to allow the orthopedic surgeon to do it safely.    Operative summary:  The patient was brought into the operating room and placed under general endotracheal anesthesia using intravenous and inhalational agents.  The patient was then positioned on the operating table in the prone position.  All pressure points were padded including peripheral points of entrapment.  Back was prepped with chloraprep and then draped with Ioban, towels, half sheets and a split sheet incision was made in the lower part of the back excising the previous scar.  We were then able to dissect down to the previous instrumentation laterally.  This was done on both sides at L3, L4 and L5.  The previous cross-link was then removed.  Following this the scar tissue was mobilized off the residual lamina and the laminectomy was taken all the way out to the level of the medial pedicle from above L4 down below L5 pedicles.  We were able to dissect along the medial pedicle mobilize the nerve root and removing scar tissue.  This was done under microsurgical technique and microsurgical instrumentation.  Following this each of the nerve roots were checked to be sure there was no evidence of residual  compression as well as the L3 nerve roots which were not mobilized as much.  Bleeding was then controlled with the bipolar cautery thrombin Gelfoam and FloSeal and the operation was turned over to Dr. Knight for the osteotomy and fusion.

## 2019-01-09 NOTE — ANESTHESIA PROCEDURE NOTES
ANESTHESIA INTUBATION  Urgency: elective    Date/Time: 1/9/2019 8:36 AM  End Time:1/9/2019 8:36 AM  Airway not difficult    General Information and Staff    Patient location during procedure: OR  Anesthesiologist: Jeevan Ryan MD  CRNA: Yanira Wilson CRNA    Indications and Patient Condition  Indications for airway management: airway protection    Preoxygenated: yes  MILS maintained throughout  Mask difficulty assessment: 1 - vent by mask    Final Airway Details  Final airway type: endotracheal airway      Successful airway: ETT  Cuffed: yes   Successful intubation technique: direct laryngoscopy  Facilitating devices/methods: intubating stylet  Endotracheal tube insertion site: oral  Blade: La  Blade size: 2  ETT size (mm): 7.0  Cormack-Lehane Classification: grade I - full view of glottis  Placement verified by: chest auscultation and capnometry   Measured from: lips  Number of attempts at approach: 1    Additional Comments  Atraumatic, Secured after verification of placement

## 2019-01-09 NOTE — ANESTHESIA PREPROCEDURE EVALUATION
Anesthesia Evaluation     Patient summary reviewed and Nursing notes reviewed   no history of anesthetic complications:  NPO Solid Status: > 6 hours  NPO Liquid Status: > 6 hours           Airway   Mallampati: II  TM distance: >3 FB  Neck ROM: full  no difficulty expected and No difficulty expected  Dental - normal exam     Pulmonary - negative pulmonary ROS and normal exam    breath sounds clear to auscultation  (-) rhonchi, decreased breath sounds, wheezes, rales, stridor  Cardiovascular - normal exam    NYHA Classification: I  ECG reviewed  Rhythm: regular  Rate: normal    (+) hypertension, valvular problems/murmurs MVP, dysrhythmias Paroxysmal Atrial Fib, Atrial Fib, hyperlipidemia,   (-) murmur, weak pulses, friction rub, systolic click, carotid bruits, JVD, peripheral edema      Neuro/Psych- negative ROS  GI/Hepatic/Renal/Endo    (+)  hiatal hernia, GERD,  diabetes mellitus type 2 well controlled using insulin,     Musculoskeletal     (+) back pain,   Abdominal  - normal exam    Abdomen: soft.   Substance History - negative use     OB/GYN negative ob/gyn ROS         Other   (+) arthritis                     Anesthesia Plan    ASA 3     general     intravenous induction   Anesthetic plan, all risks, benefits, and alternatives have been provided, discussed and informed consent has been obtained with: patient.

## 2019-01-09 NOTE — OP NOTE
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    Asst.: Anuradha Bertrand    EBL: 425 cc    Anesthetic: Gen.    Condition: Satisfactory    Dr. Jacques Redmond performed the L3-4, L4-5 repeat laminectomies and neural lysis.    Description of procedure: Dr. Jacques Redmond performed the approach and laminectomy.  See his note for further details.  I took over.  The dural sleeve was intact and the sac and roots were easily mobilized away from the anterior vertebral body and posterior longitudinal ligament and the L4 nerve root below, and L3 root above the L4 pedicle was nicely decompressed on either side.  The muscle was stripped subperiosteally to the tips of transverse processes from T11 to L2 and then the palpable hardware from L2 to L5.  I also exposed the sacral alar.  The hardware was removed and the fusion was found to be solid at L3-4 and L4-5, and ununited at L2-3 as suspected..  Curettes and rongeurs removed adherent soft tissue.  Packs were placed for hemostasis.  The graft was decorticated.  A Steffee probe was inserted at the intersection of the anatomic landmarks.  A flexible probe was inserted to check the integrity of the pedicle.  Screws were placed from T11 to L1 and replaced from L2 to L5, leaving out the left L2 screw on the left and of course the L4 screws bilaterally.  Contoured rods were later added, nuts were tightened and torqued.  Biplanar image intensification showed appropriate screw position and anatomic level and satisfactory posture.  At this time I  perform the pedicle subtraction osteotomy at L4.  I removed the posterior lateral fusion mass by using a narrow Whitehall or and Kerrison Whitehall or and removing the thin horizontal stripe of bone from the superior aspect of the fat pedicle to the lateral aspect of the mass in the inferior aspect of the L4 pedicle to the lateral aspect of the mass bilaterally.  I then removed the fusion mass with a Kerrison Whitehall or.  This brought me down to the pedicle of which the medial part was maintained to protect the neural elements and I entered laterally and removed the cancellus bone, and his case fairly soft on the base width of the pedicle down to an apex which show narrowed just the 5 mm or so in front of the anterior vertebral body creating a closing wedge osteotomy while I was working on the right side the left side was protected with a temporary lizzie affixed to the screws.  I remove the thin shell of bone just anterior to the dura with reverse curettes and then secured the the left side while I proceeded to the opposite side to repeat the above the procedure identically.  Now the lizzie was placed on the right and locked it distally and unlocked process proximally in then allowed to gently extend and the slight amount of manual pressure and later clamp pressure to the screws was applied to close the osteotomy wedge.  Biplanar images at this time showed excellent apposition of the vertebral body and significant improvement in lordosis remainder the screws appear well placed.  contoured rods were placed nuts were tightened and torqued and again x-ray was obtained to make certain that the osteotomy remained well-positioned which indeed it did.  Now on the left side at L5-S1 I decided to place an interbody fusion cage.A widened annulotomy was accomplished with a knife and pituitary rongeur after facetectomy.  Sequential  scrapers, along with curettage were used to remove disc debris down to bleeding subchondral and plate bone.   The passing root was protected medially with a bayonet nerve root retractor.  The exiting root was protected above.  A self-retaining distractor was placed and engaged.  Disc debris was adequately removed.  Now the appropriate-sized cage was selected, matching the final scraper size.  The sponge, which had been prepared at the back table, was packed into the Concorde bullet carbon fiber cage.  Additional sponge was placed in the anterior aspect of the disc space.  The cage was then tamped into position and finally seated.  The distraction was removed and the cage fit was excellent.  No neurologic structures were impinged or significantly stretched.  Biplanar images showed satisfactory position of the cage and of course appropriate anatomic level.  The wound was vigorously irrigated.    Bone marrow was obtained from the right iliac crest.  The marrow was applied to the master graft sponges.  Laminectomy bone, and bone from decortication of the graft bed was cleaned at the back table throughout the case and available for bone graft.  The morcellized bone was placed in the decorticated lateral gutter bilaterally.  Master graft sponges were then placed.  Hemostasis was assured.  The wound was then closed with running and interrupted #1 Vicryl for the dorsal fascia, 2-0 Vicryl subcutaneously, then then staples and Dermabond for the skin.  A sterile dressing was applied.  The patient is about to be recovered.

## 2019-01-09 NOTE — PLAN OF CARE
Problem: Patient Care Overview  Goal: Plan of Care Review  Outcome: Ongoing (interventions implemented as appropriate)   01/09/19 2410   Coping/Psychosocial   Plan of Care Reviewed With patient;spouse   Plan of Care Review   Progress no change   OTHER   Outcome Summary Doing well. C/o pain with all medications and did not want to call physician at this time. Dressing d/i. Plan is home with wife, at d/c. Will continue to monitor.        Problem: Fall Risk (Adult)  Goal: Identify Related Risk Factors and Signs and Symptoms  Outcome: Ongoing (interventions implemented as appropriate)      Problem: Skin Injury Risk (Adult)  Goal: Identify Related Risk Factors and Signs and Symptoms  Outcome: Ongoing (interventions implemented as appropriate)      Problem: Laminectomy/Foraminotomy/Discectomy (Adult)  Goal: Signs and Symptoms of Listed Potential Problems Will be Absent, Minimized or Managed (Laminectomy/Foraminotomy/Discectomy)  Outcome: Ongoing (interventions implemented as appropriate)

## 2019-01-10 NOTE — CONSULTS
CONSULT NOTE    INTERNAL MEDICINE   [facility]       Referring Provider: Barron Knight MD  Reason for Consultation: To evaluate chronic medical conditions that may be exacerbated postoperatively  PCP: Trey Stockton MD      HPI  Patient is a 78 y.o. male presents with history of type 2 diabetes on oral meds, paroxysmal A. fib on Coumadin who presents and underwent a T11-L3 and L5-S1 fusion today.  Patient does have a history of iron deficiency anemia and his hemoglobin has been 8.8-10.2 for the last year or more.  He did have macrocytosis with an MCV of 100-1 02.5 in 2018 but more recently it has been in the mid 90s.  HEENT change to a new PCP and saw in December and they were concerned about the anemia and it may be due to gastritis from the Aricept and the Coumadin could be worsening.  B12 was within normal range in 2016 and 2018.     Anemia workup at pcp mid december2018 iron low and iron sat 12%; fecal occult blood neg    PAST MEDICAL HISTORY  Past Medical History:   Diagnosis Date   • Acute suppurative otitis media    • Acute upper respiratory infection    • Anemia    • Cholelithiasis    • Diabetes mellitus (CMS/HCC)     TYPE 2   • Diverticulosis    • Dry eyes    • Earache    • Edema    • Encounter for screening for malignant neoplasm of prostate    • Esophagitis, reflux    • Glaucoma    • H/O echocardiogram 09/18/2013   • Health care maintenance    • Hiatal hernia    • History of EKG 10/06/2015   • History of Holter monitoring 09/16/2013   • Hyperlipidemia    • Hypertension    • Lumbar canal stenosis    • Microalbuminuria    • Mitral valve prolapse    • Nephrolithiasis    • Osteoarthritis of hand     of thumb   • PAF (paroxysmal atrial fibrillation) (CMS/HCC)    • 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   • Spinal stenosis        PAST SURGICAL HISTORY  Past Surgical History:   Procedure Laterality Date   • AMPUTATION DIGIT Left 10/29/2016     Procedure: TENDON AND NERVE REPAIR OF  LEFT THUMB;  Surgeon: Zak Hanson MD;  Location: [facility];  Service:    • ARTHRODESIS  10/28/2013    Spinal / Description: Repair spinal stenosis   • CATARACT EXTRACTION     • EYE SURGERY      EYE MUSCLE SX   • HERNIA REPAIR      right inguinal   • LUMBAR DISCECTOMY FUSION INSTRUMENTATION N/A 1/29/2018    Procedure: L2 to L5 fusion with instrumentation and removal of implants L4 5;  Surgeon: Barrno Knight MD;  Location: [facility] OR;  Service:    • LUMBAR FUSION  2018    Lumbar vertebral fusion   • LUMBAR LAMINECTOMY      10.28.13  bilat L4/5 lami with Nyla and Belen   • LUMBAR LAMINECTOMY DISCECTOMY DECOMPRESSION N/A 1/29/2018    Procedure: L2 to L4 laminectomy with a fusion by orthopedics;  Surgeon: Jeison Redmond MD;  Location: [facility];  Service:    • OTHER SURGICAL HISTORY      Lithotomy   • TONSILLECTOMY         FAMILY HISTORY  Family History   Problem Relation Age of Onset   • Heart attack Mother         acute myocardial infarction   • Stroke Mother         hemorrhagic   • Emphysema Father    • Diabetes Other         mellitus   • Hypertension Other    • Heart disease Other    • Malig Hyperthermia Neg Hx        SOCIAL HISTORY  Social History     Tobacco Use   • Smoking status: Never Smoker   • Smokeless tobacco: Never Used   • Tobacco comment: caffeine use   Substance Use Topics   • Alcohol use: No     Frequency: Never   • Drug use: No       MEDICATIONS:  Medications Prior to Admission   Medication Sig Dispense Refill Last Dose   • brimonidine (ALPHAGAN) 0.2 % ophthalmic solution Administer 1 drop to both eyes 2 (Two) Times a Day.   1/9/2019 at 0330   • dorzolamide (TRUSOPT) 2 % ophthalmic solution Administer 1 drop to both eyes 2 (two) times a day.   1/9/2019 at 0330   • enalapril (VASOTEC) 20 MG tablet Take 1 tablet by mouth 2 (Two) Times a Day. (Patient taking differently: Take 40 mg by mouth Every Morning.) 180 tablet 1 1/8/2019 at 2100    • esomeprazole (nexIUM) 40 MG capsule Take 1 capsule by mouth Daily. 90 capsule 1 1/8/2019 at 2100   • glimepiride (AMARYL) 2 MG tablet Take 1 tablet by mouth Every Morning Before Breakfast. 90 tablet 1 1/8/2019 at 2100   • latanoprost (XALATAN) 0.005 % ophthalmic solution Administer 1 drop to both eyes Every Night.   1/9/2019 at 0330   • oxyCODONE-acetaminophen (PERCOCET)  MG per tablet Take 1 tablet by mouth Every 6 (Six) Hours As Needed for Moderate Pain . 50 tablet 0 Past Week at Unknown time   • timolol (TIMOPTIC) 0.5 % ophthalmic solution Administer 1 drop to both eyes Daily.   1/9/2019 at 0330   • warfarin (COUMADIN) 10 MG tablet Take 10 mg by mouth. Takes Tuesday Thursday  Sat and Sunday 1/1/2019   • warfarin (COUMADIN) 5 MG tablet Take 5 mg by mouth. Takes Monday Wed friday 1/2/2019   • ACCU-CHEK FASTCLIX LANCETS misc USE TO TEST TWICE DAILY 102 each 0 Taking   • ACCU-CHEK SMARTVIEW test strip USE TO TEST TWICE DAILY 100 each 1 Taking   • Blood Glucose Monitoring Suppl (ACCU-CHEK FRANCISCO SMARTVIEW) W/DEVICE kit USE TO TEST BLOOD SUGAR TWICE DAILY 1 kit 0 Taking   • Blood Glucose Monitoring Suppl (FREESTYLE LITE) device Use to test blood once daily 1 each 0 Taking   • donepezil (ARICEPT) 10 MG tablet Take 10 mg by mouth Every Morning.   1/8/2019 at 2100   • ferrous sulfate (IRON SUPPLEMENT) 325 (65 FE) MG tablet Take 1 tablet by mouth Daily With Breakfast.   1/7/2019   • furosemide (LASIX) 20 MG tablet Take 1 tablet by mouth Daily. as needed (Patient taking differently: Take 20 mg by mouth As Needed. as needed) 90 tablet 3 More than a month at Unknown time   • glucose blood (FREESTYLE LITE) test strip Use to test blood once daily 100 each 12 Taking   • Glucose Blood disk Inject 1 Device as directed daily. TEST BLOOD SUGAR ONCE DAILY AS DIRECTED 100 each 3 Taking   • Lancets (FREESTYLE) lancets Use to test blood sugar daily 100 each 12 Taking   • metFORMIN (GLUCOPHAGE) 1000 MG tablet Take one am,  "1/2 noon, and 1 pm (Patient taking differently: 1,000 mg. Take one am, 1/2 noon, and   One in the  pm) 225 tablet 1 1/7/2019   • ONE TOUCH ULTRA TEST test strip USE ONCE DAILY AS DIRECTED 100 each 3 Taking   • pioglitazone (ACTOS) 45 MG tablet Take 1 tablet by mouth Every Morning. 90 tablet 0 1/8/2019 at 2100   • simvastatin (ZOCOR) 20 MG tablet Take 20 mg by mouth Every Night.   1/8/2019 at 2100   • tamsulosin (FLOMAX) 0.4 MG capsule 24 hr capsule Take 0.4 mg by mouth Every Morning. 1-2 TABS AS NEEDED    1/8/2019 at 2100       Allergies:  Patient has no known allergies.    Review of Systems:  Chronic back pain, h/o delerium after surgery last time and urinary retention  Negative for following (except as per HPI):  Constitution: chills, fevers,   Eyes: change of vision, loss of vision and discharge  ENT: ear drainage, ear ringing and facial trauma  Respiratory: cough, pleuritic pain, shortness of air  Cardiovascular: chest pressure, pain, lower extremity edema, palpitations  Gastrointestinal: constipation, diarrhea, nausea, vomiting, pain    Integument: rash and wound  Hematologic / Lymphatic: excessive bleeding and easy bruising  Musculoskeletal: joint pain, joint stiffness, joint swelling and muscle pain  Neurological: headaches, numbness, seizures and tremors  Behavioral / Psych: anxiety, depression and hallucinations         Vital Signs  Temp:  [97.4 °F (36.3 °C)-98.6 °F (37 °C)] 98.1 °F (36.7 °C)  Heart Rate:  [55-92] 91  Resp:  [14-20] 18  BP: (126-156)/(59-80) 130/68  Flowsheet Rows      First Filed Value   Admission Height  190.5 cm (75\") Documented at 01/09/2019 1611   Admission Weight  89.1 kg (196 lb 6.9 oz) Documented at 01/09/2019 0616           Physical Exam:  General Appearance:    awakens, cooperative, in no acute distress   Head:    Normocephalic, without obvious abnormality, atraumatic   Eyes:         conjunctivae and sclerae normal, no icterus, PERRLA   ENT:    Ears grossly intact, oral mucosa " moist,   Neck:   No adenopathy, supple, trachea midline,    Back:     Normal to inspection, range of motion normal   Lungs:     Clear to auscultation,respirations regular, even and                   unlabored    Heart:    Regular rhythm and normal rate,  no murmur, normal S1 and S2,   Abdomen:     Normal bowel sounds, no masses,  soft non-tender, non-distended,    Extremities:   Moves all extremities well, no cyanosis, trace edema,             Pulses:   Pulses palpable and equal bilaterally   Skin:   No bleeding, rash, bruising    Neurologic:    Psych:   Cranial nerves 2 - 12 grossly intact, sensation intact,     Moves all extremities well, equal bilateral strength 4/5    Alert and Oriented x 3, Normal Affect     LABS:  Admission on 01/09/2019   Component Date Value Ref Range Status   • Glucose 01/09/2019 56* 70 - 130 mg/dL Final   • ABO Type 01/09/2019 A   Final   • RH type 01/09/2019 Negative   Final   • Antibody Screen 01/09/2019 Negative   Final   • T&S Expiration Date 01/09/2019 1/12/2019 11:59:59 PM   Final   • Protime 01/09/2019 13.5  11.7 - 14.2 Seconds Final   • INR 01/09/2019 1.05  0.90 - 1.10 Final   • Hemoglobin 01/09/2019 7.8* 13.7 - 17.6 g/dL Final   • Hematocrit 01/09/2019 24.7* 40.4 - 52.2 % Final   • Glucose 01/09/2019 154* 70 - 130 mg/dL Final   • Glucose 01/09/2019 154* 70 - 130 mg/dL Final       DIAGNOSTICS:  Ct Lumbar Spine Without Contrast    Result Date: 1/9/2019  1.  The patient is status post fusion from L2 to L5. There is lucency related to the L2 screws suggesting loosening. They minimally engage the superior endplate of L2. There is sclerosis and endplate irregularity appreciated involving the inferior endplate of L1 which is new versus the prior MRI examination of 12/30/2016. There is no evidence of central canal stenosis. There is neural foraminal compromise on the left at L4-L5 and bilaterally at L5-S1. See above. 2.  Vacuum disc effect is appreciated from L2 to S1.  Radiation dose  reduction techniques were utilized, including automated exposure control and exposure modulation based on body size.  This report was finalized on 1/9/2019 11:46 AM by Dr. Abbe Alexander M.D.           Results Review:   I reviewed the patient's new clinical results.  Old records reviewed see hpi    ASSESSMENT AND PLAN     +Iron deficiency anemia  -monitor for post op anemia  -continue PO iron- could do iv iron before d/c  -unknown if has had colonoscopy- eventhough one fecal occult blood test was neg- pt may need to follow up with a GI doc.  -will also get retic count  -MMA, spep unremarkable, b12 and folate ok  -am labs ordered    +  Atrial fibrillation /   Chronic anticoagulation-for Afib  -warfarin being held due to surgery.    +Postural kyphosis of lumbar region- post op management per primary  -restart coumadin when ok with primary  - be aware of high risk of post op delerium and urinary retention    +  DM (HgbA1c 5.9)  -hold metformin due to some post op hypoxia; continue piogliazone  --Accu checks  -hypoglycemia protocol ordered  -sliding scale insulin to cover outlier blood sugars  -continue home medicine regimen    +  Hypertension/ h/o   Mild cognitive disorder  -stable, continue medical management    +glaucoma- pt very concerned over eye drops- reorder    +DVT proph: scd- defer to primary  +Medical decision maker: wife    I discussed the patients findings and my recommendations with the patient and/or family.  Please reference all orders placed.    Fiona La MD  01/09/19  11:32 PM

## 2019-01-10 NOTE — THERAPY EVALUATION
Acute Care - Physical Therapy Initial Evaluation  [facility]     Patient Name: Derrek Ritter  : 1940  MRN: [id number]  Today's Date: 1/10/2019   Onset of Illness/Injury or Date of Surgery: 19  Date of Referral to PT: 01/10/19  Referring Physician: Barron Knight MD      Admit Date: 2019    Visit Dx:     ICD-10-CM ICD-9-CM   1. Impaired functional mobility and activity tolerance Z74.09 V49.89   2. Postural kyphosis of lumbar region M40.05 737.10     Patient Active Problem List   Diagnosis   • Cholelithiasis   • DM (HgbA1c 5.9)   • Diverticulosis of intestine   • Hypercholesterolemia   • Hypertension   • Spinal stenosis   • Microalbuminuria   • Atrial fibrillation (CMS/HCC)   • Medicare annual wellness visit, subsequent   • Anemia   • Iron deficiency anemia   • DJD (degenerative joint disease)   • Impotence of organic origin   • Glaucoma   • Hiatal hernia   • Mild cognitive disorder   • Calculus of kidney   • Nocturia   • Chronic bilateral low back pain with bilateral sciatica   • Post laminectomy syndrome   • Routine health maintenance   • Weight loss   • Spinal stenosis of lumbar region with neurogenic claudication   • Spondylolisthesis of lumbar region   • Lumbar spine pain   • Metabolic encephalopathy   • Urinary retention with incomplete bladder emptying   • Macrocytosis without anemia   • Sundowning   • RBBB (right bundle branch block with left anterior fascicular block)   • Leg swelling   • Postural kyphosis of lumbar region   • H/O urinary retention   • Chronic anticoagulation-for Afib     Past Medical History:   Diagnosis Date   • Acute suppurative otitis media    • Acute upper respiratory infection    • Anemia    • Cholelithiasis    • Diabetes mellitus (CMS/HCC)     TYPE 2   • Diverticulosis    • Dry eyes    • Earache    • Edema    • Encounter for screening for malignant neoplasm of prostate    • Esophagitis, reflux    • Glaucoma    • H/O echocardiogram 2013   • Health care  maintenance    • Hiatal hernia    • History of EKG 10/06/2015   • History of Holter monitoring 09/16/2013   • Hyperlipidemia    • Hypertension    • Lumbar canal stenosis    • Microalbuminuria    • Mitral valve prolapse    • Nephrolithiasis    • Osteoarthritis of hand     of thumb   • PAF (paroxysmal atrial fibrillation) (CMS/HCC)    • 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   • Spinal stenosis      Past Surgical History:   Procedure Laterality Date   • AMPUTATION DIGIT Left 10/29/2016    Procedure: TENDON AND NERVE REPAIR OF  LEFT THUMB;  Surgeon: Zak Hanson MD;  Location: [facility];  Service:    • ARTHRODESIS  10/28/2013    Spinal / Description: Repair spinal stenosis   • CATARACT EXTRACTION     • EYE SURGERY      EYE MUSCLE SX   • HERNIA REPAIR      right inguinal   • LUMBAR DISCECTOMY FUSION INSTRUMENTATION N/A 1/29/2018    Procedure: L2 to L5 fusion with instrumentation and removal of implants L4 5;  Surgeon: Barron Knight MD;  Location: [facility];  Service:    • LUMBAR FUSION  2018    Lumbar vertebral fusion   • LUMBAR LAMINECTOMY      10.28.13  bilat L4/5 lami with Nyla and Belen   • LUMBAR LAMINECTOMY DISCECTOMY DECOMPRESSION N/A 1/29/2018    Procedure: L2 to L4 laminectomy with a fusion by orthopedics;  Surgeon: Jeison Redmond MD;  Location: [facility];  Service:    • OTHER SURGICAL HISTORY      Lithotomy   • TONSILLECTOMY          PT ASSESSMENT (last 12 hours)      Physical Therapy Evaluation     Row Name 01/10/19 2597          PT Evaluation Time/Intention    Subjective Information  complains of;pain  -CA     Document Type  evaluation  -CA     Mode of Treatment  individual therapy;physical therapy  -CA     Patient Effort  good  -CA     Symptoms Noted During/After Treatment  none  -CA     Row Name 01/10/19 1166          General Information    Patient Profile Reviewed?  yes  -CA     Onset of Illness/Injury or Date of Surgery  01/09/19  -CA      Referring Physician  Barron Knight MD  -CA     Patient Observations  alert;cooperative;agree to therapy  -CA     Equipment Currently Used at Home  walker, rolling  -CA     Pertinent History of Current Functional Problem  s/p T11-L4, L5-S1 fusion  -CA     Existing Precautions/Restrictions  fall;spinal;brace worn when out of bed  -CA     Row Name 01/10/19 1355          Relationship/Environment    Lives With  spouse  -CA     Row Name 01/10/19 1355          Resource/Environmental Concerns    Current Living Arrangements  home/apartment/condo  -CA     Row Name 01/10/19 1355          Home Main Entrance    Number of Stairs, Main Entrance  one  -CA     Row Name 01/10/19 1350          Stairs Within Home, Primary    Stairs, Within Home, Primary  11  -CA     Row Name 01/10/19 1359          Cognitive Assessment/Intervention- PT/OT    Orientation Status (Cognition)  oriented x 4  -CA     Follows Commands (Cognition)  WNL  -CA     Personal Safety Interventions  fall prevention program maintained;gait belt;nonskid shoes/slippers when out of bed  -CA     Row Name 01/10/19 1352          Bed Mobility Assessment/Treatment    Bed Mobility Assessment/Treatment  sidelying-sit;rolling left  -CA     Rolling Left [place] (Bed Mobility)  contact guard;minimum assist (75% patient effort)  -CA     Sidelying-Sit [place] (Bed Mobility)  moderate assist (50% patient effort)  -CA     Assistive Device (Bed Mobility)  bed rails;head of bed elevated  -CA     Row Name 01/10/19 1350          Transfer Assessment/Treatment    Transfer Assessment/Treatment  sit-stand transfer;stand-sit transfer  -CA     Comment (Transfers)  from slightly elevated bed  -CA     Sit-Stand [place] (Transfers)  contact guard  -CA     Stand-Sit [place] (Transfers)  contact guard  -CA     Row Name 01/10/19 1359          Sit-Stand Transfer    Assistive Device (Sit-Stand Transfers)  walker, front-wheeled  -CA     Row Name 01/10/19 1354          Stand-Sit  "Transfer    Assistive Device (Stand-Sit Transfers)  walker, front-wheeled  -CA     Row Name 01/10/19 0673          Gait/Stairs Assessment/Training    [place] Level (Gait)  contact guard  -CA     Assistive Device (Gait)  walker, front-wheeled  -CA     Distance in Feet (Gait)  450  -CA     Pattern (Gait)  step-through  -CA     Deviations/Abnormal Patterns (Gait)  base of support, narrow;gait speed decreased;stride length decreased  -CA     Bilateral Gait Deviations  forward flexed posture;heel strike decreased  -CA     Comment (Gait/Stairs)  cues for increased step length/foot clearance  -CA     Row Name 01/10/19 0796          General ROM    GENERAL ROM COMMENTS  grossly wfl  -CA     Row Name 01/10/19 9125          MMT (Manual Muscle Testing)    General MMT Comments  B LE grossly 4/5  -CA     Row Name 01/10/19 8821          Sensory Assessment/Intervention    Sensory General Assessment  no sensation deficits identified  -CA     Row Name 01/10/19 9863          Pain Assessment    Additional Documentation  Pain Scale: Numbers Pre/Post-Treatment (Group)  -CA     Row Name 01/10/19 6509          Pain Scale: Numbers Pre/Post-Treatment    Pain Scale: Numbers, Pretreatment  -- \"12/10\"  -CA     Pain Scale: Numbers, Post-Treatment  9/10  -CA     Pain Location  back  -CA     Pain Intervention(s)  Repositioned;Ambulation/increased activity  -CA     Row Name             Wound 01/09/19 0844 Other (See comments) back incision    Wound - Properties Group Date first assessed: 01/09/19  -JT Time first assessed: 0844  -JT Side: Other (See comments)  -JT Location: back  -JT Type: incision  -JT    Row Name 01/10/19 5477          Physical Therapy Clinical Impression    Date of Referral to PT  01/10/19  -CA     Criteria for Skilled Interventions Met (PT Clinical Impression)  yes;treatment indicated  -CA     Rehab Potential (PT Clinical Summary)  good, to achieve stated therapy goals  -CA     Row Name 01/10/19 1786          Physical " Therapy Goals    Bed Mobility Goal Selection (PT)  bed mobility, PT goal 1  -CA     Transfer Goal Selection (PT)  transfer, PT goal 1  -CA     Gait Training Goal Selection (PT)  gait training, PT goal 1  -CA     Stairs Goal Selection (PT)  stairs, PT goal 1  -CA     Additional Documentation  Stairs Goal Selection (PT) (Row)  -CA     Row Name 01/10/19 8479          Bed Mobility Goal 1 (PT)    Activity/Assistive Device (Bed Mobility Goal 1, PT)  bed mobility activities, all  -CA     [place] Level/Cues Needed (Bed Mobility Goal 1, PT)  supervision required  -CA     Time Frame (Bed Mobility Goal 1, PT)  1 week  -CA     Row Name 01/10/19 6734          Transfer Goal 1 (PT)    Activity/Assistive Device (Transfer Goal 1, PT)  transfers, all  -CA     [place] Level/Cues Needed (Transfer Goal 1, PT)  supervision required  -CA     Time Frame (Transfer Goal 1, PT)  1 week  -CA     Row Name 01/10/19 5090          Gait Training Goal 1 (PT)    Activity/Assistive Device (Gait Training Goal 1, PT)  gait (walking locomotion);assistive device use  -CA     [place] Level (Gait Training Goal 1, PT)  supervision required  -CA     Distance (Gait Goal 1, PT)  400  -CA     Time Frame (Gait Training Goal 1, PT)  1 week  -CA     Row Name 01/10/19 5971          Stairs Goal 1 (PT)    Activity/Assistive Device (Stairs Goal 1, PT)  stairs, all skills  -CA     [place] Level/Cues Needed (Stairs Goal 1, PT)  contact guard assist  -CA     Number of Stairs (Stairs Goal 1, PT)  11  -CA     Time Frame (Stairs Goal 1, PT)  1 week  -CA     Row Name 01/10/19 8477          Positioning and Restraints    Pre-Treatment Position  in bed  -CA     Post Treatment Position  chair  -CA     In Chair  reclined;call light within reach;encouraged to call for assist;with family/caregiver  -CA     Row Name 01/10/19 2033          Living Environment    Home Accessibility  stairs to enter home;stairs within home  -CA       User Key  (r) = Recorded By, (t) =  Taken By, (c) = Cosigned By    Initials Name Provider Type    Margie Sena, RN Registered Nurse    Nel Steele, BAL[place] Physical Therapist        Physical Therapy Education     Title: PT OT SLP Therapies (In Progress)     Topic: Physical Therapy (In Progress)     Point: Mobility training (In Progress)     Learning Progress Summary           Patient Acceptance, E, NR by CA at 1/10/2019  2:00 PM    Comment:  educated on log roll, spinal precautions                   Point: Home exercise program (In Progress)     Learning Progress Summary           Patient Acceptance, E, NR by CA at 1/10/2019  2:00 PM    Comment:  educated on log roll, spinal precautions                   Point: Body mechanics (In Progress)     Learning Progress Summary           Patient Acceptance, E, NR by CA at 1/10/2019  2:00 PM    Comment:  educated on log roll, spinal precautions                   Point: Precautions (In Progress)     Learning Progress Summary           Patient Acceptance, E, NR by CA at 1/10/2019  2:00 PM    Comment:  educated on log roll, spinal precautions                               User Key     Initials Effective Dates Name Provider Type Discipline    CA 06/13/18 -  Nel Hopkins, BALDE[place] Physical Therapist PT              PT Recommendation and Plan  Anticipated Discharge Disposition (PT): home with assist, home with home health  Planned Therapy Interventions (PT Eval): balance training, bed mobility training, gait training, home exercise program, manual therapy techniques, neuromuscular re-education, patient/family education, postural re-education, ROM (range of motion), stair training, strengthening, stretching, transfer training  Therapy Frequency (PT Clinical Impression): 2 times/day  Outcome Summary/Treatment Plan (PT)  Anticipated Discharge Disposition (PT): home with assist, home with home health  Plan of Care Reviewed With: patient  Outcome Summary: Pt is a 78 y.o. male is s/p T11-L4 , L5-S1 fusion on  1/9/2019. Pt presents today with weakness and decreased functional mobility. Pt required mod A for bed mobility, min A for STS transfers from elevated bed, and CGA for ambulation with FWW for 450 ft. Pt will benefit from skilled PT to address the previous deficits and improve overall safety with functional mobility. PTA pt lives with wife in 2 story home with 1 JOSE ROBERTO and 11 steps to bedroom/BR, has rolling wx at home. Anticipate pt will D/C to home with assist and HH PT, but will cont to assess pending functional strength gains, safety, and ability to navigate steps.  Outcome Measures     Row Name 01/10/19 1400             How much help from another person do you currently need...    Turning from your back to your side while in flat bed without using bedrails?  3  -CA      Moving from lying on back to sitting on the side of a flat bed without bedrails?  2  -CA      Moving to and from a bed to a chair (including a wheelchair)?  3  -CA      Standing up from a chair using your arms (e.g., wheelchair, bedside chair)?  3  -CA      Climbing 3-5 steps with a railing?  2  -CA      To walk in hospital room?  3  -CA      AM-PAC 6 Clicks Score  16  -CA         Functional Assessment    Outcome Measure Options  AM-PAC 6 Clicks Basic Mobility (PT)  -CA        User Key  (r) = Recorded By, (t) = Taken By, (c) = Cosigned By    Initials Name Provider Type    Nel Steele, PT Physical Therapist         Time Calculation:   PT Charges     Row Name 01/10/19 1403 01/10/19 1033          Time Calculation    Start Time  1324  -CA  --     Stop Time  1355  -CA  --     Time Calculation (min)  31 min  -CA  --     PT Received On  01/10/19  -CA  --     PT - Next Appointment  01/11/19  -CA  01/10/19  -CA     PT Goal Re-Cert Due Date  01/17/19  -CA  --        Time Calculation- PT    Total Timed Code Minutes- PT  23 minute(s)  -CA  --       User Key  (r) = Recorded By, (t) = Taken By, (c) = Cosigned By    Initials Name Provider Type    OCTAVIO Hopkins  Nel, PT Physical Therapist        Therapy Suggested Charges     Code   Minutes Charges    None           Therapy Charges for Today     Code Description Service Date Service Provider Modifiers Qty    63564272148 HC PT EVAL MOD COMPLEXITY 1 1/10/2019 Nel Hopkins, PT GP 1    03187219653 HC PT THER PROC EA 15 MIN 1/10/2019 Nel Hopkins, PT GP 2          PT G-Codes  Outcome Measure Options: AM-PAC 6 Clicks Basic Mobility (PT)  AM-PAC 6 Clicks Score: 16      Nel Hopkins, PT  1/10/2019

## 2019-01-10 NOTE — PROGRESS NOTES
Postoperative day 1: AVSS awake alert and oriented.  He complains of back pain but no leg pain.  He is already been standing in the feels that he is standing more erect than preoperatively.  He moves the legs well.  Hemoglobin is 8.  Doing well begin PT I want to get a [place] brace which she will wear when up and out of bed.

## 2019-01-10 NOTE — PROGRESS NOTES
Name: Derrek Ritter ADMIT: 2019   : 1940  PCP: Trey Stockton MD    MRN: [id number] LOS: 1 days   AGE/SEX: 78 y.o. male  ROOM: [facility]     Subjective   Seems to be doing okay.    Objective   Vital Signs  Temp:  [97.2 °F (36.2 °C)-98.1 °F (36.7 °C)] 97.2 °F (36.2 °C)  Heart Rate:  [] 105  Resp:  [14-18] 18  BP: (125-156)/(56-80) 134/66  SpO2:  [95 %-100 %] 96 %  on  Flow (L/min):  [2-4] 2;   Device (Oxygen Therapy): room air  Body mass index is 24.55 kg/m².    Physical Exam   Constitutional: He is oriented to person, place, and time. No distress.   Cardiovascular: Normal rate and regular rhythm.   No murmur heard.  Pulmonary/Chest: Effort normal and breath sounds normal.   Abdominal: Soft. Bowel sounds are normal. He exhibits no distension. There is no tenderness.   Musculoskeletal: Normal range of motion. He exhibits no edema.   Neurological: He is alert and oriented to person, place, and time.   Skin: Skin is warm and dry. He is not diaphoretic.       Results Review:       I reviewed the patient's new clinical results.  Results from last 7 days   Lab Units  01/10/19   0435  19   1346  19   0655   WBC 10*3/mm3  7.72   --   3.92*   HEMOGLOBIN g/dL  8.0*  7.8*  9.2*   PLATELETS 10*3/mm3  216   --   265     Results from last 7 days   Lab Units  01/10/19   0435  19   0655   SODIUM mmol/L  145  147*   POTASSIUM mmol/L  4.0  4.2   CHLORIDE mmol/L  108*  111*   CO2 mmol/L  25.0  25.2   BUN mg/dL  14  22   CREATININE mg/dL  0.71*  0.87   GLUCOSE mg/dL  162*  149*   Estimated Creatinine Clearance: 97.5 mL/min (A) (by C-G formula based on SCr of 0.71 mg/dL (L)).  Results from last 7 days   Lab Units  01/10/19   0435  19   0655   CALCIUM mg/dL  8.3*  9.5   ALBUMIN g/dL  2.90*   --      Lab Results   Component Value Date    HGBA1C 7.10 (H) 10/10/2018    HGBA1C 7.22 (H) 2018     Glucose   Date/Time Value Ref Range Status   01/10/2019 0630 144 (H) 70 - 130 mg/dL  Final   01/09/2019 2212 154 (H) 70 - 130 mg/dL Final   01/09/2019 1347 154 (H) 70 - 130 mg/dL Final   01/09/2019 0549 56 (L) 70 - 130 mg/dL Final         atorvastatin 20 mg Oral Daily   brimonidine 1 drop Both Eyes BID   donepezil 10 mg Oral QAM   dorzolamide 1 drop Both Eyes BID   enalapril 40 mg Oral QAM   ferrous sulfate 325 mg Oral Q AM   glipiZIDE 5 mg Oral QAM AC   insulin lispro 0-7 Units Subcutaneous 4x Daily With Meals & Nightly   latanoprost 1 drop Both Eyes Nightly   pantoprazole 40 mg Oral QAM   pioglitazone 45 mg Oral QAM   polyethylene glycol 17 g Oral Daily   sennosides-docusate sodium 1 tablet Oral BID   sodium chloride 3 mL Intravenous Q12H   tamsulosin 0.4 mg Oral QAM   timolol 1 drop Both Eyes Daily       HYDROmorphone HCl-NaCl     lactated ringers 9 mL/hr Last Rate: 9 mL/hr (01/09/19 0652)   sodium chloride 100 mL/hr Last Rate: 100 mL/hr (01/09/19 1635)         Assessment/Plan      Active Hospital Problems    Diagnosis Date Noted   • **Postural kyphosis of lumbar region [M40.05] 12/28/2018   • H/O urinary retention [Z87.898] 01/09/2019   • Chronic anticoagulation-for Afib [Z79.01] 01/09/2019   • Mild cognitive disorder [F09] 10/06/2016   • Iron deficiency anemia [D50.9] 07/11/2016   • Hypertension [I10] 02/02/2016   • Atrial fibrillation (CMS/HCC) [I48.91] 02/02/2016   • DM (HgbA1c 5.9) [E11.9] 02/02/2016      Resolved Hospital Problems   No resolved problems to display.       Mr. Ritter is a 78 y.o. male who is POD#1 T 11 to L3, L5-S1 fusion with T11 to S1 instrumentation, L1-2, L4 laminectomy and neural lysis and L4 pedicle subtraction osteotomy with Cyril Redmond and Vinicio.    · BS excellent. a1c normal. Continue same for now, low dose correctional factor plus actos. Metformin held.  · Blood pressure and heart rate stable. Atrial fibrillation rate controlled. Anticoagulation held per surgery.  · Expected postoperative anemia. He has chronic iron deficiency anemia and is on iron replacement  therapy. Recent iron studies reviewed. Likely to need transfusion.      Breezy Gonzalez MD  [facility]  01/10/19  8:36 AM

## 2019-01-10 NOTE — PROGRESS NOTES
Post Op Visit      Dr. Redomnd saw patient earlier this morning. Patient up in chair.  Feels good. Back pain well managed. Denies leg pain. Denies headache.      99.1, 83, 18, 134/66; 95% RA        Lumbar dressing is dry and intact.  No calf swelling or tenderness with palpation.         1/10/2019 04:35   WBC 7.72       POD 1 bilateral L3-4, L4-5 laminectomy with neural lysis for flat back syndrome.  T11-L4, L5-S1 bilateral posterior lateral fusion with Dr. Knight.      Continue to mobilize  CBC in a.m.  Discharge when okay with Dr. Knight

## 2019-01-10 NOTE — PLAN OF CARE
Problem: Patient Care Overview  Goal: Plan of Care Review   01/10/19 1401   Coping/Psychosocial   Plan of Care Reviewed With patient   OTHER   Outcome Summary Pt is a 78 y.o. male is s/p T11-L4 , L5-S1 fusion on 1/9/2019. Pt presents today with weakness and decreased functional mobility. Pt required mod A for bed mobility, min A for STS transfers from elevated bed, and CGA for ambulation with FWW for 450 ft. Pt will benefit from skilled PT to address the previous deficits and improve overall safety with functional mobility. PTA pt lives with wife in 2 story home with 1 [address]SE [address] and 11 steps to bedroom/BR, has rolling wx at home. Anticipate pt will D/C to home with assist and HH PT, but will cont to assess pending functional strength gains, safety, and ability to navigate steps.

## 2019-01-10 NOTE — PLAN OF CARE
Problem: Patient Care Overview  Goal: Plan of Care Review  Outcome: Ongoing (interventions implemented as appropriate)   01/10/19 0458   Coping/Psychosocial   Plan of Care Reviewed With patient   Plan of Care Review   Progress improving   OTHER   Outcome Summary vss. weaned off oxygen for most of night. new orders per [facility] MD. continues to c/o pain-continue dilaudid pca and po percocet and flexeril prn-see mar. patient forgetful at times-frequent redirection and education of PCA pump given overnight. spouse at bedside. turn q2hr. f/c in place with good output. resting quietly between care. blood sugar wnl. dsg cdi. ctm closely.      Goal: Individualization and Mutuality  Outcome: Ongoing (interventions implemented as appropriate)    Goal: Discharge Needs Assessment  Outcome: Ongoing (interventions implemented as appropriate)      Problem: Fall Risk (Adult)  Goal: Identify Related Risk Factors and Signs and Symptoms  Outcome: Outcome(s) achieved Date Met: 01/10/19    Goal: Absence of Fall  Outcome: Ongoing (interventions implemented as appropriate)      Problem: Skin Injury Risk (Adult)  Goal: Identify Related Risk Factors and Signs and Symptoms  Outcome: Outcome(s) achieved Date Met: 01/10/19    Goal: Skin Health and Integrity  Outcome: Ongoing (interventions implemented as appropriate)

## 2019-01-11 NOTE — PROGRESS NOTES
D/w nurse and BS are good and bp is good, HR is high but is being seen by cardiology. Will follow up tomorrow.

## 2019-01-11 NOTE — THERAPY TREATMENT NOTE
Acute Care - Physical Therapy Treatment Note  [facility]     Patient Name: Derrek Ritter  : 1940  MRN: [id number]  Today's Date: 2019  Onset of Illness/Injury or Date of Surgery: 19  Date of Referral to PT: 01/10/19  Referring Physician: Barron Knight MD    Admit Date: 2019    Visit Dx:    ICD-10-CM ICD-9-CM   1. Impaired functional mobility and activity tolerance Z74.09 V49.89   2. Postural kyphosis of lumbar region M40.05 737.10   3. Paroxysmal atrial fibrillation (CMS/HCC) I48.0 427.31   4. Spinal stenosis of lumbar region with neurogenic claudication M48.062 724.03     Patient Active Problem List   Diagnosis   • Cholelithiasis   • DM (HgbA1c 5.9)   • Diverticulosis of intestine   • Hypercholesterolemia   • Hypertension   • Spinal stenosis   • Microalbuminuria   • Atrial fibrillation (CMS/HCC)   • Medicare annual wellness visit, subsequent   • Anemia   • Iron deficiency anemia   • DJD (degenerative joint disease)   • Impotence of organic origin   • Glaucoma   • Hiatal hernia   • Mild cognitive disorder   • Calculus of kidney   • Nocturia   • Chronic bilateral low back pain with bilateral sciatica   • Post laminectomy syndrome   • Routine health maintenance   • Weight loss   • Spinal stenosis of lumbar region with neurogenic claudication   • Spondylolisthesis of lumbar region   • Lumbar spine pain   • Metabolic encephalopathy   • Urinary retention with incomplete bladder emptying   • Macrocytosis without anemia   • Sundowning   • RBBB (right bundle branch block with left anterior fascicular block)   • Leg swelling   • Postural kyphosis of lumbar region   • H/O urinary retention   • Chronic anticoagulation-for Afib       Therapy Treatment    Rehabilitation Treatment Summary     Row Name 19 1300 19 0900          Treatment Time/Intention    Discipline  physical therapy assistant  -CW  physical therapy assistant  -CW     Document Type  therapy note (daily note)  -FOREST   therapy note (daily note)  -CW     Subjective Information  complains of;fatigue;pain  -CW  complains of;weakness;fatigue;pain  -CW     Mode of Treatment  physical therapy  -CW  physical therapy  -CW     Therapy Frequency (PT Clinical Impression)  2 times/day  -CW  2 times/day  -CW     Patient Effort  adequate  -CW  adequate  -CW     Existing Precautions/Restrictions  fall;brace worn when out of bed;spinal  -CW  fall;brace worn when out of bed;spinal  -CW     Recorded by [CW] Pieter Mckeon, PTA 01/11/19 1348 [CW] Pieter Mckeon, PTA 01/11/19 0951     Row Name 01/11/19 1300 01/11/19 0900          Vital Signs    O2 Delivery Pre Treatment  room air  -CW  supplemental O2  -CW     O2 Delivery Intra Treatment  --  supplemental O2  -CW     O2 Delivery Post Treatment  --  supplemental O2  -CW     Recorded by [CW] Pieter Mckeon, PTA 01/11/19 1348 [CW] Pieter Mckeon, PTA 01/11/19 0951     Row Name 01/11/19 1300 01/11/19 0900          Cognitive Assessment/Intervention- PT/OT    Orientation Status (Cognition)  oriented x 4  -CW  oriented x 4  -CW     Follows Commands (Cognition)  WNL  -CW  WNL  -CW     Personal Safety Interventions  fall prevention program maintained;gait belt;muscle strengthening facilitated;nonskid shoes/slippers when out of bed  -CW  fall prevention program maintained;gait belt;muscle strengthening facilitated;nonskid shoes/slippers when out of bed  -CW     Recorded by [CW] Pieter Mckeon, PTA 01/11/19 1348 [CW] Pieter Mckeon, PTA 01/11/19 0951     Row Name 01/11/19 1300 01/11/19 0900          Bed Mobility Assessment/Treatment    Rolling Left [place] (Bed Mobility)  not tested  -CW  not tested  -CW     Comment (Bed Mobility)  in chair then EOB with nursing  -CW  in chair  -CW     Recorded by [CW] Pieter Mckeon, PTA 01/11/19 1348 [CW] Pieter Mckeon, PTA 01/11/19 0951     Row Name 01/11/19 1300 01/11/19 0900          Transfer Assessment/Treatment    Transfer  Assessment/Treatment  sit-stand transfer;stand-sit transfer  -CW  sit-stand transfer;stand-sit transfer  -CW     Recorded by [CW] Pieter Mckeon, PTA 01/11/19 1348 [CW] Pieter Mckeon, PTA 01/11/19 0951     Row Name 01/11/19 1300 01/11/19 0900          Sit-Stand Transfer    Sit-Stand [place] (Transfers)  minimum assist (75% patient effort)  -CW  minimum assist (75% patient effort)  -CW     Assistive Device (Sit-Stand Transfers)  walker, front-wheeled  -CW  walker, front-wheeled  -CW     Recorded by [CW] Pieter Mckeon, PTA 01/11/19 1348 [CW] Pieter Mckeon, PTA 01/11/19 0951     Row Name 01/11/19 1300 01/11/19 0900          Stand-Sit Transfer    Stand-Sit [place] (Transfers)  minimum assist (75% patient effort)  -CW  minimum assist (75% patient effort)  -CW     Assistive Device (Stand-Sit Transfers)  walker, front-wheeled  -CW  walker, front-wheeled  -CW     Recorded by [CW] Pieter Mckeon, PTA 01/11/19 1348 [CW] Pieter Mckeon, PTA 01/11/19 0951     Row Name 01/11/19 1300 01/11/19 0900          Gait/Stairs Assessment/Training    [place] Level (Gait)  contact guard  -CW  not tested  -CW     Assistive Device (Gait)  walker, front-wheeled  -CW  --     Distance in Feet (Gait)  450  -CW  --     Pattern (Gait)  step-through  -CW  --     Deviations/Abnormal Patterns (Gait)  base of support, narrow;gait speed decreased;stride length decreased  -CW  --     Bilateral Gait Deviations  forward flexed posture;heel strike decreased  -CW  --     [place] Level (Stairs)  contact guard  -CW  --     Handrail Location (Stairs)  right side (ascending)  -CW  --     Number of Steps (Stairs)  15  -CW  --     Ascending Technique (Stairs)  step-to-step  -CW  --     Descending Technique (Stairs)  step-to-step  -CW  --     Comment (Gait/Stairs)  --  pt with increased pain and lower hemoglobin  -CW     Recorded by [CW] Pieter Mckeon, PTA 01/11/19 1348 [CW] Pieter Mckeon, PTA  01/11/19 0951     Row Name 01/11/19 1300 01/11/19 0900          Positioning and Restraints    Pre-Treatment Position  sitting in chair/recliner  -CW  sitting in chair/recliner  -CW     Post Treatment Position  bed  -CW  chair  -CW     In Bed  notified nsg;sitting EOB;call light within reach;encouraged to call for assist;with family/caregiver;with nsg  -CW  --     In Chair  --  notified nsg;reclined;call light within reach;encouraged to call for assist  -CW     Recorded by [CW] Pieter Mckeon, PTA 01/11/19 1348 [CW] Pieter Mckeon, PTA 01/11/19 0951     Row Name 01/11/19 1300 01/11/19 0900          Pain Assessment    Additional Documentation  Pain Scale: Numbers Pre/Post-Treatment (Group)  -CW  Pain Scale: Numbers Pre/Post-Treatment (Group)  -CW     Recorded by [CW] Pieter Mckeon, PTA 01/11/19 1348 [CW] Pieter Mckeon, PTA 01/11/19 0951     Row Name 01/11/19 1300 01/11/19 0900          Pain Scale: Numbers Pre/Post-Treatment    Pain Scale: Numbers, Pretreatment  4/10  -CW  4/10  -CW     Pain Scale: Numbers, Post-Treatment  8/10  -CW  8/10  -CW     Pain Location  back  -CW  back  -CW     Pain Intervention(s)  Medication (See MAR);Repositioned;Ambulation/increased activity  -CW  Medication (See MAR);Repositioned;Ambulation/increased activity  -CW     Recorded by [CW] Pieter Mckeon, PTA 01/11/19 1348 [CW] Pieter Mckeon, PTA 01/11/19 0951     Row Name                Wound 01/09/19 0844 Other (See comments) back incision    Wound - Properties Group Date first assessed: 01/09/19 [JT] Time first assessed: 0844 [JT] Side: Other (See comments) [JT] Location: back [JT] Type: incision [JT] Recorded by:  [JT] Margie Bravo RN 01/09/19 0844    Row Name 01/11/19 1300 01/11/19 0900          Outcome Summary/Treatment Plan (PT)    Anticipated Discharge Disposition (PT)  home with assist;home with home health  -CW  home with assist;home with home health  -CW     Recorded by [CW] Pieter Mckeon  P, PTA 01/11/19 1348 [CW] Pieter Mckeon P, PTA 01/11/19 0951       User Key  (r) = Recorded By, (t) = Taken By, (c) = Cosigned By    Initials Name Effective Dates Discipline    Margie Sena RN 06/16/16 -  Nurse    CW Pieter Mckeon P, PTA 03/07/18 -  PT          Wound 01/09/19 0844 Other (See comments) back incision (Active)   Dressing Appearance dry;intact 1/11/2019 12:22 AM   Closure MARIELLE 1/11/2019 12:22 AM   Base dressing in place, unable to visualize 1/11/2019 12:22 AM   Drainage Amount none 1/11/2019 12:22 AM   Dressing Care, Wound foam;gauze 1/10/2019  8:15 PM           Physical Therapy Education     Title: PT OT SLP Therapies (Done)     Topic: Physical Therapy (Done)     Point: Mobility training (Done)     Learning Progress Summary           Patient Acceptance, E,TB, VU,DU by CW at 1/11/2019  1:48 PM    Acceptance, E,TB, VU,DU by CW at 1/11/2019  9:51 AM    Acceptance, E, NR by CA at 1/10/2019  2:00 PM    Comment:  educated on log roll, spinal precautions                   Point: Home exercise program (Done)     Learning Progress Summary           Patient Acceptance, E,TB, VU,DU by CW at 1/11/2019  1:48 PM    Acceptance, E,TB, VU,DU by CW at 1/11/2019  9:51 AM    Acceptance, E, NR by CA at 1/10/2019  2:00 PM    Comment:  educated on log roll, spinal precautions                   Point: Body mechanics (Done)     Learning Progress Summary           Patient Acceptance, E,TB, VU,DU by CW at 1/11/2019  1:48 PM    Acceptance, E,TB, VU,DU by CW at 1/11/2019  9:51 AM    Acceptance, E, NR by CA at 1/10/2019  2:00 PM    Comment:  educated on log roll, spinal precautions                   Point: Precautions (Done)     Learning Progress Summary           Patient Acceptance, E,TB, VU,DU by CW at 1/11/2019  1:48 PM    Acceptance, E,TB, VU,DU by CW at 1/11/2019  9:51 AM    Acceptance, E, NR by CA at 1/10/2019  2:00 PM    Comment:  educated on log roll, spinal precautions                               User  Key     Initials Effective Dates Name Provider Type Discipline    CW 03/07/18 -  Pieter Mckeon PTA Physical Therapy Assistant PT    CA 06/13/18 -  Nel Hopkins PT Physical Therapist PT                PT Recommendation and Plan  Anticipated Discharge Disposition (PT): home with assist, home with home health  Therapy Frequency (PT Clinical Impression): 2 times/day  Outcome Summary/Treatment Plan (PT)  Anticipated Discharge Disposition (PT): home with assist, home with home health  Plan of Care Reviewed With: patient  Progress: improving  Outcome Summary: Pt increasing with amb safety and stair navigation with 1 HR to go home with and use.  Pt improved with activity tlerance and strength in the B LE  Outcome Measures     Row Name 01/11/19 1300 01/11/19 0900 01/10/19 1400       How much help from another person do you currently need...    Turning from your back to your side while in flat bed without using bedrails?  3  -CW  3  -CW  3  -CA    Moving from lying on back to sitting on the side of a flat bed without bedrails?  3  -CW  2  -CW  2  -CA    Moving to and from a bed to a chair (including a wheelchair)?  3  -CW  3  -CW  3  -CA    Standing up from a chair using your arms (e.g., wheelchair, bedside chair)?  3  -CW  3  -CW  3  -CA    Climbing 3-5 steps with a railing?  3  -CW  2  -CW  2  -CA    To walk in hospital room?  3  -CW  3  -CW  3  -CA    AM-PAC 6 Clicks Score  18  -CW  16  -CW  16  -CA       Functional Assessment    Outcome Measure Options  AM-PAC 6 Clicks Basic Mobility (PT)  -CW  AM-PAC 6 Clicks Basic Mobility (PT)  -CW  AM-PAC 6 Clicks Basic Mobility (PT)  -CA      User Key  (r) = Recorded By, (t) = Taken By, (c) = Cosigned By    Initials Name Provider Type    CW Pieter Mckeon PTA Physical Therapy Assistant    CA Nel Hopkins, BALDEMAR Physical Therapist         Time Calculation:   PT Charges     Row Name 01/11/19 1349 01/11/19 0954          Time Calculation    Start Time  1258  -CW  0967   -CW     Stop Time  1324  -CW  0954  -CW     Time Calculation (min)  26 min  -CW  28 min  -CW     PT Received On  01/11/19  -CW  01/11/19  -CW     PT - Next Appointment  01/12/19  -CW  01/12/19  -CW       User Key  (r) = Recorded By, (t) = Taken By, (c) = Cosigned By    Initials Name Provider Type    CW Pieter Mckeon PTA Physical Therapy Assistant        Therapy Suggested Charges     Code   Minutes Charges    None           Therapy Charges for Today     Code Description Service Date Service Provider Modifiers Qty    63333848429 HC PT THER PROC EA 15 MIN 1/11/2019 Pieter Mckeon PTA GP 2    09470978184 HC PT THER PROC EA 15 MIN 1/11/2019 Pieter Mckeon PTA GP 2          PT G-Codes  Outcome Measure Options: AM-PAC 6 Clicks Basic Mobility (PT)  AM-PAC 6 Clicks Score: 18    Pieter Mckeon PTA  1/11/2019

## 2019-01-11 NOTE — PLAN OF CARE
Problem: Patient Care Overview  Goal: Plan of Care Review  Outcome: Ongoing (interventions implemented as appropriate)   01/10/19 8190   Coping/Psychosocial   Plan of Care Reviewed With patient   Plan of Care Review   Progress improving   OTHER   Outcome Summary Doing well. Ambulating in room with walker and brace on. Pain controlled with oral medication and PCA. Spell of tachycardia and was asymptomatic. Will continue to monitor.       Problem: Fall Risk (Adult)  Goal: Absence of Fall  Outcome: Ongoing (interventions implemented as appropriate)      Problem: Skin Injury Risk (Adult)  Goal: Skin Health and Integrity  Outcome: Ongoing (interventions implemented as appropriate)      Problem: Laminectomy/Foraminotomy/Discectomy (Adult)  Goal: Signs and Symptoms of Listed Potential Problems Will be Absent, Minimized or Managed (Laminectomy/Foraminotomy/Discectomy)  Outcome: Ongoing (interventions implemented as appropriate)

## 2019-01-11 NOTE — PROGRESS NOTES
Subjective   Derrek Ritter is a 78 y.o. male.     History of Present Illness     Patient presents today for subsequent annual Medicare wellness evaluation.    Health maintenance: Last ophthalmology visit scheduled for next week.  Dentist less than 1 year.  Exercise walking, patient is still recovering from his back surgery at the end of January of this year.    Hypertension patient is compliant with medication, dietary salt restriction, walking for exercise, and home blood pressure monitoring.  There are no medication side effects noted.    Hyperlipidemia: Patient's compliant with medication, and is fasting for lab work today.  He does monitor dietary fat intake as well.    Diabetes: Patient's compliant with medication, dietary carbohydrate restriction.  Fasting blood sugar typically runs 100 at home.  Patient's current with ophthalmological follow-up, he does not have a podiatrist, and is not an ACE inhibitor at this time, microalbumin will be rechecked today.    Patient has history of anemia, followed by hematology, status to be determined.      Immunization update, Prevnar will be provided today, patient is advised to get the new shingles vaccine as well.    The following portions of the patient's history were reviewed and updated as appropriate: allergies, current medications, past family history, past medical history, past social history, past surgical history and problem list.    Review of Systems   Constitutional: Positive for appetite change. Negative for unexpected weight change.   HENT: Negative.    Eyes: Negative.    Respiratory: Negative.    Cardiovascular: Negative.    Gastrointestinal: Negative.    Endocrine: Negative.    Genitourinary: Negative.    Musculoskeletal: Negative.    Skin: Negative.    Allergic/Immunologic: Negative for immunocompromised state.   Neurological: Negative.    Hematological: Bruises/bleeds easily.        Patient is on Coumadin at this time   Psychiatric/Behavioral: Negative.          Patient continues to use Aricept for memory issues.  He does believe this is helping some at this point, we discussed increasing the medication to 10 mg a day to see if we can improve his benefit.       Objective   Physical Exam   Constitutional: He is oriented to person, place, and time. He appears well-developed and well-nourished. No distress.   HENT:   Head: Normocephalic and atraumatic.   Right Ear: Tympanic membrane, external ear and ear canal normal.   Left Ear: Tympanic membrane, external ear and ear canal normal.   Mouth/Throat: Uvula is midline, oropharynx is clear and moist and mucous membranes are normal.   Eyes: Conjunctivae and EOM are normal. Pupils are equal, round, and reactive to light.   Neck: Normal range of motion. Neck supple. No JVD present. Carotid bruit is not present. No thyromegaly present.   Cardiovascular: Normal rate, regular rhythm, normal heart sounds and intact distal pulses.  Exam reveals no gallop and no friction rub.    No murmur heard.  Pulmonary/Chest: Effort normal and breath sounds normal. He has no wheezes. He has no rales.   Abdominal: Soft. Bowel sounds are normal. There is no hepatosplenomegaly. There is no tenderness.   Genitourinary:   Genitourinary Comments: Declined by pt   Musculoskeletal: Normal range of motion. He exhibits no edema or deformity.   Lymphadenopathy:     He has no cervical adenopathy.   Neurological: He is alert and oriented to person, place, and time. He has normal strength and normal reflexes. No cranial nerve deficit or sensory deficit. Coordination normal.   Skin: Skin is warm and dry. No rash noted.   Psychiatric: He has a normal mood and affect. His speech is normal and behavior is normal. Judgment and thought content normal. Cognition and memory are normal.   Nursing note and vitals reviewed.      Assessment/Plan   Derrek was seen today for annual exam.    Diagnoses and all orders for this visit:    Medicare annual wellness visit,  subsequent    Essential hypertension  -     Comprehensive Metabolic Panel    Hypercholesterolemia  -     Lipid Panel    Type 2 diabetes mellitus without complication, without long-term current use of insulin  -     Hemoglobin A1c  -     Microalbumin / Creatinine Urine Ratio - Urine, Clean Catch    Anemia, unspecified type  -     CBC & Differential    Encounter for immunization  -     Pneumococcal Conjugate Vaccine 13-Valent All (PCV13)    Memory changes      Number-one subsequent annual Medicare wellness evaluation, clinically stable, see comments above and below.  Recommend repeat exam one year.    #2 hypertension stable on current medication.  Plan: Continue same, check CMP today follow-up results online, blood pressure check 4 months.    #3 hyperlipidemia on medication, status to be determined.  Plan: Continue meds, check lipids today adjust treatment if indicated.    #4 diabetes status determined.  Plan: Check A1c and microalbumin today, continue meds and adjust dosage if required.    #5 history of anemia on treatment status to be determined.  Plan: Check CBC today follow-up results as above.    #6 Prevnar today, patient advised to get the new shingles shot.    #7 memory issues, patient was advised to increase the Aricept to 2 tablets per day for total 10 mg.  He'll contact me by phone in about a month with an update on symptoms if we has seen any improvement or not with the increased dosage of meds.          Immediate family member

## 2019-01-11 NOTE — PLAN OF CARE
Problem: Patient Care Overview  Goal: Plan of Care Review  Outcome: Ongoing (interventions implemented as appropriate)   01/11/19 4472   Coping/Psychosocial   Plan of Care Reviewed With patient   Plan of Care Review   Progress improving   OTHER   Outcome Summary Pt increasing with amb safety and stair navigation with 1 HR to go home with and use. Pt improved with activity tolerance and strength in the B LE

## 2019-01-11 NOTE — THERAPY TREATMENT NOTE
Acute Care - Physical Therapy Treatment Note  [facility]     Patient Name: Derrek Ritter  : 1940  MRN: [id number]  Today's Date: 2019  Onset of Illness/Injury or Date of Surgery: 19  Date of Referral to PT: 01/10/19  Referring Physician: Barron Knight MD    Admit Date: 2019    Visit Dx:    ICD-10-CM ICD-9-CM   1. Impaired functional mobility and activity tolerance Z74.09 V49.89   2. Postural kyphosis of lumbar region M40.05 737.10     Patient Active Problem List   Diagnosis   • Cholelithiasis   • DM (HgbA1c 5.9)   • Diverticulosis of intestine   • Hypercholesterolemia   • Hypertension   • Spinal stenosis   • Microalbuminuria   • Atrial fibrillation (CMS/HCC)   • Medicare annual wellness visit, subsequent   • Anemia   • Iron deficiency anemia   • DJD (degenerative joint disease)   • Impotence of organic origin   • Glaucoma   • Hiatal hernia   • Mild cognitive disorder   • Calculus of kidney   • Nocturia   • Chronic bilateral low back pain with bilateral sciatica   • Post laminectomy syndrome   • Routine health maintenance   • Weight loss   • Spinal stenosis of lumbar region with neurogenic claudication   • Spondylolisthesis of lumbar region   • Lumbar spine pain   • Metabolic encephalopathy   • Urinary retention with incomplete bladder emptying   • Macrocytosis without anemia   • Sundowning   • RBBB (right bundle branch block with left anterior fascicular block)   • Leg swelling   • Postural kyphosis of lumbar region   • H/O urinary retention   • Chronic anticoagulation-for Afib       Therapy Treatment    Rehabilitation Treatment Summary     Row Name 19 0900             Treatment Time/Intention    Discipline  physical therapy assistant  -CW      Document Type  therapy note (daily note)  -CW      Subjective Information  complains of;weakness;fatigue;pain  -CW      Mode of Treatment  physical therapy  -CW      Therapy Frequency (PT Clinical Impression)  2 times/day  -CW       Patient Effort  adequate  -CW      Existing Precautions/Restrictions  fall;brace worn when out of bed;spinal  -CW      Recorded by [CW] Pieter Mckeon, PTA 01/11/19 0951      Row Name 01/11/19 0900             Vital Signs    O2 Delivery Pre Treatment  supplemental O2  -CW      O2 Delivery Intra Treatment  supplemental O2  -CW      O2 Delivery Post Treatment  supplemental O2  -CW      Recorded by [CW] Pieter Mckeon, PTA 01/11/19 0951      Row Name 01/11/19 0900             Cognitive Assessment/Intervention- PT/OT    Orientation Status (Cognition)  oriented x 4  -CW      Follows Commands (Cognition)  WNL  -CW      Personal Safety Interventions  fall prevention program maintained;gait belt;muscle strengthening facilitated;nonskid shoes/slippers when out of bed  -CW      Recorded by [CW] Pieter Mckeon, PTA 01/11/19 0951      Row Name 01/11/19 0900             Bed Mobility Assessment/Treatment    Rolling Left Screven (Bed Mobility)  not tested  -CW      Comment (Bed Mobility)  in chair  -CW      Recorded by [CW] Pieter Mckeon, PTA 01/11/19 0951      Row Name 01/11/19 0900             Transfer Assessment/Treatment    Transfer Assessment/Treatment  sit-stand transfer;stand-sit transfer  -CW      Recorded by [CW] Pieter Mckeon, PTA 01/11/19 0951      Row Name 01/11/19 0900             Sit-Stand Transfer    Sit-Stand Screven (Transfers)  minimum assist (75% patient effort)  -CW      Assistive Device (Sit-Stand Transfers)  walker, front-wheeled  -CW      Recorded by [CW] Pieter Mckeon, PTA 01/11/19 0951      Row Name 01/11/19 0900             Stand-Sit Transfer    Stand-Sit Screven (Transfers)  minimum assist (75% patient effort)  -CW      Assistive Device (Stand-Sit Transfers)  walker, front-wheeled  -CW      Recorded by [CW] Pieter Mckeon, PTA 01/11/19 0951      Row Name 01/11/19 0900             Gait/Stairs Assessment/Training    Screven Level (Gait)  not tested   -CW      Comment (Gait/Stairs)  pt with increased pain and lower hemoglobin  -CW      Recorded by [CW] Pieter Mckeon, PTA 01/11/19 0951      Row Name 01/11/19 0900             Positioning and Restraints    Pre-Treatment Position  sitting in chair/recliner  -CW      Post Treatment Position  chair  -CW      In Chair  notified nsg;reclined;call light within reach;encouraged to call for assist  -CW      Recorded by [CW] Pieter Mckeon, PTA 01/11/19 0951      Row Name 01/11/19 0900             Pain Assessment    Additional Documentation  Pain Scale: Numbers Pre/Post-Treatment (Group)  -CW      Recorded by [FOREST] Pieter Mckeon, PTA 01/11/19 0951      Row Name 01/11/19 0900             Pain Scale: Numbers Pre/Post-Treatment    Pain Scale: Numbers, Pretreatment  4/10  -CW      Pain Scale: Numbers, Post-Treatment  8/10  -CW      Pain Location  back  -CW      Pain Intervention(s)  Medication (See MAR);Repositioned;Ambulation/increased activity  -CW      Recorded by [CW] Pieter Mckeon, PTA 01/11/19 0951      Row Name                Wound 01/09/19 0844 Other (See comments) back incision    Wound - Properties Group Date first assessed: 01/09/19 [JT] Time first assessed: 0844 [JT] Side: Other (See comments) [JT] Location: back [JT] Type: incision [JT] Recorded by:  [MADAYT] Margie Bravo RN 01/09/19 0844    Row Name 01/11/19 0900             Outcome Summary/Treatment Plan (PT)    Anticipated Discharge Disposition (PT)  home with assist;home with home health  -CW      Recorded by [FOREST] Pieter Mckeon, PTA 01/11/19 0951        User Key  (r) = Recorded By, (t) = Taken By, (c) = Cosigned By    Initials Name Effective Dates Discipline    JT Margie Bravo RN 06/16/16 -  Nurse    CW Pieter Mckeon, PTA 03/07/18 -  PT          Wound 01/09/19 0844 Other (See comments) back incision (Active)   Dressing Appearance dry;intact 1/11/2019 12:22 AM   Closure MARIELLE 1/11/2019 12:22 AM   Base dressing in place,  unable to visualize 1/11/2019 12:22 AM   Drainage Amount none 1/11/2019 12:22 AM   Dressing Care, Wound foam;gauze 1/10/2019  8:15 PM           Physical Therapy Education     Title: PT OT SLP Therapies (Done)     Topic: Physical Therapy (Done)     Point: Mobility training (Done)     Learning Progress Summary           Patient Acceptance, E,TB, VU,DU by CW at 1/11/2019  9:51 AM    Acceptance, E, NR by CA at 1/10/2019  2:00 PM    Comment:  educated on log roll, spinal precautions                   Point: Home exercise program (Done)     Learning Progress Summary           Patient Acceptance, E,TB, VU,DU by CW at 1/11/2019  9:51 AM    Acceptance, E, NR by CA at 1/10/2019  2:00 PM    Comment:  educated on log roll, spinal precautions                   Point: Body mechanics (Done)     Learning Progress Summary           Patient Acceptance, E,TB, VU,DU by CW at 1/11/2019  9:51 AM    Acceptance, E, NR by CA at 1/10/2019  2:00 PM    Comment:  educated on log roll, spinal precautions                   Point: Precautions (Done)     Learning Progress Summary           Patient Acceptance, E,TB, VU,DU by CW at 1/11/2019  9:51 AM    Acceptance, E, NR by CA at 1/10/2019  2:00 PM    Comment:  educated on log roll, spinal precautions                               User Key     Initials Effective Dates Name Provider Type Discipline    CW 03/07/18 -  Pieter Mckeon, PTA Physical Therapy Assistant PT    CA 06/13/18 -  Nel Hopkins, BALDEMAR Physical Therapist PT                PT Recommendation and Plan  Anticipated Discharge Disposition (PT): home with assist, home with home health  Therapy Frequency (PT Clinical Impression): 2 times/day  Outcome Summary/Treatment Plan (PT)  Anticipated Discharge Disposition (PT): home with assist, home with home health  Plan of Care Reviewed With: patient  Progress: improving  Outcome Summary: Pt increased with activity tolerance and improved with strength  Outcome Measures     Row Name 01/11/19  0900 01/10/19 1400          How much help from another person do you currently need...    Turning from your back to your side while in flat bed without using bedrails?  3  -CW  3  -CA     Moving from lying on back to sitting on the side of a flat bed without bedrails?  2  -CW  2  -CA     Moving to and from a bed to a chair (including a wheelchair)?  3  -CW  3  -CA     Standing up from a chair using your arms (e.g., wheelchair, bedside chair)?  3  -CW  3  -CA     Climbing 3-5 steps with a railing?  2  -CW  2  -CA     To walk in hospital room?  3  -CW  3  -CA     AM-PAC 6 Clicks Score  16  -CW  16  -CA        Functional Assessment    Outcome Measure Options  AM-PAC 6 Clicks Basic Mobility (PT)  -CW  AM-PAC 6 Clicks Basic Mobility (PT)  -CA       User Key  (r) = Recorded By, (t) = Taken By, (c) = Cosigned By    Initials Name Provider Type    Pieter Petersen PTA Physical Therapy Assistant    Nel Steele, PT Physical Therapist         Time Calculation:   PT Charges     Row Name 01/11/19 0954             Time Calculation    Start Time  0926  -CW      Stop Time  0954  -CW      Time Calculation (min)  28 min  -CW      PT Received On  01/11/19  -CW      PT - Next Appointment  01/12/19  -CW        User Key  (r) = Recorded By, (t) = Taken By, (c) = Cosigned By    Initials Name Provider Type    Pieter Petersen PTA Physical Therapy Assistant        Therapy Suggested Charges     Code   Minutes Charges    None           Therapy Charges for Today     Code Description Service Date Service Provider Modifiers Qty    20166679329 HC PT THER PROC EA 15 MIN 1/11/2019 Pieter Mckeon PTA GP 2          PT G-Codes  Outcome Measure Options: AM-PAC 6 Clicks Basic Mobility (PT)  AM-PAC 6 Clicks Score: 16    Pieter Mckeon PTA  1/11/2019

## 2019-01-11 NOTE — CONSULTS
Cardiology History & Physical / Consultation      Patient Name: Derrek Ritter  Age/Sex: 78 y.o. male  : 1940  MRN: [id number]    Date of Admission: 2019  Date of Encounter Visit: 19  Encounter Provider: LAMONT Saldaña  Referring Provider: Barron Knight MD  Place of Service: [facility] CARDIOLOGY  Patient Care Team:  Trey Stockton MD as PCP - General (Internal Medicine)  Madhav Hdez MD as Consulting Physician (Hematology and Oncology)  Sherice Newman MD as Consulting Physician (Cardiology)  Carey Pascual [facility] as Pharmacist          Subjective:     Chief Complaint: back pain    Reason for consultation: atrial fibrillation    History of Present Illness:  Derrek Ritter is a    78-year-old gentleman usually followed by Dr. Sherice Newman is a history of paroxysmal atrial fibrillation.  He has a history of a right bundle branch block which is chronic as well as an LAFB.  He was on per path unknown and a beta blocker in the past but became bradycardic and subsequently both were discontinued.  He was anticoagulated and clinically had been doing well from a cardiovascular standpoint.  He underwent a fusion at T11 level and-L3 L5 and S1 fusion.    Previous testing    stress nuclear perfusion study performed on 10/01/2013 which showed no evidence of ischemia.     He also had a 2-D echocardiogram with Doppler performed on 2013 which revealed an ejection fraction of 55-60%, indeterminate diastolic filling pattern, borderline left atrial enlargement, mild posterior leaflet prolapse of the mitral valve with trace mitral insufficiency.      Past Medical History:  Past Medical History:   Diagnosis Date   • Acute suppurative otitis media    • Acute upper respiratory infection    • Anemia    • Cholelithiasis    • Diabetes mellitus (CMS/HCC)     TYPE 2   • Diverticulosis    • Dry eyes    • Earache    • Edema    • Encounter for  screening for malignant neoplasm of prostate    • Esophagitis, reflux    • Glaucoma    • H/O echocardiogram 09/18/2013   • Health care maintenance    • Hiatal hernia    • History of EKG 10/06/2015   • History of Holter monitoring 09/16/2013   • Hyperlipidemia    • Hypertension    • Lumbar canal stenosis    • Microalbuminuria    • Mitral valve prolapse    • Nephrolithiasis    • Osteoarthritis of hand     of thumb   • PAF (paroxysmal atrial fibrillation) (CMS/HCC)    • 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   • Spinal stenosis        Past Surgical History:   Procedure Laterality Date   • AMPUTATION DIGIT Left 10/29/2016    Procedure: TENDON AND NERVE REPAIR OF  LEFT THUMB;  Surgeon: Zak Hanson MD;  Location: [facility] OR;  Service:    • ARTHRODESIS  10/28/2013    Spinal / Description: Repair spinal stenosis   • CATARACT EXTRACTION     • EYE SURGERY      EYE MUSCLE SX   • HERNIA REPAIR      right inguinal   • LUMBAR DISCECTOMY FUSION INSTRUMENTATION N/A 1/29/2018    Procedure: L2 to L5 fusion with instrumentation and removal of implants L4 5;  Surgeon: Barron Knight MD;  Location: [facility] OR;  Service:    • LUMBAR FUSION  2018    Lumbar vertebral fusion   • LUMBAR LAMINECTOMY      10.28.13  bilat L4/5 lami with Nyla and Belen   • LUMBAR LAMINECTOMY DISCECTOMY DECOMPRESSION N/A 1/29/2018    Procedure: L2 to L4 laminectomy with a fusion by orthopedics;  Surgeon: Jeison Redmond MD;  Location: [facility];  Service:    • OTHER SURGICAL HISTORY      Lithotomy   • TONSILLECTOMY         Home Medications:   Medications Prior to Admission   Medication Sig Dispense Refill Last Dose   • brimonidine (ALPHAGAN) 0.2 % ophthalmic solution Administer 1 drop to both eyes 2 (Two) Times a Day.   1/9/2019 at 0330   • dorzolamide (TRUSOPT) 2 % ophthalmic solution Administer 1 drop to both eyes 2 (two) times a day.   1/9/2019 at 0330   • enalapril (VASOTEC) 20 MG tablet Take 1  tablet by mouth 2 (Two) Times a Day. (Patient taking differently: Take 40 mg by mouth Every Morning.) 180 tablet 1 1/8/2019 at 2100   • esomeprazole (nexIUM) 40 MG capsule Take 1 capsule by mouth Daily. 90 capsule 1 1/8/2019 at 2100   • glimepiride (AMARYL) 2 MG tablet Take 1 tablet by mouth Every Morning Before Breakfast. 90 tablet 1 1/8/2019 at 2100   • latanoprost (XALATAN) 0.005 % ophthalmic solution Administer 1 drop to both eyes Every Night.   1/9/2019 at 0330   • oxyCODONE-acetaminophen (PERCOCET)  MG per tablet Take 1 tablet by mouth Every 6 (Six) Hours As Needed for Moderate Pain . 50 tablet 0 Past Week at Unknown time   • timolol (TIMOPTIC) 0.5 % ophthalmic solution Administer 1 drop to both eyes Daily.   1/9/2019 at 0330   • warfarin (COUMADIN) 10 MG tablet Take 10 mg by mouth. Takes Tuesday Thursday  Sat and Sunday 1/1/2019   • warfarin (COUMADIN) 5 MG tablet Take 5 mg by mouth. Takes Monday Wed friday 1/2/2019   • ACCU-CHEK FASTCLIX LANCETS misc USE TO TEST TWICE DAILY 102 each 0 Taking   • ACCU-CHEK SMARTVIEW test strip USE TO TEST TWICE DAILY 100 each 1 Taking   • Blood Glucose Monitoring Suppl (ACCU-CHEK FRANCISCO SMARTVIEW) W/DEVICE kit USE TO TEST BLOOD SUGAR TWICE DAILY 1 kit 0 Taking   • Blood Glucose Monitoring Suppl (FREESTYLE LITE) device Use to test blood once daily 1 each 0 Taking   • donepezil (ARICEPT) 10 MG tablet Take 10 mg by mouth Every Morning.   1/8/2019 at 2100   • ferrous sulfate (IRON SUPPLEMENT) 325 (65 FE) MG tablet Take 1 tablet by mouth Daily With Breakfast.   1/7/2019   • furosemide (LASIX) 20 MG tablet Take 1 tablet by mouth Daily. as needed (Patient taking differently: Take 20 mg by mouth As Needed. as needed) 90 tablet 3 More than a month at Unknown time   • glucose blood (FREESTYLE LITE) test strip Use to test blood once daily 100 each 12 Taking   • Glucose Blood disk Inject 1 Device as directed daily. TEST BLOOD SUGAR ONCE DAILY AS DIRECTED 100 each 3 Taking   •  Lancets (FREESTYLE) lancets Use to test blood sugar daily 100 each 12 Taking   • metFORMIN (GLUCOPHAGE) 1000 MG tablet Take one am, 1/2 noon, and 1 pm (Patient taking differently: 1,000 mg. Take one am, 1/2 noon, and   One in the  pm) 225 tablet 1 1/7/2019   • ONE TOUCH ULTRA TEST test strip USE ONCE DAILY AS DIRECTED 100 each 3 Taking   • pioglitazone (ACTOS) 45 MG tablet Take 1 tablet by mouth Every Morning. 90 tablet 0 1/8/2019 at 2100   • simvastatin (ZOCOR) 20 MG tablet Take 20 mg by mouth Every Night.   1/8/2019 at 2100   • tamsulosin (FLOMAX) 0.4 MG capsule 24 hr capsule Take 0.4 mg by mouth Every Morning. 1-2 TABS AS NEEDED    1/8/2019 at 2100       Allergies:  No Known Allergies    Past Social History:  Social History     Socioeconomic History   • Marital status:      Spouse name: Silvia   • Number of children: 3   • Years of education: College   • Highest education level: Not on file   Social Needs   • Financial resource strain: Not on file   • Food insecurity - worry: Not on file   • Food insecurity - inability: Not on file   • Transportation needs - medical: Not on file   • Transportation needs - non-medical: Not on file   Occupational History   • Occupation: Teacher RETIRED     Employer: RETIRED   Tobacco Use   • Smoking status: Never Smoker   • Smokeless tobacco: Never Used   • Tobacco comment: caffeine use   Substance and Sexual Activity   • Alcohol use: No     Frequency: Never   • Drug use: No   • Sexual activity: Defer   Other Topics Concern   • Not on file   Social History Narrative   • Not on file       Past Family History: History reviewed. No pertinent family history.   Family History   Problem Relation Age of Onset   • Heart attack Mother         acute myocardial infarction   • Stroke Mother         hemorrhagic   • Emphysema Father    • Diabetes Other         mellitus   • Hypertension Other    • Heart disease Other    • Malig Hyperthermia Neg Hx        Review of Systems   All other systems  reviewed and are negative.          Objective:     Objective:  Temp:  [97.7 °F (36.5 °C)-99.1 °F (37.3 °C)] 97.7 °F (36.5 °C)  Heart Rate:  [] 103  Resp:  [16-18] 16  BP: ()/(60-97) 180/97    Intake/Output Summary (Last 24 hours) at 1/11/2019 0840  Last data filed at 1/10/2019 1600  Gross per 24 hour   Intake 4 ml   Output 175 ml   Net -171 ml     Body mass index is 24.55 kg/m².      01/09/19  0616 01/09/19  1611   Weight: 89.1 kg (196 lb 6.9 oz) 89.1 kg (196 lb 6.9 oz)           Physical Exam:   Physical Exam   Constitutional: He is oriented to person, place, and time. He appears well-developed.   HENT:   Head: Normocephalic.   Eyes: Conjunctivae are normal.   Neck: Normal range of motion.   Cardiovascular: Normal rate, regular rhythm and normal heart sounds.   Pulmonary/Chest: Breath sounds normal.   Abdominal: Soft. Bowel sounds are normal.   Musculoskeletal: Normal range of motion. He exhibits no edema.   Neurological: He is alert and oriented to person, place, and time.   Skin: Skin is warm and dry.   Psychiatric: He has a normal mood and affect. His behavior is normal.   Vitals reviewed.       Labs:   Lab Review:     Results from last 7 days   Lab Units  01/10/19   0435  01/07/19   0655   SODIUM mmol/L  145  147*   POTASSIUM mmol/L  4.0  4.2   CHLORIDE mmol/L  108*  111*   CO2 mmol/L  25.0  25.2   BUN mg/dL  14  22   CREATININE mg/dL  0.71*  0.87   GLUCOSE mg/dL  162*  149*   CALCIUM mg/dL  8.3*  9.5   AST (SGOT) U/L  28   --    ALT (SGPT) U/L  18   --      Results from last 7 days   Lab Units  01/11/19   0533  01/10/19   1943   TROPONIN T ng/mL  <0.010  <0.010     Results from last 7 days   Lab Units  01/11/19   0533  01/10/19   0435   WBC 10*3/mm3   --   7.72   HEMOGLOBIN g/dL  7.5*  8.0*   HEMATOCRIT %  25.0*  25.8*   PLATELETS 10*3/mm3   --   216     Results from last 7 days   Lab Units  01/09/19   0622  01/08/19   0722   INR   1.05  1.2                                      Assessment:        Postural kyphosis of lumbar region    DM (HgbA1c 5.9)    Hypertension    Atrial fibrillation (CMS/HCC)    Iron deficiency anemia    Mild cognitive disorder    H/O urinary retention    Chronic anticoagulation-for Afib        Plan:       1.  History of paroxysmal atrial fibrillation.  Patient now status post lumbar fusion.  Patient also was anemic.  With this extensive surgery and the anemia and his known paroxysmal atrial fibrillation is not a big surprise that he went back into a briefly.  He is currently back out of it.  Patient also had issues of bradycardia so I'm hesitant to put him on a beta blocker.  At this point I would discontinue when necessary beta blockers as necessary if he goes in and out of atrial fibrillation.  2.  Anemia currently appears stable.  3.  Status post lumbar fusion surgery.  4.  Diabetes  5.  We'll continue to follow treated atrial fibrillation as it arises    Thank you for allowing me to participate in the care of Derrek JONATHAN TREVIÑOkendell. Feel free to contact me directly with any further questions or concerns.    LAMONT Saldaña  [facility]  01/11/19  8:40 AM        Arvind Mallory MD  [facility]  1/11/2019 11:22 AM

## 2019-01-11 NOTE — PROGRESS NOTES
"Blood pressure 126/71, pulse 97, temperature 97.8 °F (36.6 °C), temperature source Oral, resp. rate 16, height 190.5 cm (75\"), weight 89.1 kg (196 lb 6.9 oz), SpO2 98 %.      Pt walked well with PT today.    Dr. Redmond saw pt this am.   AA,Ox3  HENLEY    ..  Results from last 7 days   Lab Units  01/11/19   0533  01/10/19   0435   WBC 10*3/mm3   --   7.72   HEMOGLOBIN g/dL  7.5*  8.0*   HEMATOCRIT %  25.0*  25.8*   PLATELETS 10*3/mm3   --   216         POD#2 s/p L4-S1 lami with neural lysis both levels and L3-5 fusion with Dr. Knight      At this point will defer to Dr. Knight for all additional care. Pt and family aware that we are available at any time for further questions or concerns but pt does not need to follow up in our office. Will S/O.  "

## 2019-01-11 NOTE — PROGRESS NOTES
Postop day 2: AVSS awake alert oriented area he nearly fell this morning catching himself in the bathroom and complains of some increased back pain.  The dressings intact he moves his legs well in fact he sitting up comfortably in the chair.  X-rays obtained since then show good position of the osteotomy site and the hardware and is unchanged from operative  Position.  He may indeed need rehabilitation admission.  Continue  PT.

## 2019-01-11 NOTE — SIGNIFICANT NOTE
Pt was assisted to restroom by RN. Pt was also using a walker. Pt refused to use toilet with staff in room. Pt was informed to pull the call light cord in restroom when finished and to not get up without staff present. Pt's wife called RN into room while Pt was in restroom. Pt was found squatting down on floor holding him self up by toilet grab bar. The Pt states he urinated on the floor and slipped when trying to stand up from toilet, but denies falling. Pt denies hitting head or injury. Pt was assisted up and was assisted back to chair. MD was notified and ordered XR's of the spine to ensure no injury occurred.

## 2019-01-11 NOTE — PLAN OF CARE
Problem: Patient Care Overview  Goal: Plan of Care Review  Outcome: Ongoing (interventions implemented as appropriate)   01/11/19 0979   Coping/Psychosocial   Plan of Care Reviewed With patient   Plan of Care Review   Progress improving   OTHER   Outcome Summary Pt increased with activity tolerance and improved with strength

## 2019-01-12 NOTE — PLAN OF CARE
Problem: Patient Care Overview  Goal: Plan of Care Review  Outcome: Ongoing (interventions implemented as appropriate)   01/12/19 6682   Coping/Psychosocial   Plan of Care Reviewed With patient;spouse   Plan of Care Review   Progress improving   OTHER   Outcome Summary Pt A/Ox4 with some forgetfulness. Heartrate 110-140's, Afib. Metoprolol po given. C/o low back pain well controlled with po pain meds. Amb to br with assist x1 and walker without diff. Will continue to monitor       Problem: Fall Risk (Adult)  Goal: Absence of Fall  Outcome: Ongoing (interventions implemented as appropriate)      Problem: Skin Injury Risk (Adult)  Goal: Skin Health and Integrity  Outcome: Ongoing (interventions implemented as appropriate)      Problem: Laminectomy/Foraminotomy/Discectomy (Adult)  Goal: Signs and Symptoms of Listed Potential Problems Will be Absent, Minimized or Managed (Laminectomy/Foraminotomy/Discectomy)  Outcome: Ongoing (interventions implemented as appropriate)

## 2019-01-12 NOTE — PROGRESS NOTES
LOS: 3 days   Patient Care Team:  Trey Stockton MD as PCP - General (Internal Medicine)  Madhav Hdez MD as Consulting Physician (Hematology and Oncology)  Sherice Newman MD as Consulting Physician (Cardiology)  Carey Pascual [facility] as Pharmacist    Chief Complaint: paroxysmal a.fib       Interval History: c/o incisional pain      Objective   Vital Signs  Temp:  [98 °F (36.7 °C)-99.4 °F (37.4 °C)] 98.9 °F (37.2 °C)  Heart Rate:  [] 118  Resp:  [16-18] 18  BP: ()/(55-95) 118/87    Intake/Output Summary (Last 24 hours) at 1/12/2019 1213  Last data filed at 1/12/2019 0549  Gross per 24 hour   Intake 1037.83 ml   Output --   Net 1037.83 ml           Physical Exam   Constitutional: He is oriented to person, place, and time. He appears well-developed and well-nourished. No distress.   HENT:   Head: Normocephalic and atraumatic.   Eyes: Pupils are equal, round, and reactive to light.   Neck: No JVD present. No thyromegaly present.   Cardiovascular: Normal heart sounds and intact distal pulses. An irregular rhythm present. Tachycardia present.   No murmur heard.  Pulmonary/Chest: Effort normal and breath sounds normal. No respiratory distress.   Abdominal: Soft. Bowel sounds are normal. He exhibits no distension. There is no splenomegaly or hepatomegaly. There is no tenderness.   Musculoskeletal: Normal range of motion. He exhibits no edema.   Neurological: He is alert and oriented to person, place, and time.   Skin: Skin is warm and dry. He is not diaphoretic. No erythema.   Psychiatric: He has a normal mood and affect. His behavior is normal. Judgment normal.     Physical Exam   Constitutional: He is oriented to person, place, and time. He appears well-developed and well-nourished. No distress.   HENT:   Head: Normocephalic and atraumatic.   Eyes: Pupils are equal, round, and reactive to light.   Neck: No JVD present. No thyromegaly present.   Cardiovascular: Normal heart sounds and  intact distal pulses. An irregular rhythm present. Tachycardia present.   No murmur heard.  Pulmonary/Chest: Effort normal and breath sounds normal. No respiratory distress.   Abdominal: Soft. Bowel sounds are normal. He exhibits no distension. There is no splenomegaly or hepatomegaly. There is no tenderness.   Musculoskeletal: Normal range of motion. He exhibits no edema.   Neurological: He is alert and oriented to person, place, and time.   Skin: Skin is warm and dry. He is not diaphoretic. No erythema.   Psychiatric: He has a normal mood and affect. His behavior is normal. Judgment normal.     Results Review:      Results from last 7 days   Lab Units  01/10/19   0435  01/07/19   0655   SODIUM mmol/L  145  147*   POTASSIUM mmol/L  4.0  4.2   CHLORIDE mmol/L  108*  111*   CO2 mmol/L  25.0  25.2   BUN mg/dL  14  22   CREATININE mg/dL  0.71*  0.87   GLUCOSE mg/dL  162*  149*   CALCIUM mg/dL  8.3*  9.5     Results from last 7 days   Lab Units  01/11/19   0533  01/10/19   1943   TROPONIN T ng/mL  <0.010  <0.010     Results from last 7 days   Lab Units  01/12/19   0533  01/11/19   0533  01/10/19   0435   01/07/19   0655   WBC 10*3/mm3  8.02   --   7.72   --   3.92*   HEMOGLOBIN g/dL  8.5*  7.5*  8.0*   < >  9.2*   HEMATOCRIT %  27.2*  25.0*  25.8*   < >  29.0*   PLATELETS 10*3/mm3  152   --   216   --   265    < > = values in this interval not displayed.     Results from last 7 days   Lab Units  01/09/19   0622  01/08/19   0722   INR   1.05  1.2                   I reviewed the patient's new clinical results.  I personally viewed and interpreted the patient's EKG/Telemetry data        Medication Review:     atorvastatin 20 mg Oral Daily   brimonidine 1 drop Both Eyes BID   donepezil 10 mg Oral QAM   dorzolamide 1 drop Both Eyes BID   enalapril 40 mg Oral QAM   ferrous sulfate 325 mg Oral Q AM   insulin lispro 0-7 Units Subcutaneous 4x Daily With Meals & Nightly   latanoprost 1 drop Both Eyes Nightly   pantoprazole 40 mg  Oral QAM   pioglitazone 45 mg Oral QAM   polyethylene glycol 17 g Oral Daily   sennosides-docusate sodium 1 tablet Oral BID   sodium chloride 3 mL Intravenous Q12H   tamsulosin 0.4 mg Oral QAM   timolol 1 drop Both Eyes Daily   warfarin 10 mg Oral Daily         lactated ringers 9 mL/hr Last Rate: 9 mL/hr (01/09/19 0652)   sodium chloride 100 mL/hr Last Rate: 100 mL/hr (01/10/19 3235)       Assessment/Plan      1.  Paroxysmal atrial fibrillation.  He did have some atrial fibrillation after his extensive surgery and subsequent anemia.  We have been trying to avoid beta blockers because of a history of bradycardia.  Warfarin was and starting today.  He is in atrial fibrillation with a heart rate in the 110s to 120s as he is laying in the bed.  I did discuss the plan of care with Dr. Mallory.  He received Lopressor 12.5 mg yesterday and then again this morning around 9 AM.  I'm going to give him another 12.5 now and we will start him on 25 mg twice a day tonight around 8 PM.    2.  Bradycardia on propafenone and beta blockers in the past.  Currently in atrial fibrillation with RVR.  Plan as above.    3.  Status post back surgery.  Plans per orthopedic surgery are to discharge to rehabilitation tomorrow.  I would like to get his atrial fibrillation under control before he goes to rehabilitation.      Postural kyphosis of lumbar region    DM (HgbA1c 5.9)    Hypertension    Atrial fibrillation (CMS/HCC)    Iron deficiency anemia    Mild cognitive disorder    H/O urinary retention    Chronic anticoagulation-for Afib        ELLIOTT Morales  01/12/19  12:13 PM

## 2019-01-12 NOTE — SIGNIFICANT NOTE
01/12/19 1543   Rehab Treatment   Discipline physical therapist   Reason Treatment Not Performed patient/family declined treatment, not feeling well  (Pt reports doing walks and steps yesterday, but nauseated and constipated today and doesn't feel like walking. Discussed with  VICKY Tapia and she acknowledged. Pt reports nsg offered pain med and he will take later. )   Recommendation   PT - Next Appointment 01/13/19

## 2019-01-12 NOTE — PROGRESS NOTES
Postop day 3: No new complaints moves legs well posture is better.  The x-rays after near fall yesterday appear unremarkable and are unchanged from operative films.  Wound is dressed and dry.  Hemoglobin 8.5.  Doing well possibly to rehabilitation tomorrow discharge summary prescription and orders are complete.  He may resume Coumadin today but I don't want him to take anything fast acting, if desired, until tomorrow.

## 2019-01-12 NOTE — PLAN OF CARE
Problem: Patient Care Overview  Goal: Plan of Care Review  Outcome: Ongoing (interventions implemented as appropriate)   01/12/19 9489   Coping/Psychosocial   Plan of Care Reviewed With patient   Plan of Care Review   Progress improving   OTHER   Outcome Summary Pt received 2nd unit of blood; Heart rate was up to 150 Afib; one time dose of po lopressor given and pt has been in SR; Pt received PO pain med x1 with positive results; Dressing is C/D/I; wife at bedside; will continue to monitor       Problem: Fall Risk (Adult)  Goal: Absence of Fall  Outcome: Ongoing (interventions implemented as appropriate)      Problem: Skin Injury Risk (Adult)  Goal: Skin Health and Integrity  Outcome: Ongoing (interventions implemented as appropriate)

## 2019-01-12 NOTE — DISCHARGE INSTRUCTIONS
EZRA GORDON JR., M.D.  ORTHOPAEDIC SURGERY  [address]  [address]    LUMBAR FUSION SURGERY  HOME INSTRUCTIONS    1.     You will need to check your dressing or have a family member to check           your dressing EVERYDAY for the following signs:             Increase in redness           Increase in swelling around the incision or low back area           Increase in pain           Any drainage noted on the dressing or from the incision           Edges of the incision are pulling apart once dressing is removed           Increase in overall body temperature (greater than 100.5 degrees)           Dressings are to be changed only if directed by the physician            Call your physician if any of these listed above occur after you are           discharged from the hospital.  DO NOT wait until your office visit to           inform the physician.    2.     Continue with the exercise program twice a day as instructed by the           physical therapist at the hospital.  A more involved physical therapy          program may be started 3 months after your surgery.    3.     Walking must be done on a daily basis.  You should walk at least twice           a day and more if you are able.  Start with short distances and gradually           add a little distance everyday.    4.     You may wish to use an elevated toilet seat for up to12 weeks after surgery.    5.     You MUST wear your brace anytime you are up.  You do not have to wear             your brace at nighttime when walking a short distance to the bathroom.    6.     You may shower three (3) days after your surgery, as long as there is no                drainage.  The dressing is waterproof and should be left on and intact until you are         seen in the office for your first post-operative visit. You may remove          your brace to shower.                                                                                                             7.     You can sit in a high-straight back chair with arms as tolerated, unless  instructed otherwise.  Do not flex your hips more than 90 degrees when        sitting.  Avoid low, soft chairs.    8.     You may want to sleep on a firm mattress.  Avoid waterbeds for 3-6 months           after surgery.     9.     Patients should not smoke.  Smoking interferes with the fusion and the                healing time.     10.   Avoid pushing or pulling.  There will be no lifting of anything over 5 pounds for the           first few months.    11.   You may drive a car, usually 2-4 weeks after surgery, but only after you           have checked with your physician.    12.   You may ride in a car after your surgery and for no more than           30 minutes to one hour at a time.  Short distance riding to and from the           physician’s office for follow-up visits is the only time you should be in a           car until after the 2-4 weeks time frame.    13.   You may go home in a car.  You must wear your seat belt.    14.   You will be sent home with pain medication, but it will only be used up to           4 weeks after surgery.  Refills may be obtained, but ONLY during office           hours.    15.   You may go and down stairs, but take it easy at first.  It is preferable that   the first few weeks at home, try to only use the stairs once or twice a day          until you have had a chance to regain your strength.    16.   NO bending at the waist.  You may need to use your “reacher” to assist you          in picking up items off of the floor. (not mandatory to have)  If you absolutely                 something, you may squat by bending at your hips and knees.          need to reach for.    17.   Sexual activity should be avoided for 2 weeks after surgery.  Be careful and use              common sense.  You may have to adjust your position to lessen the strain on your         back.    18.   Return to the  office in 2 weeks to see Dr. Knight.

## 2019-01-12 NOTE — DISCHARGE SUMMARY
Orthopedic Discharge Summary      Patient: Derrek Ritter  YOB: 1940  Medical Record Number: [id number]    Attending Physician: Barron Knight MD  Consulting Physician(s):   Consults     Date and Time Order Name Status Description    1/10/2019 1911 Inpatient Cardiology Consult Completed     1/9/2019 1550 Inpatient Hospitalist Consult Completed           Date of Admission: 1/9/2019  5:33 AM  Date of Discharge:1/12/2019    Discharge Diagnosis: T 11 to L3, L5-S1 fusion with T11 to S1 instrumentation, L1-2, L4 laminectomy and neural lysis and L4 pedicle subtraction osteotomy with Cyril Redmond and Vinicio,   Acute Blood Loss Anemia      Presenting Problem/History of Present Illness: Postural kyphosis of lumbar region [M40.05]  Postural kyphosis of lumbar region [M40.05]        Allergies: No Known Allergies    Discharge Medications     Discharge Medications      New Medications      Instructions Start Date   calcium carbonate 500 MG chewable tablet  Commonly known as:  TUMS   2 tablets, Oral, 3 Times Daily PRN      sennosides-docusate sodium 8.6-50 MG tablet  Commonly known as:  SENOKOT-S   1 tablet, Oral, 2 Times Daily         Changes to Medications      Instructions Start Date   enalapril 20 MG tablet  Commonly known as:  VASOTEC  What changed:    · how much to take  · when to take this   20 mg, Oral, 2 Times Daily      furosemide 20 MG tablet  Commonly known as:  LASIX  What changed:    · when to take this  · reasons to take this  · additional instructions   20 mg, Oral, Daily, as needed      metFORMIN 1000 MG tablet  Commonly known as:  GLUCOPHAGE  What changed:    · how much to take  · additional instructions   Take one am, 1/2 noon, and 1 pm      oxyCODONE-acetaminophen  MG per tablet  Commonly known as:  PERCOCET  What changed:    · how much to take  · when to take this   2 tablets, Oral, Every 4 Hours PRN         Continue These Medications      Instructions Start Date   brimonidine 0.2 %  ophthalmic solution  Commonly known as:  ALPHAGAN   1 drop, Both Eyes, 2 Times Daily      donepezil 10 MG tablet  Commonly known as:  ARICEPT   10 mg, Oral, Every Morning      dorzolamide 2 % ophthalmic solution  Commonly known as:  TRUSOPT   1 drop, Both Eyes, 2 Times Daily      esomeprazole 40 MG capsule  Commonly known as:  nexIUM   40 mg, Oral, Daily      ferrous sulfate 325 (65 FE) MG tablet  Commonly known as:  IRON SUPPLEMENT   325 mg, Oral, Daily With Breakfast      freestyle lancets   Use to test blood sugar daily      ACCU-CHEK FASTCLIX LANCETS misc   USE TO TEST TWICE DAILY      FREESTYLE LITE device   Use to test blood once daily      ACCU-CHEK FRANCISCO SMARTVIEW w/Device kit   USE TO TEST BLOOD SUGAR TWICE DAILY      glimepiride 2 MG tablet  Commonly known as:  AMARYL   2 mg, Oral, Every Morning Before Breakfast      Glucose Blood disk   1 Device, Injection, Daily, TEST BLOOD SUGAR ONCE DAILY AS DIRECTED      ONE TOUCH ULTRA TEST test strip  Generic drug:  glucose blood   USE ONCE DAILY AS DIRECTED      glucose blood test strip  Commonly known as:  FREESTYLE LITE   Use to test blood once daily      ACCU-CHEK SMARTVIEW test strip  Generic drug:  glucose blood   USE TO TEST TWICE DAILY      latanoprost 0.005 % ophthalmic solution  Commonly known as:  XALATAN   1 drop, Both Eyes, Nightly      pioglitazone 45 MG tablet  Commonly known as:  ACTOS   45 mg, Oral, Every Morning      simvastatin 20 MG tablet  Commonly known as:  ZOCOR   20 mg, Oral, Nightly      tamsulosin 0.4 MG capsule 24 hr capsule  Commonly known as:  FLOMAX   0.4 mg, Oral, Every Morning, 1-2 TABS AS NEEDED      timolol 0.5 % ophthalmic solution  Commonly known as:  TIMOPTIC   1 drop, Both Eyes, Daily      warfarin 5 MG tablet  Commonly known as:  COUMADIN   5 mg, Oral, Takes Monday Wed friday       warfarin 10 MG tablet  Commonly known as:  COUMADIN   10 mg, Oral, Takes Tuesday Thursday  Sat and Sunday                Past Medical History:    Diagnosis Date   • Acute suppurative otitis media    • Acute upper respiratory infection    • Anemia    • Cholelithiasis    • Diabetes mellitus (CMS/HCC)     TYPE 2   • Diverticulosis    • Dry eyes    • Earache    • Edema    • Encounter for screening for malignant neoplasm of prostate    • Esophagitis, reflux    • Glaucoma    • H/O echocardiogram 09/18/2013   • Health care maintenance    • Hiatal hernia    • History of EKG 10/06/2015   • History of Holter monitoring 09/16/2013   • Hyperlipidemia    • Hypertension    • Lumbar canal stenosis    • Microalbuminuria    • Mitral valve prolapse    • Nephrolithiasis    • Osteoarthritis of hand     of thumb   • PAF (paroxysmal atrial fibrillation) (CMS/HCC)    • 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   • Spinal stenosis         Past Surgical History:   Procedure Laterality Date   • AMPUTATION DIGIT Left 10/29/2016    Procedure: TENDON AND NERVE REPAIR OF  LEFT THUMB;  Surgeon: Zak Hanson MD;  Location: [facility] OR;  Service:    • ARTHRODESIS  10/28/2013    Spinal / Description: Repair spinal stenosis   • CATARACT EXTRACTION     • EYE SURGERY      EYE MUSCLE SX   • HERNIA REPAIR      right inguinal   • LUMBAR DISCECTOMY FUSION INSTRUMENTATION N/A 1/29/2018    Procedure: L2 to L5 fusion with instrumentation and removal of implants L4 5;  Surgeon: Barron Knight MD;  Location: [facility] OR;  Service:    • LUMBAR FUSION  2018    Lumbar vertebral fusion   • LUMBAR LAMINECTOMY      10.28.13  bilat L4/5 lami with Nyla and Belen   • LUMBAR LAMINECTOMY DISCECTOMY DECOMPRESSION N/A 1/29/2018    Procedure: L2 to L4 laminectomy with a fusion by orthopedics;  Surgeon: Jeison Redmond MD;  Location: [facility] OR;  Service:    • OTHER SURGICAL HISTORY      Lithotomy   • TONSILLECTOMY          Social History     Occupational History   • Occupation: Teacher RETIRED     Employer: RETIRED   Tobacco Use   • Smoking status: Never  Smoker   • Smokeless tobacco: Never Used   • Tobacco comment: caffeine use   Substance and Sexual Activity   • Alcohol use: No     Frequency: Never   • Drug use: No   • Sexual activity: Defer      Social History     Social History Narrative   • Not on file        Family History   Problem Relation Age of Onset   • Heart attack Mother         acute myocardial infarction   • Stroke Mother         hemorrhagic   • Emphysema Father    • Diabetes Other         mellitus   • Hypertension Other    • Heart disease Other    • Malig Hyperthermia Neg Hx          Physical Exam: 78 y.o. male  General Appearance:    Alert, cooperative, in no acute distress                      Vitals:    01/11/19 2140 01/11/19 2239 01/12/19 0558 01/12/19 0601   BP:  102/55 138/88    BP Location:   Right arm    Patient Position:   Lying    Pulse: 84 85 (!) 125 113   Resp:  18 16    Temp:  98.7 °F (37.1 °C) 99.4 °F (37.4 °C)    TempSrc:  Oral Oral    SpO2: 94% 95% 93% 95%   Weight:       Height:            DIAGNOSTIC TESTS:   Admission on 01/09/2019   Component Date Value Ref Range Status   • Glucose 01/09/2019 56* 70 - 130 mg/dL Final   • ABO Type 01/09/2019 A   Final   • RH type 01/09/2019 Negative   Final   • Antibody Screen 01/09/2019 Negative   Final   • T&S Expiration Date 01/09/2019 1/12/2019 11:59:59 PM   Final   • Protime 01/09/2019 13.5  11.7 - 14.2 Seconds Final   • INR 01/09/2019 1.05  0.90 - 1.10 Final   • Hemoglobin 01/09/2019 7.8* 13.7 - 17.6 g/dL Final   • Hematocrit 01/09/2019 24.7* 40.4 - 52.2 % Final   • Glucose 01/09/2019 154* 70 - 130 mg/dL Final   • Glucose 01/09/2019 154* 70 - 130 mg/dL Final   • Glucose 01/10/2019 162* 65 - 99 mg/dL Final   • BUN 01/10/2019 14  8 - 23 mg/dL Final   • Creatinine 01/10/2019 0.71* 0.76 - 1.27 mg/dL Final   • Sodium 01/10/2019 145  136 - 145 mmol/L Final   • Potassium 01/10/2019 4.0  3.5 - 5.2 mmol/L Final   • Chloride 01/10/2019 108* 98 - 107 mmol/L Final   • CO2 01/10/2019 25.0  22.0 - 29.0  mmol/L Final   • Calcium 01/10/2019 8.3* 8.6 - 10.5 mg/dL Final   • Total Protein 01/10/2019 5.3* 6.0 - 8.5 g/dL Final   • Albumin 01/10/2019 2.90* 3.50 - 5.20 g/dL Final   • ALT (SGPT) 01/10/2019 18  1 - 41 U/L Final   • AST (SGOT) 01/10/2019 28  1 - 40 U/L Final   • Alkaline Phosphatase 01/10/2019 35* 39 - 117 U/L Final   • Total Bilirubin 01/10/2019 0.2  0.1 - 1.2 mg/dL Final   • eGFR Non African Amer 01/10/2019 108  >60 mL/min/1.73 Final   • Globulin 01/10/2019 2.4  gm/dL Final   • A/G Ratio 01/10/2019 1.2  g/dL Final   • BUN/Creatinine Ratio 01/10/2019 19.7  7.0 - 25.0 Final   • Anion Gap 01/10/2019 12.0  mmol/L Final   • Reticulocyte % 01/10/2019 1.95* 0.50 - 1.50 % Final   • WBC 01/10/2019 7.72  4.50 - 10.70 10*3/mm3 Final   • RBC 01/10/2019 2.63* 4.60 - 6.00 10*6/mm3 Final   • Hemoglobin 01/10/2019 8.0* 13.7 - 17.6 g/dL Final   • Hematocrit 01/10/2019 25.8* 40.4 - 52.2 % Final   • MCV 01/10/2019 98.1* 79.8 - 96.2 fL Final   • MCH 01/10/2019 30.4  27.0 - 32.7 pg Final   • MCHC 01/10/2019 31.0* 32.6 - 36.4 g/dL Final   • RDW 01/10/2019 15.4* 11.5 - 14.5 % Final   • RDW-SD 01/10/2019 54.7* 37.0 - 54.0 fl Final   • MPV 01/10/2019 9.7  6.0 - 12.0 fL Final   • Platelets 01/10/2019 216  140 - 500 10*3/mm3 Final   • Neutrophil % 01/10/2019 82.6* 42.7 - 76.0 % Final   • Lymphocyte % 01/10/2019 8.4* 19.6 - 45.3 % Final   • Monocyte % 01/10/2019 8.4  5.0 - 12.0 % Final   • Eosinophil % 01/10/2019 0.1* 0.3 - 6.2 % Final   • Basophil % 01/10/2019 0.1  0.0 - 1.5 % Final   • Immature Grans % 01/10/2019 0.4  0.0 - 0.5 % Final   • Neutrophils, Absolute 01/10/2019 6.37  1.90 - 8.10 10*3/mm3 Final   • Lymphocytes, Absolute 01/10/2019 0.65* 0.90 - 4.80 10*3/mm3 Final   • Monocytes, Absolute 01/10/2019 0.65  0.20 - 1.20 10*3/mm3 Final   • Eosinophils, Absolute 01/10/2019 0.01  0.00 - 0.70 10*3/mm3 Final   • Basophils, Absolute 01/10/2019 0.01  0.00 - 0.20 10*3/mm3 Final   • Immature Grans, Absolute 01/10/2019 0.03  0.00 - 0.03  10*3/mm3 Final   • Glucose 01/10/2019 144* 70 - 130 mg/dL Final   • Glucose 01/10/2019 137* 70 - 130 mg/dL Final   • Glucose 01/10/2019 129  70 - 130 mg/dL Final   • Troponin T 01/10/2019 <0.010  0.000 - 0.030 ng/mL Final   • Glucose 01/10/2019 191* 70 - 130 mg/dL Final   • Hemoglobin 01/11/2019 7.5* 13.7 - 17.6 g/dL Final   • Hematocrit 01/11/2019 25.0* 40.4 - 52.2 % Final   • Troponin T 01/11/2019 <0.010  0.000 - 0.030 ng/mL Final   • Glucose 01/11/2019 143* 70 - 130 mg/dL Final   • Product Code 01/12/2019 [id number]   Final   • Unit Number 01/12/2019 [id number]   Final   • UNIT  ABO 01/12/2019 A   Final   • UNIT  RH 01/12/2019 NEG   Final   • Dispense Status 01/12/2019 PT   Final   • Blood Type 01/12/2019 ANEG   Final   • Blood Expiration Date 01/12/2019 201902132359   Final   • Blood Type Barcode 01/12/2019 0600   Final   • Product Code 01/12/2019 [id number]   Final   • Unit Number 01/12/2019 [id number]   Final   • UNIT  ABO 01/12/2019 A   Final   • UNIT  RH 01/12/2019 NEG   Final   • Dispense Status 01/12/2019 IS   Final   • Blood Type 01/12/2019 ANEG   Final   • Blood Expiration Date 01/12/2019 [id number]   Final   • Blood Type Barcode 01/12/2019 0600   Final   • Glucose 01/11/2019 198* 70 - 130 mg/dL Final   • Glucose 01/11/2019 92  70 - 130 mg/dL Final   • Glucose 01/11/2019 98  70 - 130 mg/dL Final   • WBC 01/12/2019 8.02  4.50 - 10.70 10*3/mm3 Final   • RBC 01/12/2019 2.86* 4.60 - 6.00 10*6/mm3 Final   • Hemoglobin 01/12/2019 8.5* 13.7 - 17.6 g/dL Final   • Hematocrit 01/12/2019 27.2* 40.4 - 52.2 % Final   • MCV 01/12/2019 95.1  79.8 - 96.2 fL Final   • MCH 01/12/2019 29.7  27.0 - 32.7 pg Final   • MCHC 01/12/2019 31.3* 32.6 - 36.4 g/dL Final   • RDW 01/12/2019 16.6* 11.5 - 14.5 % Final   • RDW-SD 01/12/2019 56.9* 37.0 - 54.0 fl Final   • MPV 01/12/2019 9.9  6.0 - 12.0 fL Final   • Platelets 01/12/2019 152  140 - 500 10*3/mm3 Final   • Neutrophil % 01/12/2019 82.7* 42.7 - 76.0 % Final   •  Lymphocyte % 01/12/2019 8.9* 19.6 - 45.3 % Final   • Monocyte % 01/12/2019 6.5  5.0 - 12.0 % Final   • Eosinophil % 01/12/2019 1.4  0.3 - 6.2 % Final   • Basophil % 01/12/2019 0.1  0.0 - 1.5 % Final   • Immature Grans % 01/12/2019 0.4  0.0 - 0.5 % Final   • Neutrophils, Absolute 01/12/2019 6.64  1.90 - 8.10 10*3/mm3 Final   • Lymphocytes, Absolute 01/12/2019 0.71* 0.90 - 4.80 10*3/mm3 Final   • Monocytes, Absolute 01/12/2019 0.52  0.20 - 1.20 10*3/mm3 Final   • Eosinophils, Absolute 01/12/2019 0.11  0.00 - 0.70 10*3/mm3 Final   • Basophils, Absolute 01/12/2019 0.01  0.00 - 0.20 10*3/mm3 Final   • Immature Grans, Absolute 01/12/2019 0.03  0.00 - 0.03 10*3/mm3 Final   • Glucose 01/12/2019 71  70 - 130 mg/dL Final   • Glucose 01/12/2019 71  70 - 130 mg/dL Final       No results found.    Hospital Course:  78 y.o. male admitted to [facility] to services of Barron Knight MD with Postural kyphosis of lumbar region [M40.05]  Postural kyphosis of lumbar region [M40.05] on 1/9/2019 and underwent T 11 to L3, L5-S1 fusion with T11 to S1 instrumentation, L1-2, L4 laminectomy and neural lysis and L4 pedicle subtraction osteotomy with Cyril Redmond and Vinicio  Per Barron Kinght MD. Antibiotic and VTE prophylaxis were per [facility]IP protocols.  The patient was admitted to the floor where IV and/or oral pain medications were administered for postoperative pain.  At discharge the incisional pain was tolerable and preop neurologic function was intact.  The dressing was dry and the wound was clean.  He was maintained at couple additional days as he was evaluated by the medicine service and cardiology for atrial fibrillation and his anticoagulant was resumed.  Heart rate is improving with still some tachycardia to 120s but he is been quite stable.  He will be discharged when deemed appropriate by the medical service.    Condition on Discharge:  Stable    Discharge Instructions: . Patient may weight bear as tolerated unless  otherwise specified. Continue DEANN hose daily (for two weeks) and ice regularly. Patient also instructed on incentive spirometer during hospitalization and encouraged to continue to use at home regularly. Patient may shower on POD #3 if and when all wound drainage has stopped.  Where applicable, the brace should be worn when up and about.  It need not be worn to the bathroom and certainly not in the shower.  A detailed list of instructions specific to the operation was given to the patient at the time of discharge.  PT INR should be obtained and called to my office on 1/16 and 1/17  Follow up Instructions:  Follow up in the office with Dr. Barron Gordon Jr. in 2-3 weeks - patient to call the office at [phone] to schedule. Prescriptions were given for pain medication.    Follow-up Appointments  Future Appointments   Date Time Provider Department Center   1/23/2019 11:20 AM Barron Gordon MD [facility] None   2/27/2019 12:30 PM Sherice Newman MD [facility] None   6/12/2019  8:45 AM Trey Stockton MD [facility] None     Additional Instructions for the Follow-ups that You Need to Schedule     Ambulatory Referral to Home Health   As directed      Face to Face Visit Date:  1/11/2019    Follow-up Provider for Plan of Care?:  I will be treating the patient on an ongoing basis.  Please send me the Plan of Care for signature.    Follow-up Provider:  BARRON GORDON [[id number]]    Reason/Clinical Findings:  Post surgical    Describe mobility limitations that make leaving home difficult:  Requires the assistance of another to leave home    Nursing/Therapeutic Services Requested:  Physical Therapy Skilled Nursing    Skilled nursing orders:  Other (Protime with INR q Monday and Thursday and call to patient's cardiologist)    PT orders:  Therapeutic exercise (Wear back brace anytime OOB, but may go to bathroom at night without brace. Avoid forward bending and twisting at the waist.  No lifting greater than  5 pounds) Gait Training Transfer training Strengthening Other    Weight Bearing Status:  As Tolerated    Frequency:  1 Week 1         Discharge Follow-up with Specialty: Orthopedics; 2 Weeks   As directed      Specialty:  Orthopedics    Follow Up:  2 Weeks    Follow Up Details:  Return to the office to see Dr. Barron Knight               Discharge Disposition Plan:tomorrow to rehab    Date: 1/12/2019    Barron Knight MD  01/12/19  8:51 AM       Orthopedic Discharge Summary      Patient: Derrek Ritter  YOB: 1940  Medical Record Number: [id number]    Attending Physician: Barron Knight MD  Consulting Physician(s):   Consults     Date and Time Order Name Status Description    1/10/2019 1911 Inpatient Cardiology Consult Completed     1/9/2019 1550 Inpatient Hospitalist Consult Completed           Date of Admission: 1/9/2019  5:33 AM  Date of Discharge:1/12/2019    Discharge Diagnosis: T 11 to L3, L5-S1 fusion with T11 to S1 instrumentation, L1-2, L4 laminectomy and neural lysis and L4 pedicle subtraction osteotomy with Cyril Redmond and Vinicio,   Acute Blood Loss Anemia      Presenting Problem/History of Present Illness: Postural kyphosis of lumbar region [M40.05]  Postural kyphosis of lumbar region [M40.05]        Allergies: No Known Allergies    Discharge Medications     Discharge Medications      New Medications      Instructions Start Date   calcium carbonate 500 MG chewable tablet  Commonly known as:  TUMS   2 tablets, Oral, 3 Times Daily PRN      sennosides-docusate sodium 8.6-50 MG tablet  Commonly known as:  SENOKOT-S   1 tablet, Oral, 2 Times Daily         Changes to Medications      Instructions Start Date   enalapril 20 MG tablet  Commonly known as:  VASOTEC  What changed:    · how much to take  · when to take this   20 mg, Oral, 2 Times Daily      furosemide 20 MG tablet  Commonly known as:  LASIX  What changed:    · when to take this  · reasons to take this  · additional instructions    20 mg, Oral, Daily, as needed      metFORMIN 1000 MG tablet  Commonly known as:  GLUCOPHAGE  What changed:    · how much to take  · additional instructions   Take one am, 1/2 noon, and 1 pm      oxyCODONE-acetaminophen  MG per tablet  Commonly known as:  PERCOCET  What changed:    · how much to take  · when to take this   2 tablets, Oral, Every 4 Hours PRN         Continue These Medications      Instructions Start Date   brimonidine 0.2 % ophthalmic solution  Commonly known as:  ALPHAGAN   1 drop, Both Eyes, 2 Times Daily      donepezil 10 MG tablet  Commonly known as:  ARICEPT   10 mg, Oral, Every Morning      dorzolamide 2 % ophthalmic solution  Commonly known as:  TRUSOPT   1 drop, Both Eyes, 2 Times Daily      esomeprazole 40 MG capsule  Commonly known as:  nexIUM   40 mg, Oral, Daily      ferrous sulfate 325 (65 FE) MG tablet  Commonly known as:  IRON SUPPLEMENT   325 mg, Oral, Daily With Breakfast      freestyle lancets   Use to test blood sugar daily      ACCU-CHEK FASTCLIX LANCETS misc   USE TO TEST TWICE DAILY      FREESTYLE LITE device   Use to test blood once daily      ACCU-CHEK FRANCISCO SMARTVIEW w/Device kit   USE TO TEST BLOOD SUGAR TWICE DAILY      glimepiride 2 MG tablet  Commonly known as:  AMARYL   2 mg, Oral, Every Morning Before Breakfast      Glucose Blood disk   1 Device, Injection, Daily, TEST BLOOD SUGAR ONCE DAILY AS DIRECTED      ONE TOUCH ULTRA TEST test strip  Generic drug:  glucose blood   USE ONCE DAILY AS DIRECTED      glucose blood test strip  Commonly known as:  FREESTYLE LITE   Use to test blood once daily      ACCU-CHEK SMARTVIEW test strip  Generic drug:  glucose blood   USE TO TEST TWICE DAILY      latanoprost 0.005 % ophthalmic solution  Commonly known as:  XALATAN   1 drop, Both Eyes, Nightly      pioglitazone 45 MG tablet  Commonly known as:  ACTOS   45 mg, Oral, Every Morning      simvastatin 20 MG tablet  Commonly known as:  ZOCOR   20 mg, Oral, Nightly      tamsulosin  0.4 MG capsule 24 hr capsule  Commonly known as:  FLOMAX   0.4 mg, Oral, Every Morning, 1-2 TABS AS NEEDED      timolol 0.5 % ophthalmic solution  Commonly known as:  TIMOPTIC   1 drop, Both Eyes, Daily      warfarin 5 MG tablet  Commonly known as:  COUMADIN   5 mg, Oral, Takes Monday Wed friday       warfarin 10 MG tablet  Commonly known as:  COUMADIN   10 mg, Oral, Takes Tuesday Thursday  Sat and Sunday                Past Medical History:   Diagnosis Date   • Acute suppurative otitis media    • Acute upper respiratory infection    • Anemia    • Cholelithiasis    • Diabetes mellitus (CMS/HCC)     TYPE 2   • Diverticulosis    • Dry eyes    • Earache    • Edema    • Encounter for screening for malignant neoplasm of prostate    • Esophagitis, reflux    • Glaucoma    • H/O echocardiogram 09/18/2013   • Health care maintenance    • Hiatal hernia    • History of EKG 10/06/2015   • History of Holter monitoring 09/16/2013   • Hyperlipidemia    • Hypertension    • Lumbar canal stenosis    • Microalbuminuria    • Mitral valve prolapse    • Nephrolithiasis    • Osteoarthritis of hand     of thumb   • PAF (paroxysmal atrial fibrillation) (CMS/HCC)    • 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   • Spinal stenosis         Past Surgical History:   Procedure Laterality Date   • AMPUTATION DIGIT Left 10/29/2016    Procedure: TENDON AND NERVE REPAIR OF  LEFT THUMB;  Surgeon: Zak Hanson MD;  Location: [facility];  Service:    • ARTHRODESIS  10/28/2013    Spinal / Description: Repair spinal stenosis   • CATARACT EXTRACTION     • EYE SURGERY      EYE MUSCLE SX   • HERNIA REPAIR      right inguinal   • LUMBAR DISCECTOMY FUSION INSTRUMENTATION N/A 1/29/2018    Procedure: L2 to L5 fusion with instrumentation and removal of implants L4 5;  Surgeon: Barron Knight MD;  Location: [facility];  Service:    • LUMBAR FUSION  2018    Lumbar vertebral fusion   • LUMBAR LAMINECTOMY       10.28.13  bilat L4/5 lami with Nyla and Belen   • LUMBAR LAMINECTOMY DISCECTOMY DECOMPRESSION N/A 1/29/2018    Procedure: L2 to L4 laminectomy with a fusion by orthopedics;  Surgeon: Jeison Redmond MD;  Location: [facility];  Service:    • OTHER SURGICAL HISTORY      Lithotomy   • TONSILLECTOMY          Social History     Occupational History   • Occupation: Teacher RETIRED     Employer: RETIRED   Tobacco Use   • Smoking status: Never Smoker   • Smokeless tobacco: Never Used   • Tobacco comment: caffeine use   Substance and Sexual Activity   • Alcohol use: No     Frequency: Never   • Drug use: No   • Sexual activity: Defer      Social History     Social History Narrative   • Not on file        Family History   Problem Relation Age of Onset   • Heart attack Mother         acute myocardial infarction   • Stroke Mother         hemorrhagic   • Emphysema Father    • Diabetes Other         mellitus   • Hypertension Other    • Heart disease Other    • Malig Hyperthermia Neg Hx          Physical Exam: 78 y.o. male  General Appearance:    Alert, cooperative, in no acute distress                      Vitals:    01/11/19 2140 01/11/19 2239 01/12/19 0558 01/12/19 0601   BP:  102/55 138/88    BP Location:   Right arm    Patient Position:   Lying    Pulse: 84 85 (!) 125 113   Resp:  18 16    Temp:  98.7 °F (37.1 °C) 99.4 °F (37.4 °C)    TempSrc:  Oral Oral    SpO2: 94% 95% 93% 95%   Weight:       Height:            DIAGNOSTIC TESTS:   Admission on 01/09/2019   Component Date Value Ref Range Status   • Glucose 01/09/2019 56* 70 - 130 mg/dL Final   • ABO Type 01/09/2019 A   Final   • RH type 01/09/2019 Negative   Final   • Antibody Screen 01/09/2019 Negative   Final   • T&S Expiration Date 01/09/2019 1/12/2019 11:59:59 PM   Final   • Protime 01/09/2019 13.5  11.7 - 14.2 Seconds Final   • INR 01/09/2019 1.05  0.90 - 1.10 Final   • Hemoglobin 01/09/2019 7.8* 13.7 - 17.6 g/dL Final   • Hematocrit 01/09/2019 24.7* 40.4 - 52.2  % Final   • Glucose 01/09/2019 154* 70 - 130 mg/dL Final   • Glucose 01/09/2019 154* 70 - 130 mg/dL Final   • Glucose 01/10/2019 162* 65 - 99 mg/dL Final   • BUN 01/10/2019 14  8 - 23 mg/dL Final   • Creatinine 01/10/2019 0.71* 0.76 - 1.27 mg/dL Final   • Sodium 01/10/2019 145  136 - 145 mmol/L Final   • Potassium 01/10/2019 4.0  3.5 - 5.2 mmol/L Final   • Chloride 01/10/2019 108* 98 - 107 mmol/L Final   • CO2 01/10/2019 25.0  22.0 - 29.0 mmol/L Final   • Calcium 01/10/2019 8.3* 8.6 - 10.5 mg/dL Final   • Total Protein 01/10/2019 5.3* 6.0 - 8.5 g/dL Final   • Albumin 01/10/2019 2.90* 3.50 - 5.20 g/dL Final   • ALT (SGPT) 01/10/2019 18  1 - 41 U/L Final   • AST (SGOT) 01/10/2019 28  1 - 40 U/L Final   • Alkaline Phosphatase 01/10/2019 35* 39 - 117 U/L Final   • Total Bilirubin 01/10/2019 0.2  0.1 - 1.2 mg/dL Final   • eGFR Non African Amer 01/10/2019 108  >60 mL/min/1.73 Final   • Globulin 01/10/2019 2.4  gm/dL Final   • A/G Ratio 01/10/2019 1.2  g/dL Final   • BUN/Creatinine Ratio 01/10/2019 19.7  7.0 - 25.0 Final   • Anion Gap 01/10/2019 12.0  mmol/L Final   • Reticulocyte % 01/10/2019 1.95* 0.50 - 1.50 % Final   • WBC 01/10/2019 7.72  4.50 - 10.70 10*3/mm3 Final   • RBC 01/10/2019 2.63* 4.60 - 6.00 10*6/mm3 Final   • Hemoglobin 01/10/2019 8.0* 13.7 - 17.6 g/dL Final   • Hematocrit 01/10/2019 25.8* 40.4 - 52.2 % Final   • MCV 01/10/2019 98.1* 79.8 - 96.2 fL Final   • MCH 01/10/2019 30.4  27.0 - 32.7 pg Final   • MCHC 01/10/2019 31.0* 32.6 - 36.4 g/dL Final   • RDW 01/10/2019 15.4* 11.5 - 14.5 % Final   • RDW-SD 01/10/2019 54.7* 37.0 - 54.0 fl Final   • MPV 01/10/2019 9.7  6.0 - 12.0 fL Final   • Platelets 01/10/2019 216  140 - 500 10*3/mm3 Final   • Neutrophil % 01/10/2019 82.6* 42.7 - 76.0 % Final   • Lymphocyte % 01/10/2019 8.4* 19.6 - 45.3 % Final   • Monocyte % 01/10/2019 8.4  5.0 - 12.0 % Final   • Eosinophil % 01/10/2019 0.1* 0.3 - 6.2 % Final   • Basophil % 01/10/2019 0.1  0.0 - 1.5 % Final   • Immature  Grans % 01/10/2019 0.4  0.0 - 0.5 % Final   • Neutrophils, Absolute 01/10/2019 6.37  1.90 - 8.10 10*3/mm3 Final   • Lymphocytes, Absolute 01/10/2019 0.65* 0.90 - 4.80 10*3/mm3 Final   • Monocytes, Absolute 01/10/2019 0.65  0.20 - 1.20 10*3/mm3 Final   • Eosinophils, Absolute 01/10/2019 0.01  0.00 - 0.70 10*3/mm3 Final   • Basophils, Absolute 01/10/2019 0.01  0.00 - 0.20 10*3/mm3 Final   • Immature Grans, Absolute 01/10/2019 0.03  0.00 - 0.03 10*3/mm3 Final   • Glucose 01/10/2019 144* 70 - 130 mg/dL Final   • Glucose 01/10/2019 137* 70 - 130 mg/dL Final   • Glucose 01/10/2019 129  70 - 130 mg/dL Final   • Troponin T 01/10/2019 <0.010  0.000 - 0.030 ng/mL Final   • Glucose 01/10/2019 191* 70 - 130 mg/dL Final   • Hemoglobin 01/11/2019 7.5* 13.7 - 17.6 g/dL Final   • Hematocrit 01/11/2019 25.0* 40.4 - 52.2 % Final   • Troponin T 01/11/2019 <0.010  0.000 - 0.030 ng/mL Final   • Glucose 01/11/2019 143* 70 - 130 mg/dL Final   • Product Code 01/12/2019 [id number]   Final   • Unit Number 01/12/2019 [id number]   Final   • UNIT  ABO 01/12/2019 A   Final   • UNIT  RH 01/12/2019 NEG   Final   • Dispense Status 01/12/2019 PT   Final   • Blood Type 01/12/2019 ANEG   Final   • Blood Expiration Date 01/12/2019 091404000106   Final   • Blood Type Barcode 01/12/2019 0600   Final   • Product Code 01/12/2019 [id number]   Final   • Unit Number 01/12/2019 [id number]   Final   • UNIT  ABO 01/12/2019 A   Final   • UNIT  RH 01/12/2019 NEG   Final   • Dispense Status 01/12/2019 IS   Final   • Blood Type 01/12/2019 ANEG   Final   • Blood Expiration Date 01/12/2019 [id number]   Final   • Blood Type Barcode 01/12/2019 0600   Final   • Glucose 01/11/2019 198* 70 - 130 mg/dL Final   • Glucose 01/11/2019 92  70 - 130 mg/dL Final   • Glucose 01/11/2019 98  70 - 130 mg/dL Final   • WBC 01/12/2019 8.02  4.50 - 10.70 10*3/mm3 Final   • RBC 01/12/2019 2.86* 4.60 - 6.00 10*6/mm3 Final   • Hemoglobin 01/12/2019 8.5* 13.7 - 17.6 g/dL Final   •  Hematocrit 01/12/2019 27.2* 40.4 - 52.2 % Final   • MCV 01/12/2019 95.1  79.8 - 96.2 fL Final   • MCH 01/12/2019 29.7  27.0 - 32.7 pg Final   • MCHC 01/12/2019 31.3* 32.6 - 36.4 g/dL Final   • RDW 01/12/2019 16.6* 11.5 - 14.5 % Final   • RDW-SD 01/12/2019 56.9* 37.0 - 54.0 fl Final   • MPV 01/12/2019 9.9  6.0 - 12.0 fL Final   • Platelets 01/12/2019 152  140 - 500 10*3/mm3 Final   • Neutrophil % 01/12/2019 82.7* 42.7 - 76.0 % Final   • Lymphocyte % 01/12/2019 8.9* 19.6 - 45.3 % Final   • Monocyte % 01/12/2019 6.5  5.0 - 12.0 % Final   • Eosinophil % 01/12/2019 1.4  0.3 - 6.2 % Final   • Basophil % 01/12/2019 0.1  0.0 - 1.5 % Final   • Immature Grans % 01/12/2019 0.4  0.0 - 0.5 % Final   • Neutrophils, Absolute 01/12/2019 6.64  1.90 - 8.10 10*3/mm3 Final   • Lymphocytes, Absolute 01/12/2019 0.71* 0.90 - 4.80 10*3/mm3 Final   • Monocytes, Absolute 01/12/2019 0.52  0.20 - 1.20 10*3/mm3 Final   • Eosinophils, Absolute 01/12/2019 0.11  0.00 - 0.70 10*3/mm3 Final   • Basophils, Absolute 01/12/2019 0.01  0.00 - 0.20 10*3/mm3 Final   • Immature Grans, Absolute 01/12/2019 0.03  0.00 - 0.03 10*3/mm3 Final   • Glucose 01/12/2019 71  70 - 130 mg/dL Final   • Glucose 01/12/2019 71  70 - 130 mg/dL Final       No results found.    Hospital Course:  78 y.o. male admitted to [facility] to services of Barron Knight MD with Postural kyphosis of lumbar region [M40.05]  Postural kyphosis of lumbar region [M40.05] on 1/9/2019 and underwent T 11 to L3, L5-S1 fusion with T11 to S1 instrumentation, L1-2, L4 laminectomy and neural lysis and L4 pedicle subtraction osteotomy with Cyril Redmond and Vinicio  Per Barron Knight MD. Antibiotic and VTE prophylaxis were per SCIP protocols.  The patient was admitted to the floor where IV and/or oral pain medications were administered for postoperative pain.  At discharge the incisional pain was tolerable and preop neurologic function was intact.  The dressing was dry and the wound was  clean.    Condition on Discharge:  Stable    Discharge Instructions: . Patient may weight bear as tolerated unless otherwise specified. Continue DEANN hose daily (for two weeks) and ice regularly. Patient also instructed on incentive spirometer during hospitalization and encouraged to continue to use at home regularly. Patient may shower on POD #3 if and when all wound drainage has stopped.  Where applicable, the brace should be worn when up and about.  It need not be worn to the bathroom and certainly not in the shower.  A detailed list of instructions specific to the operation was given to the patient at the time of discharge.  Have PT and INR should be obtained Monday Tuesday and Wednesday and telephone to my office at [phone].    Follow up Instructions:  Follow up in the office with Dr. Barron Gordon Jr. in 2-3 weeks - patient to call the office at [phone] to schedule. Prescriptions were given for pain medication.    Follow-up Appointments  Future Appointments   Date Time Provider Department Center   1/23/2019 11:20 AM Barron Gordon MD [facility] None   2/27/2019 12:30 PM Sherice Newman MD [facility] None   6/12/2019  8:45 AM Trey Stockton MD [facility] None     Additional Instructions for the Follow-ups that You Need to Schedule     Ambulatory Referral to Home Health   As directed      Face to Face Visit Date:  1/11/2019    Follow-up Provider for Plan of Care?:  I will be treating the patient on an ongoing basis.  Please send me the Plan of Care for signature.    Follow-up Provider:  BARRON GORDON [[id number]]    Reason/Clinical Findings:  Post surgical    Describe mobility limitations that make leaving home difficult:  Requires the assistance of another to leave home    Nursing/Therapeutic Services Requested:  Physical Therapy Skilled Nursing    Skilled nursing orders:  Other (Protime with INR q Monday and Thursday and call to patient's cardiologist)    PT orders:  Therapeutic  exercise (Wear back brace anytime OOB, but may go to bathroom at night without brace. Avoid forward bending and twisting at the waist.  No lifting greater than 5 pounds) Gait Training Transfer training Strengthening Other    Weight Bearing Status:  As Tolerated    Frequency:  1 Week 1         Discharge Follow-up with Specialty: Orthopedics; 2 Weeks   As directed      Specialty:  Orthopedics    Follow Up:  2 Weeks    Follow Up Details:  Return to the office to see Dr. Barron Knight               Discharge Disposition Plan:tomorrow to rehab    Date: 1/12/2019    Barron Knight MD  01/12/19  8:51 AM

## 2019-01-12 NOTE — PROGRESS NOTES
[facility]     LOS: 3 days     Subjective:    CC:No chief complaint on file.    DIET:  Diet Order   Procedures   • Diet Regular     No cp  No soa  Eating some  Still with significant back pain  Objective:    Vital Signs:  Temp:  [98 °F (36.7 °C)-99.4 °F (37.4 °C)] 98.9 °F (37.2 °C)  Heart Rate:  [] 118  Resp:  [16-18] 18  BP: ()/(55-95) 118/87    SpO2:  [93 %-95 %] 94 %  on   ;   Device (Oxygen Therapy): room air  Body mass index is 24.55 kg/m².    Physical Exam   Constitutional: He appears well-developed and well-nourished.   Sitting in chair, comfortable   HENT:   Head: Normocephalic and atraumatic.   Cardiovascular: Exam reveals no friction rub.   No murmur heard.  Pulmonary/Chest: Effort normal and breath sounds normal.   Abdominal: Soft. Bowel sounds are normal. He exhibits no distension. There is no tenderness.   Neurological: He is alert.   Skin: Skin is warm and dry.       Results Review:    Glucose   Date Value Ref Range Status   01/10/2019 162 (H) 65 - 99 mg/dL Final     Results from last 7 days   Lab Units  01/12/19   0533   WBC 10*3/mm3  8.02   HEMOGLOBIN g/dL  8.5*   HEMATOCRIT %  27.2*   PLATELETS 10*3/mm3  152     Results from last 7 days   Lab Units  01/10/19   0435   SODIUM mmol/L  145   POTASSIUM mmol/L  4.0   CHLORIDE mmol/L  108*   CO2 mmol/L  25.0   BUN mg/dL  14   CREATININE mg/dL  0.71*   CALCIUM mg/dL  8.3*   BILIRUBIN mg/dL  0.2   ALK PHOS U/L  35*   ALT (SGPT) U/L  18   AST (SGOT) U/L  28   GLUCOSE mg/dL  162*     Results from last 7 days   Lab Units  01/09/19   0622   INR   1.05         Results from last 7 days   Lab Units  01/11/19   0533  01/10/19   1943   TROPONIN T ng/mL  <0.010  <0.010     Cultures:       I have reviewed daily medications and changes in CPOE    Scheduled meds    atorvastatin 20 mg Oral Daily   brimonidine 1 drop Both Eyes BID   donepezil 10 mg Oral QAM   dorzolamide 1 drop Both Eyes BID   enalapril 40 mg Oral QAM   ferrous  sulfate 325 mg Oral Q AM   glipiZIDE 5 mg Oral QAM AC   insulin lispro 0-7 Units Subcutaneous 4x Daily With Meals & Nightly   latanoprost 1 drop Both Eyes Nightly   pantoprazole 40 mg Oral QAM   pioglitazone 45 mg Oral QAM   polyethylene glycol 17 g Oral Daily   sennosides-docusate sodium 1 tablet Oral BID   sodium chloride 3 mL Intravenous Q12H   tamsulosin 0.4 mg Oral QAM   timolol 1 drop Both Eyes Daily   warfarin 10 mg Oral Daily         lactated ringers 9 mL/hr Last Rate: 9 mL/hr (01/09/19 0652)   sodium chloride 100 mL/hr Last Rate: 100 mL/hr (01/10/19 1846)     PRN meds  bisacodyl  •  calcium carbonate  •  cyclobenzaprine  •  dextrose  •  dextrose  •  glucagon (human recombinant)  •  naloxone  •  ondansetron **OR** ondansetron ODT **OR** ondansetron  •  oxyCODONE-acetaminophen  •  sodium chloride  •  temazepam        Postural kyphosis of lumbar region    DM (HgbA1c 5.9)    Hypertension    Atrial fibrillation (CMS/HCC)    Iron deficiency anemia    Mild cognitive disorder    H/O urinary retention    Chronic anticoagulation-for Afib        Assessment/Plan:    Mr. Ritter is a 78 y.o. male who is POD#3 T 11 to L3, L5-S1 fusion with T11 to S1 instrumentation, L1-2, L4 laminectomy and neural lysis and L4 pedicle subtraction osteotomy with Cyril Redmond and Vinicio.     · BS are low, will stop the glipizide until BS are higher. a1c normal. Continue same for now, low dose correctional factor plus actos. Metformin held.  · Blood pressure stable, heart rate is high, cardiology has order metoprolol. Restart coumdin 10 mg today  · Expected postoperative anemia. He has chronic iron deficiency anemia and is on iron replacement therapy. S/p transfusion, hb is good today        Abbe Walter MD  01/12/19  12:01 PM

## 2019-01-13 NOTE — DISCHARGE PLACEMENT REQUEST
"HUDSONDerrek rdz JONATHAN (78 y.o. Male)     Date of Birth Social Security Number Address Home Phone MRN    1940  [id number] [facility] [address] [facility] [address] [phone] [id number]    Quaker Marital Status          Gnosticism        Admission Date Admission Type Admitting Provider Attending Provider Department, Room/Bed    1/9/19 Elective Barron Knight MD Werner, Joseph G, MD [address], P585/1    Discharge Date Discharge Disposition Discharge Destination         Rehab Facility or Unit (DC - External)              Attending Provider:  Barron Knight MD    Allergies:  No Known Allergies    Isolation:  None   Infection:  None   Code Status:  CPR    Ht:  190.5 cm (75\")   Wt:  89.1 kg (196 lb 6.9 oz)    Admission Cmt:  None   Principal Problem:  Postural kyphosis of lumbar region [M40.05] More...                 Active Insurance as of 1/9/2019     Primary Coverage     Payor Plan Insurance Group Employer/Plan Group    [facility] MEDICARE REPLACEMENT [facility] [id number]     Payor Plan Address Payor Plan Phone Number Payor Plan Fax Number Effective Dates    [address] [address]   1/1/2016 - None Entered    [facility] [id number]       Subscriber Name Subscriber Birth Date Member ID       DERREK OBREGON 1940 [id number]                 Emergency Contacts      (Rel.) Home Phone Work Phone Mobile Phone    Silvia Obregon (Spouse) [phone] -- [phone]              "

## 2019-01-13 NOTE — PLAN OF CARE
Problem: Patient Care Overview  Goal: Plan of Care Review   01/13/19 2897   Coping/Psychosocial   Plan of Care Reviewed With patient;spouse   Plan of Care Review   Progress improving   OTHER   Outcome Summary Pt alert and oriented x 4 today. but some decreased judgement on how far he should walk. Wanted to walk further and had some knee slight buckling and had limited activity yesterday after doing more on 1/12/19 so coached pt to not push. Pt is appropriate for skilled nsg on dc to improve flexibility, improve balance and advance gait endurance. Pt needs cga with gt belt initially due to knee weakness, ed with wiife/pt.

## 2019-01-13 NOTE — PROGRESS NOTES
LOS: 4 days   Patient Care Team:  Trey Stockton MD as PCP - General (Internal Medicine)  Madhav Hdez MD as Consulting Physician (Hematology and Oncology)  Sherice Newman MD as Consulting Physician (Cardiology)  Carey Pascual [facility] as Pharmacist    Chief Complaint: paroxysmal a.fib with RVR       Interval History: c/o incisional pain, up in chair      Objective   Vital Signs  Temp:  [98 °F (36.7 °C)-99.5 °F (37.5 °C)] 99.5 °F (37.5 °C)  Heart Rate:  [] 110  Resp:  [16-20] 16  BP: (109-140)/(68-91) 120/78  No intake or output data in the 24 hours ending 01/13/19 1052        Physical Exam   Constitutional: He is oriented to person, place, and time. He appears well-developed and well-nourished. No distress.   HENT:   Head: Normocephalic and atraumatic.   Eyes: Pupils are equal, round, and reactive to light.   Neck: No JVD present. No thyromegaly present.   Cardiovascular: Normal heart sounds and intact distal pulses. An irregular rhythm present. Tachycardia present.   No murmur heard.  1+ bilateral lower extremity edema.   Pulmonary/Chest: Effort normal and breath sounds normal. No respiratory distress.   Abdominal: Soft. Bowel sounds are normal. He exhibits no distension. There is no splenomegaly or hepatomegaly. There is no tenderness.   Musculoskeletal: Normal range of motion. He exhibits no edema.   Neurological: He is alert and oriented to person, place, and time.   Skin: Skin is warm and dry. He is not diaphoretic. No erythema.   Psychiatric: He has a normal mood and affect. His behavior is normal. Judgment normal.     Physical Exam   Constitutional: He is oriented to person, place, and time. He appears well-developed and well-nourished. No distress.   HENT:   Head: Normocephalic and atraumatic.   Eyes: Pupils are equal, round, and reactive to light.   Neck: No JVD present. No thyromegaly present.   Cardiovascular: Normal heart sounds and intact distal pulses. An irregular rhythm  present. Tachycardia present.   No murmur heard.  1+ bilateral lower extremity edema.   Pulmonary/Chest: Effort normal and breath sounds normal. No respiratory distress.   Abdominal: Soft. Bowel sounds are normal. He exhibits no distension. There is no splenomegaly or hepatomegaly. There is no tenderness.   Musculoskeletal: Normal range of motion. He exhibits no edema.   Neurological: He is alert and oriented to person, place, and time.   Skin: Skin is warm and dry. He is not diaphoretic. No erythema.   Psychiatric: He has a normal mood and affect. His behavior is normal. Judgment normal.     Results Review:      Results from last 7 days   Lab Units  01/10/19   0435  01/07/19   0655   SODIUM mmol/L  145  147*   POTASSIUM mmol/L  4.0  4.2   CHLORIDE mmol/L  108*  111*   CO2 mmol/L  25.0  25.2   BUN mg/dL  14  22   CREATININE mg/dL  0.71*  0.87   GLUCOSE mg/dL  162*  149*   CALCIUM mg/dL  8.3*  9.5     Results from last 7 days   Lab Units  01/11/19   0533  01/10/19   1943   TROPONIN T ng/mL  <0.010  <0.010     Results from last 7 days   Lab Units  01/12/19   0533  01/11/19   0533  01/10/19   0435   01/07/19   0655   WBC 10*3/mm3  8.02   --   7.72   --   3.92*   HEMOGLOBIN g/dL  8.5*  7.5*  8.0*   < >  9.2*   HEMATOCRIT %  27.2*  25.0*  25.8*   < >  29.0*   PLATELETS 10*3/mm3  152   --   216   --   265    < > = values in this interval not displayed.     Results from last 7 days   Lab Units  01/13/19   0645  01/12/19   1206  01/09/19   0622   INR   1.11*  1.09  1.06  1.05                   I reviewed the patient's new clinical results.  I personally viewed and interpreted the patient's EKG/Telemetry data        Medication Review:     atorvastatin 20 mg Oral Daily   brimonidine 1 drop Both Eyes BID   donepezil 10 mg Oral QAM   dorzolamide 1 drop Both Eyes BID   enalapril 40 mg Oral QAM   ferrous sulfate 325 mg Oral Q AM   insulin lispro 0-7 Units Subcutaneous 4x Daily With Meals & Nightly   latanoprost 1 drop Both Eyes  Nightly   metoprolol tartrate 25 mg Oral Q12H   pantoprazole 40 mg Oral QAM   pioglitazone 45 mg Oral QAM   polyethylene glycol 17 g Oral Daily   sennosides-docusate sodium 1 tablet Oral BID   sodium chloride 3 mL Intravenous Q12H   tamsulosin 0.4 mg Oral QAM   timolol 1 drop Both Eyes Daily   warfarin 10 mg Oral Daily         lactated ringers 9 mL/hr Last Rate: 9 mL/hr (01/09/19 0652)   sodium chloride 100 mL/hr Last Rate: 100 mL/hr (01/10/19 1846)       Assessment/Plan      1.  Paroxysmal atrial fibrillation.  He did have some atrial fibrillation after his extensive surgery and subsequent anemia.  We have been trying to avoid beta blockers because of a history of bradycardia.  Warfarin was started yesterday.  Yesterday was in the 120s and I increased his Lopressor to 25 mg twice daily.  He is now in the high 90s to low 100s with his heart rate, this is at rest.  He still in atrial fibrillation.  He is back on his normal home dose of warfarin, his INR is still subtherapeutic today at 1.1.  The wife is uncomfortable with sending him to rehabilitation today with slightly elevated heart rates, and I think is reasonable to keep him here today and see how his heart rate does.  We could potentially put his metoprolol tartrate up to 37.5 mg twice daily if his blood pressure will tolerate it.  Per the nurse he is in and out of sinus rhythm, and when he was in sinus rhythm earlier this morning his heart rate was in 80s.  I've only ever seen him in atrial fibrillation.  I'll check a 12-lead in the morning.    2.  Bradycardia on propafenone and beta blockers in the past.  Currently in atrial fibrillation with RVR.  Plan as above.    3.  Status post back surgery.  Plans per orthopedic surgery are to discharge to rehabilitation when okay with cardiology.  I would like to get his atrial fibrillation under control before he goes to rehabilitation, likely he will be ready to go home tomorrow.      Postural kyphosis of lumbar  region    DM (HgbA1c 5.9)    Hypertension    Atrial fibrillation (CMS/HCC)    Iron deficiency anemia    Mild cognitive disorder    H/O urinary retention    Chronic anticoagulation-for Afib        ELLIOTT Morales  01/13/19  10:52 AM

## 2019-01-13 NOTE — PLAN OF CARE
Problem: Patient Care Overview  Goal: Plan of Care Review  Outcome: Ongoing (interventions implemented as appropriate)   01/13/19 9364   Coping/Psychosocial   Plan of Care Reviewed With patient   Plan of Care Review   Progress no change   OTHER   Outcome Summary Pt is alert but forgetful at times; Pain med given x1; Lopressor given tonight; heart rate still going in and out of afib 's; Rehab soon; will continue to monitor       Problem: Fall Risk (Adult)  Goal: Absence of Fall  Outcome: Ongoing (interventions implemented as appropriate)      Problem: Skin Injury Risk (Adult)  Goal: Skin Health and Integrity  Outcome: Ongoing (interventions implemented as appropriate)

## 2019-01-13 NOTE — PROGRESS NOTES
"/83 (BP Location: Right arm, Patient Position: Lying)   Pulse 111   Temp 98.3 °F (36.8 °C) (Oral)   Resp 16   Ht 190.5 cm (75\")   Wt 89.1 kg (196 lb 6.9 oz)   SpO2 100%   BMI 24.55 kg/m²     Lab Results (last 24 hours)     Procedure Component Value Units Date/Time    POC Glucose Once [[id number]]  (Normal) Collected:  01/13/19 0700    Specimen:  Blood Updated:  01/13/19 0704     Glucose 128 mg/dL     POC Glucose Once [[id number]]  (Abnormal) Collected:  01/12/19 2048    Specimen:  Blood Updated:  01/12/19 2058     Glucose 134 mg/dL     POC Glucose Once [[id number]]  (Normal) Collected:  01/12/19 1834    Specimen:  Blood Updated:  01/12/19 1845     Glucose 94 mg/dL     POC Glucose Once [[id number]]  (Abnormal) Collected:  01/12/19 1737    Specimen:  Blood Updated:  01/12/19 1738     Glucose 51 mg/dL     POC Glucose Once [[id number]]  (Abnormal) Collected:  01/12/19 1707    Specimen:  Blood Updated:  01/12/19 1709     Glucose 48 mg/dL     POC Glucose Once [[id number]]  (Abnormal) Collected:  01/12/19 1705    Specimen:  Blood Updated:  01/12/19 1709     Glucose 46 mg/dL     POC Glucose Once [[id number]]  (Normal) Collected:  01/12/19 1120    Specimen:  Blood Updated:  01/12/19 1659     Glucose 89 mg/dL     Protime-INR [[id number]]  (Normal) Collected:  01/12/19 1206    Specimen:  Blood Updated:  01/12/19 1236     Protime 13.6 Seconds      INR 1.06    Protime-INR [[id number]]  (Normal) Collected:  01/12/19 1206    Specimen:  Blood Updated:  01/12/19 1236     Protime 13.9 Seconds      INR 1.09          Imaging Results (last 24 hours)     ** No results found for the last 24 hours. **          Patient Care Team:  Trey Stockton MD as PCP - General (Internal Medicine)  Madhav Hdez MD as Consulting Physician (Hematology and Oncology)  Sherice Newman MD as Consulting Physician (Cardiology)  Carey Pascual RPH as Pharmacist    SUBJECTIVE  Pain improved, but still with tachycardia  PHYSICAL " EXAM   Pulse rate still over 100    Postural kyphosis of lumbar region    DM (HgbA1c 5.9)    Hypertension    Atrial fibrillation (CMS/HCC)    Iron deficiency anemia    Mild cognitive disorder    H/O urinary retention    Chronic anticoagulation-for Afib      PLAN / DISPOSITION:  Defer tachycardial situation to cardiology who is seeing the patient  Delay discharge till medically ready.  Disposition pending re: NH placement iin any case   Jordan Tejeda MD  01/13/19  7:30 AM

## 2019-01-13 NOTE — THERAPY TREATMENT NOTE
Acute Care - Physical Therapy Treatment Note  [facility]     Patient Name: Derrek Ritter  : 1940  MRN: [id number]  Today's Date: 2019  Onset of Illness/Injury or Date of Surgery: 19  Date of Referral to PT: 01/10/19  Referring Physician: Barron Knight MD    Admit Date: 2019    Visit Dx:    ICD-10-CM ICD-9-CM   1. Impaired functional mobility and activity tolerance Z74.09 V49.89   2. Postural kyphosis of lumbar region M40.05 737.10   3. Paroxysmal atrial fibrillation (CMS/HCC) I48.0 427.31   4. Spinal stenosis of lumbar region with neurogenic claudication M48.062 724.03     Patient Active Problem List   Diagnosis   • Cholelithiasis   • DM (HgbA1c 5.9)   • Diverticulosis of intestine   • Hypercholesterolemia   • Hypertension   • Spinal stenosis   • Microalbuminuria   • Atrial fibrillation (CMS/HCC)   • Medicare annual wellness visit, subsequent   • Anemia   • Iron deficiency anemia   • DJD (degenerative joint disease)   • Impotence of organic origin   • Glaucoma   • Hiatal hernia   • Mild cognitive disorder   • Calculus of kidney   • Nocturia   • Chronic bilateral low back pain with bilateral sciatica   • Post laminectomy syndrome   • Routine health maintenance   • Weight loss   • Spinal stenosis of lumbar region with neurogenic claudication   • Spondylolisthesis of lumbar region   • Lumbar spine pain   • Metabolic encephalopathy   • Urinary retention with incomplete bladder emptying   • Macrocytosis without anemia   • Sundowning   • RBBB (right bundle branch block with left anterior fascicular block)   • Leg swelling   • Postural kyphosis of lumbar region   • H/O urinary retention   • Chronic anticoagulation-for Afib       Therapy Treatment    Rehabilitation Treatment Summary     Row Name 19 0950             Treatment Time/Intention    Discipline  physical therapist  -SP      Document Type  therapy note (daily note)  -SP      Subjective Information  complains of;weakness  -SP       Mode of Treatment  physical therapy  -SP      Therapy Frequency (PT Clinical Impression)  daily  -SP      Patient Effort  good  -SP      Existing Precautions/Restrictions  fall;brace worn when out of bed;spinal  -SP      Recorded by [SP] Amber Hansen, PT 01/13/19 1759      Row Name 01/13/19 1700             Vital Signs    O2 Delivery Pre Treatment  room air  -SP      Recorded by [SP] Amber Hansen, PT 01/13/19 1759      Row Name 01/13/19 1700             Cognitive Assessment/Intervention- PT/OT    Orientation Status (Cognition)  oriented x 4  -SP      Follows Commands (Cognition)  WFL  -SP      Personal Safety Interventions  fall prevention program maintained  -SP      Recorded by [SP] Amber Hansen, PT 01/13/19 1759      Row Name 01/13/19 1700             Safety Issues, Functional Mobility    Safety Issues Affecting Function (Mobility)  problem solving;safety precaution awareness  -SP      Impairments Affecting Function (Mobility)  balance;endurance/activity tolerance;pain;postural/trunk control;strength  -SP      Comment, Safety Issues/Impairments (Mobility)  wanted  to walk more and knees were wobbly (judgement)   -SP      Recorded by [SP] Amber Hansen, PT 01/13/19 1759      Row Name 01/13/19 1700             Bed Mobility Assessment/Treatment    Comment (Bed Mobility)  in chair, ed to use brace when up, donned   -SP      Recorded by [SP] Amber Hansen, PT 01/13/19 1759      Row Name 01/13/19 1700             Transfer Assessment/Treatment    Transfer Assessment/Treatment  sit-stand transfer;stand-sit transfer  -SP      Recorded by [SP] Amber Hansen, PT 01/13/19 1759      Row Name 01/13/19 1700             Sit-Stand Transfer    Sit-Stand [place] (Transfers)  verbal cues;contact guard;minimum assist (75% patient effort)  -SP      Assistive Device (Sit-Stand Transfers)  walker, front-wheeled  -SP      Recorded by [SP] Amber Hansen, PT 01/13/19 1759      Row Name 01/13/19 1700              Stand-Sit Transfer    Stand-Sit [place] (Transfers)  contact guard;minimum assist (75% patient effort)  -SP      Assistive Device (Stand-Sit Transfers)  walker, front-wheeled  -SP      Recorded by [SP] Amber Hansen, PT 01/13/19 1759      Row Name 01/13/19 1700             Gait/Stairs Assessment/Training    [place] Level (Gait)  contact guard;minimum assist (75% patient effort);verbal cues  -SP      Assistive Device (Gait)  walker, front-wheeled  -SP      Distance in Feet (Gait)  125ft  -SP      Pattern (Gait)  step-through  -SP      Deviations/Abnormal Patterns (Gait)  base of support, narrow;gait speed decreased;stride length decreased;bilateral deviations  -SP      Bilateral Gait Deviations  forward flexed posture;knee buckling, bilateral buckling, (wobbly - stay bent)   -SP      Comment (Gait/Stairs)  pt had delayed onset soreness and overall less activity tolerance on 1/12 /19 so did not push increased distance with gt phyllis with knee unsteadiness.   -SP      Recorded by [SP] Amber Hansen, PT 01/13/19 1759      Row Name 01/13/19 1700             Positioning and Restraints    Pre-Treatment Position  sitting in chair/recliner  -SP      Post Treatment Position  chair  -SP      In Chair  notified nsg;with brace;with family/caregiver;sitting  -SP      Recorded by [SP] Amber Hansen, PT 01/13/19 1759      Row Name 01/13/19 1700             Pain Scale: Numbers Pre/Post-Treatment    Pain Scale: Numbers, Pretreatment  5/10  -SP      Pain Scale: Numbers, Post-Treatment  5/10  -SP      Pain Location  back  -SP      Pain Intervention(s)  Repositioned;Ambulation/increased activity loosened brace in sitting   -SP      Recorded by [SP] Amber Hansen, PT 01/13/19 1759      Row Name                Wound 01/09/19 0844 Other (See comments) back incision    Wound - Properties Group Date first assessed: 01/09/19 [JT] Time first assessed: 0844 [JT] Side: Other (See comments) [JT] Location: back [JT] Type:  incision [JT] Recorded by:  [ANGIE] Margie Bravo RN 01/09/19 0844      User Key  (r) = Recorded By, (t) = Taken By, (c) = Cosigned By    Initials Name Effective Dates Discipline    Margie Sena RN 06/16/16 -  Nurse    Amber Torres, PT 04/03/18 -  PT          Wound 01/09/19 0844 Other (See comments) back incision (Active)   Dressing Appearance intact;dry 1/13/2019  9:11 AM   Closure MARIELLE 1/13/2019  9:11 AM   Base dressing in place, unable to visualize 1/13/2019  9:11 AM   Drainage Amount none 1/13/2019  9:11 AM   Dressing Care, Wound low-adherent 1/13/2019  9:11 AM           Physical Therapy Education     Title: PT OT SLP Therapies (Done)     Topic: Physical Therapy (Done)     Point: Mobility training (Done)     Learning Progress Summary           Patient Acceptance, E, VU,NR by SP at 1/13/2019  6:02 PM    Comment:  up with assist, not to overdo it and brace on when up.    Acceptance, E,TB, VU,DU by CW at 1/11/2019  1:48 PM    Acceptance, E,TB, VU,DU by CW at 1/11/2019  9:51 AM    Acceptance, E, NR by CA at 1/10/2019  2:00 PM    Comment:  educated on log roll, spinal precautions   Significant Other Acceptance, E, VU,NR by SP at 1/13/2019  6:02 PM    Comment:  up with assist, not to overdo it and brace on when up.                   Point: Home exercise program (Done)     Learning Progress Summary           Patient Acceptance, E,TB, VU,DU by CW at 1/11/2019  1:48 PM    Acceptance, E,TB, VU,DU by CW at 1/11/2019  9:51 AM    Acceptance, E, NR by CA at 1/10/2019  2:00 PM    Comment:  educated on log roll, spinal precautions                   Point: Body mechanics (Done)     Learning Progress Summary           Patient Acceptance, E, VU,NR by SP at 1/13/2019  6:02 PM    Comment:  up with assist, not to overdo it and brace on when up.    Acceptance, E,TB, VU,DU by CW at 1/11/2019  1:48 PM    Acceptance, E,TB, VU,DU by FOREST at 1/11/2019  9:51 AM    AcceptanceDARY NR by CA at 1/10/2019  2:00 PM    Comment:   educated on log roll, spinal precautions   Significant Other Acceptance, E, VU,NR by SP at 1/13/2019  6:02 PM    Comment:  up with assist, not to overdo it and brace on when up.                   Point: Precautions (Done)     Learning Progress Summary           Patient Acceptance, E, VU,NR by SP at 1/13/2019  6:02 PM    Comment:  up with assist, not to overdo it and brace on when up.    Acceptance, E,TB, VU,DU by CW at 1/11/2019  1:48 PM    Acceptance, E,TB, VU,DU by CW at 1/11/2019  9:51 AM    Acceptance, E, NR by CA at 1/10/2019  2:00 PM    Comment:  educated on log roll, spinal precautions   Significant Other Acceptance, E, VU,NR by SP at 1/13/2019  6:02 PM    Comment:  up with assist, not to overdo it and brace on when up.                               User Key     Initials Effective Dates Name Provider Type Discipline     03/07/18 -  Pieter Mckeon, PTA Physical Therapy Assistant PT    SP 04/03/18 -  Amber Hansen, PT Physical Therapist PT    CA 06/13/18 -  Nel Hopkins, BALDEMAR Physical Therapist PT                PT Recommendation and Plan  Therapy Frequency (PT Clinical Impression): daily  Plan of Care Reviewed With: (P) patient, spouse  Progress: (P) improving  Outcome Summary: (P) Pt alert and oriented x 4 today. but some decreased judgement on how far he should walk. Wanted to walk further and had some knee slight buckling and had limited activity yesterday after doing more on 1/12/19 so coached pt to not push. Pt is appropriate for skilled nsg on dc to improve flexibility, improve balance and advance gait endurance. Pt needs cga with gt belt initially due to knee weakness, ed with wiife/pt.   Outcome Measures     Row Name 01/11/19 1300 01/11/19 0900          How much help from another person do you currently need...    Turning from your back to your side while in flat bed without using bedrails?  3  -CW  3  -CW     Moving from lying on back to sitting on the side of a flat bed without bedrails?   3  -CW  2  -CW     Moving to and from a bed to a chair (including a wheelchair)?  3  -CW  3  -CW     Standing up from a chair using your arms (e.g., wheelchair, bedside chair)?  3  -CW  3  -CW     Climbing 3-5 steps with a railing?  3  -CW  2  -CW     To walk in hospital room?  3  -CW  3  -CW     AM-PAC 6 Clicks Score  18  -CW  16  -CW        Functional Assessment    Outcome Measure Options  AM-PAC 6 Clicks Basic Mobility (PT)  -CW  AM-PAC 6 Clicks Basic Mobility (PT)  -CW       User Key  (r) = Recorded By, (t) = Taken By, (c) = Cosigned By    Initials Name Provider Type    CW Pieter Mckeon, PTA Physical Therapy Assistant         Time Calculation:   PT Charges     Row Name 01/13/19 1803             Time Calculation    Start Time  1720  -SP      Stop Time  1745  -SP      Time Calculation (min)  25 min  -SP      PT Received On  01/13/19  -SP      PT - Next Appointment  01/14/19  -SP         Time Calculation- PT    Total Timed Code Minutes- PT  25 minute(s)  -SP         Timed Charges    95434 - PT Therapeutic Exercise Minutes  25  -SP        User Key  (r) = Recorded By, (t) = Taken By, (c) = Cosigned By    Initials Name Provider Type    Amber Torres, PT Physical Therapist        Therapy Suggested Charges     Code   Minutes Charges    85822 (CPT®) Hc Pt Neuromusc Re Education Ea 15 Min      89031 (CPT®) Hc Pt Ther Proc Ea 15 Min 25 2    10293 (CPT®) Hc Gait Training Ea 15 Min      18852 (CPT®) Hc Pt Therapeutic Act Ea 15 Min      79683 (CPT®) Hc Pt Manual Therapy Ea 15 Min      29660 (CPT®) Hc Pt Iontophoresis Ea 15 Min      67461 (CPT®) Hc Pt Elec Stim Ea-Per 15 Min      87876 (CPT®) Hc Pt Ultrasound Ea 15 Min      14463 (CPT®) Hc Pt Self Care/Mgmt/Train Ea 15 Min      72213 (CPT®) Hc Pt Prosthetic (S) Train Initial Encounter, Each 15 Min      38164 (CPT®) Hc Pt Orthotic(S)/Prosthetic(S) Encounter, Each 15 Min      29127 (CPT®) Hc Orthotic(S) Mgmt/Train Initial Encounter, Each 15min      Total  25 2         Therapy Charges for Today     Code Description Service Date Service Provider Modifiers Qty    88009005570 HC PT THER PROC EA 15 MIN 1/13/2019 Amber Hansen, PT GP 2          PT G-Codes  Outcome Measure Options: AM-PAC 6 Clicks Basic Mobility (PT)  AM-PAC 6 Clicks Score: 18    Amber Hansen, PT  1/13/2019

## 2019-01-13 NOTE — PROGRESS NOTES
[facility]     LOS: 4 days     Subjective:    CC:No chief complaint on file.    DIET:  Diet Order   Procedures   • Diet Regular     Back pain is present, moderate in severity  No n/v/d      Objective:    Vital Signs:  Temp:  [98 °F (36.7 °C)-99.5 °F (37.5 °C)] 99.5 °F (37.5 °C)  Heart Rate:  [] 125  Resp:  [16-20] 16  BP: (109-140)/(70-92) 125/92    SpO2:  [90 %-100 %] 96 %  on  Flow (L/min):  [2] 2;   Device (Oxygen Therapy): room air  Body mass index is 24.55 kg/m².    Physical Exam   Constitutional: He appears well-developed and well-nourished.   Slightly uncomfortable appearing when I am talking with him, leaning to the left   HENT:   Head: Normocephalic and atraumatic.   Cardiovascular: Exam reveals no friction rub.   No murmur heard.  Pulmonary/Chest: Effort normal and breath sounds normal.   Abdominal: Soft. Bowel sounds are normal. He exhibits no distension. There is no tenderness.   Neurological: He is alert.   Skin: Skin is warm and dry.       Results Review:    No results found for: GLUCOSE  Results from last 7 days   Lab Units  01/12/19   0533   WBC 10*3/mm3  8.02   HEMOGLOBIN g/dL  8.5*   HEMATOCRIT %  27.2*   PLATELETS 10*3/mm3  152     Results from last 7 days   Lab Units  01/10/19   0435   SODIUM mmol/L  145   POTASSIUM mmol/L  4.0   CHLORIDE mmol/L  108*   CO2 mmol/L  25.0   BUN mg/dL  14   CREATININE mg/dL  0.71*   CALCIUM mg/dL  8.3*   BILIRUBIN mg/dL  0.2   ALK PHOS U/L  35*   ALT (SGPT) U/L  18   AST (SGOT) U/L  28   GLUCOSE mg/dL  162*     Results from last 7 days   Lab Units  01/13/19   0645   INR   1.11*         Results from last 7 days   Lab Units  01/11/19   0533  01/10/19   1943   TROPONIN T ng/mL  <0.010  <0.010     Cultures:       I have reviewed daily medications and changes in CPOE    Scheduled meds    atorvastatin 20 mg Oral Daily   brimonidine 1 drop Both Eyes BID   donepezil 10 mg Oral QAM   dorzolamide 1 drop Both Eyes BID   enalapril 40 mg Oral  QAM   ferrous sulfate 325 mg Oral Q AM   insulin lispro 0-7 Units Subcutaneous 4x Daily With Meals & Nightly   latanoprost 1 drop Both Eyes Nightly   metoprolol tartrate 25 mg Oral Q12H   pantoprazole 40 mg Oral QAM   pioglitazone 45 mg Oral QAM   polyethylene glycol 17 g Oral BID   sennosides-docusate sodium 1 tablet Oral BID   sodium chloride 3 mL Intravenous Q12H   tamsulosin 0.4 mg Oral QAM   timolol 1 drop Both Eyes Daily   warfarin 10 mg Oral Daily         lactated ringers 9 mL/hr Last Rate: 9 mL/hr (01/09/19 0652)   sodium chloride 100 mL/hr Last Rate: 100 mL/hr (01/10/19 1846)     PRN meds  bisacodyl  •  calcium carbonate  •  cyclobenzaprine  •  dextrose  •  dextrose  •  glucagon (human recombinant)  •  naloxone  •  ondansetron **OR** ondansetron ODT **OR** ondansetron  •  oxyCODONE-acetaminophen  •  sodium chloride  •  temazepam        Postural kyphosis of lumbar region    DM (HgbA1c 5.9)    Hypertension    Atrial fibrillation (CMS/HCC)    Iron deficiency anemia    Mild cognitive disorder    H/O urinary retention    Chronic anticoagulation-for Afib        Assessment/Plan:    Postural kyphosis of lumbar region      DM (HgbA1c 5.9)  -blood sugars are low so hold on glimiperide  -continue with the actos  -ssi      Hypertension      Atrial fibrillation (CMS/HCC)  -Hr is better      Iron deficiency anemia      Mild cognitive disorder      H/O urinary retention      Chronic anticoagulation-for Afib      DVT PPX: scd      Abbe Walter MD  01/13/19  3:45 PM

## 2019-01-13 NOTE — PROGRESS NOTES
Continued Stay Note  [facility]     Patient Name: Derrek Ritter  MRN: [id number]  Today's Date: 1/13/2019    Admit Date: 1/9/2019    Discharge Plan     Row Name 01/13/19 1144       Plan    Plan  Referrals to [facility] choice and [facility]-2[facility] choice- await evaluations and will need [facility] Medicare Precert     Patient/Family in Agreement with Plan  yes    Plan Comments  Incoming call from [facility]ICK[facility] Tapia stating patient and wife now want SNF for patient.  Spoke with wife, Silvia at [phone] and she would like referrals to [facility] notified) and [facility](Juan Carlos- notified at [phone])- await evaluations and will need [facility] Medicare precert.... CCP will follow and assist as needed..... Marlin Kang RN,CCP         Discharge Codes    No documentation.       Expected Discharge Date and Time     Expected Discharge Date Expected Discharge Time    Jan 13, 2019             Marlin Kang RN

## 2019-01-14 NOTE — PROGRESS NOTES
Continued Stay Note  [facility]     Patient Name: Derrek Ritter  MRN: [id number]  Today's Date: 1/14/2019    Admit Date: 1/9/2019    Discharge Plan     Row Name 01/14/19 1124       Plan    Plan  [facility]     Patient/Family in Agreement with Plan  yes    Plan Comments  Spoke with wife by phone.  She requested a referral to [facility].  Called x2 and no return call.  Spoke with [facility] and [facility] has a bed and will start precert.  Spoke with wife and she agrees to this plan.        Discharge Codes    No documentation.       Expected Discharge Date and Time     Expected Discharge Date Expected Discharge Time    Jan 13, 2019             Celia Mtaa RN

## 2019-01-14 NOTE — THERAPY TREATMENT NOTE
Acute Care - Physical Therapy Treatment Note  [facility]     Patient Name: Derrek Ritter  : 1940  MRN: [id number]  Today's Date: 2019  Onset of Illness/Injury or Date of Surgery: 19  Date of Referral to PT: 01/10/19  Referring Physician: Barron Knight MD    Admit Date: 2019    Visit Dx:    ICD-10-CM ICD-9-CM   1. Impaired functional mobility and activity tolerance Z74.09 V49.89   2. Postural kyphosis of lumbar region M40.05 737.10   3. Paroxysmal atrial fibrillation (CMS/HCC) I48.0 427.31   4. Spinal stenosis of lumbar region with neurogenic claudication M48.062 724.03     Patient Active Problem List   Diagnosis   • Cholelithiasis   • DM (HgbA1c 5.9)   • Diverticulosis of intestine   • Hypercholesterolemia   • Hypertension   • Spinal stenosis   • Microalbuminuria   • Atrial fibrillation (CMS/HCC)   • Medicare annual wellness visit, subsequent   • Anemia   • Iron deficiency anemia   • DJD (degenerative joint disease)   • Impotence of organic origin   • Glaucoma   • Hiatal hernia   • Mild cognitive disorder   • Calculus of kidney   • Nocturia   • Chronic bilateral low back pain with bilateral sciatica   • Post laminectomy syndrome   • Routine health maintenance   • Weight loss   • Spinal stenosis of lumbar region with neurogenic claudication   • Spondylolisthesis of lumbar region   • Lumbar spine pain   • Metabolic encephalopathy   • Urinary retention with incomplete bladder emptying   • Macrocytosis without anemia   • Sundowning   • RBBB (right bundle branch block with left anterior fascicular block)   • Leg swelling   • Postural kyphosis of lumbar region   • H/O urinary retention   • Chronic anticoagulation-for Afib       Therapy Treatment    Rehabilitation Treatment Summary     Row Name 19 1000             Treatment Time/Intention    Discipline  physical therapy assistant  -CW      Document Type  therapy note (daily note)  -CW      Subjective Information  no complaints  -CW       Mode of Treatment  physical therapy  -CW      Therapy Frequency (PT Clinical Impression)  daily  -CW      Patient Effort  good  -CW      Existing Precautions/Restrictions  fall;brace worn when out of bed;spinal  -CW      Recorded by [CW] Pieter Mckeon, PTA 01/14/19 1023      Row Name 01/14/19 1000             Vital Signs    O2 Delivery Pre Treatment  room air  -CW      Recorded by [CW] Pieter Mckeon, PTA 01/14/19 1023      Row Name 01/14/19 1000             Cognitive Assessment/Intervention- PT/OT    Orientation Status (Cognition)  oriented x 4  -CW      Follows Commands (Cognition)  WFL  -CW      Personal Safety Interventions  fall prevention program maintained;gait belt;muscle strengthening facilitated;nonskid shoes/slippers when out of bed  -CW      Recorded by [CW] Pieter Mckeon, PTA 01/14/19 1023      Row Name 01/14/19 1000             Safety Issues, Functional Mobility    Impairments Affecting Function (Mobility)  balance;endurance/activity tolerance;pain;postural/trunk control;strength  -CW      Recorded by [CW] Pieter Mckeon, PTA 01/14/19 1023      Row Name 01/14/19 1000             Bed Mobility Assessment/Treatment    Bed Mobility Assessment/Treatment  supine-sit  -CW      Supine-Sit [place] (Bed Mobility)  supervision  -CW      Assistive Device (Bed Mobility)  bed rails  -CW      Recorded by [CW] Pieter Mckeon, PTA 01/14/19 1023      Row Name 01/14/19 1000             Transfer Assessment/Treatment    Transfer Assessment/Treatment  sit-stand transfer;stand-sit transfer  -CW      Recorded by [CW] Pieter Mckeon, PTA 01/14/19 1023      Row Name 01/14/19 1000             Sit-Stand Transfer    Sit-Stand [place] (Transfers)  verbal cues;contact guard  -CW      Assistive Device (Sit-Stand Transfers)  walker, front-wheeled  -CW      Recorded by [CW] Pieter Mckeon, PTA 01/14/19 1023      Row Name 01/14/19 1000             Stand-Sit Transfer    Stand-Sit  [place] (Transfers)  contact guard  -CW      Assistive Device (Stand-Sit Transfers)  walker, front-wheeled  -CW      Recorded by [CW] Pieter Mckeon, PTA 01/14/19 1023      Row Name 01/14/19 1000             Gait/Stairs Assessment/Training    [place] Level (Gait)  contact guard;verbal cues  -CW      Assistive Device (Gait)  walker, front-wheeled  -CW      Distance in Feet (Gait)  100  -CW      Pattern (Gait)  step-through  -CW      Deviations/Abnormal Patterns (Gait)  gait speed decreased;stride length decreased  -CW      Bilateral Gait Deviations  --  -CW      Recorded by [CW] Pieter Mckeon, PTA 01/14/19 1023      Row Name 01/14/19 1000             Positioning and Restraints    Pre-Treatment Position  in bed  -CW      Post Treatment Position  chair  -CW      In Chair  notified nsg;reclined;call light within reach;encouraged to call for assist  -CW      Recorded by [CW] Pieter Mckeon, PTA 01/14/19 1023      Row Name 01/14/19 1000             Pain Scale: Numbers Pre/Post-Treatment    Pain Scale: Numbers, Pretreatment  0/10 - no pain  -CW      Pain Scale: Numbers, Post-Treatment  --  -CW      Pain Location  --  -CW      Recorded by [CW] Pieter Mckeon, PTA 01/14/19 1023      Row Name                Wound 01/09/19 0844 Other (See comments) back incision    Wound - Properties Group Date first assessed: 01/09/19 [JT] Time first assessed: 0844 [JT] Side: Other (See comments) [JT] Location: back [JT] Type: incision [JT] Recorded by:  [ANGIE] Margie Bravo RN 01/09/19 0844    Row Name 01/14/19 1000             Outcome Summary/Treatment Plan (PT)    Anticipated Discharge Disposition (PT)  skilled nursing facility;home with home health  -CW      Recorded by [CW] Pieter Mckeon, PTA 01/14/19 1023        User Key  (r) = Recorded By, (t) = Taken By, (c) = Cosigned By    Initials Name Effective Dates Discipline    Margie Sena RN 06/16/16 -  Nurse    CW Pieter Mckeon, PTA  03/07/18 -  PT          Wound 01/09/19 0844 Other (See comments) back incision (Active)   Dressing Appearance intact;dry 1/14/2019  4:28 AM   Closure MARIELLE 1/14/2019  4:28 AM   Base dressing in place, unable to visualize 1/14/2019  4:28 AM   Drainage Amount none 1/14/2019  4:28 AM   Dressing Care, Wound low-adherent 1/13/2019  8:52 PM           Physical Therapy Education     Title: PT OT SLP Therapies (Done)     Topic: Physical Therapy (Done)     Point: Mobility training (Done)     Learning Progress Summary           Patient Acceptance, E,TB, VU,DU by CW at 1/14/2019 10:23 AM    Acceptance, E, VU,NR by SP at 1/13/2019  6:02 PM    Comment:  up with assist, not to overdo it and brace on when up.    Acceptance, E,TB, VU,DU by CW at 1/11/2019  1:48 PM    Acceptance, E,TB, VU,DU by CW at 1/11/2019  9:51 AM    Acceptance, E, NR by CA at 1/10/2019  2:00 PM    Comment:  educated on log roll, spinal precautions   Significant Other Acceptance, E, VU,NR by SP at 1/13/2019  6:02 PM    Comment:  up with assist, not to overdo it and brace on when up.                   Point: Home exercise program (Done)     Learning Progress Summary           Patient Acceptance, E,TB, VU,DU by CW at 1/14/2019 10:23 AM    Acceptance, E,TB, VU,DU by CW at 1/11/2019  1:48 PM    Acceptance, E,TB, VU,DU by CW at 1/11/2019  9:51 AM    Acceptance, E, NR by CA at 1/10/2019  2:00 PM    Comment:  educated on log roll, spinal precautions                   Point: Body mechanics (Done)     Learning Progress Summary           Patient Acceptance, E,TB, VU,DU by CW at 1/14/2019 10:23 AM    Acceptance, E, VU,NR by SP at 1/13/2019  6:02 PM    Comment:  up with assist, not to overdo it and brace on when up.    Acceptance, E,TB, VU,DU by CW at 1/11/2019  1:48 PM    Acceptance, E,TB, VU,DU by CW at 1/11/2019  9:51 AM    Acceptance, E, NR by CA at 1/10/2019  2:00 PM    Comment:  educated on log roll, spinal precautions   Significant Other Acceptance, DARY, SHRUTI,NR by SP at  1/13/2019  6:02 PM    Comment:  up with assist, not to overdo it and brace on when up.                   Point: Precautions (Done)     Learning Progress Summary           Patient Acceptance, E,TB, VU,DU by CW at 1/14/2019 10:23 AM    Acceptance, E, VU,NR by SP at 1/13/2019  6:02 PM    Comment:  up with assist, not to overdo it and brace on when up.    Acceptance, E,TB, VU,DU by CW at 1/11/2019  1:48 PM    Acceptance, E,TB, VU,DU by CW at 1/11/2019  9:51 AM    Acceptance, E, NR by CA at 1/10/2019  2:00 PM    Comment:  educated on log roll, spinal precautions   Significant Other Acceptance, E, VU,NR by SP at 1/13/2019  6:02 PM    Comment:  up with assist, not to overdo it and brace on when up.                               User Key     Initials Effective Dates Name Provider Type Discipline     03/07/18 -  Pieter Mckeon, PTA Physical Therapy Assistant PT    SP 04/03/18 -  Amber Hansen, PT Physical Therapist PT    CA 06/13/18 -  Nel Hopkins, PT Physical Therapist PT                PT Recommendation and Plan  Anticipated Discharge Disposition (PT): skilled nursing facility, home with home health  Therapy Frequency (PT Clinical Impression): daily  Outcome Summary/Treatment Plan (PT)  Anticipated Discharge Disposition (PT): skilled nursing facility, home with home health  Plan of Care Reviewed With: patient  Progress: improving  Outcome Summary: Pt increasing with strength and baalnce with use of RWX and improved bed mobility  Outcome Measures     Row Name 01/14/19 1000 01/11/19 1300          How much help from another person do you currently need...    Turning from your back to your side while in flat bed without using bedrails?  3  -CW  3  -CW     Moving from lying on back to sitting on the side of a flat bed without bedrails?  3  -CW  3  -CW     Moving to and from a bed to a chair (including a wheelchair)?  3  -CW  3  -CW     Standing up from a chair using your arms (e.g., wheelchair, bedside chair)?  3   -CW  3  -CW     Climbing 3-5 steps with a railing?  2  -CW  3  -CW     To walk in hospital room?  3  -CW  3  -CW     AM-PAC 6 Clicks Score  17  -CW  18  -CW        Functional Assessment    Outcome Measure Options  AM-PAC 6 Clicks Basic Mobility (PT)  -CW  AM-PAC 6 Clicks Basic Mobility (PT)  -CW       User Key  (r) = Recorded By, (t) = Taken By, (c) = Cosigned By    Initials Name Provider Type    Pieter Petersen PTA Physical Therapy Assistant         Time Calculation:   PT Charges     Row Name 01/14/19 1025             Time Calculation    Start Time  0937  -CW      Stop Time  1005  -CW      Time Calculation (min)  28 min  -CW      PT Received On  01/14/19  -CW      PT - Next Appointment  01/15/19  -CW        User Key  (r) = Recorded By, (t) = Taken By, (c) = Cosigned By    Initials Name Provider Type    Pieter Petersen PTA Physical Therapy Assistant        Therapy Suggested Charges     Code   Minutes Charges    67337 (CPT®) Hc Pt Neuromusc Re Education Ea 15 Min      57692 (CPT®) Hc Pt Ther Proc Ea 15 Min 25 2    71907 (CPT®) Hc Gait Training Ea 15 Min      86126 (CPT®) Hc Pt Therapeutic Act Ea 15 Min      94023 (CPT®) Hc Pt Manual Therapy Ea 15 Min      17330 (CPT®) Hc Pt Iontophoresis Ea 15 Min      77141 (CPT®) Hc Pt Elec Stim Ea-Per 15 Min      88493 (CPT®) Hc Pt Ultrasound Ea 15 Min      70419 (CPT®) Hc Pt Self Care/Mgmt/Train Ea 15 Min      66013 (CPT®) Hc Pt Prosthetic (S) Train Initial Encounter, Each 15 Min      42726 (CPT®) Hc Pt Orthotic(S)/Prosthetic(S) Encounter, Each 15 Min      03553 (CPT®) Hc Orthotic(S) Mgmt/Train Initial Encounter, Each 15min      Total  25 2        Therapy Charges for Today     Code Description Service Date Service Provider Modifiers Qty    91574943636 HC PT THER SUPP EA 15 MIN 1/14/2019 Pieter Mckeon, PTA GP 1    76902211947 HC PT THER PROC EA 15 MIN 1/14/2019 Pieter Mckeon, PTA GP 2          PT G-Codes  Outcome Measure Options: AM-PAC 6 Clicks Basic  Mobility (PT)  AM-PAC 6 Clicks Score: 17    Pieter Mckeon, PTA  1/14/2019

## 2019-01-14 NOTE — PROGRESS NOTES
LOS: 5 days   Patient Care Team:  Trey Stockton MD as PCP - General (Internal Medicine)  Madhav Hdez MD as Consulting Physician (Hematology and Oncology)  Sherice Newman MD as Consulting Physician (Cardiology)  Carey Pascual [facility] as Pharmacist    Chief Complaint: back pain     Interval History: No cardiac complaints.  Remains in AF.  Has a lot of back pain.      Objective   Vital Signs  Temp:  [98.9 °F (37.2 °C)-99.5 °F (37.5 °C)] 99 °F (37.2 °C)  Heart Rate:  [] 120  Resp:  [16-20] 18  BP: (106-148)/(65-92) 106/65    Intake/Output Summary (Last 24 hours) at 1/14/2019 0933  Last data filed at 1/14/2019 0851  Gross per 24 hour   Intake 240 ml   Output --   Net 240 ml           Physical Exam   Constitutional: He appears well-developed and well-nourished.   HENT:   Head: Normocephalic.   Nose: Nose normal.   Mouth/Throat: Oropharynx is clear and moist.   Eyes: Conjunctivae and EOM are normal. Pupils are equal, round, and reactive to light.   Neck: Normal range of motion. No JVD present.   Cardiovascular: Normal heart sounds. An irregularly irregular rhythm present. Tachycardia present.   Pulmonary/Chest: Effort normal and breath sounds normal.   Abdominal: Soft. There is no tenderness.   Musculoskeletal: He exhibits edema (1-2+ edema with stasis changes).   Neurological: He is alert. No cranial nerve deficit.   Skin: Skin is warm and dry. No erythema.   Psychiatric: He has a normal mood and affect. His behavior is normal.   Vitals reviewed.      Results Review:      Results from last 7 days   Lab Units  01/10/19   0435   SODIUM mmol/L  145   POTASSIUM mmol/L  4.0   CHLORIDE mmol/L  108*   CO2 mmol/L  25.0   BUN mg/dL  14   CREATININE mg/dL  0.71*   GLUCOSE mg/dL  162*   CALCIUM mg/dL  8.3*     Results from last 7 days   Lab Units  01/11/19   0533  01/10/19   1943   TROPONIN T ng/mL  <0.010  <0.010     Results from last 7 days   Lab Units  01/12/19 0533  01/11/19   0533  01/10/19   0435    WBC 10*3/mm3  8.02   --   7.72   HEMOGLOBIN g/dL  8.5*  7.5*  8.0*   HEMATOCRIT %  27.2*  25.0*  25.8*   PLATELETS 10*3/mm3  152   --   216     Results from last 7 days   Lab Units  01/14/19   0610  01/13/19   0645  01/12/19   1206   INR   1.19*  1.11*  1.09  1.06                   I reviewed the patient's new clinical results.  I personally viewed and interpreted the patient's EKG/Telemetry data        Medication Review:     atorvastatin 20 mg Oral Daily   brimonidine 1 drop Both Eyes BID   donepezil 10 mg Oral QAM   dorzolamide 1 drop Both Eyes BID   enalapril 40 mg Oral QAM   ferrous sulfate 325 mg Oral Q AM   insulin lispro 0-7 Units Subcutaneous 4x Daily With Meals & Nightly   latanoprost 1 drop Both Eyes Nightly   metoprolol tartrate 25 mg Oral Q12H   pantoprazole 40 mg Oral QAM   pioglitazone 45 mg Oral QAM   polyethylene glycol 17 g Oral BID   sennosides-docusate sodium 1 tablet Oral BID   sodium chloride 3 mL Intravenous Q12H   tamsulosin 0.4 mg Oral QAM   timolol 1 drop Both Eyes Daily   warfarin 10 mg Oral Daily         lactated ringers 9 mL/hr Last Rate: 9 mL/hr (01/09/19 0652)   sodium chloride 100 mL/hr Last Rate: 100 mL/hr (01/10/19 1846)       Assessment/Plan     1.  Paroxysmal atrial fibrillation -- known history, now with persistent AF post-operatively. Rate is 100-120s.  He's asymptomatic.  There has been concern about giving BB as he's had sinus bradycardia in the past, and as his BP is low here at times.  However, he's on a huge dose of enalapril.  I will decrease the enalapril, increase metoprolol, and give some oral digoxin today (I can't give IV digoxin on 5P).  He's on warfarin.     2.  Sinus bradycardia in the past when on propafenone and beta blockers -- as above     3.  HTN with intermittently low BP after surgery -- as above.    4.  Chronic OAC -- warfarin resumed without bridge due to recent surgery.     5.  Leg swelling -- he states he's had edema since the 1990s.  He's on  pioglitazone which I would recommend stopping.  He was putting a significant amount of salt on his breakfast as well.  He should decrease his sodium intake and keep his legs elevated. Resume home furosemide.    Seven Brooks MD  01/14/19  9:33 AM

## 2019-01-14 NOTE — PROGRESS NOTES
Postop day 5: AVSS but tachycardic to 110 while I was there and reportedly up to 120 this shift.  Awake alert oriented.  Expected back pain no leg pain.  Hemoglobin 8.5.  Pro time 14.9 INR 1.2.

## 2019-01-14 NOTE — PLAN OF CARE
Problem: Patient Care Overview  Goal: Plan of Care Review  Outcome: Ongoing (interventions implemented as appropriate)   01/14/19 1024   Coping/Psychosocial   Plan of Care Reviewed With patient   Plan of Care Review   Progress improving   OTHER   Outcome Summary Pt increasing with strength and balance with use of RWX and improved bed mobility

## 2019-01-14 NOTE — PLAN OF CARE
Problem: Patient Care Overview  Goal: Plan of Care Review  Outcome: Ongoing (interventions implemented as appropriate)   01/14/19 0535   Coping/Psychosocial   Plan of Care Reviewed With patient   Plan of Care Review   Progress no change   OTHER   Outcome Summary A&Ox4. Working on rehab disposition. However, Pt has gone into afib w/ RVR a few times since his sx. Pt again went into afib w/ RVR around 0400 with HR in the 150s. Spoke w/ on call cardiologist who gave one time dose of PO metoprolol. HR is now trending back down. Will continue to monitor.        Problem: Skin Injury Risk (Adult)  Goal: Skin Health and Integrity  Outcome: Ongoing (interventions implemented as appropriate)      Problem: Laminectomy/Foraminotomy/Discectomy (Adult)  Goal: Signs and Symptoms of Listed Potential Problems Will be Absent, Minimized or Managed (Laminectomy/Foraminotomy/Discectomy)  Outcome: Ongoing (interventions implemented as appropriate)

## 2019-01-15 NOTE — PLAN OF CARE
Problem: Patient Care Overview  Goal: Plan of Care Review  Outcome: Ongoing (interventions implemented as appropriate)   01/15/19 9873   Coping/Psychosocial   Plan of Care Reviewed With patient;spouse   Plan of Care Review   Progress improving   OTHER   Outcome Summary Pt went into afib w/ RVR at the beginning of the shift. Highest HR observed was 155. Given new dose of metoprolol and HR has been back in the 110-120 range. Unable to transfer to Rehab facility d/t RVR and no discharge summary. Pt should go to rehab today if HR remains stable.        Problem: Laminectomy/Foraminotomy/Discectomy (Adult)  Goal: Signs and Symptoms of Listed Potential Problems Will be Absent, Minimized or Managed (Laminectomy/Foraminotomy/Discectomy)  Outcome: Ongoing (interventions implemented as appropriate)

## 2019-01-15 NOTE — DISCHARGE SUMMARY
Orthopedic Discharge Summary      Patient: Derrek Ritter  YOB: 1940  Medical Record Number: [id number]    Attending Physician: Barron Knight MD  Consulting Physician(s):   Consults     Date and Time Order Name Status Description    1/10/2019 1911 Inpatient Cardiology Consult Completed     1/9/2019 1550 Inpatient Hospitalist Consult Completed           Date of Admission: 1/9/2019  5:33 AM  Date of Discharge:1/15/2019    Discharge Diagnosis: T 11 to L3, L5-S1 fusion with T11 to S1 instrumentation, L1-2, L4 laminectomy and neural lysis and L4 pedicle subtraction osteotomy with Cyril Redmond and Vinicio,   Acute Blood Loss Anemia      Presenting Problem/History of Present Illness: Postural kyphosis of lumbar region [M40.05]  Postural kyphosis of lumbar region [M40.05]        Allergies: No Known Allergies    Discharge Medications     Discharge Medications      New Medications      Instructions Start Date   calcium carbonate 500 MG chewable tablet  Commonly known as:  TUMS   2 tablets, Oral, 3 Times Daily PRN      sennosides-docusate sodium 8.6-50 MG tablet  Commonly known as:  SENOKOT-S   1 tablet, Oral, 2 Times Daily         Changes to Medications      Instructions Start Date   enalapril 20 MG tablet  Commonly known as:  VASOTEC  What changed:    · how much to take  · when to take this   20 mg, Oral, 2 Times Daily      furosemide 20 MG tablet  Commonly known as:  LASIX  What changed:    · when to take this  · reasons to take this  · additional instructions   20 mg, Oral, Daily, as needed      metFORMIN 1000 MG tablet  Commonly known as:  GLUCOPHAGE  What changed:    · how much to take  · additional instructions   Take one am, 1/2 noon, and 1 pm      oxyCODONE-acetaminophen  MG per tablet  Commonly known as:  PERCOCET  What changed:    · how much to take  · when to take this  Notes to patient:  Last at 12 PM   2 tablets, Oral, Every 4 Hours PRN         Continue These Medications      Instructions  Start Date   brimonidine 0.2 % ophthalmic solution  Commonly known as:  ALPHAGAN   1 drop, Both Eyes, 2 Times Daily      donepezil 10 MG tablet  Commonly known as:  ARICEPT   10 mg, Oral, Every Morning      dorzolamide 2 % ophthalmic solution  Commonly known as:  TRUSOPT   1 drop, Both Eyes, 2 Times Daily      esomeprazole 40 MG capsule  Commonly known as:  nexIUM   40 mg, Oral, Daily      ferrous sulfate 325 (65 FE) MG tablet  Commonly known as:  IRON SUPPLEMENT   325 mg, Oral, Daily With Breakfast      freestyle lancets   Use to test blood sugar daily      ACCU-CHEK FASTCLIX LANCETS misc   USE TO TEST TWICE DAILY      FREESTYLE LITE device   Use to test blood once daily      ACCU-CHEK FRANCISCO SMARTVIEW w/Device kit   USE TO TEST BLOOD SUGAR TWICE DAILY      glimepiride 2 MG tablet  Commonly known as:  AMARYL   2 mg, Oral, Every Morning Before Breakfast      Glucose Blood disk   1 Device, Injection, Daily, TEST BLOOD SUGAR ONCE DAILY AS DIRECTED      ONE TOUCH ULTRA TEST test strip  Generic drug:  glucose blood   USE ONCE DAILY AS DIRECTED      glucose blood test strip  Commonly known as:  FREESTYLE LITE   Use to test blood once daily      ACCU-CHEK SMARTVIEW test strip  Generic drug:  glucose blood   USE TO TEST TWICE DAILY      latanoprost 0.005 % ophthalmic solution  Commonly known as:  XALATAN   1 drop, Both Eyes, Nightly      pioglitazone 45 MG tablet  Commonly known as:  ACTOS   45 mg, Oral, Every Morning      simvastatin 20 MG tablet  Commonly known as:  ZOCOR   20 mg, Oral, Nightly      tamsulosin 0.4 MG capsule 24 hr capsule  Commonly known as:  FLOMAX   0.4 mg, Oral, Every Morning, 1-2 TABS AS NEEDED      timolol 0.5 % ophthalmic solution  Commonly known as:  TIMOPTIC   1 drop, Both Eyes, Daily      warfarin 5 MG tablet  Commonly known as:  COUMADIN   5 mg, Oral, Takes Monday Wed friday       warfarin 10 MG tablet  Commonly known as:  COUMADIN   10 mg, Oral, Takes Tuesday Thursday  Sat and Sunday                 Past Medical History:   Diagnosis Date   • Acute suppurative otitis media    • Acute upper respiratory infection    • Anemia    • Cholelithiasis    • Diabetes mellitus (CMS/HCC)     TYPE 2   • Diverticulosis    • Dry eyes    • Earache    • Edema    • Encounter for screening for malignant neoplasm of prostate    • Esophagitis, reflux    • Glaucoma    • H/O echocardiogram 09/18/2013   • Health care maintenance    • Hiatal hernia    • History of EKG 10/06/2015   • History of Holter monitoring 09/16/2013   • Hyperlipidemia    • Hypertension    • Lumbar canal stenosis    • Microalbuminuria    • Mitral valve prolapse    • Nephrolithiasis    • Osteoarthritis of hand     of thumb   • PAF (paroxysmal atrial fibrillation) (CMS/HCC)    • 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   • Spinal stenosis         Past Surgical History:   Procedure Laterality Date   • AMPUTATION DIGIT Left 10/29/2016    Procedure: TENDON AND NERVE REPAIR OF  LEFT THUMB;  Surgeon: Zak Hanson MD;  [facility];  Service:    • ARTHRODESIS  10/28/2013    Spinal / Description: Repair spinal stenosis   • CATARACT EXTRACTION     • EYE SURGERY      EYE MUSCLE SX   • HERNIA REPAIR      right inguinal   • LUMBAR DISCECTOMY FUSION INSTRUMENTATION N/A 1/29/2018    Procedure: L2 to L5 fusion with instrumentation and removal of implants L4 5;  Surgeon: Barron Knight MD;  Location: [facility] OR;  Service:    • LUMBAR DISCECTOMY FUSION INSTRUMENTATION N/A 1/9/2019    Procedure: T 11 to L3, L5-S1 fusion with T11 to S1 instrumentation, L4 pedicle subtraction osteotomy, Right L5-S1 interbody fusion with cage;  Surgeon: Barron Knight MD;  [facility];  Service: Orthopedic Spine   • LUMBAR FUSION  2018    Lumbar vertebral fusion   • LUMBAR LAMINECTOMY      10.28.13  bilat L4/5 lami with Nyla and Belen   • LUMBAR LAMINECTOMY DISCECTOMY DECOMPRESSION N/A 1/29/2018    Procedure: L2 to L4  laminectomy with a fusion by orthopedics;  Surgeon: Jeison Redmond MD;  Location: [facility];  Service:    • LUMBAR LAMINECTOMY DISCECTOMY DECOMPRESSION N/A 1/9/2019    Procedure: L4-L5, L5-S1 LAMINECTOMY WITH NEURAL LYSIS;  Surgeon: Jeison Redmond MD;  Location: [facility];  Service: Neurosurgery   • OTHER SURGICAL HISTORY      Lithotomy   • TONSILLECTOMY          Social History     Occupational History   • Occupation: Teacher RETIRED     Employer: RETIRED   Tobacco Use   • Smoking status: Never Smoker   • Smokeless tobacco: Never Used   • Tobacco comment: caffeine use   Substance and Sexual Activity   • Alcohol use: No     Frequency: Never   • Drug use: No   • Sexual activity: Defer      Social History     Social History Narrative   • Not on file        Family History   Problem Relation Age of Onset   • Heart attack Mother         acute myocardial infarction   • Stroke Mother         hemorrhagic   • Emphysema Father    • Diabetes Other         mellitus   • Hypertension Other    • Heart disease Other    • Malig Hyperthermia Neg Hx          Physical Exam: 78 y.o. male  General Appearance:    Alert, cooperative, in no acute distress                      Vitals:    01/14/19 1706 01/14/19 1927 01/14/19 2026 01/15/19 0438   BP: (!) 142/103 139/76 110/59 138/89   BP Location: Left arm   Right arm   Patient Position: Sitting   Lying   Pulse: (!) 136  117 (!) 128   Resp: 18   18   Temp: 98 °F (36.7 °C)   97.2 °F (36.2 °C)   TempSrc: Oral   Oral   SpO2: 97%   98%   Weight:       Height:            DIAGNOSTIC TESTS:   Admission on 01/09/2019   Component Date Value Ref Range Status   • Glucose 01/09/2019 56* 70 - 130 mg/dL Final   • ABO Type 01/09/2019 A   Final   • RH type 01/09/2019 Negative   Final   • Antibody Screen 01/09/2019 Negative   Final   • T&S Expiration Date 01/09/2019 1/12/2019 11:59:59 PM   Final   • Protime 01/09/2019 13.5  11.7 - 14.2 Seconds Final   • INR 01/09/2019 1.05  0.90 - 1.10 Final   •  Hemoglobin 01/09/2019 7.8* 13.7 - 17.6 g/dL Final   • Hematocrit 01/09/2019 24.7* 40.4 - 52.2 % Final   • Glucose 01/09/2019 154* 70 - 130 mg/dL Final   • Glucose 01/09/2019 154* 70 - 130 mg/dL Final   • Glucose 01/10/2019 162* 65 - 99 mg/dL Final   • BUN 01/10/2019 14  8 - 23 mg/dL Final   • Creatinine 01/10/2019 0.71* 0.76 - 1.27 mg/dL Final   • Sodium 01/10/2019 145  136 - 145 mmol/L Final   • Potassium 01/10/2019 4.0  3.5 - 5.2 mmol/L Final   • Chloride 01/10/2019 108* 98 - 107 mmol/L Final   • CO2 01/10/2019 25.0  22.0 - 29.0 mmol/L Final   • Calcium 01/10/2019 8.3* 8.6 - 10.5 mg/dL Final   • Total Protein 01/10/2019 5.3* 6.0 - 8.5 g/dL Final   • Albumin 01/10/2019 2.90* 3.50 - 5.20 g/dL Final   • ALT (SGPT) 01/10/2019 18  1 - 41 U/L Final   • AST (SGOT) 01/10/2019 28  1 - 40 U/L Final   • Alkaline Phosphatase 01/10/2019 35* 39 - 117 U/L Final   • Total Bilirubin 01/10/2019 0.2  0.1 - 1.2 mg/dL Final   • eGFR Non African Amer 01/10/2019 108  >60 mL/min/1.73 Final   • Globulin 01/10/2019 2.4  gm/dL Final   • A/G Ratio 01/10/2019 1.2  g/dL Final   • BUN/Creatinine Ratio 01/10/2019 19.7  7.0 - 25.0 Final   • Anion Gap 01/10/2019 12.0  mmol/L Final   • Reticulocyte % 01/10/2019 1.95* 0.50 - 1.50 % Final   • WBC 01/10/2019 7.72  4.50 - 10.70 10*3/mm3 Final   • RBC 01/10/2019 2.63* 4.60 - 6.00 10*6/mm3 Final   • Hemoglobin 01/10/2019 8.0* 13.7 - 17.6 g/dL Final   • Hematocrit 01/10/2019 25.8* 40.4 - 52.2 % Final   • MCV 01/10/2019 98.1* 79.8 - 96.2 fL Final   • MCH 01/10/2019 30.4  27.0 - 32.7 pg Final   • MCHC 01/10/2019 31.0* 32.6 - 36.4 g/dL Final   • RDW 01/10/2019 15.4* 11.5 - 14.5 % Final   • RDW-SD 01/10/2019 54.7* 37.0 - 54.0 fl Final   • MPV 01/10/2019 9.7  6.0 - 12.0 fL Final   • Platelets 01/10/2019 216  140 - 500 10*3/mm3 Final   • Neutrophil % 01/10/2019 82.6* 42.7 - 76.0 % Final   • Lymphocyte % 01/10/2019 8.4* 19.6 - 45.3 % Final   • Monocyte % 01/10/2019 8.4  5.0 - 12.0 % Final   • Eosinophil %  01/10/2019 0.1* 0.3 - 6.2 % Final   • Basophil % 01/10/2019 0.1  0.0 - 1.5 % Final   • Immature Grans % 01/10/2019 0.4  0.0 - 0.5 % Final   • Neutrophils, Absolute 01/10/2019 6.37  1.90 - 8.10 10*3/mm3 Final   • Lymphocytes, Absolute 01/10/2019 0.65* 0.90 - 4.80 10*3/mm3 Final   • Monocytes, Absolute 01/10/2019 0.65  0.20 - 1.20 10*3/mm3 Final   • Eosinophils, Absolute 01/10/2019 0.01  0.00 - 0.70 10*3/mm3 Final   • Basophils, Absolute 01/10/2019 0.01  0.00 - 0.20 10*3/mm3 Final   • Immature Grans, Absolute 01/10/2019 0.03  0.00 - 0.03 10*3/mm3 Final   • Glucose 01/10/2019 144* 70 - 130 mg/dL Final   • Glucose 01/10/2019 137* 70 - 130 mg/dL Final   • Glucose 01/10/2019 129  70 - 130 mg/dL Final   • Troponin T 01/10/2019 <0.010  0.000 - 0.030 ng/mL Final   • Glucose 01/10/2019 191* 70 - 130 mg/dL Final   • Hemoglobin 01/11/2019 7.5* 13.7 - 17.6 g/dL Final   • Hematocrit 01/11/2019 25.0* 40.4 - 52.2 % Final   • Troponin T 01/11/2019 <0.010  0.000 - 0.030 ng/mL Final   • Glucose 01/11/2019 143* 70 - 130 mg/dL Final   • Product Code 01/12/2019 [id number]   Final   • Unit Number 01/12/2019 [id number]   Final   • UNIT  ABO 01/12/2019 A   Final   • UNIT  RH 01/12/2019 NEG   Final   • Dispense Status 01/12/2019 PT   Final   • Blood Type 01/12/2019 ANEG   Final   • Blood Expiration Date 01/12/2019 [id number]   Final   • Blood Type Barcode 01/12/2019 0600   Final   • Product Code 01/12/2019 [id number]   Final   • Unit Number 01/12/2019 [id number]   Final   • UNIT  ABO 01/12/2019 A   Final   • UNIT  RH 01/12/2019 NEG   Final   • Dispense Status 01/12/2019 PT   Final   • Blood Type 01/12/2019 ANEG   Final   • Blood Expiration Date 01/12/2019 [id number]   Final   • Blood Type Barcode 01/12/2019 0600   Final   • Glucose 01/11/2019 198* 70 - 130 mg/dL Final   • Glucose 01/11/2019 92  70 - 130 mg/dL Final   • Glucose 01/11/2019 98  70 - 130 mg/dL Final   • WBC 01/12/2019 8.02  4.50 - 10.70 10*3/mm3 Final   • RBC  01/12/2019 2.86* 4.60 - 6.00 10*6/mm3 Final   • Hemoglobin 01/12/2019 8.5* 13.7 - 17.6 g/dL Final   • Hematocrit 01/12/2019 27.2* 40.4 - 52.2 % Final   • MCV 01/12/2019 95.1  79.8 - 96.2 fL Final   • MCH 01/12/2019 29.7  27.0 - 32.7 pg Final   • MCHC 01/12/2019 31.3* 32.6 - 36.4 g/dL Final   • RDW 01/12/2019 16.6* 11.5 - 14.5 % Final   • RDW-SD 01/12/2019 56.9* 37.0 - 54.0 fl Final   • MPV 01/12/2019 9.9  6.0 - 12.0 fL Final   • Platelets 01/12/2019 152  140 - 500 10*3/mm3 Final   • Neutrophil % 01/12/2019 82.7* 42.7 - 76.0 % Final   • Lymphocyte % 01/12/2019 8.9* 19.6 - 45.3 % Final   • Monocyte % 01/12/2019 6.5  5.0 - 12.0 % Final   • Eosinophil % 01/12/2019 1.4  0.3 - 6.2 % Final   • Basophil % 01/12/2019 0.1  0.0 - 1.5 % Final   • Immature Grans % 01/12/2019 0.4  0.0 - 0.5 % Final   • Neutrophils, Absolute 01/12/2019 6.64  1.90 - 8.10 10*3/mm3 Final   • Lymphocytes, Absolute 01/12/2019 0.71* 0.90 - 4.80 10*3/mm3 Final   • Monocytes, Absolute 01/12/2019 0.52  0.20 - 1.20 10*3/mm3 Final   • Eosinophils, Absolute 01/12/2019 0.11  0.00 - 0.70 10*3/mm3 Final   • Basophils, Absolute 01/12/2019 0.01  0.00 - 0.20 10*3/mm3 Final   • Immature Grans, Absolute 01/12/2019 0.03  0.00 - 0.03 10*3/mm3 Final   • Glucose 01/12/2019 71  70 - 130 mg/dL Final   • Glucose 01/12/2019 71  70 - 130 mg/dL Final   • Protime 01/12/2019 13.6  11.7 - 14.2 Seconds Final   • INR 01/12/2019 1.06  0.90 - 1.10 Final   • Protime 01/12/2019 13.9  11.7 - 14.2 Seconds Final   • INR 01/12/2019 1.09  0.90 - 1.10 Final   • Glucose 01/12/2019 89  70 - 130 mg/dL Final   • Glucose 01/12/2019 46* 70 - 130 mg/dL Final   • Glucose 01/12/2019 48* 70 - 130 mg/dL Final   • Glucose 01/12/2019 51* 70 - 130 mg/dL Final   • Glucose 01/12/2019 94  70 - 130 mg/dL Final   • Glucose 01/12/2019 134* 70 - 130 mg/dL Final   • Protime 01/13/2019 14.1  11.7 - 14.2 Seconds Final   • INR 01/13/2019 1.11* 0.90 - 1.10 Final   • Glucose 01/13/2019 128  70 - 130 mg/dL Final   •  Glucose 01/13/2019 122  70 - 130 mg/dL Final   • Glucose 01/13/2019 148* 70 - 130 mg/dL Final   • Glucose 01/13/2019 200* 70 - 130 mg/dL Final   • Glucose 01/13/2019 125  70 - 130 mg/dL Final   • Protime 01/14/2019 14.9* 11.7 - 14.2 Seconds Final   • INR 01/14/2019 1.19* 0.90 - 1.10 Final   • Glucose 01/14/2019 132* 70 - 130 mg/dL Final   • Glucose 01/14/2019 199* 70 - 130 mg/dL Final   • Glucose 01/14/2019 140* 70 - 130 mg/dL Final   • Glucose 01/14/2019 222* 70 - 130 mg/dL Final   • Protime 01/15/2019 16.6* 11.7 - 14.2 Seconds Final   • INR 01/15/2019 1.37* 0.90 - 1.10 Final   • Glucose 01/15/2019 121  70 - 130 mg/dL Final   • Glucose 01/15/2019 164* 70 - 130 mg/dL Final       No results found.    Hospital Course:  78 y.o. male admitted to [facility] to services of Barron Knight MD with Postural kyphosis of lumbar region [M40.05]  Postural kyphosis of lumbar region [M40.05] on 1/9/2019 and underwent T 11 to L3, L5-S1 fusion with T11 to S1 instrumentation, L1-2, L4 laminectomy and neural lysis and L4 pedicle subtraction osteotomy with Cyril Redmond and Vinicio  Per Barron Knight MD. Antibiotic and VTE prophylaxis were per [facility]IP protocols.  The patient was admitted to the floor where IV and/or oral pain medications were administered for postoperative pain.  At discharge the incisional pain was tolerable and preop neurologic function was intact.  The dressing was dry and the wound was clean.    Condition on Discharge:  Stable    Discharge Instructions: . Patient may weight bear as tolerated unless otherwise specified. Continue DEANN hose daily (for two weeks) and ice regularly. Patient also instructed on incentive spirometer during hospitalization and encouraged to continue to use at home regularly. Patient may shower on POD #3 if and when all wound drainage has stopped.  Where applicable, the brace should be worn when up and about.  It need not be worn to the bathroom and certainly not in the shower.  A  detailed list of instructions specific to the operation was given to the patient at the time of discharge.  He developed atrial fibrillation causing a rapid heartbeat later in his visit which is slowly improving.  He has resumed Coumadin and an INR and PT should be obtained tomorrow 1/16 and Thursday 1/17 and called to my office at [phone].  The brace should be worn at all times when he is up and about.    Follow up Instructions:  Follow up in the office with Dr. Ezra Gordon Jr. in 2-3 weeks - patient to call the office at [phone] to schedule. Prescriptions were given for pain medication.    Follow-up Appointments  Future Appointments   Date Time Provider Department Center   1/23/2019 11:20 AM Ezra Gordon MD [facility] None   2/27/2019 12:30 PM Sherice Newman MD [facility] None   6/12/2019  8:45 AM Trey Stockton MD [facility] None     Additional Instructions for the Follow-ups that You Need to Schedule     Ambulatory Referral to Home Health   As directed      Face to Face Visit Date:  1/11/2019    Follow-up Provider for Plan of Care?:  I will be treating the patient on an ongoing basis.  Please send me the Plan of Care for signature.    Follow-up Provider:  EZRA GORDON [[id number]]    Reason/Clinical Findings:  Post surgical    Describe mobility limitations that make leaving home difficult:  Requires the assistance of another to leave home    Nursing/Therapeutic Services Requested:  Physical Therapy Skilled Nursing    Skilled nursing orders:  Other (Protime with INR q Monday and Thursday and call to patient's cardiologist)    PT orders:  Therapeutic exercise (Wear back brace anytime OOB, but may go to bathroom at night without brace. Avoid forward bending and twisting at the waist.  No lifting greater than 5 pounds) Gait Training Transfer training Strengthening Other    Weight Bearing Status:  As Tolerated    Frequency:  1 Week 1         Discharge Follow-up with Specialty:  Orthopedics; 2 Weeks   As directed      Specialty:  Orthopedics    Follow Up:  2 Weeks    Follow Up Details:  Return to the office to see Dr. Barron Knight               Discharge Disposition Plan:today to rehab    Date: 1/15/2019    Barron Knight MD  01/15/19  11:34 AM

## 2019-01-15 NOTE — PROGRESS NOTES
LOS: 6 days   Patient Care Team:  Trey Stockton MD as PCP - General (Internal Medicine)  Madhav Hdez MD as Consulting Physician (Hematology and Oncology)  Sherice Newman MD as Consulting Physician (Cardiology)  [facility]y P[facility] as Pharmacist    Chief Complaint:     A fib    Interval History:     No dizziness, dyspnea, chest pain, falls or nausea.     Objective   Vital Signs  Temp:  [97.2 °F (36.2 °C)-98.8 °F (37.1 °C)] 97.2 °F (36.2 °C)  Heart Rate:  [] 128  Resp:  [18] 18  BP: (110-142)/() 138/89  No intake or output data in the 24 hours ending 01/15/19 0907    Comfortable NAD  Neck supple, no JVD or thyromegaly appreciated  S1/S2 irregular and tachy, no m/r/g  Lungs CTA B, normal effort  Abdomen S/NT/ND (+) BS, no HSM appreciated  Extremities warm, no clubbing, cyanosis, or edema  No visible or palpable skin lesions  A/Ox4, mood and affect appropriate    Results Review:      Results from last 7 days   Lab Units  01/10/19   0435   SODIUM mmol/L  145   POTASSIUM mmol/L  4.0   CHLORIDE mmol/L  108*   CO2 mmol/L  25.0   BUN mg/dL  14   CREATININE mg/dL  0.71*   GLUCOSE mg/dL  162*   CALCIUM mg/dL  8.3*     Results from last 7 days   Lab Units  01/11/19   0533  01/10/19   1943   TROPONIN T ng/mL  <0.010  <0.010     Results from last 7 days   Lab Units  01/12/19   0533  01/11/19   0533  01/10/19   0435   WBC 10*3/mm3  8.02   --   7.72   HEMOGLOBIN g/dL  8.5*  7.5*  8.0*   HEMATOCRIT %  27.2*  25.0*  25.8*   PLATELETS 10*3/mm3  152   --   216     Results from last 7 days   Lab Units  01/15/19   0534  01/14/19   0610  01/13/19   0645   INR   1.37*  1.19*  1.11*                   I reviewed the patient's new clinical results.  I personally viewed and interpreted the patient's EKG/Telemetry data        Medication Review:     atorvastatin 20 mg Oral Daily   brimonidine 1 drop Both Eyes BID   donepezil 10 mg Oral QAM   dorzolamide 1 drop Both Eyes BID   enalapril 10 mg Oral QAM    ferrous sulfate 325 mg Oral Q AM   insulin lispro 0-7 Units Subcutaneous 4x Daily With Meals & Nightly   latanoprost 1 drop Both Eyes Nightly   metoprolol tartrate 25 mg Oral TID   pantoprazole 40 mg Oral QAM   pioglitazone 45 mg Oral QAM   polyethylene glycol 17 g Oral BID   sennosides-docusate sodium 1 tablet Oral BID   sodium chloride 3 mL Intravenous Q12H   tamsulosin 0.4 mg Oral QAM   timolol 1 drop Both Eyes Daily   warfarin 10 mg Oral Daily            Assessment/Plan       Postural kyphosis of lumbar region    DM (HgbA1c 5.9)    Hypertension    Atrial fibrillation (CMS/HCC)    Iron deficiency anemia    Mild cognitive disorder    H/O urinary retention    Chronic anticoagulation-for Afib    1.  Paroxysmal atrial fibrillation -- known history, now with persistent AF post-operatively. Rate is 100-120s.  He's asymptomatic.  He's on warfarin and metoprolol, OK to go.       2.  Sinus bradycardia in the past when on propafenone and beta blockers       3.  HTN with intermittently low BP after surgery - controlled.      4.  Chronic OAC -- warfarin resumed without bridge due to recent surgery.      5.  Leg swelling -- he states he's had edema since the 1990s.  He's on pioglitazone which and a significant amount of salt on his breakfast as well.  He should decrease his sodium intake and keep his legs elevated.       outpt echo, metoprolol 50mg TID.  And ok to d/c - f/u with Sakina Davies in our office in 2 weeks.      Sherice Newman MD  01/15/19  9:07 AM

## 2019-01-15 NOTE — PROGRESS NOTES
Case Management Discharge Note    Final Note: Skilled care at [facility]    Destination - Selection Complete      Service Provider Request Status Selected Services Address Phone Number Fax Number    [facility] Selected Skilled Nursing [address], [facility] [address] [phone]       Marlin Kang, RN 1/13/2019 1137    1st choice                 Durable Medical Equipment      No service has been selected for the patient.      Dialysis/Infusion      No service has been selected for the patient.      Home Medical Care      No service has been selected for the patient.      Community Resources      No service has been selected for the patient.        Other: (car)    Final Discharge Disposition Code: 03 - skilled nursing facility (SNF)

## 2019-01-15 NOTE — PROGRESS NOTES
Continued Stay Note  [facility]     Patient Name: Derrek Ritter  MRN: [id number]  Today's Date: 1/15/2019    Admit Date: 1/9/2019    Discharge Plan     Row Name 01/15/19 1245       Plan    Plan  [facility]    Plan Comments  Patient discharged to skilled care at [facility] today.  Wife will take by car.  Wife and patient agree to this plan.  Patient is contact guard assist for transfers and ambulation    Final Discharge Disposition Code  03 - skilled nursing facility (SNF)    Final Note  Skilled care at [facility]        Discharge Codes    No documentation.       Expected Discharge Date and Time     Expected Discharge Date Expected Discharge Time    Jan 13, 2019             Celia Mata RN

## 2019-01-15 NOTE — THERAPY TREATMENT NOTE
Acute Care - Physical Therapy Treatment Note  [facility]     Patient Name: Derrek Ritter  : 1940  MRN: [id number]  Today's Date: 1/15/2019  Onset of Illness/Injury or Date of Surgery: 19  Date of Referral to PT: 01/10/19  Referring Physician: Barron Knight MD    Admit Date: 2019    Visit Dx:    ICD-10-CM ICD-9-CM   1. Impaired functional mobility and activity tolerance Z74.09 V49.89   2. Postural kyphosis of lumbar region M40.05 737.10   3. Paroxysmal atrial fibrillation (CMS/HCC) I48.0 427.31   4. Spinal stenosis of lumbar region with neurogenic claudication M48.062 724.03     Patient Active Problem List   Diagnosis   • Cholelithiasis   • DM (HgbA1c 5.9)   • Diverticulosis of intestine   • Hypercholesterolemia   • Hypertension   • Spinal stenosis   • Microalbuminuria   • Atrial fibrillation (CMS/HCC)   • Medicare annual wellness visit, subsequent   • Anemia   • Iron deficiency anemia   • DJD (degenerative joint disease)   • Impotence of organic origin   • Glaucoma   • Hiatal hernia   • Mild cognitive disorder   • Calculus of kidney   • Nocturia   • Chronic bilateral low back pain with bilateral sciatica   • Post laminectomy syndrome   • Routine health maintenance   • Weight loss   • Spinal stenosis of lumbar region with neurogenic claudication   • Spondylolisthesis of lumbar region   • Lumbar spine pain   • Metabolic encephalopathy   • Urinary retention with incomplete bladder emptying   • Macrocytosis without anemia   • Sundowning   • RBBB (right bundle branch block with left anterior fascicular block)   • Leg swelling   • Postural kyphosis of lumbar region   • H/O urinary retention   • Chronic anticoagulation-for Afib       Therapy Treatment    Rehabilitation Treatment Summary     Row Name 01/15/19 1000             Treatment Time/Intention    Discipline  physical therapy assistant  -CW      Document Type  therapy note (daily note)  -CW      Subjective Information  no complaints  -CW       Mode of Treatment  physical therapy  -CW      Therapy Frequency (PT Clinical Impression)  daily  -CW      Patient Effort  good  -CW      Existing Precautions/Restrictions  fall;brace worn when out of bed;spinal  -CW      Recorded by [CW] Pieter Mckeon, PTA 01/15/19 1004      Row Name 01/15/19 1000             Vital Signs    O2 Delivery Pre Treatment  room air  -CW      Recorded by [CW] Pieter Mckeon, PTA 01/15/19 1004      Row Name 01/15/19 1000             Cognitive Assessment/Intervention- PT/OT    Orientation Status (Cognition)  oriented x 4  -CW      Follows Commands (Cognition)  WFL  -CW      Personal Safety Interventions  fall prevention program maintained;gait belt;muscle strengthening facilitated;nonskid shoes/slippers when out of bed  -CW      Recorded by [CW] Pieter Mckeon, PTA 01/15/19 1004      Row Name 01/15/19 1000             Safety Issues, Functional Mobility    Impairments Affecting Function (Mobility)  balance;endurance/activity tolerance;pain;postural/trunk control;strength  -CW      Recorded by [CW] Pieter Mckeon, PTA 01/15/19 1004      Row Name 01/15/19 1000             Bed Mobility Assessment/Treatment    Bed Mobility Assessment/Treatment  supine-sit  -CW      Rolling Left [place] (Bed Mobility)  not tested  -CW      Comment (Bed Mobility)  pt EOB and then into chair  -CW      Recorded by [CW] Pieter Mckeon, PTA 01/15/19 1004      Row Name 01/15/19 1000             Transfer Assessment/Treatment    Transfer Assessment/Treatment  sit-stand transfer;stand-sit transfer  -CW      Recorded by [CW] Pieter Mckeon, PTA 01/15/19 1004      Row Name 01/15/19 1000             Sit-Stand Transfer    Sit-Stand [place] (Transfers)  verbal cues;contact guard  -CW      Assistive Device (Sit-Stand Transfers)  walker, front-wheeled  -CW      Recorded by [CW] Pieter Mckeon, PTA 01/15/19 1004      Row Name 01/15/19 1000             Stand-Sit Transfer    Stand-Sit  [place] (Transfers)  contact guard  -CW      Assistive Device (Stand-Sit Transfers)  walker, front-wheeled  -CW      Recorded by [CW] Pieter Mckeon, PTA 01/15/19 1004      Row Name 01/15/19 1000             Gait/Stairs Assessment/Training    [place] Level (Gait)  contact guard;verbal cues  -CW      Assistive Device (Gait)  walker, front-wheeled  -CW      Distance in Feet (Gait)  100  -CW      Pattern (Gait)  step-through  -CW      Deviations/Abnormal Patterns (Gait)  gait speed decreased;stride length decreased  -CW      Recorded by [CW] Pieter Mckeon, PTA 01/15/19 1004      Row Name 01/15/19 1000             Balance    Balance  static standing balance  -CW      Recorded by [CW] Pieter Mckeon, PTA 01/15/19 1004      Row Name 01/15/19 1000             Static Standing Balance    Level of [place] (Supported Standing, Static Balance)  standby assist  -CW      Time Able to Maintain Position (Supported Standing, Static Balance)  more than 5 minutes  -CW      Assistive Device Utilized (Supported Standing, Static Balance)  rolling walker  -CW      Recorded by [CW] Pieter Mckeon, PTA 01/15/19 1004      Row Name 01/15/19 1000             Positioning and Restraints    Pre-Treatment Position  in bed  -CW      Post Treatment Position  chair  -CW      In Chair  notified nsg;sitting;call light within reach;encouraged to call for assist;with family/caregiver  -CW      Recorded by [CW] Pieter Mckeon, PTA 01/15/19 1004      Row Name 01/15/19 1000             Pain Scale: Numbers Pre/Post-Treatment    Pain Scale: Numbers, Pretreatment  0/10 - no pain  -CW      Recorded by [CW] Pieter Mckeon, PTA 01/15/19 1004      Row Name                Wound 01/09/19 0844 Other (See comments) back incision    Wound - Properties Group Date first assessed: 01/09/19 [JT] Time first assessed: 0844 [JT] Side: Other (See comments) [JT] Location: back [JT] Type: incision [JT] Recorded by:  [JT] Lawrence  VICKY Hanson 01/09/19 0844    Row Name 01/15/19 1000             Outcome Summary/Treatment Plan (PT)    Anticipated Discharge Disposition (PT)  skilled nursing facility;home with home health  -CW      Recorded by [CW] Pieter Mckeon, PTA 01/15/19 1004        User Key  (r) = Recorded By, (t) = Taken By, (c) = Cosigned By    Initials Name Effective Dates Discipline    JMargie Pastrana RN 06/16/16 -  Nurse    CW Pieter Mckeon, PTA 03/07/18 -  PT          Wound 01/09/19 0844 Other (See comments) back incision (Active)   Dressing Appearance intact;dry 1/15/2019  8:33 AM   Closure MARIELLE 1/15/2019  8:33 AM   Base dressing in place, unable to visualize 1/15/2019  8:33 AM   Drainage Amount none 1/15/2019  8:33 AM   Dressing Care, Wound low-adherent 1/15/2019  8:33 AM           Physical Therapy Education     Title: PT OT SLP Therapies (Done)     Topic: Physical Therapy (Done)     Point: Mobility training (Done)     Learning Progress Summary           Patient Acceptance, E,TB, VU,DU by CW at 1/15/2019 10:04 AM    Acceptance, E,TB, VU,DU by CW at 1/14/2019 10:23 AM    Acceptance, E, VU,NR by SP at 1/13/2019  6:02 PM    Comment:  up with assist, not to overdo it and brace on when up.    Acceptance, E,TB, VU,DU by CW at 1/11/2019  1:48 PM    Acceptance, E,TB, VU,DU by CW at 1/11/2019  9:51 AM    Acceptance, E, NR by CA at 1/10/2019  2:00 PM    Comment:  educated on log roll, spinal precautions   Significant Other Acceptance, E, VU,NR by SP at 1/13/2019  6:02 PM    Comment:  up with assist, not to overdo it and brace on when up.                   Point: Home exercise program (Done)     Learning Progress Summary           Patient Acceptance, E,TB, VU,DU by CW at 1/15/2019 10:04 AM    Acceptance, E,TB, VU,DU by CW at 1/14/2019 10:23 AM    Acceptance, E,TB, VU,DU by CW at 1/11/2019  1:48 PM    Acceptance, JESSICA FOOTE VUDU by FOREST at 1/11/2019  9:51 AM    AcceptanceDARY NR by CA at 1/10/2019  2:00 PM    Comment:  educated  on log roll, spinal precautions                   Point: Body mechanics (Done)     Learning Progress Summary           Patient Acceptance, E,TB, VU,DU by CW at 1/15/2019 10:04 AM    Acceptance, E,TB, VU,DU by CW at 1/14/2019 10:23 AM    Acceptance, E, VU,NR by SP at 1/13/2019  6:02 PM    Comment:  up with assist, not to overdo it and brace on when up.    Acceptance, E,TB, VU,DU by CW at 1/11/2019  1:48 PM    Acceptance, E,TB, VU,DU by CW at 1/11/2019  9:51 AM    Acceptance, E, NR by CA at 1/10/2019  2:00 PM    Comment:  educated on log roll, spinal precautions   Significant Other Acceptance, E, VU,NR by SP at 1/13/2019  6:02 PM    Comment:  up with assist, not to overdo it and brace on when up.                   Point: Precautions (Done)     Learning Progress Summary           Patient Acceptance, E,TB, VU,DU by CW at 1/15/2019 10:04 AM    Acceptance, E,TB, VU,DU by CW at 1/14/2019 10:23 AM    Acceptance, E, VU,NR by SP at 1/13/2019  6:02 PM    Comment:  up with assist, not to overdo it and brace on when up.    Acceptance, E,TB, VU,DU by CW at 1/11/2019  1:48 PM    Acceptance, E,TB, VU,DU by CW at 1/11/2019  9:51 AM    Acceptance, E, NR by CA at 1/10/2019  2:00 PM    Comment:  educated on log roll, spinal precautions   Significant Other Acceptance, E, VU,NR by SP at 1/13/2019  6:02 PM    Comment:  up with assist, not to overdo it and brace on when up.                               User Key     Initials Effective Dates Name Provider Type Discipline    CW 03/07/18 -  Pieter Mckeon, PTA Physical Therapy Assistant PT    SP 04/03/18 -  Amber Hansen, PT Physical Therapist PT    CA 06/13/18 -  Nel Hopkins, PT Physical Therapist PT                PT Recommendation and Plan  Anticipated Discharge Disposition (PT): skilled nursing facility, home with home health  Therapy Frequency (PT Clinical Impression): daily  Outcome Summary/Treatment Plan (PT)  Anticipated Discharge Disposition (PT): skilled nursing  facility, home with home health  Plan of Care Reviewed With: patient  Progress: improving  Outcome Summary: Pt increasing with transfer safety and standing tolerance with RWX and improved gait technique  Outcome Measures     Row Name 01/15/19 1000 01/14/19 1000          How much help from another person do you currently need...    Turning from your back to your side while in flat bed without using bedrails?  3  -CW  3  -CW     Moving from lying on back to sitting on the side of a flat bed without bedrails?  3  -CW  3  -CW     Moving to and from a bed to a chair (including a wheelchair)?  3  -CW  3  -CW     Standing up from a chair using your arms (e.g., wheelchair, bedside chair)?  3  -CW  3  -CW     Climbing 3-5 steps with a railing?  2  -CW  2  -CW     To walk in hospital room?  3  -CW  3  -CW     AM-PAC 6 Clicks Score  17  -CW  17  -CW        Functional Assessment    Outcome Measure Options  AM-PAC 6 Clicks Basic Mobility (PT)  -CW  AM-PAC 6 Clicks Basic Mobility (PT)  -CW       User Key  (r) = Recorded By, (t) = Taken By, (c) = Cosigned By    Initials Name Provider Type    Pieter Petersen PTA Physical Therapy Assistant         Time Calculation:   PT Charges     Row Name 01/15/19 1007             Time Calculation    Start Time  0926  -CW      Stop Time  1007  -CW      Time Calculation (min)  41 min  -CW      PT Received On  01/15/19  -CW      PT - Next Appointment  01/16/19  -CW        User Key  (r) = Recorded By, (t) = Taken By, (c) = Cosigned By    Initials Name Provider Type    Pieter Petersen PTA Physical Therapy Assistant        Therapy Suggested Charges     Code   Minutes Charges    66043 (CPT®) Hc Pt Neuromusc Re Education Ea 15 Min      85364 (CPT®) Hc Pt Ther Proc Ea 15 Min 25 2    33621 (CPT®) Hc Gait Training Ea 15 Min      74481 (CPT®) Hc Pt Therapeutic Act Ea 15 Min      63070 (CPT®) Hc Pt Manual Therapy Ea 15 Min      35875 (CPT®) Hc Pt Iontophoresis Ea 15 Min      61829 (CPT®) Hc Pt  Elec Stim Ea-Per 15 Min      86512 (CPT®) Hc Pt Ultrasound Ea 15 Min      20534 (CPT®) Hc Pt Self Care/Mgmt/Train Ea 15 Min      13299 (CPT®) Hc Pt Prosthetic (S) Train Initial Encounter, Each 15 Min      37645 (CPT®) Hc Pt Orthotic(S)/Prosthetic(S) Encounter, Each 15 Min      83813 (CPT®) Hc Orthotic(S) Mgmt/Train Initial Encounter, Each 15min      Total  25 2        Therapy Charges for Today     Code Description Service Date Service Provider Modifiers Qty    35864670319 HC PT THER SUPP EA 15 MIN 1/14/2019 Pieter Mckeon, PTA GP 1    30042241196 HC PT THER PROC EA 15 MIN 1/14/2019 Pieter Mckeon, PTA GP 2    63774928246 HC PT THER PROC EA 15 MIN 1/15/2019 Pieter Mckeon, PTA GP 3          PT G-Codes  Outcome Measure Options: AM-PAC 6 Clicks Basic Mobility (PT)  AM-PAC 6 Clicks Score: 17    Pieter Mckeon PTA  1/15/2019

## 2019-01-15 NOTE — PROGRESS NOTES
He remains somewhat tachycardic.  Vital signs stable awake alert oriented expected back pain no leg pain.  PT 16.6 INR 1.3 Plan is for discharge to [facility] home when medically stable, possibly today.

## 2019-01-15 NOTE — PROGRESS NOTES
Continued Stay Note  [facility]     Patient Name: Derrek Ritter  MRN: [id number]  Today's Date: 1/15/2019    Admit Date: 1/9/2019    Discharge Plan     Row Name 01/15/19 0927       Plan    Plan Comments  Mary has precert.  Will follow for transfer when medically ready.        Discharge Codes    No documentation.       Expected Discharge Date and Time     Expected Discharge Date Expected Discharge Time    Jan 13, 2019             Celia Mata RN

## 2019-01-15 NOTE — PLAN OF CARE
Problem: Patient Care Overview  Goal: Plan of Care Review  Outcome: Ongoing (interventions implemented as appropriate)   01/15/19 1006   Coping/Psychosocial   Plan of Care Reviewed With patient   Plan of Care Review   Progress improving   OTHER   Outcome Summary Pt increasing with transfer safety and standing tolerance with RWX and improved gait technique

## 2019-01-16 NOTE — TELEPHONE ENCOUNTER
Lety, can you please review this and advise? I was not sure exactly how to proceed since the patient was originally being treated/managed by Cardiologist

## 2019-01-16 NOTE — TELEPHONE ENCOUNTER
Makeda stated [facility] wanted to have patient's PT / INR drawn today 01/16/19 & tomorrow 01/17/19 ([facility] ordered one every Monday & Thursday and patient's discharge paperwork had [facility] requesting PT / INR for today 01/16/18 & tomorrow 01/17/19) to be reported to him     Diagnosis for Coumadin is A-Fib.    PT 18.3 & INR of 1.5 - was off of Coumadin preoperatively until 01/15 restarted last night first dose of 10 mg. Gets 10 mg on Tuesdays, Thursdays, Saturdays & Sundays. 5 mg on Mondays, Wednesdays & Fridays.     Thanks /srh

## 2019-01-17 NOTE — TELEPHONE ENCOUNTER
ProTime today is 27.2 INR is 2.3.  Will continue his Coumadin at the same dose recheck his ProTime again on Monday.  Have advised the nurse at the rehabilitation Center that his cardiologist Dr. Sherice Newman was the one who is managing his Coumadin preop and would recommend that she call all further pro times to Dr. Newman's office per CALVIN W

## 2019-01-17 NOTE — PROGRESS NOTES
Anticoagulation Clinic Progress Note    Anticoagulation Summary  As of 2019    INR goal:   2.0-3.0   TTR:   76.6 % (2.2 mo)   INR used for dosin.30 (2019)   Warfarin maintenance plan:   5 mg on Mon, Wed, Fri; 10 mg all other days   Weekly warfarin total:   55 mg   No change documented:   Ravinder Ochoa RPH   Plan last modified:   Silvia Robin RPH (10/23/2018)   Next INR check:   2019   Priority:   Maintenance   Target end date:       Indications    Atrial fibrillation (CMS/HCC) [I48.91]             Anticoagulation Episode Summary     INR check location:       Preferred lab:       Send INR reminders to:   [facility]    Comments:         Anticoagulation Care Providers     Provider Role Specialty Phone number    Sherice Newman MD Referring Cardiology [phone]          INR History:  Anticoagulation Monitoring 2018   INR 2.2 2.5 2.30   INR Date 2018   INR Goal 2.0-3.0 2.0-3.0 2.0-3.0   Trend Same Same Same   Last Week Total 55 mg 55 mg 55 mg   Next Week Total 55 mg 55 mg 55 mg   Sun 10 mg 10 mg 10 mg   Mon 5 mg 5 mg 5 mg   Tue 10 mg 10 mg 10 mg   Wed 5 mg 5 mg 5 mg   Thu 10 mg 10 mg 10 mg   Fri 5 mg 5 mg 5 mg   Sat 10 mg 10 mg 10 mg   Visit Report - - -   Some recent data might be hidden       Plan:  1. Contacted Chanelle at [facility] and instructed them to continue same dose and recheck in 1 week.    Ravinder Ochoa RPH

## 2019-01-17 NOTE — TELEPHONE ENCOUNTER
Have attempted to contact the nurse at the rehabilitation center.  She was busy with patient's and of left a message for her to call me back

## 2019-01-24 NOTE — PROGRESS NOTES
Anticoagulation Clinic Progress Note    Anticoagulation Summary  As of 1/24/2019    INR goal:   2.0-3.0   TTR:   76.7 % (2.5 mo)   INR used for dosing:   3.20! (1/24/2019)   Warfarin maintenance plan:   10 mg on Mon, Wed, Fri; 5 mg all other days   Weekly warfarin total:   50 mg   Plan last modified:   Ravinder Ochoa RPH (1/24/2019)   Next INR check:   1/31/2019   Priority:   Maintenance   Target end date:       Indications    Atrial fibrillation (CMS/HCC) [I48.91]             Anticoagulation Episode Summary     INR check location:       Preferred lab:       [facility]ers [facility] [place]    Comments:   Pt currently at [facility]      Anticoagulation Care Providers     Provider Role Specialty Phone number    Sherice Newman MD Referring Cardiology [phone]          INR History:  Anticoagulation Monitoring 12/13/2018 1/17/2019 1/24/2019   INR 2.5 2.30 3.20   INR Date 12/13/2018 1/17/2019 1/24/2019   INR Goal 2.0-3.0 2.0-3.0 2.0-3.0   Trend Same Same Down   Last Week Total 55 mg 55 mg 55 mg   Next Week Total 55 mg 55 mg 50 mg   Sun 10 mg 10 mg 5 mg   Mon 5 mg 5 mg 10 mg   Tue 10 mg 10 mg 5 mg   Wed 5 mg 5 mg 10 mg   Thu 10 mg 10 mg 5 mg   Fri 5 mg 5 mg 10 mg   Sat 10 mg 10 mg 5 mg   Visit Report - - -   Some recent data might be hidden       Plan:  1. Contacted [facility] and instructed them to decrease to 10mg on M/W/F and 5mg all other days. Recheck in 1 week.    Ravinder Ochoa RPH

## 2019-01-24 NOTE — NURSING NOTE
Blood sugar 46. 4oz Dr Pepper given. Will recheck in 15 mins  
Blood sugar 51. Orange juice given. Will monitor  
Blood sugar 94. Will continue to monitor  
S/W Mikey, orthotics supplier, per Dr. Knight request and he states that he will bring over a back brace. Amanda Johnson RN    
Transport here for patient to have XR to lumbar spine r/t fall earlier this AM. TANK Edouard RN   
Yes

## 2019-01-28 NOTE — PROGRESS NOTES
Anticoagulation Clinic Progress Note    Anticoagulation Summary  As of 2019    INR goal:   2.0-3.0   TTR:   72.8 % (2.6 mo)   INR used for dosin.10! (2019)   Warfarin maintenance plan:   7.5 mg on Mon, Wed, Fri; 5 mg all other days   Weekly warfarin total:   42.5 mg   Plan last modified:   Carey Pascual RPH (2019)   Next INR check:   2019   Priority:   Maintenance   Target end date:       Indications    Atrial fibrillation (CMS/HCC) [I48.91]             Anticoagulation Episode Summary     INR check location:       Preferred lab:       Send INR reminders to:    [facility]    Comments:   Pt currently at [facility]      Anticoagulation Care Providers     Provider Role Specialty Phone number    Sherice Newman MD Referring Cardiology [phone]          INR History:  Anticoagulation Monitoring 2019   INR 2.30 3.20 4.10   INR Date 2019   INR Goal 2.0-3.0 2.0-3.0 2.0-3.0   Trend Same Down Down   Last Week Total 55 mg 55 mg 45 mg   Next Week Total 55 mg 50 mg 42.5 mg   Sun 10 mg 5 mg 5 mg   Mon 5 mg 10 mg 7.5 mg   Tue 10 mg 5 mg 5 mg   Wed 5 mg 10 mg 7.5 mg   Thu 10 mg 5 mg 5 mg   Fri 5 mg 10 mg 7.5 mg   Sat 10 mg 5 mg 5 mg   Visit Report - - -   Some recent data might be hidden       Plan:  1. Patient is in rehab facility and they are following/dosing INRs.  This is to record INR and dosing only.    Carey Pascual RPH

## 2019-01-28 NOTE — TELEPHONE ENCOUNTER
Protime today is 49.0, INR is 4.1.  The nurse called results to our office, his cardiologist and to the house physician.  Have advised her to hold the Coumadin today and since the patient is on long term Coumadin for A-fib, would recommend that she have the house physician or cardiologist to manage his medication and labs.  Per PAWEL. AMB

## 2019-01-30 NOTE — TELEPHONE ENCOUNTER
TSH normal.  CMP normal except for high sodium of 147 and albumin low at 3.10.  Alkaline phos elevated at 143. Drink less fluid.     Hemoglobin still low at 9.3 and hematocrit 31.5.  This has improved. I have recommended referral to [facility] for anemia.     Wife informed (patient told her to talk to me).

## 2019-01-30 NOTE — PROGRESS NOTES
Date of Office Visit: 2018  Encounter Provider: LAMONT Gallo  Place of Service: [facility] CARDIOLOGY  Patient Name: Derrek Ritter  :1940    Chief Complaint   Patient presents with   • Atrial Fibrillation   :     HPI: Derrek Ritter is a 78 y.o. male who presents today for hospital follow-up.  He has a history of paroxysmal atrial fibrillation (), hypertension, hyperlipidemia, right bundle branch block, left anterior fascicular block, type 2 diabetes mellitus, and glaucoma.       He is an established patient of Dr. Sherice Newman.  He's remains on warfarin for stroke prevention with INRs followed at the [facility].  His beta blocker metoprolol had been stopped in the past due to bradycardia.  His last echocardiogram 2013 revealed the following: EF 55-60%, indeterminate diastolic function, borderline left atrial enlargement, mild mitral valve prolapse with trace mitral insufficiency.  He has a history of chronic lower extremity edema/lymphedema.    He recently was hospitalized and underwent thoracic and lumbar spinal fusion and laminectomy by Dr. Redmond and Dr. Knight.  Postoperatively he developed atrial fibrillation with rapid ventricular response and he was asymptomatic.  Cardiology consulted on him and did not recommend beta blocker with his history of bradycardia.  His warfarin was resumed and he was noted to be anemic with a discharge hemoglobin of 8.5.  He has not had a follow-up CBC.    He presents today with his wife accompanying him.  He was discharged from the rehabilitation facility earlier this morning.  He is on his way to have his INR Checked at the hospital.  He denies any blood in his urine or stools.  He continues to have chronic lower extremity edema and has not been taking his furosemide as of his mobility issues.  He denies chest pain, shortness of air, PND, orthopnea, palpitations, dizziness, or  syncope.  His blood pressure and heart rate are both normal today.      Past Medical History:   Diagnosis Date   • Acute suppurative otitis media    • Acute upper respiratory infection    • Anemia    • Cholelithiasis    • Diabetes mellitus (CMS/HCC)     TYPE 2   • Diverticulosis    • Dry eyes    • Earache    • Edema    • Encounter for screening for malignant neoplasm of prostate    • Esophagitis, reflux    • Glaucoma    • H/O echocardiogram 09/18/2013   • Health care maintenance    • Hiatal hernia    • History of EKG 10/06/2015   • History of Holter monitoring 09/16/2013   • Hyperlipidemia    • Hypertension    • Lumbar canal stenosis    • Microalbuminuria    • Mitral valve prolapse    • Nephrolithiasis    • Osteoarthritis of hand     of thumb   • PAF (paroxysmal atrial fibrillation) (CMS/HCC)    • 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   • Spinal stenosis        Past Surgical History:   Procedure Laterality Date   • AMPUTATION DIGIT Left 10/29/2016    Procedure: TENDON AND NERVE REPAIR OF  LEFT THUMB;  Surgeon: Zak Hanson MD;  Location: [facility] OR;  Service:    • ARTHRODESIS  10/28/2013    Spinal / Description: Repair spinal stenosis   • CATARACT EXTRACTION     • EYE SURGERY      EYE MUSCLE SX   • HERNIA REPAIR      right inguinal   • LUMBAR DISCECTOMY FUSION INSTRUMENTATION N/A 1/29/2018    Procedure: L2 to L5 fusion with instrumentation and removal of implants L4 5;  Surgeon: Barron Knight MD;  Location: [facility] OR;  Service:    • LUMBAR DISCECTOMY FUSION INSTRUMENTATION N/A 1/9/2019    Procedure: T 11 to L3, L5-S1 fusion with T11 to S1 instrumentation, L4 pedicle subtraction osteotomy, Right L5-S1 interbody fusion with cage;  Surgeon: Barron Knight MD;  Location: [facility] OR;  Service: Orthopedic Spine   • LUMBAR FUSION  2018    Lumbar vertebral fusion   • LUMBAR LAMINECTOMY      10.28.13  bilat L4/5 lami with Nyla and Belen   • LUMBAR  LAMINECTOMY DISCECTOMY DECOMPRESSION N/A 1/29/2018    Procedure: L2 to L4 laminectomy with a fusion by orthopedics;  Surgeon: Jeison Redmond MD;  Location: [facility];  Service:    • LUMBAR LAMINECTOMY DISCECTOMY DECOMPRESSION N/A 1/9/2019    Procedure: L4-L5, L5-S1 LAMINECTOMY WITH NEURAL LYSIS;  Surgeon: Jeison Redmond MD;  Location: [facility];  Service: Neurosurgery   • OTHER SURGICAL HISTORY      Lithotomy   • TONSILLECTOMY         Social History     Social History   • Marital status:      Spouse name: Silvia   • Number of children: 3   • Years of education: College     Occupational History   • Teacher RETIRED Retired     Social History Main Topics   • Smoking status: Never Smoker   • Smokeless tobacco: Never Used      Comment: caffeine use   • Alcohol use Yes      Comment: Occasional   • Drug use: No   • Sexual activity: Defer       Family History   Problem Relation Age of Onset   • Heart attack Mother         acute myocardial infarction   • Stroke Mother         hemorrhagic   • Emphysema Father    • Diabetes Other         mellitus   • Hypertension Other    • Heart disease Other    • Malig Hyperthermia Neg Hx        Review of Systems   Constitution: Negative for chills, diaphoresis, fever, malaise/fatigue, night sweats, weight gain and weight loss.   HENT: Negative for hearing loss, nosebleeds, sore throat and tinnitus.    Eyes: Negative for blurred vision, double vision, pain and visual disturbance.   Cardiovascular: Positive for leg swelling (bilateral). Negative for chest pain, claudication, cyanosis, dyspnea on exertion, irregular heartbeat, near-syncope, orthopnea, palpitations, paroxysmal nocturnal dyspnea and syncope.   Respiratory: Negative for cough, hemoptysis, shortness of breath, snoring and wheezing.    Endocrine: Negative for cold intolerance, heat intolerance and polyuria.   Hematologic/Lymphatic: Negative for bleeding problem. Does not bruise/bleed easily.   Skin: Negative for  color change, dry skin, flushing and itching.   Musculoskeletal: Negative for falls, joint pain, joint swelling, muscle cramps, muscle weakness and myalgias.   Gastrointestinal: Negative for abdominal pain, constipation, heartburn, melena, nausea and vomiting.   Genitourinary: Negative for dysuria and hematuria.        Erectile dysfunction   Neurological: Negative for excessive daytime sleepiness, dizziness, light-headedness, loss of balance, numbness, paresthesias, seizures and vertigo.   Psychiatric/Behavioral: Negative for altered mental status, depression, memory loss and substance abuse. The patient does not have insomnia and is not nervous/anxious.    Allergic/Immunologic: Negative for environmental allergies.       Allergies   Allergen Reactions   • Beta Adrenergic Blockers Other (See Comments)     Bradycardia         Current Outpatient Medications:   •  ACCU-CHEK FASTCLIX LANCETS misc, USE TO TEST TWICE DAILY, Disp: 102 each, Rfl: 0  •  ACCU-CHEK SMARTVIEW test strip, USE TO TEST TWICE DAILY, Disp: 100 each, Rfl: 1  •  Blood Glucose Monitoring Suppl (ACCU-CHEK FRANCISCO SMARTVIEW) W/DEVICE kit, USE TO TEST BLOOD SUGAR TWICE DAILY, Disp: 1 kit, Rfl: 0  •  Blood Glucose Monitoring Suppl (FREESTYLE LITE) device, Use to test blood once daily, Disp: 1 each, Rfl: 0  •  brimonidine (ALPHAGAN) 0.2 % ophthalmic solution, Administer 1 drop to both eyes 2 (Two) Times a Day., Disp: , Rfl:   •  calcium carbonate (TUMS) 500 MG chewable tablet, Chew 1,000 mg 3 (Three) Times a Day As Needed for Indigestion or Heartburn., Disp: , Rfl:   •  donepezil (ARICEPT) 10 MG tablet, Take 10 mg by mouth Every Morning., Disp: , Rfl:   •  dorzolamide (TRUSOPT) 2 % ophthalmic solution, Administer 1 drop to both eyes 2 (two) times a day., Disp: , Rfl:   •  enalapril (VASOTEC) 20 MG tablet, Take 1 tablet by mouth 2 (Two) Times a Day. (Patient taking differently: Take 40 mg by mouth Every Morning.), Disp: 180 tablet, Rfl: 1  •  esomeprazole  (nexIUM) 40 MG capsule, Take 1 capsule by mouth Daily., Disp: 90 capsule, Rfl: 1  •  ferrous sulfate (IRON SUPPLEMENT) 325 (65 FE) MG tablet, Take 1 tablet by mouth Daily With Breakfast., Disp: , Rfl:   •  furosemide (LASIX) 20 MG tablet, Take 1 tablet by mouth Daily. as needed (Patient taking differently: Take 20 mg by mouth As Needed. as needed), Disp: 90 tablet, Rfl: 3  •  glimepiride (AMARYL) 2 MG tablet, Take 1 tablet by mouth Every Morning Before Breakfast., Disp: 90 tablet, Rfl: 1  •  glucose blood (FREESTYLE LITE) test strip, Use to test blood once daily, Disp: 100 each, Rfl: 12  •  Glucose Blood disk, Inject 1 Device as directed daily. TEST BLOOD SUGAR ONCE DAILY AS DIRECTED, Disp: 100 each, Rfl: 3  •  Lancets (FREESTYLE) lancets, Use to test blood sugar daily, Disp: 100 each, Rfl: 12  •  latanoprost (XALATAN) 0.005 % ophthalmic solution, Administer 1 drop to both eyes Every Night., Disp: , Rfl:   •  metFORMIN (GLUCOPHAGE) 1000 MG tablet, Take one am, 1/2 noon, and 1 pm (Patient taking differently: 1,000 mg. Take one am, 1/2 noon, and   One in the  pm), Disp: 225 tablet, Rfl: 1  •  ONE TOUCH ULTRA TEST test strip, USE ONCE DAILY AS DIRECTED, Disp: 100 each, Rfl: 3  •  oxyCODONE-acetaminophen (PERCOCET)  MG per tablet, Take 1 tablet by mouth Every 6 (Six) Hours As Needed for Moderate Pain ., Disp: , Rfl:   •  pioglitazone (ACTOS) 45 MG tablet, Take 1 tablet by mouth Every Morning., Disp: 90 tablet, Rfl: 0  •  simvastatin (ZOCOR) 20 MG tablet, Take 20 mg by mouth Every Night., Disp: , Rfl:   •  tamsulosin (FLOMAX) 0.4 MG capsule 24 hr capsule, Take 0.4 mg by mouth Every Morning. 1-2 TABS AS NEEDED , Disp: , Rfl:   •  timolol (TIMOPTIC) 0.5 % ophthalmic solution, Administer 1 drop to both eyes Daily., Disp: , Rfl:   •  warfarin (COUMADIN) 10 MG tablet, Take 10 mg by mouth. Takes Tuesday Thursday  Sat and Sunday, Disp: , Rfl:   •  warfarin (COUMADIN) 5 MG tablet, Take 5 mg by mouth. Takes Monday Wed    "friday, Disp: , Rfl:   •  sennosides-docusate sodium (SENOKOT-S) 8.6-50 MG tablet, Take 1 tablet by mouth 2 (Two) Times a Day., Disp: , Rfl:       Objective:     Vitals:    01/30/19 1039   BP: 120/64   BP Location: Left arm   Pulse: 64   Weight: 90.6 kg (199 lb 12.8 oz)   Height: 190.5 cm (75\")     Body mass index is 24.97 kg/m².    PHYSICAL EXAM:    Vitals Reviewed.   General Appearance: No acute distress, well developed and well nourished.   Eyes: Conjunctiva and lids: No erythema, swelling, or discharge. Sclera non-icteric.   HENT: Atraumatic, normocephalic. External eyes, ears, and nose normal. No hearing loss noted. Mucous membranes normal. Lips not cyanotic. Neck supple with no tenderness.  Respiratory: No signs of respiratory distress. Respiration rhythm and depth normal.   Clear to auscultation. No rales, crackles, rhonchi, or wheezing auscultated.   Cardiovascular:  Jugular Venous Pressure: Normal  Heart Rate and Rhythm: Normal, Heart Sounds: Normal S1 and S2. No S3 or S4 noted.  Murmurs: No murmurs noted. No rubs, thrills, or gallops.   Arterial Pulses: Carotid pulses normal. No carotid bruit noted. Posterior tibialis and dorsalis pedis pulses normal.   Lower Extremities: 2+ Bilateral lower extremity edema (chronic according to patient)  Gastrointestinal:  Abdomen soft, non-distended, non-tender. Normal bowel sounds. No hepatomegaly.   Musculoskeletal: Normal movement of extremities  Skin and Nails: General appearance normal. No pallor, cyanosis, diaphoresis. Skin temperature normal. No clubbing of fingernails.   Psychiatric: Patient alert and oriented to person, place, and time. Speech and behavior appropriate. Normal mood and affect.       ECG 12 Lead  Date/Time: 1/30/2019 10:46 AM  Performed by: Sakina Martin APRN  Authorized by: Sakina Martin APRN   Comparison: compared with previous ECG from 1/14/2019  Comparison to previous ECG: Atrial fibrillation with RVR-heart rate 119  Rhythm: sinus " rhythm  Rate: normal  BPM: 64  Conduction: right bundle branch block and LAFB  ST Segments: ST segments normal  QRS axis: normal  Clinical impression: abnormal ECG  Comments: T wave inversions V3 and V4              Assessment:       Diagnosis Plan   1. Paroxysmal atrial fibrillation (CMS/HCC)     2. On warfarin therapy     3. Anemia, unspecified type  Comprehensive Metabolic Panel    CBC Auto Differential   4. Right bundle branch block (RBBB) with left anterior fascicular block (LAFB)     5. Hypercholesterolemia     6. Leg swelling            Plan:       1.  Paroxysmal atrial fibrillation: He remains in normal sinus rhythm.  He had an episode of postoperative atrial fibrillation with rapid ventricular response after his back surgery and was asymptomatic.  He is not on beta blocker therapy due to a history of bradycardia and remains on warfarin denying bleeding and will have an INR checked today at the hospital.  Check a TSH, CBC, and CMP.     Atrial Fibrillation and Atrial Flutter  Assessment  • The patient has paroxysmal atrial fibrillation  • This is non-valvular in etiology  • The patient's CHADS2-VASc score is 3  • A [id number] score of 2 or more is considered a high risk for a thromboembolic event  • Warfarin prescribed    Plan  • Continue in atrial fibrillation with rate control  • Continue warfarin for antithrombotic therapy, bleeding issues discussed    2.  Anemia: His hemoglobin upon discharge was 8.5.  He looks very pale today I've recommended a repeat CBC.  If abnormal he needs to follow-up with his PCP and I may refer him over to the CBC group.  He is been chronically anemic for some time.  He denies any bleeding.    3.  Right Bundle Branch Block/LAFB: Abnormal EKG; chronic and unchanged.    4.  Hypertension: Blood pressure well-controlled today.  Continue on Enalapril.     5.  Hypercholesterolemia: Continue on Simvastatin.  Patient's lipid panel 6/11/18 was within normal limits.     6.  Lymphedema:  Bilateral +2 lower extremity edema which is been chronic for 20 years.  He is currently off his furosemide until he has better mobility, but feels that his swelling is stable.    7.  Follow-up with Dr. Newman in 3-4 months, unless otherwise needed sooner.    As always, it has been a pleasure to participate in your patient's care.      Sincerely,         LAMONT Muñoz

## 2019-01-30 NOTE — PROGRESS NOTES
Anticoagulation Clinic Progress Note    Anticoagulation Summary  As of 1/30/2019    INR goal:   2.0-3.0   TTR:   72.8 % (2.6 mo)   INR used for dosing:   No new INR was available at the time of this encounter.   Warfarin maintenance plan:   7.5 mg on Mon, Wed, Fri; 5 mg all other days   Weekly warfarin total:   42.5 mg   Plan last modified:   Carey Pascual RPH (1/28/2019)   Next INR check:   2/1/2019   Priority:   Maintenance   Target end date:       Indications    Atrial fibrillation (CMS/HCC) [I48.91]             Anticoagulation Episode Summary     INR check location:       Preferred lab:       Send INR reminders to:   [facility]    Comments:         Anticoagulation Care Providers     Provider Role Specialty Phone number    Sherice Newman MD Referring Cardiology [phone]          Clinic Interview:  Patient Findings     Negatives:   Signs/symptoms of thrombosis, Signs/symptoms of bleeding,   Laboratory test error suspected, Change in health, Change in alcohol use,   Change in activity, Upcoming invasive procedure, Emergency department   visit, Upcoming dental procedure, Missed doses, Extra doses, Change in   medications, Change in diet/appetite, Hospital admission, Bruising, Other   complaints      Clinical Outcomes     Negatives:   Major bleeding event, Thromboembolic event,   Anticoagulation-related hospital admission, Anticoagulation-related ED   visit, Anticoagulation-related fatality        INR History:  Anticoagulation Monitoring 1/24/2019 1/28/2019 1/30/2019   INR 3.20 4.10 -   INR Date 1/24/2019 1/28/2019 -   INR Goal 2.0-3.0 2.0-3.0 2.0-3.0   Trend Down Down Same   Last Week Total 55 mg 45 mg 30 mg   Next Week Total 50 mg 30 mg 40 mg   Sun 5 mg 5 mg -   Mon 10 mg Hold (1/28) -   Tue 5 mg Hold (1/29) -   Wed 10 mg 7.5 mg 5 mg (1/30)   Thu 5 mg 5 mg 5 mg   Fri 10 mg 7.5 mg -   Sat 5 mg 5 mg -   Visit Report - - -   Some recent data might be hidden     Due to a user error, the  patient's INR was not accurately recorded.  The INR returned as a 5.0; however, this value was inaccurate due to a technical error with the machine.  This error was not caught until after the visit was almost complete.  The patient was very upset and refused to have his INR checked again saying he will just wait until Friday.  I explained to the patient that I cannot accurately dose his warfarin without and accurate INR value, but he insisted that he wanted to go and to just wait until Friday. I instructed the patient to take 5mg today and tomorrow and to recheck on Friday because the patient had already held 2 doses of warfarin.  I believe that his actual INR value was closer to the 2-3 range.  Patient said he would do this; however he again expressed his frustration with the machines we used.  I apologized to the patient for the inconvenience and said to call us with any questions or concerns.    Plan:  1. Will instruct Derrek Ritter to continue their warfarin regimen- see above in Anticoagulation Summary.  2. Follow up in 2 days.  3. Patient desires warfarin refills.  4. Verbal and written information provided. Patient expresses understanding and has no further questions at this time.    Ravinder Ochoa, [facility]

## 2019-01-30 NOTE — TELEPHONE ENCOUNTER
----- Message from LAMONT Moreland sent at 1/30/2019 12:35 PM EST -----    Follow-up on CBC, CMP, and TSH

## 2019-02-01 NOTE — PROGRESS NOTES
Anticoagulation Clinic Progress Note    Anticoagulation Summary  As of 2/1/2019    INR goal:   2.0-3.0   TTR:   68.8 % (2.7 mo)   INR used for dosing:   3.1! (2/1/2019)   Warfarin maintenance plan:   7.5 mg on Mon, Wed, Fri; 5 mg all other days   Weekly warfarin total:   42.5 mg   Plan last modified:   Carey Pascual RPH (1/28/2019)   Next INR check:   2/8/2019   Priority:   Maintenance   Target end date:       Indications    Atrial fibrillation (CMS/HCC) [I48.91]             Anticoagulation Episode Summary     INR check location:       Preferred lab:       Send INR reminders to:   [facility]    Comments:         Anticoagulation Care Providers     Provider Role Specialty Phone number    Sherice Newman MD Referring Cardiology [phone]          Clinic Interview:  Patient Findings     Negatives:   Signs/symptoms of thrombosis, Signs/symptoms of bleeding,   Laboratory test error suspected, Change in health, Change in alcohol use,   Change in activity, Upcoming invasive procedure, Emergency department   visit, Upcoming dental procedure, Missed doses, Extra doses, Change in   medications, Change in diet/appetite, Hospital admission, Bruising, Other   complaints      Clinical Outcomes     Negatives:   Major bleeding event, Thromboembolic event,   Anticoagulation-related hospital admission, Anticoagulation-related ED   visit, Anticoagulation-related fatality     Will start 7.5 mg MWF (was on 10 mg MWF) this week.     INR History:  Anticoagulation Monitoring 1/28/2019 1/30/2019 2/1/2019   INR 4.10 - 3.1   INR Date 1/28/2019 - 2/1/2019   INR Goal 2.0-3.0 2.0-3.0 2.0-3.0   Trend Down Same Same   Last Week Total 45 mg 30 mg 30 mg   Next Week Total 30 mg 40 mg 42.5 mg   Sun 5 mg - 5 mg   Mon Hold (1/28) - 7.5 mg   Tue Hold (1/29) - 5 mg   Wed 7.5 mg 5 mg (1/30) 7.5 mg   Thu 5 mg 5 mg 5 mg   Fri 7.5 mg - 7.5 mg   Sat 5 mg - 5 mg   Visit Report - - -   Some recent data might be hidden       Plan:  1.  INR is Supratherapeutic today- see above in Anticoagulation Summary.  Will instruct Derrek Ritter to Continue their warfarin regimen- see above in Anticoagulation Summary.  2. Follow up in 1 week  3. Patient declines warfarin refills.  4. Verbal and written information provided. Patient expresses understanding and has no further questions at this time.    José Manuel Mata [facility]

## 2019-02-05 NOTE — TELEPHONE ENCOUNTER
Spoke with Kaelyn and she was informed of [facility]'s instructions.

## 2019-02-08 NOTE — PROGRESS NOTES
Anticoagulation Clinic Progress Note    Anticoagulation Summary  As of 2019    INR goal:   2.0-3.0   TTR:   63.4 % (3 mo)   INR used for dosin.58! (2019)   Warfarin maintenance plan:   5 mg every day   Weekly warfarin total:   35 mg   Plan last modified:   Ravinder Ochoa RPH (2019)   Next INR check:   2019   Priority:   Maintenance   Target end date:       Indications    Atrial fibrillation (CMS/HCC) [I48.91]             Anticoagulation Episode Summary     INR check location:       Preferred lab:       [facility] [place]    Comments:         Anticoagulation Care Providers     Provider Role Specialty Phone number    Sherice Newman MD Referring Cardiology [phone]          Clinic Interview:  Patient Findings     Positives:   Extra doses    Negatives:   Signs/symptoms of thrombosis, Signs/symptoms of bleeding,   Laboratory test error suspected, Change in health, Change in alcohol use,   Change in activity, Upcoming invasive procedure, Emergency department   visit, Upcoming dental procedure, Missed doses, Change in medications,   Change in diet/appetite, Hospital admission, Bruising, Other complaints    Comments:   Pt was unsure of the dose he was taking      Clinical Outcomes     Negatives:   Major bleeding event, Thromboembolic event,   Anticoagulation-related hospital admission, Anticoagulation-related ED   visit, Anticoagulation-related fatality    Comments:   Pt was unsure of the dose he was taking        INR History:  Anticoagulation Monitoring 2019   INR - 3.1 5.58   INR Date - 2019   INR Goal 2.0-3.0 2.0-3.0 2.0-3.0   Trend Same Same Down   Last Week Total 30 mg 30 mg 42.5 mg   Next Week Total 40 mg 42.5 mg 25 mg   Sun - 5 mg 5 mg   Mon - 7.5 mg 5 mg ()   Tue - 5 mg 5 mg   Wed 5 mg () 7.5 mg -   Thu 5 mg 5 mg -   Fri - 7.5 mg Hold ()   Sat - 5 mg Hold ()   Visit Report - - -   Some recent  data might be hidden     **Patient educated on signs/symptoms of bleeding and instructed to go to ER if he notices any of these signs.**    Plan:  1. INR is Supratherapeutic today- see above in Anticoagulation Summary.  Will instruct Derrek Ritter to Change their warfarin regimen- see above in Anticoagulation Summary.  2. Follow up in 4 days  3. Patient declines warfarin refills.  4. Verbal and written information provided. Patient expresses understanding and has no further questions at this time.    Ravinder Ochoa [facility]

## 2019-02-13 NOTE — PROGRESS NOTES
Anticoagulation Clinic Progress Note    Anticoagulation Summary  As of 2019    INR goal:   2.0-3.0   TTR:   60.1 % (3.1 mo)   INR used for dosin.9 (2019)   Warfarin maintenance plan:   5 mg every day   Weekly warfarin total:   35 mg   Plan last modified:   Rachel Olsen (2019)   Next INR check:   2019   Priority:   High   Target end date:       Indications    Atrial fibrillation (CMS/HCC) [I48.91]             Anticoagulation Episode Summary     INR check location:       Preferred lab:       Send INR reminders to:   [facility] CLINICAL POOL    Comments:         Anticoagulation Care Providers     Provider Role Specialty Phone number    Sherice Newman MD Referring Cardiology [phone]          Clinic Interview:      INR History:  Anticoagulation Monitoring 2019   INR 3.1 5.58 2.9   INR Date 2019   INR Goal 2.0-3.0 2.0-3.0 2.0-3.0   Trend Same Down Same   Last Week Total 30 mg 42.5 mg 27.5 mg   Next Week Total 42.5 mg 25 mg 35 mg   Sun 5 mg 5 mg 5 mg   Mon 7.5 mg 5 mg () 5 mg   Tue 5 mg 5 mg 5 mg   Wed 7.5 mg - 5 mg   Thu 5 mg - 5 mg   Fri 7.5 mg Hold () 5 mg   Sat 5 mg Hold () 5 mg   Visit Report - - -   Some recent data might be hidden       Plan:  1. INR is Therapeutic today- see above in Anticoagulation Summary.  Will instruct Derrek JONATHAN Ritter to Change their warfarin regimen- see above in Anticoagulation Summary.  2. Follow up in 1.5 weeks  3. Patient declines warfarin refills.  4. Verbal and written information provided. Patient expresses understanding and has no further questions at this time.    Carey Pascual [facility]

## 2019-02-13 NOTE — PROGRESS NOTES
Anticoagulation Clinic Progress Note    Anticoagulation Summary  As of 2019    INR goal:   2.0-3.0   TTR:   60.1 % (3.1 mo)   INR used for dosin.9 (2019)   Warfarin maintenance plan:   5 mg every day   Weekly warfarin total:   35 mg   Plan last modified:   Rachel Olsen (2019)   Next INR check:   2019   Priority:   High   Target end date:       Indications    Atrial fibrillation (CMS/HCC) [I48.91]             Anticoagulation Episode Summary     INR check location:       Preferred lab:       Send INR reminders to:   [facility] CLINICAL POOL    Comments:         Anticoagulation Care Providers     Provider Role Specialty Phone number    Sherice Newman MD Referring Cardiology [phone]          Clinic Interview:      INR History:  Anticoagulation Monitoring 2019   INR 3.1 5.58 2.9   INR Date 2019   INR Goal 2.0-3.0 2.0-3.0 2.0-3.0   Trend Same Down Same   Last Week Total 30 mg 42.5 mg 27.5 mg   Next Week Total 42.5 mg 25 mg 35 mg   Sun 5 mg 5 mg 5 mg   Mon 7.5 mg 5 mg () 5 mg   Tue 5 mg 5 mg 5 mg   Wed 7.5 mg - 5 mg   Thu 5 mg - 5 mg   Fri 7.5 mg Hold () 5 mg   Sat 5 mg Hold () 5 mg   Visit Report - - -   Some recent data might be hidden       Plan:  1. INR is therapeutic today- see above in Anticoagulation Summary.   Will instruct Derrek Ritter to continue their warfarin regimen- see above in Anticoagulation Summary.  2. Follow up in 2 weeks.  3. Patient declines warfarin refills.  4. Verbal and written information provided. Patient expresses understanding and has no further questions at this time.    Rachel Olsen

## 2019-02-15 NOTE — TELEPHONE ENCOUNTER
"Spoke with patient and let him know that it is important to wear his brace and that it may help prevent him from being hunched over again. Patient voiced his understanding and said that he would \"force\" himself to wear it.   "

## 2019-02-15 NOTE — TELEPHONE ENCOUNTER
Call the patient and reinforce the need for him to wear his brace.  It may help prevent him from becoming hunched over again.

## 2019-02-25 NOTE — PROGRESS NOTES
Anticoagulation Clinic Progress Note    Anticoagulation Summary  As of 2019    INR goal:   2.0-3.0   TTR:   63.5 % (3.5 mo)   INR used for dosin.9! (2019)   Warfarin maintenance plan:   5 mg every day   Weekly warfarin total:   35 mg   No change documented:   Rachel Olsen   Plan last modified:   Rachel Olsen (2019)   Next INR check:   3/11/2019   Priority:   High   Target end date:       Indications    Atrial fibrillation (CMS/HCC) [I48.91]             Anticoagulation Episode Summary     INR check location:       Preferred lab:       Send INR reminders to:    [facility] [place]    Comments:         Anticoagulation Care Providers     Provider Role Specialty Phone number    Sherice Newman MD Referring Cardiology [phone]          Clinic Interview:  Patient Findings     Negatives:   Signs/symptoms of thrombosis, Signs/symptoms of bleeding,   Laboratory test error suspected, Change in health, Change in alcohol use,   Change in activity, Upcoming invasive procedure, Emergency department   visit, Upcoming dental procedure, Missed doses, Extra doses, Change in   medications, Change in diet/appetite, Hospital admission, Bruising, Other   complaints      Clinical Outcomes     Negatives:   Major bleeding event, Thromboembolic event,   Anticoagulation-related hospital admission, Anticoagulation-related ED   visit, Anticoagulation-related fatality        INR History:  Anticoagulation Monitoring 2019   INR 5.58 2.9 1.9   INR Date 2019   INR Goal 2.0-3.0 2.0-3.0 2.0-3.0   Trend Down Same Same   Last Week Total 42.5 mg 27.5 mg 35 mg   Next Week Total 25 mg 35 mg 35 mg   Sun 5 mg 5 mg 5 mg   Mon 5 mg () 5 mg 5 mg   Tue 5 mg 5 mg 5 mg   Wed - 5 mg 5 mg   Thu - 5 mg 5 mg   Fri Hold () 5 mg 5 mg   Sat Hold () 5 mg 5 mg   Visit Report - - -   Some recent data might be hidden       Plan:  1. INR is therapeutic today- see above  in Anticoagulation Summary.   Will instruct Derrek JONATHAN Liuon to continue their warfarin regimen- see above in Anticoagulation Summary.  2. Follow up in 2 weeks.  3. Patient declines warfarin refills.  4. Verbal and written information provided. Patient expresses understanding and has no further questions at this time.    Rachel Olsen

## 2019-02-27 NOTE — PROGRESS NOTES
Subjective Weakness and fatigue    REASON FOR CONSULTATION: Anemia  Provide an opinion on any further workup or treatment                             REQUESTING PHYSICIAN: Trey Stockton MD    RECORDS OBTAINED:  Records of the patients history including those obtained from the referring provider were reviewed and summarized in detail.    HISTORY OF PRESENT ILLNESS:  The patient is a 78 y.o. year old male who is here for an opinion about the above issue.    History of Present Illness   Patient is a 78-year-old male followed by primary care with diabetes mellitus, hypertension, atrial fibrillation and iron deficiency anemia.  He remains on anticoagulation for atrial fibrillation and has been seen by orthopedic surgery with persistent pain in the back and legs with a previous history of lumbar fusion and a concern thereafter of possibly a nonunion.  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  As he prepared for surgery in mid December there was some concern about his anemia and additional testing was done with H&H of 9.8 and 31.9, white count of 4110 and platelet count of 264,000 December 19 and iron profile revealed an iron of 53, saturation of 12%, unremarkable protein electrophoresis.  He was admitted January 8 felt to be a candidate for operative repair and was seen by neurosurgery January 9 undergoing T11-L3, L5-S1 fusion with T11-S1 instrumentation, L4 pedicle subtraction osteotomy and right L5-S1 interbody fusion with cage, L4-L5, L5-S1 laminectomy with neural lysis.     Hematologically during this time he dropped his hemoglobin to as low as 7.5 g% on January 9, January 12 8.5 g% in January 30 9.3 g%.  Additional testing January 30 include a BUN and creatinine of 14.71, sodium 147, albumin 3.1, alk phos 143 with normal transaminases.  We have discussed his schedule and timing thus far, apparent response to iron but intolerance to oral iron with abdominal discomfort and  constipation.    Past Medical History:   Diagnosis Date   • Acute suppurative otitis media    • Acute upper respiratory infection    • Anemia    • Cholelithiasis    • Diabetes mellitus (CMS/HCC)     TYPE 2   • Diverticulosis    • Dry eyes    • Earache    • Edema    • Encounter for screening for malignant neoplasm of prostate    • Esophagitis, reflux    • Glaucoma    • H/O echocardiogram 09/18/2013   • Health care maintenance    • Hiatal hernia    • History of EKG 10/06/2015   • History of Holter monitoring 09/16/2013   • Hyperlipidemia    • Hypertension    • Lumbar canal stenosis    • Microalbuminuria    • Mitral valve prolapse    • Nephrolithiasis    • Osteoarthritis of hand     of thumb   • PAF (paroxysmal atrial fibrillation) (CMS/HCC)    • 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   • Spinal stenosis         Past Surgical History:   Procedure Laterality Date   • AMPUTATION DIGIT Left 10/29/2016    Procedure: TENDON AND NERVE REPAIR OF  LEFT THUMB;  Surgeon: Zak Hanson MD;  Location: [facility];  Service:    • ARTHRODESIS  10/28/2013    Spinal / Description: Repair spinal stenosis   • CATARACT EXTRACTION     • EYE SURGERY      EYE MUSCLE SX   • HERNIA REPAIR      right inguinal   • LUMBAR DISCECTOMY FUSION INSTRUMENTATION N/A 1/29/2018    Procedure: L2 to L5 fusion with instrumentation and removal of implants L4 5;  Surgeon: Barron Knight MD;  Location: [facility];  Service:    • LUMBAR DISCECTOMY FUSION INSTRUMENTATION N/A 1/9/2019    Procedure: T 11 to L3, L5-S1 fusion with T11 to S1 instrumentation, L4 pedicle subtraction osteotomy, Right L5-S1 interbody fusion with cage;  Surgeon: Barron Knight MD;  Location: [facility];  Service: Orthopedic Spine   • LUMBAR FUSION  2018    Lumbar vertebral fusion   • LUMBAR LAMINECTOMY      10.28.13  bilat L4/5 lami with Nyla and Belen   • LUMBAR LAMINECTOMY DISCECTOMY DECOMPRESSION N/A 1/29/2018    Procedure:  L2 to L4 laminectomy with a fusion by orthopedics;  Surgeon: Jeison Redmond MD;  [facility]: [facility];  Service:    • LUMBAR LAMINECTOMY DISCECTOMY DECOMPRESSION N/A 1/9/2019    Procedure: L4-L5, L5-S1 LAMINECTOMY WITH NEURAL LYSIS;  Surgeon: Jeison Redmond MD;  Location: [facility];  Service: Neurosurgery   • OTHER SURGICAL HISTORY      Lithotomy   • TONSILLECTOMY          Current Outpatient Medications on File Prior to Visit   Medication Sig Dispense Refill   • ACCU-CHEK FASTCLIX LANCETS misc USE TO TEST TWICE DAILY 102 each 0   • ACCU-CHEK SMARTVIEW test strip USE TO TEST TWICE DAILY 100 each 1   • Blood Glucose Monitoring Suppl (ACCU-CHEK FRANCISCO SMARTVIEW) W/DEVICE kit USE TO TEST BLOOD SUGAR TWICE DAILY 1 kit 0   • Blood Glucose Monitoring Suppl (FREESTYLE LITE) device Use to test blood once daily 1 each 0   • brimonidine (ALPHAGAN) 0.2 % ophthalmic solution Administer 1 drop to both eyes 2 (Two) Times a Day.     • calcium carbonate (TUMS) 500 MG chewable tablet Chew 1,000 mg 3 (Three) Times a Day As Needed for Indigestion or Heartburn.     • donepezil (ARICEPT) 10 MG tablet Take 10 mg by mouth Every Morning.     • dorzolamide (TRUSOPT) 2 % ophthalmic solution Administer 1 drop to both eyes 2 (two) times a day.     • enalapril (VASOTEC) 20 MG tablet Take 1 tablet by mouth 2 (Two) Times a Day. (Patient taking differently: Take 40 mg by mouth Every Morning.) 180 tablet 1   • esomeprazole (nexIUM) 40 MG capsule Take 1 capsule by mouth Daily. 90 capsule 1   • ferrous sulfate (IRON SUPPLEMENT) 325 (65 FE) MG tablet Take 1 tablet by mouth Daily With Breakfast.     • furosemide (LASIX) 20 MG tablet Take 1 tablet by mouth Daily. as needed (Patient taking differently: Take 20 mg by mouth As Needed. as needed) 90 tablet 3   • glimepiride (AMARYL) 2 MG tablet Take 1 tablet by mouth Every Morning Before Breakfast. 90 tablet 1   • glucose blood (FREESTYLE LITE) test strip Use to test blood once daily 100 each 12    • Glucose Blood disk Inject 1 Device as directed daily. TEST BLOOD SUGAR ONCE DAILY AS DIRECTED 100 each 3   • Lancets (FREESTYLE) lancets Use to test blood sugar daily 100 each 12   • latanoprost (XALATAN) 0.005 % ophthalmic solution Administer 1 drop to both eyes Every Night.     • metFORMIN (GLUCOPHAGE) 1000 MG tablet Take one am, 1/2 noon, and 1 pm (Patient taking differently: 1,000 mg. Take one am, 1/2 noon, and   One in the  pm) 225 tablet 1   • ONE TOUCH ULTRA TEST test strip USE ONCE DAILY AS DIRECTED 100 each 3   • oxyCODONE-acetaminophen (PERCOCET)  MG per tablet Take 1 tablet by mouth Every 6 (Six) Hours As Needed for Moderate Pain .     • sennosides-docusate sodium (SENOKOT-S) 8.6-50 MG tablet Take 1 tablet by mouth 2 (Two) Times a Day.     • tamsulosin (FLOMAX) 0.4 MG capsule 24 hr capsule Take 0.4 mg by mouth Every Morning. 1-2 TABS AS NEEDED      • timolol (TIMOPTIC) 0.5 % ophthalmic solution Administer 1 drop to both eyes Daily.     • warfarin (COUMADIN) 10 MG tablet Take 10 mg by mouth. Takes Tuesday Thursday  Sat and Sunday     • warfarin (COUMADIN) 5 MG tablet Take one tablet by mouth daily, or as directed by the [facility]. 30 tablet 0   • [DISCONTINUED] pioglitazone (ACTOS) 45 MG tablet Take 1 tablet by mouth Every Morning. 90 tablet 0   • [DISCONTINUED] simvastatin (ZOCOR) 20 MG tablet Take 20 mg by mouth Every Night.       No current facility-administered medications on file prior to visit.         ALLERGIES:    Allergies   Allergen Reactions   • Beta Adrenergic Blockers Other (See Comments)     Bradycardia        Social History     Socioeconomic History   • Marital status:      Spouse name: Silvia   • Number of children: 3   • Years of education: College   • Highest education level: Not on file   Occupational History   • Occupation: Teacher RETIRED     Employer: RETIRED   Tobacco Use   • Smoking status: Never Smoker   • Smokeless tobacco: Never Used   • Tobacco  "comment: caffeine use   Substance and Sexual Activity   • Alcohol use: No     Frequency: Never   • Drug use: No   • Sexual activity: Defer        Family History   Problem Relation Age of Onset   • Heart attack Mother         acute myocardial infarction   • Stroke Mother         hemorrhagic   • Emphysema Father    • Diabetes Other         mellitus   • Hypertension Other    • Heart disease Other    • Malig Hyperthermia Neg Hx         Review of Systems   Constitutional: Positive for activity change, fatigue and unexpected weight change.   Respiratory: Positive for shortness of breath.    Cardiovascular: Positive for leg swelling.   Genitourinary:        Nocturia   Neurological: Positive for weakness and light-headedness.          Objective     Vitals:    02/27/19 1351   BP: 167/74   Pulse: 59   Resp: 14   Temp: 98.2 °F (36.8 °C)   SpO2: 98%   Weight: 94.8 kg (208 lb 14.4 oz)   Height: 190.5 cm (75\")   PainSc:   5   PainLoc: Back  Comment: pt had surgery on jan 9th     Current Status 2/27/2019   ECOG score 1       Physical Exam   Constitutional: He is oriented to person, place, and time. He appears well-developed and well-nourished.   HENT:   Head: Normocephalic and atraumatic.   Nose: Nose normal.   Eyes: EOM are normal. Pupils are equal, round, and reactive to light.   Neck: Normal range of motion. Neck supple.   Cardiovascular: Normal rate, regular rhythm, normal heart sounds and intact distal pulses.   Pulmonary/Chest: Effort normal and breath sounds normal.   Abdominal: Soft. Bowel sounds are normal.   Musculoskeletal: Normal range of motion.   Patient wearing eggshell brace post surgery.  Bilateral lower extremity edema 2+   Neurological: He is alert and oriented to person, place, and time.   Skin: Skin is warm and dry.   Psychiatric: He has a normal mood and affect. His behavior is normal.       RECENT LABS:  Hematology WBC   Date Value Ref Range Status   02/27/2019 4.92 3.40 - 10.80 10*3/mm3 Final   12/19/2018 " 4.10 (L) 4.50 - 10.70 10*3/mm3 Final     RBC   Date Value Ref Range Status   02/27/2019 3.71 (L) 4.14 - 5.80 10*6/mm3 Final   12/19/2018 3.36 (L) 4.60 - 6.00 10*6/mm3 Final     Hemoglobin   Date Value Ref Range Status   02/27/2019 10.5 (L) 13.0 - 17.7 g/dL Final     Hematocrit   Date Value Ref Range Status   02/27/2019 33.4 (L) 37.5 - 51.0 % Final     Platelets   Date Value Ref Range Status   02/27/2019 265 140 - 450 10*3/mm3 Final          Assessment/Plan      78 year-old male followed by primary care with diabetes mellitus, hypertension, atrial fibrillation and iron deficiency anemia.  He remains on anticoagulation for atrial fibrillation and has been seen by orthopedic surgery and thereafter neurosurgery requiring ultimately extensive surgery as described above.  A review of his concurrent laboratory studies demonstrate anemia developing in 2016 which worsened after his most recent extensive back surgery and was found, in part, related to iron deficiency.  Following the surgery he dropped his hemoglobin even further to as low as 7.5 g% and was placed on oral iron, slowly demonstrating a response to its use.  We have discussed that he was likely iron deficient, to a greater degree than recognized, for quite some time and had additional blood loss with his recent surgery accentuating this problem.  As he is seen in office February 27 he is improved on oral iron but is intolerant to its use.     We have started a workup to determine the full etiology for his anemia but finds that his iron saturation is reduced at 14%, ferritin at 47.4.  He is a candidate for intravenous iron and we plan:  *Laboratory assessment today for the etiology of his anemia including iron deficiency  *Return for injection for this week and next for a total of 1500 mg dosing  *5 weeks MD assessment stat iron labs and potential further IV iron as needed.  *Patient will discontinue oral iron at this point.

## 2019-03-07 NOTE — PROGRESS NOTES
"He complains of \"stooping over \"pain is only slightly worse and he continues to do reasonably well otherwise.  On exam palpable kyphosis is present at the superior aspect of his fixation and indeed he has a noticeable stoop and has lost sagittal balance.  He is neurologically unchanged with x-rays show a compression fracture of T11 with significant collapse and kyphosis.  I spoke with Dr. Frazier at the [facility] and he is going to see the patient in consultation.  The question is whether or not to proceed with correction now or after he has healed.  "

## 2019-03-11 NOTE — TELEPHONE ENCOUNTER
Pt's wife called stating pt is having back surgery due to a compression fracture on 3/25/19. She questions if pt needs lab work prior to surgery due to recent anemia. Reviewed with Dr. Herron. Per Dr. Herron, no labs needed from our standpoint prior to surgery. Pt's wife informed and v/u. Pt has f/u scheduled with Dr. Herron on 4/8/19.

## 2019-03-11 NOTE — TELEPHONE ENCOUNTER
He just had surgery and had postoperative atrial fibrillation.  Are you okay with another surgery this soon?

## 2019-03-11 NOTE — PROGRESS NOTES
Anticoagulation Clinic Progress Note    Anticoagulation Summary  As of 3/11/2019    INR goal:   2.0-3.0   TTR:   56.2 % (4 mo)   INR used for dosin.3! (3/11/2019)   Warfarin maintenance plan:   5 mg every day   Weekly warfarin total:   35 mg   Plan last modified:   Rachel Olsen (2019)   Next INR check:   3/18/2019   Priority:   High   Target end date:       Indications    Atrial fibrillation (CMS/HCC) [I48.91]             Anticoagulation Episode Summary     INR check location:       Preferred lab:       Send INR reminders to:    [facility] [place]    Comments:         Anticoagulation Care Providers     Provider Role Specialty Phone number    Sherice Newman MD Referring Cardiology [phone]          Clinic Interview:  Patient Findings     Positives:   Upcoming invasive procedure, Missed doses, Change in   diet/appetite    Negatives:   Signs/symptoms of thrombosis, Signs/symptoms of bleeding,   Laboratory test error suspected, Change in health, Change in alcohol use,   Change in activity, Emergency department visit, Upcoming dental procedure,   Extra doses, Change in medications, Hospital admission, Bruising, Other   complaints    Comments:   Missed dose within the past week. Possibly slightly more vit   k. Surgery for fracture of spine scheduled for 3/25 at [place].      Clinical Outcomes     Negatives:   Major bleeding event, Thromboembolic event,   Anticoagulation-related hospital admission, Anticoagulation-related ED   visit, Anticoagulation-related fatality    Comments:   Missed dose within the past week. Possibly slightly more vit   k. Surgery for fracture of spine scheduled for 3/25 at [place].        INR History:  Anticoagulation Monitoring 2019 2019 3/11/2019   INR 2.9 1.9 1.3   INR Date 2019 2019 3/11/2019   INR Goal 2.0-3.0 2.0-3.0 2.0-3.0   Trend Same Same Same   Last Week Total 27.5 mg 35 mg 30 mg   Next Week Total 35 mg 35 mg 42.5 mg   Sun 5 mg 5 mg 5  mg   Mon 5 mg 5 mg 10 mg (3/11)   Tue 5 mg 5 mg 7.5 mg (3/12)   Wed 5 mg 5 mg 5 mg   Thu 5 mg 5 mg 5 mg   Fri 5 mg 5 mg 5 mg   Sat 5 mg 5 mg 5 mg   Visit Report - - -   Some recent data might be hidden       Plan:  1. INR is Subtherapeutic today- see above in Anticoagulation Summary.  Will instruct Derrek CASTILLO HUDSONkendell to Change their warfarin regimen- see above in Anticoagulation Summary.  2. Follow up in 1 week  3. Patient declines warfarin refills.  4. Verbal and written information provided. Patient expresses understanding and has no further questions at this time.    Jena Cruz [facility]

## 2019-03-11 NOTE — TELEPHONE ENCOUNTER
Pt called and states that pt is having a back surgery on 3/25/19. He wants to know how long he can hold his warfarin and he if he is clear to have the procedure?......Faith CUMMINGS

## 2019-03-13 NOTE — TELEPHONE ENCOUNTER
I was able to reach the patient today.  I asked him to write down the information and the new recommendations.  He is scheduled for surgery on 2/25/19.    As of 2/20/19, recommended that he hold his warfarin.  We did discuss the possible risk of stroke when not anticoagulated with the warfarin and he verbalizes understanding.    Dr. Newman has recommended that he start amiodarone 200 mg twice daily on 2/20/19 to prevent atrial fibrillation during the meghana/postoperative period.  I have asked him to continue with amiodarone until we follow-up with him post surgery.    He is at acceptable risk from a cardiac standpoint to proceed with surgery per Dr. Newman.  Faith-please let the surgery office note.  Thank you

## 2019-03-15 NOTE — TELEPHONE ENCOUNTER
"Patient's wife called asking about scheduling FU with J[facility] for patient. Patient is due to have surgery on \"bone above where CALVINW did surgery (in Jan 2019\") with [facility] 03/25 with a PO at [facility] the following week. Patient's wife Silvia was wondering about a 6 follow up in June with JGW since Silvia stated patient had Lumbar surgery in Jan 2019 - please advise if J[facility] wants to see in June 2019 or sooner?. Thanks /srh  "

## 2019-03-18 NOTE — TELEPHONE ENCOUNTER
He wont need a follow up visit with me, however, he can call if he has any further questions.  Otherwise, f/u with [facility]

## 2019-03-18 NOTE — PROGRESS NOTES
Anticoagulation Clinic Progress Note    Anticoagulation Summary  As of 3/18/2019    INR goal:   2.0-3.0   TTR:   53.1 % (4.2 mo)   INR used for dosin.8! (3/18/2019)   Warfarin maintenance plan:   5 mg every day   Weekly warfarin total:   35 mg   Plan last modified:   Rachel Olsen (2019)   Next INR check:   2019   Priority:   High   Target end date:       Indications    Atrial fibrillation (CMS/HCC) [I48.91]             Anticoagulation Episode Summary     INR check location:       Preferred lab:       Send INR reminders to:    [facility] [place]    Comments:         Anticoagulation Care Providers     Provider Role Specialty Phone number    Sherice Newman MD Referring Cardiology [phone]          Clinic Interview:  Patient Findings     Positives:   Upcoming invasive procedure    Negatives:   Signs/symptoms of thrombosis, Signs/symptoms of bleeding,   Laboratory test error suspected, Change in health, Change in alcohol use,   Change in activity, Emergency department visit, Upcoming dental procedure,   Missed doses, Extra doses, Change in medications, Change in diet/appetite,   Hospital admission, Bruising, Other complaints    Comments:   Due to hold warfarin 5 days beginning in 2 days; will not   change dose at this time. Continue same dose. Begin holding x 5 days 3/20   pre-surgery.       Clinical Outcomes     Negatives:   Major bleeding event, Thromboembolic event,   Anticoagulation-related hospital admission, Anticoagulation-related ED   visit, Anticoagulation-related fatality    Comments:   Due to hold warfarin 5 days beginning in 2 days; will not   change dose at this time. Continue same dose. Begin holding x 5 days 3/20   pre-surgery.         INR History:  Anticoagulation Monitoring 2019 3/11/2019 3/18/2019   INR 1.9 1.3 1.8   INR Date 2019 3/11/2019 3/18/2019   INR Goal 2.0-3.0 2.0-3.0 2.0-3.0   Trend Same Same Same   Last Week Total 35 mg 30 mg 40 mg   Next  Week Total 35 mg 42.5 mg 10 mg   Sun 5 mg 5 mg Hold (3/24); Otherwise 5 mg   Mon 5 mg 10 mg (3/11) 5 mg   Tue 5 mg 7.5 mg (3/12) 5 mg   Wed 5 mg 5 mg Hold (3/20); Otherwise 5 mg   Thu 5 mg 5 mg Hold (3/21); Otherwise 5 mg   Fri 5 mg 5 mg Hold (3/22); Otherwise 5 mg   Sat 5 mg 5 mg Hold (3/23); Otherwise 5 mg   Visit Report - - -   Some recent data might be hidden       Plan:  1. INR is Subtherapeutic today- see above in Anticoagulation Summary.  Will instruct Derrek Estrada to Change their warfarin regimen- see above in Anticoagulation Summary.  2. Follow-up to be determined. Reportedly to be followed by [facility] immediately following surgery. Defer scheduling until contact from Mr. Estrada following procedure.    3. Patient declines warfarin refills.  4. Verbal and written information provided. Patient expresses understanding and has no further questions at this time.    Jean Cruz [facility]

## 2019-03-18 NOTE — TELEPHONE ENCOUNTER
Decrease amio to 200mg once daily for 1 month, then stop and see me 1 week after stopping amio - ok to put on a Thursday.     RM

## 2019-03-21 NOTE — TELEPHONE ENCOUNTER
03/21/19  2:42 PM  Derrek D Zofiajose gon  1940  Home Phone [phone]   Mobile [phone]       Derrek Estrada is a patient of Dr. Newman.    Pt's surgery may be canceled tomorrow, d/t insurance not approving it in time. If it is canceled, should he continue to take the amiodarone at 200mg BID until has the surgery? What is the course of action?    Stephanie Rosenthal RN

## 2019-03-26 NOTE — TELEPHONE ENCOUNTER
Pt called. He is at [facility] for surgery. He was wanting to know if he should take Amiodarone 200 MG BID or 1 tablet Daily. He is having surgery today.    Per . If the pt is having surgery he can remain on Amiodarone 200 MG BID if not Amiodarone 200 MG daily.    LM on pt VM to call our office back.    PT #: [phone]    Thanks Ema

## 2019-03-27 NOTE — TELEPHONE ENCOUNTER
----- Message from Sherice Newman MD sent at 3/27/2019 11:14 AM EDT -----  4 weeks.     rm  ----- Message -----  From: Sakina Martin APRN  Sent: 3/27/2019   9:11 AM  To: LAMONT Moreland, Sherice Newman MD      We put him on amiodarone for surgery 3/25. How long do you want him to continue the amio post surgery?     ----- Message -----  From: Sakina Martin APRN  Sent: 3/13/2019   3:52 PM  To: LAMONT Moreland      Patient having surgery 2/25/19  Decide how long he is continue the amiodarone

## 2019-03-28 NOTE — TELEPHONE ENCOUNTER
I spoke with wife via telephone and read the telephone correspondence from 3/26/19.  There was some confusion about his amiodarone.    I have clarified this.  Now that he is out of surgery he is to take amiodarone 200 mg 1 tablet a day.  Dr. Newman wants him to come in to be seen 1 week after the stop of amiodarone.  Which will be around May 1.    [place]-Dr. Newman wants to see him the week of May 1 on a Thursday.  Please call wife to arrange.

## 2019-04-01 PROBLEM — R50.9 FEVER AND CHILLS: Status: ACTIVE | Noted: 2019-01-01

## 2019-04-01 NOTE — H&P
Admitted to [facility] - seen by my ARNP but went to ICU under care of [facility] - Dr Paiz was nice enough to assume care as attending and [facility] will sign off

## 2019-04-01 NOTE — ED NOTES
"Pt c/o \"bhargavi horse type pain in left leg\"  States \"if I move my leg up, it goes away\"     Stephanie Saucedo RN  04/01/19 5697    "

## 2019-04-01 NOTE — ED NOTES
"[facility].  Pt was using walker and on the way to the bathroom and \"legs gave out\".  Wife witnessed the fall and could not \"catch\" patient. States pt fell backwards and hitting head on leather recliner.  Denies LOC.  Pt c/o a constant neck pain.  Denies HA, dizziness or n/v.     Stephanie Saucedo RN  03/31/19 2015    "

## 2019-04-01 NOTE — NURSING NOTE
Upon entering the room at about 630 pm pts wife had given him his home bottles of his eye drops and he was instilling them. I explained at length that it was not allow or appropriate and that we needed Dr., order etc... They acknowledged understanding but were concerned of the danger of missing doses. Dr. Escobar contacted and orders rec'd.

## 2019-04-01 NOTE — PROGRESS NOTES
"Adult Nutrition  Assessment/PES    Patient Name:  Derrek Estrada  YOB: 1940  MRN: [id number]  Admit Date:  3/31/2019    Assessment Date:  4/1/2019    Comments:  Nutrition assessment complete due to PI to coccyx. Pt reports no weight loss.Appetite \"ok\".  Will add a nutrition supplement and change diet to Cons Carb to support wound healing.  Encouraged po.    Reason for Assessment     Row Name 04/01/19 1200          Reason for Assessment    Reason For Assessment  identified at risk by screening criteria     Diagnosis  pulmonary disease;trauma Acute hypoxemic respiratory failure, fall, thorasic osteomyelitis, HTN, DM , Spinal stenosis     Identified At Risk by Screening Criteria  large or nonhealing wound, burn or pressure injury           Anthropometrics     Row Name 04/01/19 1202 04/01/19 0638       Anthropometrics    Weight  --  88.4 kg (194 lb 14.2 oz)       Admit Weight    Admit Weight  88.4 kg (194 lb 14.2 oz)  --       Body Mass Index (BMI)    BMI Assessment  BMI 25-29.9: overweight  --    Row Name 04/01/19 0630          Anthropometrics    Height  188 cm (74\")     Weight  92.5 kg (204 lb)        Ideal Body Weight (IBW)    Ideal Body Weight (IBW) (kg)  87.66     % Ideal Body Weight  105.56        Body Mass Index (BMI)    BMI (kg/m2)  26.25        IBW Adjustment, Para/Tetraplegia    5% Adjustment, Para (IBW)  83.28     10% Adjustment, Para (IBW)  78.89     10% Adjustment, Tetra (IBW)  78.89     15% Adjustment, Tetra (IBW)  74.51         Labs/Tests/Procedures/Meds     Row Name 04/01/19 1202          Labs/Procedures/Meds    Lab Results Reviewed  reviewed, pertinent     Lab Results Comments  glu, crp        Diagnostic Tests/Procedures    Diagnostic Test/Procedure Reviewed  reviewed, pertinent        Medications    Pertinent Medications Reviewed  reviewed, pertinent     Pertinent Medications Comments  protonix, vanc, insulin         Physical Findings     Row Name 04/01/19 1203          Physical " "Findings    Overall Physical Appearance  on oxygen therapy     Skin  pressure injury;edema bilat coccyx, back incision         Estimated/Assessed Needs     Row Name 04/01/19 1204 04/01/19 0630       Calculation Measurements    Height  --  188 cm (74\")       Estimated/Assessed Needs    Additional Documentation  Calorie Requirements (Group)  --       Calorie Requirements    Estimated Calorie Requirement Comment  2210 kcals (25/kg),  g rpo (1-1.2/kg), 2200cc fluid  --        Nutrition Prescription Ordered     Row Name 04/01/19 1205          Nutrition Prescription PO    Current PO Diet  Regular         Evaluation of Received Nutrient/Fluid Intake     Row Name 04/01/19 1208 04/01/19 0630       Calculation Measurements    Height  --  188 cm (74\")       PO Evaluation    Number of Meals  1  --    % PO Intake  50% per pt  --        Evaluation of Prescribed Nutrient/Fluid Intake     Row Name 04/01/19 0630          Calculation Measurements    Height  188 cm (74\")         Problem/Interventions:  Problem 1     Row Name 04/01/19 1208          Nutrition Diagnoses Problem 1    Problem 1  Increased Nutrient Needs     Macronutrient  Kcal;Protein     Etiology (related to)  Medical Diagnosis     Skin  Pressure injury         Intervention Goal     Row Name 04/01/19 1209          Intervention Goal    General  Maintain nutrition;Reduce/improve symptoms;Meet nutritional needs for age/condition;Disease management/therapy     PO  Tolerate PO;Increase intake;PO intake (%)     PO Intake %  80 %     Weight  No significant weight loss         Nutrition Intervention     Row Name 04/01/19 1209          Nutrition Intervention    RD/Tech Action  Follow Tx progress;Care plan reviewd;Interview for preference;Advise available snack;Advise alternate selection;Encourage intake;Recommend/ordered     Recommended/Ordered  Supplement;Diet         Nutrition Prescription     Row Name 04/01/19 1209          Nutrition Prescription PO    PO Prescription  " Begin/change supplement;Begin/change diet     Supplement  Silvestre;Boost Glucose Control     Supplement Frequency  2 times a day     Common Modifiers  Consistent Carbohydrate     New PO Prescription Ordered?  Yes         Education/Evaluation     Row Name 04/01/19 1210          Education    Education  Will Instruct as appropriate        Monitor/Evaluation    Monitor  Per protocol;Weight;Skin status;I&O;PO intake;Symptoms;Supplement intake;Pertinent labs           Electronically signed by:  Jenelle Rios RD  04/01/19 12:11 PM

## 2019-04-01 NOTE — PROGRESS NOTES
"Pharmacokinetic Evaluation: Vancomycin IV  Patient: Derrek Estrada  Admission Date: 331  MRN: [id number]  : 1940    Day of vancomycin therapy:   Consult for Dr. Paiz  Treating: Thoracic osteomyelitis  Goal trough: 15-20 mcg/mL    Other antimicrobials: Cefepime 2 gm IV q12hrs    Relevant clinical data and objective history reviewed:  79 y.o. male 188 cm (74\") 88.4 kg (194 lb 14.2 oz)  Body mass index is 25.02 kg/m².  Results from last 7 days   Lab Units 19  2246   CREATININE mg/dL 0.77     Estimated Creatinine Clearance: 95.2 mL/min (by C-G formula based on SCr of 0.77 mg/dL).    Lab Results   Component Value Date    WBC 6.74 2019     Temp:  [98.8 °F (37.1 °C)-100.7 °F (38.2 °C)] 98.8 °F (37.1 °C)  Heart Rate:  [74-87] 74  Resp:  [14-16] 14  BP: (142-186)/(63-96) 156/71    Intake/Output Summary (Last 24 hours) at 2019 0731  Last data filed at 2019 0521  Gross per 24 hour   Intake 100 ml   Output --   Net 100 ml     Baseline cultures/labs/radiology:    3/31 Lactic acid: 1.7  3/31 Procalcitonin: 0.56  3/31 CRP: 15.42   ESR: 55    Radiology:   3/31 CXR: Left greater than right basilar predominant opacities, likely airspace disease and layering effusions.   CT Chest: Osseous findings of T3 and T11 most consistent with osteomyelitis with a probable area of cellulitis surrounding the T3 spinous process   CTA Chest: No P.E.    Cultures:   3/31 RVP: None detected   Blood cx x2: In process      Assessment/Plan:   PMH: Acute suppurative otitis media, Acute upper respiratory infection, Anemia, Cholelithiasis, Diabetes mellitus, Diverticulosis, Dry eyes, Earache, ED (erectile dysfunction), Edema, Encounter for screening for malignant neoplasm of prostate, Esophagitis, reflux, Glaucoma, H/O echocardiogram (2013), Health care maintenance, Hiatal hernia, History of EKG (10/06/2015), History of Holter monitoring (2013), Hyperlipidemia, Hypertension, Lumbar canal " stenosis, Microalbuminuria, Mild cognitive impairment, Mitral valve prolapse, Nephrolithiasis, Osteoarthritis of hand, PAF (paroxysmal atrial fibrillation), 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    HPI: Neck pain s/p fall. Chronic back pain with sciatica    1. Start Vancomycin 1500 mg IV q12hrs @ 1800 today  2. Will follow with vancomycin trough levels.    Vancomycin IV Dosing & Levels History:  4/1 0521 1750 mg x1    Thank you for your consult,    Geri Belcher [facility]

## 2019-04-01 NOTE — ED PROVIDER NOTES
MD ATTESTATION NOTE    The LINO and I have discussed this patient's history, physical exam, and treatment plan.  I have reviewed the documentation and personally had a face to face interaction with the patient. I affirm the documentation and agree with the treatment and plan.  The attached note describes my personal findings.    The patient presents complaining of head and neck pain s/p fall backwards which going to the bathroom. The pt is currently in rehab after a back fusion. Discussed the plan to wait on the pt's imaging results.     PEx:  Heart RRR, no murmur  Lungs CTAB  Neck is in C collar  Dry oropharynx  Abd soft, nontender, nondistended, bowel sounds normal  Mild tenderness anteriorly with pain with external rotation and flexion of L hip    Documentation assistance provided by yina Calvert for Dr. Knight.  Information recorded by the scribe was done at my direction and has been verified and validated by me.             Ema Calvert  03/31/19 1872       Trey Knight MD  03/31/19 5749

## 2019-04-01 NOTE — NURSING NOTE
Flaca victor contacted about going down to xray. She advised to complete it at the same time that he is taken for MRI.

## 2019-04-01 NOTE — ED NOTES
Pts O2 sat noted to drop into 60's-70's.  Pt placed on O2 @ 6L per NC.  CHARLIE GALLAGHER notified.  Pt monitored closely.  Pt eventually placed on high flow NC @ 11L per NC.  O2 saturations noted to be maintained in the 90's  Pt denies any resp distress or any discomfort.      Stephanie Saucedo RN  04/01/19 0253

## 2019-04-01 NOTE — CONSULTS
Inpatient Neurosurgery Consult  Consult performed by: Flaca Burdick APRN  Consult ordered by: Janeth Garcia MD  Reason for consult: evaluate for possible spine infection          Patient Care Team:  Trey Stockton MD as PCP - General (Internal Medicine)  Sherice Newman MD as Consulting Physician (Cardiology)  Carey Pascual [facility] as Pharmacist  Geovanni Herron MD as Consulting Physician (Hematology and Oncology)  Sakina Martin APRN as Referring Physician (Cardiology)    Chief complaint:    Leg weakness; evaluate for possible spinal infection    Subjective     This is a 78 yo male known to Dr. Redmond and Dr. Knight. He has a history of postural kyphosis lumbar region, flat back syndrome. He underwent bilateral L3 4 and L4 5 laminectomy with neural lysis at both levels by Dr. Redmond and T 11 to L4, L5-S1 bilateral posterior lateral fusion with [employer] expedient segmental instrumentation from T11 to the sacrum, L4 pedicle subtraction osteotomy, left L5 interbody fusion with Concorde bullet carbon fiber cage, with local bone graft, and Vivogen bone graft substitute by Dr. Knight on January 9, 2019. He was fitter for a brace and discharged on January 15, 2019. He saw Dr. Knight on January 23. X-rays looked fine at that point. The patient had been in rehab. In reviewing the notes there were some calls indicating lack of compliance with the brace. He saw Dr. Knight again in early February. The patient was noticeably stooping per Dr. Knight's note. Dr. Knight indicated that the patient was neurologically unchanged. Office Xray showed a T11 compression fracture with significant collapse and kyphosis. Dr. Knight contacted Dr. Frazier at the [facility] for consultation. The patient underwent T4-T12 posterior spinal fusion with Dr. Shaver on March 25, 2019. The patient went home March 28, 2019.  The wife stated that he did not qualify for a rehab admission.  Once they arrived  home, the patient was unable to get out of the car.  The patient's wife even asked a male neighbor to help and he was not able to help him out of the car.  For that reason the [facility]  CT scan of the T/L spine 3/29/19 showed stable T11 fracture. Fusion hardware in good position. There was also mention of bilateral pleural effusions.He was admitted to the hospital from there overnight and then subsequently discharged to [facility] for subacute rehab.  Yesterday, the patient had attempted to get up and walk he immediately felt weak in his legs and fell backward. He did strike his head on the floor.  There was no loss of consciousness. EMS was called to transport the patient back to the hospital however the patient's wife refused to allow the patient to go back downtown to [facility] emergency room.  The patient was therefore brought to [facility] emergency room and was admitted from there.     States that his legs felt very heavy yesterday but today they feel better.  He does have some chronic bilateral lower extremity edema which is quite severe.  He denies any shortness of breath but then again he is just lying in bed.  He admits to some upper thoracic pain with radiation around to the chest area along the nipple line.  There is no sensory deficit.  He has had a low-grade fever since his presentation to the hospital.  Infectious disease consult was obtained.  He underwent CT of the chest which raised concern about possible thoracic spine osteomyelitis. Neurosurgery has been asked to re-evaluate.         Review of Systems   Constitutional: Positive for activity change.   HENT: Positive for congestion.    Musculoskeletal: Positive for back pain and gait problem.   Skin: Positive for wound.        Thoracic incision well approximated.  No redness, swelling, or drainage.   Neurological: Positive for weakness.   All other systems reviewed and are  negative.       Past Medical History:   Diagnosis Date   • Acute suppurative otitis media    • Acute upper respiratory infection    • Anemia    • Cholelithiasis    • Diabetes mellitus (CMS/HCC)     TYPE 2   • Diverticulosis    • Dry eyes    • Earache    • ED (erectile dysfunction)    • Edema    • Encounter for screening for malignant neoplasm of prostate    • Esophagitis, reflux    • Glaucoma    • H/O echocardiogram 09/18/2013   • Health care maintenance    • Hiatal hernia    • History of EKG 10/06/2015   • History of Holter monitoring 09/16/2013   • Hyperlipidemia    • Hypertension    • Lumbar canal stenosis    • Microalbuminuria    • Mild cognitive impairment    • Mitral valve prolapse    • Nephrolithiasis    • Osteoarthritis of hand     of thumb   • PAF (paroxysmal atrial fibrillation) (CMS/HCC)    • 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   • Spinal stenosis    ,   Past Surgical History:   Procedure Laterality Date   • AMPUTATION DIGIT Left 10/29/2016    Procedure: TENDON AND NERVE REPAIR OF  LEFT THUMB;  Surgeon: Zak Hanson MD;  Location: [facility];  Service:    • ARTHRODESIS  10/28/2013    Spinal / Description: Repair spinal stenosis   • BLEPHAROPLASTY  2000, 05/2002   • CATARACT EXTRACTION     • EYE SURGERY  2011    EYE MUSCLE SX   • HERNIA REPAIR Right 03/2010    inguinal   • LUMBAR DISCECTOMY FUSION INSTRUMENTATION N/A 1/29/2018    Procedure: L2 to L5 fusion with instrumentation and removal of implants L4 5;  Surgeon: Barron Knight MD;  Location: [facility];  Service:    • LUMBAR DISCECTOMY FUSION INSTRUMENTATION N/A 1/9/2019    Procedure: T 11 to L3, L5-S1 fusion with T11 to S1 instrumentation, L4 pedicle subtraction osteotomy, Right L5-S1 interbody fusion with cage;  Surgeon: Barron Knight MD;  Location: [facility];  Service: Orthopedic Spine   • LUMBAR FUSION  2018    Lumbar vertebral fusion   • LUMBAR LAMINECTOMY      10.28.13  bilat L4/5  leroyi with Nasim   • LUMBAR LAMINECTOMY DISCECTOMY DECOMPRESSION N/A 1/29/2018    Procedure: L2 to L4 laminectomy with a fusion by orthopedics;  Surgeon: Jeison Redmond MD;  Location: [facility];  Service:    • LUMBAR LAMINECTOMY DISCECTOMY DECOMPRESSION N/A 1/9/2019    Procedure: L4-L5, L5-S1 LAMINECTOMY WITH NEURAL LYSIS;  Surgeon: Jeison Redmond MD;  Location: [facility];  Service: Neurosurgery   • OTHER SURGICAL HISTORY  2008    Lithotomy   • TONSILLECTOMY     • VASECTOMY  09/2008   ,   Family History   Problem Relation Age of Onset   • Heart attack Mother         acute myocardial infarction   • Stroke Mother         hemorrhagic   • Emphysema Father    • Diabetes Other         mellitus   • Hypertension Other    • Heart disease Other    • Malig Hyperthermia Neg Hx    ,   Social History     Tobacco Use   • Smoking status: Never Smoker   • Smokeless tobacco: Never Used   • Tobacco comment: caffeine use   Substance Use Topics   • Alcohol use: No     Frequency: Never   • Drug use: No   ,   Medications Prior to Admission   Medication Sig Dispense Refill Last Dose   • ACCU-CHEK FASTCLIX LANCETS misc USE TO TEST TWICE DAILY 102 each 0 Taking   • ACCU-CHEK SMARTVIEW test strip USE TO TEST TWICE DAILY 100 each 1 Taking   • amiodarone (PACERONE) 200 MG tablet Take 1 tablet by mouth Daily. 30 tablet 0    • Blood Glucose Monitoring Suppl (ACCU-CHEK FRANCISCO SMARTVIEW) W/DEVICE kit USE TO TEST BLOOD SUGAR TWICE DAILY 1 kit 0 Taking   • Blood Glucose Monitoring Suppl (FREESTYLE LITE) device Use to test blood once daily 1 each 0 Taking   • brimonidine (ALPHAGAN) 0.2 % ophthalmic solution Administer 1 drop to both eyes 2 (Two) Times a Day.   Taking   • calcium carbonate (TUMS) 500 MG chewable tablet Chew 1,000 mg 3 (Three) Times a Day As Needed for Indigestion or Heartburn.   Taking   • donepezil (ARICEPT) 10 MG tablet Take 10 mg by mouth Every Morning.   Taking   • dorzolamide (TRUSOPT) 2 % ophthalmic  solution Administer 1 drop to both eyes 2 (two) times a day.   Taking   • enalapril (VASOTEC) 20 MG tablet Take 1 tablet by mouth 2 (Two) Times a Day. (Patient taking differently: Take 40 mg by mouth Every Morning.) 180 tablet 1 Taking   • metFORMIN (GLUCOPHAGE) 1000 MG tablet Take one am, 1/2 noon, and 1 pm (Patient taking differently: 1,000 mg. Take one am, 1/2 noon, and   One in the  pm) 225 tablet 1 Taking   • oxyCODONE-acetaminophen (PERCOCET)  MG per tablet Take 1 tablet by mouth Every 6 (Six) Hours As Needed for Moderate Pain .   Taking   • tamsulosin (FLOMAX) 0.4 MG capsule 24 hr capsule Take 0.4 mg by mouth Every Morning. 1-2 TABS AS NEEDED    Taking   • timolol (TIMOPTIC) 0.5 % ophthalmic solution Administer 1 drop to both eyes Daily.   Taking   • esomeprazole (nexIUM) 40 MG capsule Take 1 capsule by mouth Daily. 90 capsule 1 Taking   • ferrous sulfate (IRON SUPPLEMENT) 325 (65 FE) MG tablet Take 1 tablet by mouth Daily With Breakfast.   Not Taking   • furosemide (LASIX) 20 MG tablet Take 1 tablet by mouth Daily. as needed (Patient taking differently: Take 20 mg by mouth As Needed. as needed) 90 tablet 3 Taking   • glimepiride (AMARYL) 2 MG tablet Take 1 tablet by mouth Every Morning Before Breakfast. 90 tablet 1 Taking   • glucose blood (FREESTYLE LITE) test strip Use to test blood once daily 100 each 12 Taking   • Glucose Blood disk Inject 1 Device as directed daily. TEST BLOOD SUGAR ONCE DAILY AS DIRECTED 100 each 3 Taking   • Lancets (FREESTYLE) lancets Use to test blood sugar daily 100 each 12 Taking   • latanoprost (XALATAN) 0.005 % ophthalmic solution Administer 1 drop to both eyes Every Night.   Taking   • ONE TOUCH ULTRA TEST test strip USE ONCE DAILY AS DIRECTED 100 each 3 Taking   • pioglitazone (ACTOS) 45 MG tablet Take 1 tablet by mouth Every Morning. 90 tablet 0 Taking   • sennosides-docusate sodium (SENOKOT-S) 8.6-50 MG tablet Take 1 tablet by mouth 2 (Two) Times a Day.   Taking   •  simvastatin (ZOCOR) 20 MG tablet Take 1 tablet by mouth Every Night. 90 tablet 1 Taking   • warfarin (COUMADIN) 10 MG tablet Take 10 mg by mouth. Takes Tuesday Thursday  Sat and Sunday   Taking   • warfarin (COUMADIN) 5 MG tablet Take one tablet by mouth daily, or as directed by the [facility]. 30 tablet 0 Taking   , Scheduled Meds:      cefepime 2 g Intravenous Q12H   insulin lispro 0-14 Units Subcutaneous 4x Daily With Meals & Nightly   pantoprazole 40 mg Oral QAM AC   vancomycin 1,500 mg Intravenous Q12H   , Continuous Infusions:      Pharmacy to dose vancomycin    , PRN Meds:  •  acetaminophen **OR** acetaminophen  •  aluminum-magnesium hydroxide-simethicone  •  bisacodyl  •  bisacodyl  •  dextrose  •  dextrose  •  fentaNYL citrate (PF)  •  glucagon (human recombinant)  •  ipratropium-albuterol  •  ondansetron **OR** ondansetron ODT **OR** ondansetron  •  oxyCODONE-acetaminophen  •  Pharmacy to dose vancomycin  •  [COMPLETED] Insert peripheral IV **AND** sodium chloride and Allergies:  Beta adrenergic blockers    Objective      Vital Signs  Temp:  [98.3 °F (36.8 °C)-100.7 °F (38.2 °C)] 98.3 °F (36.8 °C)  Heart Rate:  [73-87] 73  Resp:  [14-16] 14  BP: (142-186)/() 157/78    Physical Exam   Constitutional: He is oriented to person, place, and time. He appears well-developed and well-nourished. He is cooperative. No distress.   This is an awake, alert, oriented 79-year-old male in no acute distress.  He is nontoxic appearing.  He is able to converse appropriately.    HENT:   Head: Normocephalic and atraumatic.   Mouth/Throat: Oropharynx is clear and moist.   Eyes: Conjunctivae and EOM are normal.   Neck: Normal range of motion. Neck supple.   Cardiovascular: Normal rate.   Significant bilateral lower extremity edema with some erythema in both legs left greater than right.  No open sores.  Both feet and legs are warm to touch.  Pulses were difficult to palpate but were palpable.    Pulmonary/Chest: Effort normal. No respiratory distress.   Abdominal: Soft. He exhibits no distension. There is no tenderness.   Musculoskeletal: Normal range of motion. He exhibits no edema. Tenderness: tender to palpation in the upper thoracic region.   Neurological: He is alert and oriented to person, place, and time. He has normal strength. He displays normal reflexes. No sensory deficit. He exhibits normal muscle tone. Coordination normal. GCS eye subscore is 4. GCS verbal subscore is 5. GCS motor subscore is 6.   Negative Khan's; negative clonus.    Skin: Skin is warm and dry. No rash noted. He is not diaphoretic.   Psychiatric: He has a normal mood and affect. Thought content normal.   Vitals reviewed.      Results Review:    I reviewed the patient's new clinical results.  I reviewed the patient's new imaging results and agree with the interpretation.  Discussed with Dr. Redmond.        Ref. Range 4/1/2019 02:20   Sed Rate Latest Ref Range: 0 - 20 mm/hr 55 (H)        Ref. Range 3/31/2019 22:46   C-Reactive Protein Latest Ref Range: 0.00 - 0.50 mg/dL 15.42 (H)          Ref. Range 3/31/2019 22:46   WBC Latest Ref Range: 3.40 - 10.80 10*3/mm3 6.74       5 VIEW LUMBAR SPINE      Bilateral pedicle screws T12-L3 and L5-S1 with bilateral fusion rods posteriorly. interbody spacer present at the lumbosacral junction, unchanged. Additional fusion hardware has been placed traversing a large portion of the mid and lower thoracic spine as well. No hardware complication is evident. There is minimal retrolisthesis at L3-4, approximately 3 mm. There is approximately 3-4 mm of anterolisthesis at L4-5. These findings appear stable. A mild to  moderate L4 compression deformity appears stable compared to previous. A much milder endplate deformity of L3 appears stable as well. No acute fracture is demonstrated. Mild to moderate multilevel degenerative disc disease changes are present and appear similar to previous. Oblique  views are unremarkable. Generalized osteopenia.        CTA CHEST negative for PE    CT CHEST - L > R pleural effusions with atelectasis vs pneumonia. Mention of larger pericardial effusion. Post surgical changes noted lytic destruction dorsal aspect of T3 spinous process suspicious for osteomyelitis. No evidence of definite abscess.         Assessment/Plan       Hypercholesterolemia    Hypertension    Iron deficiency anemia    Chronic anticoagulation-for Afib    Fever and chills      Assessment & Plan     1. Hx of lumbar kyphosis; flat back synrome. Hx of L3/4, L4/5 laminectomy by Dr Redmond and fusion by Dr. Knight T11-S1  2. T11 VCF with hardware displacement; unstable thoracic kyphosis- s/p posterior spinal fusion T4-T12 by  at [facility] requested by Dr. Knight.  3.New chest CT raises question of possible osteomyelitis at the T3 spinous process  4. Low grade fever - normal WBC  5  Generalized weakness  6.  Acute hypoxemic respiratory failure with bilateral pleural effusions  7. . History of paroxysmal atrial fibrillation  8. History of T2 DM    I have discussed the above events with Dr. Redmond.  The only way to determine an infection in the spine is to start with MRI imaging with and without contrast.  We will start out with the an MRI of the thoracic and lumbar spine.  Further recommendations to follow    I discussed the patients findings and my recommendations with patient, family and nursing staff    LAMONT Moore  04/01/19  6:04 PM

## 2019-04-01 NOTE — H&P
[facility]  HISTORY AND PHYSICAL   [facility]        Patient Identification:  Name: Derrek Estrada  Age: 79 y.o.  Sex: male  :  1940  MRN: [id number]                     Primary Care Physician: Trey Stockton MD    Chief Complaint:  Fall at rehab with neck pain    History of Present Illness:   79-year-old male presents to the emergency room after a fall at rehab with complaints of neck and head pain.  No loss of consciousness.  Has chronic back pain with sciatica.  Has thoracic spine osteomyelitis and started on antibiotics in the emergency room.  Was going to be admitted to the hospitalist service when his oxygen saturations became a problem in the emergency room requiring increased supplemental oxygen.  ER staff was worried about a potential acute PE and order a CT PE protocol which did not show evidence of pulmonary embolism but showed significant pericardial effusion.  With tenuous status plan to admit to the intensive care unit for closer monitoring.  Getting stat 2D echo and consulting cardiology for effusion.    Past Medical History:  Past Medical History:   Diagnosis Date   • Acute suppurative otitis media    • Acute upper respiratory infection    • Anemia    • Cholelithiasis    • Diabetes mellitus (CMS/HCC)     TYPE 2   • Diverticulosis    • Dry eyes    • Earache    • ED (erectile dysfunction)    • Edema    • Encounter for screening for malignant neoplasm of prostate    • Esophagitis, reflux    • Glaucoma    • H/O echocardiogram 2013   • Health care maintenance    • Hiatal hernia    • History of EKG 10/06/2015   • History of Holter monitoring 2013   • Hyperlipidemia    • Hypertension    • Lumbar canal stenosis    • Microalbuminuria    • Mild cognitive impairment    • Mitral valve prolapse    • Nephrolithiasis    • Osteoarthritis of hand     of thumb   • PAF (paroxysmal atrial fibrillation) (CMS/HCC)    • 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   • Spinal stenosis      Past Surgical History:  Past Surgical History:   Procedure Laterality Date   • AMPUTATION DIGIT Left 10/29/2016    Procedure: TENDON AND NERVE REPAIR OF  LEFT THUMB;  Surgeon: Zak Hanson MD;  Location: [facility];  Service:    • ARTHRODESIS  10/28/2013    Spinal / Description: Repair spinal stenosis   • BLEPHAROPLASTY  2000, 05/2002   • CATARACT EXTRACTION     • EYE SURGERY  2011    EYE MUSCLE SX   • HERNIA REPAIR Right 03/2010    inguinal   • LUMBAR DISCECTOMY FUSION INSTRUMENTATION N/A 1/29/2018    Procedure: L2 to L5 fusion with instrumentation and removal of implants L4 5;  Surgeon: Barron Knight MD;  Location: [facility] OR;  Service:    • LUMBAR DISCECTOMY FUSION INSTRUMENTATION N/A 1/9/2019    Procedure: T 11 to L3, L5-S1 fusion with T11 to S1 instrumentation, L4 pedicle subtraction osteotomy, Right L5-S1 interbody fusion with cage;  Surgeon: Barron Knight MD;  Location: [facility];  Service: Orthopedic Spine   • LUMBAR FUSION  2018    Lumbar vertebral fusion   • LUMBAR LAMINECTOMY      10.28.13  bilat L4/5 lami with Nyla and Belen   • LUMBAR LAMINECTOMY DISCECTOMY DECOMPRESSION N/A 1/29/2018    Procedure: L2 to L4 laminectomy with a fusion by orthopedics;  Surgeon: Jeison Redmond MD;  Location: [facility];  Service:    • LUMBAR LAMINECTOMY DISCECTOMY DECOMPRESSION N/A 1/9/2019    Procedure: L4-L5, L5-S1 LAMINECTOMY WITH NEURAL LYSIS;  Surgeon: Jeison Redmond MD;  Location: [facility];  Service: Neurosurgery   • OTHER SURGICAL HISTORY  2008    Lithotomy   • TONSILLECTOMY     • VASECTOMY  09/2008      Home Meds:  Medications Prior to Admission   Medication Sig Dispense Refill Last Dose   • ACCU-CHEK FASTCLIX LANCETS misc USE TO TEST TWICE DAILY 102 each 0 Taking   • ACCU-CHEK SMARTVIEW test strip USE TO TEST TWICE DAILY 100 each 1 Taking   • amiodarone (PACERONE) 200 MG tablet Take 1 tablet by mouth Daily.  30 tablet 0    • Blood Glucose Monitoring Suppl (ACCU-CHEK FRANCISCO SMARTVIEW) W/DEVICE kit USE TO TEST BLOOD SUGAR TWICE DAILY 1 kit 0 Taking   • Blood Glucose Monitoring Suppl (FREESTYLE LITE) device Use to test blood once daily 1 each 0 Taking   • brimonidine (ALPHAGAN) 0.2 % ophthalmic solution Administer 1 drop to both eyes 2 (Two) Times a Day.   Taking   • calcium carbonate (TUMS) 500 MG chewable tablet Chew 1,000 mg 3 (Three) Times a Day As Needed for Indigestion or Heartburn.   Taking   • donepezil (ARICEPT) 10 MG tablet Take 10 mg by mouth Every Morning.   Taking   • dorzolamide (TRUSOPT) 2 % ophthalmic solution Administer 1 drop to both eyes 2 (two) times a day.   Taking   • enalapril (VASOTEC) 20 MG tablet Take 1 tablet by mouth 2 (Two) Times a Day. (Patient taking differently: Take 40 mg by mouth Every Morning.) 180 tablet 1 Taking   • metFORMIN (GLUCOPHAGE) 1000 MG tablet Take one am, 1/2 noon, and 1 pm (Patient taking differently: 1,000 mg. Take one am, 1/2 noon, and   One in the  pm) 225 tablet 1 Taking   • oxyCODONE-acetaminophen (PERCOCET)  MG per tablet Take 1 tablet by mouth Every 6 (Six) Hours As Needed for Moderate Pain .   Taking   • tamsulosin (FLOMAX) 0.4 MG capsule 24 hr capsule Take 0.4 mg by mouth Every Morning. 1-2 TABS AS NEEDED    Taking   • timolol (TIMOPTIC) 0.5 % ophthalmic solution Administer 1 drop to both eyes Daily.   Taking   • esomeprazole (nexIUM) 40 MG capsule Take 1 capsule by mouth Daily. 90 capsule 1 Taking   • ferrous sulfate (IRON SUPPLEMENT) 325 (65 FE) MG tablet Take 1 tablet by mouth Daily With Breakfast.   Not Taking   • furosemide (LASIX) 20 MG tablet Take 1 tablet by mouth Daily. as needed (Patient taking differently: Take 20 mg by mouth As Needed. as needed) 90 tablet 3 Taking   • glimepiride (AMARYL) 2 MG tablet Take 1 tablet by mouth Every Morning Before Breakfast. 90 tablet 1 Taking   • glucose blood (FREESTYLE LITE) test strip Use to test blood once daily  100 each 12 Taking   • Glucose Blood disk Inject 1 Device as directed daily. TEST BLOOD SUGAR ONCE DAILY AS DIRECTED 100 each 3 Taking   • Lancets (FREESTYLE) lancets Use to test blood sugar daily 100 each 12 Taking   • latanoprost (XALATAN) 0.005 % ophthalmic solution Administer 1 drop to both eyes Every Night.   Taking   • ONE TOUCH ULTRA TEST test strip USE ONCE DAILY AS DIRECTED 100 each 3 Taking   • pioglitazone (ACTOS) 45 MG tablet Take 1 tablet by mouth Every Morning. 90 tablet 0 Taking   • sennosides-docusate sodium (SENOKOT-S) 8.6-50 MG tablet Take 1 tablet by mouth 2 (Two) Times a Day.   Taking   • simvastatin (ZOCOR) 20 MG tablet Take 1 tablet by mouth Every Night. 90 tablet 1 Taking   • warfarin (COUMADIN) 10 MG tablet Take 10 mg by mouth. Takes Tuesday Thursday  Sat and Sunday   Taking   • warfarin (COUMADIN) 5 MG tablet Take one tablet by mouth daily, or as directed by the [facility]. 30 tablet 0 Taking       Allergies:  Allergies   Allergen Reactions   • Beta Adrenergic Blockers Other (See Comments)     Bradycardia     Immunizations:  Immunization History   Administered Date(s) Administered   • Flu Mist 09/21/2015, 09/30/2016   • Flu Vaccine High Dose PF 65YR+ 09/19/2018   • Hepatitis A 03/11/2018   • Influenza TIV (IM) 09/01/2011, 09/19/2012   • Pneumococcal Conjugate 13-Valent (PCV13) 06/11/2018   • Pneumococcal Polysaccharide (PPSV23) 08/14/2012   • Pneumococcal, Unspecified 10/01/2003, 08/14/2012, 09/28/2015   • Td 10/01/2003   • Tdap 09/19/2013   • Zostavax 10/01/2011     Social History:   Social History     Social History Narrative   • Not on file     Social History     Tobacco Use   • Smoking status: Never Smoker   • Smokeless tobacco: Never Used   • Tobacco comment: caffeine use   Substance Use Topics   • Alcohol use: No     Frequency: Never     Family History:  Family History   Problem Relation Age of Onset   • Heart attack Mother         acute myocardial infarction   •  "Stroke Mother         hemorrhagic   • Emphysema Father    • Diabetes Other         mellitus   • Hypertension Other    • Heart disease Other    • Malig Hyperthermia Neg Hx         Review of Systems  Constitutional: Negative for activity change, appetite change and fever.   HENT: Negative for congestion and sore throat.    Eyes: Negative.    Respiratory: Negative for cough and shortness of breath.    Cardiovascular: Negative for chest pain and leg swelling.   Gastrointestinal: Positive for abdominal pain (R). Negative for diarrhea and vomiting.   Endocrine: Negative.    Genitourinary: Negative for decreased urine volume and dysuria.   Musculoskeletal: Positive for arthralgias (R hip) and neck pain.        Head pain s/t fall   Skin: Negative for rash and wound.   Allergic/Immunologic: Negative.    Neurological: Negative for weakness, numbness and headaches.   Hematological: Negative.    Psychiatric/Behavioral: Negative.    All other systems reviewed and are negative.        Objective:  tMax 24 hrs: Temp (24hrs), Av.7 °F (37.6 °C), Min:98.8 °F (37.1 °C), Max:100.7 °F (38.2 °C)    Vitals Ranges:   Temp:  [98.8 °F (37.1 °C)-100.7 °F (38.2 °C)] 98.8 °F (37.1 °C)  Heart Rate:  [74-87] 74  Resp:  [14-16] 14  BP: (142-186)/(63-96) 156/71      Exam:  /71   Pulse 74   Temp 98.8 °F (37.1 °C) (Oral)   Resp 14   Ht 188 cm (74\")   Wt 88.4 kg (194 lb 14.2 oz)   SpO2 94%   BMI 25.02 kg/m²     GENERAL APPEARANCE:   · Well developed  · Well nourished  · No acute distress   EYES:    · PERRL                                                                           · Conjunctiva normal  · Sclera non-icteric.  HENT:   · Atraumatic, normocephalic  · External ears and nose normal  · Moist mucous membranes and no ulcers  NECK:  · Thyroid not enlarged  · Trachea midline   ·   RESPIRATORY:    · Nonlabored breathing   · Normal breath sounds  · No rales. No wheezing  · No dullness  CARDIOVASCULAR:    · RRR  · Normal S1, S2  · No " murmur  · Lower extremity edema: none    GI:   · Bowel sounds normal  · Abdomen soft , non-distended, non-tender  · No abdominal masses.    MUSCULOSKELETAL:  · Normal movement of extremities  · No tenderness, no deformities  · No clubbing or cyanosis   Skin:    · No visible rashes  · No palpable nodules  PSYCHIATRIC:  · Speech and behavior appropriate  · Normal mood and affect  · Oriented to person, place and time  NEUROLOGIC:  .    Data Review:  Collected  Updated  Procedure  Result Status      04/01/2019 0448  04/01/2019 0526  Troponin [[id number]]    Blood     Final result  Troponin T <0.010 ng/mL          04/01/2019 0258  04/01/2019 0301  Blood Gas, Arterial [[id number]]   (Abnormal)   Arterial Blood     Final result  Site Arterial: right radial    Vamsi's Test Positive    pH, Arterial 7.456 Abnormally high  pH units   pCO2, Arterial 38.6 mm Hg   pO2, Arterial 181.0 Abnormally high  mm Hg   HCO3, Arterial 27.2 mmol/L   Base Excess, Arterial 3.1 Abnormally high  mmol/L   O2 Saturation Calculated 99.7 Abnormally high  %   Barometric Pressure for Blood Gas 758.7 mmHg   Modality HFNC    Flow Rate 10 lpm          04/01/2019 0220  04/01/2019 0317  Sedimentation Rate [[id number]]   (Abnormal)   Blood     Final result  Sed Rate 55 Abnormally high  mm/hr          04/01/2019 0218  04/01/2019 0226  Blood Culture - Blood, Arm, Left [[id number]]   Blood from Arm, Left     In process  No result data             04/01/2019 0017  04/01/2019 0031  Blood Culture - Blood, Arm, Right [[id number]]   Blood from Arm, Right     In process  No result data             03/31/2019 2331  04/01/2019 0043  Respiratory Panel, PCR - Swab, Nasopharynx [[id number]]   Swab from Nasopharynx     Final result  ADENOVIRUS, PCR Not Detected   Coronavirus 229E Not Detected   Coronavirus HKU1 Not Detected   Coronavirus NL63 Not Detected   Coronavirus OC43 Not Detected   Human Metapneumovirus Not Detected   Human Rhinovirus/Enterovirus Not Detected   Influenza  B PCR Not Detected   Parainfluenza Virus 1 Not Detected   Parainfluenza Virus 2 Not Detected   Parainfluenza Virus 3 Not Detected   Parainfluenza Virus 4 Not Detected   Bordetella pertussis pcr Not Detected   Influenza A H1 2009 PCR Not Detected   Chlamydophila pneumoniae PCR Not Detected   Mycoplasma pneumo by PCR Not Detected   Influenza A PCR Not Detected   Influenza A H3 Not Detected   Influenza A H1 Not Detected   RSV, PCR Not Detected   Bordetella parapertussis PCR Not Detected             03/31/2019 2327 03/31/2019 2342  Urinalysis With Microscopic If Indicated (No Culture) - Urine, Clean Catch [[id number]]    (Abnormal)   Urine, Clean Catch     Final result  Color, UA Yellow   Appearance, UA Clear   pH, UA <=5.0   Specific Gravity, UA 1.020   Glucose,  mg/dL (Trace) Abnormal    Ketones, UA Negative   Bilirubin, UA Negative   Blood, UA Negative   Protein, UA Negative   Leukocytes, UA Negative   Nitrite, UA Negative   Urobilinogen, UA 1.0 E.U./dL          03/31/2019 2246 03/31/2019 2325  Comprehensive Metabolic Panel [[id number]]    (Abnormal)   Blood     Final result  Glucose 170 Abnormally high  mg/dL   BUN 32 Abnormally high  mg/dL   Creatinine 0.77 mg/dL   Sodium 142 mmol/L   Potassium 3.8 mmol/L   Chloride 103 mmol/L   CO2 26.4 mmol/L   Calcium 8.4 Abnormally low  mg/dL   Total Protein 6.1 g/dL   Albumin 3.00 Abnormally low  g/dL   ALT (SGPT) 21 U/L   AST (SGOT) 41 Abnormally high  U/L   Alkaline Phosphatase 93 U/L   Total Bilirubin 0.3 mg/dL   eGFR Non African Am 98 mL/min/1.73   Globulin 3.1 gm/dL   A/G Ratio 1.0 g/dL   BUN/Creatinine Ratio 41.6 Abnormally high     Anion Gap 12.6 mmol/L          03/31/2019 2246 04/01/2019 0113  Lactic Acid, Plasma [[id number]]   Blood     Final result  Lactate 1.7 mmol/L          03/31/2019 2246 03/31/2019 2337  Procalcitonin [[id number]]    (Abnormal)   Blood     Final result  Procalcitonin 0.56 Abnormally high  ng/mL          03/31/2019 2246 03/31/2019 2306   CBC Auto Differential [[id number]]   (Abnormal)   Blood     Final result  WBC 6.74 10*3/mm3   RBC 3.64 Abnormally low  10*6/mm3   Hemoglobin 10.7 Abnormally low  g/dL   Hematocrit 34.5 Abnormally low  %   MCV 94.8 fL   MCH 29.4 pg   MCHC 31.0 Abnormally low  g/dL   RDW 16.8 Abnormally high  %   RDW-SD 58.8 Abnormally high  fl   MPV 10.0 fL   Platelets 229 10*3/mm3   Neutrophil Rel % 80.4 Abnormally high  %   Lymphocyte Rel % 9.3 Abnormally low  %   Monocyte Rel % 7.4 %   Eosinophil Rel % 1.3 %   Basophil Rel % 0.3 %   Immature Granulocyte Rel % 1.3 Abnormally high  %   Neutrophils Absolute 5.41 10*3/mm3   Lymphocytes Absolute 0.63 Abnormally low  10*3/mm3   Monocytes Absolute 0.50 10*3/mm3   Eosinophils Absolute 0.09 10*3/mm3   Basophils Absolute 0.02 10*3/mm3   Immature Grans, Absolute 0.09 Abnormally high  10*3/mm3   nRBC 0.0 /100 WBC          03/31/2019 2246  04/01/2019 0210  C-reactive Protein [[id number]]   (Abnormal)   Blood     Final result  C-Reactive Protein 15.42 Abnormally high  mg/dL          03/31/2019 2135  03/31/2019 2215  Protime-INR [[id number]]   Blood from Arm, Right     Final result  Protime 13.6 Seconds   INR 1.07           Chest x-ray viewed         CT chest 4/1/19 reviewed shows no evidence of pulmonary embolism.  Left greater than right pleural effusion with atelectasis noted.  Large pericardial effusion.          Microbiology reviewed        Assessment:  Acute hypoxemic respiratory failure  Pleural effusions left greater than right  Basilar atelectasis  Pericardial effusion  Thoracic osteomyelitis  Paroxysmal atrial fibrillation  Type 2 diabetes  Status post fall at rehab  Spinal stenosis  Hypertension  Hyperlipidemia    Plan:  Unclear etiology of his acute worsening hypoxemia.  May be related to large pericardial effusion and questionable intrapericardial shunt.  Will get stat 2D echo and consult cardiology.  Does not appear to have pneumonia on CT scan of the chest.  No pulmonary embolism.   Small effusions on chest x-ray and would not be expected to cause this level of hypoxemia.  Repeat chest x-ray for follow-up.  Continue antibiotics for osteomyelitis and consult infectious disease to help with antibiotic management.  Monitor closely in the intensive care unit.  Pain control.  Control blood pressure and glucose.  Mechanical DVT prophylaxis.  His oral AC has been on hold since recent surgery.    CCT: 35 min    Marty Paiz MD  [facility], [facility]  Pulmonary and Critical Care Medicine    4/1/2019  7:05 AM

## 2019-04-01 NOTE — ED PROVIDER NOTES
EMERGENCY DEPARTMENT ENCOUNTER    CHIEF COMPLAINT  Chief Complaint: neck and head pain  History given by: Pt and pt's wife  History limited by: nothing  Room Number: N336/1  PMD: Trey Stockton MD      HPI:  Pt is a 79 y.o. male who presents complaining of constant neck and head pain after a fall at rehab today. Pt admits to L hip pain from working hard at rehab today. Pt also admits to R sided abd pain. Pt's wife reports the pt was walking to the bathroom while at the rehab and fell hitting his head on a chair. Pt's wife notes the pt did not have LOC upon hitting his head. Pt's wife states the pt was admitted to the rehab today after a back fusion. Pt's wife notes the pt has been off of anticoagulants for 5 days due to the surgery. Pt's reports recent dx of osteoporosis.     Duration:  A few hours  Onset: sudden  Timing: constant  Location: neck and head  Radiation: none  Quality: pain  Intensity/Severity: mild  Progression: unchnaged  Associated Symptoms: L hip pain, R sided abd pain  Aggravating Factors: none  Alleviating Factors: none  Previous Episodes: none  Treatment before arrival: Pt's wife states the pt was admitted to the rehab today after a back fusion. Pt's wife notes the pt has been off of anticoagulants for 5 days due to the surgery. Pt's reports recent dx of osteoporosis.     PAST MEDICAL HISTORY  Active Ambulatory Problems     Diagnosis Date Noted   • Cholelithiasis 02/02/2016   • DM (HgbA1c 5.9) 02/02/2016   • Diverticulosis of intestine 02/02/2016   • Hypercholesterolemia 02/02/2016   • Hypertension 02/02/2016   • Spinal stenosis 02/02/2016   • Microalbuminuria 02/02/2016   • Atrial fibrillation (CMS/HCC) 02/02/2016   • Medicare annual wellness visit, subsequent 06/06/2016   • Anemia 06/07/2016   • Iron deficiency anemia 07/11/2016   • DJD (degenerative joint disease) 10/06/2016   • Impotence of organic origin 10/06/2016   • Glaucoma 10/06/2016   • Hiatal hernia 10/06/2016   • Mild  cognitive disorder 10/06/2016   • Calculus of kidney 10/06/2016   • Nocturia 10/06/2016   • Chronic bilateral low back pain with bilateral sciatica 12/16/2016   • Post laminectomy syndrome 12/16/2016   • Routine health maintenance 02/09/2017   • Weight loss 03/06/2017   • Spinal stenosis of lumbar region with neurogenic claudication 12/15/2017   • Spondylolisthesis of lumbar region 12/15/2017   • Lumbar spine pain 01/29/2018   • Metabolic encephalopathy 01/30/2018   • Urinary retention with incomplete bladder emptying 01/31/2018   • Macrocytosis without anemia 01/31/2018   • Sundowning 02/01/2018   • 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   • Leg swelling 12/19/2018   • Postural kyphosis of lumbar region 12/28/2018   • H/O urinary retention 01/09/2019   • Chronic anticoagulation-for Afib 01/09/2019     Resolved Ambulatory Problems     Diagnosis Date Noted   • Cough 02/02/2016   • Acute URI 03/06/2017   • Atrial fibrillation, chronic (aka ATRIAL FIBRILLATION, CHRONIC) 07/31/2017     Past Medical History:   Diagnosis Date   • Acute suppurative otitis media    • Acute upper respiratory infection    • Anemia    • Cholelithiasis    • Diabetes mellitus (CMS/HCC)    • Diverticulosis    • Dry eyes    • Earache    • ED (erectile dysfunction)    • Edema    • Encounter for screening for malignant neoplasm of prostate    • Esophagitis, reflux    • Glaucoma    • H/O echocardiogram 09/18/2013   • Health care maintenance    • Hiatal hernia    • History of EKG 10/06/2015   • History of Holter monitoring 09/16/2013   • Hyperlipidemia    • Hypertension    • Lumbar canal stenosis    • Microalbuminuria    • Mild cognitive impairment    • Mitral valve prolapse    • Nephrolithiasis    • Osteoarthritis of hand    • PAF (paroxysmal atrial fibrillation) (CMS/HCC)    • 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   • Spinal stenosis        PAST SURGICAL HISTORY  Past  Surgical History:   Procedure Laterality Date   • AMPUTATION DIGIT Left 10/29/2016    Procedure: TENDON AND NERVE REPAIR OF  LEFT THUMB;  Surgeon: Zak Hanson MD;  Location: [facility] OR;  Service:    • ARTHRODESIS  10/28/2013    Spinal / Description: Repair spinal stenosis   • BLEPHAROPLASTY  2000, 05/2002   • CATARACT EXTRACTION     • EYE SURGERY  2011    EYE MUSCLE SX   • HERNIA REPAIR Right 03/2010    inguinal   • LUMBAR DISCECTOMY FUSION INSTRUMENTATION N/A 1/29/2018    Procedure: L2 to L5 fusion with instrumentation and removal of implants L4 5;  Surgeon: Barron Knight MD;  Location: [facility] OR;  Service:    • LUMBAR DISCECTOMY FUSION INSTRUMENTATION N/A 1/9/2019    Procedure: T 11 to L3, L5-S1 fusion with T11 to S1 instrumentation, L4 pedicle subtraction osteotomy, Right L5-S1 interbody fusion with cage;  Surgeon: Barron Knight MD;  Location: [facility] OR;  Service: Orthopedic Spine   • LUMBAR FUSION  2018    Lumbar vertebral fusion   • LUMBAR LAMINECTOMY      10.28.13  bilat L4/5 lami with Nyla and Belen   • LUMBAR LAMINECTOMY DISCECTOMY DECOMPRESSION N/A 1/29/2018    Procedure: L2 to L4 laminectomy with a fusion by orthopedics;  Surgeon: Jeison Redmond MD;  Location: [facility] OR;  Service:    • LUMBAR LAMINECTOMY DISCECTOMY DECOMPRESSION N/A 1/9/2019    Procedure: L4-L5, L5-S1 LAMINECTOMY WITH NEURAL LYSIS;  Surgeon: Jeison Redmond MD;  Location: [facility]  Service: Neurosurgery   • OTHER SURGICAL HISTORY  2008    Lithotomy   • TONSILLECTOMY     • VASECTOMY  09/2008       FAMILY HISTORY  Family History   Problem Relation Age of Onset   • Heart attack Mother         acute myocardial infarction   • Stroke Mother         hemorrhagic   • Emphysema Father    • Diabetes Other         mellitus   • Hypertension Other    • Heart disease Other    • Malig Hyperthermia Neg Hx        SOCIAL HISTORY  Social History     Socioeconomic History   • Marital status:      Spouse  name: Silvia   • Number of children: 3   • Years of education: College   • Highest education level: Not on file   Occupational History   • Occupation: Teacher      Employer: RETIRED   Tobacco Use   • Smoking status: Never Smoker   • Smokeless tobacco: Never Used   • Tobacco comment: caffeine use   Substance and Sexual Activity   • Alcohol use: No     Frequency: Never   • Drug use: No   • Sexual activity: Defer       ALLERGIES  Beta adrenergic blockers    REVIEW OF SYSTEMS  Review of Systems   Constitutional: Negative for activity change, appetite change and fever.   HENT: Negative for congestion and sore throat.    Eyes: Negative.    Respiratory: Negative for cough and shortness of breath.    Cardiovascular: Negative for chest pain and leg swelling.   Gastrointestinal: Positive for abdominal pain (R). Negative for diarrhea and vomiting.   Endocrine: Negative.    Genitourinary: Negative for decreased urine volume and dysuria.   Musculoskeletal: Positive for arthralgias (R hip) and neck pain.        Head pain s/t fall   Skin: Negative for rash and wound.   Allergic/Immunologic: Negative.    Neurological: Negative for weakness, numbness and headaches.   Hematological: Negative.    Psychiatric/Behavioral: Negative.    All other systems reviewed and are negative.      PHYSICAL EXAM  ED Triage Vitals [03/31/19 1943]   Temp Heart Rate Resp BP SpO2   100.1 °F (37.8 °C) 74 16 153/80 95 %      Temp src Heart Rate Source Patient Position BP Location FiO2 (%)   Tympanic -- -- -- --       Physical Exam   Constitutional: He is oriented to person, place, and time. No distress.   HENT:   Head: Normocephalic and atraumatic.   Eyes: EOM are normal. Pupils are equal, round, and reactive to light.   Neck: Normal range of motion. Neck supple.   Cardiovascular: Normal rate, regular rhythm and normal heart sounds.   Pulmonary/Chest: Effort normal and breath sounds normal. No respiratory distress.   Abdominal: Soft. There is no tenderness.  There is no rebound and no guarding.   Musculoskeletal: Normal range of motion. He exhibits no edema.        Left hip: He exhibits tenderness (mild with internal and external rotation).        Cervical back: He exhibits bony tenderness (C5-C6). He exhibits no deformity.   C collar in place. Incision running from the C-spine to lumbar spine is closed and well-healed with sutures and steri-strips in place. Mild surrounded erythema to the incision site There is no dehiscence or discharge noted.   Neurological: He is alert and oriented to person, place, and time. He has normal sensation and normal strength.   Pt is NVI   Skin: Skin is warm and dry.   Hematoma to the L pareital aspect of the scalp   Psychiatric: Mood and affect normal.   Nursing note and vitals reviewed.      LAB RESULTS  Lab Results (last 24 hours)     Procedure Component Value Units Date/Time    Protime-INR [[id number]]  (Normal) Collected:  03/31/19 2135    Specimen:  Blood from Arm, Right Updated:  03/31/19 2215     Protime 13.6 Seconds      INR 1.07    CBC & Differential [[id number]] Collected:  03/31/19 2246    Specimen:  Blood Updated:  03/31/19 2306    Narrative:       The following orders were created for panel order CBC & Differential.  Procedure                               Abnormality         Status                     ---------                               -----------         ------                     CBC Auto Differential[[id number]]        Abnormal            Final result                 Please view results for these tests on the individual orders.    Comprehensive Metabolic Panel [[id number]]  (Abnormal) Collected:  03/31/19 2246    Specimen:  Blood Updated:  03/31/19 2325     Glucose 170 mg/dL      BUN 32 mg/dL      Creatinine 0.77 mg/dL      Sodium 142 mmol/L      Potassium 3.8 mmol/L      Chloride 103 mmol/L      CO2 26.4 mmol/L      Calcium 8.4 mg/dL      Total Protein 6.1 g/dL      Albumin 3.00 g/dL      ALT (SGPT) 21 U/L      AST  "(SGOT) 41 U/L      Alkaline Phosphatase 93 U/L      Total Bilirubin 0.3 mg/dL      eGFR Non African Amer 98 mL/min/1.73      Globulin 3.1 gm/dL      A/G Ratio 1.0 g/dL      BUN/Creatinine Ratio 41.6     Anion Gap 12.6 mmol/L     Narrative:       GFR Normal >60  Chronic Kidney Disease <60  Kidney Failure <15    Lactic Acid, Plasma [[id number]]  (Normal) Collected:  03/31/19 2246    Specimen:  Blood Updated:  04/01/19 0113     Lactate 1.7 mmol/L     Procalcitonin [[id number]]  (Abnormal) Collected:  03/31/19 2246    Specimen:  Blood Updated:  03/31/19 2337     Procalcitonin 0.56 ng/mL     Narrative:       As a Marker for Sepsis (Non-Neonates):   1. <0.5 ng/mL represents a low risk of severe sepsis and/or septic shock.  1. >2 ng/mL represents a high risk of severe sepsis and/or septic shock.    As a Marker for Lower Respiratory Tract Infections that require antibiotic therapy:  PCT on Admission     Antibiotic Therapy             6-12 Hrs later  > 0.5                Strongly Recommended            >0.25 - <0.5         Recommended  0.1 - 0.25           Discouraged                   Remeasure/reassess PCT  <0.1                 Strongly Discouraged          Remeasure/reassess PCT      As 28 day mortality risk marker: \"Change in Procalcitonin Result\" (> 80 % or <=80 %) if Day 0 (or Day 1) and Day 4 values are available. Refer to http://www.[employer]-pct-calculator.com/   Change in PCT <=80 %   A decrease of PCT levels below or equal to 80 % defines a positive change in PCT test result representing a higher risk for 28-day all-cause mortality of patients diagnosed with severe sepsis or septic shock.  Change in PCT > 80 %   A decrease of PCT levels of more than 80 % defines a negative change in PCT result representing a lower risk for 28-day all-cause mortality of patients diagnosed with severe sepsis or septic shock.                CBC Auto Differential [[id number]]  (Abnormal) Collected:  03/31/19 2246    Specimen:  Blood " Updated:  03/31/19 2306     WBC 6.74 10*3/mm3      RBC 3.64 10*6/mm3      Hemoglobin 10.7 g/dL      Hematocrit 34.5 %      MCV 94.8 fL      MCH 29.4 pg      MCHC 31.0 g/dL      RDW 16.8 %      RDW-SD 58.8 fl      MPV 10.0 fL      Platelets 229 10*3/mm3      Neutrophil % 80.4 %      Lymphocyte % 9.3 %      Monocyte % 7.4 %      Eosinophil % 1.3 %      Basophil % 0.3 %      Immature Grans % 1.3 %      Neutrophils, Absolute 5.41 10*3/mm3      Lymphocytes, Absolute 0.63 10*3/mm3      Monocytes, Absolute 0.50 10*3/mm3      Eosinophils, Absolute 0.09 10*3/mm3      Basophils, Absolute 0.02 10*3/mm3      Immature Grans, Absolute 0.09 10*3/mm3      nRBC 0.0 /100 WBC     C-reactive Protein [[id number]]  (Abnormal) Collected:  03/31/19 2246    Specimen:  Blood Updated:  04/01/19 0210     C-Reactive Protein 15.42 mg/dL     Urinalysis With Microscopic If Indicated (No Culture) - Urine, Clean Catch [[id number]]  (Abnormal) Collected:  03/31/19 2327    Specimen:  Urine, Clean Catch Updated:  03/31/19 2342     Color, UA Yellow     Appearance, UA Clear     pH, UA <=5.0     Specific Gravity, UA 1.020     Glucose,  mg/dL (Trace)     Ketones, UA Negative     Bilirubin, UA Negative     Blood, UA Negative     Protein, UA Negative     Leuk Esterase, UA Negative     Nitrite, UA Negative     Urobilinogen, UA 1.0 E.U./dL    Narrative:       Urine microscopic not indicated.    Respiratory Panel, PCR - Swab, Nasopharynx [[id number]]  (Normal) Collected:  03/31/19 2331    Specimen:  Swab from Nasopharynx Updated:  04/01/19 0043     ADENOVIRUS, PCR Not Detected     Coronavirus 229E Not Detected     Coronavirus HKU1 Not Detected     Coronavirus NL63 Not Detected     Coronavirus OC43 Not Detected     Human Metapneumovirus Not Detected     Human Rhinovirus/Enterovirus Not Detected     Influenza B PCR Not Detected     Parainfluenza Virus 1 Not Detected     Parainfluenza Virus 2 Not Detected     Parainfluenza Virus 3 Not Detected     Parainfluenza  Virus 4 Not Detected     Bordetella pertussis pcr Not Detected     Influenza A H1 2009 PCR Not Detected     Chlamydophila pneumoniae PCR Not Detected     Mycoplasma pneumo by PCR Not Detected     Influenza A PCR Not Detected     Influenza A H3 Not Detected     Influenza A H1 Not Detected     RSV, PCR Not Detected     Bordetella parapertussis PCR Not Detected    Blood Culture - Blood, Arm, Right [[id number]] Collected:  04/01/19 0017    Specimen:  Blood from Arm, Right Updated:  04/01/19 0031    Blood Culture - Blood, Arm, Left [[id number]] Collected:  04/01/19 0218    Specimen:  Blood from Arm, Left Updated:  04/01/19 0226    Sedimentation Rate [[id number]]  (Abnormal) Collected:  04/01/19 0220    Specimen:  Blood Updated:  04/01/19 0317     Sed Rate 55 mm/hr     Blood Gas, Arterial [[id number]]  (Abnormal) Collected:  04/01/19 0258    Specimen:  Arterial Blood Updated:  04/01/19 0301     Site Arterial: right radial     Vamsi's Test Positive     pH, Arterial 7.456 pH units      pCO2, Arterial 38.6 mm Hg      pO2, Arterial 181.0 mm Hg      HCO3, Arterial 27.2 mmol/L      Base Excess, Arterial 3.1 mmol/L      O2 Saturation Calculated 99.7 %      Barometric Pressure for Blood Gas 758.7 mmHg      Modality HFNC     Flow Rate 10 lpm     Troponin [[id number]]  (Normal) Collected:  04/01/19 0448    Specimen:  Blood Updated:  04/01/19 0526     Troponin T <0.010 ng/mL     Narrative:       Troponin T Reference Range:  <= 0.03 ng/mL-   Negative for AMI  >0.03 ng/mL-     Abnormal for myocardial necrosis.  Clinicians would have to utilize clinical acumen, EKG, Troponin and serial changes to determine if it is an Acute Myocardial Infarction or myocardial injury due to an underlying chronic condition.           I ordered the above labs and reviewed the results    RADIOLOGY  CT Angiogram Chest With Contrast   Final Result   1. No evidence of pulmonary embolism       This report was finalized on 4/1/2019 3:46 AM by Tod Turner M.D.           CT Chest Without Contrast   Final Result   1. Left greater than right pleural effusions with dependent densities in   both lungs as discussed. Atelectasis is favored although a component of   superimposed pneumonia cannot be excluded.   2. Moderate to large pericardial effusion.   3. Uncomplicated cholelithiasis.   4. Postsurgical changes in the thoracolumbar spine as discussed. Osseous   findings of T3 and T11 most consistent with osteomyelitis with a   probable area of cellulitis surrounding the T3 spinous process                           .        This report was finalized on 4/1/2019 2:43 AM by Tod Turner M.D.          XR Chest 2 View   Final Result   Left greater than right basilar predominant opacities,   likely airspace disease and layering effusions. Recommend follow-up to   ensure resolution       This report was finalized on 3/31/2019 11:06 PM by Tod Turner M.D.          XR Spine Lumbar 4+ View   Final Result   1. There've been interval postsurgical changes in the thoracic spine   since the previous study. The lumbar fusion hardware appears stable as   do chronic appearing lumbar vertebra deformities of L3 and L4. No acute   osseous finding or hardware complication demonstrated       This report was finalized on 3/31/2019 10:07 PM by Tod Turner M.D.          XR Hip With or Without Pelvis 2 - 3 View Left   Final Result   1. No acute osseous abnormality.       This report was finalized on 3/31/2019 10:03 PM by Tod Turner M.D.          CT Cervical Spine Without Contrast   Final Result   1. No acute cervical spine fracture.   2. Postsurgical changes in the upper thoracic spine with some soft   tissue calcifications and soft tissue air as discussed.   3. Left greater than right pleural effusions are incidentally noted       This report was finalized on 3/31/2019 9:12 PM by Tod Turner M.D.          CT Head Without Contrast   Final Result   1. No acute intracranial abnormality.    2.  Stable tiny partially calcified extra-axial mass in the left middle   cranial fossa, likely tiny meningioma                       This report was finalized on 3/31/2019 9:03 PM by Tod Turner M.D.          XR Chest 1 View    (Results Pending)        I ordered the above noted radiological studies. Interpreted by radiologist. Discussed pt's case with Dr. Turner (radiology) Reviewed by me in PACS.       PROCEDURES  Critical Care  Performed by: Marty Paiz MD  Authorized by: Marty Paiz MD     Critical care provider statement:     Critical care time (minutes):  40    Critical care time was exclusive of:  Separately billable procedures and treating other patients    Critical care was necessary to treat or prevent imminent or life-threatening deterioration of the following conditions:  Respiratory failure    Critical care was time spent personally by me on the following activities:  Blood draw for specimens, development of treatment plan with patient or surrogate, discussions with consultants, discussions with primary provider, evaluation of patient's response to treatment, examination of patient, obtaining history from patient or surrogate, ordering and performing treatments and interventions, ordering and review of laboratory studies, ordering and review of radiographic studies, pulse oximetry, re-evaluation of patient's condition and review of old charts        EKG          EKG time: 0420  Rhythm/Rate: SR 81  P waves and SC: unremarkable  QRS, axis: RBBB, LAFB  ST and T waves: nonspecific T waves changes in anterior leads   EKG limited due to artifact    Interpreted Contemporaneously by me, independently viewed and unchanged compared to prior on January 2019.      PROGRESS AND CONSULTS     2039 CT cervical spine, CT head, and XR L hip ordered for further evaluation.     2057 Dr. Knight (ED) is currently at bedside.    2131 XR lumbar spine ordered for further evaluation.    2221 Discussed pt's case  with Dr. Knight (ED) who agrees with plan of care.    2238 Labs ordered for further evaluation. Rechecked pt who is resting comfortably in NAD. D/w pt the plan for further tests due to fever. Pt and pt's wife understands and agree with plan.    2241 CXR ordered for further evaluation.     2240 Discussed pt's case who agrees with plan of care.    0044 Rechecked pt who is resting comfortably in NAD. Upon re-exam, the pt has no abd tenderness. D/w pt and family the plan to admit for further care. Pt and pt's family understand and agree with plan, all questions were answered at this time.    0055 Call placed to [facility].    0125 Received a call from Olga PRADO) ([facility]) and discussed patient's case. Shira agrees with the plan to admit to Dr. Lu ([facility]).    0130 CT Chest ordered for further evaluation of opacities to r/o PNA as cause of fever.    0228 Informed patient O2 sats have suddenly dropped from lower 90 to 70s.  Rechecked pt who is resting comfortably in NAD. Pt states he does not feel SOA. Monitored pt's O2 STATs. High flow cannula requested for the pt.    0236 Discussed pt's case with Dr. Sofia who agrees with plan for CTA.    0238 CTA ordered for further evaluation.     0250 Rechecked pt who is resting comfortably in NAD. D/w pt and pt's wife the imaging results an the plan for more tests. Pt and pt's wife understand and agree with plan.     0305 Dilaudid ordered for the pt.    0313 Rechecked pt who is resting comfortably in NAD. D/w pt and pt's wife the test results.     0402 Rechecked pt who is resting comfortably in NAD. D/w pt the imaging results. The patient is stable. BP- 159/81 HR- 80 Temp- 99.4 °F (37.4 °C) (Tympanic) O2 sat- 91%.  Pt is currently on 10 L O2.    0406 EKG ordered for further evaluation.     0414 Maxipime ordered for the pt.    0420 Vancomycin ordered for the pt.    0424 Call placed to Pulmonology.    0446 Discussed pt's case with Dr. Paiz (Pulmon) who agrees with the plan to  admit the pt for further evaluation. Dr. Paiz requests an Echo be done and cardiology consulted.    0523 Call placed to cardiology.    0529 Discussed pt's case with Dr. Daniels (cardio) who agrees to consult.    0600  Echo completed in ED.    0620 Patient sent to the ICU    MEDICAL DECISION MAKING  Results were reviewed/discussed with the patient and they were also made aware of online access. Pt also made aware that some labs, such as cultures, will not be resulted during ER visit and follow up with PMD is necessary.     MDM  Number of Diagnoses or Management Options  Acute low back pain without sciatica, unspecified back pain laterality (1 week post op):   Fall, initial encounter:   Fever and chills:   Generalized weakness:   Hypoxia:   Injury of head, initial encounter:   Osteomyelitis, unspecified site, unspecified type (CMS/HCC) T spine:      Amount and/or Complexity of Data Reviewed  Clinical lab tests: ordered and reviewed (Procal: 0.56  C-Reactive Protein: 15.42  PO2: 181.0  Sed Rate: 55)  Tests in the radiology section of CPT®: ordered and reviewed (CT head: No acute intracranial abnormality. Stable tiny partially calcified extra-axial mass in the left middle  cranial fossa, likely tiny meningioma  CT cervical spine: No acute cervical spine fracture. Postsurgical changes in the upper thoracic spine with some soft tissue calcifications and soft tissue air as discussed. Left greater than right pleural effusions are incidentally noted  XR L hip: negative acute  XR lumbar spine: There've been interval postsurgical changes in the thoracic spine since the previous study. The lumbar fusion hardware appears stable as do chronic appearing lumbar vertebra deformities of L3 and L4. No acute osseous finding or hardware complication demonstrated  CXR: Left greater than right basilar predominant opacities, likely airspace disease and layering effusions.  CT Chest: Left greater than right pleural effusions with  dependent densities in both lungs as discussed. Atelectasis is favored although a component of superimposed pneumonia cannot be excluded. Moderate to large pericardial effusion. Uncomplicated cholelithiasis. Postsurgical changes in the thoracolumbar spine as discussed. Osseous findings of T3 and T11 most consistent with osteomyelitis with a probable area of cellulitis surrounding the T3 spinous process.  CTA: No evidence of pulmonary embolism)  Tests in the medicine section of CPT®: ordered and reviewed (See note)  Discussion of test results with the performing providers: yes (Dr. Turner )  Discuss the patient with other providers: yes (Dr. Sofia (ED)  Dr. Knight (ED)  Olga Silva (APRN)  Dr. Paiz (Pulmon))           DIAGNOSIS  Final diagnoses:   Fever and chills   Generalized weakness   Injury of head, initial encounter   Acute low back pain without sciatica, unspecified back pain laterality (1 week post op)   Osteomyelitis, unspecified site, unspecified type (CMS/HCC) T spine   Hypoxia   Pericardial effusion       DISPOSITION  ADMISSION    Discussed treatment plan and reason for admission with pt/family and admitting physician.  Pt/family voiced understanding of the plan for admission for further testing/treatment as needed.         Latest Documented Vital Signs:  As of 7:33 AM  BP- 156/71 HR- 74 Temp- 98.8 °F (37.1 °C) (Oral) O2 sat- 94%    --  Documentation assistance provided by yina Lane for ELLIOTT Venegas.  Information recorded by the scrsamia was done at my direction and has been verified and validated by me.       Estefania Lane  04/01/19 0543       Jamie Tinoco III, PA  04/01/19 3883

## 2019-04-01 NOTE — CONSULTS
Referring Provider: Marty Paiz MD  [address] [address]  Reason for Consultation: Fever     Subjective   History of present illness: This is a 79-year-old male with type 2 diabetes, atrial fibrillation, hypertension, lumbar spine stenosis requiring multiple surgeries who was admitted on March 31 with  The patient has been dealing with lumbar spine stenosis and severe back pain and leg pain for quite some time.  In January 2019 he underwent T11 through L3, L5 through S1 fusion with T11 through S1 instrumentation, L4 pedicle osteotomy and right L5 through S1 interbody fusion with cage. Unfortunately the patient developed a T11 fracture.  He underwent posterior spinal fusion T4 through T12, pedicle screw instrumentation T4 through T12 and local autograft bone on March 25, 2019.  He was discharged to rehab.  The day of admission he fell hitting the back of his head.  While in the ER the patient developed a low-grade fever of 100.7.  He also became acutely hypoxic and is currently requiring high flow oxygen.  He continues to report back and chest pain which is long-standing for him.  He denies any rhinorrhea cough sore throat.  He denies any abdominal pain nausea vomiting or diarrhea.  No dysuria or increasing urinary frequency.  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   Lumbar spine stenosis status post multiple surgical intervention    Past Surgical History:   Procedure Laterality Date   • AMPUTATION DIGIT Left 10/29/2016    Procedure: TENDON AND NERVE REPAIR OF  LEFT THUMB;  Surgeon: Zak Hanson,  MD;  Location: [facility] OR;  Service:    • ARTHRODESIS  10/28/2013    Spinal / Description: Repair spinal stenosis   • BLEPHAROPLASTY  2000, 05/2002   • CATARACT EXTRACTION     • EYE SURGERY  2011    EYE MUSCLE SX   • HERNIA REPAIR Right 03/2010    inguinal   • LUMBAR DISCECTOMY FUSION INSTRUMENTATION N/A 1/29/2018    Procedure: L2 to L5 fusion with instrumentation and removal of implants L4 5;  Surgeon: Barron Knight MD;  Location: [facility] OR;  Service:    • LUMBAR DISCECTOMY FUSION INSTRUMENTATION N/A 1/9/2019    Procedure: T 11 to L3, L5-S1 fusion with T11 to S1 instrumentation, L4 pedicle subtraction osteotomy, Right L5-S1 interbody fusion with cage;  Surgeon: Barron Knight MD;  Location: [facility];  Service: Orthopedic Spine   • LUMBAR FUSION  2018    Lumbar vertebral fusion   • LUMBAR LAMINECTOMY      10.28.13  bilat L4/5 lami with Nyla and Belen   • LUMBAR LAMINECTOMY DISCECTOMY DECOMPRESSION N/A 1/29/2018    Procedure: L2 to L4 laminectomy with a fusion by orthopedics;  Surgeon: Jeison Redmond MD;  Location: [facility];  Service:    • LUMBAR LAMINECTOMY DISCECTOMY DECOMPRESSION N/A 1/9/2019    Procedure: L4-L5, L5-S1 LAMINECTOMY WITH NEURAL LYSIS;  Surgeon: Jeison Redmond MD;  Location: [facility];  Service: Neurosurgery   • OTHER SURGICAL HISTORY  2008    Lithotomy   • TONSILLECTOMY     • VASECTOMY  09/2008        reports that he has never smoked. He has never used smokeless tobacco. He reports that he does not drink alcohol or use drugs.    family history includes Diabetes in his other; Emphysema in his father; Heart attack in his mother; Heart disease in his other; Hypertension in his other; Stroke in his mother.    Allergies   Allergen Reactions   • Beta Adrenergic Blockers Other (See Comments)     Bradycardia       Medication:  Antibiotics:  Vancomycin 1500 mg IV every 12 hours  Cefepime 2 g IV every 12 hours    Please refer to the medical record for a full medication  list    Review of Systems  Pertinent items are noted in HPI, all other systems reviewed and negative    Objective   Vital Signs   Temp:  [98.8 °F (37.1 °C)-100.7 °F (38.2 °C)] 98.8 °F (37.1 °C)  Heart Rate:  [74-87] 74  Resp:  [14-16] 14  BP: (142-186)/(63-96) 156/71  100% 6L HF     Physical Exam:   General: In no acute distress  HEENT: Normocephalic, atraumatic, PERRL, EOMI, no scleral icterus. Oropharynx is clear and moist  Neck: Supple, trachea is midline  Cardiovascular: Normal rate, regular rhythm, kilo S1 and S2, no murmurs, rubs, or gallops    Respiratory: Decreased sounds at the lung bases, occasional crackles no wheezing   GI: Abdomen is soft, non-tender, non-distended, positive bowel sounds bilaterally, no masses  Musculoskeletal: Normal range of motion, no edema, tenderness or deformity  Skin: No rashes or lesions, surgical incision on back without any purulence or erythema  Extremities: no C/C, + 3 LE pitting edema   Neurological: Alert and oriented, moving all 4 extremities  Psychiatric: Normal mood and affect     Results Review:   I reviewed the patient's new clinical results.  I reviewed the patient's new imaging results and agree with the interpretation.    Lab Results   Component Value Date    WBC 6.74 03/31/2019    HGB 10.7 (L) 03/31/2019    HCT 34.5 (L) 03/31/2019    MCV 94.8 03/31/2019     03/31/2019       Lab Results   Component Value Date    GLUCOSE 170 (H) 03/31/2019    BUN 32 (H) 03/31/2019    CREATININE 0.77 03/31/2019    EGFRIFNONA 98 03/31/2019    EGFRIFAFRI 121 06/11/2018    BCR 41.6 (H) 03/31/2019    CO2 26.4 03/31/2019    CALCIUM 8.4 (L) 03/31/2019    PROTENTOTREF 6.1 02/27/2019    ALBUMIN 3.00 (L) 03/31/2019    LABIL2 1.1 02/27/2019    AST 41 (H) 03/31/2019    ALT 21 03/31/2019       Microbiology:  4/1 BCx P x 2  3/31 RVP neg    Radiology:  CT of the cervical spine with no fracture.  Postsurgical changes in the upper thoracic spine.  Left greater than right pleural  effusions.    CT head no acute findings    X-ray of the left hip without abnormalities    X-ray of the lumbar spine with postsurgical changes.  No acute findings    4/1 CT of the chest with bilateral pleural effusions left greater than right, densities in the lower lung fields.  Moderate to large pericardial effusion.  Postsurgical changes in the thoracolumbar spine.  Bony changes at T3 and T11 most consistent with osteomyelitis.  No evidence of PE    Assessment/Plan   1.  Low-grade temperature  -No symptoms of a respiratory GI or  infection  -Surgical incision healing well without any signs of wound infection  -Doubt osteomyelitis on CAT scan  - Blood cultures are pending x2 will follow up  -Given elevated pro-calcitonin okay to continue empiric antibiotics for now but if blood cultures remain negative and there is no signs of infection will discontinue in the next day or 2  -Repeat procalcitonin in the morning  -Obtain lower extremity Dopplers to rule out DVT as a cause of fever     2.  Acute hypoxic respiratory failure   -No etiology per chest CT  -He is being weaned off oxygen rapidly    3.  CT of the chest concerning with thoracic spine osteomyelitis per radiology  -We will ask neurosurgery opinion, there has not been any mention of  osteomyelitis on prior scans including those obtained at the end of March at [place].  He has had extensive instrumentation in that area and I am wondering if these are simply postsurgical changes    I discussed the patients findings and my recommendations with patient and nursing staff

## 2019-04-01 NOTE — CONSULTS
Patient Name: Derrek Estrada  Age/Sex: 79 y.o. male  : 1940  MRN: [id number]    Date of Admission: 3/31/2019  Date of Encounter Visit: 19  Encounter Provider: Ezio Luis MD  Place of Service: [facility] CARDIOLOGY      Referring Provider: Marty Paiz MD  Patient Care Team:  Trey Stockton MD as PCP - General (Internal Medicine)  Sherice Newman MD as Consulting Physician (Cardiology)  Carey Pascual MARY as Pharmacist  Geovanni Herron MD as Consulting Physician (Hematology and Oncology)  Sakina Martin APRN as Referring Physician (Cardiology)    Subjective:   Admitted/Consulted for: Chronic Anticoagulation for atrial fibrillation    Chief Complaint: pericardial effusion       History of Present Illness:  Derrek Estrada is a 79 y.o. male patient known to Dr. Newman with history of paroxysmal atrial fibrillation (, on Coumadin), hypertension, hyperlipidemia, right bundle branch block, left anterior fascicular block, type 2 diabetes mellitus and lower extremity edema/lymphedema.  His last echo in 2013 showed EF 55-60%, indeterminate diastolic filling pattern, borderline LA enlargement and mild posterior leaflet prolapse of mitral valve with trace mitral insufficiency. Follow up stress test 10/1/13 showed no evidence of ischemia at that time.    He was admitted to our facility 19 and underwent spinal fusion with Dr. Redmond/Dr. Knight. Postoperatively, he went back into rapid atrial fibrillation, was asymptomatifc and eventually converted back to NSR on his own. He was not started on a beta blocker as he has history of bradycardia. He was discharged to rehab 1/15/19 on lasix, coumadin and enalapril and was anemic at that time with hemoglobin 8.5. He was last seen by Sakina Martin in office 19. He had maintained NSR, appeared pale and had recommended follow up CBC with INR checks at the [facility]  "Anticoagulation Clinic. His hemoglobin was 9.3.     He recently underwent repeat spinal surgery at the [facility] 3/25/19. His wife reports he has been off anticoagulation since 3/20/19 and has been at rehab since Friday. He had witnessed fall yesterday 3/31/19 at rehab and was brought to the ED. He reports he hit his head but denies any loss of consciousness and states his \"legs gave out\" causing fall. CT head was negative. He complained of neck and head pain, right sided abdominal pain and admits to left hip pain related to therapy at rehab. CXR showed left greater than right basilar predominant opacities.    Around 2:30am, patient was noted to have decline in oxygen saturations to the 70's and was placed on 11LNC high flow. CT chest showed no evidence of pulmonary embolism and a left greater than right moderate to large pleural effusions with dependent densities in both lungs. Follow up STAT echo showed EF 54% , grade I diastolic dysfunction, normal LV function, trivial pericardial effusion and moderate size left pleural effusion. ED EKG was noted. He denied any shortness of breath or chest pain. He had low grade temperature of 99.4 in ED and started on 2g IV Cefepime and Vancomycin. He received 0.5mg IV dilaudid and Zofran for pain. Troponin x1 was negative. Procal 0.56, hemoglobin 10.7, INR 1.07. He has maintained NSR and is currently 100% on 6LNC high flow. He is resting and appears in no overall distress. He was admitted to the ICU for further evaluation and treatment. We have been consulted for his pericardial effusion.   The pericardial effusion is small and probably not accounting for pleural effusions    Previous testing:     Stress Test 10/1/13  No evidence of ischemia    Echo 9/18/13  EF 55-60%, indeterminate diastolic filling pattern, borderline LA enlargement, mild posterior leaflet prolapse of mitral valve with trace mitral insufficiency      Past Medical History:  Past Medical History: "   Diagnosis Date   • Acute suppurative otitis media    • Acute upper respiratory infection    • Anemia    • Cholelithiasis    • Diabetes mellitus (CMS/HCC)     TYPE 2   • Diverticulosis    • Dry eyes    • Earache    • ED (erectile dysfunction)    • Edema    • Encounter for screening for malignant neoplasm of prostate    • Esophagitis, reflux    • Glaucoma    • H/O echocardiogram 09/18/2013   • Health care maintenance    • Hiatal hernia    • History of EKG 10/06/2015   • History of Holter monitoring 09/16/2013   • Hyperlipidemia    • Hypertension    • Lumbar canal stenosis    • Microalbuminuria    • Mild cognitive impairment    • Mitral valve prolapse    • Nephrolithiasis    • Osteoarthritis of hand     of thumb   • PAF (paroxysmal atrial fibrillation) (CMS/HCC)    • 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   • Spinal stenosis        Past Surgical History:   Procedure Laterality Date   • AMPUTATION DIGIT Left 10/29/2016    Procedure: TENDON AND NERVE REPAIR OF  LEFT THUMB;  Surgeon: Zak Hanson MD;  Location: [facility];  Service:    • ARTHRODESIS  10/28/2013    Spinal / Description: Repair spinal stenosis   • BLEPHAROPLASTY  2000, 05/2002   • CATARACT EXTRACTION     • EYE SURGERY  2011    EYE MUSCLE SX   • HERNIA REPAIR Right 03/2010    inguinal   • LUMBAR DISCECTOMY FUSION INSTRUMENTATION N/A 1/29/2018    Procedure: L2 to L5 fusion with instrumentation and removal of implants L4 5;  Surgeon: Barron Kinght MD;  Location: [facility];  Service:    • LUMBAR DISCECTOMY FUSION INSTRUMENTATION N/A 1/9/2019    Procedure: T 11 to L3, L5-S1 fusion with T11 to S1 instrumentation, L4 pedicle subtraction osteotomy, Right L5-S1 interbody fusion with cage;  Surgeon: Barron Knight MD;  Location: [facility];  Service: Orthopedic Spine   • LUMBAR FUSION  2018    Lumbar vertebral fusion   • LUMBAR LAMINECTOMY      10.28.13  bilat L4/5 lami with Nasim   •  LUMBAR LAMINECTOMY DISCECTOMY DECOMPRESSION N/A 1/29/2018    Procedure: L2 to L4 laminectomy with a fusion by orthopedics;  Surgeon: Jeison Redmond MD;  Location: [facility];  Service:    • LUMBAR LAMINECTOMY DISCECTOMY DECOMPRESSION N/A 1/9/2019    Procedure: L4-L5, L5-S1 LAMINECTOMY WITH NEURAL LYSIS;  Surgeon: Jeison Redmond MD;  Location: [facility];  Service: Neurosurgery   • OTHER SURGICAL HISTORY  2008    Lithotomy   • TONSILLECTOMY     • VASECTOMY  09/2008       Home Medications:   Medications Prior to Admission   Medication Sig Dispense Refill Last Dose   • ACCU-CHEK FASTCLIX LANCETS misc USE TO TEST TWICE DAILY 102 each 0 Taking   • ACCU-CHEK SMARTVIEW test strip USE TO TEST TWICE DAILY 100 each 1 Taking   • amiodarone (PACERONE) 200 MG tablet Take 1 tablet by mouth Daily. 30 tablet 0    • Blood Glucose Monitoring Suppl (ACCU-CHEK FRANCISCO SMARTVIEW) W/DEVICE kit USE TO TEST BLOOD SUGAR TWICE DAILY 1 kit 0 Taking   • Blood Glucose Monitoring Suppl (FREESTYLE LITE) device Use to test blood once daily 1 each 0 Taking   • brimonidine (ALPHAGAN) 0.2 % ophthalmic solution Administer 1 drop to both eyes 2 (Two) Times a Day.   Taking   • calcium carbonate (TUMS) 500 MG chewable tablet Chew 1,000 mg 3 (Three) Times a Day As Needed for Indigestion or Heartburn.   Taking   • donepezil (ARICEPT) 10 MG tablet Take 10 mg by mouth Every Morning.   Taking   • dorzolamide (TRUSOPT) 2 % ophthalmic solution Administer 1 drop to both eyes 2 (two) times a day.   Taking   • enalapril (VASOTEC) 20 MG tablet Take 1 tablet by mouth 2 (Two) Times a Day. (Patient taking differently: Take 40 mg by mouth Every Morning.) 180 tablet 1 Taking   • metFORMIN (GLUCOPHAGE) 1000 MG tablet Take one am, 1/2 noon, and 1 pm (Patient taking differently: 1,000 mg. Take one am, 1/2 noon, and   One in the  pm) 225 tablet 1 Taking   • oxyCODONE-acetaminophen (PERCOCET)  MG per tablet Take 1 tablet by mouth Every 6 (Six) Hours As Needed  for Moderate Pain .   Taking   • tamsulosin (FLOMAX) 0.4 MG capsule 24 hr capsule Take 0.4 mg by mouth Every Morning. 1-2 TABS AS NEEDED    Taking   • timolol (TIMOPTIC) 0.5 % ophthalmic solution Administer 1 drop to both eyes Daily.   Taking   • esomeprazole (nexIUM) 40 MG capsule Take 1 capsule by mouth Daily. 90 capsule 1 Taking   • ferrous sulfate (IRON SUPPLEMENT) 325 (65 FE) MG tablet Take 1 tablet by mouth Daily With Breakfast.   Not Taking   • furosemide (LASIX) 20 MG tablet Take 1 tablet by mouth Daily. as needed (Patient taking differently: Take 20 mg by mouth As Needed. as needed) 90 tablet 3 Taking   • glimepiride (AMARYL) 2 MG tablet Take 1 tablet by mouth Every Morning Before Breakfast. 90 tablet 1 Taking   • glucose blood (FREESTYLE LITE) test strip Use to test blood once daily 100 each 12 Taking   • Glucose Blood disk Inject 1 Device as directed daily. TEST BLOOD SUGAR ONCE DAILY AS DIRECTED 100 each 3 Taking   • Lancets (FREESTYLE) lancets Use to test blood sugar daily 100 each 12 Taking   • latanoprost (XALATAN) 0.005 % ophthalmic solution Administer 1 drop to both eyes Every Night.   Taking   • ONE TOUCH ULTRA TEST test strip USE ONCE DAILY AS DIRECTED 100 each 3 Taking   • pioglitazone (ACTOS) 45 MG tablet Take 1 tablet by mouth Every Morning. 90 tablet 0 Taking   • sennosides-docusate sodium (SENOKOT-S) 8.6-50 MG tablet Take 1 tablet by mouth 2 (Two) Times a Day.   Taking   • simvastatin (ZOCOR) 20 MG tablet Take 1 tablet by mouth Every Night. 90 tablet 1 Taking   • warfarin (COUMADIN) 10 MG tablet Take 10 mg by mouth. Takes Tuesday Thursday  Sat and Sunday   Taking   • warfarin (COUMADIN) 5 MG tablet Take one tablet by mouth daily, or as directed by the [facility]. 30 tablet 0 Taking       Allergies:  Allergies   Allergen Reactions   • Beta Adrenergic Blockers Other (See Comments)     Bradycardia       Past Social History:  Social History     Socioeconomic History   • Marital  status:      Spouse name: Silvia   • Number of children: 3   • Years of education: College   • Highest education level: Not on file   Occupational History   • Occupation: Teacher      Employer: RETIRED   Tobacco Use   • Smoking status: Never Smoker   • Smokeless tobacco: Never Used   • Tobacco comment: caffeine use   Substance and Sexual Activity   • Alcohol use: No     Frequency: Never   • Drug use: No   • Sexual activity: Defer        Past Family History:  Family History   Problem Relation Age of Onset   • Heart attack Mother         acute myocardial infarction   • Stroke Mother         hemorrhagic   • Emphysema Father    • Diabetes Other         mellitus   • Hypertension Other    • Heart disease Other    • Malig Hyperthermia Neg Hx        Review of Systems: All systems reviewed. Pertinent positives identified in HPI. All other systems are negative.     REVIEW OF SYSTEMS:   CONSTITUTIONAL: No weight loss, fever, chills, weakness or fatigue.   HEENT: Eyes: No visual loss, blurred vision, double vision or yellow sclerae. Ears, Nose, Throat: No hearing loss, sneezing, congestion, runny nose or sore throat.   SKIN: No rash or itching.     RESPIRATORY: No shortness of breath, hemoptysis, cough or sputum.   GASTROINTESTINAL: No anorexia, nausea, vomiting or diarrhea. No abdominal pain, bright red blood per rectum or melena.  GENITOURINARY: No burning on urination, hematuria or increased frequency.  NEUROLOGICAL: No headache, dizziness, syncope, paralysis, ataxia, numbness or tingling in the extremities. No change in bowel or bladder control.   MUSCULOSKELETAL: No muscle, back pain, joint pain or stiffness.   HEMATOLOGIC: No anemia, bleeding or bruising.   LYMPHATICS: No enlarged nodes. No history of splenectomy.   PSYCHIATRIC: No history of depression, anxiety, hallucinations.   ENDOCRINOLOGIC: No reports of sweating, cold or heat intolerance. No polyuria or polydipsia.       Objective:     Objective:  Temp:  [98.8  °F (37.1 °C)-100.7 °F (38.2 °C)] 98.8 °F (37.1 °C)  Heart Rate:  [74-87] 74  Resp:  [14-16] 14  BP: (142-186)/(63-96) 156/71    Intake/Output Summary (Last 24 hours) at 4/1/2019 0956  Last data filed at 4/1/2019 0837  Gross per 24 hour   Intake 100 ml   Output 300 ml   Net -200 ml     Body mass index is 25.02 kg/m².      03/31/19 2000 04/01/19 0630 04/01/19  0638   Weight: 92.5 kg (204 lb) 92.5 kg (204 lb) 88.4 kg (194 lb 14.2 oz)           Physical Exam:   Physical Exam   Constitutional: He is oriented to person, place, and time. He appears well-developed and well-nourished.   HENT:   Head: Normocephalic.   Eyes: Pupils are equal, round, and reactive to light.   Neck: Normal range of motion. No JVD present. Carotid bruit is not present. No thyromegaly present.   Cardiovascular: Normal rate, regular rhythm, S1 normal, S2 normal, normal heart sounds and intact distal pulses. Exam reveals no gallop and no friction rub.   No murmur heard.  Pulmonary/Chest: Effort normal and breath sounds normal.   Abdominal: Soft. Bowel sounds are normal.   Musculoskeletal: He exhibits edema.   Neurological: He is alert and oriented to person, place, and time.   Skin: Skin is warm, dry and intact. No erythema.   Psychiatric: He has a normal mood and affect.   Vitals reviewed.        Lab Review:     Results from last 7 days   Lab Units 03/31/19  2246   SODIUM mmol/L 142   POTASSIUM mmol/L 3.8   CHLORIDE mmol/L 103   CO2 mmol/L 26.4   BUN mg/dL 32*   CREATININE mg/dL 0.77   GLUCOSE mg/dL 170*   CALCIUM mg/dL 8.4*       Results from last 7 days   Lab Units 04/01/19  0448   TROPONIN T ng/mL <0.010     Results from last 7 days   Lab Units 03/31/19  2246   WBC 10*3/mm3 6.74   HEMOGLOBIN g/dL 10.7*   HEMATOCRIT % 34.5*   PLATELETS 10*3/mm3 229     Results from last 7 days   Lab Units 03/31/19  2135   INR  1.07                               Imaging:    Imaging Results (most recent)     Procedure Component Value Units Date/Time    CT  Angiogram Chest With Contrast [[id number]] Collected:  04/01/19 0344     Updated:  04/01/19 0349    Narrative:       CT ANGIOGRAM THORAX WITH CONTRAST, PULMONARY EMBOLISM PROTOCOL     HISTORY: Pulmonary embolism.     COMPARISON: 1:56 AM.     TECHNIQUE: Radiation dose reduction techniques were utilized, including  automated exposure control and exposure modulation based on body size.  Axial contrast-enhanced images of the chest were obtained according to  the pulmonary embolism protocol. Coronal oblique 3-D MIP reformatted  images were supplemented and reviewed.  100 mls of non ionic contrast  was utilized intravenously.     FINDINGS CHEST CT: No filling defects are identified in the pulmonary  arteries to suggest pulmonary embolism. Previously described effusions,  pulmonary parenchymal, and osseous findings are unchanged in the short  interim. Uncomplicated cholelithiasis incidentally noted also.             Impression:       1. No evidence of pulmonary embolism     This report was finalized on 4/1/2019 3:46 AM by Tod Turner M.D.       CT Chest Without Contrast [[id number]] Collected:  04/01/19 0222     Updated:  04/01/19 0247    Narrative:       THORACIC CT SCAN WITHOUT CONTRAST     HISTORY: Further eval for PNA; R50.9-Fever, unspecified; R53.1-Weakness;  W19.XXXA-Unspecified fall, initial encounter; S09.90XA-Unspecified  injury of head, initial encounter; M54.5-Low back pain     COMPARISON: None.     TECHNIQUE:  Radiation dose reduction techniques were utilized, including  automated exposure control and exposure modulation based on body size.  Axial images of the thorax obtained without IV contrast, per request.     FINDINGS: There are bilateral freely layering pleural effusions. The  left is larger, moderate in size. There are adjacent lower lobe  consolidations with air bronchograms which are favored to be related to  areas of secondary atelectasis. There is some ill-defined airspace  disease posteriorly in  the left upper lobe as well, images 14-36 which  may be related to atelectasis as well but pneumonia cannot be excluded.     Borderline cardiomegaly with moderate to large pericardial effusion  present. Aorta is nonaneurysmal. No obvious adenopathy by noncontrast  technique.  Unenhanced images of the included upper abdomen shows  several small gallstones dependently in a nondistended gallbladder.     Bone window images show extensive postsurgical changes from posterior  thoracolumbar fusion. With the exception of T11, there are bilateral  pedicle screws from T4-T12 with posterior fusion rods bilaterally. This  produces metallic artifact. There is extensive lytic destruction of the  more central aspect of the T11 vertebral body with sclerotic margins  most likely related to osteomyelitis. Cortical defects in the pedicles  suggest that there were prior pedicle screws at this level which have  been removed, presumably related to the suspected osteomyelitis. There  is minimal height loss of the T11 vertebra, approximately 10%. There is  slight bulging of the posterior margin of the lower T11 vertebra into  the central canal, approximately 2-3 mm contributing to some mild canal  narrowing. The remainder of the pedicle screws appear to be intact and  well secured considering the aforementioned artifact.     As was noted on the cervical spine CT from 03/31/2019, some soft tissue  air as well as calcification/osseous densities are present in the  posterior paraspinous tissues near the upper margin of the fusion  hardware centered near midline, mostly about the T3 spinous process and  surrounding tissues. There is clearly some lytic destruction of the T3  spinous process mostly along its more dorsal aspect which is suspicious  for osteomyelitis. The air in the adjacent paraspinous tissues is likely  related to cellulitis. A definite abscess not appreciated by noncontrast  technique.     Case discussed with Dr Tinoco, by  telephone, extension [phone], during  interpretation at 2:38 AM          Impression:       1. Left greater than right pleural effusions with dependent densities in  both lungs as discussed. Atelectasis is favored although a component of  superimposed pneumonia cannot be excluded.  2. Moderate to large pericardial effusion.  3. Uncomplicated cholelithiasis.  4. Postsurgical changes in the thoracolumbar spine as discussed. Osseous  findings of T3 and T11 most consistent with osteomyelitis with a  probable area of cellulitis surrounding the T3 spinous process                    .      This report was finalized on 4/1/2019 2:43 AM by Tod Turner M.D.       XR Chest 2 View [[id number]] Collected:  03/31/19 2305     Updated:  03/31/19 2309    Narrative:       PA AND LATERAL CHEST X-RAY     HISTORY: FUO     COMPARISON: 01/31/2018.     FINDINGS: PA and lateral views of the chest were obtained. The lungs are  fairly well inflated. Somewhat hazy, groundglass opacities are present  in the lung bases with pleural thickening posteriorly, left greater than  right likely combination of airspace disease and layering effusions.  Stable cardiomegaly. Normal vascularity. Interval postsurgical changes  are noted in the thoracic spine from multilevel fusion.             Impression:       Left greater than right basilar predominant opacities,  likely airspace disease and layering effusions. Recommend follow-up to  ensure resolution     This report was finalized on 3/31/2019 11:06 PM by Tod Turner M.D.       XR Spine Lumbar 4+ View [[id number]] Collected:  03/31/19 2203     Updated:  03/31/19 2211    Narrative:       5 VIEW LUMBAR SPINE     CLINICAL HISTORY: Trauma.     COMPARISON: 01/11/2019     FINDINGS: Standard five-view lumbar spine series was performed.  This  included lateral, AP, bilateral oblique, and cone-down lateral views.     Bilateral pedicle screws are again seen from T12-L3 and L5-S1 with  bilateral fusion rods  posteriorly. An interbody spacer is present at the  lumbosacral junction, also unchanged. Additional fusion hardware has  been placed traversing a large portion of the mid and lower thoracic  spine as well. No hardware complication is evident. There is minimal  retrolisthesis at L3-4, approximately 3 mm. There is approximately 3-4  mm of anterolisthesis at L4-5. These findings appear stable. A mild to  moderate compression deformity of the L4 vertebra along its superior  endplate appears stable compared to previous. Maximum height loss is  approximately 30%. A much milder endplate deformity of L3 superiorly  appears stable as well. No acute fracture is demonstrated. Mild to  moderate multilevel degenerative disc disease changes are present and  appear similar to previous. Oblique views are unremarkable. There is  generalized osteopenia. Lumbar skin staples have been removed.             Impression:       1. There've been interval postsurgical changes in the thoracic spine  since the previous study. The lumbar fusion hardware appears stable as  do chronic appearing lumbar vertebra deformities of L3 and L4. No acute  osseous finding or hardware complication demonstrated     This report was finalized on 3/31/2019 10:07 PM by Tod Turner M.D.       XR Hip With or Without Pelvis 2 - 3 View Left [[id number]] Collected:  03/31/19 2202     Updated:  03/31/19 2208    Narrative:       THREE-VIEW LEFT HIP     HISTORY: Fall with left hip pain     FINDINGS: Three views of the left hip were submitted. There is no  fracture or dislocation. Generalized osteopenia. There are vascular  calculi. Surgical clips noted to the right of midline.             Impression:       1. No acute osseous abnormality.     This report was finalized on 3/31/2019 10:03 PM by Tod Turner M.D.       CT Cervical Spine Without Contrast [[id number]] Collected:  03/31/19 2106     Updated:  03/31/19 2115    Narrative:       NONCONTRAST CT SCAN CERVICAL  SPINE     CLINICAL HISTORY: Trauma with neck pain     COMPARISON: None.     TECHNIQUE: Radiation dose reduction techniques were utilized, including  automated exposure control and exposure modulation based on body size.  Axial noncontrast images of the cervical spine were obtained without  contrast. Sagittal reformatted images were supplemented.     FINDINGS:  No acute vertebral fracture identified on the axial series. .     There is generalized lordotic curvature of the entire cervical spine on  the sagittal reformatted images. The cervical vertebrae are well aligned  with respect to their adjacent vertebra.  No significant compression  deformity or retropulsion.     Multilevel degenerative changes are present. These are most pronounced  at C6-7. Mild multilevel facet disease is present more pronounced in the  upper and mid cervical levels. Marginal osteophytes and broad-based disc  osteophyte complexes contribute to multilevel canal and foraminal  narrowing.     Postsurgical changes are noted in the encompassed portion of the upper  thoracic spine. Some soft tissue calcifications and soft tissue air is  noted in the posterior paraspinous tissues. This is of uncertain  etiology. Infectious process not excluded.. There is partial  visualization of left greater than right pleural effusions.          Impression:       1. No acute cervical spine fracture.  2. Postsurgical changes in the upper thoracic spine with some soft  tissue calcifications and soft tissue air as discussed.  3. Left greater than right pleural effusions are incidentally noted     This report was finalized on 3/31/2019 9:12 PM by Tod Turner M.D.       CT Head Without Contrast [[id number]] Collected:  03/31/19 2059     Updated:  03/31/19 2106    Narrative:       CRANIAL CT SCAN WITHOUT CONTRAST     CLINICAL HISTORY: Trauma with ??? LOC, HA, Excessive fatigue, and neck  pain.  R/o ICH.     COMPARISON: 01/31/2018.     TECHNIQUE: Radiation dose  reduction techniques were utilized, including  automated exposure control and exposure modulation based on body size.  Multiple axial images of the head were obtained without contrast.      FINDINGS:  Stable small right basal ganglia calcifications. Generalized  atrophy. There is a small partially calcified extra-axial mass in the  left middle cranial fossa abutting the left temporal tip likely a tiny  meningioma. It measures approximately 8 mm. It is better appreciated on  previous MRI. Regardless it appears stable.. No hydrocephalus. Bone  window images are unremarkable.                Impression:       1. No acute intracranial abnormality.   2. Stable tiny partially calcified extra-axial mass in the left middle  cranial fossa, likely tiny meningioma                 This report was finalized on 3/31/2019 9:03 PM by Tod Turner M.D.             EKG 4/1/19      Baseline 1/30/19      Telemetry        I personally viewed and interpreted the patient's EKG/Telemetry data.    Assessment:   Assessment/Plan         Hypercholesterolemia    Hypertension    Iron deficiency anemia    Chronic anticoagulation-for Afib    Fever and chills    1. Acute Hypoxemic respiratory failure  2. Bilateral pleural effusions: doubt this is cardiac based.  Only trivial pericardial effusion per echo  3. Pericardial effusion: not significant  4. Atrial fib, paroxysmal:  Patient remains in NSR.  Has been on amiodarone.  Last TSH in February ok.  Will recheck.  Hold anticoagulation  5. Diabetes, Type II      Thank you for allowing me to participate in the care of Derrek Estrada. Feel free to contact me directly with any further questions or concerns.    Ezio Luis MD  [facility]  04/01/19  9:56 AM

## 2019-04-02 NOTE — PROGRESS NOTES
INFECTIOUS DISEASES PROGRESS NOTE    CC: f/u osteo?     S:   Feels okay  Back sore  No f/c/ns    O:  Physical Exam:  Temp:  [98.3 °F (36.8 °C)-99.3 °F (37.4 °C)] 98.9 °F (37.2 °C)  Heart Rate:  [62-83] 77  BP: (117-177)/(57-95) 144/78  Physical Exam   Constitutional: He appears well-developed.   Pulmonary/Chest: Effort normal.   Abdominal: Soft. He exhibits no distension. There is no tenderness.   Neurological: He is alert.   Skin: Skin is warm and dry.   Psychiatric: He has a normal mood and affect. His behavior is normal.        Diagnostics:     PCT 0.27  WBC 5.6    H/H 9.5/31      Bcx NGTD  RPP neg      Assessment/Plan   1.  Low-grade temperature  - Blood cultures are ngtd x2   -Cont empiric vanc and cefepime pending MRI results  -Repeat procalcitonin better  -Obtained lower extremity Dopplers to rule out DVT as a cause of fever but negative     2.  Acute hypoxic respiratory failure, resolved     3.  CT of the chest concerning with thoracic spine osteomyelitis per radiology  -Appreciate NSGY eval  -MRI back today      Mark Perez MD  10:22 AM  04/02/19

## 2019-04-02 NOTE — NURSING NOTE
04/02/19 1519   Wound 04/01/19 0700 Bilateral coccyx pressure injury   Date first assessed/Time first assessed: 04/01/19 0700   Present On Admission : yes;picture taken  Side: Bilateral  Location: coccyx  Type: pressure injury  Stage, Pressure Injury: Stage 1   Dressing Appearance dry;intact   Closure None   Base clean;other (see comments);pink;moist  (pale wound base)   Periwound intact;non-blanchable;redness;ecchymotic   Periwound Temperature warm   Periwound Skin Turgor soft   Wound Length (cm) 1.2 cm   Wound Width (cm) 1.2 cm   Wound Depth (cm) 0.3 cm   Drainage Characteristics/Odor serous   Drainage Amount scant   Dressing Care, Wound dressing reinforced;low-adherent;foam;other (see comments)  (Optifoam CDI)   Periwound Care, Wound dry periwound area maintained   Skin Interventions   Pressure Reduction Devices positioning supports utilized;heel offloading device utilized   Pressure Reduction Techniques heels elevated off bed;frequent weight shift encouraged;sit time limited to 2 hours;positioned off wounds     CWON consult received.  Patient with a pressure injury to coccyx/gluteal area that was present on admission. Partial thickness wound just inside upper, right buttock that measures 1.2 cm x 1.2 cm x 0.3 cm. Base is pale and moist with scant serous drainage. PI consistent with a stage 2 injury. Periwound skin is intact with areas of non-blanchable red and purple skin.  Patient did have a recent fall which most likely is r/t bruising appearance. Wound is without s/s of infection.  Recommend to continue use of Optifoam.  Discussed with pt and wife at bedside importance of proper surface to bed/chair to redistribute pressure and using Z guard for barrier protection. Patient and wife verbalized understanding and agreeable to tx plan.  Waffle cushion and Z guard left at bedside.  Accumax pump for bed ordered. Discussed with VICKY Diana.  Thank you for consult and please call with any questions.

## 2019-04-02 NOTE — PLAN OF CARE
Problem: Skin Injury Risk (Adult)  Goal: Identify Related Risk Factors and Signs and Symptoms  Outcome: Outcome(s) achieved Date Met: 04/02/19    Goal: Skin Health and Integrity  Outcome: Ongoing (interventions implemented as appropriate)      Problem: Fall Risk (Adult)  Goal: Identify Related Risk Factors and Signs and Symptoms  Outcome: Outcome(s) achieved Date Met: 04/02/19    Goal: Absence of Fall  Outcome: Ongoing (interventions implemented as appropriate)      Problem: Patient Care Overview  Goal: Plan of Care Review  Outcome: Ongoing (interventions implemented as appropriate)   04/02/19 0523   Coping/Psychosocial   Plan of Care Reviewed With patient   Plan of Care Review   Progress no change   OTHER   Outcome Summary patient remains in ccu overnight. now on room air. The only patient complaint tonight has been pain. Home meds updated per the rehab records. Per patient and that MAR, the patient was taking percocet 10/325 two tabs q4h. Otherwise, patient has remained stable overnight.        Problem: Wound (Includes Pressure Injury) (Adult)  Goal: Signs and Symptoms of Listed Potential Problems Will be Absent, Minimized or Managed (Wound)  Outcome: Ongoing (interventions implemented as appropriate)      Problem: Pain, Acute (Adult)  Goal: Identify Related Risk Factors and Signs and Symptoms  Outcome: Outcome(s) achieved Date Met: 04/02/19    Goal: Acceptable Pain Control/Comfort Level  Outcome: Ongoing (interventions implemented as appropriate)

## 2019-04-02 NOTE — PROGRESS NOTES
LOS: 1 day   Patient Care Team:  Trey Stockton MD as PCP - General (Internal Medicine)  Sherice Newman MD as Consulting Physician (Cardiology)  Carey Pascual [facility] as Pharmacist  Geovanni Herron MD as Consulting Physician (Hematology and Oncology)  Sakina Martin APRN as Referring Physician (Cardiology)    Chief Complaint:     F/u resp failure    Interval History:       He has no CP, dyspnea.  He has LE edema.  He has no tachycardia or dizziness.     He is fatigued and has back pain.     Echo 4/1/19  Interpretation Summary     · Left ventricular systolic function is normal. Calculated EF = 54.0%. Estimated EF was in agreement with the calculated EF. Normal left ventricular cavity size and wall thickness noted. All left ventricular wall segments contract normally.  · Left ventricular diastolic dysfunction is noted (grade I) consistent with impaired relaxation.  · No valvular stenosis or insufficiency.  · There is a trivial pericardial effusion.  · There is a moderate size left pleural effusion.         Objective   Vital Signs  Temp:  [98.3 °F (36.8 °C)-99.3 °F (37.4 °C)] 98.9 °F (37.2 °C)  Heart Rate:  [62-83] 77  BP: (117-177)/(57-95) 144/78    Intake/Output Summary (Last 24 hours) at 4/2/2019 0821  Last data filed at 4/2/2019 0722  Gross per 24 hour   Intake 1364 ml   Output 1225 ml   Net 139 ml       Comfortable NAD  Neck supple, no JVD or thyromegaly appreciated  S1/S2 RRR, no m/r/g  Lungs CTA B, normal effort  Abdomen S/NT/ND (+) BS, no HSM appreciated  Extremities warm, no clubbing, cyanosis, 2+ LE edema  No visible or palpable skin lesions  A/Ox4, mood and affect appropriate    Results Review:      Results from last 7 days   Lab Units 04/02/19  0429 03/31/19  2246   SODIUM mmol/L 143 142   POTASSIUM mmol/L 3.7 3.8   CHLORIDE mmol/L 106 103   CO2 mmol/L 27.1 26.4   BUN mg/dL 18 32*   CREATININE mg/dL 0.49* 0.77   GLUCOSE mg/dL 110* 170*   CALCIUM mg/dL 8.1* 8.4*     Results from last 7  days   Lab Units 04/01/19  0448   TROPONIN T ng/mL <0.010     Results from last 7 days   Lab Units 04/02/19  0429 03/31/19  2246   WBC 10*3/mm3 5.60 6.74   HEMOGLOBIN g/dL 9.5* 10.7*   HEMATOCRIT % 30.7* 34.5*   PLATELETS 10*3/mm3 253 229     Results from last 7 days   Lab Units 03/31/19  2135   INR  1.07                   I reviewed the patient's new clinical results.  I personally viewed and interpreted the patient's EKG/Telemetry data        Medication Review:     brimonidine 1 drop Both Eyes BID   cefepime 2 g Intravenous Q12H   donepezil 5 mg Oral QAM   dorzolamide 1 drop Both Eyes BID   enalapril 20 mg Oral Q12H   furosemide 20 mg Oral Daily   insulin lispro 0-14 Units Subcutaneous 4x Daily With Meals & Nightly   latanoprost 1 drop Both Eyes Nightly   pantoprazole 40 mg Oral QAM AC   tamsulosin 0.4 mg Oral QAM   timolol 1 drop Both Eyes Daily   vancomycin 1,500 mg Intravenous Q12H         Pharmacy to dose vancomycin        Assessment/Plan       Hypercholesterolemia    Hypertension    Iron deficiency anemia    Chronic anticoagulation-for Afib    Fever and chills    1. Acute Hypoxemic respiratory failure - improving.   2. Bilateral pleural effusions: doubt this is cardiac based.  Only trivial pericardial effusion per echo  3. Low albumen.   4. Atrial fib, paroxysmal:  restart amiodarone for PAF and start AC if ok will other services.   5. Diabetes, Type II  6.  S/p T4-T12 posterior spinal fusion.  Possible T3 osteomyelitis.     Start AC and amiodarone for PAF, increase lasix.  Will follow.     IV lasix once in addition to po lasix.     Sherice Newman MD  04/02/19  8:21 AM

## 2019-04-02 NOTE — PLAN OF CARE
Problem: Skin Injury Risk (Adult)  Goal: Skin Health and Integrity  Outcome: Ongoing (interventions implemented as appropriate)      Problem: Fall Risk (Adult)  Goal: Absence of Fall  Outcome: Ongoing (interventions implemented as appropriate)      Problem: Patient Care Overview  Goal: Plan of Care Review  Outcome: Ongoing (interventions implemented as appropriate)   04/02/19 9636   Coping/Psychosocial   Plan of Care Reviewed With patient   Plan of Care Review   Progress improving   OTHER   Outcome Summary VSS, pt denies pain, tranferred from CCU to 4N, MRI of lumbar & thoracic spince attempted but no valuable images obtained / captured RT extensive back surgeries - fusions, hardware, etc, XR spine in process, worked c/ PT, Vanc trough due tomorrow @ 0900, WOC eval pt coccyx recommend zinc oxide cream or drsg, plan to DC back to rehab, CTM       Problem: Wound (Includes Pressure Injury) (Adult)  Goal: Signs and Symptoms of Listed Potential Problems Will be Absent, Minimized or Managed (Wound)  Outcome: Ongoing (interventions implemented as appropriate)      Problem: Pain, Acute (Adult)  Goal: Acceptable Pain Control/Comfort Level  Outcome: Ongoing (interventions implemented as appropriate)

## 2019-04-02 NOTE — PROGRESS NOTES
[facility] INPATIENT PROGRESS NOTE         [facility]ON[facility]    2019      PATIENT IDENTIFICATION:  Name: Derrek Estrada ADMIT: 3/31/2019   : 1940  PCP: Trey Stockton MD    MRN: [id number] LOS: 1 days   AGE/SEX: 79 y.o. male  ROOM: [facility]                     LOS 1    Reason for visit: f/u resp failure      SUBJECTIVE:    Less soa.  No cp, cough, n/v.  Chart reviewed.    Objective   OBJECTIVE:    Vital Sign Min/Max for last 24 hours  Temp  Min: 98.3 °F (36.8 °C)  Max: 99.3 °F (37.4 °C)   BP  Min: 117/95  Max: 177/81   Pulse  Min: 62  Max: 83   No Data Recorded   SpO2  Min: 93 %  Max: 100 %   No Data Recorded   Weight  Min: 86.8 kg (191 lb 5.8 oz)  Max: 86.8 kg (191 lb 5.8 oz)                         Body mass index is 24.57 kg/m².    Intake/Output Summary (Last 24 hours) at 2019 0807  Last data filed at 2019 0722  Gross per 24 hour   Intake 1364 ml   Output 1225 ml   Net 139 ml         Exam:  GEN:  No distress, appears stated age  EYES:   PERRL, anicteric sclera  ENT:    External ears/nose normal, OP clear  NECK:  No adenopathy, midline trachea  LUNGS: Normal chest on inspection, palpation and auscultation  CV:  Normal S1S2, without murmur  ABD:  Non tender, non distended, no hepatosplenomegaly, +BS  EXT:  No edema, cyanosis or clubbing    Scheduled meds:    brimonidine 1 drop Both Eyes BID   cefepime 2 g Intravenous Q12H   dorzolamide 1 drop Both Eyes BID   enalapril 20 mg Oral Q12H   insulin lispro 0-14 Units Subcutaneous 4x Daily With Meals & Nightly   latanoprost 1 drop Both Eyes Nightly   pantoprazole 40 mg Oral QAM AC   timolol 1 drop Both Eyes Daily   vancomycin 1,500 mg Intravenous Q12H     IV meds:                        Pharmacy to dose vancomycin      Data Review:  Results from last 7 days   Lab Units 19  0429 19  2246   SODIUM mmol/L 143 142   POTASSIUM mmol/L 3.7 3.8   CHLORIDE mmol/L 106 103   CO2 mmol/L 27.1 26.4   BUN mg/dL 18 32*    CREATININE mg/dL 0.49* 0.77   GLUCOSE mg/dL 110* 170*   CALCIUM mg/dL 8.1* 8.4*         Estimated Creatinine Clearance: 93.4 mL/min (A) (by C-G formula based on SCr of 0.49 mg/dL (L)).  Results from last 7 days   Lab Units 04/02/19  0429 03/31/19  2246   WBC 10*3/mm3 5.60 6.74   HEMOGLOBIN g/dL 9.5* 10.7*   PLATELETS 10*3/mm3 253 229     Results from last 7 days   Lab Units 03/31/19  2135   INR  1.07     Results from last 7 days   Lab Units 03/31/19  2246   ALT (SGPT) U/L 21   AST (SGOT) U/L 41*     Results from last 7 days   Lab Units 04/01/19  0258   PH, ARTERIAL pH units 7.456*   PO2 ART mm Hg 181.0*   PCO2, ARTERIAL mm Hg 38.6   HCO3 ART mmol/L 27.2     Results from last 7 days   Lab Units 04/02/19  0429 03/31/19  2246   PROCALCITONIN ng/mL 0.27* 0.56*   LACTATE mmol/L  --  1.7        2D echo reviewed: EF 54% drage I diast dysfunction         CXR 4/2 reviewed: increased vascular congestion        Lower ext dopplers: negative  )Assessment   ASSESSMENT:      Active Hospital Problems    Diagnosis  POA   • Fever and chills [R50.9]  Yes   • Chronic anticoagulation-for Afib [Z79.01]  Not Applicable   • Iron deficiency anemia [D50.9]  Yes   • Hypercholesterolemia [E78.00]  Yes   • Hypertension [I10]  Yes      Resolved Hospital Problems   No resolved problems to display.     Acute hypoxemic respiratory failure  Pleural effusions left greater than right  Basilar atelectasis  Pericardial effusion  Thoracic osteomyelitis  Paroxysmal atrial fibrillation  Type 2 diabetes  Status post fall at rehab  Spinal stenosis  Hypertension  Hyperlipidemia          PLAN:  Weaning oxygen as able.  Consultants input appreciated.  Antibiotics per ID recs.  MRI to eval possible spinal infectious process.  Control glucose.  Control BP.  Resume some home meds.  Mechanical DVT prophylaxis until known if any spinal eval needed/LP.  Add Lovenox when cleared by LISA.    Can move out of CCU    Marty Paiz MD  Pulmonary and Critical Care  [facility], [facility]  4/2/2019    8:07 AM

## 2019-04-02 NOTE — PLAN OF CARE
Problem: Patient Care Overview  Goal: Plan of Care Review  Outcome: Ongoing (interventions implemented as appropriate)   04/02/19 5527   Coping/Psychosocial   Plan of Care Reviewed With patient   OTHER   Outcome Summary Pt presents with impaired functional mobility and gait secondary to generalized weakness, some impaired balance, and decreased activity tolerance s/p fall following recent back surgery. Pt may benefit from skilled PT to address strength, mobility, and gait.

## 2019-04-02 NOTE — PROGRESS NOTES
F/U for possible new spinal osteomyelitis    Patient has not had MRI yet. Out of CCU. No severe back pain. Certainly no different than it has been.       .  Vitals:    04/02/19 1414   BP: 128/69   Pulse: 68   Resp: 16   Temp: 98.5 °F (36.9 °C)   SpO2: 94%        Ref. Range 4/2/2019 04:29   WBC Latest Ref Range: 3.40 - 10.80 10*3/mm3 5.60     Awake, alert, oriented x3.  Nontoxic appearing.  Rolling side to side in bed without much difficulty.  Thoracic incision well approximated.  Steri-Strips intact.  No redness, swelling, or drainage.  Moving legs okay.    1. Hx of lumbar kyphosis; flat back synrome. Hx of L3/4, L4/5 laminectomy by Dr Redmond and fusion by Dr. Knight T11-S1  2. T11 VCF with hardware displacement; unstable thoracic kyphosis- s/p posterior spinal fusion T4-T12 by  at [facility] requested by Dr. Knight.  3.New chest CT raises question of possible osteomyelitis at the T3 spinous process  4. Low grade fever - normal WBC  5  Generalized weakness  6.  Acute hypoxemic respiratory failure with bilateral pleural effusions  7. History of paroxysmal atrial fibrillation  8. History of T2 DM       Thoracolumbar MRI and thoracic x-rays to be performed today  Further recommendations pending the above results.

## 2019-04-02 NOTE — NURSING NOTE
MRI dept called & stated they attempted both thoracic & lumbar region MRIs but no valuable images able to obtained RT all the fusions, hardware, etc that pt has in his back. Tech reported she talked to radiologist & nothing clearly showed / defined in the images. Pt sent to XR for XR of spine.

## 2019-04-02 NOTE — THERAPY EVALUATION
Acute Care - Physical Therapy Initial Evaluation  [facility]     Patient Name: Derrek Estrada  : 1940  MRN: [id number]  Today's Date: 2019   Onset of Illness/Injury or Date of Surgery: 19            Admit Date: 3/31/2019    Visit Dx:     ICD-10-CM ICD-9-CM   1. Fever and chills R50.9 780.60   2. Generalized weakness R53.1 780.79   3. Injury of head, initial encounter S09.90XA 959.01   4. Acute low back pain without sciatica, unspecified back pain laterality (1 week post op) M54.5 724.2   5. Osteomyelitis, unspecified site, unspecified type (CMS/HCC) T spine M86.9 730.20   6. Hypoxia R09.02 799.02   7. Pericardial effusion I31.3 423.9   8. Difficulty walking R26.2 719.7     Patient Active Problem List   Diagnosis   • Cholelithiasis   • DM (HgbA1c 5.9)   • Diverticulosis of intestine   • Hypercholesterolemia   • Hypertension   • Spinal stenosis   • Microalbuminuria   • Atrial fibrillation (CMS/HCC)   • Medicare annual wellness visit, subsequent   • Anemia   • Iron deficiency anemia   • DJD (degenerative joint disease)   • Impotence of organic origin   • Glaucoma   • Hiatal hernia   • Mild cognitive disorder   • Calculus of kidney   • Nocturia   • Chronic bilateral low back pain with bilateral sciatica   • Post laminectomy syndrome   • Routine health maintenance   • Weight loss   • Spinal stenosis of lumbar region with neurogenic claudication   • Spondylolisthesis of lumbar region   • Lumbar spine pain   • Metabolic encephalopathy   • Urinary retention with incomplete bladder emptying   • Macrocytosis without anemia   • Sundowning   • RBBB (right bundle branch block with left anterior fascicular block)   • Leg swelling   • Postural kyphosis of lumbar region   • H/O urinary retention   • Chronic anticoagulation-for Afib   • Fever and chills     Past Medical History:   Diagnosis Date   • Acute suppurative otitis media    • Acute upper respiratory infection    • Anemia    • Cholelithiasis    •  Diabetes mellitus (CMS/HCC)     TYPE 2   • Diverticulosis    • Dry eyes    • Earache    • ED (erectile dysfunction)    • Edema    • Encounter for screening for malignant neoplasm of prostate    • Esophagitis, reflux    • Glaucoma    • H/O echocardiogram 09/18/2013   • Health care maintenance    • Hiatal hernia    • History of EKG 10/06/2015   • History of Holter monitoring 09/16/2013   • Hyperlipidemia    • Hypertension    • Lumbar canal stenosis    • Microalbuminuria    • Mild cognitive impairment    • Mitral valve prolapse    • Nephrolithiasis    • Osteoarthritis of hand     of thumb   • PAF (paroxysmal atrial fibrillation) (CMS/HCC)    • 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   • Spinal stenosis      Past Surgical History:   Procedure Laterality Date   • AMPUTATION DIGIT Left 10/29/2016    Procedure: TENDON AND NERVE REPAIR OF  LEFT THUMB;  Surgeon: Zak Hanson MD;  Location: [facility];  Service:    • ARTHRODESIS  10/28/2013    Spinal / Description: Repair spinal stenosis   • BLEPHAROPLASTY  2000, 05/2002   • CATARACT EXTRACTION     • EYE SURGERY  2011    EYE MUSCLE SX   • HERNIA REPAIR Right 03/2010    inguinal   • LUMBAR DISCECTOMY FUSION INSTRUMENTATION N/A 1/29/2018    Procedure: L2 to L5 fusion with instrumentation and removal of implants L4 5;  Surgeon: Barron Knight MD;  Location: [facility] OR;  Service:    • LUMBAR DISCECTOMY FUSION INSTRUMENTATION N/A 1/9/2019    Procedure: T 11 to L3, L5-S1 fusion with T11 to S1 instrumentation, L4 pedicle subtraction osteotomy, Right L5-S1 interbody fusion with cage;  Surgeon: Barron Knight MD;  Location: [facility] OR;  Service: Orthopedic Spine   • LUMBAR FUSION  2018    Lumbar vertebral fusion   • LUMBAR LAMINECTOMY      10.28.13  bilat L4/5 lami with Nyla and Belen   • LUMBAR LAMINECTOMY DISCECTOMY DECOMPRESSION N/A 1/29/2018    Procedure: L2 to L4 laminectomy with a fusion by orthopedics;  Surgeon:  Jeison Redmond MD;  Location: [facility] OR;  Service:    • LUMBAR LAMINECTOMY DISCECTOMY DECOMPRESSION N/A 1/9/2019    Procedure: L4-L5, L5-S1 LAMINECTOMY WITH NEURAL LYSIS;  Surgeon: Jeison Redmond MD;  Location: [facility];  Service: Neurosurgery   • OTHER SURGICAL HISTORY  2008    Lithotomy   • TONSILLECTOMY     • VASECTOMY  09/2008        PT ASSESSMENT (last 12 hours)      Physical Therapy Evaluation     Row Name 04/02/19 1152          PT Evaluation Time/Intention    Subjective Information  complains of;weakness  -     Document Type  evaluation  -     Mode of Treatment  physical therapy  -     Patient Effort  good  -     Symptoms Noted During/After Treatment  none  -     Row Name 04/02/19 1152          General Information    Onset of Illness/Injury or Date of Surgery  03/31/19  -     Patient Observations  alert;cooperative;agree to therapy  -     Patient/Family Observations  pt supine in bed, no acute distress noted at rest  -     Prior Level of Function  independent:;gait;transfer;bed mobility;ADL's  -     Equipment Currently Used at Home  walker, rolling pt states he has been using walker since most recent back sx  -CH     Pertinent History of Current Functional Problem  pt admitted from SNF after fall, pt had T4-T12 fusion on 3/25  -     Existing Precautions/Restrictions  fall post-op back surgery, no brace needed per pt  -     Barriers to Rehab  medically complex  -     Row Name 04/02/19 1152          Relationship/Environment    Lives With  spouse  -     Row Name 04/02/19 1152          Resource/Environmental Concerns    Current Living Arrangements  home/apartment/condo  -     Row Name 04/02/19 1152          Cognitive Assessment/Interventions    Additional Documentation  Cognitive Assessment/Intervention (Group)  -     Row Name 04/02/19 1152          Cognitive Assessment/Intervention- PT/OT    Orientation Status (Cognition)  oriented x 3  -CH     Follows Commands  (Cognition)  WFL  -     Personal Safety Interventions  fall prevention program maintained;gait belt;nonskid shoes/slippers when out of bed  -     Row Name 04/02/19 1152          Bed Mobility Assessment/Treatment    Bed Mobility Assessment/Treatment  supine-sit;supine-sit-supine  -     Supine-Sit [place] (Bed Mobility)  verbal cues;nonverbal cues (demo/gesture);minimum assist (75% patient effort);2 person assist  -     Supine-Sit-Supine [place] (Bed Mobility)  verbal cues;nonverbal cues (demo/gesture);minimum assist (75% patient effort);2 person assist  -     Comment (Bed Mobility)  log roll technique   -     Row Name 04/02/19 1152          Transfer Assessment/Treatment    Transfer Assessment/Treatment  sit-stand transfer;stand-sit transfer  -     Sit-Stand [place] (Transfers)  verbal cues;nonverbal cues (demo/gesture);minimum assist (75% patient effort);2 person assist  -     Stand-Sit [place] (Transfers)  verbal cues;nonverbal cues (demo/gesture);minimum assist (75% patient effort);2 person assist  -     Row Name 04/02/19 1152          Sit-Stand Transfer    Assistive Device (Sit-Stand Transfers)  walker, front-wheeled  -     Row Name 04/02/19 1152          Stand-Sit Transfer    Assistive Device (Stand-Sit Transfers)  walker, front-wheeled  -     Row Name 04/02/19 1152          Gait/Stairs Assessment/Training    [place] Level (Gait)  verbal cues;nonverbal cues (demo/gesture);minimum assist (75% patient effort);2 person assist  -     Assistive Device (Gait)  walker, front-wheeled  -     Distance in Feet (Gait)  30  -CH     Deviations/Abnormal Patterns (Gait)  jody decreased;gait speed decreased;stride length decreased  -     Row Name 04/02/19 1152          General ROM    GENERAL ROM COMMENTS  AROM WFL for age  -     Row Name 04/02/19 1152          MMT (Manual Muscle Testing)    General MMT Comments  generalized weakness noted with functional mobility, pt  reports LEs feel like noodles  -[facility] Name 04/02/19 1152          Motor Assessment/Intervention    Additional Documentation  Balance (Group)  -[facility] Name 04/02/19 1152          Balance    Balance  static standing balance;dynamic standing balance  -CH     Row Name 04/02/19 1152          Static Standing Balance    Level of [place] (Supported Standing, Static Balance)  minimal assist, 75% patient effort;2 person assist  -     Assistive Device Utilized (Supported Standing, Static Balance)  walker, rolling  -[facility] Name 04/02/19 1152          Dynamic Standing Balance    Level of [place], Reaches Outside Midline (Standing, Dynamic Balance)  minimal assist, 75% patient effort;2 person assist  -     Assistive Device Utilized (Supported Standing, Dynamic Balance)  walker, rolling  -[facility] Name 04/02/19 1152          Pain Assessment    Additional Documentation  Pain Scale: Numbers Pre/Post-Treatment (Group)  -CH     Row Name 04/02/19 1152          Pain Scale: Numbers Pre/Post-Treatment    Pain Scale: Numbers, Pretreatment  0/10 - no pain  -CH     Row Name             Wound 01/09/19 0844 Other (See comments) back incision    Wound - Properties Group Date first assessed: 01/09/19  -JT Time first assessed: 0844  -JT Side: Other (See comments)  -JT Location: back  -JT Type: incision  -JT    [facility] Name             Wound 04/01/19 0700 Bilateral coccyx pressure injury    Wound - Properties Group Date first assessed: 04/01/19  -JH Time first assessed: 0700  -JH Present On Admission : yes;picture taken  - Side: Bilateral  -JH Location: coccyx  - Type: pressure injury  - Stage, Pressure Injury: Stage 1  -JH    Row Name 04/02/19 1152          Plan of Care Review    Plan of Care Reviewed With  patient  -CH     Row Name 04/02/19 1152          Physical Therapy Clinical Impression    Patient/Family Goals Statement (PT Clinical Impression)  to return to [facility]     Criteria for Skilled Interventions Met  (PT Clinical Impression)  treatment indicated  -     Impairments Found (describe specific impairments)  gait, locomotion, and balance;muscle performance  -     Rehab Potential (PT Clinical Summary)  good, to achieve stated therapy goals  -     Row Name 04/02/19 1152          Physical Therapy Goals    Bed Mobility Goal Selection (PT)  bed mobility, PT goal 1  -CH     Transfer Goal Selection (PT)  transfer, PT goal 1  -CH     Gait Training Goal Selection (PT)  gait training, PT goal 1  -     Row Name 04/02/19 1152          Bed Mobility Goal 1 (PT)    Activity/Assistive Device (Bed Mobility Goal 1, PT)  bed mobility activities, all  -CH     [place] Level/Cues Needed (Bed Mobility Goal 1, PT)  supervision required  -CH     Time Frame (Bed Mobility Goal 1, PT)  1 week  -     Row Name 04/02/19 1152          Transfer Goal 1 (PT)    Activity/Assistive Device (Transfer Goal 1, PT)  transfers, all;walker, rolling  -CH     [place] Level/Cues Needed (Transfer Goal 1, PT)  supervision required  -CH     Time Frame (Transfer Goal 1, PT)  1 week  -     Row Name 04/02/19 1152          Gait Training Goal 1 (PT)    Activity/Assistive Device (Gait Training Goal 1, PT)  gait (walking locomotion);walker, rolling  -CH     [place] Level (Gait Training Goal 1, PT)  supervision required  -CH     Distance (Gait Goal 1, PT)  150  -CH     Time Frame (Gait Training Goal 1, PT)  1 week  -     Row Name 04/02/19 1152          Positioning and Restraints    Pre-Treatment Position  in bed  -CH     Post Treatment Position  bed  -CH     In Bed  supine;call light within reach;encouraged to call for assist;with family/caregiver  -       User Key  (r) = Recorded By, (t) = Taken By, (c) = Cosigned By    Initials Name Provider Type    CH Airam Mcknight, PT Physical Therapist    Kym Turner RN Registered Nurse    Margie Sena RN Registered Nurse        Physical Therapy Education     Title: PT OT SLP Therapies  (In Progress)     Topic: Physical Therapy (In Progress)     Point: Mobility training (Done)     Learning Progress Summary           Patient Acceptance, E,TB,D, VU,NR by  at 4/2/2019  2:17 PM                   Point: Body mechanics (Done)     Learning Progress Summary           Patient Acceptance, E,TB,D, VU,NR by  at 4/2/2019  2:17 PM                   Point: Precautions (Done)     Learning Progress Summary           Patient Acceptance, E,TB,D, VU,NR by  at 4/2/2019  2:17 PM                               User Key     Initials Effective Dates Name Provider Type Discipline     04/03/18 -  Airam Mcknight, PT Physical Therapist PT              PT Recommendation and Plan  Anticipated Discharge Disposition (PT): skilled nursing facility  Planned Therapy Interventions (PT Eval): balance training, bed mobility training, home exercise program, gait training, patient/family education, transfer training  Therapy Frequency (PT Clinical Impression): daily  Outcome Summary/Treatment Plan (PT)  Anticipated Discharge Disposition (PT): skilled nursing facility  Plan of Care Reviewed With: patient  Outcome Summary: Pt presents with impaired functional mobility and gait secondary to generalized weakness, some impaired balance, and decreased activity tolerance s/p fall following recent back surgery. Pt may benefit from skilled PT to address strength, mobility, and gait.  Outcome Measures     Row Name 04/02/19 1400             How much help from another person do you currently need...    Turning from your back to your side while in flat bed without using bedrails?  3  -CH      Moving from lying on back to sitting on the side of a flat bed without bedrails?  3  -CH      Moving to and from a bed to a chair (including a wheelchair)?  3  -CH      Standing up from a chair using your arms (e.g., wheelchair, bedside chair)?  3  -CH      Climbing 3-5 steps with a railing?  1  -CH      To walk in hospital room?  3  -CH      AM-PAC 6  Clicks Score  16  -         Functional Assessment    Outcome Measure Options  AM-PAC 6 Clicks Basic Mobility (PT)  -        User Key  (r) = Recorded By, (t) = Taken By, (c) = Cosigned By    Initials Name Provider Type     Airam Mcknight, PT Physical Therapist         Time Calculation:   PT Charges     Row Name 04/02/19 1157             Time Calculation    Start Time  1136  -      Stop Time  1152  -      Time Calculation (min)  16 min  -      PT Received On  04/02/19  -      PT - Next Appointment  04/03/19  -      PT Goal Re-Cert Due Date  04/09/19  -         Time Calculation- PT    Total Timed Code Minutes- PT  10 minute(s)  -        User Key  (r) = Recorded By, (t) = Taken By, (c) = Cosigned By    Initials Name Provider Type     Airam Mcknight, PT Physical Therapist        Therapy Charges for Today     Code Description Service Date Service Provider Modifiers Qty    91341843746 HC PT EVAL MOD COMPLEXITY 2 4/2/2019 Airam Mcknight, PT GP 1    40574621925 HC PT THER PROC EA 15 MIN 4/2/2019 Airam Mcknight, PT GP 1    29578102817 HC PT THER SUPP EA 15 MIN 4/2/2019 Airam Mcknight, PT GP 1          PT G-Codes  Outcome Measure Options: AM-PAC 6 Clicks Basic Mobility (PT)  AM-PAC 6 Clicks Score: 16      Airam Mcknight, PT  4/2/2019

## 2019-04-03 NOTE — THERAPY TREATMENT NOTE
Acute Care - Physical Therapy Treatment Note  [facility]     Patient Name: Derrek Estrada  : 1940  MRN: [id number]  Today's Date: 4/3/2019  Onset of Illness/Injury or Date of Surgery: 19          Admit Date: 3/31/2019    Visit Dx:    ICD-10-CM ICD-9-CM   1. Fever and chills R50.9 780.60   2. Generalized weakness R53.1 780.79   3. Injury of head, initial encounter S09.90XA 959.01   4. Acute low back pain without sciatica, unspecified back pain laterality (1 week post op) M54.5 724.2   5. Osteomyelitis, unspecified site, unspecified type (CMS/HCC) T spine M86.9 730.20   6. Hypoxia R09.02 799.02   7. Pericardial effusion I31.3 423.9   8. Difficulty walking R26.2 719.7     Patient Active Problem List   Diagnosis   • Cholelithiasis   • DM (HgbA1c 5.9)   • Diverticulosis of intestine   • Hypercholesterolemia   • Hypertension   • Spinal stenosis   • Microalbuminuria   • Atrial fibrillation (CMS/HCC)   • Medicare annual wellness visit, subsequent   • Anemia   • Iron deficiency anemia   • DJD (degenerative joint disease)   • Impotence of organic origin   • Glaucoma   • Hiatal hernia   • Mild cognitive disorder   • Calculus of kidney   • Nocturia   • Chronic bilateral low back pain with bilateral sciatica   • Post laminectomy syndrome   • Routine health maintenance   • Weight loss   • Spinal stenosis of lumbar region with neurogenic claudication   • Spondylolisthesis of lumbar region   • Lumbar spine pain   • Metabolic encephalopathy   • Urinary retention with incomplete bladder emptying   • Macrocytosis without anemia   • Sundowning   • RBBB (right bundle branch block with left anterior fascicular block)   • Leg swelling   • Postural kyphosis of lumbar region   • H/O urinary retention   • Chronic anticoagulation-for Afib   • Fever and chills       Therapy Treatment    Rehabilitation Treatment Summary     Row Name 19 1605             Treatment Time/Intention    Discipline  physical therapist  -MA       Document Type  therapy note (daily note)  -MA      Subjective Information  complains of;weakness  -MA      Mode of Treatment  physical therapy;individual therapy  -MA      Patient/Family Observations  supine in bed, no acute distress, wife present   -MA      Patient Effort  good  -MA      Existing Precautions/Restrictions  fall no brace needed per pt  -MA      Recorded by [MA] Sia Linda, PT 04/03/19 1610      Row Name 04/03/19 1605             Cognitive Assessment/Intervention- PT/OT    Orientation Status (Cognition)  oriented x 4  -MA      Follows Commands (Cognition)  WNL  -MA      Personal Safety Interventions  fall prevention program maintained;gait belt;muscle strengthening facilitated;nonskid shoes/slippers when out of bed;supervised activity  -MA      Recorded by [MA] Sia Linda, PT 04/03/19 1610      Row Name 04/03/19 1605             Bed Mobility Assessment/Treatment    Supine-Sit [place] (Bed Mobility)  minimum assist (75% patient effort)  -MA      Supine-Sit-Supine [place] (Bed Mobility)  minimum assist (75% patient effort)  -MA      Assistive Device (Bed Mobility)  bed rails;head of bed elevated  -MA      Comment (Bed Mobility)  cues for log roll technique  -MA      Recorded by [MA] Sia Linda, PT 04/03/19 1610      Row Name 04/03/19 1605             Sit-Stand Transfer    Sit-Stand [place] (Transfers)  contact guard  -MA      Assistive Device (Sit-Stand Transfers)  walker, front-wheeled  -MA      Recorded by [MA] Sia Linda, PT 04/03/19 1610      Row Name 04/03/19 1605             Stand-Sit Transfer    Stand-Sit [place] (Transfers)  contact guard  -MA      Assistive Device (Stand-Sit Transfers)  walker, front-wheeled  -MA      Recorded by [MA] Sia Linda, PT 04/03/19 1610      Row Name 04/03/19 1605             Gait/Stairs Assessment/Training    [place] Level (Gait)  contact guard  -MA      Assistive Device (Gait)  walker, front-wheeled  -MA      Distance in Feet  (Gait)  180  -MA      Pattern (Gait)  step-through  -MA      Deviations/Abnormal Patterns (Gait)  base of support, narrow;jody decreased;stride length decreased  -MA      Comment (Gait/Stairs)  no LOB, flexed knees  -MA      Recorded by [MA] Sia Linda, PT 04/03/19 1610      Row Name 04/03/19 1605             Positioning and Restraints    Pre-Treatment Position  in bed  -MA      Post Treatment Position  bed  -MA      In Bed  supine;call light within reach;encouraged to call for assist;exit alarm on;with family/caregiver  -MA      Recorded by [MA] Sia Linda, PT 04/03/19 1610      Row Name 04/03/19 1605             Pain Scale: Numbers Pre/Post-Treatment    Pain Scale: Numbers, Pretreatment  0/10 - no pain  -MA      Recorded by [MA] Sia Linda, PT 04/03/19 1610      Row Name                Wound 01/09/19 0844 Other (See comments) back incision    Wound - Properties Group Date first assessed: 01/09/19 [JT] Time first assessed: 0844 [JT] Side: Other (See comments) [JT] Location: back [JT] Type: incision [JT] Recorded by:  [JT] Margie Bravo RN 01/09/19 0844    Row Name                Wound 04/01/19 0700 Bilateral coccyx pressure injury    Wound - Properties Group Date first assessed: 04/01/19 [JH] Time first assessed: 0700 [JH] Present On Admission : yes;picture taken [JH] Side: Bilateral [JH] Location: coccyx [JH] Type: pressure injury [JH] Stage, Pressure Injury: Stage 1 [] Recorded by:  [] Kym Mckeon, RN 04/01/19 0752      User Key  (r) = Recorded By, (t) = Taken By, (c) = Cosigned By    Initials Name Effective Dates Discipline     Kym Mckeon RN 06/16/16 -  Nurse    Margie Sena RN 06/16/16 -  Nurse    Sia Wilcox, PT 10/19/18 -  PT          Wound 01/09/19 0844 Other (See comments) back incision (Active)   Dressing Appearance open to air 4/3/2019  3:00 PM   Closure Adhesive closure strips 4/3/2019  3:00 PM   Base clean 4/3/2019  3:00 PM   Periwound intact 4/3/2019  3:00 PM    Periwound Temperature warm 4/3/2019  3:00 PM   Drainage Amount none 4/3/2019  3:00 PM       Wound 04/01/19 0700 Bilateral coccyx pressure injury (Active)   Dressing Appearance open to air 4/3/2019  3:00 PM   Closure Open to air 4/3/2019  3:00 PM   Drainage Amount none 4/3/2019  3:00 PM           Physical Therapy Education     Title: PT OT SLP Therapies (In Progress)     Topic: Physical Therapy (In Progress)     Point: Mobility training (Done)     Learning Progress Summary           Patient Acceptance, E, VU,NR by MA at 4/3/2019  4:10 PM    Acceptance, E,TB,D, VU,NR by  at 4/2/2019  2:17 PM                   Point: Body mechanics (Done)     Learning Progress Summary           Patient Acceptance, E, VU,NR by MA at 4/3/2019  4:10 PM    Acceptance, E,TB,D, VU,NR by  at 4/2/2019  2:17 PM                   Point: Precautions (Done)     Learning Progress Summary           Patient Acceptance, E, VU,NR by MA at 4/3/2019  4:10 PM    Acceptance, E,TB,D, VU,NR by  at 4/2/2019  2:17 PM                               User Key     Initials Effective Dates Name Provider Type Discipline     04/03/18 -  Airam Mcknight, PT Physical Therapist PT    MA 10/19/18 -  Sia Linda, PT Physical Therapist PT                PT Recommendation and Plan     Outcome Summary: Pt able to ambulate increased distance this date. No increase in pain with mobility. Requires Rwx for balance during ambulation. Will continue to progress as tolerated.   Outcome Measures     Row Name 04/03/19 1600 04/02/19 1400          How much help from another person do you currently need...    Turning from your back to your side while in flat bed without using bedrails?  3  -MA  3  -CH     Moving from lying on back to sitting on the side of a flat bed without bedrails?  3  -MA  3  -CH     Moving to and from a bed to a chair (including a wheelchair)?  3  -MA  3  -CH     Standing up from a chair using your arms (e.g., wheelchair, bedside chair)?  3  -MA  3  -CH      Climbing 3-5 steps with a railing?  2  -MA  1  -     To walk in hospital room?  3  -MA  3  -CH     AM-PAC 6 Clicks Score  17  -MA  16  -CH        Functional Assessment    Outcome Measure Options  AM-PAC 6 Clicks Basic Mobility (PT)  -MA  AM-PAC 6 Clicks Basic Mobility (PT)  -       User Key  (r) = Recorded By, (t) = Taken By, (c) = Cosigned By    Initials Name Provider Type     Airam Mcknight, PT Physical Therapist    Sia Wilcox PT Physical Therapist         Time Calculation:   PT Charges     Row Name 04/03/19 1611             Time Calculation    Start Time  1552  -MA      Stop Time  1604  -MA      Time Calculation (min)  12 min  -MA      PT Received On  04/03/19  -MA      PT - Next Appointment  04/04/19  -MA         Time Calculation- PT    Total Timed Code Minutes- PT  12 minute(s)  -MA        User Key  (r) = Recorded By, (t) = Taken By, (c) = Cosigned By    Initials Name Provider Type    Sia Wilocx, PT Physical Therapist        Therapy Charges for Today     Code Description Service Date Service Provider Modifiers Qty    59025641395  PT THER PROC EA 15 MIN 4/3/2019 Sia Linda, PT GP 1          PT G-Codes  Outcome Measure Options: AM-PAC 6 Clicks Basic Mobility (PT)  AM-PAC 6 Clicks Score: 17    Sia Linda PT  4/3/2019

## 2019-04-03 NOTE — PROGRESS NOTES
"Pharmacokinetic Consult - Vancomycin Dosing (Follow-up Note)    Derrek Estrada is on day 3 pharmacy to dose vancomycin for thoracic osteomyelitis per Dr. Paiz's request (Dr. Janeth Garcia following for ID). Goal trough: 15-20 mcg/mL.    Duration of Therapy: 7 days    Other Antimicrobials: Cefepime 2g IV q12h x 7 days    Relevant clinical data and objective history reviewed:  79 y.o. male 188 cm (74\") 85.4 kg (188 lb 4.8 oz)    Vitals:    19 1914 19 2314 19 0648 19 0731   BP: 149/81 114/65  136/68   BP Location: Left arm Left arm  Right arm   Pulse: 67 72  65   Resp: 16 16  16   Temp: 99 °F (37.2 °C) 99.6 °F (37.6 °C)  98.9 °F (37.2 °C)   TempSrc: Oral Oral  Oral   SpO2: 93% 94%  95%     Creatinine   Date Value Ref Range Status   2019 0.53 (L) 0.76 - 1.27 mg/dL Final   2019 0.49 (L) 0.76 - 1.27 mg/dL Final   2019 0.77 0.76 - 1.27 mg/dL Final     BUN   Date Value Ref Range Status   2019 19 8 - 23 mg/dL Final     Estimated Creatinine Clearance: 91.9 mL/min (A) (by C-G formula based on SCr of 0.53 mg/dL (L)).    Lab Results   Component Value Date    WBC 5.55 2019     Temp Readings from Last 3 Encounters:   19 98.9 °F (37.2 °C) (Oral)     Baseline culture/source/susceptibility:   3/31: RVP-neg  : Bcx-NGTD    Radiology/Imagin/1: CT Chest-\"Postsurgical changes in the thoracolumbar spine as discussed. Osseous  findings of T3 and T11 most consistent with osteomyelitis with a probable area of cellulitis surrounding the T3 spinous process.\"    Vancomycin Dosing History:  : Vancomycin 1750 mg (20 mg/kg) loading dose IV once @ : 1500 mg IV q12h @ : 1500 mg IV q12h @ 0940, 2150  4/3 @ 0831: Vancomycin trough=8.8 mcg/mL (~10.5 hrs post-dose)    Assessment/Plan    1. Vancomycin was dosed at 1500 mg IV q12h. Trough came back low at 8.8 mcg/mL (~10.5 hr level). Will increase dose to try and target goal trough level of 15-20 mcg/mL.    2. Will " change vancomycin dose to 1750 mg IV q12h to start now, and schedule a trough on Friday morning 4/5 before the 1100 dose to check on new regimen.     3. Will increase cefepime dose to 2g IV q8h since CrCl is >60 mL/min.     4. Will monitor serum creatinine at least every 48 hours per dosing recommendations. SCr is stable today at 0.53. Encourage adequate hydration if appropriate to prevent nephrotoxicities. Monitor for decreased UOP, rash or other signs of vancomycin intolerance.    5. Pharmacy will continue to follow daily while on vancomycin and adjust as needed.     Rubia Castro, Pharm.D., BCPS

## 2019-04-03 NOTE — PROGRESS NOTES
LOS: 2 days     Chief Complaint:  Concern for osteomyelitis     Interval History:  Afebrile, off supplemental O2. Continues to reports chronic back pain.  He does not think he has a back infection nor is very happy with all the testing that has been done so far.  Denies abdominal pain nausea vomiting or diarrhea.  Tolerating antibiotics without a rash.    Vital Signs  Temp:  [98.2 °F (36.8 °C)-99.6 °F (37.6 °C)] 98.9 °F (37.2 °C)  Heart Rate:  [] 65  Resp:  [16] 16  BP: (114-149)/(65-84) 136/68    Physical Exam:  General: In no acute distress  Cardiovascular: RRR, + LE edema   Respiratory: occ crackles at bases   GI: Soft, NT/ND, + bowel sounds bilaterally  Skin: No rashes    Antibiotics:  Vancomycin 1500 mg IV every 12 hours   cefepime 2 g IV every 12 hours    Results Review:      Lab Results   Component Value Date    WBC 5.55 04/03/2019    HGB 9.4 (L) 04/03/2019    HCT 30.3 (L) 04/03/2019    MCV 94.4 04/03/2019     04/03/2019     Lab Results   Component Value Date    GLUCOSE 136 (H) 04/03/2019    BUN 19 04/03/2019    CREATININE 0.53 (L) 04/03/2019    EGFRIFNONA 150 04/03/2019    EGFRIFAFRI 121 06/11/2018    BCR 35.8 (H) 04/03/2019    CO2 31.8 (H) 04/03/2019    CALCIUM 8.0 (L) 04/03/2019    PROTENTOTREF 6.1 02/27/2019    ALBUMIN 3.00 (L) 03/31/2019    LABIL2 1.1 02/27/2019    AST 41 (H) 03/31/2019    ALT 21 03/31/2019     Microbiology:  4/1 BCx NGTD x 2  3/31 RVP neg    X-ray of the thoracic spine shows extensive multilevel fusion in the thoracic and lumbar spine.  No pedicle screws at T11 with some fragmentation and fracture and multiple changes which could be related to osteomyelitis.  Mild compression of the vertebral body.    Assessment/Plan   1.  Low-grade temperature - resolved   - blood cx NGTD     2.  Acute hypoxic respiratory failure - resolved      3.  CT of the chest concerning with thoracic spine osteomyelitis per radiology  -Awaiting further imaging and neurosurgical opinion  - Continue  empiric vancomycin dosing per pharmacy  -Continue cefepime     Even if neurosurgery recommends antibiotics I am not sure the patient will be willing to proceed with a 6-8-week course of IV antibiotics

## 2019-04-03 NOTE — DISCHARGE PLACEMENT REQUEST
"Natalie Derrek JONATHAN (79 y.o. Male)     Date of Birth Social Security Number Address Home Phone MRN    1940  [address] [facility]  [place] [facility] [address] [phone] [id number]    [employer] Marital Status          Church        Admission Date Admission Type Admitting Provider Attending Provider Department, Room/Bed    3/31/19 Emergency Marty Paiz MD Jambeih, Rami, MD [address], [address]    Discharge Date Discharge Disposition Discharge Destination                       Attending Provider:  Jada Balbuena MD    Allergies:  Beta Adrenergic Blockers    Isolation:  None   Infection:  None   Code Status:  CPR    Ht:  188 cm (74\")   Wt:  85.4 kg (188 lb 4.8 oz)    Admission Cmt:  None   Principal Problem:  None                Active Insurance as of 3/31/2019     Primary Coverage     Payor Plan Insurance Group Employer/Plan Group    [facility] MEDICARE REPLACEMENT [facility] [id number]     Payor Plan Address Payor Plan Phone Number Payor Plan Fax Number Effective Dates    [address] [address]   1/1/2016 - None Entered    [facility] [id number]       Subscriber Name Subscriber Birth Date Member ID       DERREK OBREGON 1940 [id number]                 Emergency Contacts      (Rel.) Home Phone Work Phone Mobile Phone    Silvia Obregon (Spouse) [phone] -- [phone]              "

## 2019-04-03 NOTE — PLAN OF CARE
Problem: Fall Risk (Adult)  Goal: Absence of Fall  Outcome: Ongoing (interventions implemented as appropriate)      Problem: Patient Care Overview  Goal: Plan of Care Review  Outcome: Ongoing (interventions implemented as appropriate)    Goal: Discharge Needs Assessment  Outcome: Ongoing (interventions implemented as appropriate)      Problem: Wound (Includes Pressure Injury) (Adult)  Goal: Signs and Symptoms of Listed Potential Problems Will be Absent, Minimized or Managed (Wound)  Outcome: Ongoing (interventions implemented as appropriate)      Problem: Pain, Acute (Adult)  Goal: Acceptable Pain Control/Comfort Level  Outcome: Ongoing (interventions implemented as appropriate)

## 2019-04-03 NOTE — PLAN OF CARE
Problem: Skin Injury Risk (Adult)  Goal: Skin Health and Integrity  Outcome: Ongoing (interventions implemented as appropriate)      Problem: Patient Care Overview  Goal: Plan of Care Review  Outcome: Ongoing (interventions implemented as appropriate)      Problem: Wound (Includes Pressure Injury) (Adult)  Goal: Signs and Symptoms of Listed Potential Problems Will be Absent, Minimized or Managed (Wound)  Outcome: Ongoing (interventions implemented as appropriate)

## 2019-04-03 NOTE — PROGRESS NOTES
Discharge Planning Assessment  [facility]     Patient Name: Derrek Estrada  MRN: [id number]  Today's Date: 4/3/2019    Admit Date: 3/31/2019    Discharge Needs Assessment    No documentation.       Discharge Plan     Row Name 04/03/19 1442       Plan    Plan  [facility]    Plan Comments  IMM letter signed.   CCP spoke via telephone to Silvia, pt wife, [phone],  to discuss d/c planning. Face sheet verified. CCP role explained.  Pt emergency contact his wife Silvia.   Pt lives in a one story house with his wife.  Pt uses no DME to ambulate.  He is normally independent with ADL's.  He has no home health history. Pt has been to  rehab at [facility] and he would like to return at [place].  Macarena /Viviana/[facility] notified pt intent to return.  CCP following to assist with any d/c planning needs          Destination      Service Provider Request Status Selected Services Address Phone Number Fax Number    [facility] Pending - Request Sent N/A [address] [address] [phone]      Durable Medical Equipment      No service coordination in this encounter.      Dialysis/Infusion      No service coordination in this encounter.      Home Medical Care      No service coordination in this encounter.      Therapy      No service coordination in this encounter.      Community Resources      No service coordination in this encounter.          Demographic Summary    No documentation.       Functional Status    No documentation.       Psychosocial    No documentation.       Abuse/Neglect    No documentation.       Legal    No documentation.       Substance Abuse    No documentation.       Patient Forms    No documentation.           Shikha Laureano RN

## 2019-04-03 NOTE — PROGRESS NOTES
"CC:  Thoracic back pain unchanged. No new complaints. No leg pain, no N/T, no change in strength    T/L MRI attempted-images that were completed were useless, scan aborted       Blood pressure 144/76, pulse 66, temperature 98.8 °F (37.1 °C), temperature source Oral, resp. rate 16, height 188 cm (74\"), weight 85.4 kg (188 lb 4.8 oz), SpO2 95 %.      AA, Ox3  No focal deficits  Lungs effort normal  Does not appear ill  LE edema chronic and unchanged      ..  Results from last 7 days   Lab Units 04/03/19  0513   WBC 10*3/mm3 5.55   HEMOGLOBIN g/dL 9.4*   HEMATOCRIT % 30.3*   PLATELETS 10*3/mm3 275         BC-negative      POD#9 s/p T11 VCF with hardware displacement and unstable thoracic kyphosis with T4-T12 fusion by Dr. Shaver Almost 3mos s/p previous L3-5 lami with Dr. Redmond and T11-S1 fusion with Dr. Knight for lumbar stenosis  S/p fall  Pleural effusions  Afib  DM  ? Of T3 spinous process infection noted on Chest CT    At this point there is no definitive radiographic  evidence of infection. His pain is certainly normal given his recent surgery and fall and therefore not a reliable indicator of infection. He is now afebrile, WBC normal. Will ask Dr. Knight to see and weigh in as well.  But at this point we do not feel that there is any indication for intervention and agree with Dr. Garcia that there is little to support long term antibiotic treatment at this time. Would recommend follow up with Dr. Shaver as scheduled. Will follow for Dr. Knight's recommendation.            "

## 2019-04-03 NOTE — PLAN OF CARE
Problem: Patient Care Overview  Goal: Plan of Care Review   04/03/19 1611   OTHER   Outcome Summary Pt able to ambulate increased distance this date. No increase in pain with mobility. Requires Rwx for balance during ambulation. Will continue to progress as tolerated.

## 2019-04-03 NOTE — PROGRESS NOTES
LOS: 2 days   Patient Care Team:  Trey Stockton MD as PCP - General (Internal Medicine)  Sherice Newman MD as Consulting Physician (Cardiology)  Carey Pascual [facility] as Pharmacist  Geovanni Herron MD as Consulting Physician (Hematology and Oncology)  Sakina Martin APRN as Referring Physician (Cardiology)    Chief Complaint:     F/u PAF    Interval History:     Denies chest pain, tachycardia, dizziness.  Feels weak and fatigued all over.    In review of his monitors, he had no atrial fibrillation overnight.    Objective   Vital Signs  Temp:  [98.2 °F (36.8 °C)-99.6 °F (37.6 °C)] 98.9 °F (37.2 °C)  Heart Rate:  [] 65  Resp:  [16] 16  BP: (114-170)/(65-98) 136/68    Intake/Output Summary (Last 24 hours) at 4/3/2019 0738  Last data filed at 4/3/2019 0601  Gross per 24 hour   Intake --   Output 650 ml   Net -650 ml       Comfortable NAD  Neck supple, no JVD or thyromegaly appreciated  S1/S2 RRR, no m/r/g  Lungs CTA B, normal effort  Abdomen S/NT/ND (+) BS, no HSM appreciated  Extremities warm, no clubbing, cyanosis, 1+ LE edema  No visible or palpable skin lesions  A/Ox4, mood and affect appropriate    Results Review:      Results from last 7 days   Lab Units 04/03/19 0513 04/02/19 0429 03/31/19  2246   SODIUM mmol/L 142 143 142   POTASSIUM mmol/L 3.4* 3.7 3.8   CHLORIDE mmol/L 103 106 103   CO2 mmol/L 31.8* 27.1 26.4   BUN mg/dL 19 18 32*   CREATININE mg/dL 0.53* 0.49* 0.77   GLUCOSE mg/dL 136* 110* 170*   CALCIUM mg/dL 8.0* 8.1* 8.4*     Results from last 7 days   Lab Units 04/01/19  0448   TROPONIN T ng/mL <0.010     Results from last 7 days   Lab Units 04/03/19  0513 04/02/19  0429 03/31/19  2246   WBC 10*3/mm3 5.55 5.60 6.74   HEMOGLOBIN g/dL 9.4* 9.5* 10.7*   HEMATOCRIT % 30.3* 30.7* 34.5*   PLATELETS 10*3/mm3 275 253 229     Results from last 7 days   Lab Units 03/31/19  2135   INR  1.07                   I reviewed the patient's new clinical results.  I personally viewed and  interpreted the patient's EKG/Telemetry data        Medication Review:     amiodarone 200 mg Oral Q12H   apixaban 5 mg Oral Q12H   brimonidine 1 drop Both Eyes BID   cefepime 2 g Intravenous Q12H   donepezil 5 mg Oral QAM   dorzolamide 1 drop Both Eyes BID   enalapril 20 mg Oral Q12H   furosemide 40 mg Oral Daily   insulin lispro 0-14 Units Subcutaneous 4x Daily With Meals & Nightly   latanoprost 1 drop Both Eyes Nightly   pantoprazole 40 mg Oral QAM AC   tamsulosin 0.4 mg Oral QAM   timolol 1 drop Both Eyes Daily   vancomycin 1,500 mg Intravenous Q12H         Pharmacy to dose vancomycin        Assessment/Plan       Hypercholesterolemia    Hypertension    Iron deficiency anemia    Chronic anticoagulation-for Afib    Fever and chills    1. Acute Hypoxemic respiratory failure - improving.   2. Bilateral pleural effusions: doubt this is cardiac based.  Only trivial pericardial effusion per echo  3. Low albumen.   4. Atrial fib, paroxysmal:  amiodarone for PAF and AC    5. Diabetes, Type II  6.  S/p T4-T12 posterior spinal fusion.       Stable cardiac, will see as needed.            Sherice Newman MD  04/03/19  7:38 AM

## 2019-04-04 NOTE — PROGRESS NOTES
Post Operative Visit      Out of CCU. Up in chair. Doing well. Ambulated 200 ft with CGA with PT.          97.4, 58, 20, 143/80    AA&O x 3. Non-toxic appearing.   Thoracic incision looks okay.   No calf swelling or tenderness with palpation.       POD 10 T11 VCF wth hardware displacement and unstable thoracic kyphosis with T4-12 fusion by Dr. Shaver.   3 mos out from previous L3-5 lami by Dr. Redmond and subsequent T11-S1 fusion.         Dr. Knight also saw patient. He feels that patient is progressing as expected. He does not appreciate any evidence of infection on imaging or on exam.   Patient to f/u with [facility] for routine post op care.  Dr. Redmond will see patient again June 6, 2019 at 1:00 pm to check on his progress.

## 2019-04-04 NOTE — THERAPY TREATMENT NOTE
Acute Care - Physical Therapy Treatment Note  [facility]     Patient Name: Derrek Estrada  : 1940  MRN: [id number]  Today's Date: 2019  Onset of Illness/Injury or Date of Surgery: 19          Admit Date: 3/31/2019    Visit Dx:    ICD-10-CM ICD-9-CM   1. Fever and chills R50.9 780.60   2. Generalized weakness R53.1 780.79   3. Injury of head, initial encounter S09.90XA 959.01   4. Acute low back pain without sciatica, unspecified back pain laterality (1 week post op) M54.5 724.2   5. Osteomyelitis, unspecified site, unspecified type (CMS/HCC) T spine M86.9 730.20   6. Hypoxia R09.02 799.02   7. Pericardial effusion I31.3 423.9   8. Difficulty walking R26.2 719.7     Patient Active Problem List   Diagnosis   • Cholelithiasis   • DM (HgbA1c 5.9)   • Diverticulosis of intestine   • Hypercholesterolemia   • Hypertension   • Spinal stenosis   • Microalbuminuria   • Atrial fibrillation (CMS/HCC)   • Medicare annual wellness visit, subsequent   • Anemia   • Iron deficiency anemia   • DJD (degenerative joint disease)   • Impotence of organic origin   • Glaucoma   • Hiatal hernia   • Mild cognitive disorder   • Calculus of kidney   • Nocturia   • Chronic bilateral low back pain with bilateral sciatica   • Post laminectomy syndrome   • Routine health maintenance   • Weight loss   • Spinal stenosis of lumbar region with neurogenic claudication   • Spondylolisthesis of lumbar region   • Lumbar spine pain   • Metabolic encephalopathy   • Urinary retention with incomplete bladder emptying   • Macrocytosis without anemia   • Sundowning   • RBBB (right bundle branch block with left anterior fascicular block)   • Leg swelling   • Postural kyphosis of lumbar region   • H/O urinary retention   • Chronic anticoagulation-for Afib   • Fever and chills       Therapy Treatment    Rehabilitation Treatment Summary     Row Name 19 1150             Treatment Time/Intention    Discipline  physical therapy assistant   -      Document Type  therapy note (daily note)  -      Subjective Information  no complaints  -SM      Mode of Treatment  physical therapy  -SM      Patient Effort  good  -SM      Existing Precautions/Restrictions  fall  -SM      Recorded by [] Marisa La, [facility] 04/04/19 1431      Row Name 04/04/19 1150             Cognitive Assessment/Intervention- PT/OT    Orientation Status (Cognition)  oriented x 4  -SM      Follows Commands (Cognition)  WNL  -SM      Personal Safety Interventions  fall prevention program maintained;gait belt;nonskid shoes/slippers when out of bed  -SM      Recorded by [SM] Marisa La, [facility] 04/04/19 1431      Row Name 04/04/19 1150             Bed Mobility Assessment/Treatment    Bed Mobility Assessment/Treatment  supine-sit;sit-supine  -SM      Supine-Sit [place] (Bed Mobility)  minimum assist (75% patient effort)  -      Sit-Supine [place] (Bed Mobility)  minimum assist (75% patient effort)  -SM      Bed Mobility, Safety Issues  decreased use of arms for pushing/pulling;decreased use of legs for bridging/pushing  -SM      Assistive Device (Bed Mobility)  bed rails;head of bed elevated  -      Comment (Bed Mobility)  log roll  -SM      Recorded by [] Marisa La, [facility] 04/04/19 1431      Row Name 04/04/19 1150             Transfer Assessment/Treatment    Transfer Assessment/Treatment  sit-stand transfer;stand-sit transfer  -SM      Recorded by [] Marisa La, [facility] 04/04/19 1431      Row Name 04/04/19 1150             Sit-Stand Transfer    Sit-Stand [place] (Transfers)  contact guard  -      Assistive Device (Sit-Stand Transfers)  walker, front-wheeled  -      Recorded by [SM] Marisa La, [facility] 04/04/19 1431      Row Name 04/04/19 1150             Stand-Sit Transfer    Stand-Sit [place] (Transfers)  contact guard  -      Assistive Device (Stand-Sit Transfers)  walker, front-wheeled  -      Recorded by [SM] Abhinav  Marisa Culveree, JOSSUE 04/04/19 1431      Row Name 04/04/19 1150             Gait/Stairs Assessment/Training    [place] Level (Gait)  contact guard  -SM      Assistive Device (Gait)  walker, front-wheeled  -      Distance in Feet (Gait)  200  -SM      Pattern (Gait)  step-through  -SM      Deviations/Abnormal Patterns (Gait)  jody decreased;stride length decreased  -SM      Bilateral Gait Deviations  forward flexed posture  -SM      Recorded by [SM] Marisa La JOSSUE [place] 04/04/19 1431      Row Name 04/04/19 1150             Positioning and Restraints    Pre-Treatment Position  in bed  -SM      Post Treatment Position  bed  -SM      In Bed  supine;call light within reach;encouraged to call for assist;exit alarm on  -SM      Recorded by [SM] Angela Laah JOSSUE [place] 04/04/19 1431      Row Name 04/04/19 1150             Pain Scale: Numbers Pre/Post-Treatment    Pain Scale: Numbers, Pretreatment  0/10 - no pain  -SM      Recorded by [SM] Marisa aL PTA 04/04/19 1431      Row Name                Wound 01/09/19 0844 Other (See comments) back incision    Wound - Properties Group Date first assessed: 01/09/19 [JT] Time first assessed: 0844 [JT] Side: Other (See comments) [JT] Location: back [JT] Type: incision [JT] Recorded by:  [JT] Margie Bravo RN 01/09/19 0844    Row Name                Wound 04/01/19 0700 Bilateral coccyx pressure injury    Wound - Properties Group Date first assessed: 04/01/19 [JH] Time first assessed: 0700 [JH] Present On Admission : yes;picture taken [JH] Side: Bilateral [JH] Location: coccyx [JH] Type: pressure injury [JH] Stage, Pressure Injury: Stage 1 [JH] Recorded by:  [JH] Kym Mckeon RN 04/01/19 0754      User Key  (r) = Recorded By, (t) = Taken By, (c) = Cosigned By    Initials Name Effective Dates Discipline     Kym Mckeon RN 06/16/16 -  Nurse    Margie Sena RN 06/16/16 -  Nurse    LETTY Abhinav Marisa Jeniffer [facility] 03/07/18 -  PT          Wound 01/09/19  0844 Other (See comments) back incision (Active)   Dressing Appearance open to air 4/4/2019  9:49 AM   Closure Adhesive closure strips 4/4/2019  9:49 AM   Base clean;dry 4/4/2019  9:49 AM   Periwound intact 4/4/2019  9:49 AM   Periwound Temperature warm 4/4/2019  9:49 AM   Periwound Skin Turgor soft 4/4/2019  9:49 AM   Drainage Amount none 4/4/2019  9:49 AM   Care, Wound cleansed with;soap and water 4/4/2019  9:49 AM   Dressing Care, Wound open to air 4/4/2019  9:49 AM       Wound 04/01/19 0700 Bilateral coccyx pressure injury (Active)   Dressing Appearance open to air 4/4/2019  9:49 AM   Closure Open to air 4/4/2019  9:49 AM   Base clean;dry;moist;pink 4/4/2019  9:49 AM   Drainage Amount none 4/4/2019  9:49 AM   Periwound Care, Wound dry periwound area maintained 4/4/2019  9:49 AM           Physical Therapy Education     Title: PT OT SLP Therapies (In Progress)     Topic: Physical Therapy (In Progress)     Point: Mobility training (Done)     Learning Progress Summary           Patient Acceptance, E,TB, VU,NR by  at 4/4/2019  2:31 PM    Acceptance, E, VU,NR by MA at 4/3/2019  4:10 PM    Acceptance, E,TB,D, VU,NR by  at 4/2/2019  2:17 PM                   Point: Body mechanics (Done)     Learning Progress Summary           Patient Acceptance, E,TB, VU,NR by  at 4/4/2019  2:31 PM    Acceptance, E, VU,NR by MA at 4/3/2019  4:10 PM    Acceptance, E,TB,D, VU,NR by  at 4/2/2019  2:17 PM                   Point: Precautions (Done)     Learning Progress Summary           Patient Acceptance, E,TB, VU,NR by  at 4/4/2019  2:31 PM    Acceptance, E, VU,NR by MA at 4/3/2019  4:10 PM    Acceptance, E,TB,D, VU,NR by  at 4/2/2019  2:17 PM                               User Key     Initials Effective Dates Name Provider Type Discipline     04/03/18 -  Airam Mcknight, PT Physical Therapist PT    SM 03/07/18 -  Marisa La, PTA Physical Therapy Assistant PT    MA 10/19/18 -  Sia Linda, PT Physical Therapist  PT                PT Recommendation and Plan     Plan of Care Reviewed With: patient  Progress: improving  Outcome Summary: Pt tolerated treatment well this date. Ambulated 200ft w/ Rw and CGA. No complaints. PT will continue to address functional mobility deficits as tolerated.  Outcome Measures     Row Name 04/04/19 1400 04/03/19 1600 04/02/19 1400       How much help from another person do you currently need...    Turning from your back to your side while in flat bed without using bedrails?  3  -SM  3  -MA  3  -CH    Moving from lying on back to sitting on the side of a flat bed without bedrails?  3  -SM  3  -MA  3  -CH    Moving to and from a bed to a chair (including a wheelchair)?  3  -SM  3  -MA  3  -CH    Standing up from a chair using your arms (e.g., wheelchair, bedside chair)?  3  -SM  3  -MA  3  -CH    Climbing 3-5 steps with a railing?  2  -SM  2  -MA  1  -CH    To walk in hospital room?  3  -SM  3  -MA  3  -CH    AM-PAC 6 Clicks Score  17  -SM  17  -MA  16  -CH       Functional Assessment    Outcome Measure Options  AM-PAC 6 Clicks Basic Mobility (PT)  -SM  AM-PAC 6 Clicks Basic Mobility (PT)  -MA  AM-PAC 6 Clicks Basic Mobility (PT)  -      User Key  (r) = Recorded By, (t) = Taken By, (c) = Cosigned By    Initials Name Provider Type     Airam Mcknight, PT Physical Therapist    Marisa Ribeiro, JOSSUE Physical Therapy Assistant    Sia Wilcox, PT Physical Therapist         Time Calculation:   PT Charges     Row Name 04/04/19 1433             Time Calculation    Start Time  1150  -SM      Stop Time  1202  -SM      Time Calculation (min)  12 min  -SM      PT Received On  04/04/19  -      PT - Next Appointment  04/05/19  -        User Key  (r) = Recorded By, (t) = Taken By, (c) = Cosigned By    Initials Name Provider Type    Marisa Ribeiro PTA Physical Therapy Assistant        Therapy Charges for Today     Code Description Service Date Service Provider Modifiers Qty     [id number]  PT THER PROC EA 15 MIN 4/4/2019 Marisa La, PTA GP 1          PT G-Codes  Outcome Measure Options: AM-PAC 6 Clicks Basic Mobility (PT)  AM-PAC 6 Clicks Score: 17    Marisa La, PTA  4/4/2019

## 2019-04-04 NOTE — PROGRESS NOTES
"                                              LOS: 2 days   Patient Care Team:  Trey Stockton MD as PCP - General (Internal Medicine)  Sherice Newman MD as Consulting Physician (Cardiology)  Carey Pascual [facility] as Pharmacist  Geovanni Herron MD as Consulting Physician (Hematology and Oncology)  Sakina Martin APRN as Referring Physician (Cardiology)    Chief Complaint:  F/up possible osteomyelitis, respiratory failure medical problems listed below    Subjective   Interval History  I reviewed the admission note, PMH, PSH, Family hx, social history, imagings and prior records on this admission, summarized the finding in my note and formulated a transition of care plan.     Reported some abdominal pain today which improved after pain medications.  He said that the pain in his back has almost completely subsided.    REVIEW OF SYSTEMS:   CARDIOVASCULAR: No chest pain, chest pressure or chest discomfort. No palpitations or edema.   RESPIRATORY: No shortness of breath, cough or sputum.   Neuro: Denies numbness but reported weakness in his legs    Ventilator/Non-Invasive Ventilation Settings (From admission, onward)    None                Physical Exam:     Vital Signs  Temp:  [98.8 °F (37.1 °C)-99.6 °F (37.6 °C)] 99.3 °F (37.4 °C)  Heart Rate:  [65-73] 73  Resp:  [16] 16  BP: (114-144)/(62-76) 132/62    Intake/Output Summary (Last 24 hours) at 4/3/2019 2059  Last data filed at 4/3/2019 1305  Gross per 24 hour   Intake --   Output 575 ml   Net -575 ml     Flowsheet Rows      First Filed Value   Admission Height  190.5 cm (75\") Documented at 03/31/2019 1943   Admission Weight  92.5 kg (204 lb) Documented at 03/31/2019 2000          General Appearance:    Alert, cooperative, in no acute distress   HEENT:  Mallampati score 3, moist mucous membrane   Neck:   Large. Trachea midline. No thyromegaly.   Lungs:    Decreased air entry at the bases.  Mild crackles.  No wheezing.  Nonlabored breathing    Heart:   "  Regular rhythm and normal rate, normal S1 and S2, no            murmur   Skin:    No abnormalities observed   Abdomen:     Obese. Soft. No tenderness. No HSM.   Neuro:   Conscious, alert, oriented x3   Extremities:   Moves all extremities well, +2 edema, no cyanosis, no             Redness          Results Review:        Results from last 7 days   Lab Units 04/03/19  0513 04/02/19  0429 03/31/19  2246   SODIUM mmol/L 142 143 142   POTASSIUM mmol/L 3.4* 3.7 3.8   CHLORIDE mmol/L 103 106 103   CO2 mmol/L 31.8* 27.1 26.4   BUN mg/dL 19 18 32*   CREATININE mg/dL 0.53* 0.49* 0.77   GLUCOSE mg/dL 136* 110* 170*   CALCIUM mg/dL 8.0* 8.1* 8.4*     Results from last 7 days   Lab Units 04/01/19  0448   TROPONIN T ng/mL <0.010     Results from last 7 days   Lab Units 04/03/19  0513 04/02/19  0429 03/31/19  2246   WBC 10*3/mm3 5.55 5.60 6.74   HEMOGLOBIN g/dL 9.4* 9.5* 10.7*   HEMATOCRIT % 30.3* 30.7* 34.5*   PLATELETS 10*3/mm3 275 253 229     Results from last 7 days   Lab Units 03/31/19  2135   INR  1.07           I reviewed the patient's new clinical results.  I personally viewed and interpreted the patient's CXR        Medication Review:     amiodarone 200 mg Oral Q12H   apixaban 5 mg Oral Q12H   brimonidine 1 drop Both Eyes BID   cefepime 2 g Intravenous Q8H   donepezil 5 mg Oral QAM   dorzolamide 1 drop Both Eyes BID   enalapril 20 mg Oral Q12H   furosemide 40 mg Oral Daily   insulin lispro 0-14 Units Subcutaneous 4x Daily With Meals & Nightly   latanoprost 1 drop Both Eyes Nightly   pantoprazole 40 mg Oral QAM AC   potassium chloride 40 mEq Oral Daily   [START ON 4/4/2019] tamsulosin 0.4 mg Oral Daily   timolol 1 drop Both Eyes Daily   vancomycin 1,750 mg Intravenous Q12H         Pharmacy to dose vancomycin        Diagnostic imaging:  I personally and independently reviewed the following images:  CXR 4/2/19:  Bilateral pulmonary edema and congestion.  Bilateral pleural effusion      Assessment   1. Acute hypoxemic  respiratory failure, resolved  2. Pleural effusions left greater than right  3. Basilar atelectasis  4. Pulmonary vascular edema/congestion  5. Pericardial effusion  6. Thoracic osteomyelitis ?  7. Paroxysmal atrial fibrillation  8. Type 2 diabetes  9. Status post fall at rehab  10. Spinal stenosis  11. Hypertension  12. Hyperlipidemia  13. Hypokalemia, NEW  14. Chronic anemia  15. Protein calorie malnutrition, albumin 3      Plan     · Vancomycin and cefepime per ID for possible osteomyelitis.  Plan for 6-8 weeks of therapy.  · Noted the plan to have Dr. Mooney on board for second opinion  · Continue Lasix  · Eliquis for A. Fib  · Replace potassium and check BMP in a.m.    Patient's wife had a lot of questions regarding the treatment/diagnosis.  I answered her questions to the best of my knowledge and informed her that the consultant will weigh in their input again tomorrow.  She was concerned about canceling her upcoming vacation on May 18 and also about an appointment with refinancing company tomorrow.        Jada Balbuena MD  04/03/19  8:59 PM      Time: More than 50% of time spent in counseling and coordination of care:  Total face-to-face/floor time 38 min.  Time spent in counseling 20 min. Counseling included the following topics: duration of AB therapy, questionable diagnosis of osteomyelitis, feasability of outpatient AB therapy, issues with prior admission to [facility], diagnostic accuracy of imagings for osteomyelitis      This note was dictated utilizing Dragon dictation

## 2019-04-04 NOTE — PROGRESS NOTES
New patient or new problem visit    CC: Thoracic back pain    HPI: He is seen today at request of Dr. Redmond and Dr. Balbuena for concerns of postoperative infection.  He has a complicated history of having undergone T11-L4 instrumented fusion laminectomy and pedicle subtraction osteotomy in January of this year.  He did very well and for 8 weeks had a stellar recovery but then was seen at last visit to have fractured T11 and redeveloped significant kyphosis at this level.  I requested help from the [facility] spine [facility] were Dr. Hoyos on March 25 performed T3 to T the 11 fusion with instrumentation.  Patient was discharged home 3 days afterward and on the following day fell and presented to [facility] where he was discharged and ultimately he returned to [facility] where he was admitted.  He did have some low-grade fevers early in the admission but has had no wound drainage and has expected postoperative pain.  He did have some heaviness in the legs which were initially weak but quickly improved postoperatively.    PFSH: See attached    ROS: See attached    PE: On exam he sitting up at the bedside awake alert and oriented and answers my questions appropriately his posture is improved from when I last saw him although he has significant rounding of the shoulders.  The wound is healing very nicely with no evidence of fluctuance induration or drainage.  There remains a nylon suture in the skin of at least the lower aspect of the wound.  In the lower extremities he has bilateral edema but good push pull strength in the ankles and hands intact sensation.    XRAY: I reviewed his x-rays and CT scans of the cervical and thoracic spine.  Plain film x-rays demonstrated excellent correction of the kyphosis and what would appear to be an eggshell corpectomy of T11 with complete correction of the kyphosis.  He has pedicle screw instrumentation up to T3.  CT scans confirm what would appear to be a central defect at T11 which I  believe is a corpectomy site and then some bone graft in the midline at L3 which accounts for the concern for irregularities there.  I see no evidence at all of infection and this looks like a normal postoperative x-ray and CT scan.  Reviewed the radiologist report the description of which I agree with but not the conclusions.    Other: n/a    Impression: He appears to have a normal postoperative course for extensive correction of thoracolumbar kyphosis with T3-T11 instrumented extension of the fusion and eggshell corpectomy at T11    Plan: He will follow-up with the [facility] spine [place] and may be discharged when he is medically ready.

## 2019-04-04 NOTE — TELEPHONE ENCOUNTER
----- Message from Silvia CALIXTO Radha sent at 4/4/2019  8:36 AM EDT -----  [phone]  Home  Till 10:30      [phone] cell  After that    Pt is inpatient at [facility].  Wife wants someone to call her before 10:30.  Related to has appt on Monday and suggestions they made at the hospital, wants to check and see what Dr. Herron thinks.      Pt's wife calling because he is in the hospital right now following a fall and may be discharged soon to a rehab facility. Pt is supposed to see Dr. Herron with possible injectafer on 4/8. Pt's wife does not think he will be able to make this apt since he will have just gotten out of the hospital. She does not know if he would want to move this apt out or have someone see him while he is in the hospital. D/W Dr. Herron. Per Dr. Herron, pt needs to be consulted while in the hospital by our group. Informed Yoanna who will contact the hospital to get the consult. Pt's wife informed that our group would be consulted.

## 2019-04-04 NOTE — PROGRESS NOTES
Continued Stay Note  [facility]     Patient Name: Derrek Estrada  MRN: [id number]  Today's Date: 4/4/2019    Admit Date: 3/31/2019    Discharge Plan     Row Name 04/04/19 0841       Plan    Plan  [facility]    Plan Comments  Pt accepted to return to [facility].  Pt will need [facility] pre cert prior to return to [facility]          Discharge Codes    No documentation.             Shikha Laureano RN

## 2019-04-04 NOTE — PROGRESS NOTES
Continued Stay Note  [facility]     Patient Name: Derrek Estrada  MRN: [id number]  Today's Date: 4/4/2019    Admit Date: 3/31/2019    Discharge Plan     Row Name 04/04/19 1525       Plan    Plan  [facility]  vs Home    Plan Comments  Spoke to pt wife  Pt ambulating 200 feet and is not going to receive IV antibiotics.  She will agree to  if pre cert is not obtained          Discharge Codes    No documentation.             Shikha Laureano RN     Prescriptions sent to pharmacy.

## 2019-04-04 NOTE — CONSULTS
Subjective     REASON FOR CONSULTATION:  Provide an opinion on any further workup or treatment on:    Iron deficiency anemia                       REQUESTING PHYSICIAN: Marty Paiz MD      HISTORY OF PRESENT ILLNESS:      Derrek Estrada is a 79 y.o. patient who was admitted on 3/31/2019.       The patient has a history of iron deficiency anemia. He was recently seen by Dr. Herron at our office.  His iron deficiency was attributed in part to recent surgical blood losses. He was on oral iron but did not tolerate it.  Therefore, he was treated with IV Injectafer x 2 doses given on 2/28/19 and 3/7/19. Oral iron was discontinued. He tolerated the infusions well. However, he did not feel better from energy standpoint.     The patient was admitted on 3/31/19 with a fall at rehab. He states that he stood up and started going to the bathroom. His legs were weak and he fell. He did not hit his head. After the fall, he started complaining of head and neck pain.  He came to the ER and was having shortness of breath. CT angiogram showed no PE. ECHO was requested to evaluate for pericardial effusion. The amount was considered to be trivial.      There was initially a concern that there was infection in the back. He was seen by ID and Ortho. Their impression is that there is no evidence of an infection.     Past Medical History:   Diagnosis Date   • Acute suppurative otitis media    • Acute upper respiratory infection    • Anemia    • Cholelithiasis    • Diabetes mellitus (CMS/HCC)     TYPE 2   • Diverticulosis    • Dry eyes    • Earache    • ED (erectile dysfunction)    • Edema    • Encounter for screening for malignant neoplasm of prostate    • Esophagitis, reflux    • Glaucoma    • H/O echocardiogram 09/18/2013   • Health care maintenance    • Hiatal hernia    • History of EKG 10/06/2015   • History of Holter monitoring 09/16/2013   • Hyperlipidemia    • Hypertension    • Lumbar canal stenosis    • Microalbuminuria     • Mild cognitive impairment    • Mitral valve prolapse    • Nephrolithiasis    • Osteoarthritis of hand     of thumb   • PAF (paroxysmal atrial fibrillation) (CMS/HCC)    • 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   • Spinal stenosis        Past Surgical History:   Procedure Laterality Date   • AMPUTATION DIGIT Left 10/29/2016    Procedure: TENDON AND NERVE REPAIR OF  LEFT THUMB;  Surgeon: Zak Hanson MD;  Location: [facility] OR;  Service:    • ARTHRODESIS  10/28/2013    Spinal / Description: Repair spinal stenosis   • BLEPHAROPLASTY  2000, 05/2002   • CATARACT EXTRACTION     • EYE SURGERY  2011    EYE MUSCLE SX   • HERNIA REPAIR Right 03/2010    inguinal   • LUMBAR DISCECTOMY FUSION INSTRUMENTATION N/A 1/29/2018    Procedure: L2 to L5 fusion with instrumentation and removal of implants L4 5;  Surgeon: Barron Knight MD;  Location: [facility] OR;  Service:    • LUMBAR DISCECTOMY FUSION INSTRUMENTATION N/A 1/9/2019    Procedure: T 11 to L3, L5-S1 fusion with T11 to S1 instrumentation, L4 pedicle subtraction osteotomy, Right L5-S1 interbody fusion with cage;  Surgeon: Barron Knight MD;  Location: [facility] OR;  Service: Orthopedic Spine   • LUMBAR FUSION  2018    Lumbar vertebral fusion   • LUMBAR LAMINECTOMY      10.28.13  bilat L4/5 lami with Nyla and Belen   • LUMBAR LAMINECTOMY DISCECTOMY DECOMPRESSION N/A 1/29/2018    Procedure: L2 to L4 laminectomy with a fusion by orthopedics;  Surgeon: Jeison Redmond MD;  Location: [facility] OR;  Service:    • LUMBAR LAMINECTOMY DISCECTOMY DECOMPRESSION N/A 1/9/2019    Procedure: L4-L5, L5-S1 LAMINECTOMY WITH NEURAL LYSIS;  Surgeon: Jeison Redmond MD;  Location: [facility] OR;  Service: Neurosurgery   • OTHER SURGICAL HISTORY  2008    Lithotomy   • TONSILLECTOMY     • VASECTOMY  09/2008         SCHEDULED MEDS:    amiodarone 200 mg Oral Q12H   apixaban 5 mg Oral Q12H   brimonidine 1 drop Both Eyes BID    donepezil 5 mg Oral QAM   dorzolamide 1 drop Both Eyes BID   enalapril 20 mg Oral Q12H   furosemide 40 mg Oral Daily   insulin lispro 0-14 Units Subcutaneous 4x Daily With Meals & Nightly   latanoprost 1 drop Both Eyes Nightly   pantoprazole 40 mg Oral QAM AC   potassium chloride 40 mEq Oral Daily   tamsulosin 0.4 mg Oral Daily   timolol 1 drop Both Eyes Daily     INFUSIONS:     PRN MEDS:  •  acetaminophen **OR** acetaminophen  •  aluminum-magnesium hydroxide-simethicone  •  bisacodyl  •  bisacodyl  •  dextrose  •  dextrose  •  fentaNYL citrate (PF)  •  glucagon (human recombinant)  •  ipratropium-albuterol  •  ondansetron **OR** ondansetron ODT **OR** ondansetron  •  oxyCODONE-acetaminophen  •  [COMPLETED] Insert peripheral IV **AND** sodium chloride    ALLERGIES:  Allergies   Allergen Reactions   • Beta Adrenergic Blockers Other (See Comments)     Bradycardia        Social History     Socioeconomic History   • Marital status:      Spouse name: Silvia   • Number of children: 3   • Years of education: College   • Highest education level: Not on file   Occupational History   • Occupation: Teacher      Employer: RETIRED   Tobacco Use   • Smoking status: Never Smoker   • Smokeless tobacco: Never Used   • Tobacco comment: caffeine use   Substance and Sexual Activity   • Alcohol use: No     Frequency: Never   • Drug use: No   • Sexual activity: Defer       Cancer-related family history is not on file.    REVIEW OF SYSTEMS:   GENERAL:  fatigue. Feels cold.  SKIN: Negative for skin rash or lesion.  HEME/LYMPH: easy bruisability.  RESPIRATORY:  shortness of breath.    CVS:  Negative for chest pain. He has lower extremity swelling.   GI:  Negative for abdominal pain, nausea, vomiting, hematochezia or melena.   :  Negative for hematuria.   MUSCULOSKELETAL:  back pain.  PSYCHIATRIC:  Negative for anxiety or depression.     Objective   VITAL SIGNS:  Vitals:    04/04/19 0524 04/04/19 0737 04/04/19 1339 04/04/19 1537    BP:  144/72 143/80    BP Location:  Right arm Right arm    Patient Position:  Lying Sitting    Pulse:  70 58 60   Resp:  20 20    Temp:  98.9 °F (37.2 °C) 97.4 °F (36.3 °C)    TempSrc:  Oral Oral    SpO2:       Weight: 84.1 kg (185 lb 4.8 oz)      Height:           Wt Readings from Last 3 Encounters:   04/04/19 84.1 kg (185 lb 4.8 oz)   03/06/19 86.2 kg (190 lb)   02/27/19 94.8 kg (208 lb 14.4 oz)       PHYSICAL EXAMINATION:   GENERAL:  The patient appears weak, not in acute distress.  SKIN: Warm and dry. No skin rashes. No ecchymosis or petechiae.  HEAD:  Normocephalic.  EYES:  No Jaundice. Pallor. Pupils equal. EOMI.  NECK:  Supple. No Thyromegaly. No Masses.  LYMPHATICS:  No cervical or supraclavicular lymphadenopathy.  CHEST: Normal respiratory effort. Decreased breath sounds at the bases.   CARDIAC:  Normal S1 & S2. No murmurs. +2 edema distal aspect of the legs.  ABDOMEN:  Soft. No tenderness. No Hepatomegaly. No Splenomegaly.   EXTREMITIES:  No clubbing. No Calf tenderness. Incision in the thoracic spine is C/D/I.  NEUROLOGICAL:  No Focal neurological deficits.          RESULT REVIEW:   Results from last 7 days   Lab Units 04/04/19 0419 04/03/19 0513 04/02/19 0429 03/31/19 2246   WBC 10*3/mm3 5.35 5.55 5.60 6.74   NEUTROS ABS 10*3/mm3  --   --   --  5.41   HEMOGLOBIN g/dL 9.1* 9.4* 9.5* 10.7*   HEMATOCRIT % 29.6* 30.3* 30.7* 34.5*   PLATELETS 10*3/mm3 295 275 253 229     Results from last 7 days   Lab Units 04/04/19 0419 04/03/19 0513 04/02/19 0429 03/31/19  2246   SODIUM mmol/L 145 142 143 142   POTASSIUM mmol/L 3.8 3.4* 3.7 3.8   CHLORIDE mmol/L 109* 103 106 103   CO2 mmol/L 28.1 31.8* 27.1 26.4   BUN mg/dL 19 19 18 32*   CREATININE mg/dL 0.54* 0.53* 0.49* 0.77   CALCIUM mg/dL 8.6 8.0* 8.1* 8.4*   ALBUMIN g/dL  --   --   --  3.00*   BILIRUBIN mg/dL  --   --   --  0.3   ALK PHOS U/L  --   --   --  93   ALT (SGPT) U/L  --   --   --  21   AST (SGOT) U/L  --   --   --  41*     Results from last 7 days    Lab Units 03/31/19  2135   INR  1.07     Lab Results   Component Value Date    FERRITIN 47.40 02/27/2019    FERRITIN 10.02 (L) 08/07/2018    FERRITIN 17.10 (L) 08/23/2016    IRON 51 (L) 02/27/2019    IRON 97 08/23/2016    IRON 90 07/11/2016    TIBC 368 02/27/2019    TIBC 436 12/19/2018    TIBC 427 08/07/2018     Lab Results   Component Value Date    FOLATE >20.00 02/27/2019    FOLATE >20.00 12/19/2018    FOLATE 16.42 02/01/2018     Lab Results   Component Value Date    UGNWAHXP71 362 02/27/2019    WUCTYQVB84 411 12/19/2018    PHIPSVGN26 259 02/01/2018     Results from last 7 days   Lab Units 03/31/19  2246   CRP mg/dL 15.42*     Component      Latest Ref Rng & Units 12/19/2018 2/27/2019   IgG      700 - 1600 mg/dL  758   IgA      61 - 437 mg/dL  370   IgM      15 - 143 mg/dL  62   Total Protein      6.0 - 8.5 g/dL 6.2 6.1   Albumin      2.9 - 4.4 g/dL 3.4 3.1   Alpha-1-Globulin      0.0 - 0.4 g/dL 0.2 0.3   Alpha-2-Globulin      0.4 - 1.0 g/dL 0.8 0.9   Beta Globulin      0.7 - 1.3 g/dL 1.0 1.0   Gamma Globulin      0.4 - 1.8 g/dL 0.8 0.8   M-Edward      Not Observed g/dL Not Observed Not Observed   Globulin      2.2 - 3.9 g/dL 2.8 3.0   A/G Ratio      0.7 - 1.7 1.2 1.1   Immunofixation Reflex, Serum        Comment   Please Note       Comment Comment   Kappa FLC      3.3 - 19.4 mg/L  28.8 (H)   Lambda FLC      5.7 - 26.3 mg/L  26.4 (H)   Kappa/Lambda Ratio      0.26 - 1.65  1.09   SPE CONCLUSION       Comment        Assessment/Plan   1.  Iron deficiency anemia. The patient had a recent extensive Orthopedic surgery and had blood losses as a result. He was treated with oral iron but did not tolerate it. He was given Injectafer x 2 doses for a total of 1500 mg.   He tolerated the infusions well but he did not feel better afterwards. The fall was attributed by the patient to feeling of weakness in the legs and this may be attributed to the anemia.   His hemoglobin is at 9.1 today.     2.  History of borderline vitamin  B12 level. This may be also contributing to the anemia. Folate level is good.     3.  Atrial fibrillation. He is on chronic anticoagulation with Eliquis 5 mg BID. No clinical evidence of bleeding.     PLAN:    1.  Repeat Ferritin and iron panel today. If his levels are low, we will give Venofer during this hospital stay.     2.  We will obtain B12 and methylmalonic acid levels.       Susie Polo MD  04/04/19

## 2019-04-04 NOTE — PLAN OF CARE
Problem: Patient Care Overview  Goal: Plan of Care Review  Outcome: Ongoing (interventions implemented as appropriate)   04/04/19 1432   Coping/Psychosocial   Plan of Care Reviewed With patient   Plan of Care Review   Progress improving   OTHER   Outcome Summary Pt tolerated treatment well this date. Ambulated 200ft w/ Rw and CGA. No complaints. PT will continue to address functional mobility deficits as tolerated.

## 2019-04-04 NOTE — PROGRESS NOTES
LOS: 3 days     Chief Complaint:  Follow-up ? Back infection    Interval History:  No acute events. No fevers. BCx remain negative. Ortho spine reviewed films and does not think any infection present which is consistent with ID opinion yesterday. SOA resolved.     ROS; no n/v/d    Vital Signs  Temp:  [98.8 °F (37.1 °C)-99.3 °F (37.4 °C)] 98.9 °F (37.2 °C)  Heart Rate:  [66-75] 70  Resp:  [16-20] 20  BP: (115-144)/(59-76) 144/72    Physical Exam:  General: In no acute distress  Cardiovascular: NR, RR, + LE edema   Respiratory: normal WOB on RA  GI: Soft, NT/ND, + bowel sounds bilaterally  Skin: No rashes    Antibiotics:  •  cefepime (MAXIPIME) 2 g/100 mL 0.9% NS (mbp), 2 g, Intravenous, Q8H, Marty Paiz MD, 2 g at 04/04/19 0513  •  vancomycin 1750 mg/500 mL 0.9% NS IVPB ([facility]), 1,750 mg, Intravenous, Q12H, Marty Paiz MD, 1,750 mg at 04/04/19 0047    LABS:  CBC, BMP, CRP, micro reviewed today  Lab Results   Component Value Date    WBC 5.35 04/04/2019    HGB 9.1 (L) 04/04/2019    HCT 29.6 (L) 04/04/2019    MCV 95.2 04/04/2019     04/04/2019     Lab Results   Component Value Date    GLUCOSE 91 04/04/2019    BUN 19 04/04/2019    CREATININE 0.54 (L) 04/04/2019    EGFRIFNONA 147 04/04/2019    EGFRIFAFRI 121 06/11/2018    BCR 35.2 (H) 04/04/2019    CO2 28.1 04/04/2019    CALCIUM 8.6 04/04/2019    PROTENTOTREF 6.1 02/27/2019    ALBUMIN 3.00 (L) 03/31/2019    LABIL2 1.1 02/27/2019    AST 41 (H) 03/31/2019    ALT 21 03/31/2019    CRP 15.42 (H) 03/31/2019     Lab Results   Component Value Date    VANCOTROUGH 8.80 04/03/2019       Microbiology:  4/1 BCx NGTD x 2  3/31 RVP neg    Radiology (personally reviewed):   No new imaging for ID    Assessment/Plan   1. Acute hypoxic respiratory failure - resolved     2. Lumbar and thoracic spine changes on CT- reviewed by ortho spine who feels changes on imaging consistent with post-operative changes. This is consistent with our group's opinion over the past 2  days. Blood cultures are negative. Stop vancomycin and cefepime.      I discussed with the patient's son. ID will sign off at this time.

## 2019-04-05 NOTE — PLAN OF CARE
Problem: Skin Injury Risk (Adult)  Goal: Skin Health and Integrity  Outcome: Ongoing (interventions implemented as appropriate)      Problem: Fall Risk (Adult)  Goal: Absence of Fall  Outcome: Ongoing (interventions implemented as appropriate)      Problem: Patient Care Overview  Goal: Plan of Care Review  Outcome: Ongoing (interventions implemented as appropriate)    Goal: Individualization and Mutuality  Outcome: Ongoing (interventions implemented as appropriate)    Goal: Discharge Needs Assessment  Outcome: Ongoing (interventions implemented as appropriate)      Problem: Wound (Includes Pressure Injury) (Adult)  Goal: Signs and Symptoms of Listed Potential Problems Will be Absent, Minimized or Managed (Wound)  Outcome: Ongoing (interventions implemented as appropriate)      Problem: Pain, Acute (Adult)  Goal: Acceptable Pain Control/Comfort Level  Outcome: Ongoing (interventions implemented as appropriate)

## 2019-04-05 NOTE — PLAN OF CARE
Problem: Skin Injury Risk (Adult)  Goal: Skin Health and Integrity  Outcome: Ongoing (interventions implemented as appropriate)      Problem: Fall Risk (Adult)  Goal: Absence of Fall  Outcome: Ongoing (interventions implemented as appropriate)      Problem: Patient Care Overview  Goal: Plan of Care Review  Outcome: Ongoing (interventions implemented as appropriate)   04/04/19 1958   Coping/Psychosocial   Plan of Care Reviewed With patient   Plan of Care Review   Progress improving   OTHER   Outcome Summary Pt still c/o increased pain in back from recent surgery. No signs of infection noted. Antibiotics discontinued. Amb in jc with walker. Plan for d/c back t   04/04/19 1958   Coping/Psychosocial   Plan of Care Reviewed With patient   Plan of Care Review   Progress improving   OTHER   Outcome Summary Pt still c/o increased pain in back from recent surgery. No signs of infection noted. Antibiotics discontinued. Amb in jc with walker. Plan for d/c back to Rehab soon.    o Rehab soon.      Goal: Individualization and Mutuality  Outcome: Ongoing (interventions implemented as appropriate)    Goal: Discharge Needs Assessment  Outcome: Ongoing (interventions implemented as appropriate)    Goal: Interprofessional Rounds/Family Conf  Outcome: Ongoing (interventions implemented as appropriate)      Problem: Wound (Includes Pressure Injury) (Adult)  Goal: Signs and Symptoms of Listed Potential Problems Will be Absent, Minimized or Managed (Wound)  Outcome: Ongoing (interventions implemented as appropriate)      Problem: Pain, Acute (Adult)  Goal: Acceptable Pain Control/Comfort Level  Outcome: Ongoing (interventions implemented as appropriate)

## 2019-04-05 NOTE — DISCHARGE SUMMARY
DISCHARGE SUMMARY    Patient Name: Derrek Estrada  Age/Sex: 79 y.o. male  : 1940  MRN: [id number]  Patient Care Team:  Trey Stockton MD as PCP - General (Internal Medicine)  Sherice Newman MD as Consulting Physician (Cardiology)  Carey Pascual [facility] as Pharmacist  Geovanni Herron MD as Consulting Physician (Hematology and Oncology)  Sakina Martin APRN as Referring Physician (Cardiology)       Date of Admit: 3/31/2019  Date of Discharge:  19  Discharge Condition: Good    Discharge Diagnoses:  1. Acute hypoxemic respiratory failure, resolved  2. Pleural effusions left greater than right  3. Basilar atelectasis  4. Pulmonary vascular edema/congestion  5. Pericardial effusion  6. 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 Paroxysmal atrial fibrillation  8. Type 2 diabetes  9. Status post fall at rehab  10. Spinal stenosis  11. Hypertension  12. Hyperlipidemia  13. Hypokalemia, replaced  14. Chronic anemia  15. Protein calorie malnutrition, albumin 3  16. Deconditioning      History of present illness from H&P from 3/31/2019: per Dr. Paiz  79-year-old male presents to the emergency room after a fall at rehab with complaints of neck and head pain.  No loss of consciousness.  Has chronic back pain with sciatica.  Has thoracic spine osteomyelitis and started on antibiotics in the emergency room.  Was going to be admitted to the hospitalist service when his oxygen saturations became a problem in the emergency room requiring increased supplemental oxygen.  ER staff was worried about a potential acute PE and order a CT PE protocol which did not show evidence of pulmonary embolism but showed significant pericardial effusion.  With tenuous status plan to admit to the intensive care unit for closer monitoring.  Getting stat 2D echo and consulting cardiology for effusion.    Hospital Course:   Patient was initially admitted to the intensive care unit.  He had significant hypoxemia  which was believed to be secondary to pleural effusion and atelectasis.  He did not have a PE.  He had some pericardial effusion on the CT of the chest but no significant effusion on the echocardiogram and this did not explain his symptoms.  There was a question shane of osteomyelitis after his recent surgery.  This was raised on his CT imaging.  However, clinically he did not have symptoms or signs consistent with osteomyelitis.  He was placed on antibiotic therapy briefly for that but then infectious disease, neurosurgery and orthopedic surgery felt that the changes on the CT are rather related to postop abnormalities.  Antibiotics were discontinued.    Patient was deconditioned during his stay and requires rehab.    Few changes were made to his medications.  Lasix was switched from 20 mg as needed to 40 mg daily.  He was also transitioned from warfarin to Eliquis per cardiology recommendations.    Consults:   IP CONSULT TO INTERNAL MEDICINE  IP CONSULT TO PULMONOLOGY  IP CONSULT TO CARDIOLOGY  IP CONSULT TO CASE MANAGEMENT   IP CONSULT TO CARDIOLOGY  IP CONSULT TO INFECTIOUS DISEASES  IP CONSULT TO NEUROSURGERY  IP CONSULT TO ORTHOPEDIC SURGERY  IP CONSULT TO HEMATOLOGY AND ONCOLOGY    Significant Discharge Diagnostics   Procedures Performed:         Pertinent Lab Results:  Results from last 7 days   Lab Units 04/04/19 0419 04/03/19 0513 04/02/19 0429 03/31/19  2246   SODIUM mmol/L 145 142 143 142   POTASSIUM mmol/L 3.8 3.4* 3.7 3.8   CHLORIDE mmol/L 109* 103 106 103   CO2 mmol/L 28.1 31.8* 27.1 26.4   BUN mg/dL 19 19 18 32*   CREATININE mg/dL 0.54* 0.53* 0.49* 0.77   GLUCOSE mg/dL 91 136* 110* 170*   CALCIUM mg/dL 8.6 8.0* 8.1* 8.4*   AST (SGOT) U/L  --   --   --  41*   ALT (SGPT) U/L  --   --   --  21     Results from last 7 days   Lab Units 04/01/19  0448   TROPONIN T ng/mL <0.010     Results from last 7 days   Lab Units 04/04/19 0419 04/03/19 0513 04/02/19 0429 03/31/19  2246   WBC  10*3/mm3 5.35 5.55 5.60 6.74   HEMOGLOBIN g/dL 9.1* 9.4* 9.5* 10.7*   HEMATOCRIT % 29.6* 30.3* 30.7* 34.5*   PLATELETS 10*3/mm3 295 275 253 229   MCV fL 95.2 94.4 94.8 94.8   MCH pg 29.3 29.3 29.3 29.4   MCHC g/dL 30.7* 31.0* 30.9* 31.0*   RDW % 16.4* 16.3* 16.5* 16.8*   RDW-SD fl 57.9* 57.2* 57.7* 58.8*   MPV fL 10.1 10.2 10.4 10.0   NEUTROPHIL % %  --   --   --  80.4*   LYMPHOCYTE % %  --   --   --  9.3*   MONOCYTES % %  --   --   --  7.4   EOSINOPHIL % %  --   --   --  1.3   BASOPHIL % %  --   --   --  0.3   IMM GRAN % %  --   --   --  1.3*   NEUTROS ABS 10*3/mm3  --   --   --  5.41   LYMPHS ABS 10*3/mm3  --   --   --  0.63*   MONOS ABS 10*3/mm3  --   --   --  0.50   EOS ABS 10*3/mm3  --   --   --  0.09   BASOS ABS 10*3/mm3  --   --   --  0.02   IMMATURE GRANS (ABS) 10*3/mm3  --   --   --  0.09*   NRBC /100 WBC  --   --   --  0.0     Results from last 7 days   Lab Units 03/31/19  2135   INR  1.07                 Results from last 7 days   Lab Units 04/01/19  0448   TSH mIU/mL 2.020     Results from last 7 days   Lab Units 04/01/19  0258   PH, ARTERIAL pH units 7.456*   PCO2, ARTERIAL mm Hg 38.6   PO2 ART mm Hg 181.0*   HCO3 ART mmol/L 27.2     Results from last 7 days   Lab Units 04/02/19  0429 03/31/19  2246   PROCALCITONIN ng/mL 0.27* 0.56*   LACTATE mmol/L  --  1.7     Results from last 7 days   Lab Units 04/01/19  0220 03/31/19  2246   SED RATE mm/hr 55*  --    CRP mg/dL  --  15.42*         Results from last 7 days   Lab Units 04/01/19  0218 04/01/19  0017   BLOODCX  No growth at 4 days No growth at 4 days     Results from last 7 days   Lab Units 03/31/19  2327   NITRITE UA  Negative     Results from last 7 days   Lab Units 03/31/19  2331   ADENOVIRUS DETECTION BY PCR  Not Detected   CORONAVIRUS 229E  Not Detected   CORONAVIRUS HKU1  Not Detected   CORONAVIRUS NL63  Not Detected   CORONAVIRUS OC43  Not Detected   HUMAN METAPNEUMOVIRUS  Not Detected   HUMAN RHINOVIRUS/ENTEROVIRUS  Not Detected   INFLUENZA B  PCR  Not Detected   PARAINFLUENZA 1  Not Detected   PARAINFLUENZA VIRUS 2  Not Detected   PARAINFLUENZA VIRUS 3  Not Detected   PARAINFLUENZA VIRUS 4  Not Detected   BORDETELLA PERTUSSIS PCR  Not Detected   CHLAMYDOPHILA PNEUMONIAE PCR  Not Detected   MYCOPLAMA PNEUMO PCR  Not Detected   INFLUENZA A PCR  Not Detected   INFLUENZA A H3  Not Detected   INFLUENZA A H1  Not Detected   RSV, PCR  Not Detected           Imaging Results:  Imaging Results (all)     Procedure Component Value Units Date/Time    XR Spine Thoracic 2 View [[id number]] Collected:  04/02/19 2017     Updated:  04/02/19 2027    Narrative:       TWO-VIEW THORACIC SPINE     HISTORY: Evaluate recent fusion hardware in thoracic spine.     FINDINGS: The patient has had very extensive multilevel fusion in the  thoracic and lumbar spine with interlocking posterior plates and pedicle  screws extending from T4 to S1. There are no pedicle screws at T11 which  shows evidence of fragmentation and fracture and mottled changes which  could relate to osteomyelitis. There is mild compression of the  vertebral body. This appearance is unchanged from yesterday's chest CT  scan.     There is also slight compression of the T3 vertebral body above the  multilevel fusion that is unchanged.     There is a moderately large left pleural effusion with adjacent dense  atelectasis at the left base as also noted on yesterday's CT scan. There  is a very small right pleural effusion that is better seen on the CT  scan.     This report was finalized on 4/2/2019 8:24 PM by Dr. Mike Colon M.D.       XR Chest 1 View [[id number]] Collected:  04/02/19 0406     Updated:  04/02/19 0411    Narrative:       PORTABLE CHEST RADIOGRAPH     HISTORY: Pleural effusions     COMPARISON: 03/31/2019     FINDINGS:  Cardiomegaly is identified. There is worsening vascular congestion.  There are bilateral pleural effusions, left greater than right. No  pneumothorax is seen. Bibasilar consolidation has  increased.  Postsurgical changes of the thoracic spine are noted.       Impression:       Worsening vascular congestion, as well as increasing consolidation at  the lung bases.     This report was finalized on 4/2/2019 4:08 AM by Dr. Melanie Martinez M.D.       CT Angiogram Chest With Contrast [[id number]] Collected:  04/01/19 0344     Updated:  04/01/19 0349    Narrative:       CT ANGIOGRAM THORAX WITH CONTRAST, PULMONARY EMBOLISM PROTOCOL     HISTORY: Pulmonary embolism.     COMPARISON: 1:56 AM.     TECHNIQUE: Radiation dose reduction techniques were utilized, including  automated exposure control and exposure modulation based on body size.  Axial contrast-enhanced images of the chest were obtained according to  the pulmonary embolism protocol. Coronal oblique 3-D MIP reformatted  images were supplemented and reviewed.  100 mls of non ionic contrast  was utilized intravenously.     FINDINGS CHEST CT: No filling defects are identified in the pulmonary  arteries to suggest pulmonary embolism. Previously described effusions,  pulmonary parenchymal, and osseous findings are unchanged in the short  interim. Uncomplicated cholelithiasis incidentally noted also.             Impression:       1. No evidence of pulmonary embolism     This report was finalized on 4/1/2019 3:46 AM by Tod Turner M.D.       CT Chest Without Contrast [[id number]] Collected:  04/01/19 0222     Updated:  04/01/19 0247    Narrative:       THORACIC CT SCAN WITHOUT CONTRAST     HISTORY: Further eval for PNA; R50.9-Fever, unspecified; R53.1-Weakness;  W19.XXXA-Unspecified fall, initial encounter; S09.90XA-Unspecified  injury of head, initial encounter; M54.5-Low back pain     COMPARISON: None.     TECHNIQUE:  Radiation dose reduction techniques were utilized, including  automated exposure control and exposure modulation based on body size.  Axial images of the thorax obtained without IV contrast, per request.     FINDINGS: There are bilateral  freely layering pleural effusions. The  left is larger, moderate in size. There are adjacent lower lobe  consolidations with air bronchograms which are favored to be related to  areas of secondary atelectasis. There is some ill-defined airspace  disease posteriorly in the left upper lobe as well, images 14-36 which  may be related to atelectasis as well but pneumonia cannot be excluded.     Borderline cardiomegaly with moderate to large pericardial effusion  present. Aorta is nonaneurysmal. No obvious adenopathy by noncontrast  technique.  Unenhanced images of the included upper abdomen shows  several small gallstones dependently in a nondistended gallbladder.     Bone window images show extensive postsurgical changes from posterior  thoracolumbar fusion. With the exception of T11, there are bilateral  pedicle screws from T4-T12 with posterior fusion rods bilaterally. This  produces metallic artifact. There is extensive lytic destruction of the  more central aspect of the T11 vertebral body with sclerotic margins  most likely related to osteomyelitis. Cortical defects in the pedicles  suggest that there were prior pedicle screws at this level which have  been removed, presumably related to the suspected osteomyelitis. There  is minimal height loss of the T11 vertebra, approximately 10%. There is  slight bulging of the posterior margin of the lower T11 vertebra into  the central canal, approximately 2-3 mm contributing to some mild canal  narrowing. The remainder of the pedicle screws appear to be intact and  well secured considering the aforementioned artifact.     As was noted on the cervical spine CT from 03/31/2019, some soft tissue  air as well as calcification/osseous densities are present in the  posterior paraspinous tissues near the upper margin of the fusion  hardware centered near midline, mostly about the T3 spinous process and  surrounding tissues. There is clearly some lytic destruction of the  T3  spinous process mostly along its more dorsal aspect which is suspicious  for osteomyelitis. The air in the adjacent paraspinous tissues is likely  related to cellulitis. A definite abscess not appreciated by noncontrast  technique.     Case discussed with Dr Tinoco, by telephone, extension [phone], during  interpretation at 2:38 AM          Impression:       1. Left greater than right pleural effusions with dependent densities in  both lungs as discussed. Atelectasis is favored although a component of  superimposed pneumonia cannot be excluded.  2. Moderate to large pericardial effusion.  3. Uncomplicated cholelithiasis.  4. Postsurgical changes in the thoracolumbar spine as discussed. Osseous  findings of T3 and T11 most consistent with osteomyelitis with a  probable area of cellulitis surrounding the T3 spinous process                    .      This report was finalized on 4/1/2019 2:43 AM by Tod Truner M.D.       XR Chest 2 View [[id number]] Collected:  03/31/19 2305     Updated:  03/31/19 2309    Narrative:       PA AND LATERAL CHEST X-RAY     HISTORY: FUO     COMPARISON: 01/31/2018.     FINDINGS: PA and lateral views of the chest were obtained. The lungs are  fairly well inflated. Somewhat hazy, groundglass opacities are present  in the lung bases with pleural thickening posteriorly, left greater than  right likely combination of airspace disease and layering effusions.  Stable cardiomegaly. Normal vascularity. Interval postsurgical changes  are noted in the thoracic spine from multilevel fusion.             Impression:       Left greater than right basilar predominant opacities,  likely airspace disease and layering effusions. Recommend follow-up to  ensure resolution     This report was finalized on 3/31/2019 11:06 PM by Tod Turner M.D.       XR Spine Lumbar 4+ View [[id number]] Collected:  03/31/19 2203     Updated:  03/31/19 2211    Narrative:       5 VIEW LUMBAR SPINE     CLINICAL HISTORY: Trauma.      COMPARISON: 01/11/2019     FINDINGS: Standard five-view lumbar spine series was performed.  This  included lateral, AP, bilateral oblique, and cone-down lateral views.     Bilateral pedicle screws are again seen from T12-L3 and L5-S1 with  bilateral fusion rods posteriorly. An interbody spacer is present at the  lumbosacral junction, also unchanged. Additional fusion hardware has  been placed traversing a large portion of the mid and lower thoracic  spine as well. No hardware complication is evident. There is minimal  retrolisthesis at L3-4, approximately 3 mm. There is approximately 3-4  mm of anterolisthesis at L4-5. These findings appear stable. A mild to  moderate compression deformity of the L4 vertebra along its superior  endplate appears stable compared to previous. Maximum height loss is  approximately 30%. A much milder endplate deformity of L3 superiorly  appears stable as well. No acute fracture is demonstrated. Mild to  moderate multilevel degenerative disc disease changes are present and  appear similar to previous. Oblique views are unremarkable. There is  generalized osteopenia. Lumbar skin staples have been removed.             Impression:       1. There've been interval postsurgical changes in the thoracic spine  since the previous study. The lumbar fusion hardware appears stable as  do chronic appearing lumbar vertebra deformities of L3 and L4. No acute  osseous finding or hardware complication demonstrated     This report was finalized on 3/31/2019 10:07 PM by Tod Turner M.D.       XR Hip With or Without Pelvis 2 - 3 View Left [[id number]] Collected:  03/31/19 2202     Updated:  03/31/19 2208    Narrative:       THREE-VIEW LEFT HIP     HISTORY: Fall with left hip pain     FINDINGS: Three views of the left hip were submitted. There is no  fracture or dislocation. Generalized osteopenia. There are vascular  calculi. Surgical clips noted to the right of midline.             Impression:       1.  No acute osseous abnormality.     This report was finalized on 3/31/2019 10:03 PM by Tod Turner M.D.       CT Cervical Spine Without Contrast [[id number]] Collected:  03/31/19 2106     Updated:  03/31/19 2115    Narrative:       NONCONTRAST CT SCAN CERVICAL SPINE     CLINICAL HISTORY: Trauma with neck pain     COMPARISON: None.     TECHNIQUE: Radiation dose reduction techniques were utilized, including  automated exposure control and exposure modulation based on body size.  Axial noncontrast images of the cervical spine were obtained without  contrast. Sagittal reformatted images were supplemented.     FINDINGS:  No acute vertebral fracture identified on the axial series. .     There is generalized lordotic curvature of the entire cervical spine on  the sagittal reformatted images. The cervical vertebrae are well aligned  with respect to their adjacent vertebra.  No significant compression  deformity or retropulsion.     Multilevel degenerative changes are present. These are most pronounced  at C6-7. Mild multilevel facet disease is present more pronounced in the  upper and mid cervical levels. Marginal osteophytes and broad-based disc  osteophyte complexes contribute to multilevel canal and foraminal  narrowing.     Postsurgical changes are noted in the encompassed portion of the upper  thoracic spine. Some soft tissue calcifications and soft tissue air is  noted in the posterior paraspinous tissues. This is of uncertain  etiology. Infectious process not excluded.. There is partial  visualization of left greater than right pleural effusions.          Impression:       1. No acute cervical spine fracture.  2. Postsurgical changes in the upper thoracic spine with some soft  tissue calcifications and soft tissue air as discussed.  3. Left greater than right pleural effusions are incidentally noted     This report was finalized on 3/31/2019 9:12 PM by Tod Turner M.D.       CT Head Without Contrast [[id number]]  Collected:  03/31/19 2059     Updated:  03/31/19 2106    Narrative:       CRANIAL CT SCAN WITHOUT CONTRAST     CLINICAL HISTORY: Trauma with ??? LOC, HA, Excessive fatigue, and neck  pain.  R/o ICH.     COMPARISON: 01/31/2018.     TECHNIQUE: Radiation dose reduction techniques were utilized, including  automated exposure control and exposure modulation based on body size.  Multiple axial images of the head were obtained without contrast.      FINDINGS:  Stable small right basal ganglia calcifications. Generalized  atrophy. There is a small partially calcified extra-axial mass in the  left middle cranial fossa abutting the left temporal tip likely a tiny  meningioma. It measures approximately 8 mm. It is better appreciated on  previous MRI. Regardless it appears stable.. No hydrocephalus. Bone  window images are unremarkable.                Impression:       1. No acute intracranial abnormality.   2. Stable tiny partially calcified extra-axial mass in the left middle  cranial fossa, likely tiny meningioma                 This report was finalized on 3/31/2019 9:03 PM by Tod Turner M.D.             Objective:   Temp:  [97.4 °F (36.3 °C)-98.8 °F (37.1 °C)] 98.3 °F (36.8 °C)  Heart Rate:  [52-81] 62  Resp:  [18-20] 20  BP: (139-152)/(63-80) 139/71   SpO2:  [95 %] 95 %  on    Device (Oxygen Therapy): room air    Intake/Output Summary (Last 24 hours) at 4/5/2019 1127  Last data filed at 4/4/2019 2312  Gross per 24 hour   Intake 420 ml   Output 580 ml   Net -160 ml     Body mass index is 23.79 kg/m².      04/02/19  1414 04/03/19  0648 04/04/19  0524   Weight: 94 kg (207 lb 3.2 oz) 85.4 kg (188 lb 4.8 oz) 84.1 kg (185 lb 4.8 oz)     Weight change:     Physical Exam:  General Appearance:    Alert, cooperative, in no acute distress   HEENT:  Mallampati score 3, moist mucous membrane   Neck:   Large. Trachea midline. No thyromegaly.   Lungs:    Decreased air entry at the bases.  Mild crackles.  No wheezing.  Nonlabored  breathing    Heart:    Regular rhythm and normal rate, normal S1 and S2, no            murmur   Skin:    No abnormalities observed   Abdomen:     Obese. Soft. No tenderness. No HSM.   Neuro:   Conscious, alert, oriented x3   Extremities:   Moves all extremities well, +2 edema, no cyanosis, no             Redness             Discharge Medications and Instructions:     Discharge Medications     Discharge Medications      New Medications      Instructions Start Date   apixaban 5 MG tablet tablet  Commonly known as:  ELIQUIS   5 mg, Oral, Every 12 Hours Scheduled      potassium chloride 10 MEQ CR capsule  Commonly known as:  MICRO-K   40 mEq, Oral, Daily   Start Date:  4/6/2019        Changes to Medications      Instructions Start Date   amiodarone 200 MG tablet  Commonly known as:  PACERONE  What changed:  when to take this   200 mg, Oral, Every 12 Hours Scheduled      enalapril 20 MG tablet  Commonly known as:  VASOTEC  What changed:    · how much to take  · when to take this   20 mg, Oral, 2 Times Daily      furosemide 40 MG tablet  Commonly known as:  LASIX  What changed:    · medication strength  · how much to take  · additional instructions   40 mg, Oral, Daily   Start Date:  4/6/2019     metFORMIN 1000 MG tablet  Commonly known as:  GLUCOPHAGE  What changed:    · how much to take  · additional instructions   Take one am, 1/2 noon, and 1 pm      oxyCODONE-acetaminophen  MG per tablet  Commonly known as:  PERCOCET  What changed:  Another medication with the same name was removed. Continue taking this medication, and follow the directions you see here.   1 tablet, Oral, Every 4 Hours PRN         Continue These Medications      Instructions Start Date   atorvastatin 10 MG tablet  Commonly known as:  LIPITOR   10 mg, Oral, Nightly      brimonidine 0.2 % ophthalmic solution  Commonly known as:  ALPHAGAN   1 drop, Both Eyes, 2 Times Daily      brimonidine-timolol 0.2-0.5 % ophthalmic solution  Commonly known as:   COMBIGAN   1 drop, Every 12 Hours      calcium carbonate 500 MG chewable tablet  Commonly known as:  TUMS   2 tablets, Oral, 3 Times Daily PRN      cholecalciferol 30872 units capsule  Commonly known as:  VITAMIN D3   2,000 Units, Oral, Daily      cyclobenzaprine 10 MG tablet  Commonly known as:  FLEXERIL   10 mg, Oral, 3 Times Daily PRN      cycloSPORINE 0.05 % ophthalmic emulsion  Commonly known as:  RESTASIS   1 drop, 2 Times Daily      docusate sodium 100 MG capsule  Commonly known as:  COLACE   100 mg, Oral, 2 Times Daily      donepezil 10 MG tablet  Commonly known as:  ARICEPT   5 mg, Oral, Every Morning      dorzolamide 2 % ophthalmic solution  Commonly known as:  TRUSOPT   1 drop, Both Eyes, 2 Times Daily      esomeprazole 40 MG capsule  Commonly known as:  nexIUM   40 mg, Oral, Daily      ferrous sulfate 325 (65 FE) MG tablet  Commonly known as:  IRON SUPPLEMENT   325 mg, Oral, Daily With Breakfast      freestyle lancets   Use to test blood sugar daily      ACCU-CHEK FASTCLIX LANCETS misc   USE TO TEST TWICE DAILY      FREESTYLE LITE device   Use to test blood once daily      ACCU-CHEK FRANCISCO SMARTVIEW w/Device kit   USE TO TEST BLOOD SUGAR TWICE DAILY      gabapentin 100 MG capsule  Commonly known as:  NEURONTIN   300 mg, Oral, 3 Times Daily      glimepiride 2 MG tablet  Commonly known as:  AMARYL   2 mg, Oral, Every Morning Before Breakfast      Glucose Blood disk   1 Device, Injection, Daily, TEST BLOOD SUGAR ONCE DAILY AS DIRECTED      ONE TOUCH ULTRA TEST test strip  Generic drug:  glucose blood   USE ONCE DAILY AS DIRECTED      glucose blood test strip  Commonly known as:  FREESTYLE LITE   Use to test blood once daily      ACCU-CHEK SMARTVIEW test strip  Generic drug:  glucose blood   USE TO TEST TWICE DAILY      latanoprost 0.005 % ophthalmic solution  Commonly known as:  XALATAN   1 drop, Both Eyes, Nightly      pioglitazone 45 MG tablet  Commonly known as:  ACTOS   45 mg, Oral, Every Morning       polyethyl glycol-propyl glycol 0.4-0.3 % solution ophthalmic solution  Commonly known as:  SYSTANE   1 drop, 2 Times Daily PRN      sennosides-docusate sodium 8.6-50 MG tablet  Commonly known as:  SENOKOT-S   1 tablet, Oral, 2 Times Daily      simvastatin 20 MG tablet  Commonly known as:  ZOCOR   20 mg, Oral, Nightly      tamsulosin 0.4 MG capsule 24 hr capsule  Commonly known as:  FLOMAX   0.4 mg, Oral, Every Morning, 1-2 TABS AS NEEDED      timolol 0.5 % ophthalmic solution  Commonly known as:  TIMOPTIC   1 drop, Both Eyes, Daily         Stop These Medications    warfarin 10 MG tablet  Commonly known as:  COUMADIN     warfarin 5 MG tablet  Commonly known as:  COUMADIN            Discharge Diet:    Dietary Orders (From admission, onward)    Start     Ordered    04/01/19 1800  Dietary Nutrition Supplements Boost Glucose Control (Glucerna Shake); chocolate  Daily With Lunch & Dinner     Comments:  Mix with yasmeen   Question Answer Comment   Select Supplement: Boost Glucose Control (Glucerna Shake)    Flavor: chocolate        04/01/19 1211    04/01/19 1211  Diet Regular; Consistent Carbohydrate  Diet Effective Now     Question Answer Comment   Diet Texture / Consistency Regular    Common Modifiers Consistent Carbohydrate        04/01/19 1211          Activity at Discharge:   as tolerated    Discharge disposition: Skilled    Discharge Instructions and Follow ups:     Contact information for follow-up providers     Jeison Redmond MD Follow up on 6/6/2019.    Specialty:  Neurosurgery  Why:  at 1:00 pm  Contact information:  [address]  [place] [id number] [phone]             Trey Stockton MD .    Specialty:  Internal Medicine  Contact information:  [address] [place] [id number] [phone]                   Contact information for after-discharge care     Destination     [facility] Follow up.    Service:  Skilled Nursing  Contact information:  [address]  [facility] [address]  [phone]                           Future Appointments   Date Time Provider Department Center   5/9/2019 11:00 AM Sherice Newman MD [facility] None   6/6/2019  1:00 PM Jeison Redmond MD [facility] None   6/12/2019  8:45 AM Trey Stockton MD [facility] None        Medication Reconciliation: Please see electronically completed Med Rec.    Total time spent discharging patient including evaluation, medication reconciliation, arranging follow up, and post hospitalization instructions and education total time exceeds 30 minutes.     Jada Balbuena MD  04/05/19  11:27 AM      Dictated utilizing Dragon dictation

## 2019-04-05 NOTE — PROGRESS NOTES
Subjective     CHIEF COMPLAINT:     Anemia    HISTORY OF PRESENT ILLNESS:    The patient states he feels the same today. No new c/c.      SCHEDULED MEDS:    amiodarone 200 mg Oral Q12H   apixaban 5 mg Oral Q12H   brimonidine 1 drop Both Eyes BID   cyanocobalamin 1,000 mcg Intramuscular Daily   donepezil 5 mg Oral QAM   dorzolamide 1 drop Both Eyes BID   enalapril 20 mg Oral Q12H   furosemide 40 mg Oral Daily   latanoprost 1 drop Both Eyes Nightly   pantoprazole 40 mg Oral QAM AC   potassium chloride 40 mEq Oral Daily   tamsulosin 0.4 mg Oral Daily   timolol 1 drop Both Eyes Daily     INFUSIONS:     PRN MEDS:  [COMPLETED] Insert peripheral IV **AND** sodium chloride       Objective   VITAL SIGNS:  Vitals:    04/04/19 2312 04/05/19 0739 04/05/19 0900 04/05/19 1348   BP: 139/71      BP Location: Right arm      Patient Position: Sitting      Pulse: 81 52 62 56   Resp: 20 20  20   Temp: 98.8 °F (37.1 °C) 98.3 °F (36.8 °C)  98.7 °F (37.1 °C)   TempSrc: Oral Oral  Oral   SpO2:  95%  96%   Weight:       Height:           Wt Readings from Last 3 Encounters:   04/04/19 84.1 kg (185 lb 4.8 oz)   03/06/19 86.2 kg (190 lb)   02/27/19 94.8 kg (208 lb 14.4 oz)       PHYSICAL EXAMINATION:  GENERAL:  The patient appears in fair general condition, not in acute distress.  SKIN: Warm and dry. No skin rashes. No ecchymosis.   HEAD:  Normocephalic.  EYES:  No Jaundice. Pallor.     RESULT REVIEW:   Results from last 7 days   Lab Units 04/04/19  0419 04/03/19  0513 04/02/19  0429 03/31/19  2246   WBC 10*3/mm3 5.35 5.55 5.60 6.74   NEUTROS ABS 10*3/mm3  --   --   --  5.41   HEMOGLOBIN g/dL 9.1* 9.4* 9.5* 10.7*   HEMATOCRIT % 29.6* 30.3* 30.7* 34.5*   PLATELETS 10*3/mm3 295 275 253 229     Lab Results   Component Value Date    FERRITIN 583.00 (H) 04/04/2019    FERRITIN 47.40 02/27/2019    FERRITIN 10.02 (L) 08/07/2018    FERRITIN 17.10 (L) 08/23/2016    FERRITIN 11.9 (L) 07/11/2016    IRON 39 (L) 04/04/2019    IRON 51 (L) 02/27/2019    IRON  97 08/23/2016    IRON 90 07/11/2016    IRON 56 (L) 06/10/2016    TIBC 167 (L) 04/04/2019    TIBC 368 02/27/2019    TIBC 436 12/19/2018    TIBC 427 08/07/2018    TIBC 336 08/23/2016     Lab Results   Component Value Date    FOLATE >20.00 02/27/2019    FOLATE >20.00 12/19/2018    FOLATE 16.42 02/01/2018     Lab Results   Component Value Date    GBRLYSID54 289 04/05/2019    BSEVOUTE67 362 02/27/2019    RJLBWBGK42 411 12/19/2018    ACGCOTNS78 259 02/01/2018    JUTEJBHL20 318 07/11/2016         Assessment/Plan   1.  Iron deficiency anemia. The patient had a recent extensive Orthopedic surgery and had blood losses as a result. He was treated with oral iron but did not tolerate it. He was given Injectafer x 2 doses for a total of 1500 mg.       His hemoglobin is at 9.1 today. Iron stores are adequate. He does not need additional iron infusion at this point.      2.  Mild B12 deficiency. B12 level is lower end of normal.      3.  Atrial fibrillation. He is on chronic anticoagulation with Eliquis 5 mg BID. Platelet count is normal.      PLAN:     1.  Start B12 1000 mcg IM weekly. He will receive a dose today and continue at the subacute rehab once weekly. I would also recommend PO B12 1000 mcg daily.      2.  Patient does not need additional IV iron at this point.     3.  Follow up with Dr. Herron in 1 month with CBC, Ferritin and iron panel.          Susie Polo MD  04/05/19

## 2019-04-05 NOTE — PROGRESS NOTES
Continued Stay Note  [facility]     Patient Name: Derrek Estrada  MRN: [id number]  Today's Date: 4/5/2019    Admit Date: 3/31/2019    Discharge Plan     Row Name 04/05/19 0745       Plan    Plan  Mary    Plan Comments  Spoke to Kenna/Viviana.  Mary is requesting pre cert. 4/4        Discharge Codes    No documentation.             Shikha Laureano RN

## 2019-04-05 NOTE — PROGRESS NOTES
"                                              LOS: 3 days   Patient Care Team:  Trey Stockton MD as PCP - General (Internal Medicine)  Sherice Newman MD as Consulting Physician (Cardiology)  Carey Pascual [facility] as Pharmacist  Geovanni Herron MD as Consulting Physician (Hematology and Oncology)  Sakina Martin APRN as Referring Physician (Cardiology)    Chief Complaint:  F/up possible osteomyelitis, respiratory failure medical problems listed below    Subjective   Interval History  Doing well.  No new complaint with exception of back pain.    Ventilator/Non-Invasive Ventilation Settings (From admission, onward)    None                Physical Exam:     Vital Signs  Temp:  [97.4 °F (36.3 °C)-98.9 °F (37.2 °C)] 98.8 °F (37.1 °C)  Heart Rate:  [58-81] 81  Resp:  [18-20] 20  BP: (139-152)/(63-80) 139/71    Intake/Output Summary (Last 24 hours) at 4/4/2019 4876  Last data filed at 4/4/2019 2312  Gross per 24 hour   Intake 540 ml   Output 1505 ml   Net -965 ml     Flowsheet Rows      First Filed Value   Admission Height  190.5 cm (75\") Documented at 03/31/2019 1943   Admission Weight  92.5 kg (204 lb) Documented at 03/31/2019 2000          General Appearance:    Alert, cooperative, in no acute distress   HEENT:  Mallampati score 3, moist mucous membrane   Neck:   Large. Trachea midline. No thyromegaly.   Lungs:    Decreased air entry at the bases.  Mild crackles.  No wheezing.  Nonlabored breathing    Heart:    Regular rhythm and normal rate, normal S1 and S2, no            murmur   Skin:    No abnormalities observed   Abdomen:     Obese. Soft. No tenderness. No HSM.   Neuro:   Conscious, alert, oriented x3   Extremities:   Moves all extremities well, +2 edema, no cyanosis, no             Redness          Results Review:        Results from last 7 days   Lab Units 04/04/19  0419 04/03/19  0513 04/02/19  0429   SODIUM mmol/L 145 142 143   POTASSIUM mmol/L 3.8 3.4* 3.7   CHLORIDE mmol/L 109* 103 106 "   CO2 mmol/L 28.1 31.8* 27.1   BUN mg/dL 19 19 18   CREATININE mg/dL 0.54* 0.53* 0.49*   GLUCOSE mg/dL 91 136* 110*   CALCIUM mg/dL 8.6 8.0* 8.1*     Results from last 7 days   Lab Units 04/01/19  0448   TROPONIN T ng/mL <0.010     Results from last 7 days   Lab Units 04/04/19  0419 04/03/19  0513 04/02/19  0429   WBC 10*3/mm3 5.35 5.55 5.60   HEMOGLOBIN g/dL 9.1* 9.4* 9.5*   HEMATOCRIT % 29.6* 30.3* 30.7*   PLATELETS 10*3/mm3 295 275 253     Results from last 7 days   Lab Units 03/31/19  2135   INR  1.07           I reviewed the patient's new clinical results.  I personally viewed and interpreted the patient's CXR        Medication Review:     amiodarone 200 mg Oral Q12H   apixaban 5 mg Oral Q12H   brimonidine 1 drop Both Eyes BID   donepezil 5 mg Oral QAM   dorzolamide 1 drop Both Eyes BID   enalapril 20 mg Oral Q12H   furosemide 40 mg Oral Daily   insulin lispro 0-14 Units Subcutaneous 4x Daily With Meals & Nightly   latanoprost 1 drop Both Eyes Nightly   pantoprazole 40 mg Oral QAM AC   potassium chloride 40 mEq Oral Daily   tamsulosin 0.4 mg Oral Daily   timolol 1 drop Both Eyes Daily            Diagnostic imaging:  I personally and independently reviewed the following images:  CXR 4/2/19:  Bilateral pulmonary edema and congestion.  Bilateral pleural effusion      Assessment   1. Acute hypoxemic respiratory failure, resolved  2. Pleural effusions left greater than right  3. Basilar atelectasis  4. Pulmonary vascular edema/congestion  5. Pericardial effusion  6. Thoracic osteomyelitis, ruled out  7. Paroxysmal atrial fibrillation  8. Type 2 diabetes  9. Status post fall at rehab  10. Spinal stenosis  11. Hypertension  12. Hyperlipidemia  13. Hypokalemia, replaced  14. Chronic anemia  15. Protein calorie malnutrition, albumin 3      Plan     · Of antibiotics per ID  · Consult hematology.  Patient was scheduled for iron infusion    Disposition: Rehab versus home with home health.  Awaiting resort        Rami  MD Esha  04/04/19  11:56 PM          This note was dictated utilizing Dragon dictation

## 2019-04-05 NOTE — PROGRESS NOTES
Case Management Discharge Note    Final Note: [facility]     Destination - Selection Complete      Service Provider Request Status Selected Services Address Phone Number Fax Number    [facility] Selected Skilled Nursing [address] [facility], [facility] [address] [phone]      Durable Medical Equipment      No service has been selected for the patient.      Dialysis/Infusion      No service has been selected for the patient.      Home Medical Care      No service has been selected for the patient.      Therapy      No service has been selected for the patient.      Community Resources      No service has been selected for the patient.        Transportation Services  W/C Van: Other    Final Discharge Disposition Code: 03 - skilled nursing facility (SNF)

## 2019-04-05 NOTE — PROGRESS NOTES
Continued Stay Note  [facility]     Patient Name: Derrek Estrada  MRN: [id number]  Today's Date: 4/5/2019    Admit Date: 3/31/2019    Discharge Plan     Row Name 04/05/19 1124       Plan    Plan  [facility]    Plan Comments  Pt has pre cert  Dr Balbuena notified          Discharge Codes    No documentation.             Shikha Laureano RN

## 2019-04-05 NOTE — PLAN OF CARE
Problem: Skin Injury Risk (Adult)  Goal: Skin Health and Integrity  Outcome: Outcome(s) achieved Date Met: 04/05/19      Problem: Fall Risk (Adult)  Goal: Absence of Fall  Outcome: Outcome(s) achieved Date Met: 04/05/19      Problem: Patient Care Overview  Goal: Plan of Care Review  Outcome: Outcome(s) achieved Date Met: 04/05/19 04/05/19 1340   Coping/Psychosocial   Plan of Care Reviewed With patient   Plan of Care Review   Progress improving   OTHER   Outcome Summary Pt stable. D/C to Rehab.     Goal: Individualization and Mutuality  Outcome: Outcome(s) achieved Date Met: 04/05/19    Goal: Discharge Needs Assessment  Outcome: Outcome(s) achieved Date Met: 04/05/19    Goal: Interprofessional Rounds/Family Conf  Outcome: Outcome(s) achieved Date Met: 04/05/19      Problem: Wound (Includes Pressure Injury) (Adult)  Goal: Signs and Symptoms of Listed Potential Problems Will be Absent, Minimized or Managed (Wound)  Outcome: Outcome(s) achieved Date Met: 04/05/19      Problem: Pain, Acute (Adult)  Goal: Acceptable Pain Control/Comfort Level  Outcome: Outcome(s) achieved Date Met: 04/05/19

## 2019-04-11 NOTE — TELEPHONE ENCOUNTER
----- Message from Makeda De La Rosa sent at 4/11/2019  9:31 AM EDT -----  Contact: [phone]  Silvia   Has question for a nurse.

## 2019-04-11 NOTE — PROGRESS NOTES
This visit is for documentation purposes only: Patient is now on Eliquis per hospital discharge. No longer following in the [facility]. It has been a pleasure being part of his care.

## 2019-04-11 NOTE — TELEPHONE ENCOUNTER
S/w pt. At [facility] (pt. Cell # [phone])  States his wife called here to get the b12 injections straightened out.   Informed pt. According to dr. moore's progress note on 3/31/19, he is to get the b12 1,000mcg im wkly and b12 1,000mcg daily.  S/w pt. Nurse, Alphonse ([facility] # [phone])  Verbal orders given to him. R/V.

## 2019-04-19 NOTE — TELEPHONE ENCOUNTER
----- Message from Makeda De La Rosa sent at 4/19/2019  9:55 AM EDT -----  Contact: [phone]  Asking if patient needs to come for B12 injections?  Out of  Rehab and needs order for weekly B 12 shots. E scribed to pharmacy.

## 2019-04-22 NOTE — TELEPHONE ENCOUNTER
----- Message from Danica Burns sent at 4/22/2019 12:25 PM EDT -----  PATIENT FORGOT TO MENTION HE HAS HAD SPELLS OF STOMACH SICKNESS, HAD ONE WHILE DRAWING LABS. HE WOULD LIKE TO SPEAK WITH YOU.    PATIENT [phone]    The patient currently denies having any feeling of sickness to stomach and he does not recall having any sickness to stomach when they bryn the labs.

## 2019-04-22 NOTE — PROGRESS NOTES
Subjective Chief complaint is follow-up after hospitalization for back surgery  Derrek Estrada is a 79 y.o. male.     History of Present Illness   Derrek is here today for follow-up.  He had back surgery in March.  He has had some complications since then.  Initially when discharged from surgery he went home.  He did not do well at home.  He was then admitted to [facility].  While at [facility] he fell and injured his neck.  He was done at [facility].  He has now been released from there.  He is experiencing a pressure ulcer on his buttock.  They have been using triple antibiotic ointment.  He is not having any fever or chills.  He did have a questionable pericardial effusion on his CT scan.  This was read as a large pericardial effusion.  However after some diuresis his echocardiogram showed very trivial pericardial effusion and no interference with his ejection fraction.  He did very few his numerous medications.  He is taking metformin now 500 mg 3 times daily.  He often misses the lunchtime or midday dose.  Did have some problems with hypokalemia during the hospitalization.  He is on both furosemide and potassium now.  He was also initiated on B12 injections for some chronic anemia.  He has received 2 B12 injections one week apart.  He is to receive 2 more before going to monthly.  Current outpatient and discharge medications have been reconciled for the patient.  Reviewed by: Trey Stockton MD    The following portions of the patient's history were reviewed and updated as appropriate: allergies, current medications, past family history, past medical history, past social history, past surgical history and problem list.    Review of Systems   Respiratory: Negative for chest tightness and shortness of breath.    Cardiovascular: Negative for chest pain.   Musculoskeletal: Positive for back pain.       Objective   Physical Exam   Cardiovascular: Normal rate and normal heart sounds.  Exam reveals no friction rub.   Pulmonary/Chest: Effort normal and breath sounds normal. No stridor. No respiratory distress. He has no wheezes.   Musculoskeletal: He exhibits no edema.   Skin:   There is a pressure ulceration on the right buttock near the midline of the coccyx.  This has some granulation tissue in it.   Nursing note and vitals reviewed.        Assessment/Plan   Derrek was seen today for follow-up and meds check.    Diagnoses and all orders for this visit:    Pressure injury, stage 2, unspecified location  -     Ambulatory Referral to Wound Clinic    Anemia, unspecified type    B12 deficiency  -     cyanocobalamin injection 1,000 mcg  -     cyanocobalamin injection 1,000 mcg    Hypokalemia  -     Basic Metabolic Panel    Other orders  -     gabapentin (NEURONTIN) 100 MG capsule; Take 3 capsules by mouth 3 (Three) Times a Day.    Letter is here today for follow-up.  We are going to check his potassium today.  He is going to continue to get some B12 injections here.  He will get them weekly x2 doses and then monthly.  We are going to refer him to wound care for the pressure ulceration.

## 2019-05-01 NOTE — PROGRESS NOTES
Subjective Patient generally feeling improved.    REASON FOR CONSULTATION: Anemia  Provide an opinion on any further workup or treatment                             REQUESTING PHYSICIAN: Trey Stockton MD    RECORDS OBTAINED:  Records of the patients history including those obtained from the referring provider were reviewed and summarized in detail.    HISTORY OF PRESENT ILLNESS:  The patient is a 79 y.o. year old male who is here for an opinion about the above issue.    History of Present Illness   Patient is a 79-year-old male followed by primary care with diabetes mellitus, hypertension, atrial fibrillation and iron deficiency anemia.  He remains on anticoagulation for atrial fibrillation and has been seen by orthopedic surgery with persistent pain in the back and legs with a previous history of lumbar fusion and a concern thereafter of possibly a nonunion.  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  As he prepared for surgery in mid December there was some concern about his anemia and additional testing was done with H&H of 9.8 and 31.9, white count of 4110 and platelet count of 264,000 December 19 and iron profile revealed an iron of 53, saturation of 12%, unremarkable protein electrophoresis.  He was admitted January 8 felt to be a candidate for operative repair and was seen by neurosurgery January 9 undergoing T11-L3, L5-S1 fusion with T11-S1 instrumentation, L4 pedicle subtraction osteotomy and right L5-S1 interbody fusion with cage, L4-L5, L5-S1 laminectomy with neural lysis.     Hematologically during this time he dropped his hemoglobin to as low as 7.5 g% on January 9, January 12 8.5 g% in January 30 9.3 g%.  Additional testing January 30 include a BUN and creatinine of 14.71, sodium 147, albumin 3.1, alk phos 143 with normal transaminases.  We have discussed his schedule and timing thus far, apparent response to iron but intolerance to oral iron with abdominal discomfort  and constipation.  The patient was given IV iron trial as a visit in late February 2019.  He was admitted March 31 with a fall at rehab and his hospitalization was complicated by worsening respiratory status.  He had degree of hypoxemia but fortunately responded to supportive care.  We have found his B12 level was at the low normal level and began loading B12 which he is now completed, partly at his primary care office.  He now presents back to [facility] with his wife is also a patient and plans are for them to take a drive to [place] in the next several weeks.    Past Medical History:   Diagnosis Date   • Acute hypoxemic respiratory failure (CMS/HCC)    • Acute suppurative otitis media    • Acute upper respiratory infection    • Anemia    • Cholelithiasis    • Diabetes mellitus (CMS/HCC)     TYPE 2   • Diverticulosis    • Dry eyes    • Earache    • ED (erectile dysfunction)    • Edema    • Encounter for screening for malignant neoplasm of prostate    • Esophagitis, reflux    • Glaucoma    • H/O echocardiogram 09/18/2013   • Health care maintenance    • Hiatal hernia    • History of EKG 10/06/2015   • History of Holter monitoring 09/16/2013   • Hypercholesterolemia    • Hyperlipidemia    • Hypertension    • Lumbar canal stenosis    • Microalbuminuria    • Mild cognitive impairment    • Mitral valve prolapse    • Nephrolithiasis    • Osteoarthritis of hand     of thumb   • PAF (paroxysmal atrial fibrillation) (CMS/HCC)    • Pericardial effusion    • 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   • Spinal stenosis         Past Surgical History:   Procedure Laterality Date   • AMPUTATION DIGIT Left 10/29/2016    Procedure: TENDON AND NERVE REPAIR OF  LEFT THUMB;  Surgeon: Zak Hanson MD;  Location: [facility];  Service:    • ARTHRODESIS  10/28/2013    Spinal / Description: Repair spinal stenosis   • BLEPHAROPLASTY  2000, 05/2002   • CATARACT EXTRACTION     • EYE SURGERY  2011     EYE MUSCLE SX   • HERNIA REPAIR Right 03/2010    inguinal   • LUMBAR DISCECTOMY FUSION INSTRUMENTATION N/A 1/29/2018    Procedure: L2 to L5 fusion with instrumentation and removal of implants L4 5;  Surgeon: Barron Knight MD;  Location: [facility];  Service:    • LUMBAR DISCECTOMY FUSION INSTRUMENTATION N/A 1/9/2019    Procedure: T 11 to L3, L5-S1 fusion with T11 to S1 instrumentation, L4 pedicle subtraction osteotomy, Right L5-S1 interbody fusion with cage;  Surgeon: Barron Knight MD;  Location: [facility] OR;  Service: Orthopedic Spine   • LUMBAR FUSION  2018    Lumbar vertebral fusion   • LUMBAR LAMINECTOMY      10.28.13  bilat L4/5 lami with Nyla and Belen   • LUMBAR LAMINECTOMY DISCECTOMY DECOMPRESSION N/A 1/29/2018    Procedure: L2 to L4 laminectomy with a fusion by orthopedics;  Surgeon: Jeison Redmond MD;  Location: [facility];  Service:    • LUMBAR LAMINECTOMY DISCECTOMY DECOMPRESSION N/A 1/9/2019    Procedure: L4-L5, L5-S1 LAMINECTOMY WITH NEURAL LYSIS;  Surgeon: Jeison Redmond MD;  Location: [facility];  Service: Neurosurgery   • OTHER SURGICAL HISTORY  2008    Lithotomy   • TONSILLECTOMY     • VASECTOMY  09/2008        Current Outpatient Medications on File Prior to Visit   Medication Sig Dispense Refill   • ACCU-CHEK FASTCLIX LANCETS misc USE TO TEST TWICE DAILY 102 each 0   • ACCU-CHEK SMARTVIEW test strip USE TO TEST TWICE DAILY 100 each 1   • amiodarone (PACERONE) 200 MG tablet Take 1 tablet by mouth Every 12 (Twelve) Hours.     • apixaban (ELIQUIS) 5 MG tablet tablet Take 1 tablet by mouth Every 12 (Twelve) Hours. 180 tablet 0   • atorvastatin (LIPITOR) 10 MG tablet Take 10 mg by mouth Every Night.     • Blood Glucose Monitoring Suppl (ACCU-CHEK FRANCISCO SMARTVIEW) W/DEVICE kit USE TO TEST BLOOD SUGAR TWICE DAILY 1 kit 0   • Blood Glucose Monitoring Suppl (FREESTYLE LITE) device Use to test blood once daily 1 each 0   • brimonidine (ALPHAGAN) 0.2 % ophthalmic solution  Administer 1 drop to both eyes 2 (Two) Times a Day.     • brimonidine-timolol (COMBIGAN) 0.2-0.5 % ophthalmic solution 1 drop Every 12 (Twelve) Hours.     • calcium carbonate (TUMS) 500 MG chewable tablet Chew 1,000 mg 3 (Three) Times a Day As Needed for Indigestion or Heartburn.     • cholecalciferol (VITAMIN D3) 45864 units capsule Take 2,000 Units by mouth Daily.     • cyclobenzaprine (FLEXERIL) 10 MG tablet Take 10 mg by mouth 3 (Three) Times a Day As Needed for Muscle Spasms.     • cycloSPORINE (RESTASIS) 0.05 % ophthalmic emulsion 1 drop 2 (Two) Times a Day.     • docusate sodium (COLACE) 100 MG capsule Take 100 mg by mouth 2 (Two) Times a Day.     • donepezil (ARICEPT) 10 MG tablet Take 5 mg by mouth Every Morning.     • dorzolamide (TRUSOPT) 2 % ophthalmic solution Administer 1 drop to both eyes 2 (two) times a day.     • enalapril (VASOTEC) 20 MG tablet Take 1 tablet by mouth 2 (Two) Times a Day. (Patient taking differently: Take 40 mg by mouth Every Morning.) 180 tablet 1   • esomeprazole (nexIUM) 40 MG capsule Take 1 capsule by mouth Daily. 90 capsule 1   • ferrous sulfate (IRON SUPPLEMENT) 325 (65 FE) MG tablet Take 1 tablet by mouth Daily With Breakfast.     • furosemide (LASIX) 40 MG tablet Take 1 tablet by mouth Daily.     • gabapentin (NEURONTIN) 100 MG capsule Take 3 capsules by mouth 3 (Three) Times a Day. 90 capsule 1   • glimepiride (AMARYL) 2 MG tablet Take 1 tablet by mouth Every Morning Before Breakfast. 90 tablet 1   • glucose blood (FREESTYLE LITE) test strip Use to test blood once daily 100 each 12   • Glucose Blood disk Inject 1 Device as directed daily. TEST BLOOD SUGAR ONCE DAILY AS DIRECTED 100 each 3   • Lancets (FREESTYLE) lancets Use to test blood sugar daily 100 each 12   • latanoprost (XALATAN) 0.005 % ophthalmic solution Administer 1 drop to both eyes Every Night.     • metFORMIN (GLUCOPHAGE) 500 MG tablet Take 1 tablet by mouth 3 (Three) Times a Day. 270 tablet 0   • ONE TOUCH  ULTRA TEST test strip USE ONCE DAILY AS DIRECTED 100 each 3   • oxyCODONE-acetaminophen (PERCOCET)  MG per tablet Take 1 tablet by mouth Every 4 (Four) Hours As Needed for Moderate Pain . 12 tablet 0   • pioglitazone (ACTOS) 45 MG tablet TAKE 1 TABLET EVERY MORNING 90 tablet 2   • polyethyl glycol-propyl glycol (SYSTANE) 0.4-0.3 % solution ophthalmic solution 1 drop 2 (Two) Times a Day As Needed.     • potassium chloride (MICRO-K) 10 MEQ CR capsule Take 2 capsules by mouth Daily. 180 capsule 0   • rosuvastatin (CRESTOR) 5 MG tablet Take 5 mg by mouth Daily.     • sennosides-docusate sodium (SENOKOT-S) 8.6-50 MG tablet Take 1 tablet by mouth 2 (Two) Times a Day.     • tamsulosin (FLOMAX) 0.4 MG capsule 24 hr capsule Take 0.4 mg by mouth Every Morning. 1-2 TABS AS NEEDED      • timolol (TIMOPTIC) 0.5 % ophthalmic solution Administer 1 drop to both eyes Daily.       Current Facility-Administered Medications on File Prior to Visit   Medication Dose Route Frequency Provider Last Rate Last Dose   • [START ON 6/3/2019] cyanocobalamin injection 1,000 mcg  1,000 mcg Intramuscular Q30 Days Trey Stockton MD            ALLERGIES:    Allergies   Allergen Reactions   • Beta Adrenergic Blockers Other (See Comments)     Bradycardia        Social History     Socioeconomic History   • Marital status:      Spouse name: Silvia   • Number of children: 3   • Years of education: College   • Highest education level: Not on file   Occupational History   • Occupation: Teacher      Employer: RETIRED   Tobacco Use   • Smoking status: Never Smoker   • Smokeless tobacco: Never Used   • Tobacco comment: caffeine use   Substance and Sexual Activity   • Alcohol use: No     Frequency: Never   • Drug use: No   • Sexual activity: Defer        Family History   Problem Relation Age of Onset   • Heart attack Mother         acute myocardial infarction   • Stroke Mother         hemorrhagic   • Emphysema Father    • Diabetes Other       "   mellitus   • Hypertension Other    • Heart disease Other    • Malig Hyperthermia Neg Hx         Review of Systems   Constitutional: Positive for activity change, fatigue and unexpected weight change.   Respiratory: Positive for shortness of breath.    Cardiovascular: Positive for leg swelling.   Genitourinary:        Nocturia   Neurological: Positive for weakness and light-headedness.          Objective     Vitals:    05/06/19 1608   BP: 162/69   Pulse: 57   Resp: 16   Temp: 98.6 °F (37 °C)   TempSrc: Oral   SpO2: 96%   Weight: 85.7 kg (188 lb 14.4 oz)   Height: 188 cm (74.02\")   PainSc: 0-No pain     Current Status 5/6/2019   ECOG score 2       Physical Exam   Constitutional: He is oriented to person, place, and time. He appears well-developed and well-nourished.   HENT:   Head: Normocephalic and atraumatic.   Nose: Nose normal.   Eyes: EOM are normal. Pupils are equal, round, and reactive to light.   Neck: Normal range of motion. Neck supple.   Cardiovascular: Normal rate, regular rhythm, normal heart sounds and intact distal pulses.   Pulmonary/Chest: Effort normal and breath sounds normal.   Abdominal: Soft. Bowel sounds are normal.   Musculoskeletal: Normal range of motion.   Patient wearing eggshell brace post surgery.  Bilateral lower extremity edema 2+   Neurological: He is alert and oriented to person, place, and time.   Skin: Skin is warm and dry.   Psychiatric: He has a normal mood and affect. His behavior is normal.       RECENT LABS:  Hematology WBC   Date Value Ref Range Status   05/06/2019 4.63 3.40 - 10.80 10*3/mm3 Final   12/19/2018 4.10 (L) 4.50 - 10.70 10*3/mm3 Final     RBC   Date Value Ref Range Status   05/06/2019 3.69 (L) 4.14 - 5.80 10*6/mm3 Final   12/19/2018 3.36 (L) 4.60 - 6.00 10*6/mm3 Final     Hemoglobin   Date Value Ref Range Status   05/06/2019 11.0 (L) 13.0 - 17.7 g/dL Final   03/25/2019 10.4 (L) 13.5 - 17.5 g/dL Final     Hematocrit   Date Value Ref Range Status   05/06/2019 35.7 " (L) 37.5 - 51.0 % Final   03/25/2019 32.7 (L) 41.0 - 53.0 % Final     Platelets   Date Value Ref Range Status   05/06/2019 302 140 - 450 10*3/mm3 Final          Assessment/Plan      78 year-old male followed by primary care with diabetes mellitus, hypertension, atrial fibrillation and iron deficiency anemia.  He remains on anticoagulation for atrial fibrillation and has been seen by orthopedic surgery and thereafter neurosurgery requiring ultimately extensive surgery as described above.  A review of his concurrent laboratory studies demonstrate anemia developing in 2016 which worsened after his most recent extensive back surgery and was found, in part, related to iron deficiency.  Following the surgery he dropped his hemoglobin even further to as low as 7.5 g% and was placed on oral iron, slowly demonstrating a response to its use.  We have discussed that he was likely iron deficient, to a greater degree than recognized, for quite some time and had additional blood loss with his recent surgery accentuating this problem.  As he is seen in office February 27 he is improved on oral iron but is intolerant to its use.     We have started a workup to determine the full etiology for his anemia but finds that his iron saturation is reduced at 14%, ferritin at 47.4.  He was a candidate for intravenous iron and we did treat him in late February.  This was helpful though he had an intercurrent hospitalization late March after a fall in rehab.  Respiratorily he decompensated and required additional support.  This however he has improved from.  As he seen back May 6 repeat iron studies include a ferritin 337, profile with an iron of 66, TIBC 2 2729% saturation and transferrin of 162.  He does not need IV iron but will continue B12 through his primary care and orally.  At this point we will plan for him to be reassessed in approximately 15 weeks and be seen at the same day his wife is seen in late August.  At that point he may  need additional IV iron.

## 2019-05-09 NOTE — PROGRESS NOTES
Date of Office Visit: 2019  Encounter Provider: Sherice Newman MD  Place of Service: [facility] CARDIOLOGY  Patient Name: Derrek Estrada  :1940      Patient ID:  Derrek Estrada is a 79 y.o. male is here for  followup for PAF.         History of Present Illness    He had been seen in the past by Dr. Figueroa prior to back surgery for spinal stenosis  in . Prior to that surgery, he had a stress nuclear perfusion study performed on  10/01/2013 which showed no evidence of ischemia. He also had a 2-D echocardiogram with  Doppler performed on 2013 which revealed an ejection fraction of 55-60%,  indeterminate diastolic filling pattern, borderline left atrial enlargement, mild  posterior leaflet prolapse of the mitral valve with trace mitral insufficiency. He had  been seen in Dr. Glover's office and Dr. Glover had detected an irregular heart rhythm.  He was found to be in atrial fibrillation. A Holter monitor was performed on 2013  which showed persistent atrial fibrillation, a maximum heart rate of 164 and an average  heart rate of 98 beats per minute. He then had a second Holter recording on 2013  which showed no atrial fibrillation at all. He has been on warfarin since that time. He  was also placed on propafenone and Metoprolol then as well.       He does have a history of diabetes mellitus type 2, hypertension, and hyperlipidemia. He  has a hiatal hernia, history of diverticular disease and gastroesophageal reflux disease.  He has never had any thyroid disease.         He had back surgery in  and it did not help  at all.  He had ankle brachial indices done in 10/2014,  which were normal.       He cares for his wife who has cancer and other medical issues.  He is a retired  and .     He was discharged from the hospital 18.  He was status post lumbar laminectomy with fusion and went into atrial  fibrillation.  He was placed on metoprolol and warfarin.  He had a spinal fusion then done 1/9/2019 and went into atrial fibrillation after this.  He then had a repeat spinal surgery at [facility] 3/25/2019.  He had a fall in rehab and had not had neck pain after that.    He was hospitalized 3/30//19-4/5/2019 with acute hypoxic respiratory failure, hypoxemia.  This was felt to be due to atelectasis and pleural effusions.  He did not have a pulmonary embolism.  He had a small pericardial effusion.  There was question of osteomyelitis with his back surgery but he did not have signs or symptoms consistent with this.  He was changed to Lasix 40 mg daily and placed on Eliquis.  He was able be to be dismissed in stable condition with these changes.  His echocardiogram done 4/1/2019 showed ejection fraction 54% grade 1 diastolic dysfunction, moderate sized left pleural effusion, trivial pericardial effusion.    He sees Dr. barksdale and his anemia is doing better.  He still has a lot of lower extremity edema.  The only medication that I see that might do this is his Actos.  He is having no difficulty breathing.  He does not feel his heart racing or skipping.  He said no dizziness or syncope.  He has no chest pain or pressure.  He has had no orthopnea or PND.  He has no fevers, chills or cough.  They are planning to go on vacation.    Past Medical History:   Diagnosis Date   • Acute hypoxemic respiratory failure (CMS/HCC)    • Acute suppurative otitis media    • Acute upper respiratory infection    • Anemia    • Cholelithiasis    • Diabetes mellitus (CMS/HCC)     TYPE 2   • Diverticulosis    • Dry eyes    • Earache    • ED (erectile dysfunction)    • Edema    • Encounter for screening for malignant neoplasm of prostate    • Esophagitis, reflux    • Glaucoma    • H/O echocardiogram 09/18/2013   • Health care maintenance    • Hiatal hernia    • History of EKG 10/06/2015   • History of Holter monitoring 09/16/2013    • Hypercholesterolemia    • Hyperlipidemia    • Hypertension    • Lumbar canal stenosis    • Microalbuminuria    • Mild cognitive impairment    • Mitral valve prolapse    • Nephrolithiasis    • Osteoarthritis of hand     of thumb   • PAF (paroxysmal atrial fibrillation) (CMS/HCC)    • Pericardial effusion    • 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   • Spinal stenosis          Past Surgical History:   Procedure Laterality Date   • AMPUTATION DIGIT Left 10/29/2016    Procedure: TENDON AND NERVE REPAIR OF  LEFT THUMB;  Surgeon: Zak Hanson MD;  Location: [facility] OR;  Service:    • ARTHRODESIS  10/28/2013    Spinal / Description: Repair spinal stenosis   • BLEPHAROPLASTY  2000, 05/2002   • CATARACT EXTRACTION     • EYE SURGERY  2011    EYE MUSCLE SX   • HERNIA REPAIR Right 03/2010    inguinal   • LUMBAR DISCECTOMY FUSION INSTRUMENTATION N/A 1/29/2018    Procedure: L2 to L5 fusion with instrumentation and removal of implants L4 5;  Surgeon: Barron Knight MD;  Location: [facility] OR;  Service:    • LUMBAR DISCECTOMY FUSION INSTRUMENTATION N/A 1/9/2019    Procedure: T 11 to L3, L5-S1 fusion with T11 to S1 instrumentation, L4 pedicle subtraction osteotomy, Right L5-S1 interbody fusion with cage;  Surgeon: Barron Knight MD;  Location: [facility] OR;  Service: Orthopedic Spine   • LUMBAR FUSION  2018    Lumbar vertebral fusion   • LUMBAR LAMINECTOMY      10.28.13  bilat L4/5 lami with Nyla and Belen   • LUMBAR LAMINECTOMY DISCECTOMY DECOMPRESSION N/A 1/29/2018    Procedure: L2 to L4 laminectomy with a fusion by orthopedics;  Surgeon: Jeison Redmond MD;  Location: [facility];  Service:    • LUMBAR LAMINECTOMY DISCECTOMY DECOMPRESSION N/A 1/9/2019    Procedure: L4-L5, L5-S1 LAMINECTOMY WITH NEURAL LYSIS;  Surgeon: Jeison Redmond MD;  Location: [facility] OR;  Service: Neurosurgery   • OTHER SURGICAL HISTORY  2008    Lithotomy   • TONSILLECTOMY     •  VASECTOMY  09/2008       Current Outpatient Medications on File Prior to Visit   Medication Sig Dispense Refill   • ACCU-CHEK FASTCLIX LANCETS misc USE TO TEST TWICE DAILY 102 each 0   • ACCU-CHEK SMARTVIEW test strip USE TO TEST TWICE DAILY 100 each 1   • amiodarone (PACERONE) 200 MG tablet Take 1 tablet by mouth Every 12 (Twelve) Hours. (Patient taking differently: Take 200 mg by mouth Daily.)     • apixaban (ELIQUIS) 5 MG tablet tablet Take 1 tablet by mouth Every 12 (Twelve) Hours. 180 tablet 0   • atorvastatin (LIPITOR) 10 MG tablet Take 10 mg by mouth Every Night.     • Blood Glucose Monitoring Suppl (ACCU-CHEK FRANCISCO SMARTVIEW) W/DEVICE kit USE TO TEST BLOOD SUGAR TWICE DAILY 1 kit 0   • Blood Glucose Monitoring Suppl (FREESTYLE LITE) device Use to test blood once daily 1 each 0   • brimonidine (ALPHAGAN) 0.2 % ophthalmic solution Administer 1 drop to both eyes 2 (Two) Times a Day.     • brimonidine-timolol (COMBIGAN) 0.2-0.5 % ophthalmic solution 1 drop Every 12 (Twelve) Hours.     • calcium carbonate (TUMS) 500 MG chewable tablet Chew 1,000 mg 3 (Three) Times a Day As Needed for Indigestion or Heartburn.     • cholecalciferol (VITAMIN D3) 55352 units capsule Take 2,000 Units by mouth Daily.     • cyclobenzaprine (FLEXERIL) 10 MG tablet Take 10 mg by mouth 3 (Three) Times a Day As Needed for Muscle Spasms.     • cycloSPORINE (RESTASIS) 0.05 % ophthalmic emulsion 1 drop 2 (Two) Times a Day.     • docusate sodium (COLACE) 100 MG capsule Take 100 mg by mouth 2 (Two) Times a Day.     • donepezil (ARICEPT) 10 MG tablet Take 5 mg by mouth Every Morning.     • dorzolamide (TRUSOPT) 2 % ophthalmic solution Administer 1 drop to both eyes 2 (two) times a day.     • enalapril (VASOTEC) 20 MG tablet Take 1 tablet by mouth 2 (Two) Times a Day. (Patient taking differently: Take 40 mg by mouth Every Morning.) 180 tablet 1   • esomeprazole (nexIUM) 40 MG capsule Take 1 capsule by mouth Daily. 90 capsule 1   • ferrous sulfate  (IRON SUPPLEMENT) 325 (65 FE) MG tablet Take 1 tablet by mouth Daily With Breakfast.     • furosemide (LASIX) 40 MG tablet Take 1 tablet by mouth Daily.     • glimepiride (AMARYL) 2 MG tablet Take 1 tablet by mouth Every Morning Before Breakfast. 90 tablet 1   • glucose blood (FREESTYLE LITE) test strip Use to test blood once daily 100 each 12   • Glucose Blood disk Inject 1 Device as directed daily. TEST BLOOD SUGAR ONCE DAILY AS DIRECTED 100 each 3   • Lancets (FREESTYLE) lancets Use to test blood sugar daily 100 each 12   • latanoprost (XALATAN) 0.005 % ophthalmic solution Administer 1 drop to both eyes Every Night.     • metFORMIN (GLUCOPHAGE) 500 MG tablet Take 1 tablet by mouth 3 (Three) Times a Day. 270 tablet 0   • ONE TOUCH ULTRA TEST test strip USE ONCE DAILY AS DIRECTED 100 each 3   • oxyCODONE-acetaminophen (PERCOCET)  MG per tablet Take 1 tablet by mouth Every 4 (Four) Hours As Needed for Moderate Pain . 12 tablet 0   • pioglitazone (ACTOS) 45 MG tablet TAKE 1 TABLET EVERY MORNING 90 tablet 2   • polyethyl glycol-propyl glycol (SYSTANE) 0.4-0.3 % solution ophthalmic solution 1 drop 2 (Two) Times a Day As Needed.     • potassium chloride (MICRO-K) 10 MEQ CR capsule Take 2 capsules by mouth Daily. 180 capsule 0   • rosuvastatin (CRESTOR) 5 MG tablet Take 5 mg by mouth Daily.     • sennosides-docusate sodium (SENOKOT-S) 8.6-50 MG tablet Take 1 tablet by mouth 2 (Two) Times a Day.     • tamsulosin (FLOMAX) 0.4 MG capsule 24 hr capsule Take 0.4 mg by mouth Every Morning. 1-2 TABS AS NEEDED      • timolol (TIMOPTIC) 0.5 % ophthalmic solution Administer 1 drop to both eyes Daily.     • gabapentin (NEURONTIN) 100 MG capsule Take 3 capsules by mouth 3 (Three) Times a Day. 90 capsule 1     Current Facility-Administered Medications on File Prior to Visit   Medication Dose Route Frequency Provider Last Rate Last Dose   • [START ON 6/3/2019] cyanocobalamin injection 1,000 mcg  1,000 mcg Intramuscular Q30 Days  "Trey Stockton MD           Social History     Socioeconomic History   • Marital status:      Spouse name: Silvia   • Number of children: 3   • Years of education: College   • Highest education level: Not on file   Occupational History   • Occupation: Teacher      Employer: RETIRED   Tobacco Use   • Smoking status: Never Smoker   • Smokeless tobacco: Never Used   • Tobacco comment: caffeine use   Substance and Sexual Activity   • Alcohol use: No     Frequency: Never   • Drug use: No   • Sexual activity: Defer           Review of Systems   Constitution: Negative.   HENT: Negative for congestion.    Eyes: Negative for vision loss in left eye and vision loss in right eye.   Respiratory: Negative.  Negative for cough, hemoptysis, shortness of breath, sleep disturbances due to breathing, snoring, sputum production and wheezing.    Endocrine: Negative.    Hematologic/Lymphatic: Negative.    Skin: Negative for poor wound healing and rash.   Musculoskeletal: Negative for falls, gout, muscle cramps and myalgias.   Gastrointestinal: Negative for abdominal pain, diarrhea, dysphagia, hematemesis, melena, nausea and vomiting.   Neurological: Negative for excessive daytime sleepiness, dizziness, headaches, light-headedness, loss of balance, seizures and vertigo.   Psychiatric/Behavioral: Negative for depression and substance abuse. The patient is not nervous/anxious.        Procedures    ECG 12 Lead  Date/Time: 5/9/2019 10:54 AM  Performed by: Sherice Newman MD  Authorized by: Sherice Newman MD   Comparison: compared with previous ECG   Similar to previous ECG  Rhythm: sinus rhythm  Ectopy: atrial premature contractions  Conduction: right bundle branch block    Clinical impression: abnormal EKG                Objective:      Vitals:    05/09/19 1036   BP: 136/60   BP Location: Right arm   Patient Position: Sitting   Pulse: 76   Weight: 85.9 kg (189 lb 6.4 oz)   Height: 190.5 cm (75\")     Body mass " index is 23.67 kg/m².    Physical Exam   Constitutional: He is oriented to person, place, and time. He appears well-developed and well-nourished. No distress.   HENT:   Head: Normocephalic and atraumatic.   Eyes: Conjunctivae are normal. No scleral icterus.   Neck: Neck supple. No JVD present. Carotid bruit is not present. No thyromegaly present.   Cardiovascular: Normal rate, regular rhythm, S1 normal, S2 normal, normal heart sounds and intact distal pulses.  No extrasystoles are present. PMI is not displaced. Exam reveals no gallop.   No murmur heard.  Pulses:       Carotid pulses are 2+ on the right side, and 2+ on the left side.       Radial pulses are 2+ on the right side, and 2+ on the left side.        Dorsalis pedis pulses are 2+ on the right side, and 2+ on the left side.        Posterior tibial pulses are 2+ on the right side, and 2+ on the left side.   2+  LE edema   Pulmonary/Chest: Effort normal and breath sounds normal. No respiratory distress. He has no wheezes. He has no rhonchi. He has no rales. He exhibits no tenderness.   Abdominal: Soft. Bowel sounds are normal. He exhibits no distension, no abdominal bruit and no mass. There is no tenderness.   Musculoskeletal: He exhibits no edema or deformity.   Lymphadenopathy:     He has no cervical adenopathy.   Neurological: He is alert and oriented to person, place, and time. No cranial nerve deficit.   Skin: Skin is warm and dry. No rash noted. He is not diaphoretic. No cyanosis. No pallor. Nails show no clubbing.   Psychiatric: He has a normal mood and affect. Judgment normal.   Vitals reviewed.      Lab Review:       Assessment:      Diagnosis Plan   1. Paroxysmal atrial fibrillation (CMS/HCC)     2. Essential hypertension     3. Hypercholesterolemia     4. RBBB (right bundle branch block with left anterior fascicular block)     5. Chronic anticoagulation-for Afib       1. Paroxysmal atrial fibrillation. On apixaban and amiodarone.   2. Hypertension  under fair control.   3. Diabetes mellitus type 2. This has gotten better with weight loss. In fact, he said  his hemoglobin A1C has been running about 6.6%.   4. Hyperlipidemia. He is currently on simvastatin for this.   5. History of hiatal hernia, gastroesophageal reflux disease, and diverticular disease  stable at this time.   6. Chronic lower extremity edema with what appears to be venous insufficiency and may be  lymphedema as well. Stable.  7. RBBB and LAFB, chronic.   8. Bradycardia, off metoprolol due to this.   9. H/o lumbar laminectomy, 3 surgeries for this.        Plan:       See caleb in 3 months.  Consider changing actos to jardiance with LE edema.  No other changes, maintain amiodarone until seen back in office and maybe d/c then.     Atrial Fibrillation and Atrial Flutter  Assessment  • The patient has paroxysmal atrial fibrillation  • The patient's CHADS2-VASc score is 4  • A [id number] score of 2 or more is considered a high risk for a thromboembolic event  • Apixaban prescribed    Plan  • Attempt to maintain sinus rhythm  • Continue apixaban for antithrombotic therapy, bleeding issues discussed  • Continue amiodarone for rhythm control

## 2019-06-01 PROBLEM — R78.81 MRSA BACTEREMIA: Status: ACTIVE | Noted: 2019-01-01

## 2019-06-01 PROBLEM — R29.898 WEAKNESS OF BOTH LOWER EXTREMITIES: Status: ACTIVE | Noted: 2019-01-01

## 2019-06-01 PROBLEM — E11.9 TYPE 2 DIABETES MELLITUS, WITHOUT LONG-TERM CURRENT USE OF INSULIN (HCC): Chronic | Status: ACTIVE | Noted: 2019-01-01

## 2019-06-01 PROBLEM — B95.62 MRSA BACTEREMIA: Status: ACTIVE | Noted: 2019-01-01

## 2019-06-01 PROBLEM — R60.0 LOWER EXTREMITY EDEMA: Chronic | Status: ACTIVE | Noted: 2019-01-01

## 2019-06-01 PROBLEM — L03.317 CELLULITIS OF BUTTOCK: Status: ACTIVE | Noted: 2019-01-01

## 2019-06-01 PROBLEM — S31.819A WOUND OF RIGHT BUTTOCK: Status: ACTIVE | Noted: 2019-01-01

## 2019-06-01 PROBLEM — A41.9 SEPSIS AFFECTING SKIN: Status: ACTIVE | Noted: 2019-01-01

## 2019-06-01 NOTE — ED PROVIDER NOTES
EMERGENCY DEPARTMENT ENCOUNTER    Room Number:  14/14  Date of encounter:  6/1/2019  PCP: Trey Stockton MD   Cardiologist- Dr. Newman  Historian: patient, family      HPI:  Chief Complaint: fever    Context: Derrek Estrada is a 79 y.o. male who presents to the ED c/o fever (AInm=881) that began earlier tonight. Pt also complains of pain around his known pressure ulcers on his buttocks. Pt denies N/V/D, cough, SOA, dysuria or hematuria. Pt's family states that they became concerned when the pt was unable to get up by himself tonight due to BLE weakness but that the pt has a history of intermittent BLE weakness. Pt's family states that they have been applying Aquacel to the pt's pressure ulcers. Pt's spouse states that the pt's right buttock pressure ulcer is unchanged but the newer pressure ulcer has become larger and more erythematous. Pt took Percocet earlier today with mild improvement of his pain. Pt is on Eliquis for a-fib.    PAST MEDICAL HISTORY  Active Ambulatory Problems     Diagnosis Date Noted   • Cholelithiasis 02/02/2016   • DM (HgbA1c 5.9) 02/02/2016   • Diverticulosis of intestine 02/02/2016   • Hypercholesterolemia 02/02/2016   • Hypertension 02/02/2016   • Spinal stenosis 02/02/2016   • Microalbuminuria 02/02/2016   • Atrial fibrillation (CMS/HCC) 02/02/2016   • Medicare annual wellness visit, subsequent 06/06/2016   • Anemia 06/07/2016   • Iron deficiency anemia 07/11/2016   • DJD (degenerative joint disease) 10/06/2016   • Impotence of organic origin 10/06/2016   • Glaucoma 10/06/2016   • Hiatal hernia 10/06/2016   • Mild cognitive disorder 10/06/2016   • Calculus of kidney 10/06/2016   • Nocturia 10/06/2016   • Chronic bilateral low back pain with bilateral sciatica 12/16/2016   • Post laminectomy syndrome 12/16/2016   • Routine health maintenance 02/09/2017   • Weight loss 03/06/2017   • Spinal stenosis of lumbar region with neurogenic claudication 12/15/2017   •  Spondylolisthesis of lumbar region 12/15/2017   • Lumbar spine pain 01/29/2018   • Metabolic encephalopathy 01/30/2018   • Urinary retention with incomplete bladder emptying 01/31/2018   • Macrocytosis without anemia 01/31/2018   • Sundowning 02/01/2018   • 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   • Leg swelling 12/19/2018   • Postural kyphosis of lumbar region 12/28/2018   • H/O urinary retention 01/09/2019   • Chronic anticoagulation-for Afib 01/09/2019   • Fever and chills 04/01/2019     Resolved Ambulatory Problems     Diagnosis Date Noted   • Cough 02/02/2016   • Acute URI 03/06/2017   • Atrial fibrillation, chronic (aka ATRIAL FIBRILLATION, CHRONIC) 07/31/2017     Past Medical History:   Diagnosis Date   • Acute hypoxemic respiratory failure (CMS/HCC)    • Acute suppurative otitis media    • Acute upper respiratory infection    • Anemia    • Cholelithiasis    • Diabetes mellitus (CMS/HCC)    • Diverticulosis    • Dry eyes    • Earache    • ED (erectile dysfunction)    • Edema    • Encounter for screening for malignant neoplasm of prostate    • Esophagitis, reflux    • Glaucoma    • H/O echocardiogram 09/18/2013   • Health care maintenance    • Hiatal hernia    • History of EKG 10/06/2015   • History of Holter monitoring 09/16/2013   • Hypercholesterolemia    • Hyperlipidemia    • Hypertension    • Lumbar canal stenosis    • Microalbuminuria    • Mild cognitive impairment    • Mitral valve prolapse    • Nephrolithiasis    • Osteoarthritis of hand    • PAF (paroxysmal atrial fibrillation) (CMS/HCC)    • Pericardial effusion    • 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   • Spinal stenosis          PAST SURGICAL HISTORY  Past Surgical History:   Procedure Laterality Date   • AMPUTATION DIGIT Left 10/29/2016    Procedure: TENDON AND NERVE REPAIR OF  LEFT THUMB;  Surgeon: Zak Hanson MD;  Location: [facility] OR;  Service:    •  ARTHRODESIS  10/28/2013    Spinal / Description: Repair spinal stenosis   • BLEPHAROPLASTY  2000, 05/2002   • CATARACT EXTRACTION     • EYE SURGERY  2011    EYE MUSCLE SX   • HERNIA REPAIR Right 03/2010    inguinal   • LUMBAR DISCECTOMY FUSION INSTRUMENTATION N/A 1/29/2018    Procedure: L2 to L5 fusion with instrumentation and removal of implants L4 5;  Surgeon: Barron Knight MD;  Location: [facility] OR;  Service:    • LUMBAR DISCECTOMY FUSION INSTRUMENTATION N/A 1/9/2019    Procedure: T 11 to L3, L5-S1 fusion with T11 to S1 instrumentation, L4 pedicle subtraction osteotomy, Right L5-S1 interbody fusion with cage;  Surgeon: Barron Knight MD;  Location: [facility] OR;  Service: Orthopedic Spine   • LUMBAR FUSION  2018    Lumbar vertebral fusion   • LUMBAR LAMINECTOMY      10.28.13  bilat L4/5 lami with Nyla and Belen   • LUMBAR LAMINECTOMY DISCECTOMY DECOMPRESSION N/A 1/29/2018    Procedure: L2 to L4 laminectomy with a fusion by orthopedics;  Surgeon: Jeison Redmond MD;  Location: [facility] OR;  Service:    • LUMBAR LAMINECTOMY DISCECTOMY DECOMPRESSION N/A 1/9/2019    Procedure: L4-L5, L5-S1 LAMINECTOMY WITH NEURAL LYSIS;  Surgeon: Jeison Redmond MD;  Location: [facility] OR;  Service: Neurosurgery   • OTHER SURGICAL HISTORY  2008    Lithotomy   • TONSILLECTOMY     • VASECTOMY  09/2008         FAMILY HISTORY  Family History   Problem Relation Age of Onset   • Heart attack Mother         acute myocardial infarction   • Stroke Mother         hemorrhagic   • Emphysema Father    • Diabetes Other         mellitus   • Hypertension Other    • Heart disease Other    • Malig Hyperthermia Neg Hx          SOCIAL HISTORY  Social History     Socioeconomic History   • Marital status:      Spouse name: Silvia   • Number of children: 3   • Years of education: College   • Highest education level: Not on file   Occupational History   • Occupation: Teacher      Employer: RETIRED   Tobacco Use   • Smoking  status: Never Smoker   • Smokeless tobacco: Never Used   • Tobacco comment: caffeine use   Substance and Sexual Activity   • Alcohol use: No     Frequency: Never   • Drug use: No   • Sexual activity: Defer         ALLERGIES  Beta adrenergic blockers        REVIEW OF SYSTEMS  Review of Systems   Constitutional: Positive for fever. Negative for activity change and appetite change.   HENT: Negative for congestion and sore throat.    Eyes: Negative.    Respiratory: Negative for cough and shortness of breath.    Cardiovascular: Negative for chest pain and leg swelling.   Gastrointestinal: Negative for abdominal pain, diarrhea and vomiting.   Endocrine: Negative.    Genitourinary: Negative for decreased urine volume and dysuria.   Musculoskeletal: Negative for neck pain.   Skin: Positive for wound (pressure ulcers to buttocks with associated pain). Negative for rash.   Allergic/Immunologic: Negative.    Neurological: Positive for weakness (BLE). Negative for numbness and headaches.   Hematological: Negative.    Psychiatric/Behavioral: Negative.    All other systems reviewed and are negative.           PHYSICAL EXAM    I have reviewed the triage vital signs and nursing notes.    GENERAL: appears elderly and frail,  in no apparent distress  HENT: NCAT, neck supple, trachea midline  EYES: no scleral icterus, left pupil is irregular, normal conjunctiva  CV: regular rhythm, regular rate, no murmur  RESPIRATORY: unlabored effort, diminished breath sounds in the left lung base  ABDOMEN: soft, non-tender, non-distended  MUSCULOSKELETAL: no gross deformity, BLE edema  NEURO: alert, CN II-XII grossly intact, sensory and motor function of extremities grossly intact.  SKIN: warm, dry, no rash, baseball sized erythematous fluctuant area on the right buttocks with macerated necrotic tissue on the surface with surrounding erythema, Superficial ulceration on the right side of the intergluteal cleft that appears to be healing  well.  PSYCH:  Appropriate mood and affect    Vital signs and nursing notes reviewed.      LAB RESULTS  Recent Results (from the past 24 hour(s))   Lactic Acid, Plasma    Collection Time: 06/01/19 12:27 AM   Result Value Ref Range    Lactate 1.2 0.5 - 2.0 mmol/L   Sedimentation Rate    Collection Time: 06/01/19 12:27 AM   Result Value Ref Range    Sed Rate 78 (H) 0 - 20 mm/hr   C-reactive Protein    Collection Time: 06/01/19 12:27 AM   Result Value Ref Range    C-Reactive Protein 20.54 (H) 0.00 - 0.50 mg/dL   Comprehensive Metabolic Panel    Collection Time: 06/01/19 12:27 AM   Result Value Ref Range    Glucose 129 (H) 65 - 99 mg/dL    BUN 32 (H) 8 - 23 mg/dL    Creatinine 0.89 0.76 - 1.27 mg/dL    Sodium 144 136 - 145 mmol/L    Potassium 3.7 3.5 - 5.2 mmol/L    Chloride 108 (H) 98 - 107 mmol/L    CO2 25.0 22.0 - 29.0 mmol/L    Calcium 8.0 (L) 8.6 - 10.5 mg/dL    Total Protein 5.8 (L) 6.0 - 8.5 g/dL    Albumin 2.90 (L) 3.50 - 5.20 g/dL    ALT (SGPT) 12 1 - 41 U/L    AST (SGOT) 18 1 - 40 U/L    Alkaline Phosphatase 93 39 - 117 U/L    Total Bilirubin 0.3 0.2 - 1.2 mg/dL    eGFR Non African Amer 83 >60 mL/min/1.73    Globulin 2.9 gm/dL    A/G Ratio 1.0 g/dL    BUN/Creatinine Ratio 36.0 (H) 7.0 - 25.0    Anion Gap 11.0 mmol/L   CBC Auto Differential    Collection Time: 06/01/19 12:27 AM   Result Value Ref Range    WBC 10.33 3.40 - 10.80 10*3/mm3    RBC 2.89 (L) 4.14 - 5.80 10*6/mm3    Hemoglobin 8.6 (L) 13.0 - 17.7 g/dL    Hematocrit 28.5 (L) 37.5 - 51.0 %    MCV 98.6 (H) 79.0 - 97.0 fL    MCH 29.8 26.6 - 33.0 pg    MCHC 30.2 (L) 31.5 - 35.7 g/dL    RDW 15.4 12.3 - 15.4 %    RDW-SD 56.0 (H) 37.0 - 54.0 fl    MPV 10.1 6.0 - 12.0 fL    Platelets 242 140 - 450 10*3/mm3    Neutrophil % 93.6 (H) 42.7 - 76.0 %    Lymphocyte % 1.5 (L) 19.6 - 45.3 %    Monocyte % 3.3 (L) 5.0 - 12.0 %    Eosinophil % 0.0 (L) 0.3 - 6.2 %    Basophil % 0.3 0.0 - 1.5 %    Immature Grans % 1.3 (H) 0.0 - 0.5 %    Neutrophils, Absolute 9.67 (H) 1.70 -  7.00 10*3/mm3    Lymphocytes, Absolute 0.16 (L) 0.70 - 3.10 10*3/mm3    Monocytes, Absolute 0.34 0.10 - 0.90 10*3/mm3    Eosinophils, Absolute 0.00 0.00 - 0.40 10*3/mm3    Basophils, Absolute 0.03 0.00 - 0.20 10*3/mm3    Immature Grans, Absolute 0.13 (H) 0.00 - 0.05 10*3/mm3    nRBC 0.0 0.0 - 0.2 /100 WBC       Ordered the above labs and reviewed the results.        RADIOLOGY  Ct Pelvis With Contrast    Result Date: 6/1/2019  CT SCAN PELVIS WITH IV CONTRAST.  HISTORY: large right buttock wound  COMPARISON: None.  TECHNIQUE: Radiation dose reduction techniques were utilized, including automated exposure control and exposure modulation based on body size. Axial images were obtained from the iliac crest to the symphysis pubis with IV contrast only.. Oral contrast was not administered per request.  FINDINGS :  In the included lower abdomen, nonobstructive renal calculi are noted bilaterally. Visualized aorta is nonaneurysmal. Mild prostate gland enlargement with some elevation of the urinary bladder base. Numerous colonic diverticuli. GI tract not opacified for assessment but the visualized portion is nonobstructive. There is artifact from lumbosacral fusion hardware. There is diffuse infiltration of the subcutaneous tissues most likely related to anasarca. There is a more confluent focus of increased density dorsally, superficially at the level of the sacrum. On image 83 this area measures 8.4 x 1.8 cm. This may reflect a localized inflammatory process such as cellulitis. There is no soft tissue air or evidence of abscess.       1. Bilateral nephrolithiasis, nonobstructing. 2. Colonic diverticulosis. 3. Subcutaneous edema and inflammatory changes as discussed. 4. Mild prostate gland enlargement    This report was finalized on 6/1/2019 1:36 AM by Tod Turner M.D.        I ordered the above noted radiological studies. Interpreted by radiologist. Reviewed by me in PACS.    PROCEDURES  Procedures    MEDICATIONS GIVEN IN  ER  Medications   sodium chloride 0.9 % flush 10 mL (not administered)   lactated ringers bolus 500 mL (500 mL Intravenous New Bag 6/1/19 0205)   sodium chloride 0.9 % flush 3 mL (not administered)   sodium chloride 0.9 % flush 1-10 mL (not administered)   acetaminophen (TYLENOL) tablet 650 mg (not administered)   ondansetron (ZOFRAN) tablet 4 mg (not administered)     Or   ondansetron (ZOFRAN) injection 4 mg (not administered)   Pharmacy to Dose Zosyn (not administered)   piperacillin-tazobactam (ZOSYN) 3.375 g in iso-osmotic dextrose 50 ml (premix) (0 g Intravenous Stopped 6/1/19 0205)   iopamidol (ISOVUE-300) 61 % injection 100 mL (85 mL Intravenous Given by Other 6/1/19 0128)       PROGRESS AND CONSULTS     0013- D/w pt and family that the pt does appear to have an infected abscess/wound on the right buttocks. Discussed the plan to order lab and imaging studies for further evaluation and IV abx for wound infection. Pt understands and agrees with the plan, all questions answered.    0019- Ordered blood work, CRP, Sed Rate, lactic acid, blood cultures and CT Pelvis for further evaluation. Ordered IVF for hydration and Zosyn for abscess.    0150- Placed call to A for admission.    0216- Discussed the pt's case with Olga (NP for A), who agrees to admit the pt to a telemetry bed to Dr. Andrade.    0219- Rechecked pt. Pt is resting comfortably. Notified pt and family of the pt's lab and imaging results, including the pt's CT Pelvis results. Discussed the plan to admit the pt for IV abx. Pt and family agree with the plan and all questions were addressed.      MEDICAL DECISION MAKING  MDM  Number of Diagnoses or Management Options  Cellulitis of buttock:   Fever in adult:      Amount and/or Complexity of Data Reviewed  Clinical lab tests: ordered and reviewed (Sed rate=78, CRP=20.54)  Tests in the radiology section of CPT®: ordered and reviewed (CT Pelvis shows subcutaneous edema and stranding in buttocks)  Decide  to obtain previous medical records or to obtain history from someone other than the patient: yes  Obtain history from someone other than the patient: yes (spouse)  Review and summarize past medical records: yes (Reviewed pt's office visit with Dr. Benton (wound care) from 5/13/19 for a stage III pressure wound to the right buttock. )  Discuss the patient with other providers: yes (Olga (NP for A))  Independent visualization of images, tracings, or specimens: yes    Patient Progress  Patient progress: stable             DIAGNOSIS  Final diagnoses:   Cellulitis of buttock   Fever in adult         DISPOSITION  ADMISSION    Discussed treatment plan and reason for admission with pt/family and admitting physician.  Pt/family voiced understanding of the plan for admission for further testing/treatment as needed.     --  Documentation assistance provided by yina Gilliland for Dr. Cortney MD.  Information recorded by the scribe was done at my direction and has been verified and validated by me.     Candie Gilliland  06/01/19 0221       Zoltan Barr MD  06/01/19 0788

## 2019-06-01 NOTE — PROGRESS NOTES
"Pharmacy Consult - Zosyn Dosing     Derrekamando Estrada has a consult for pharmacy to dose Zosyn for SSTI x7 days per LAMONT Hyde's request.     Relevant clinical data and objective history reviewed:  79 y.o. male 190.5 cm (75\") 84.1 kg (185 lb 6.4 oz)    Past Medical History:   Diagnosis Date   • Acute hypoxemic respiratory failure (CMS/HCC)    • Acute suppurative otitis media    • Acute upper respiratory infection    • Anemia    • Cholelithiasis    • Diabetes mellitus (CMS/HCC)     TYPE 2   • Diverticulosis    • Dry eyes    • Earache    • ED (erectile dysfunction)    • Edema    • Encounter for screening for malignant neoplasm of prostate    • Esophagitis, reflux    • Glaucoma    • H/O echocardiogram 09/18/2013   • Health care maintenance    • Hiatal hernia    • History of EKG 10/06/2015   • History of Holter monitoring 09/16/2013   • Hypercholesterolemia    • Hyperlipidemia    • Hypertension    • Lumbar canal stenosis    • Microalbuminuria    • Mild cognitive impairment    • Mitral valve prolapse    • Nephrolithiasis    • Osteoarthritis of hand     of thumb   • PAF (paroxysmal atrial fibrillation) (CMS/HCC)    • Pericardial effusion    • 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   • Spinal stenosis      Creatinine   Date Value Ref Range Status   06/01/2019 0.89 0.76 - 1.27 mg/dL Final   05/06/2019 0.81 0.70 - 1.30 mg/dL Final   04/22/2019 0.76 0.76 - 1.27 mg/dL Final   04/04/2019 0.54 (L) 0.76 - 1.27 mg/dL Final   12/27/2018 0.80 0.60 - 1.30 mg/dL Final     Comment:     Serial Number: [id number]:  [id number]   12/30/2016 0.70 0.60 - 1.30 mg/dL Final     Comment:     Serial Number: [id number]    : [id number]     BUN   Date Value Ref Range Status   06/01/2019 32 (H) 8 - 23 mg/dL Final     Estimated Creatinine Clearance: 81.4 mL/min (by C-G formula based on SCr of 0.89 mg/dL).    Lab Results   Component Value Date    WBC 10.33 06/01/2019     Temp Readings from Last 3 " Encounters:   06/01/19 99.7 °F (37.6 °C) (Tympanic)   05/06/19 98.6 °F (37 °C) (Oral)   04/22/19 98.1 °F (36.7 °C)      Microbiology  06/01 Bcx x2 - In process    Assessment/Plan  1. Patient received Zosyn 3.375g IV x1 early this AM @ 0116. Will start Zosyn 3.375g IV q8h thereafter based on patient parameters.     2. Most recent Scr=0.89 mg/dL, which is increased from baseline. Next level to be drawn - BMP w/ AM labs.    3. The Pharmacy to Dose Consult will be discontinued at this time. Renal function will continue to be monitored and dosing adjustments will be made by pharmacy if necessary.    Thank you for allowing me to participate in the care of your patient,    Perez Dempsey, SyedD

## 2019-06-01 NOTE — PLAN OF CARE
Problem: Patient Care Overview  Goal: Plan of Care Review  Outcome: Ongoing (interventions implemented as appropriate)    Goal: Individualization and Mutuality  Outcome: Ongoing (interventions implemented as appropriate)    Goal: Discharge Needs Assessment  Outcome: Ongoing (interventions implemented as appropriate)    Goal: Interprofessional Rounds/Family Conf  Outcome: Ongoing (interventions implemented as appropriate)      Problem: Fall Risk (Adult)  Goal: Identify Related Risk Factors and Signs and Symptoms  Outcome: Outcome(s) achieved Date Met: 06/01/19    Goal: Absence of Fall  Outcome: Ongoing (interventions implemented as appropriate)      Problem: Skin Injury Risk (Adult)  Goal: Identify Related Risk Factors and Signs and Symptoms  Outcome: Outcome(s) achieved Date Met: 06/01/19    Goal: Skin Health and Integrity  Outcome: Ongoing (interventions implemented as appropriate)      Problem: Pain, Acute (Adult)  Goal: Identify Related Risk Factors and Signs and Symptoms  Outcome: Outcome(s) achieved Date Met: 06/01/19    Goal: Acceptable Pain Control/Comfort Level  Outcome: Ongoing (interventions implemented as appropriate)

## 2019-06-01 NOTE — BRIEF OP NOTE
INCISION AND DRAINAGE OF PERIRECTAL ABSCESS  Progress Note    Derrek Estrada  5/31/2019 - 6/1/2019    Pre-op Diagnosis:   Infected gluteal wound       Post-Op Diagnosis Codes:   Same    Procedure/CPT® Codes:      Procedure(s):  DEBRIDEMENT OF RIGHT GLUTEAL WOUND    Surgeon(s):  Tom Miranda MD    Anesthesia: General    Staff:   Circulator: Amada West RN  Scrub Person: Janie García    Estimated Blood Loss: minimal    Urine Voided: * No values recorded between 6/1/2019 12:19 PM and 6/1/2019 12:56 PM *    Specimens:                Specimens     ID Source Type Tests Collected By Collected At Frozen?      1 Buttock, Right Wound · ANAEROBIC CULTURE  · WOUND CULTURE   Tom Miranda MD 6/1/19 1246      Description: right buttock wound for cultures    A Buttock, Right Tissue · TISSUE PATHOLOGY EXAM   Tom Miranda MD 6/1/19 1252      Description: right buttock wound for permanent                Drains:  none    Findings: infected gluteal wound down to muscle    Complications: None      Tom Miranda MD     Date: 6/1/2019  Time: 12:58 PM

## 2019-06-01 NOTE — ANESTHESIA PREPROCEDURE EVALUATION
Anesthesia Evaluation     Patient summary reviewed and Nursing notes reviewed   NPO Solid Status: > 4 hours  NPO Liquid Status: > 8 hours           Airway   Mallampati: II  TM distance: >3 FB  Neck ROM: limited  Possible difficult intubation  Dental - normal exam   (+) poor dentition    Pulmonary - normal exam   Cardiovascular - normal exam    ECG reviewed  PT is on anticoagulation therapy    (+) hypertension less than 2 medications, valvular problems/murmurs MVP, dysrhythmias Atrial Fib,       Neuro/Psych  (+) dementia (mild cognitive impairment),     GI/Hepatic/Renal/Endo    (+)   diabetes mellitus type 2,     Musculoskeletal     (+) chronic pain (post laminectomy syndrome),   Abdominal    Substance History      OB/GYN          Other                        Anesthesia Plan    ASA 3 - emergent     MAC     Anesthetic plan, all risks, benefits, and alternatives have been provided, discussed and informed consent has been obtained with: patient.

## 2019-06-01 NOTE — CONSULTS
Referring Provider: Dr Stroud    Subjective   History of present illness:   This very nice 79-year-old we are asked to provide evaluation opinion regarding possible cellulitis.    Patient defers most of his history and his wife.  She reports that he is been dealing with the right buttocks pressure ulcer since back surgery this past winter.  Despite local topical therapy, he apparently started to have worsening pain in that area last week.  He became weak and had poor mobility.  He was actually traveling in [place] and his wife drove him back to [place] so essentially did not move off the wound for hours.  Yesterday he had fever and was admitted.  CT showed right buttock subcutaneous stranding.  Wife has noticed purulent drainage from the wound.  Patient reports that he has not been compliant with offloading of the wound because he did not think it worked.    Past micro reviewed and no h/o MRSA    Past Medical History:   Diagnosis Date   • Acute hypoxemic respiratory failure (CMS/HCC)    • Acute suppurative otitis media    • Acute upper respiratory infection    • Anemia    • Arthritis    • Cholelithiasis    • Diabetes mellitus (CMS/HCC)     TYPE 2   • Diverticulosis    • Dry eyes    • Earache    • ED (erectile dysfunction)    • Edema    • Elevated cholesterol    • Encounter for screening for malignant neoplasm of prostate    • Esophagitis, reflux    • GERD (gastroesophageal reflux disease)    • Glaucoma    • H/O echocardiogram 09/18/2013   • Health care maintenance    • Hiatal hernia    • History of EKG 10/06/2015   • History of Holter monitoring 09/16/2013   • History of transfusion    • Hypercholesterolemia    • Hyperlipidemia    • Hypertension    • Lumbar canal stenosis    • Microalbuminuria    • Mild cognitive impairment    • Mitral valve prolapse    • Nephrolithiasis    • PAF (paroxysmal atrial fibrillation) (CMS/HCC)    • Pericardial effusion    • 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   • Spinal stenosis        Past Surgical History:   Procedure Laterality Date   • AMPUTATION DIGIT Left 10/29/2016    Procedure: TENDON AND NERVE REPAIR OF  LEFT THUMB;  Surgeon: Zak Hanson MD;  Location: [facility];  Service:    • APPENDECTOMY     • ARTHRODESIS  10/28/2013    Spinal / Description: Repair spinal stenosis   • BACK SURGERY     • BLEPHAROPLASTY  2000, 05/2002   • CATARACT EXTRACTION     • COLONOSCOPY     • EYE SURGERY  2011    EYE MUSCLE SX   • FRACTURE SURGERY     • HERNIA REPAIR Right 03/2010    inguinal   • LUMBAR DISCECTOMY FUSION INSTRUMENTATION N/A 1/29/2018    Procedure: L2 to L5 fusion with instrumentation and removal of implants L4 5;  Surgeon: Barron Knight MD;  Location: [facility] OR;  Service:    • LUMBAR DISCECTOMY FUSION INSTRUMENTATION N/A 1/9/2019    Procedure: T 11 to L3, L5-S1 fusion with T11 to S1 instrumentation, L4 pedicle subtraction osteotomy, Right L5-S1 interbody fusion with cage;  Surgeon: Barron Knight MD;  Location: [facility];  Service: Orthopedic Spine   • LUMBAR FUSION  2018    Lumbar vertebral fusion   • LUMBAR LAMINECTOMY      10.28.13  bilat L4/5 lami with Nyla and Belen   • LUMBAR LAMINECTOMY DISCECTOMY DECOMPRESSION N/A 1/29/2018    Procedure: L2 to L4 laminectomy with a fusion by orthopedics;  Surgeon: Jeison Redmond MD;  Location: [facility];  Service:    • LUMBAR LAMINECTOMY DISCECTOMY DECOMPRESSION N/A 1/9/2019    Procedure: L4-L5, L5-S1 LAMINECTOMY WITH NEURAL LYSIS;  Surgeon: Jeison Redmond MD;  Location: [facility];  Service: Neurosurgery   • OTHER SURGICAL HISTORY  2008    Lithotomy   • TONSILLECTOMY     • TONSILLECTOMY     • VASECTOMY  09/2008       family history includes Diabetes in his other; Emphysema in his father; Heart attack in his mother; Heart disease in his other; Hypertension in his other; Stroke in his mother.  Social History     Socioeconomic History   • Marital status:      Spouse  name: Silvia   • Number of children: 3   • Years of education: College   • Highest education level: Not on file   Occupational History   • Occupation: Teacher      Employer: RETIRED   Tobacco Use   • Smoking status: Never Smoker   • Smokeless tobacco: Never Used   • Tobacco comment: caffeine use   Substance and Sexual Activity   • Alcohol use: No     Frequency: Never   • Drug use: No   • Sexual activity: Defer       Allergies   Allergen Reactions   • Beta Adrenergic Blockers Other (See Comments)     Bradycardia         Review of Systems  Pertinent items are noted in HPI, all other systems reviewed and negative    Objective     Physical Exam:   Vital Signs   Temp:  [98.5 °F (36.9 °C)-100.7 °F (38.2 °C)] 98.5 °F (36.9 °C)  Heart Rate:  [78-96] 88  Resp:  [16] 16  BP: ()/(42-53) 94/43    GENERAL: Awake and alert, frail and chronically ill appearing  HEENT: Oropharynx is clear. Hearing is grossly normal.   EYES: PERRL. No conjunctival injection. No lid lag.   LYMPHATICS: No lymphadenopathy of the neck or inguinal regions.   HEART: Regular rate and rhythm. 2+ peripheral edema.   LUNGS: Clear to auscultation anteriorly with normal respiratory effort.   GI: Soft, nontender, nondistended. No appreciable organomegaly.   SKIN: Warm and dry without cutaneous eruptions except 4 cm indurated r buttock lesion  PSYCHIATRIC: Appropriate mood, affect. Limited insight, and judgment.     Results Review:  White count 13.22  Platelets 245  Hemoglobin 8.3.  Hematocrit 27.  Creatinine 0.84  Blood cultures no growth to date  CT of the pelvis, independently interpreted shows stranding in the right buttocks       Assessment/Plan   1.  Sepsis secondary to infected pressure ulcer of the right buttocks  2.  Diabetes mellitus type 2, continue glycemic control efforts to prevent/control infectious complications    I think his wound is going to need operative debridement.  We will ask general surgery to evaluate.  Continue Zosyn 3.375 g IV  every 8 hours.  If he were to go to the operating room, please send operative cultures and always add MRSA therapy if need be.  I get the impression that he may require more care than what his family can provide at home.    Thank you for this consult.  We will continue to follow along and tailor antibiotics as the patient's clinical course evolves.      Mark Perez MD  06/01/19  9:21 AM

## 2019-06-01 NOTE — OP NOTE
Date of procedure: 6/1/2019    Primary surgeon: Dr. Ruben SANZ    Preoperative diagnosis: Infected right gluteal wound    Postoperative diagnosis: Infected right gluteal pressure ulcer wound    Procedure performed: Excisional debridement of infected right gluteal pressure ulcer 6 x 5 cm    Anesthesia: General     EBL: Minimal    Complications: None    Specimen: 5.6 cm skin and subcutaneous tissue    Findings: Infected right gluteal pressure ulcer down to but not including muscle    Indications: 79-year-old male mated to the hospital with sepsis with a large infected pressure ulcer wound.  I offered the patient excisional debridement.  Risks and benefits of the procedure including bleeding, infection and the risk of anesthesia were discussed in detail with the patient and his family.  They verbalized understanding and agreed with the plan    Procedure: The patient was taken to operative room he was placed in the table in supine position.  Preoperative antibiotics were already given on the floor.  The patient underwent general endotracheal anesthesia.  Patient was positioned in the left lateral decubitus protecting the pressure points areas.  The right gluteal area was then prepped and draped in usual sterile fashion.  There was a small superficial pressure ulcer over the right gluteal area at the cleft that did not require any debridement.  With Bovie electrocautery the right gluteal wound measures 5 x 6 cm was excised.  Dissection was carried into the subcutaneous tissue down to the muscle.  There was moderate amount of purulent drainage.  Cultures were sent.  The wound was irrigated, hemostasis was achieved.  The wound was packed with Betadine Kerlix and covered with 4 x 4 and ABD pads.  The patient tolerated the procedure well and he was taken to recovery area in stable condition    Tom Miranda MD, FACS  General, Minimally Invasive and Endoscopic Surgery  [address]  [place], [address]  P: [phone]  F: [phone]

## 2019-06-01 NOTE — ANESTHESIA PROCEDURE NOTES
Airway  Urgency: emergent    Date/Time: 6/1/2019 12:34 PM  Airway not difficult    General Information and Staff    Patient location during procedure: OR  Anesthesiologist: Zak Kapadia MD  CRNA: Skinny Hull CRNA    Consent for Airway (if performed for an anesthetic, see related documentation for consents)  Patient identity confirmed: verbally with patient, arm band and hospital-assigned identification number  Consent: Written consent obtained.  Risks and benefits: risks, benefits and alternatives were discussed  Consent given by: patient      Indications and Patient Condition    Preoxygenated: yes  MILS not maintained throughout  Mask difficulty assessment: 1 - vent by mask    Final Airway Details  Final airway type: endotracheal airway      Successful airway: ETT  Cuffed: yes   Successful intubation technique: direct laryngoscopy  Facilitating devices/methods: anterior pressure/BURP  Endotracheal tube insertion site: oral  Blade: Sarah  Blade size: 3  ETT size (mm): 7.5  Cormack-Lehane Classification: grade I - full view of glottis  Placement verified by: chest auscultation   Cuff volume (mL): 8  Measured from: lips  ETT to lips (cm): 22  Number of attempts at approach: 1    Additional Comments  Pre O2, SIAI

## 2019-06-01 NOTE — PLAN OF CARE
Problem: Patient Care Overview  Goal: Plan of Care Review  Outcome: Ongoing (interventions implemented as appropriate)   06/01/19 0538   Coping/Psychosocial   Plan of Care Reviewed With patient;spouse   Plan of Care Review   Progress no change   OTHER   Outcome Summary Pt admitted for fever and new abcess/pressure ulcer on right butt and butt crack.C/o bilateral lower extremity edema. Wound RN consult made.Recieved IV zosyn in er. Pt a&ox4. Urinalysis negative. VS stable. Currently resting comfortably in bed. Will continue to monitor.      Goal: Individualization and Mutuality  Outcome: Ongoing (interventions implemented as appropriate)    Goal: Discharge Needs Assessment  Outcome: Ongoing (interventions implemented as appropriate)    Goal: Interprofessional Rounds/Family Conf  Outcome: Ongoing (interventions implemented as appropriate)      Problem: Fall Risk (Adult)  Goal: Identify Related Risk Factors and Signs and Symptoms  Outcome: Ongoing (interventions implemented as appropriate)    Goal: Absence of Fall  Outcome: Ongoing (interventions implemented as appropriate)      Problem: Skin Injury Risk (Adult)  Goal: Identify Related Risk Factors and Signs and Symptoms  Outcome: Ongoing (interventions implemented as appropriate)    Goal: Skin Health and Integrity  Outcome: Ongoing (interventions implemented as appropriate)      Problem: Pain, Acute (Adult)  Intervention: Support/Optimize Psychosocial Response to Acute Pain   06/01/19 0538   Coping/Psychosocial Interventions   Supportive Measures active listening utilized;relaxation techniques promoted;verbalization of feelings encouraged   Psychosocial Support   Diversional Activities television   Family/Support System Care involvement promoted;presence promoted;self-care encouraged   Interventions   Trust Relationship/Rapport care explained;empathic listening provided;questions answered;questions encouraged;reassurance provided       Goal: Identify Related Risk  Factors and Signs and Symptoms  Outcome: Ongoing (interventions implemented as appropriate)    Goal: Acceptable Pain Control/Comfort Level  Outcome: Ongoing (interventions implemented as appropriate)

## 2019-06-01 NOTE — ED TRIAGE NOTES
Pt reports a bed sore on his bottom that is possible infected. Family reports a temp of 104. Pt took his percocet and his temp on arrival is 100.7. Pt reports pain 9/10.

## 2019-06-01 NOTE — PROGRESS NOTES
"Pharmacy to dose Vancomycin    Day 1  Consult for Dr Perez  Treating: MRSA bacteremia  Duration: 14 days    Anti-Infectives (From admission, onward)    Ordered     Dose/Rate Route Frequency Start Stop    06/01/19 1509  Pharmacy to dose vancomycin     Ordering Provider:  Mark Perez MD     Does not apply Continuous 06/01/19 1600 06/15/19 1559    06/01/19 0224  piperacillin-tazobactam (ZOSYN) 3.375 g in iso-osmotic dextrose 50 ml (premix)     Ordering Provider:  Olga Joyner APRN    3.375 g  over 4 Hours Intravenous Every 8 Hours 06/01/19 0800 06/08/19 0759    06/01/19 0019  piperacillin-tazobactam (ZOSYN) 3.375 g in iso-osmotic dextrose 50 ml (premix)     Ordering Provider:  Zoltan Barr MD    3.375 g Intravenous Once 06/01/19 0021 06/01/19 0205           Relevant clinical data and objective history reviewed:  79 y.o. male 190.5 cm (75\") 87.4 kg (192 lb 10.9 oz)    Lab Results   Component Value Date    CREATININE 0.84 06/01/2019    CREATININE 0.89 06/01/2019    CREATININE 0.81 05/06/2019    CREATININE 0.80 12/27/2018    CREATININE 0.70 12/30/2016     Estimated Creatinine Clearance: 89.6 mL/min (by C-G formula based on SCr of 0.84 mg/dL).    Lab Results   Component Value Date    WBC 13.22 (H) 06/01/2019    WBC 10.33 06/01/2019    WBC 4.63 05/06/2019    WBC 4.10 (L) 12/19/2018    WBC 3.97 (L) 10/10/2018    WBC 4.93 06/11/2018     Temp Readings from Last 3 Encounters:   06/01/19 98.9 °F (37.2 °C) (Oral)   05/06/19 98.6 °F (37 °C) (Oral)   04/22/19 98.1 °F (36.7 °C)       Baseline cultures:  6/1 blood cx 2/2 + MRSA    PLAN: Will load patient with 1500mg X one today and follow with 1 gm Q12h and draw trough on 6/3 with 1600 dose.  Will follow and adjust as needed.     Samanta Johnson PharmD, BCPS  06/01/19 3:28 PM            "

## 2019-06-01 NOTE — CONSULTS
Consultation note    Referring physician: Dr. Perez    Consulting Physician: Tom Miranda MD    Reason for consultation: Right gluteal wound    Chief Complaint   Patient presents with   • Fever   • Wound Check       HPI:   The patient is a very pleasant 79 y.o. years old male that presented to the hospital with an infected right gluteal wound.  The patient had multiple back surgeries in the recent months and has been unable to walk and get out of bed.  He recently went to [place] by car and his family noticed a wound in his right gluteal area.  Yesterday he developed fever and chills so he was brought to the emergency room.  He was found to have a right gluteal wound that looks infected.  He has been having small amount of purulent drainage.  He reports pain over the area he is unable to put pressure on it.  He has been treated for sacral decubitus ulcer stage I.      Past Medical History:   Diagnosis Date   • Acute hypoxemic respiratory failure (CMS/HCC)    • Acute suppurative otitis media    • Acute upper respiratory infection    • Anemia    • Arthritis    • Cholelithiasis    • Diabetes mellitus (CMS/HCC)     TYPE 2   • Diverticulosis    • Dry eyes    • Earache    • ED (erectile dysfunction)    • Edema    • Elevated cholesterol    • Encounter for screening for malignant neoplasm of prostate    • Esophagitis, reflux    • GERD (gastroesophageal reflux disease)    • Glaucoma    • H/O echocardiogram 09/18/2013   • Health care maintenance    • Hiatal hernia    • History of EKG 10/06/2015   • History of Holter monitoring 09/16/2013   • History of transfusion    • Hypercholesterolemia    • Hyperlipidemia    • Hypertension    • Lumbar canal stenosis    • Microalbuminuria    • Mild cognitive impairment    • Mitral valve prolapse    • Nephrolithiasis    • PAF (paroxysmal atrial fibrillation) (CMS/HCC)    • Pericardial effusion    • 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   •  Spinal stenosis        Past Surgical History:   Procedure Laterality Date   • AMPUTATION DIGIT Left 10/29/2016    Procedure: TENDON AND NERVE REPAIR OF  LEFT THUMB;  Surgeon: Zak Hanson MD;  Location: [facility] OR;  Service:    • APPENDECTOMY     • ARTHRODESIS  10/28/2013    Spinal / Description: Repair spinal stenosis   • BACK SURGERY     • BLEPHAROPLASTY  2000, 05/2002   • CATARACT EXTRACTION     • COLONOSCOPY     • EYE SURGERY  2011    EYE MUSCLE SX   • FRACTURE SURGERY     • HERNIA REPAIR Right 03/2010    inguinal   • LUMBAR DISCECTOMY FUSION INSTRUMENTATION N/A 1/29/2018    Procedure: L2 to L5 fusion with instrumentation and removal of implants L4 5;  Surgeon: Barron Knight MD;  Location: [facility] OR;  Service:    • LUMBAR DISCECTOMY FUSION INSTRUMENTATION N/A 1/9/2019    Procedure: T 11 to L3, L5-S1 fusion with T11 to S1 instrumentation, L4 pedicle subtraction osteotomy, Right L5-S1 interbody fusion with cage;  Surgeon: Barron Knight MD;  Location: [facility] OR;  Service: Orthopedic Spine   • LUMBAR FUSION  2018    Lumbar vertebral fusion   • LUMBAR LAMINECTOMY      10.28.13  bilat L4/5 lami with Nyla and Belen   • LUMBAR LAMINECTOMY DISCECTOMY DECOMPRESSION N/A 1/29/2018    Procedure: L2 to L4 laminectomy with a fusion by orthopedics;  Surgeon: Jeison Redmond MD;  Location: [facility];  Service:    • LUMBAR LAMINECTOMY DISCECTOMY DECOMPRESSION N/A 1/9/2019    Procedure: L4-L5, L5-S1 LAMINECTOMY WITH NEURAL LYSIS;  Surgeon: Jeison Redmond MD;  Location: [facility];  Service: Neurosurgery   • OTHER SURGICAL HISTORY  2008    Lithotomy   • TONSILLECTOMY     • TONSILLECTOMY     • VASECTOMY  09/2008         Current Facility-Administered Medications:   •  acetaminophen (TYLENOL) tablet 650 mg, 650 mg, Oral, Q4H PRN, Olga Joyner APRN  •  acetaminophen (TYLENOL) tablet 650 mg, 650 mg, Oral, Q4H PRN, Kit Stroud MD  •  amiodarone (PACERONE) tablet 200 mg, 200 mg, Oral,  Daily, Kit Stroud MD  •  atorvastatin (LIPITOR) tablet 10 mg, 10 mg, Oral, Nightly, Kit Stroud MD  •  cyclobenzaprine (FLEXERIL) tablet 10 mg, 10 mg, Oral, TID PRN, Kit Stroud MD  •  dextrose (D50W) 25 g/ 50mL Intravenous Solution 25 g, 25 g, Intravenous, Q15 Min PRN, Kit Stroud MD  •  dextrose (GLUTOSE) oral gel 15 g, 15 g, Oral, Q15 Min PRN, Kit Stroud MD  •  donepezil (ARICEPT) tablet 5 mg, 5 mg, Oral, QAM, Kit Stroud MD, 5 mg at 06/01/19 0842  •  dorzolamide (TRUSOPT) 2 % ophthalmic solution 1 drop, 1 drop, Both Eyes, BID, Kit Stroud MD  •  enalapril (VASOTEC) tablet 40 mg, 40 mg, Oral, QAM, Kit Stroud MD  •  furosemide (LASIX) tablet 40 mg, 40 mg, Oral, Daily, Kit Stroud MD, 40 mg at 06/01/19 0843  •  gabapentin (NEURONTIN) capsule 300 mg, 300 mg, Oral, TID, Kit Stroud MD, 300 mg at 06/01/19 0842  •  glipiZIDE (GLUCOTROL) tablet 5 mg, 5 mg, Oral, QAM AC, Kit Stroud MD, 5 mg at 06/01/19 0843  •  glucagon (human recombinant) (GLUCAGEN DIAGNOSTIC) injection 1 mg, 1 mg, Subcutaneous, PRN, Kit Stroud MD  •  insulin lispro (humaLOG) injection 0-14 Units, 0-14 Units, Subcutaneous, 4x Daily With Meals & Nightly, Kit Stroud MD  •  latanoprost (XALATAN) 0.005 % ophthalmic solution 1 drop, 1 drop, Both Eyes, Nightly, Kit Stroud MD  •  ondansetron (ZOFRAN) tablet 4 mg, 4 mg, Oral, Q6H PRN **OR** ondansetron (ZOFRAN) injection 4 mg, 4 mg, Intravenous, Q6H PRN, English, Olga C, APRN  •  ondansetron (ZOFRAN) injection 4 mg, 4 mg, Intravenous, Q6H PRN, Kit Stroud MD  •  oxyCODONE-acetaminophen (PERCOCET)  MG per tablet 1 tablet, 1 tablet, Oral, Q4H PRN, Kit Stroud MD, 1 tablet at 06/01/19 0916  •  pantoprazole (PROTONIX) EC tablet 40 mg, 40 mg, Oral, QAM, Kit Stroud MD, 40 mg at 06/01/19 0842  •  piperacillin-tazobactam (ZOSYN) 3.375 g in iso-osmotic dextrose 50 ml (premix), 3.375 g, Intravenous, Q8H, Olga Joyner APRN, 3.375 g at  06/01/19 0843  •  potassium chloride (MICRO-K) CR capsule 20 mEq, 20 mEq, Oral, Daily, Kit Stroud MD, 20 mEq at 06/01/19 0842  •  sodium chloride 0.9 % flush 1-10 mL, 1-10 mL, Intravenous, PRN, Olga Joyner, APRN  •  [COMPLETED] Insert peripheral IV, , , Once **AND** sodium chloride 0.9 % flush 10 mL, 10 mL, Intravenous, PRN, Zoltan Barr MD  •  sodium chloride 0.9 % flush 3 mL, 3 mL, Intravenous, Q12H, Olga Joyner, APRN, 3 mL at 06/01/19 0845  •  sodium chloride 0.9 % flush 3 mL, 3 mL, Intravenous, Q12H, Kit Stroud MD  •  sodium chloride 0.9 % flush 3-10 mL, 3-10 mL, Intravenous, PRN, Kit Stroud MD  •  tamsulosin (FLOMAX) 24 hr capsule 0.4 mg, 0.4 mg, Oral, QAM, Kit Stroud MD, 0.4 mg at 06/01/19 0843  •  timolol (TIMOPTIC) 0.5 % ophthalmic solution 1 drop, 1 drop, Both Eyes, Daily, Kit Stroud MD    Allergies   Allergen Reactions   • Beta Adrenergic Blockers Other (See Comments)     Bradycardia       Social History     Socioeconomic History   • Marital status:      Spouse name: Silvia   • Number of children: 3   • Years of education: College   • Highest education level: Not on file   Occupational History   • Occupation: Teacher      Employer: RETIRED   Tobacco Use   • Smoking status: Never Smoker   • Smokeless tobacco: Never Used   • Tobacco comment: caffeine use   Substance and Sexual Activity   • Alcohol use: No     Frequency: Never   • Drug use: No   • Sexual activity: Defer       Family History   Problem Relation Age of Onset   • Heart attack Mother         acute myocardial infarction   • Stroke Mother         hemorrhagic   • Emphysema Father    • Diabetes Other         mellitus   • Hypertension Other    • Heart disease Other    • Malig Hyperthermia Neg Hx        ROS:   Reports fatigue and weakness  Denies chest pain or shortness of breath, reports lower extremity edema  Reports nausea and vomiting  Reports fevers and chills    Physical Exam:   Vitals:    06/01/19 0722    BP: 94/43   Pulse: 88   Resp: 16   Temp: 98.5 °F (36.9 °C)   SpO2: 92%     GENERAL:chronically ill appearing  HEENT: normochephalic, atraumatic, no scleral icterus moist mucous membranes.  NECK: Supple there is no thyromegaly or lymphadenopathy  EXTREMITIES: no cyanosis, clubbing, 1+ edema  Over the right superior gluteal area there is an infected wound that measures 4 x 4 cm with ulceration of the skin on small amount of purulent drainage.  There is surrounding erythema and tenderness to palpation.  There is a small stage I sacral decubitus ulcer that is linear mostly over the right buttock  NEURO: alert and oriented, normal speech   SKIN: Moist, warm, no rashes.    Diagnostic workup:   White blood cell 13.2  Hemoglobin 8.3  BMP within normal limits    Assessment and plan:   The patient is a very pleasant 79 y.o. years old male with an infected right gluteal wound that will need debridement.  Discussed with the patient and his son about surgical options.  Recommend that he undergoes excisional debridement of right gluteal wound.  The wound will be left open and he will need to have dressing changes performed until the wound bed is clean enough for a wound VAC placement.  Risk of the procedure includes mostly bleeding and the risk of anesthesia were discussed with the patient.  They verbalized understanding and agreed with the plan    -N.p.o.  -Obtain informed consent    Case was discussed with Dr. Stroud and patient.    Risk and benefits of the current recommended treatment were explained to the patient that had time to ask questions that where answered, verbalized understanding and agreed with the plan.     Tom Miranda MD  General, Minimally Invasive and Endoscopic Surgery  [facility] [address], [address]  [place], [place], [address]  P: [phone]  F: [phone]

## 2019-06-01 NOTE — NURSING NOTE
Reviewing medications with spouse.  She states that he is not on Ferrous sulfate or Crestor.  She does not have the list with her but will bring back once she is able to go home. Will review list once produced

## 2019-06-01 NOTE — H&P
"      Patient Care Team:  Trey Stockton MD as PCP - General (Internal Medicine)  Sherice Nweman MD as Consulting Physician (Cardiology)  Carey Pascual [facility] as Pharmacist  Geovanni Herron MD as Consulting Physician (Hematology and Oncology)  Sakina Martin APRN as Referring Physician (Cardiology)    Chief complaint right buttock pain and wound  Subjective     Pleasant 80yo gentleman admitted through ER with large painful pressure wound on right buttock. He has had this for some time (since spine surgery in December) and wife has been providing skin care as instructed. He has been increasingly weak and has been walking less and less due to weakness and some pretty uncomfortable swelling of BLEs. They were in [place] on vacation when wife decided he needed to get back to [place] and be seen for the wound. So contributing factors to this worsening of wound are the fact that he's been sitting on beach in [place] for a week, and then had a long car trip home during which he was unable to offload appropriately due to weakness. Wife was planning on taking him to see Derm on Monday, but yesterday he developed a temp of 102 so she brought him to ER. Pt is otherwise doing quite well. No cardiac or resp complaints. He is feeling better right now bc \"pain med has kicked in\".         Review of Systems   Constitutional: Positive for fatigue and fever. Negative for appetite change, chills and diaphoresis.   HENT: Negative for congestion, ear pain, facial swelling, hearing loss, mouth sores, nosebleeds, rhinorrhea, sinus pressure, sore throat, trouble swallowing and voice change.    Eyes: Negative for pain and visual disturbance.   Respiratory: Negative for cough, choking, chest tightness, shortness of breath and stridor.    Cardiovascular: Positive for leg swelling (chronic). Negative for chest pain and palpitations.   Gastrointestinal: Negative for abdominal pain, blood in stool, diarrhea, nausea and vomiting. "   Endocrine: Negative for cold intolerance and heat intolerance.   Genitourinary: Negative for decreased urine volume, difficulty urinating, dysuria and hematuria.   Musculoskeletal: Negative for back pain and neck pain.   Skin: Positive for wound. Negative for color change and pallor.   Allergic/Immunologic: Negative for environmental allergies and food allergies.   Neurological: Negative for tremors, seizures, syncope, facial asymmetry, speech difficulty, light-headedness, numbness and headaches.   Hematological: Negative for adenopathy. Does not bruise/bleed easily.   Psychiatric/Behavioral: Negative for behavioral problems, confusion and hallucinations.        Past Medical History:   Diagnosis Date   • Acute hypoxemic respiratory failure (CMS/HCC)    • Acute suppurative otitis media    • Acute upper respiratory infection    • Anemia    • Arthritis    • Cholelithiasis    • Diabetes mellitus (CMS/[facility])     TYPE 2   • Diverticulosis    • Dry eyes    • Earache    • ED (erectile dysfunction)    • Edema    • Elevated cholesterol    • Encounter for screening for malignant neoplasm of prostate    • Esophagitis, reflux    • GERD (gastroesophageal reflux disease)    • Glaucoma    • H/O echocardiogram 09/18/2013   • Health care maintenance    • Hiatal hernia    • History of EKG 10/06/2015   • History of Holter monitoring 09/16/2013   • History of transfusion    • Hypercholesterolemia    • Hyperlipidemia    • Hypertension    • Lumbar canal stenosis    • Microalbuminuria    • Mild cognitive impairment    • Mitral valve prolapse    • Nephrolithiasis    • PAF (paroxysmal atrial fibrillation) (CMS/HCC)    • Pericardial effusion    • 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   • Spinal stenosis      Past Surgical History:   Procedure Laterality Date   • AMPUTATION DIGIT Left 10/29/2016    Procedure: TENDON AND NERVE REPAIR OF  LEFT THUMB;  Surgeon: Zak Hanson MD;  Location: [facility]  OR;  Service:    • APPENDECTOMY     • ARTHRODESIS  10/28/2013    Spinal / Description: Repair spinal stenosis   • BACK SURGERY     • BLEPHAROPLASTY  2000, 05/2002   • CATARACT EXTRACTION     • COLONOSCOPY     • EYE SURGERY  2011    EYE MUSCLE SX   • FRACTURE SURGERY     • HERNIA REPAIR Right 03/2010    inguinal   • LUMBAR DISCECTOMY FUSION INSTRUMENTATION N/A 1/29/2018    Procedure: L2 to L5 fusion with instrumentation and removal of implants L4 5;  Surgeon: Barron Knight MD;  Location: [facility] OR;  Service:    • LUMBAR DISCECTOMY FUSION INSTRUMENTATION N/A 1/9/2019    Procedure: T 11 to L3, L5-S1 fusion with T11 to S1 instrumentation, L4 pedicle subtraction osteotomy, Right L5-S1 interbody fusion with cage;  Surgeon: Barron Knight MD;  Location: [facility] OR;  Service: Orthopedic Spine   • LUMBAR FUSION  2018    Lumbar vertebral fusion   • LUMBAR LAMINECTOMY      10.28.13  bilat L4/5 lami with Nyla and Belen   • LUMBAR LAMINECTOMY DISCECTOMY DECOMPRESSION N/A 1/29/2018    Procedure: L2 to L4 laminectomy with a fusion by orthopedics;  Surgeon: Jeison Redmond MD;  Location: [facility];  Service:    • LUMBAR LAMINECTOMY DISCECTOMY DECOMPRESSION N/A 1/9/2019    Procedure: L4-L5, L5-S1 LAMINECTOMY WITH NEURAL LYSIS;  Surgeon: Jeison Redmond MD;  Location: [facility];  Service: Neurosurgery   • OTHER SURGICAL HISTORY  2008    Lithotomy   • TONSILLECTOMY     • TONSILLECTOMY     • VASECTOMY  09/2008     Family History   Problem Relation Age of Onset   • Heart attack Mother         acute myocardial infarction   • Stroke Mother         hemorrhagic   • Emphysema Father    • Diabetes Other         mellitus   • Hypertension Other    • Heart disease Other    • Malig Hyperthermia Neg Hx      Social History     Tobacco Use   • Smoking status: Never Smoker   • Smokeless tobacco: Never Used   • Tobacco comment: caffeine use   Substance Use Topics   • Alcohol use: No     Frequency: Never   • Drug use: No      Facility-Administered Medications Prior to Admission   Medication Dose Route Frequency Provider Last Rate Last Dose   • [START ON 6/3/2019] cyanocobalamin injection 1,000 mcg  1,000 mcg Intramuscular Q30 Days Trey Stockton MD         Medications Prior to Admission   Medication Sig Dispense Refill Last Dose   • brimonidine (ALPHAGAN) 0.2 % ophthalmic solution Administer 1 drop to both eyes 2 (Two) Times a Day.   Past Week at Unknown time   • ACCU-CHEK FASTCLIX LANCETS misc USE TO TEST TWICE DAILY 102 each 0 Taking   • ACCU-CHEK SMARTVIEW test strip USE TO TEST TWICE DAILY 100 each 1 Taking   • amiodarone (PACERONE) 200 MG tablet Take 1 tablet by mouth Daily. 90 tablet 1    • apixaban (ELIQUIS) 5 MG tablet tablet Take 1 tablet by mouth Every 12 (Twelve) Hours. 180 tablet 0 Taking   • atorvastatin (LIPITOR) 10 MG tablet Take 10 mg by mouth Every Night.   Taking   • Blood Glucose Monitoring Suppl (ACCU-CHEK FRANCISCO SMARTVIEW) W/DEVICE kit USE TO TEST BLOOD SUGAR TWICE DAILY 1 kit 0 Taking   • Blood Glucose Monitoring Suppl (FREESTYLE LITE) device Use to test blood once daily 1 each 0 Taking   • calcium carbonate (TUMS) 500 MG chewable tablet Chew 1,000 mg 3 (Three) Times a Day As Needed for Indigestion or Heartburn.   Taking   • cholecalciferol (VITAMIN D3) 92090 units capsule Take 2,000 Units by mouth Daily.   Taking   • cyclobenzaprine (FLEXERIL) 10 MG tablet Take 10 mg by mouth 3 (Three) Times a Day As Needed for Muscle Spasms.   Taking   • donepezil (ARICEPT) 10 MG tablet Take 5 mg by mouth Every Morning.   Taking   • dorzolamide (TRUSOPT) 2 % ophthalmic solution Administer 1 drop to both eyes 2 (two) times a day.   Taking   • enalapril (VASOTEC) 20 MG tablet Take 1 tablet by mouth 2 (Two) Times a Day. (Patient taking differently: Take 40 mg by mouth Every Morning.) 180 tablet 1 Taking   • esomeprazole (nexIUM) 40 MG capsule Take 1 capsule by mouth Daily. 90 capsule 1 Taking   • furosemide (LASIX) 40 MG  tablet Take 1 tablet by mouth Daily.   Taking   • gabapentin (NEURONTIN) 100 MG capsule Take 3 capsules by mouth 3 (Three) Times a Day. 90 capsule 1 Not Taking   • glimepiride (AMARYL) 2 MG tablet Take 1 tablet by mouth Every Morning Before Breakfast. 90 tablet 1 Taking   • glucose blood (FREESTYLE LITE) test strip Use to test blood once daily 100 each 12 Taking   • Glucose Blood disk Inject 1 Device as directed daily. TEST BLOOD SUGAR ONCE DAILY AS DIRECTED 100 each 3 Taking   • Lancets (FREESTYLE) lancets Use to test blood sugar daily 100 each 12 Taking   • latanoprost (XALATAN) 0.005 % ophthalmic solution Administer 1 drop to both eyes Every Night.   Taking   • metFORMIN (GLUCOPHAGE) 500 MG tablet Take 1 tablet by mouth 3 (Three) Times a Day. (Patient taking differently: Take 500 mg by mouth 2 (Two) Times a Day.) 270 tablet 0 Taking   • ONE TOUCH ULTRA TEST test strip USE ONCE DAILY AS DIRECTED 100 each 3 Taking   • oxyCODONE-acetaminophen (PERCOCET)  MG per tablet Take 1 tablet by mouth Every 4 (Four) Hours As Needed for Moderate Pain . 12 tablet 0 Taking   • pioglitazone (ACTOS) 45 MG tablet TAKE 1 TABLET EVERY MORNING 90 tablet 2 Taking   • potassium chloride (MICRO-K) 10 MEQ CR capsule Take 2 capsules by mouth Daily. 180 capsule 0 Taking   • tamsulosin (FLOMAX) 0.4 MG capsule 24 hr capsule Take 0.4 mg by mouth Every Morning. 1-2 TABS AS NEEDED    Taking   • timolol (TIMOPTIC) 0.5 % ophthalmic solution Administer 1 drop to both eyes Daily.   Taking     Allergies:  Beta adrenergic blockers    Objective      Vital Signs  Temp:  [98.5 °F (36.9 °C)-100.7 °F (38.2 °C)] 98.5 °F (36.9 °C)  Heart Rate:  [78-96] 88  Resp:  [16] 16  BP: ()/(42-53) 94/43    Physical Exam   Constitutional: He is oriented to person, place, and time. He appears well-developed and well-nourished. No distress.   HENT:   Head: Normocephalic and atraumatic.   Mouth/Throat: Oropharynx is clear and moist. No oropharyngeal exudate.    Eyes: Conjunctivae and EOM are normal. Pupils are equal, round, and reactive to light. Right eye exhibits no discharge. Left eye exhibits no discharge. No scleral icterus.   Neck: Normal range of motion. Neck supple. No thyromegaly present.   Cardiovascular: Normal rate, regular rhythm, normal heart sounds and intact distal pulses.   Pulmonary/Chest: Effort normal and breath sounds normal. No respiratory distress. He has no rales.   Abdominal: Soft. Bowel sounds are normal. He exhibits no distension. There is no tenderness.   Musculoskeletal: He exhibits edema (3+ in BLEs). He exhibits no deformity.   Lymphadenopathy:     He has no cervical adenopathy.   Neurological: He is alert and oriented to person, place, and time. No cranial nerve deficit or sensory deficit. He exhibits normal muscle tone.   Skin: Skin is warm and dry. Capillary refill takes less than 2 seconds. No rash noted. He is not diaphoretic. No pallor.   Large wound right buttock with surrounding erythema, malodorous drainage   Psychiatric: He has a normal mood and affect. His behavior is normal.   Nursing note and vitals reviewed.      Results Review:   I reviewed the patient's new clinical results.  I reviewed the patient's new imaging results and agree with the interpretation.  I reviewed the patient's other test results and agree with the interpretation  I personally viewed and interpreted the patient's EKG/Telemetry data  Discussed with patient, wife, and RN      Assessment/Plan       Cellulitis of buttock    Hypercholesterolemia    Hypertension    Atrial fibrillation (CMS/HCC)    Iron deficiency anemia    Mild cognitive disorder    Chronic bilateral low back pain with bilateral sciatica    Chronic anticoagulation-for Afib    Type 2 diabetes mellitus, without long-term current use of insulin (CMS/[facility])    Weakness of both lower extremities    Lower extremity edema    Wound of right buttock    Sepsis affecting skin (CMS/HCC)          Plan:    (Continue IV Zosyn for now  Blood cultures sent, MRSA screen ordered  ID consult ordered (Dr. Perez's note and recs reviewed)  Wound RN to see  Gen Surg to see for possible debridement  Hold Eliquis for now in case OR planned  I agree with wife that pt's continued BLE edema is hampering his activity, as a result he is spending more time seated and his right buttock wound is worsening  Would dc Actos permanently, PT to see, may need diuresis at some point--watch volume status closely  Cover with SSI and check HgbA1c, continue sulfonylurea for now, metformin on hold, consider long-acting insulin, consider SGLT-2 agent  Full code confirmed  SCDs for DVT ppx and to help mobilize edema  Further orders to follow as suggested by evolving hospital course  ).       I discussed the patients findings and my recommendations with patient, family and nursing staff    Kit Stroud MD  06/01/19  10:26 AM    Time: 45min

## 2019-06-02 PROBLEM — J90 PLEURAL EFFUSION: Status: ACTIVE | Noted: 2019-01-01

## 2019-06-02 NOTE — DISCHARGE PLACEMENT REQUEST
"Natalie Derrek D (79 y.o. Male)     Date of Birth Social Security Number Address Home Phone MRN    1940  [id number] [address] [facility] [address] [phone] [id number]    [place] Marital Status          [place]        Admission Date Admission Type Admitting Provider Attending Provider Department, Room/Bed    5/31/19 Emergency Abbe Andrade MD Benton, John B, MD [address], [address]    Discharge Date Discharge Disposition Discharge Destination                       Attending Provider:  Kit Stroud MD    Allergies:  Beta Adrenergic Blockers    Isolation:  Contact   Infection:  MRSA (06/01/19)   Code Status:  CPR    Ht:  190.5 cm (75\")   Wt:  88 kg (194 lb)    Admission Cmt:  None   Principal Problem:  Cellulitis of buttock [L03.317]                 Active Insurance as of 5/31/2019     Primary Coverage     Payor Plan Insurance Group Employer/Plan Group    [facility] MEDICARE REPLACEMENT [facility] [id number]     Payor Plan Address Payor Plan Phone Number Payor Plan Fax Number Effective Dates    [address] [address]   1/1/2016 - None Entered    [facility] [id number]       Subscriber Name Subscriber Birth Date Member ID       DERREK OBREGON 1940 [id number]                 Emergency Contacts      (Rel.) Home Phone Work Phone Mobile Phone    ZofiaSilvia rdz (Spouse) [phone] -- [phone]            "

## 2019-06-02 NOTE — CONSULTS
Patient Name: Derrek Estrada  :1940  79 y.o.    Date of Admission: 2019  Date of Consultation:  19  Encounter Provider: Everett Espinoza RN  Place of Service: [facility] CARDIOLOGY  Referring Provider: Abbe Andrade MD  Patient Care Team:  Trey Stockton MD as PCP - General (Internal Medicine)  Sherice Newman MD as Consulting Physician (Cardiology)  Carey Pascual MARY as Pharmacist  Geovanni Herron MD as Consulting Physician (Hematology and Oncology)  Sakina Martin APRN as Referring Physician (Cardiology)      Chief complaint: Right buttock pain and wound    Reason for Consult: Bradycardia in setting of Sepsis    History of Present Illness: Derrek Estrada is a 79-year-old male patient of Dr. Newman with a history of paroxsymal atrial fibrillation (apixaban), essential hypertension, and right bundle branch block. He was hospitalized from 3/30/19-19 with acute hypoxic respiratory failure, which was felt to be the result of of atelectasis and pleural effusions. Echo done on 19 noted EF of 54% with grade 1 diastolic dysfunctions, a moderate left pleural effusion, and a trivial pericardial effusion. He was last seen in the office on 2019 with Dr. Newman for follow up. His amiodarone 200 mg was continued, but reduced from BID to daily at that time.     He presented to the ED on 2019 with complaints of a painful pressure wound on his right buttock, He also had complaints of a 102F fever. The wound has been present since back surgery this past December, but the patient has been less active recently due to increasing weakness and uncomfortable lower extremity swelling. The wound was noted to have purulent drainage. General surgery and ID have been consulted. Surgical debridement is planned and apixaban has been held.     The patient was noted overnight on telemetry to be bradycardic, typically in the 50s when awake and in the  40s when sleeping, with occasional brief episodes in the 38-39 range. BP overnight was 107/66 and this morning was 120/56. Amiodarone has been held and we have been consulted.     He denies any symptoms of lightheadedness or dizziness.  He denies any history of syncope.       Prior Cardiac History:  Echo 4/1/2019  · Left ventricular systolic function is normal. Calculated EF = 54.0%. Estimated EF was in agreement with the calculated EF. Normal left ventricular cavity size and wall thickness noted. All left ventricular wall segments contract normally.  · Left ventricular diastolic dysfunction is noted (grade I) consistent with impaired relaxation.  · No valvular stenosis or insufficiency.  · There is a trivial pericardial effusion.  · There is a moderate size left pleural effusion.      Past Medical History:   Diagnosis Date   • Acute hypoxemic respiratory failure (CMS/HCC)    • Acute suppurative otitis media    • Acute upper respiratory infection    • Anemia    • Arthritis    • Cholelithiasis    • Diabetes mellitus (CMS/[facility])     TYPE 2   • Diverticulosis    • Dry eyes    • Earache    • ED (erectile dysfunction)    • Edema    • Elevated cholesterol    • Encounter for screening for malignant neoplasm of prostate    • Esophagitis, reflux    • GERD (gastroesophageal reflux disease)    • Glaucoma    • H/O echocardiogram 09/18/2013   • Health care maintenance    • Hiatal hernia    • History of EKG 10/06/2015   • History of Holter monitoring 09/16/2013   • History of transfusion    • Hypercholesterolemia    • Hyperlipidemia    • Hypertension    • Lumbar canal stenosis    • Microalbuminuria    • Mild cognitive impairment    • Mitral valve prolapse    • Nephrolithiasis    • PAF (paroxysmal atrial fibrillation) (CMS/HCC)    • Pericardial effusion    • 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   • Spinal stenosis        Past Surgical History:   Procedure Laterality Date   • AMPUTATION DIGIT  Left 10/29/2016    Procedure: TENDON AND NERVE REPAIR OF  LEFT THUMB;  Surgeon: Zak Hanson MD;  Location: [facility] OR;  Service:    • APPENDECTOMY     • ARTHRODESIS  10/28/2013    Spinal / Description: Repair spinal stenosis   • BACK SURGERY     • BLEPHAROPLASTY  2000, 05/2002   • CATARACT EXTRACTION     • COLONOSCOPY     • EYE SURGERY  2011    EYE MUSCLE SX   • FRACTURE SURGERY     • HERNIA REPAIR Right 03/2010    inguinal   • LUMBAR DISCECTOMY FUSION INSTRUMENTATION N/A 1/29/2018    Procedure: L2 to L5 fusion with instrumentation and removal of implants L4 5;  Surgeon: Barron Knight MD;  Location: [facility] OR;  Service:    • LUMBAR DISCECTOMY FUSION INSTRUMENTATION N/A 1/9/2019    Procedure: T 11 to L3, L5-S1 fusion with T11 to S1 instrumentation, L4 pedicle subtraction osteotomy, Right L5-S1 interbody fusion with cage;  Surgeon: Barron Knight MD;  Location: [facility] OR;  Service: Orthopedic Spine   • LUMBAR FUSION  2018    Lumbar vertebral fusion   • LUMBAR LAMINECTOMY      10.28.13  bilat L4/5 lami with Nyla and Belen   • LUMBAR LAMINECTOMY DISCECTOMY DECOMPRESSION N/A 1/29/2018    Procedure: L2 to L4 laminectomy with a fusion by orthopedics;  Surgeon: Jeison Redmond MD;  Location: [facility] OR;  Service:    • LUMBAR LAMINECTOMY DISCECTOMY DECOMPRESSION N/A 1/9/2019    Procedure: L4-L5, L5-S1 LAMINECTOMY WITH NEURAL LYSIS;  Surgeon: Jeison Redmond MD;  Location: [facility];  Service: Neurosurgery   • OTHER SURGICAL HISTORY  2008    Lithotomy   • TONSILLECTOMY     • TONSILLECTOMY     • VASECTOMY  09/2008         Prior to Admission medications    Medication Sig Start Date End Date Taking? Authorizing Provider   ACCU-CHEK FASTCLIX LANCETS misc USE TO TEST TWICE DAILY 7/8/16  Yes Quinton Leal MD   ACCU-CHEK SMARTVIEW test strip USE TO TEST TWICE DAILY 4/19/18  Yes Quinton Leal MD   amiodarone (PACERONE) 200 MG tablet Take 1 tablet by mouth Daily. 5/9/19  Yes Trent  Sherice HALL MD   apixaban (ELIQUIS) 5 MG tablet tablet Take 1 tablet by mouth Every 12 (Twelve) Hours. 4/9/19  Yes Sherice Newman MD   atorvastatin (LIPITOR) 10 MG tablet Take 10 mg by mouth Every Night.   Yes Ck Davies MD   Blood Glucose Monitoring Suppl (ACCU-CHEK FRANCISCO SMARTVIEW) W/DEVICE kit USE TO TEST BLOOD SUGAR TWICE DAILY 6/8/17  Yes Quinton Leal MD   Blood Glucose Monitoring Suppl (FREESTYLE LITE) device Use to test blood once daily 7/7/16  Yes Quinton Leal MD   brimonidine (ALPHAGAN) 0.2 % ophthalmic solution Administer 1 drop to both eyes 2 (Two) Times a Day. 3/3/16  Yes Ck Davies MD   calcium carbonate (TUMS) 500 MG chewable tablet Chew 1,000 mg 3 (Three) Times a Day As Needed for Indigestion or Heartburn. 1/12/19  Yes Barron Knight MD   cholecalciferol (VITAMIN D3) 28067 units capsule Take 2,000 Units by mouth Daily.   Yes Ck Davies MD   cyclobenzaprine (FLEXERIL) 10 MG tablet Take 10 mg by mouth 3 (Three) Times a Day As Needed for Muscle Spasms.   Yes Ck Davies MD   donepezil (ARICEPT) 10 MG tablet Take 5 mg by mouth Every Morning.   Yes Ck Davies MD   dorzolamide (TRUSOPT) 2 % ophthalmic solution Administer 1 drop to both eyes 2 (two) times a day. 7/11/13  Yes Ck Davies MD   enalapril (VASOTEC) 20 MG tablet Take 1 tablet by mouth 2 (Two) Times a Day.  Patient taking differently: Take 40 mg by mouth Every Morning. 12/19/18  Yes Trey Stockton MD   esomeprazole (nexIUM) 40 MG capsule Take 1 capsule by mouth Daily. 12/19/18  Yes Trey Stockton MD   furosemide (LASIX) 40 MG tablet Take 1 tablet by mouth Daily. 4/6/19  Yes Jada Balbuena MD   gabapentin (NEURONTIN) 100 MG capsule Take 3 capsules by mouth 3 (Three) Times a Day. 4/22/19  Yes Trey Stockton MD   glimepiride (AMARYL) 2 MG tablet Take 1 tablet by mouth Every Morning Before Breakfast. 12/19/18  Yes Trey Stockton MD    glucose blood (FREESTYLE LITE) test strip Use to test blood once daily 7/7/16  Yes Quinton Leal MD   Glucose Blood disk Inject 1 Device as directed daily. TEST BLOOD SUGAR ONCE DAILY AS DIRECTED 6/6/16  Yes Quinton Leal MD   Lancets (FREESTYLE) lancets Use to test blood sugar daily 7/7/16  Yes Quinton Leal MD   latanoprost (XALATAN) 0.005 % ophthalmic solution Administer 1 drop to both eyes Every Night. 8/5/16  Yes Ck Davies MD   metFORMIN (GLUCOPHAGE) 500 MG tablet Take 1 tablet by mouth 3 (Three) Times a Day.  Patient taking differently: Take 500 mg by mouth 2 (Two) Times a Day. 5/2/19  Yes Trey Stockton MD   ONE TOUCH ULTRA TEST test strip USE ONCE DAILY AS DIRECTED 6/6/16  Yes Quinton Leal MD   oxyCODONE-acetaminophen (PERCOCET)  MG per tablet Take 1 tablet by mouth Every 4 (Four) Hours As Needed for Moderate Pain . 4/5/19  Yes Jada Balbuena MD   pioglitazone (ACTOS) 45 MG tablet TAKE 1 TABLET EVERY MORNING 4/26/19  Yes Trey Stockton MD   potassium chloride (MICRO-K) 10 MEQ CR capsule Take 2 capsules by mouth Daily. 5/2/19  Yes Trey Stockton MD   tamsulosin (FLOMAX) 0.4 MG capsule 24 hr capsule Take 0.4 mg by mouth Every Morning. 1-2 TABS AS NEEDED    Yes Ck Davies MD   timolol (TIMOPTIC) 0.5 % ophthalmic solution Administer 1 drop to both eyes Daily. 3/3/16  Yes Ck Davies MD       Allergies   Allergen Reactions   • Beta Adrenergic Blockers Other (See Comments)     Bradycardia       Social History     Socioeconomic History   • Marital status:      Spouse name: Silvia   • Number of children: 3   • Years of education: College   • Highest education level: Not on file   Occupational History   • Occupation: Teacher      Employer: RETIRED   Tobacco Use   • Smoking status: Never Smoker   • Smokeless tobacco: Never Used   • Tobacco comment: caffeine use   Substance and Sexual Activity   • Alcohol use: No     Frequency: Never    • Drug use: No   • Sexual activity: Defer       Family History   Problem Relation Age of Onset   • Heart attack Mother         acute myocardial infarction   • Stroke Mother         hemorrhagic   • Emphysema Father    • Diabetes Other         mellitus   • Hypertension Other    • Heart disease Other    • Malig Hyperthermia Neg Hx        REVIEW OF SYSTEMS:   All systems reviewed.  Pertinent positives identified in HPI.  All other systems are negative.      Objective:     Vitals:    06/02/19 0758 06/02/19 0845 06/02/19 0859 06/02/19 0900   BP: 120/56      BP Location: Left arm      Patient Position: Lying      Pulse: 67 (!) 48 50 (!) 46   Resp: 18      Temp: 98.1 °F (36.7 °C)      TempSrc: Oral      SpO2: 90%      Weight:       Height:         Body mass index is 24.33 kg/m².    General Appearance:    Alert, cooperative, in no acute distress   Head:    Normocephalic, atraumatic   Eyes:             no icterus, no pallor, corneas clear,    Ears:    Ears appear intact with no abnormalities noted   Throat:   No oral lesions,  oral mucosa moist   Neck:   supple, trachea midline, no thyromegaly,  no JVD       Lungs:     Clear to auscultation,respirations regular, even and unlabored    Heart:    Regular rhythm and bradycardia rate, normal S1 and S2, no murmur, no gallop, no rub, no click   Chest Wall:    No abnormalities observed   Abdomen:     Normal bowel sounds, soft, non-tender, non-distended   Extremities:   Moves all extremities well, no edema,       Skin:  Psychiatric:     Alert and oriented x 3, normal mood and affect   Lab Review:     Results from last 7 days   Lab Units 06/02/19  0559   SODIUM mmol/L 144   POTASSIUM mmol/L 4.3   CHLORIDE mmol/L 109*   CO2 mmol/L 25.2   BUN mg/dL 33*   CREATININE mg/dL 0.85   CALCIUM mg/dL 7.8*   BILIRUBIN mg/dL 0.2   ALK PHOS U/L 80   ALT (SGPT) U/L 11   AST (SGOT) U/L 17   GLUCOSE mg/dL 231*         Results from last 7 days   Lab Units 06/02/19  0559   WBC 10*3/mm3 10.06    HEMOGLOBIN g/dL 7.9*   HEMATOCRIT % 26.7*   PLATELETS 10*3/mm3 224                           Telemetry      EKG      Previous EKG      I personally viewed and interpreted the patient's EKG/Telemetry data.        Assessment and Plan:       1.Asymptomatic Bradycardia  2.Paroxysmal Atrial Fibrillation    Asymptomatic Bradycardia without symptoms.  No evidence of high grade heart block. I would continue amiodarone at current dose.  Atrial Fibrillation likely to recur off amiodarone.  Apixaban currently held pending surgery.  Will continue to follow.    Everett Espinoza RN  06/02/19  9:50 AM

## 2019-06-02 NOTE — PLAN OF CARE
Problem: Patient Care Overview  Goal: Plan of Care Review  Outcome: Ongoing (interventions implemented as appropriate)   06/02/19 0313   Coping/Psychosocial   Plan of Care Reviewed With patient   Plan of Care Review   Progress no change   OTHER   Outcome Summary Minimal pain reported today. abundance of education given to patient spouse and son regarding MRSA, pressure ulcers, antibiotics and infection prevention.     Goal: Individualization and Mutuality  Outcome: Ongoing (interventions implemented as appropriate)    Goal: Discharge Needs Assessment  Outcome: Ongoing (interventions implemented as appropriate)    Goal: Interprofessional Rounds/Family Conf  Outcome: Ongoing (interventions implemented as appropriate)      Problem: Fall Risk (Adult)  Goal: Absence of Fall  Outcome: Ongoing (interventions implemented as appropriate)      Problem: Skin Injury Risk (Adult)  Goal: Skin Health and Integrity  Outcome: Ongoing (interventions implemented as appropriate)      Problem: Pain, Acute (Adult)  Goal: Acceptable Pain Control/Comfort Level  Outcome: Ongoing (interventions implemented as appropriate)

## 2019-06-02 NOTE — PROGRESS NOTES
Postoperative day 1 status post debridement of infected pressure ulcer of the right gluteus    No events overnight.  Feeling better  Reports minimal gluteal pain    Over the right gluteal area there is an open wound of 6 x 6 cm that is round.  There is healthy tissue at the base.  There is no signs of infection.  There is a small rim of necrotic skin    White blood cell 10, hemoglobin 7.9    Culture grew MRSA  Wound Culture Moderate growth (3+) Staphylococcus aureus, MRSA Abnormal         Postoperative day 1 status post debridement of infected pressure ulcer of the right gluteus, recovering as expected.  Culture grew MRSA and is being treated with the correct antibiotic discussed with him about further treatment for his wound.  I recommend that he continue wet-to-dry dressing changes 3 times a day with saline and gauze for now and gets evaluated tomorrow for wound VAC placement.  He will need to follow-up with wound care clinic    -Wet-to-dry dressing changes with saline and gauze 3 times daily  -Wound care evaluation for wound VAC placement  -Antibiotics as per ID  -Change position every 2 hours to improve blood supply to the sacral area and prevent worsening ulcers  -He may benefit from air mattress when at home    Tom Miranda MD, FACS  General, Minimally Invasive and Endoscopic Surgery  [facility]    [address], [address]  [place], [place], [address]  P: [phone]  F: [phone]

## 2019-06-02 NOTE — PROGRESS NOTES
"   LOS: 1 day   Patient Care Team:  Trey Stockton MD as PCP - General (Internal Medicine)  Sherice Newman MD as Consulting Physician (Cardiology)  Carey Pascual MARY as Pharmacist  Geovanni Herron MD as Consulting Physician (Hematology and Oncology)  Sakina Martin APRN as Referring Physician (Cardiology)    Chief Complaint: tired    Subjective     Feeling a little better today. Pretty down about the whole situation. Pain is controlled. Tolerating diet.         Subjective:  Symptoms:  Stable.  He reports malaise and weakness.  No shortness of breath, cough, chest pain, headache, chest pressure, anorexia, diarrhea or anxiety.    Diet:  Adequate intake.  No nausea or vomiting.    Activity level: Impaired due to weakness.    Pain:  He complains of pain that is mild.  He reports pain is unchanged.  Pain is well controlled.        History taken from: patient chart family RN    Objective     Vital Signs  Temp:  [97.7 °F (36.5 °C)-98.4 °F (36.9 °C)] 97.7 °F (36.5 °C)  Heart Rate:  [35-67] 63  Resp:  [18-20] 20  BP: ()/(54-66) 93/54    Objective:  General Appearance:  Comfortable, in no acute distress and ill-appearing.    Vital signs: (most recent): Blood pressure 93/54, pulse 63, temperature 97.7 °F (36.5 °C), temperature source Oral, resp. rate 20, height 190.5 cm (75\"), weight 88 kg (194 lb), SpO2 92 %.  Vital signs are normal.  No fever.    Output: Producing urine and producing stool.    HEENT: Normal HEENT exam.    Lungs:  Normal effort and normal respiratory rate.  Breath sounds clear to auscultation.    Heart: Normal rate.  Regular rhythm.    Abdomen: Abdomen is soft.  Bowel sounds are normal.   There is no abdominal tenderness.     Extremities: There is dependent edema.    Pulses: Distal pulses are intact.    Neurological: Patient is alert and oriented to person, place and time.    Skin:  Warm and dry.  No rash.             Results Review:     I reviewed the patient's new clinical " results.  I reviewed the patient's new imaging results and agree with the interpretation.  I reviewed the patient's other test results and agree with the interpretation  I personally viewed and interpreted the patient's EKG/Telemetry data  Discussed with patient, wife, RN, and Dr. Perez    Medication Review: reviewed and adjusted    Assessment/Plan       MRSA bacteremia    Hypercholesterolemia    Hypertension    Atrial fibrillation (CMS/HCC)    Iron deficiency anemia    Mild cognitive disorder    Chronic bilateral low back pain with bilateral sciatica    Chronic anticoagulation-for Afib    Type 2 diabetes mellitus, without long-term current use of insulin (CMS/HCC)    Weakness of both lower extremities    Lower extremity edema    Wound of right buttock    Sepsis affecting skin (CMS/HCC)    Pleural effusion          Plan:   (Pressure wound right buttock, MRSA growing in wound and blood cultures  POD#1 s/p I&D of wound  Wound RN to see for wound vac, will have to f/u with WCC  Continue Vanc per ID, d/w Dr. Perez this AM  Concern that his spine hardware could become involved  TTE noted, will check CXR to get a better view of pleural effusion  WBC is down now, no fever  Hgb falling, may need to transfuse if drops any lower  Continue to hold AC for now, will ask Surg when okay to restart  BP and HR low, asymptomatic, Card following, continue present meds per Dr. Shanks  Sugars very low yesterday as he got dose of Glipizide and then was made NPO for surgery, sugars high now, but he is on IVFs with dextrose, will dc Glipizide and continue on SSI, d/c dextrose in IVFs, continue to hold Metformin while on Vanc, SGLT-2 agent probably not good idea with current skin infection  BLE weakness persists, suspect multifactorial: bad back, recent spine surgery, edema, infection, etc.  Will require SNF at discharge, PT following, CCP to assist    ).       Kit Stroud MD  06/02/19  4:06 PM    Time: More than 50% of time  spent in counseling and coordination of care:  Total face-to-face/floor time 45 min.  Time spent in counseling 30 min. Counseling included the following topics: plans for recovery, rehab stay, mgmt of DM2

## 2019-06-02 NOTE — PROGRESS NOTES
INFECTIOUS DISEASES PROGRESS NOTE    CC: f/u MRSA    S:   Status post incision and debridement of an infected sacral decub yesterday.  Operative note reviewed: 5 x 6 cm right gluteal wound excised with infected subcutaneous tissue and muscle with moderate amount of purulent drainage.  Cultures grew MRSA.  Blood cultures also positive for MRSA.  Case discussed with Dr. Stroud regarding MRSA and hardware      Patient complains of some positional postsurgical right buttocks pain which is intermittent and better with analgesia.  Not associate with any fevers or chills or night sweats      No significant change in chronic back pain.  No nausea, vomiting, diarrhea.  No rashes.  +LE edema  Wants to get up and walk    O:  Physical Exam:  Temp:  [97.7 °F (36.5 °C)-99.7 °F (37.6 °C)] 98.1 °F (36.7 °C)  Heart Rate:  [35-90] 45  Resp:  [18] 18  BP: ()/(54-66) 120/56  Physical Exam   Constitutional:  Non-toxic appearance. He has a sickly appearance.   Pulmonary/Chest: Effort normal.   Musculoskeletal: He exhibits edema.   Neurological: He is alert.   Skin: Skin is warm and dry.   Psychiatric: He has a normal mood and affect. His behavior is normal.        Diagnostics:    Creatinine 0.85  White count 10.1, 92% neutrophils, 3% lymphs eyes, 4% monocytes  Hemoglobin 7.9.  Hematocrit 27.  Plts 224    Microbiology  6/1/2019 blood cultures 2/2 MRSA  6/1/2019 wound culture MRSA      Assessment/Plan   1.  MRSA septicemia secondary to infected pressure ulcer of the right buttocks  2.  Diabetes mellitus type 2, continue glycemic control efforts to prevent/control infectious complications      Continue vancomycin level 15-20.  He is bacteremic from the cellulitis, but has back hardware in place that is threatened to be compromised by the bacteremia.  We need to be very aggressive with treating this.  I will check a surface echo.  Check vancomycin level tomorrow.  Discontinue Zosyn    Mark Perez MD  1:18 PM  06/02/19

## 2019-06-02 NOTE — THERAPY EVALUATION
Acute Care - Physical Therapy Initial Evaluation  [facility]     Patient Name: Derrek Estrada  : 1940  MRN: [id number]  Today's Date: 2019   Onset of Illness/Injury or Date of Surgery: 19  Date of Referral to PT: 19  Referring Physician: Maximus      Admit Date: 2019    Visit Dx:     ICD-10-CM ICD-9-CM   1. Cellulitis of buttock L03.317 682.5   2. Fever in adult R50.9 780.60   3. Wound abscess T81.49XA 879.9   4. Impaired mobility Z74.09 799.89     Patient Active Problem List   Diagnosis   • Cholelithiasis   • DM (HgbA1c 5.9)   • Diverticulosis of intestine   • Hypercholesterolemia   • Hypertension   • Spinal stenosis   • Microalbuminuria   • Atrial fibrillation (CMS/HCC)   • Medicare annual wellness visit, subsequent   • Anemia   • Iron deficiency anemia   • DJD (degenerative joint disease)   • Impotence of organic origin   • Glaucoma   • Hiatal hernia   • Mild cognitive disorder   • Calculus of kidney   • Nocturia   • Chronic bilateral low back pain with bilateral sciatica   • Post laminectomy syndrome   • Routine health maintenance   • Weight loss   • Spinal stenosis of lumbar region with neurogenic claudication   • Spondylolisthesis of lumbar region   • Lumbar spine pain   • Metabolic encephalopathy   • Urinary retention with incomplete bladder emptying   • Macrocytosis without anemia   • Sundowning   • RBBB (right bundle branch block with left anterior fascicular block)   • Leg swelling   • Postural kyphosis of lumbar region   • H/O urinary retention   • Chronic anticoagulation-for Afib   • Fever and chills   • Cellulitis of buttock   • Type 2 diabetes mellitus, without long-term current use of insulin (CMS/HCC)   • Weakness of both lower extremities   • Lower extremity edema   • Wound of right buttock   • Sepsis affecting skin (CMS/HCC)   • MRSA bacteremia     Past Medical History:   Diagnosis Date   • Acute hypoxemic respiratory failure (CMS/HCC)    • Acute suppurative otitis  media    • Acute upper respiratory infection    • Anemia    • Arthritis    • Cholelithiasis    • Diabetes mellitus (CMS/HCC)     TYPE 2   • Diverticulosis    • Dry eyes    • Earache    • ED (erectile dysfunction)    • Edema    • Elevated cholesterol    • Encounter for screening for malignant neoplasm of prostate    • Esophagitis, reflux    • GERD (gastroesophageal reflux disease)    • Glaucoma    • H/O echocardiogram 09/18/2013   • Health care maintenance    • Hiatal hernia    • History of EKG 10/06/2015   • History of Holter monitoring 09/16/2013   • History of transfusion    • Hypercholesterolemia    • Hyperlipidemia    • Hypertension    • Lumbar canal stenosis    • Microalbuminuria    • Mild cognitive impairment    • Mitral valve prolapse    • Nephrolithiasis    • PAF (paroxysmal atrial fibrillation) (CMS/HCC)    • Pericardial effusion    • 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   • Spinal stenosis      Past Surgical History:   Procedure Laterality Date   • AMPUTATION DIGIT Left 10/29/2016    Procedure: TENDON AND NERVE REPAIR OF  LEFT THUMB;  Surgeon: Zak Hanson MD;  Location: [facility];  Service:    • APPENDECTOMY     • ARTHRODESIS  10/28/2013    Spinal / Description: Repair spinal stenosis   • BACK SURGERY     • BLEPHAROPLASTY  2000, 05/2002   • CATARACT EXTRACTION     • COLONOSCOPY     • EYE SURGERY  2011    EYE MUSCLE SX   • FRACTURE SURGERY     • HERNIA REPAIR Right 03/2010    inguinal   • LUMBAR DISCECTOMY FUSION INSTRUMENTATION N/A 1/29/2018    Procedure: L2 to L5 fusion with instrumentation and removal of implants L4 5;  Surgeon: Barron Knight MD;  Location: [facility] OR;  Service:    • LUMBAR DISCECTOMY FUSION INSTRUMENTATION N/A 1/9/2019    Procedure: T 11 to L3, L5-S1 fusion with T11 to S1 instrumentation, L4 pedicle subtraction osteotomy, Right L5-S1 interbody fusion with cage;  Surgeon: Barron Knight MD;  Location: [facility];   Service: Orthopedic Spine   • LUMBAR FUSION  2018    Lumbar vertebral fusion   • LUMBAR LAMINECTOMY      10.28.13  bilat L4/5 lami with Nyla and Belen   • LUMBAR LAMINECTOMY DISCECTOMY DECOMPRESSION N/A 1/29/2018    Procedure: L2 to L4 laminectomy with a fusion by orthopedics;  Surgeon: Jeison Redmond MD;  Location: [facility];  Service:    • LUMBAR LAMINECTOMY DISCECTOMY DECOMPRESSION N/A 1/9/2019    Procedure: L4-L5, L5-S1 LAMINECTOMY WITH NEURAL LYSIS;  Surgeon: Jeison Redmond MD;  Location: [facility];  Service: Neurosurgery   • OTHER SURGICAL HISTORY  2008    Lithotomy   • TONSILLECTOMY     • TONSILLECTOMY     • VASECTOMY  09/2008        PT ASSESSMENT (last 12 hours)      Physical Therapy Evaluation     Row Name 06/02/19 1439          PT Evaluation Time/Intention    Subjective Information  no complaints  -     Document Type  evaluation  -     Mode of Treatment  individual therapy;physical therapy  -     Patient Effort  good  -     Symptoms Noted During/After Treatment  none  -     Comment  pt appears confused  -     Row Name 06/02/19 1439          General Information    Patient Profile Reviewed?  yes  -     Onset of Illness/Injury or Date of Surgery  05/31/19  -     Referring Physician  Maximus  -     Patient Observations  cooperative;agree to therapy  -     Patient/Family Observations  spouse and son bedside  -     General Observations of Patient  pt supine in bed no acute distress  -     Equipment Currently Used at Home  walker, rolling less mobile recently 2' weakness  -     Pertinent History of Current Functional Problem  POD 1I & D R glut/sacral ulcer  -     Existing Precautions/Restrictions  fall  -     Risks Reviewed  patient and family:  -     Benefits Reviewed  patient and family:  -     Row Name 06/02/19 1439          Cognitive Assessment/Intervention- PT/OT    Orientation Status (Cognition)  oriented to;person  -     Follows Commands (Cognition)   follows one step commands;75-90% accuracy;repetition of directions required;verbal cues/prompting required  -     Safety Deficit (Cognitive)  moderate deficit;insight into deficits/self awareness  -[facility] Name 06/02/19 1439          Bed Mobility Assessment/Treatment    Comment (Bed Mobility)  NT - bed therex only until clarrified by sx  -[facility] Name 06/02/19 1439          General ROM    GENERAL ROM COMMENTS  BLEs functional - noted gross swelling  -[facility] Name 06/02/19 1439          MMT (Manual Muscle Testing)    General MMT Comments  BLEs functional, GM  -[facility] Name 06/02/19 1439          Motor Assessment/Intervention    Additional Documentation  Therapeutic Exercise (Group);Therapeutic Exercise Interventions (Group)  -[facility] Name 06/02/19 1439          Therapeutic Exercise    Therapeutic Exercise  supine, lower extremities  -     Additional Documentation  Therapeutic Exercise ([facility])  -LH     Row Name 06/02/19 1439          Lower Extremity Supine Therapeutic Exercise    Performed, Supine Lower Extremity (Therapeutic Exercise)  ankle pumps;hip flexion/extension;heel slides;hip abduction/adduction;hip external/internal rotation  -     Exercise Type, Supine Lower Extremity (Therapeutic Exercise)  AROM (active range of motion)  -     Sets/Reps Detail, Supine Lower Extremity (Therapeutic Exercise)  1/10  -[facility] Name 06/02/19 1439          Pain Assessment    Additional Documentation  Pain Scale: FACES Pre/Post-Treatment (Group)  -LH     Row Name 06/02/19 1439          Pain Scale: FACES Pre/Post-Treatment    Pain: FACES Scale, Pretreatment  0-->no hurt  -     Pain: FACES Scale, Post-Treatment  0-->no hurt  -     Row Name             Wound 01/09/19 0844 Other (See comments) back incision    Wound - Properties Group Date first assessed: 01/09/19  -JT Time first assessed: 0844  -JT Side: Other (See comments)  -JT Location: back  -JT Type: incision  -JT    Row Name             Wound  04/01/19 0700 Bilateral coccyx pressure injury    Wound - Properties Group Date first assessed: 04/01/19  -JH Time first assessed: 0700  -JH Present On Admission : yes;picture taken  -JH Side: Bilateral  -JH Location: coccyx  -JH Type: pressure injury  -JH Stage, Pressure Injury: Stage 1  -JH    Row Name             Wound 06/01/19 0058 Right gluteal pressure injury    Wound - Properties Group Date first assessed: 06/01/19  -GC Time first assessed: 0058  -GC Present On Admission : yes  -GC Side: Right  -GC Location: gluteal  -GC Type: pressure injury  -GC Stage, Pressure Injury: unstageable  -GC    Row Name             Wound 06/01/19 1316 Right gluteal incision    Wound - Properties Group Date first assessed: 06/01/19  -TL Time first assessed: 1316  -TL Side: Right  -TL Location: gluteal  -TL Type: incision  -TL    Row Name 06/02/19 1439          Plan of Care Review    Plan of Care Reviewed With  patient;spouse  -     Row Name 06/02/19 1439          Physical Therapy Clinical Impression    Date of Referral to PT  06/02/19  -     Criteria for Skilled Interventions Met (PT Clinical Impression)  yes  -     Pathology/Pathophysiology Noted (Describe Specifically for Each System)  musculoskeletal;neuromuscular;integumentary  -     Impairments Found (describe specific impairments)  gait, locomotion, and balance  -     Functional Limitations in Following Categories (Describe Specific Limitations)  self-care;home management  -     Rehab Potential (PT Clinical Summary)  fair, will monitor progress closely  -     Row Name 06/02/19 1439          Physical Therapy Goals    Bed Mobility Goal Selection (PT)  bed mobility, PT goal 1  -     Transfer Goal Selection (PT)  transfer, PT goal 1  -     Gait Training Goal Selection (PT)  gait training, PT goal 1  -     Row Name 06/02/19 1438          Bed Mobility Goal 1 (PT)    Barriers (Bed Mobility Goal 1, PT)  set mobility goals as appropriate/cleared by sx  -     Phu  Name 06/02/19 1439          Patient Education Goal (PT)    Activity (Patient Education Goal, PT)  BLE strengthening therex  -     Markesan/Cues/Accuracy (Memory Goal 2, PT)  demonstrates adequately;independent;verbalizes understanding  -     Time Frame (Patient Education Goal, PT)  long term goal (LTG);1 week  -     Row Name 06/02/19 1439          Positioning and Restraints    Pre-Treatment Position  in bed  -     Post Treatment Position  bed  -     In Bed  supine;notified nsg;call light within reach;encouraged to call for assist;exit alarm on;with family/caregiver  -       User Key  (r) = Recorded By, (t) = Taken By, (c) = Cosigned By    Initials Name Provider Type     Flaca Aleman, PT Physical Therapist    Iza Diaz, RN Registered Nurse    Kym Turner RN Registered Nurse    Amada Fishman RN Registered Nurse    Margie Sena RN Registered Nurse        Physical Therapy Education     Title: PT OT SLP Therapies (In Progress)     Topic: Physical Therapy (In Progress)     Point: Mobility training (In Progress)     Learning Progress Summary           Patient Acceptance, E, NR by  at 6/2/2019  2:46 PM    Acceptance, E, VU by  at 6/1/2019  5:37 AM   Family Acceptance, E, NR by  at 6/2/2019  2:46 PM    Acceptance, E, VU by  at 6/1/2019  5:37 AM                   Point: Home exercise program (In Progress)     Learning Progress Summary           Patient Acceptance, E, NR by  at 6/2/2019  2:46 PM   Family Acceptance, E, NR by  at 6/2/2019  2:46 PM                   Point: Body mechanics (Done)     Learning Progress Summary           Patient Acceptance, E, VU by LL at 6/1/2019  5:37 AM   Family Acceptance, E, VU by LL at 6/1/2019  5:37 AM                   Point: Precautions (Done)     Learning Progress Summary           Patient Acceptance, E, VU by LL at 6/1/2019  5:37 AM   Family Acceptance, E, VU by  at 6/1/2019  5:37 AM                                User Key     Initials Effective Dates Name Provider Type Discipline     04/03/18 -  Flaca Aleman, PT Physical Therapist PT     06/16/16 -  Ksenia Lee, RN Registered Nurse Nurse              PT Recommendation and Plan  Anticipated Discharge Disposition (PT): skilled nursing facility  Planned Therapy Interventions (PT Eval): balance training, bed mobility training, gait training, home exercise program, ROM (range of motion), stair training, strengthening, stretching, transfer training  Therapy Frequency (PT Clinical Impression): 5 times/wk  Outcome Summary/Treatment Plan (PT)  Anticipated Discharge Disposition (PT): skilled nursing facility  Plan of Care Reviewed With: patient  Outcome Summary: pt presents w gross generalized weakness, appears confused at times, s/p incision and drainage of R glut/sacral ulcer. PT orders per Dr Stroud for BLE weakness. bed therex ROM performed good tolerance. ATTN AGUILAR- please clarify mobility/sitting/ambulation restrictions. pt may benefit from skilled PT acutely to address functional deficits and assist w DC planning. pt will likely need SNU.   Outcome Measures     Row Name 06/02/19 1400             How much help from another person do you currently need...    Turning from your back to your side while in flat bed without using bedrails?  2  -LH      Moving from lying on back to sitting on the side of a flat bed without bedrails?  2  -LH      Moving to and from a bed to a chair (including a wheelchair)?  1  -LH      Standing up from a chair using your arms (e.g., wheelchair, bedside chair)?  1  -LH      Climbing 3-5 steps with a railing?  1  -LH      To walk in hospital room?  1  -      AM-PAC 6 Clicks Score  8  -         Functional Assessment    Outcome Measure Options  AM-PAC 6 Clicks Basic Mobility (PT)  -        User Key  (r) = Recorded By, (t) = Taken By, (c) = Cosigned By    Initials Name Provider Type     Flaca Aleman, PT Physical Therapist          Time Calculation:   PT Charges     Row Name 06/02/19 1449             Time Calculation    Start Time  1410  -      Stop Time  1430  -      Time Calculation (min)  20 min  -      PT Received On  06/02/19  -      PT - Next Appointment  06/03/19  -      PT Goal Re-Cert Due Date  06/09/19  -        User Key  (r) = Recorded By, (t) = Taken By, (c) = Cosigned By    Initials Name Provider Type     Flaca Aleman, PT Physical Therapist        Therapy Charges for Today     Code Description Service Date Service Provider Modifiers Qty    [id number] HC PT EVAL MOD COMPLEXITY 2 6/2/2019 Flaca Aleman, PT GP 1          PT G-Codes  Outcome Measure Options: AM-PAC 6 Clicks Basic Mobility (PT)  AM-PAC 6 Clicks Score: 8      Flaca Aleman, PT  6/2/2019

## 2019-06-02 NOTE — NURSING NOTE
Patient in sinus likn this a.m.  HR noted dropping to Mid 40s - low 50s when awake and dropping to mid 30s while sleeping.  Heart rate does not sustain but returns to Mid 60s to 70s.  Awaiting Cardiology consult to advise if this mornings dose of amiodarone should be given. Dr Stroud notified

## 2019-06-02 NOTE — PLAN OF CARE
Problem: Patient Care Overview  Goal: Plan of Care Review   06/02/19 6882   Coping/Psychosocial   Plan of Care Reviewed With patient   OTHER   Outcome Summary pt presents w gross generalized weakness, appears confused at times, s/p incision and drainage of R glut/sacral ulcer. PT orders per Dr Stroud for BLE weakness. bed therex ROM performed good tolerance. ATTN JEFF- please clarify mobility/sitting/ambulation restrictions. pt may benefit from skilled PT acutely to address functional deficits and assist w DC planning. pt will likely need SNU.

## 2019-06-02 NOTE — PROGRESS NOTES
Discharge Planning Assessment  [facility]     Patient Name: Derrek Estrada  MRN: [id number]  Today's Date: 6/2/2019    Admit Date: 5/31/2019    Discharge Needs Assessment     Row Name 06/02/19 1550       Living Environment    Lives With  spouse    Current Living Arrangements  home/apartment/condo    Primary Care Provided by  spouse/significant other    Provides Primary Care For  no one, unable/limited ability to care for self    Family Caregiver if Needed  spouse    Family Caregiver Names  Silvia Estrada [phone]    Quality of Family Relationships  helpful;involved;supportive    Able to Return to Prior Arrangements  no    Living Arrangement Comments  pt/family feel pt will need SNF at [place]       Resource/Environmental Concerns    Resource/Environmental Concerns  none    Transportation Concerns  car, none       Transition Planning    Patient/Family Anticipates Transition to  inpatient rehabilitation facility    Patient/Family Anticipated Services at Transition  rehabilitation services;home health care pt had HH set up by [facility] when he left the last time--they do not remember the agency name even after reviewing the HH list. States he has been to SNF two times this yr at [facility]    Transportation Anticipated  family or friend will provide       Discharge Needs Assessment    Readmission Within the Last 30 Days  no previous admission in last 30 days    Concerns to be Addressed  discharge planning    Equipment Currently Used at Home  cane, straight;walker, rolling;glucometer;other (see comments) BP cuff and scale available at home    Anticipated Changes Related to Illness  none    Equipment Needed After Discharge  other (see comments) pt/family state pt will need rotational bed at [place] to offload his wound.    Outpatient/Agency/Support Group Needs  homecare agency;skilled nursing facility    Discharge Facility/Level of Care Needs  nursing facility, skilled    Offered/Gave Vendor List  no    Patient's Choice  of Community Agency(s)  pt and family already had facility choice for SNF    Current Discharge Risk  chronically ill;dependent with mobility/activities of daily living;physical impairment        Discharge Plan     Row Name 06/02/19 1600       Plan    Plan  referral pending for SNF--will need [facility] MCR precert    Patient/Family in Agreement with Plan  yes pt, spouse and son Marty    Plan Comments  Introduced self/explained role of CCP. Face sheet data updated. IMM letter checked. Confirmed PCP and pharmacy. Pt states he can drive and  his meds and is able to afford them. Pt lives at home with his spouse. There are stairs in the home, a flight upstairs and downstairs. Denies difficulty navigating the stairs. Pt states he has a cane he mostly uses and a roll walker. He has a glucometer, BP cuff and a scale. He has used HH in the past but he does not remember the agency name--he states it was set up thru [facility] when he left there. States he has been at [facility] for SNF two times in the past several months. States he has been home for a few weeks, but not sure if it has been a full 60 days. He was going to OP PT at [facility] prior to admit. Pt states he plans to go to SNF at [place]. He would like a referral to [facility]. In basket referral sent and email sent to Adalgisa/Viviana to follow up with CCP. Pt/family aware pt will need insurance precert for SNF. States he will need a rotational bed at SNF and will also need to rent one for home when he leaves SNF. Family would like to use [facility] for the DME. There are no orders in EPIC for DME at this time for SNF or home use. Left [facility] for Silvia/[facility] to follow up with CCP on Monday. Spouse aware that bed will likely be a rental item. Pt states someone in the family will be able to drive pt from hospital to rehab. CCP to follow............TEMI        Destination      Service Provider Request Status Selected Services Address Phone Number Fax Number     [facility] Pending - Request Sent N/A [address], [facility] [id number] [phone]      Durable Medical Equipment      No service coordination in this encounter.      Dialysis/Infusion      No service coordination in this encounter.      Home Medical Care      No service coordination in this encounter.      Therapy      No service coordination in this encounter.      Community Resources      No service coordination in this encounter.          Demographic Summary     Row Name 06/02/19 4363       General Information    Admission Type  inpatient    Arrived From  home    Required Notices Provided  Important Message from Medicare    Referral Source  admission list;physician    Reason for Consult  discharge planning    Preferred Language  English     Used During This Interaction  no       Contact Information    Permission Granted to Share Info With  ;family/designee        Functional Status     Row Name 06/02/19 1550       Functional Status    Usual Activity Tolerance  fair    Current Activity Tolerance  poor       Functional Status, IADL    Medications  independent    Meal Preparation  assistive person    Housekeeping  assistive person    Laundry  assistive person    Shopping  assistive person        Psychosocial    No documentation.       Abuse/Neglect    No documentation.       Legal    No documentation.       Substance Abuse    No documentation.       Patient Forms    No documentation.           Regina Fernandes RN

## 2019-06-03 NOTE — PLAN OF CARE
Problem: Patient Care Overview  Goal: Plan of Care Review  Outcome: Ongoing (interventions implemented as appropriate)   06/03/19 1542   Coping/Psychosocial   Plan of Care Reviewed With patient   Plan of Care Review   Progress no change   OTHER   Outcome Summary After episode of confusion last night patient is A&Ox4 this morning. Wound vac in place. Continued education given regarding Q 2hrs to prevent pressure sores     Goal: Individualization and Mutuality  Outcome: Ongoing (interventions implemented as appropriate)    Goal: Discharge Needs Assessment  Outcome: Ongoing (interventions implemented as appropriate)    Goal: Interprofessional Rounds/Family Conf  Outcome: Ongoing (interventions implemented as appropriate)      Problem: Fall Risk (Adult)  Goal: Absence of Fall  Outcome: Ongoing (interventions implemented as appropriate)      Problem: Skin Injury Risk (Adult)  Goal: Skin Health and Integrity  Outcome: Ongoing (interventions implemented as appropriate)      Problem: Pain, Acute (Adult)  Goal: Acceptable Pain Control/Comfort Level  Outcome: Ongoing (interventions implemented as appropriate)

## 2019-06-03 NOTE — PLAN OF CARE
Problem: Patient Care Overview  Goal: Plan of Care Review  Outcome: Ongoing (interventions implemented as appropriate)   06/03/19 0126   Coping/Psychosocial   Plan of Care Reviewed With patient   Plan of Care Review   Progress improving   OTHER   Outcome Summary Upon first assessment, pt oreinted only to self. Agitated. Febrile. Paged on call [facility]. Ordered a one time dose of ativan. Held neurontin and gave PRN percocet per MD orders. Tx fever w/tylenol. VSS.  NSR. Patient resting well now. IVF. IV ABX. Wound care performed 2330. Turning Q2H. On 2LO2.      Goal: Individualization and Mutuality  Outcome: Ongoing (interventions implemented as appropriate)    Goal: Discharge Needs Assessment  Outcome: Ongoing (interventions implemented as appropriate)    Goal: Interprofessional Rounds/Family Conf  Outcome: Ongoing (interventions implemented as appropriate)      Problem: Fall Risk (Adult)  Goal: Absence of Fall  Outcome: Ongoing (interventions implemented as appropriate)      Problem: Skin Injury Risk (Adult)  Goal: Skin Health and Integrity  Outcome: Ongoing (interventions implemented as appropriate)      Problem: Pain, Acute (Adult)  Goal: Acceptable Pain Control/Comfort Level  Outcome: Ongoing (interventions implemented as appropriate)

## 2019-06-03 NOTE — CONSULTS
Inpatient Neurosurgery Consult  Consult performed by: Flaca Burdick APRN  Consult ordered by: Kit Stroud MD  Reason for consult: Leg weakness previous thoracic fusion surgery.        Patient Care Team:  Trey Stockton MD as PCP - General (Internal Medicine)  Sherice Newman MD as Consulting Physician (Cardiology)  Carey Pascual [facility] as Pharmacist  Geovanni Herron MD as Consulting Physician (Hematology and Oncology)  Sakina Martin APRN as Referring Physician (Cardiology)    Chief complaint: weakness in legs    Subjective     This is a 79-year-old male well-known to Dr. Redmond and Dr. Knight. He was last seen by us in the hospital back in early April.  He underwent L3-5 laminectomy by Dr. Redmond and subsequent T11-S1 fusion by Dr. 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  Subsequent x-rays revealed a new T11 compression fracture and significant collapse of the construct.  He was also notably kyphotic in Dr. Knight's office.  Dr. Knight at that point contacted Dr. Frazier at the [facility].  The patient underwent T4-T12 posterior fusion at [place] March 25, 2019.  He was discharged home a few days later.  When he went to get out of the car the patient was unable to stand or walk.  He went back to [facility] and was subsequently readmitted and transferred to a nursing home for further recuperation.  Upon our evaluation in early April, the patient complained of significant weakness in both of his legs.  He denied any significant thoracic or lumbar pain. There was a question of whether the patient had osteomyelitis in the thoracic spine on previous CT imaging.  At that time he was also being treated for pleural effusions.  MRI imaging of the spine was ordered but the patient cannot lie flat. Work-up was negative for pulmonary embolism.  It was determined by infectious disease as well as neurosurgery that this  "was less likely a spinal infection given the fact that the patient had improved.    The patient had an appointment later this week with Dr. Redmond. He presented to the hospital 3 days ago for increased pain in the right buttock and decubitus ulcer.  Prior to this, the wife states that the patient was using a cane to walk.  His gait was quite slow, not perfect, but he was managing quite well. He began developing a R buttock wound which the patient's wife had been caring for. While on vacation in [place], the wife noted a second area of discoloration to the skin near the wound. They waited until after Memorial Day to return home as a means to avoid traffic.  Due to travel, the patient\"s movements were quite restricted.  He then began to develop a mild fever.  After that he began noticing increasing pain in the right buttock and his temperature had climbed to 102 degrees. Over the last couple weeks the patient reports progressive weakness in his legs.  It got much more severe over the last week. The patient insists that up until this point he was doing well.  He denies any bowel or bladder incontinence.  He denies any numbness or tingling in his legs.  He denies any episodes of falls.  Mr. Estrada was admitted for buttock cellulitis and sepsis.  He was evaluated by general surgery and underwent excisional debridement of an infected 6 x 5 cm right group gluteal pressure ulcer yesterday.  He now has a wound VAC in place.  He is on vancomycin.  Wound culture was positive for MRSA.  Infectious disease is also following.  Cardiology saw the patient as well for development of bradycardia which was believed to be associated with the sepsis. The patient's son had mentioned some concern about the possibility that the lower extremity weakness could be related to a spine issue.  This reason, neurosurgery consultation has been requested.           Review of Systems   Constitutional: Positive for activity change and fever. "   Cardiovascular: Positive for leg swelling.   Gastrointestinal: Positive for diarrhea.   Musculoskeletal: Positive for back pain.   Skin: Positive for wound.   Neurological: Positive for weakness.   All other systems reviewed and are negative.       Past Medical History:   Diagnosis Date   • Acute hypoxemic respiratory failure (CMS/HCC)    • Acute suppurative otitis media    • Acute upper respiratory infection    • Anemia    • Arthritis    • Cholelithiasis    • Diabetes mellitus (CMS/HCC)     TYPE 2   • Diverticulosis    • Dry eyes    • Earache    • ED (erectile dysfunction)    • Edema    • Elevated cholesterol    • Encounter for screening for malignant neoplasm of prostate    • Esophagitis, reflux    • GERD (gastroesophageal reflux disease)    • Glaucoma    • H/O echocardiogram 09/18/2013   • Health care maintenance    • Hiatal hernia    • History of EKG 10/06/2015   • History of Holter monitoring 09/16/2013   • History of transfusion    • Hypercholesterolemia    • Hyperlipidemia    • Hypertension    • Lumbar canal stenosis    • Microalbuminuria    • Mild cognitive impairment    • Mitral valve prolapse    • Nephrolithiasis    • PAF (paroxysmal atrial fibrillation) (CMS/HCC)    • Pericardial effusion    • 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   • Spinal stenosis    ,   Past Surgical History:   Procedure Laterality Date   • AMPUTATION DIGIT Left 10/29/2016    Procedure: TENDON AND NERVE REPAIR OF  LEFT THUMB;  Surgeon: Zak Hanson MD;  Location: [facility];  Service:    • APPENDECTOMY     • ARTHRODESIS  10/28/2013    Spinal / Description: Repair spinal stenosis   • BACK SURGERY     • BLEPHAROPLASTY  2000, 05/2002   • CATARACT EXTRACTION     • COLONOSCOPY     • EYE SURGERY  2011    EYE MUSCLE SX   • FRACTURE SURGERY     • HERNIA REPAIR Right 03/2010    inguinal   • INCISION AND DRAINAGE PERIRECTAL ABSCESS Right 6/1/2019    Procedure: DEBRIDEMENT OF RIGHT GLUTEAL  WOUND;  Surgeon: Tom Miranda MD;  Location: [facility] OR;  Service: General   • LUMBAR DISCECTOMY FUSION INSTRUMENTATION N/A 1/29/2018    Procedure: L2 to L5 fusion with instrumentation and removal of implants L4 5;  Surgeon: Barron Knight MD;  Location: [facility] OR;  Service:    • LUMBAR DISCECTOMY FUSION INSTRUMENTATION N/A 1/9/2019    Procedure: T 11 to L3, L5-S1 fusion with T11 to S1 instrumentation, L4 pedicle subtraction osteotomy, Right L5-S1 interbody fusion with cage;  Surgeon: Barron Knight MD;  Location: [facility] OR;  Service: Orthopedic Spine   • LUMBAR FUSION  2018    Lumbar vertebral fusion   • LUMBAR LAMINECTOMY      10.28.13  bilat L4/5 lami with Nyla and Belen   • LUMBAR LAMINECTOMY DISCECTOMY DECOMPRESSION N/A 1/29/2018    Procedure: L2 to L4 laminectomy with a fusion by orthopedics;  Surgeon: Jeison Redmond MD;  Location: [facility] OR;  Service:    • LUMBAR LAMINECTOMY DISCECTOMY DECOMPRESSION N/A 1/9/2019    Procedure: L4-L5, L5-S1 LAMINECTOMY WITH NEURAL LYSIS;  Surgeon: Jeison Redmond MD;  Location: [facility] OR;  Service: Neurosurgery   • OTHER SURGICAL HISTORY  2008    Lithotomy   • TONSILLECTOMY     • TONSILLECTOMY     • VASECTOMY  09/2008   ,   Family History   Problem Relation Age of Onset   • Heart attack Mother         acute myocardial infarction   • Stroke Mother         hemorrhagic   • Emphysema Father    • Diabetes Other         mellitus   • Hypertension Other    • Heart disease Other    • Malig Hyperthermia Neg Hx    ,   Social History     Tobacco Use   • Smoking status: Never Smoker   • Smokeless tobacco: Never Used   • Tobacco comment: caffeine use   Substance Use Topics   • Alcohol use: No     Frequency: Never   • Drug use: No   ,   Facility-Administered Medications Prior to Admission   Medication Dose Route Frequency Provider Last Rate Last Dose   • cyanocobalamin injection 1,000 mcg  1,000 mcg Intramuscular Q30 Days Trey Stockton  MD [place]         Medications Prior to Admission   Medication Sig Dispense Refill Last Dose   • ACCU-CHEK FASTCLIX LANCETS misc USE TO TEST TWICE DAILY 102 each 0 Taking   • ACCU-CHEK SMARTVIEW test strip USE TO TEST TWICE DAILY 100 each 1 Taking   • amiodarone (PACERONE) 200 MG tablet Take 1 tablet by mouth Daily. 90 tablet 1    • apixaban (ELIQUIS) 5 MG tablet tablet Take 1 tablet by mouth Every 12 (Twelve) Hours. 180 tablet 0 Taking   • atorvastatin (LIPITOR) 10 MG tablet Take 10 mg by mouth Every Night.   Taking   • Blood Glucose Monitoring Suppl (ACCU-CHEK FRANCISCO SMARTVIEW) W/DEVICE kit USE TO TEST BLOOD SUGAR TWICE DAILY 1 kit 0 Taking   • Blood Glucose Monitoring Suppl (FREESTYLE LITE) device Use to test blood once daily 1 each 0 Taking   • brimonidine (ALPHAGAN) 0.2 % ophthalmic solution Administer 1 drop to both eyes 2 (Two) Times a Day.   Past Week at Unknown time   • calcium carbonate (TUMS) 500 MG chewable tablet Chew 1,000 mg 3 (Three) Times a Day As Needed for Indigestion or Heartburn.   Taking   • cholecalciferol (VITAMIN D3) 26397 units capsule Take 2,000 Units by mouth Daily.   Taking   • cyclobenzaprine (FLEXERIL) 10 MG tablet Take 10 mg by mouth 3 (Three) Times a Day As Needed for Muscle Spasms.   Taking   • donepezil (ARICEPT) 10 MG tablet Take 5 mg by mouth Every Morning.   Taking   • dorzolamide (TRUSOPT) 2 % ophthalmic solution Administer 1 drop to both eyes 2 (two) times a day.   Taking   • enalapril (VASOTEC) 20 MG tablet Take 1 tablet by mouth 2 (Two) Times a Day. (Patient taking differently: Take 40 mg by mouth Every Morning.) 180 tablet 1 Taking   • esomeprazole (nexIUM) 40 MG capsule Take 1 capsule by mouth Daily. 90 capsule 1 Taking   • furosemide (LASIX) 40 MG tablet Take 1 tablet by mouth Daily.   Taking   • gabapentin (NEURONTIN) 100 MG capsule Take 3 capsules by mouth 3 (Three) Times a Day. 90 capsule 1 Not Taking   • glimepiride (AMARYL) 2 MG tablet Take 1 tablet by mouth Every  Morning Before Breakfast. 90 tablet 1 Taking   • glucose blood (FREESTYLE LITE) test strip Use to test blood once daily 100 each 12 Taking   • Glucose Blood disk Inject 1 Device as directed daily. TEST BLOOD SUGAR ONCE DAILY AS DIRECTED 100 each 3 Taking   • Lancets (FREESTYLE) lancets Use to test blood sugar daily 100 each 12 Taking   • latanoprost (XALATAN) 0.005 % ophthalmic solution Administer 1 drop to both eyes Every Night.   Taking   • metFORMIN (GLUCOPHAGE) 500 MG tablet Take 1 tablet by mouth 3 (Three) Times a Day. (Patient taking differently: Take 500 mg by mouth 2 (Two) Times a Day.) 270 tablet 0 Taking   • ONE TOUCH ULTRA TEST test strip USE ONCE DAILY AS DIRECTED 100 each 3 Taking   • oxyCODONE-acetaminophen (PERCOCET)  MG per tablet Take 1 tablet by mouth Every 4 (Four) Hours As Needed for Moderate Pain . 12 tablet 0 Taking   • pioglitazone (ACTOS) 45 MG tablet TAKE 1 TABLET EVERY MORNING 90 tablet 2 Taking   • potassium chloride (MICRO-K) 10 MEQ CR capsule Take 2 capsules by mouth Daily. 180 capsule 0 Taking   • tamsulosin (FLOMAX) 0.4 MG capsule 24 hr capsule Take 0.4 mg by mouth Every Morning. 1-2 TABS AS NEEDED    Taking   • timolol (TIMOPTIC) 0.5 % ophthalmic solution Administer 1 drop to both eyes Daily.   Taking   , Scheduled Meds:      amiodarone 200 mg Oral Daily   atorvastatin 10 mg Oral Nightly   donepezil 5 mg Oral QAM   dorzolamide 1 drop Both Eyes BID   [START ON 6/4/2019] enalapril 20 mg Oral QAM   enoxaparin 1 mg/kg Subcutaneous Q12H   furosemide 40 mg Oral Daily   gabapentin 300 mg Oral TID   insulin lispro 0-14 Units Subcutaneous 4x Daily With Meals & Nightly   latanoprost 1 drop Both Eyes Nightly   pantoprazole 40 mg Oral QAM   potassium chloride 20 mEq Oral Daily   rifAMPin 300 mg Oral Q12H   sodium chloride 3 mL Intravenous Q12H   sodium chloride 3 mL Intravenous Q12H   tamsulosin 0.4 mg Oral QAM   timolol 1 drop Both Eyes Daily   vancomycin 1,250 mg Intravenous Q12H   ,  Continuous Infusions:      Pharmacy to dose vancomycin     sodium chloride 100 mL/hr Last Rate: 100 mL/hr (06/03/19 1447)   , PRN Meds:  •  acetaminophen  •  acetaminophen  •  cyclobenzaprine  •  dextrose  •  dextrose  •  glucagon (human recombinant)  •  ondansetron **OR** ondansetron  •  ondansetron  •  oxyCODONE-acetaminophen  •  sodium chloride  •  [COMPLETED] Insert peripheral IV **AND** sodium chloride  •  sodium chloride and Allergies:  Beta adrenergic blockers    Objective      Vital Signs  Temp:  [97.1 °F (36.2 °C)-100.2 °F (37.9 °C)] 97.1 °F (36.2 °C)  Heart Rate:  [70-91] 70  Resp:  [18-20] 18  BP: (100-156)/(52-74) 117/66    Physical Exam   Constitutional: He is oriented to person, place, and time. He appears well-developed and well-nourished. He is cooperative. No distress.   Awake, alert, non-toxic appearing.    HENT:   Head: Normocephalic and atraumatic.   Eyes: Conjunctivae are normal. Right eye exhibits no discharge. Left eye exhibits no discharge.   Neck: Normal range of motion. Neck supple. No tracheal deviation present.   Cardiovascular: Normal rate.   Pulmonary/Chest: Effort normal. No respiratory distress.   Abdominal: Soft. He exhibits no distension. There is no tenderness. There is no guarding.   Musculoskeletal: He exhibits tenderness (Mild tenderness in the R flank region ). He exhibits no edema.   Neurological: He is alert and oriented to person, place, and time. He has normal strength. He displays normal reflexes. No sensory deficit. He exhibits normal muscle tone. Coordination (related to previous spine surgery) abnormal. GCS eye subscore is 4. GCS verbal subscore is 5. GCS motor subscore is 6.   AA&O x 3. Moves all extremities on command. Bilateral iliopsoas 4--/5; quad 4/5 otherwise normal strength. Sensation equal and intact. Negative Khan's; negative clonus.    Skin: Skin is warm and dry. No rash noted. He is not diaphoretic.   Wound vac intact to R buttock. Previous thoracolumbar  incision well approximated, healed; no evidence of breakdown.   Psychiatric: He has a normal mood and affect. Thought content normal.   Vitals reviewed.      Results Review:    I reviewed the patient's new clinical results.  Discussed with Dr. Redmond.      Blood and wound cultures 6/1/19 positive for MRSA  ESR 6/1/19 - 78; CRP 20.54    .  Results from last 7 days   Lab Units 06/03/19  0452 06/02/19  0559 06/01/19  0753   WBC 10*3/mm3 9.37 10.06 13.22*   HEMOGLOBIN g/dL 8.6* 7.9* 8.3*   HEMATOCRIT % 27.6* 26.7* 27.1*   PLATELETS 10*3/mm3 254 224 245           Assessment/Plan       MRSA bacteremia    Hypercholesterolemia    Hypertension    Atrial fibrillation (CMS/HCC)    Iron deficiency anemia    Mild cognitive disorder    Chronic bilateral low back pain with bilateral sciatica    Chronic anticoagulation-for Afib    Type 2 diabetes mellitus, without long-term current use of insulin (CMS/[facility])    Weakness of both lower extremities    Lower extremity edema    Wound of right buttock    Sepsis affecting skin (CMS/[facility])    Pleural effusion      Assessment & Plan    Sepsis; R buttock cellulitis - wound/blood cultures   Progressive leg weakness    Spoke with Dr. Redmond regarding above suspect weakness related to deconditioning, prolonged immobility from above. Will continue to follow progress   Recommend initiation of PT/OT if mobilization okay with general surgery.    I discussed the patients findings and my recommendations with patient and family    Flaca Burdick, LAMONT  06/03/19  5:40 PM

## 2019-06-03 NOTE — CONSULTS
"Adult Nutrition  Assessment/PES    Patient Name:  Derrek Estrada  YOB: 1940  MRN: [id number]  Admit Date:  5/31/2019    Assessment Date:  6/3/2019    Comments:  N/S PI. Incision to R gluteal cleft after MRSA septicemia. Has been receiving treatment for PI since back sx in 12/2018. BLE edema chronic, currently 3+. Family reports pt will need to go to SNF for d/c care. Added Boost Glucose Control BID with breakfast and dinner to assist in wound healing. Will continue to follow.     Reason for Assessment     Row Name 06/03/19 1433          Reason for Assessment    Reason For Assessment  identified at risk by screening criteria     Diagnosis  infection/sepsis MRSA Septicemia for R gluteal PI with hx of DM2, Diverticulosis, GERD, glaucoma, hiatal hernia, HTN, HLD, PAR     Identified At Risk by Screening Criteria  large or nonhealing wound, burn or pressure injury         Nutrition/Diet History     Row Name 06/03/19 1438          Nutrition/Diet History    Typical Food/Fluid Intake  N/S PI. Incision to R gluteal cleft after MRSA septicemia. Has been receiving treatment for PI since back sx in 12/2018. BLE edema chronic.         Anthropometrics     Row Name 06/03/19 1443          Anthropometrics    Height  190.5 cm (75\")     Weight  88.7 kg (195 lb 8.8 oz)        Ideal Body Weight (IBW)    Ideal Body Weight (IBW) (kg)  90.45     % Ideal Body Weight  98.07        Body Mass Index (BMI)    BMI (kg/m2)  24.49     BMI Assessment  BMI 18.5-24.9: normal         Labs/Tests/Procedures/Meds     Row Name 06/03/19 1443          Labs/Procedures/Meds    Lab Results Reviewed  reviewed, pertinent     Lab Results Comments  Cl, Glu, BUN, Alb        Diagnostic Tests/Procedures    Diagnostic Test/Procedure Reviewed  reviewed, pertinent     Diagnostic Test/Procedures Comments  Chest Xray, Echocardiogram        Medications    Pertinent Medications Reviewed  reviewed, pertinent     Pertinent Medications Comments  Lasix, Humalog, " "Protonix, Micro-K, IV Vancomycin, 100ml/hr IV's         Physical Findings     Row Name 06/03/19 1447          Physical Findings    Overall Physical Appearance  on oxygen therapy;edematous     Skin  non-healing wound(s);edema 3+ BLE edema, R gluteal incision         Estimated/Assessed Needs     Row Name 06/03/19 1449 06/03/19 1443       Calculation Measurements    Weight Used For Calculations  88.7 kg (195 lb 8.8 oz)  --    Height  --  190.5 cm (75\")       Estimated/Assessed Needs    Additional Documentation  Fluid Requirements (Group);Protein Requirements (Group);Calorie Requirements (Group)  --       Calorie Requirements    Weight Used For Calorie Calculations  88.7 kg (195 lb 8.8 oz)  --    Estimated Calorie Requirement Comment  8128-1022 kcal (25-30kcal/kg)  --       KCAL/KG                            25 Kcal/Kg (kcal)  2217.5  --    30 Kcal/Kg (kcal)  2661  --                               [place]-St. Jeor Equation    RMR ([place]-St. Jeor Equation)  1692.63  --       Protein Requirements    Weight Used For Protein Calculations  88.7 kg (195 lb 8.8 oz)  --    Est Protein Requirement Amount (gms/kg)  1.5 gm protein  --    Estimated Protein Requirements (gms/day)  133.05  --       Fluid Requirements    Estimated Fluid Requirements (mL/day)  2600  --    Estimated Fluid Requirement Method  RDA Method  --    RDA Method (mL)  2600  --     Nutrition Prescription Ordered     Row Name 06/03/19 1451          Nutrition Prescription PO    Current PO Diet  Regular     Fluid Consistency  Thin     Common Modifiers  Cardiac;Consistent Carbohydrate         Problem/Interventions:  Problem 1     Row Name 06/03/19 1451          Nutrition Diagnoses Problem 1    Problem 1  Increased Nutrient Needs     Macronutrient  Kcal;Protein;Fluid     Etiology (related to)  Medical Diagnosis     Endocrine  DM2     Infectious Disease  MRSA     Skin  Cellulitis;Pressure injury;Surgical wound     Signs/Symptoms (evidenced by)  Other (comment) MRSA " Septicemia to R gluteal cleft         Intervention Goal     Row Name 06/03/19 1453          Intervention Goal    General  Maintain nutrition;Improved nutrition related lab(s);Reduce/improve symptoms;Meet nutritional needs for age/condition;Disease management/therapy     PO  Maintain intake;Meet estimated needs     Weight  Maintain weight         Nutrition Intervention     Row Name 06/03/19 1454          Nutrition Intervention    RD/Tech Action  Care plan reviewd;Follow Tx progress;Recommend/ordered     Recommended/Ordered  Supplement         Nutrition Prescription     Row Name 06/03/19 1454          Nutrition Prescription PO    PO Prescription  Begin/change supplement     Fluid Consistency  Thin     Supplement  Boost Glucose Control     Supplement Frequency  2 times a day     New PO Prescription Ordered?  Yes         Education/Evaluation     Row Name 06/03/19 1454          Education    Education  Will Instruct as appropriate        Monitor/Evaluation    Monitor  Per protocol;PO intake;Pertinent labs;Weight;Skin status;Symptoms     Education Follow-up  Reinforce PRN           Electronically signed by:  Suri Arias MS,RD,LD  06/03/19 2:56 PM

## 2019-06-03 NOTE — PLAN OF CARE
Problem: Patient Care Overview  Goal: Plan of Care Review   06/03/19 1110   Coping/Psychosocial   Plan of Care Reviewed With patient   OTHER   Outcome Summary Pt continues to appear confused; able to complete supine ther ex with cues. Awaiting surgery clarification of activity recommendations and restrictions prior to initiating OOB activity.

## 2019-06-03 NOTE — PROGRESS NOTES
"   LOS: 2 days   Patient Care Team:  Trey Stockton MD as PCP - General (Internal Medicine)  Sherice Newman MD as Consulting Physician (Cardiology)  Carey Pascual [facility] as Pharmacist  Geovanni Herron MD as Consulting Physician (Hematology and Oncology)  Sakina Martin APRN as Referring Physician (Cardiology)    Chief Complaint: tired    Subjective     Feeling better today. Still c/o pain right buttock. Had some confusion/agitation last PM and required some Ativan.        Subjective:  Symptoms:  Stable.  He reports malaise and weakness.  No shortness of breath, cough, chest pain, headache, chest pressure, anorexia, diarrhea or anxiety.    Diet:  Adequate intake.  No nausea or vomiting.    Activity level: Impaired due to weakness.    Pain:  He complains of pain that is mild.  He reports pain is unchanged.  Pain is well controlled.        History taken from: patient chart RN    Objective     Vital Signs  Temp:  [97.7 °F (36.5 °C)-100.2 °F (37.9 °C)] 99 °F (37.2 °C)  Heart Rate:  [63-91] 74  Resp:  [18-20] 18  BP: ()/(52-74) 116/73    Objective:  General Appearance:  Comfortable and in no acute distress.    Vital signs: (most recent): Blood pressure 116/73, pulse 74, temperature 99 °F (37.2 °C), temperature source Oral, resp. rate 18, height 190.5 cm (75\"), weight 88.7 kg (195 lb 8 oz), SpO2 100 %.  Vital signs are normal.  No fever.    Output: Producing urine and producing stool.    HEENT: Normal HEENT exam.    Lungs:  Normal effort and normal respiratory rate.  Breath sounds clear to auscultation.    Heart: Normal rate.  Regular rhythm.    Abdomen: Abdomen is soft.  Bowel sounds are normal.   There is no abdominal tenderness.     Extremities: There is dependent edema.    Pulses: Distal pulses are intact.    Neurological: Patient is alert and oriented to person, place and time.    Skin:  Warm and dry.  No rash.             Results Review:     I reviewed the patient's new clinical results.  I " reviewed the patient's new imaging results and agree with the interpretation.  I reviewed the patient's other test results and agree with the interpretation  I personally viewed and interpreted the patient's EKG/Telemetry data  Discussed with patient and RN    Medication Review: reviewed and adjusted    Assessment/Plan       MRSA bacteremia    Hypercholesterolemia    Hypertension    Atrial fibrillation (CMS/HCC)    Iron deficiency anemia    Mild cognitive disorder    Chronic bilateral low back pain with bilateral sciatica    Chronic anticoagulation-for Afib    Type 2 diabetes mellitus, without long-term current use of insulin (CMS/HCC)    Weakness of both lower extremities    Lower extremity edema    Wound of right buttock    Sepsis affecting skin (CMS/HCC)    Pleural effusion          Plan:   (Pressure wound right buttock, MRSA growing in wound and blood cultures  POD#2 s/p I&D of wound  Wound RN to see for wound vac, will have to f/u with WCC  Continue Vanc per ID  Concern that his spine hardware could become involved, Rifampin added  TTE noted, left pleural effusion on CXR looks bigger compared to 4/2/19  WBC is down now, no fever  Hgb better today, B12 and folate okay  Okay to restart Eliquis per Surg, interaction between Eliquis and Rifampin, will ask Card opinion  BP and HR low, asymptomatic, Card following, will decrease dose of Vasotec and monitor BPs  Sugars a little better today, still had some hypoglycemia overnight, will continue SSI only for now and monitor sugars closely, continue to hold Metformin while on Vanc, SGLT-2 agent probably not good idea with current skin infection  BLE weakness persists, suspect multifactorial: bad back, recent spine surgery, edema, infection, etc.  Will ask Dr. Redmond to see pt at family's request--they are concerned that his BLE weakness is due to his recent spine surgery  Will require SNF at discharge, PT following, CCP to assist    ).       Kit Stroud,  MD  06/03/19  2:08 PM    Time: 30min

## 2019-06-03 NOTE — NURSING NOTE
06/03/19 1100   Wound 06/01/19 0058 Right gluteal pressure injury   Date first assessed/Time first assessed: 06/01/19 0058   Present On Admission : yes  Side: Right  Location: gluteal  Type: pressure injury  Stage, Pressure Injury: unstageable   Dressing Appearance moist drainage   Closure None   Base moist;pink;clean   Red (%), Wound Tissue Color 100   Periwound intact;dry   Edges open   Wound Length (cm) 7 cm   Wound Width (cm) 5 cm   Wound Depth (cm) 1.5 cm   Undermining [Depth (cm)/Location] 1 cm from 12 to 6 o'clock   Drainage Characteristics/Odor serosanguineous   Drainage Amount small   Care, Wound cleansed with;sterile normal saline   Dressing Care, Wound dressing changed  (VAC drsg applied)   Periwound Care, Wound barrier film applied   NPWT (Negative Pressure Wound Therapy) 06/03/19 1100 right buttocks   Placement Date/Time: 06/03/19 1100   Location: right buttocks   Therapy Setting continuous therapy   Dressing foam, black   Pressure Setting 125 mmHg   How much help from another person do you currently need...   Turning from your back to your side while in flat bed without using bedrails? 2   Moving from lying on back to sitting on the side of a flat bed without bedrails? 2   Moving to and from a bed to a chair (including a wheelchair)? 1   Standing up from a chair using your arms (e.g., wheelchair, bedside chair)? 1   Climbing 3-5 steps with a railing? 1   To walk in hospital room? 1   AM-PAC 6 Clicks Score 8   Pt also has a linear skin tear near gluteal fold; probably from friction.  Silicone border drsg intact.  Wound pink, superficial. Explained VAC drsg changes, frequency and rationale to pt; pt states understanding

## 2019-06-03 NOTE — THERAPY TREATMENT NOTE
Acute Care - Physical Therapy Treatment Note  [facility]     Patient Name: Derrek Estrada  : 1940  MRN: [id number]  Today's Date: 6/3/2019  Onset of Illness/Injury or Date of Surgery: 19  Date of Referral to PT: 19  Referring Physician: Maximus    Admit Date: 2019    Visit Dx:    ICD-10-CM ICD-9-CM   1. Cellulitis of buttock L03.317 682.5   2. Fever in adult R50.9 780.60   3. Wound abscess T81.49XA 879.9   4. Impaired mobility Z74.09 799.89     Patient Active Problem List   Diagnosis   • Cholelithiasis   • DM (HgbA1c 5.9)   • Diverticulosis of intestine   • Hypercholesterolemia   • Hypertension   • Spinal stenosis   • Microalbuminuria   • Atrial fibrillation (CMS/HCC)   • Medicare annual wellness visit, subsequent   • Anemia   • Iron deficiency anemia   • DJD (degenerative joint disease)   • Impotence of organic origin   • Glaucoma   • Hiatal hernia   • Mild cognitive disorder   • Calculus of kidney   • Nocturia   • Chronic bilateral low back pain with bilateral sciatica   • Post laminectomy syndrome   • Routine health maintenance   • Weight loss   • Spinal stenosis of lumbar region with neurogenic claudication   • Spondylolisthesis of lumbar region   • Lumbar spine pain   • Metabolic encephalopathy   • Urinary retention with incomplete bladder emptying   • Macrocytosis without anemia   • Sundowning   • RBBB (right bundle branch block with left anterior fascicular block)   • Leg swelling   • Postural kyphosis of lumbar region   • H/O urinary retention   • Chronic anticoagulation-for Afib   • Fever and chills   • Cellulitis of buttock   • Type 2 diabetes mellitus, without long-term current use of insulin (CMS/HCC)   • Weakness of both lower extremities   • Lower extremity edema   • Wound of right buttock   • Sepsis affecting skin (CMS/HCC)   • MRSA bacteremia   • Pleural effusion       Therapy Treatment    Rehabilitation Treatment Summary     Row Name 19 1053             Treatment  Time/Intention    Discipline  physical therapist  -EE      Document Type  therapy note (daily note)  -EE      Subjective Information  no complaints  -EE      Mode of Treatment  physical therapy;individual therapy  -EE      Patient/Family Observations  Pt supine in bed in no acute distress.  -EE      Patient Effort  good  -EE      Existing Precautions/Restrictions  fall  -EE      Treatment Considerations/Comments  wound vac to R glute wound; bed ther ex only until clarified by surgery  (Significant)   -EE2      Recorded by [EE] Amy Molina, PT 06/03/19 1107  [EE2] Amy Molina, PT 06/03/19 1110      Row Name 06/03/19 1053             Cognitive Assessment/Intervention    Additional Documentation  Cognitive Assessment/Intervention (Group)  -EE      Recorded by [EE] Amy Molina, PT 06/03/19 1110      Row Name 06/03/19 1053             Cognitive Assessment/Intervention- PT/OT    Affect/Mental Status (Cognitive)  confused  -EE      Orientation Status (Cognition)  oriented to;person  -EE      Follows Commands (Cognition)  follows one step commands;over 90% accuracy;repetition of directions required  -EE      Cognitive Function (Cognitive)  safety deficit  -EE      Safety Deficit (Cognitive)  moderate deficit;insight into deficits/self awareness  -EE      Personal Safety Interventions  fall prevention program maintained;muscle strengthening facilitated;supervised activity  -EE      Recorded by [EE] Amy Molina, PT 06/03/19 1110      Row Name 06/03/19 1053             Bed Mobility Assessment/Treatment    Comment (Bed Mobility)  NT--bed exercises only until restrictions clarified by surgery  (Significant)   -EE      Recorded by [EE] Amy Molina, PT 06/03/19 1110      Row Name 06/03/19 1053             Motor Skills Assessment/Interventions    Additional Documentation  Therapeutic Exercise (Group)  -EE      Recorded by [EE] Amy Molina, PT 06/03/19 1110      Row Name 06/03/19 1053             Therapeutic Exercise    Lower  Extremity (Therapeutic Exercise)  quad sets, bilateral;SAQ (short arc quad), bilateral;heel slides, bilateral  -EE      Lower Extremity Range of Motion (Therapeutic Exercise)  hip abduction/adduction, bilateral;ankle dorsiflexion/plantar flexion, bilateral  -EE      Exercise Type (Therapeutic Exercise)  AROM (active range of motion);isometric contraction, static  -EE      Position (Therapeutic Exercise)  supine  -EE      Sets/Reps (Therapeutic Exercise)  1/10  -EE      Expected Outcome (Therapeutic Exercise)  improve performance, gait skills;improve performance, transfer skills;improve functional tolerance, household activity  -EE      Recorded by [EE] Amy Molina, PT 06/03/19 1110      Row Name 06/03/19 1053             Positioning and Restraints    Pre-Treatment Position  in bed  -EE      Post Treatment Position  bed  -EE      In Bed  side lying left;call light within reach;encouraged to call for assist;exit alarm on;with nsg;SCD pump applied;pillow between legs nsg assistant present  -EE      Recorded by [EE] Amy Molina, PT 06/03/19 1110      Row Name 06/03/19 1053             Pain Assessment    Additional Documentation  Pain Scale: Numbers Pre/Post-Treatment (Group)  -EE      Recorded by [EE] Amy Molina, PT 06/03/19 1110      Row Name 06/03/19 1053             Pain Scale: Numbers Pre/Post-Treatment    Pain Scale: Numbers, Pretreatment  0/10 - no pain  -EE      Pain Scale: Numbers, Post-Treatment  0/10 - no pain  -EE      Recorded by [EE] Amy Molina, PT 06/03/19 1110      Row Name                Wound 01/09/19 0844 Other (See comments) back incision    Wound - Properties Group Date first assessed: 01/09/19 [JT] Time first assessed: 0844 [JT] Side: Other (See comments) [JT] Location: back [JT] Type: incision [JT] Recorded by:  [JT] Margie Bravo RN 01/09/19 0844    Row Name                Wound 04/01/19 0700 Bilateral coccyx pressure injury    Wound - Properties Group Date first assessed: 04/01/19  [JH] Time first assessed: 0700 [JH] Present On Admission : yes;picture taken [JH] Side: Bilateral [JH] Location: coccyx [JH] Type: pressure injury [JH] Stage, Pressure Injury: Stage 1 [JH] Recorded by:  [JH] Kym Mckeon RN 04/01/19 0752    Row Name                Wound 06/01/19 0058 Right gluteal pressure injury    Wound - Properties Group Date first assessed: 06/01/19 [GC] Time first assessed: 0058 [GC] Present On Admission : yes [GC] Side: Right [GC] Location: gluteal [GC] Type: pressure injury [GC2] Stage, Pressure Injury: unstageable [GC2] Recorded by:  [GC] Iza Stone RN 06/01/19 0059 [GC2] Iza Stone RN 06/01/19 0252    Row Name                Wound 06/01/19 1316 Right gluteal incision    Wound - Properties Group Date first assessed: 06/01/19 [TL] Time first assessed: 1316 [TL] Side: Right [TL] Location: gluteal [TL] Type: incision [TL] Recorded by:  [TL] Amada West RN 06/01/19 1316    Row Name 06/03/19 1053             Outcome Summary/Treatment Plan (PT)    Anticipated Discharge Disposition (PT)  skilled nursing facility  -      Recorded by [EE] Amy Molina, PT 06/03/19 1110        User Key  (r) = Recorded By, (t) = Taken By, (c) = Cosigned By    Initials Name Effective Dates Discipline     Iza Stone RN 10/30/17 -  Nurse    EE Amy Molina, PT 04/03/18 -  PT    Kym Turner, RN 06/16/16 -  Nurse    Amaad Fishman RN 06/16/16 -  Nurse    Margie Sena RN 06/16/16 -  Nurse          Wound 01/09/19 0844 Other (See comments) back incision (Active)   Dressing Appearance no drainage;open to air 6/3/2019  9:33 AM   Closure Approximated;Open to air 6/3/2019  9:33 AM   Base clean;dry;pink;white 6/3/2019  9:33 AM   Periwound intact;dry;pink 6/3/2019  9:33 AM   Periwound Temperature warm 6/3/2019  9:33 AM   Periwound Skin Turgor soft 6/3/2019  9:33 AM   Drainage Amount none 6/3/2019  9:33 AM   Dressing Care, Wound open to air 6/3/2019  12:05 AM   Periwound Care, Wound dry periwound area maintained 6/3/2019 12:05 AM       Wound 04/01/19 0700 Bilateral coccyx pressure injury (Active)   Dressing Appearance dry;intact 6/3/2019  9:33 AM   Closure MARIELLE 6/3/2019  9:33 AM   Base dressing in place, unable to visualize 6/3/2019  9:33 AM   Periwound intact;dry;pink 6/2/2019 11:36 PM   Periwound Temperature warm 6/2/2019 11:36 PM   Periwound Skin Turgor soft 6/2/2019 11:36 PM   Dressing Care, Wound dressing applied;low-adherent 6/2/2019 11:36 PM   Periwound Care, Wound dry periwound area maintained 6/3/2019 12:05 AM       Wound 06/01/19 1316 Right gluteal incision (Active)   Dressing Appearance dry;intact 6/3/2019  9:33 AM   Closure MARIELLE 6/3/2019  9:33 AM   Base dressing in place, unable to visualize 6/3/2019  9:33 AM   Periwound blanchable;redness 6/2/2019 11:36 PM   Periwound Temperature warm 6/2/2019 11:36 PM   Periwound Skin Turgor soft 6/2/2019 11:36 PM   Edges rolled/closed 6/2/2019 11:36 PM   Drainage Characteristics/Odor serosanguineous;tan 6/2/2019 11:36 PM   Drainage Amount moderate 6/2/2019 11:36 PM   Care, Wound cleansed with;sterile normal saline 6/2/2019 11:36 PM   Dressing Care, Wound gauze, wet-to-dry;dressing changed 6/2/2019 11:36 PM   Periwound Care, Wound dry periwound area maintained 6/3/2019 12:05 AM           Physical Therapy Education     Title: PT OT SLP Therapies (In Progress)     Topic: Physical Therapy (In Progress)     Point: Mobility training (In Progress)     Learning Progress Summary           Patient Acceptance, E, NR by  at 6/2/2019  2:46 PM    Acceptance, E, VU by  at 6/1/2019  5:37 AM   Family Acceptance, E, NR by  at 6/2/2019  2:46 PM    Acceptance, E, VU by  at 6/1/2019  5:37 AM                   Point: Home exercise program (In Progress)     Learning Progress Summary           Patient Acceptance, E, NR by LH at 6/2/2019  2:46 PM   Family Acceptance, E, NR by LH at 6/2/2019  2:46 PM                   Point: Body  mechanics (Done)     Learning Progress Summary           Patient Acceptance, E, VU by LL at 6/1/2019  5:37 AM   Family Acceptance, E, VU by  at 6/1/2019  5:37 AM                   Point: Precautions (Done)     Learning Progress Summary           Patient Acceptance, E, VU by  at 6/1/2019  5:37 AM   Family Acceptance, E, VU by  at 6/1/2019  5:37 AM                               User Key     Initials Effective Dates Name Provider Type [facility] 04/03/18 -  Flaca Aleman, PT Physical Therapist PT     06/16/16 -  Ksenia Lee, RN Registered Nurse Nurse                PT Recommendation and Plan  Anticipated Discharge Disposition (PT): skilled nursing facility  Outcome Summary/Treatment Plan (PT)  Anticipated Discharge Disposition (PT): skilled nursing facility  Plan of Care Reviewed With: patient  Outcome Summary: Pt continues to appear confused; able to complete supine ther ex with cues. Awaiting surgery clarification of activity recommendations and restrictions prior to initiating OOB activity.   Outcome Measures     Row Name 06/03/19 1100 06/02/19 1400          How much help from another person do you currently need...    Turning from your back to your side while in flat bed without using bedrails?  2  -EE  2  -LH     Moving from lying on back to sitting on the side of a flat bed without bedrails?  2  -EE  2  -LH     Moving to and from a bed to a chair (including a wheelchair)?  1  -EE  1  -LH     Standing up from a chair using your arms (e.g., wheelchair, bedside chair)?  1  -EE  1  -LH     Climbing 3-5 steps with a railing?  1  -EE  1  -LH     To walk in hospital room?  1  -EE  1  -LH     AM-PAC 6 Clicks Score  8  -EE  8  -LH        Functional Assessment    Outcome Measure Options  AM-PAC 6 Clicks Basic Mobility (PT)  -EE  AM-PAC 6 Clicks Basic Mobility (PT)  -       User Key  (r) = Recorded By, (t) = Taken By, (c) = Cosigned By    Initials Name Provider Type     Flaca Aleman, PT  Physical Therapist    Amy Ha PT Physical Therapist         Time Calculation:   PT Charges     Row Name 06/03/19 1111             Time Calculation    Start Time  1053  -EE      Stop Time  1106  -EE      Time Calculation (min)  13 min  -EE      PT Received On  06/03/19  -EE      PT - Next Appointment  06/04/19  -EE         Time Calculation- PT    Total Timed Code Minutes- PT  13 minute(s)  -EE        User Key  (r) = Recorded By, (t) = Taken By, (c) = Cosigned By    Initials Name Provider Type    Amy Ha PT Physical Therapist        Therapy Charges for Today     Code Description Service Date Service Provider Modifiers Qty    82021488339 HC PT THER PROC EA 15 MIN 6/3/2019 Amy Molina, PT GP 1          PT G-Codes  Outcome Measure Options: AM-PAC 6 Clicks Basic Mobility (PT)  AM-PAC 6 Clicks Score: 8    Amy Molina PT  6/3/2019

## 2019-06-03 NOTE — PROGRESS NOTES
"Pharmacokinetic Evaluation: Vancomycin IV  Patient: Derrek Estrada  Admission Date: 531  MRN: [id number]  : 1940     Day of vancomycin therapy: 3/14  Consult for Dr. Perez  Treating: MRSA bacteremia  Goal trough: 15-20 mcg/mL     Current regimen: Vancomycin 1000 mg IV q12hrs  Other antimicrobials: Rifampin 300 mg po q12hrs     Relevant clinical data and objective history reviewed:  79 y.o. male 190.5 cm (75\") 88.7 kg (195 lb 8 oz)  Body mass index is 24.44 kg/m².         Results from last 7 days   Lab Units 19  0452 19  0559 19  0528   CREATININE mg/dL 0.77 0.85 0.84      Estimated Creatinine Clearance: 95.5 mL/min (by C-G formula based on SCr of 0.77 mg/dL).           Lab Results   Component Value Date     WBC 9.37 2019     WBC 10.06 2019     WBC 13.22 (H) 2019      Temp:  [97.7 °F (36.5 °C)-100.2 °F (37.9 °C)] 99 °F (37.2 °C)  Heart Rate:  [45-91] 74  Resp:  [18-20] 18  BP: ()/(52-74) 116/73     Intake/Output Summary (Last 24 hours) at 6/3/2019 0829  Last data filed at 6/3/2019 0743      Gross per 24 hour   Intake 1775.5 ml   Output 800 ml   Net 975.5 ml      Baseline cultures/labs/radiology:      Lactic acid: 1.2   CRP: 20.54   ESR: 78   TTE: EF=58%. No vegetations   HgA1C: 5.70     Radiology:    CT pelvis: Bilateral nephrolithiasis, nonobstructing. Colonic diverticulosis.  On image 83 this area measures 8.4 x 1.8 cm. This may reflect a localized inflammatory process such as cellulitis. There is no soft tissue air or evidence of abscess.   CXR: Increasing left effusion.     Cultures:    Blood cx x2: MRSA   Anaerobic cx (Rt buttock wound): In process   Blood cx x2: In process         Culture results from last 30 days   Lab 19  1246   WOUND CX  (Rt buttock) Moderate growth (3+) Staphylococcus aureus, MRSA*      Assessment/Plan:   PMH: Acute hypoxemic respiratory failure, Acute suppurative otitis media, Acute upper " respiratory infection, Anemia, Arthritis, Cholelithiasis, Diabetes mellitus, Diverticulosis, Dry eyes, Earache, Erectile dysfunction, Edema, Elevated cholesterol, Screening for malignant neoplasm of prostate, Esophagitis, reflux, GERD, Glaucoma, Echocardiogram (09/18/2013), Hiatal hernia, transfusion, Hypercholesterolemia, Hyperlipidemia, Hypertension, Lumbar canal stenosis, Microalbuminuria, Mild cognitive impairment, Mitral valve prolapse, Nephrolithiasis, PAF, Pericardial effusion, Right bundle branch block with left anterior fascicular block, and Spinal stenosis.      HPI:  Admitted with large painful pressure wound on right buttock    - trough obtained appropriately, within low end of goal 15-20  - increase dose to 1250 mg IV q12h. Repeat trough on 6/5 0430    Thank you for your consult,   Isidoro Tapia, PharmD  06/03/19 4:38 PM

## 2019-06-03 NOTE — TELEPHONE ENCOUNTER
----- Message from Marta Fernandes sent at 6/3/2019  8:51 AM EDT -----  CAN YOU PLEASE CALL HIS WIFE @ [phone] SHE IS WANTING TO DISCUSS LEG AND DM ISSUES.  HAD TO CANCEL AWV DUE TO HIM BEING IN THE HOSPIATL.  I advised following up with me 2 weeks after he gets out of the rehab facility.

## 2019-06-03 NOTE — PROGRESS NOTES
"    Patient Name: Derrek Estrada  :1940  79 y.o.      Patient Care Team:  Trey Stockton MD as PCP - General (Internal Medicine)  Sherice Newman MD as Consulting Physician (Cardiology)  Carey Pascual [facility] as Pharmacist  Geovanni Herron MD as Consulting Physician (Hematology and Oncology)  Sakina Martin APRN as Referring Physician (Cardiology)    Chief Complaint: follow up pAF    Interval History: he feels ok. No dyspnea or chest pain. Complains of wound pain.        Objective   Vital Signs  Temp:  [97.1 °F (36.2 °C)-100.2 °F (37.9 °C)] 97.1 °F (36.2 °C)  Heart Rate:  [63-91] 70  Resp:  [18-20] 18  BP: ()/(52-74) 117/66    Intake/Output Summary (Last 24 hours) at 6/3/2019 1425  Last data filed at 6/3/2019 1206  Gross per 24 hour   Intake 1905.5 ml   Output 700 ml   Net 1205.5 ml     Flowsheet Rows      First Filed Value   Admission Height  190.5 cm (75\") Documented at 2019 2341   Admission Weight  84.1 kg (185 lb 6.4 oz) Documented at 2019 0027          Physical Exam:   General Appearance:    Alert, cooperative, in no acute distress   Lungs:     Clear to auscultation.  Normal respiratory effort and rate.      Heart:    Regular rhythm and normal rate, normal S1 and S2, no murmurs, gallops or rubs.     Chest Wall:    No abnormalities observed   Abdomen:     Soft, nontender, positive bowel sounds.     Extremities:   no cyanosis, clubbing or edema.  No marked joint deformities.  Adequate musculoskeletal strength.       Results Review:    Results from last 7 days   Lab Units 19  0452   SODIUM mmol/L 141   POTASSIUM mmol/L 3.9   CHLORIDE mmol/L 108*   CO2 mmol/L 21.7*   BUN mg/dL 28*   CREATININE mg/dL 0.77   GLUCOSE mg/dL 115*   CALCIUM mg/dL 7.9*         Results from last 7 days   Lab Units 19  0452   WBC 10*3/mm3 9.37   HEMOGLOBIN g/dL 8.6*   HEMATOCRIT % 27.6*   PLATELETS 10*3/mm3 254                           Medication Review:     amiodarone 200 mg Oral " Daily   atorvastatin 10 mg Oral Nightly   donepezil 5 mg Oral QAM   dorzolamide 1 drop Both Eyes BID   enalapril 40 mg Oral QAM   furosemide 40 mg Oral Daily   gabapentin 300 mg Oral TID   insulin lispro 0-14 Units Subcutaneous 4x Daily With Meals & Nightly   latanoprost 1 drop Both Eyes Nightly   pantoprazole 40 mg Oral QAM   potassium chloride 20 mEq Oral Daily   rifAMPin 300 mg Oral Q12H   sodium chloride 3 mL Intravenous Q12H   sodium chloride 3 mL Intravenous Q12H   tamsulosin 0.4 mg Oral QAM   timolol 1 drop Both Eyes Daily   vancomycin 1,000 mg Intravenous Q12H          Pharmacy to dose vancomycin     sodium chloride 100 mL/hr Last Rate: 100 mL/hr (06/03/19 0412)       Assessment/Plan   1. Infected (MRSA) ulcer of the right gluteus - s/p debridement.   2. Paroxysmal atrial fibrillation - he is in SR on low dose amiodarone. He had asymptomatic bradycardia. Would continue amio at current dose. RN to reach out to surgery to see if ok to resume anticoagulation. He was on apixaban at home. He has a CHADs2 Vasc score of 4.   3. DM   4. Pleural effusion - increasing left. Relatively asymptomatic. Continue oral diuretics.   5. HTN - on the low side. Enalapril was decreased.     Cleared to resume anticoagulation per surgery. Interaction between rifampin and apixaban (decreases effectiveness of apixaban). Will use lovenox for now. Duration of rifampin not yet specified. Discussed with Dr. Chou.     LAMONT Saldaña  [facility]  06/03/19  2:25 PM

## 2019-06-03 NOTE — PROGRESS NOTES
INFECTIOUS DISEASES PROGRESS NOTE    CC: Follow-up MRSA septicemia    S:   Continues to complain of some surgical right buttocks pain which is worse with repositioning.  No change in chronic back pain.  No spinous process tenderness palpation.  No fevers or chills or night sweats.  Has small cough    O:  Physical Exam:  Temp:  [97.7 °F (36.5 °C)-100.2 °F (37.9 °C)] 97.8 °F (36.6 °C)  Heart Rate:  [45-91] 70  Resp:  [18-20] 18  BP: ()/(52-74) 128/71  Physical Exam  GENERAL: Awake and alert, chronically ill-appearing but in no acute distress.   HEENT: . Hearing is grossly normal.   HEART: Regular rate and rhythm.  2+ peripheral edema.   LUNGS: Decreased breath sounds in the left with normal respiratory effort.   GI: Soft, nontender, nondistended.  Slight splenomegaly   SKIN: Warm and dry without cutaneous eruptions in exposed areas.   PSYCHIATRIC: Appropriate mood, affect, insight, and judgment.        Diagnostics:     Cr 0.77  WBC 9.37 (p90, L4, M 4)  H/H 8.6/28      Microbiology  6/1/2019 blood cultures 2/2 MRSA  6/1/2019 wound culture MRSA  6/2/2019 BCx 2/2 P     CXR, independently interpreted: hardware in place and left effusion  TTE: no vegetation with small PFO and mild MR, TR       Assessment/Plan   1.  MRSA septicemia secondary to infected pressure ulcer of the right buttocks  2.  Diabetes mellitus type 2, continue glycemic control efforts to prevent/control infectious complications  3.  Left Pleural effusion, doubt infection  4. Extensive spinal hardware in place    Cont vanc for goal level of 15-20. Vanc level today  Add rifampin 300mg po q12 to try to protect hardware from being infected  TTE without evidence of endocarditis  Doubt pleural or small pericardial effusion infected.  Cardiology following given abnormal TTE.    Mark Perez MD  8:00 AM  06/03/19

## 2019-06-04 NOTE — PLAN OF CARE
Problem: Patient Care Overview  Goal: Plan of Care Review   06/04/19 2387   Coping/Psychosocial   Plan of Care Reviewed With patient   Plan of Care Review   Progress improving   OTHER   Outcome Summary Pt cleared for OOB. Able to ambulate in hallway, but requires assist of two due to weakness, impaired balance, and decreased safety awareness. Pt will benefit from continued PT for further strengthening and mobility training.

## 2019-06-04 NOTE — THERAPY TREATMENT NOTE
Acute Care - Physical Therapy Treatment Note  [facility]     Patient Name: Derrek Estrada  : 1940  MRN: [id number]  Today's Date: 2019  Onset of Illness/Injury or Date of Surgery: 19  Date of Referral to PT: 19  Referring Physician: Maximus    Admit Date: 2019    Visit Dx:    ICD-10-CM ICD-9-CM   1. Cellulitis of buttock L03.317 682.5   2. Fever in adult R50.9 780.60   3. Wound abscess T81.49XA 879.9   4. Impaired mobility Z74.09 799.89     Patient Active Problem List   Diagnosis   • Cholelithiasis   • DM (HgbA1c 5.9)   • Diverticulosis of intestine   • Hypercholesterolemia   • Hypertension   • Spinal stenosis   • Microalbuminuria   • Atrial fibrillation (CMS/HCC)   • Medicare annual wellness visit, subsequent   • Anemia   • Iron deficiency anemia   • DJD (degenerative joint disease)   • Impotence of organic origin   • Glaucoma   • Hiatal hernia   • Mild cognitive disorder   • Calculus of kidney   • Nocturia   • Chronic bilateral low back pain with bilateral sciatica   • Post laminectomy syndrome   • Routine health maintenance   • Weight loss   • Spinal stenosis of lumbar region with neurogenic claudication   • Spondylolisthesis of lumbar region   • Lumbar spine pain   • Metabolic encephalopathy   • Urinary retention with incomplete bladder emptying   • Macrocytosis without anemia   • Sundowning   • RBBB (right bundle branch block with left anterior fascicular block)   • Leg swelling   • Postural kyphosis of lumbar region   • H/O urinary retention   • Chronic anticoagulation-for Afib   • Fever and chills   • Cellulitis of buttock   • Type 2 diabetes mellitus, without long-term current use of insulin (CMS/HCC)   • Weakness of both lower extremities   • Lower extremity edema   • Wound of right buttock   • Sepsis affecting skin (CMS/HCC)   • MRSA bacteremia   • Pleural effusion       Therapy Treatment    Rehabilitation Treatment Summary     Row Name 19 1538             Treatment  Time/Intention    Discipline  physical therapist  -EE      Document Type  therapy note (daily note)  -EE      Subjective Information  complains of;pain  -EE      Mode of Treatment  physical therapy;individual therapy  -EE      Patient/Family Observations  Pt supine in bed in no acute distress  -EE      Therapy Frequency (PT Clinical Impression)  daily  -EE      Patient Effort  good  -EE      Existing Precautions/Restrictions  fall  -EE      Treatment Considerations/Comments  wound vac to R glut wound; no activity restrictions per surgery  (Significant)   -EE      Recorded by [EE] Amy Molina, PT 06/04/19 1601      Row Name 06/04/19 1538             Vital Signs    Pre SpO2 (%)  96  -EE      O2 Delivery Pre Treatment  supplemental O2  -EE      Intra SpO2 (%)  91  -EE      O2 Delivery Intra Treatment  room air  -EE      Post SpO2 (%)  96  -EE      O2 Delivery Post Treatment  supplemental O2  -EE      Pre Patient Position  Supine  -EE      Intra Patient Position  Standing  -EE      Post Patient Position  Supine  -EE      Recorded by [EE] Amy Molina, PT 06/04/19 1601      Row Name 06/04/19 1538             Cognitive Assessment/Intervention- PT/OT    Orientation Status (Cognition)  oriented to;person;place  -EE      Follows Commands (Cognition)  follows one step commands;over 90% accuracy;increased processing time needed;repetition of directions required;verbal cues/prompting required  -EE      Cognitive Function (Cognitive)  safety deficit  -EE      Safety Deficit (Cognitive)  mild deficit;safety precautions awareness;insight into deficits/self awareness  -EE      Personal Safety Interventions  fall prevention program maintained;gait belt;muscle strengthening facilitated;nonskid shoes/slippers when out of bed;supervised activity  -EE      Recorded by [EE] Amy Molina, PT 06/04/19 1601      Row Name 06/04/19 1538             Safety Issues, Functional Mobility    Safety Issues Affecting Function (Mobility)  insight  into deficits/self awareness;safety precaution awareness  -EE      Impairments Affecting Function (Mobility)  balance;endurance/activity tolerance;pain;strength  -EE      Recorded by [EE] Amy Molina, PT 06/04/19 1601      Row Name 06/04/19 1538             Bed Mobility Assessment/Treatment    Bed Mobility Assessment/Treatment  rolling left;supine-sit;sit-supine  -EE      Rolling Left [place] (Bed Mobility)  minimum assist (75% patient effort);verbal cues  -EE      Supine-Sit [place] (Bed Mobility)  moderate assist (50% patient effort);2 person assist;verbal cues  -EE      Sit-Supine [place] (Bed Mobility)  moderate assist (50% patient effort);2 person assist;verbal cues  -EE      Bed Mobility, Safety Issues  decreased use of arms for pushing/pulling;decreased use of legs for bridging/pushing  -EE      Assistive Device (Bed Mobility)  bed rails;draw sheet  -EE      Recorded by [EE] Amy Molina, PT 06/04/19 1601      Row Name 06/04/19 1538             Transfer Assessment/Treatment    Transfer Assessment/Treatment  stand-sit transfer;sit-stand transfer  -EE      Recorded by [EE] Amy Molina, PT 06/04/19 1605      Row Name 06/04/19 1538             Sit-Stand Transfer    Sit-Stand [place] (Transfers)  moderate assist (50% patient effort);2 person assist;verbal cues  -EE      Assistive Device (Sit-Stand Transfers)  walker, front-wheeled  -EE      Recorded by [EE] Amy Molina, PT 06/04/19 1605      Row Name 06/04/19 1538             Stand-Sit Transfer    Stand-Sit [place] (Transfers)  minimum assist (75% patient effort);2 person assist  -EE      Recorded by [EE] Amy Molina, PT 06/04/19 1605      Row Name 06/04/19 1538             Gait/Stairs Assessment/Training    [place] Level (Gait)  minimum assist (75% patient effort);2 person assist;verbal cues  -EE      Assistive Device (Gait)  walker, front-wheeled  -EE      Distance in Feet (Gait)  85  -EE      Deviations/Abnormal Patterns (Gait)   jody decreased;stride length decreased  -EE      Bilateral Gait Deviations  forward flexed posture  -EE      Left Sided Gait Deviations  leans left  -EE      Comment (Gait/Stairs)  min to mod A required to guide walker; verbal cues required for upright posture  -EE      Recorded by [EE] Amy Molina, PT 06/04/19 1605      Row Name 06/04/19 1538             Lower Extremity Supine Therapeutic Exercise    Performed, Supine Lower Extremity (Therapeutic Exercise)  SAQ (short arc quad) over bolster;hip abduction/adduction;heel slides;ankle pumps  -EE      Exercise Type, Supine Lower Extremity (Therapeutic Exercise)  AROM (active range of motion)  -EE      Sets/Reps Detail, Supine Lower Extremity (Therapeutic Exercise)  1/10  -EE      Recorded by [EE] Amy Molina, PT 06/04/19 1605      Row Name 06/04/19 1538             Positioning and Restraints    Pre-Treatment Position  in bed  -EE      Post Treatment Position  bed  -EE      In Bed  fowlers;side lying left;call light within reach;encouraged to call for assist;exit alarm on;with family/caregiver;notified nsg;SCD pump applied  -EE      Recorded by [EE] Amy Molina, PT 06/04/19 1605      Row Name 06/04/19 1538             Pain Scale: Numbers Pre/Post-Treatment    Pain Scale: Numbers, Pretreatment  3/10  -EE      Pain Scale: Numbers, Post-Treatment  3/10  -EE      Pain Location - Side  Right  -EE      Pain Location - Orientation  posterior  -EE      Pain Location  hip  -EE      Recorded by [EE] Amy Molina, PT 06/04/19 1606      Row Name                Wound 01/09/19 0844 Other (See comments) back incision    Wound - Properties Group Date first assessed: 01/09/19 [JT] Time first assessed: 0844 [JT] Side: Other (See comments) [JT] Location: back [JT] Type: incision [JT] Recorded by:  [JT] Margie Bravo RN 01/09/19 0844    Row Name                Wound 06/01/19 0058 Right gluteal pressure injury    Wound - Properties Group Date first assessed: 06/01/19 [GC] Time  first assessed: 0058 [GC] Present On Admission : yes [GC] Side: Right [GC] Location: gluteal [GC] Type: pressure injury [GC2] Stage, Pressure Injury: unstageable [GC2] Recorded by:  [GC] Iza Stone RN 06/01/19 0059 [GC2] Iza Stone RN 06/01/19 0252    Row Name                NPWT (Negative Pressure Wound Therapy) 06/03/19 1100 right buttocks    NPWT (Negative Pressure Wound Therapy) - Properties Group Placement Date: 06/03/19 [KN] Placement Time: 1100 [KN] Location: right buttocks [KN] Recorded by:  [KN] Chery Yeager, VICKY, CWON 06/03/19 1206      User Key  (r) = Recorded By, (t) = Taken By, (c) = Cosigned By    Initials Name Effective Dates Discipline     Iza Stone RN 10/30/17 -  Nurse    Amy Ha, PT 04/03/18 -  PT    Chery Cosme, RN, CWON 06/16/16 -  Nurse    Margie Sena RN 06/16/16 -  Nurse          Wound 01/09/19 0844 Other (See comments) back incision (Active)   Dressing Appearance no drainage;open to air 6/4/2019  2:25 PM   Closure Approximated;Open to air 6/4/2019  2:25 PM   Base clean;dry;pink;white 6/4/2019 12:10 AM   Periwound intact;dry;pink 6/4/2019 12:10 AM   Periwound Temperature warm 6/4/2019 12:10 AM   Periwound Skin Turgor soft 6/4/2019 12:10 AM   Drainage Amount none 6/4/2019 12:10 AM   Dressing Care, Wound open to air 6/4/2019 12:10 AM   Periwound Care, Wound dry periwound area maintained 6/4/2019 12:10 AM       Wound 06/01/19 0058 Right gluteal pressure injury (Active)   Dressing Appearance dry;intact 6/4/2019  2:25 PM   Closure MARIELLE 6/4/2019  2:25 PM   Base dressing in place, unable to visualize 6/4/2019  2:25 PM   Drainage Amount none 6/4/2019  2:25 PM   Periwound Care, Wound dry periwound area maintained 6/4/2019 12:10 AM       NPWT (Negative Pressure Wound Therapy) 06/03/19 1100 right buttocks (Active)   Therapy Setting continuous therapy 6/4/2019  2:25 PM   Dressing foam, black 6/4/2019  2:25 PM   Pressure  Setting 125 mmHg 6/4/2019  2:25 PM           Physical Therapy Education     Title: PT OT SLP Therapies (In Progress)     Topic: Physical Therapy (In Progress)     Point: Mobility training (In Progress)     Learning Progress Summary           Patient Acceptance, E,D, NR by EE at 6/4/2019  4:06 PM    Acceptance, E, NR by LH at 6/2/2019  2:46 PM    Acceptance, E, VU by LL at 6/1/2019  5:37 AM   Family Acceptance, E, NR by LH at 6/2/2019  2:46 PM    Acceptance, E, VU by LL at 6/1/2019  5:37 AM                   Point: Home exercise program (In Progress)     Learning Progress Summary           Patient Acceptance, E,D, NR by EE at 6/4/2019  4:06 PM    Acceptance, E, NR by LH at 6/2/2019  2:46 PM   Family Acceptance, E, NR by LH at 6/2/2019  2:46 PM                   Point: Body mechanics (In Progress)     Learning Progress Summary           Patient Acceptance, E,D, NR by EE at 6/4/2019  4:06 PM    Acceptance, E, VU by LL at 6/1/2019  5:37 AM   Family Acceptance, E, VU by LL at 6/1/2019  5:37 AM                   Point: Precautions (Done)     Learning Progress Summary           Patient Acceptance, E, VU by LL at 6/1/2019  5:37 AM   Family Acceptance, E, VU by LL at 6/1/2019  5:37 AM                               User Key     Initials Effective Dates Name Provider Type Discipline     04/03/18 -  Flaca Aleman, PT Physical Therapist PT     04/03/18 -  Amy Molina PT Physical Therapist PT     06/16/16 -  Ksenia Lee, RN Registered Nurse Nurse                PT Recommendation and Plan  Anticipated Discharge Disposition (PT): skilled nursing facility  Therapy Frequency (PT Clinical Impression): daily  Outcome Summary/Treatment Plan (PT)  Anticipated Discharge Disposition (PT): skilled nursing facility  Plan of Care Reviewed With: patient  Progress: improving  Outcome Summary: Pt cleared for OOB. Able to ambulate in hallway, but requires assist of two due to weakness, impaired balance, and decreased safety  awareness. Pt will benefit from continued PT for further strengthening and mobility training.   Outcome Measures     Row Name 06/04/19 1600 06/03/19 1100 06/02/19 1400       How much help from another person do you currently need...    Turning from your back to your side while in flat bed without using bedrails?  3  -EE  2  -EE  2  -LH    Moving from lying on back to sitting on the side of a flat bed without bedrails?  2  -EE  2  -EE  2  -LH    Moving to and from a bed to a chair (including a wheelchair)?  2  -EE  1  -EE  1  -LH    Standing up from a chair using your arms (e.g., wheelchair, bedside chair)?  2  -EE  1  -EE  1  -LH    Climbing 3-5 steps with a railing?  1  -EE  1  -EE  1  -LH    To walk in hospital room?  2  -EE  1  -EE  1  -LH    AM-PAC 6 Clicks Score  12  -EE  8  -EE  8  -LH       Functional Assessment    Outcome Measure Options  AM-PAC 6 Clicks Basic Mobility (PT)  -EE  AM-PAC 6 Clicks Basic Mobility (PT)  -EE  AM-PAC 6 Clicks Basic Mobility (PT)  -LH      User Key  (r) = Recorded By, (t) = Taken By, (c) = Cosigned By    Initials Name Provider Type     Flaca Aleman, PT Physical Therapist    EE Amy Molina PT Physical Therapist         Time Calculation:   PT Charges     Row Name 06/04/19 1608 06/04/19 1024          Time Calculation    Start Time  1536  -EE  --     Stop Time  1554  -EE  --     Time Calculation (min)  18 min  -EE  --     PT Received On  06/04/19  -EE  --     PT - Next Appointment  06/05/19  -EE  06/04/19  -EE        Time Calculation- PT    Total Timed Code Minutes- PT  18 minute(s)  -EE  --       User Key  (r) = Recorded By, (t) = Taken By, (c) = Cosigned By    Initials Name Provider Type    Amy Ha PT Physical Therapist        Therapy Charges for Today     Code Description Service Date Service Provider Modifiers Qty    08551274119 HC PT THER PROC EA 15 MIN 6/3/2019 Amy Molina, PT GP 1    48285753171 HC PT THERAPEUTIC ACT EA 15 MIN 6/4/2019 Amy Molina, PT GP 1     [id number]  PT THER SUPP EA 15 MIN 6/4/2019 Amy Molina, PT GP 1          PT G-Codes  Outcome Measure Options: AM-PAC 6 Clicks Basic Mobility (PT)  AM-PAC 6 Clicks Score: 12    Amy Molina, PT  6/4/2019

## 2019-06-04 NOTE — PROGRESS NOTES
INFECTIOUS DISEASES PROGRESS NOTE    CC: f/u MRSA    S:   Nosebleed this a.m.  No fevers or chills or night sweats.  Tolerating antibiotics okay.    O:  Physical Exam:  Temp:  [97.1 °F (36.2 °C)-99.6 °F (37.6 °C)] 99.6 °F (37.6 °C)  Heart Rate:  [70-75] 73  Resp:  [18] 18  BP: (116-131)/(66-85) 125/66  Physical Exam  Chronically ill-appearing in no acute distress.  Abdomen is soft and nontender lower extremity edema     Diagnostics:    Cr 0.7  WBC 6.9 (p81, l 8, M 7, E1)  H/H 8.4/27    vanc 15.8    Microbiology  6/1/2019 blood cultures 2/2 MRSA  6/1/2019 wound culture MRSA  6/2/2019 BCx 2/2 NGTD      Assessment/Plan   1.  MRSA septicemia secondary to infected pressure ulcer of the right buttocks  2.  Diabetes mellitus type 2, continue glycemic control efforts to prevent/control infectious complications  3.  Left Pleural effusion, doubt infection  4. Extensive spinal hardware in place     Doing okay: repeat bcx ngtd, af and wbc normal.  Cont vanc for goal level of 15-20  Cont rifampin 300mg po q12  4 week course  Place PICC    Final Infectious treatment recommendations:  The patient should receive the following antibiotics:  1. vancomycin for goal level of 15-20  2. rifampin 300mg po q12  Stop date 6/29  Laboratory monitoring:  It is recommended that the patient have the following laboratories while he/she is receiving antibiotic therapy as an inpatient and outpatient: CBC with differential weekly, CMP weekly, vancomycin trough weekly  Please fax the results of all labs to [facility] at [phone]    Follow up:  The patient will follow-up in the Infectious Disease clinic on 7/1            Mark Perez MD  7:49 AM  06/04/19

## 2019-06-04 NOTE — PROGRESS NOTES
"CC: Pt c/o mild neck soreness and stiffness which is not new.  Admits to some mild back soreness when being moved into or out of bed. No back pain in bed. No leg pain, just stable leg weakness.  No new N/T.  Only complaint is buttock pain from ulcer.    Blood pressure 126/72, pulse 80, temperature 99.4 °F (37.4 °C), temperature source Oral, resp. rate 18, height 190.5 cm (75\"), weight 89.2 kg (196 lb 10.4 oz), SpO2 93 %.    AA, Ox3  No SOB when talking in bed.    LE edema much better per pt  Has proximal leg weakness, L>R, stable    ..  Results from last 7 days   Lab Units 06/04/19  0457   WBC 10*3/mm3 6.93   HEMOGLOBIN g/dL 8.4*   HEMATOCRIT % 27.4*   PLATELETS 10*3/mm3 257       MRSA septicemia from infected R buttock wound  Pleural effusion  DM  Posterior fusion T4-T12 with Dr. Frazier 3/19 for T11 VCF with collapse of previous construct  L3-L5 lami with T11-S1 fusion with Cyril Redmond and [place] 1/19      At this point he has little to no back pain. Has been afebrile (Tm 100.2 6/2), on abx for bacteremia and infected R buttock wound.  No change in chronic proximal leg weakness.  Will certainly need additional rehab when ready to leave the hospital. No indication for new spine imaging at this time. We will follow.   "

## 2019-06-04 NOTE — SIGNIFICANT NOTE
06/04/19 1500   PICC Single Lumen 06/04/19 Left Cephalic   Placement Date/Time: 06/04/19 1500   Hand Hygiene Completed: Yes  Size (Fr): 4  Description (optional): [id number]  Length (cm): 47 cm  Orientation: Left  Location: Cephalic  Site Prep: Chlorhexidine isopropyl alcohol  All 5 Sterile Barriers Used (Glove...   Site Assessment Clean;Dry   #1 Lumen Status Blood return noted;Normal saline locked;Flushed   Length shane (cm) 0 cm   Line Care Cap changed   Dressing Type Securing device;Antimicrobial dressing/disc;Transparent   Dressing Status Clean;Dry;Intact   Dressing Intervention New dressing   Liquid Adhesive Applied   Dressing Change Due 06/11/19

## 2019-06-04 NOTE — PLAN OF CARE
Problem: Patient Care Overview  Goal: Plan of Care Review  Outcome: Ongoing (interventions implemented as appropriate)   06/04/19 4802   Coping/Psychosocial   Plan of Care Reviewed With patient   Plan of Care Review   Progress improving   OTHER   Outcome Summary No significant changes overnight. Pt able to answer all orientation questions, but still appears situationally confused. Wound vac in place. Turning Q2H. IV ABX. IVF. VSS. Low grade fever. NSR. No c/o pain throughout the night. Appears to be resting well.      Goal: Individualization and Mutuality  Outcome: Ongoing (interventions implemented as appropriate)    Goal: Discharge Needs Assessment  Outcome: Ongoing (interventions implemented as appropriate)    Goal: Interprofessional Rounds/Family Conf  Outcome: Ongoing (interventions implemented as appropriate)      Problem: Fall Risk (Adult)  Goal: Absence of Fall  Outcome: Ongoing (interventions implemented as appropriate)      Problem: Skin Injury Risk (Adult)  Goal: Skin Health and Integrity  Outcome: Ongoing (interventions implemented as appropriate)      Problem: Pain, Acute (Adult)  Goal: Acceptable Pain Control/Comfort Level  Outcome: Ongoing (interventions implemented as appropriate)

## 2019-06-04 NOTE — PROGRESS NOTES
Status post excisional debridement of infected pressure ulcer over the right gluteus postop day 3    No issues, minimal pain over the area, wound VAC in place    On exam wound VAC is in place there is no erythema      Assessment and plan:     Status post excisional debridement of right gluteus ulcer.  On wound VAC, no issues    -Follow-up in wound care clinic  -We will see PRN  -Okay to discharge anytime from my standpoint

## 2019-06-04 NOTE — PROGRESS NOTES
LOS: 3 days   Patient Care Team:  Trey Stockton MD as PCP - General (Internal Medicine)  Sherice Newman MD as Consulting Physician (Cardiology)  Carey Pascual [facility] as Pharmacist  Geovanni Herron MD as Consulting Physician (Hematology and Oncology)  Sakina Martin APRN as Referring Physician (Cardiology)    Chief Complaint:       Interval History:   He denies chest pain, difficulty breathing, cough.  He says his neck is stiff.  Overall he is feeling better than he did a couple days ago.    Objective   Vital Signs  Temp:  [97.1 °F (36.2 °C)-99.6 °F (37.6 °C)] 99.4 °F (37.4 °C)  Heart Rate:  [70-80] 80  Resp:  [18] 18  BP: (117-131)/(66-85) 126/72    Intake/Output Summary (Last 24 hours) at 6/4/2019 1025  Last data filed at 6/4/2019 0750  Gross per 24 hour   Intake 2614 ml   Output 350 ml   Net 2264 ml       Comfortable NAD  Neck supple, no JVD or thyromegaly appreciated  S1/S2 RRR, no m/r/g  Lungs CTA B but decreased at left base, normal effort  Abdomen S/NT/ND (+) BS, no HSM appreciated  Extremities warm, no clubbing, cyanosis, or edema  No visible or palpable skin lesions  A/Ox4, mood and affect appropriate    Results Review:      Results from last 7 days   Lab Units 06/04/19 0457 06/03/19  0452 06/02/19  0559   SODIUM mmol/L 143 141 144   POTASSIUM mmol/L 3.9 3.9 4.3   CHLORIDE mmol/L 109* 108* 109*   CO2 mmol/L 24.6 21.7* 25.2   BUN mg/dL 20 28* 33*   CREATININE mg/dL 0.71* 0.77 0.85   GLUCOSE mg/dL 78 115* 231*   CALCIUM mg/dL 7.9* 7.9* 7.8*         Results from last 7 days   Lab Units 06/04/19  0457 06/03/19  0452 06/02/19  0559   WBC 10*3/mm3 6.93 9.37 10.06   HEMOGLOBIN g/dL 8.4* 8.6* 7.9*   HEMATOCRIT % 27.4* 27.6* 26.7*   PLATELETS 10*3/mm3 257 254 224                       I reviewed the patient's new clinical results.  I personally viewed and interpreted the patient's EKG/Telemetry data        Medication Review:     amiodarone 200 mg Oral Daily   atorvastatin 10 mg Oral Nightly    donepezil 5 mg Oral QAM   dorzolamide 1 drop Both Eyes BID   enalapril 20 mg Oral QAM   enoxaparin 1 mg/kg Subcutaneous Q12H   furosemide 40 mg Oral Daily   gabapentin 300 mg Oral TID   insulin lispro 0-14 Units Subcutaneous 4x Daily With Meals & Nightly   latanoprost 1 drop Both Eyes Nightly   pantoprazole 40 mg Oral QAM   potassium chloride 20 mEq Oral Daily   rifAMPin 300 mg Oral Q12H   sodium chloride 3 mL Intravenous Q12H   sodium chloride 3 mL Intravenous Q12H   tamsulosin 0.4 mg Oral QAM   timolol 1 drop Both Eyes Daily   vancomycin 1,250 mg Intravenous Q12H         Pharmacy to dose vancomycin     sodium chloride 100 mL/hr Last Rate: 100 mL/hr (06/04/19 0442)       Assessment/Plan       MRSA bacteremia    Hypercholesterolemia    Hypertension    Atrial fibrillation (CMS/HCC)    Iron deficiency anemia    Mild cognitive disorder    Chronic bilateral low back pain with bilateral sciatica    Chronic anticoagulation-for Afib    Type 2 diabetes mellitus, without long-term current use of insulin (CMS/HCC)    Weakness of both lower extremities    Lower extremity edema    Wound of right buttock    Sepsis affecting skin (CMS/HCC)    Pleural effusion    1. Infected (MRSA) ulcer of the right gluteus - s/p debridement.   2. Paroxysmal atrial fibrillation - he is in SR on low dose amiodarone. Would continue amio at current dose. RN to reach out to surgery to see if ok to resume anticoagulation. He was on apixaban at home. He has a CHADs2 Vasc score of 4.   3. DM   4. Pleural effusion - increasing left. Relatively asymptomatic. Continue oral diuretics.   5. HTN - on the low side. Stable.      May need thoracentesis on the left.  Right now he does not have a lot of symptoms with this so does not need.  Would use apixaban when able to restart it, at 5 mg twice daily.    We will see as needed.    Sherice Newman MD  06/04/19  10:25 AM

## 2019-06-04 NOTE — PROGRESS NOTES
"   LOS: 3 days   Patient Care Team:  Trey Stockton MD as PCP - General (Internal Medicine)  Sherice Newman MD as Consulting Physician (Cardiology)  Carey Pascual RPH as Pharmacist  Geovanni Herron MD as Consulting Physician (Hematology and Oncology)  Sakina Martin APRN as Referring Physician (Cardiology)    Chief Complaint: none today    Subjective     Feeling better today. Still c/o pain right buttock.         Subjective:  Symptoms:  Stable.  He reports weakness.  No shortness of breath, malaise, cough, chest pain, headache, chest pressure, anorexia, diarrhea or anxiety.    Diet:  Adequate intake.  No nausea or vomiting.    Activity level: Impaired due to weakness.    Pain:  He complains of pain that is mild.  He reports pain is unchanged.  Pain is well controlled.        History taken from: patient chart RN    Objective     Vital Signs  Temp:  [99 °F (37.2 °C)-99.6 °F (37.6 °C)] 99 °F (37.2 °C)  Heart Rate:  [70-80] 71  Resp:  [18] 18  BP: (118-132)/(66-85) 132/85    Objective:  General Appearance:  Comfortable and in no acute distress.    Vital signs: (most recent): Blood pressure 132/85, pulse 71, temperature 99 °F (37.2 °C), temperature source Oral, resp. rate 18, height 190.5 cm (75\"), weight 89.2 kg (196 lb 10.4 oz), SpO2 98 %.  Vital signs are normal.  No fever.    Output: Producing urine and producing stool.    HEENT: Normal HEENT exam.    Lungs:  Normal effort and normal respiratory rate.  Breath sounds clear to auscultation.    Heart: Normal rate.  Regular rhythm.    Abdomen: Abdomen is soft.  Bowel sounds are normal.   There is no abdominal tenderness.     Extremities: There is dependent edema.    Pulses: Distal pulses are intact.    Neurological: Patient is alert and oriented to person, place and time.    Skin:  Warm and dry.  No rash.             Results Review:     I reviewed the patient's new clinical results.  I reviewed the patient's other test results and agree with the " interpretation  Discussed with patient, RN, and CCP    Medication Review: reviewed    Assessment/Plan       MRSA bacteremia    Hypercholesterolemia    Hypertension    Atrial fibrillation (CMS/HCC)    Iron deficiency anemia    Mild cognitive disorder    Chronic bilateral low back pain with bilateral sciatica    Chronic anticoagulation-for Afib    Type 2 diabetes mellitus, without long-term current use of insulin (CMS/HCC)    Weakness of both lower extremities    Lower extremity edema    Wound of right buttock    Sepsis affecting skin (CMS/HCC)    Pleural effusion          Plan:   (Pressure wound right buttock, MRSA growing in wound and blood cultures  POD#3 s/p I&D of wound  Wound vac in place, will have to f/u with [facility]  Continue Vanc and Rifampin through 6/29 per ID, PICC placed  TTE noted, left pleural effusion on CXR looks bigger compared to 4/2/19, but no symptoms at this point  WBC is down now, no fever  Hgb stable today, B12 and folate okay  Okay to restart Eliquis per Surg, interaction between Eliquis and Rifampin, pt started on Lovenox per Card  BP and HR low, asymptomatic, Card following, decreased dose of Vasotec and BPs improved, continue to monitor BPs  Sugars stable today, will continue SSI only for now and monitor sugars closely, continue to hold Metformin while on Vanc, SGLT-2 agent probably not good idea with current skin infection  BLE weakness persists, suspect multifactorial: bad back, recent spine surgery, edema, infection, etc.  Asked Dr. Redmond to see pt at family's request--they are concerned that his BLE weakness is due to his recent spine surgery  Appreciate LISA attention to pt, they agree that pt's BLE weakness is due to deconditioning and see no reason for spine imaging  Will require SNF at discharge, PT following, CCP assisting, ?Mary    ).       Kit Stroud MD  06/04/19  3:25 PM    Time: 20min

## 2019-06-04 NOTE — CONSULTS
CHART WAS REVIEWED.  Picc placed without incident - patient remains in a fib was unable to use [facility] to verify placement -- will call for cxr

## 2019-06-04 NOTE — PROGRESS NOTES
Continued Stay Note  [facility]     Patient Name: Derrek Estrada  MRN: [id number]  Today's Date: 6/4/2019    Admit Date: 5/31/2019    Discharge Plan     Row Name 06/04/19 1748       Plan    Plan  Referral for [facility] rehab and will need UMR pre-cert, wound vac therapy with [employer], and special mattress    Patient/Family in Agreement with Plan  yes    Plan Comments  Spoke with Adalgisa and she will clarify if patient is in copay days.  She states facilty can provide a low air loss mattress.  Her facility will work with [employer] and provide wound vac therapy.  CCP will follow up with referral .....................Marcelle Ardon RN        Discharge Codes    No documentation.             Marcelle Ardon RN

## 2019-06-04 NOTE — PROGRESS NOTES
Continued Stay Note  [facility]     Patient Name: Derrek Estrada  MRN: [id number]  Today's Date: 6/4/2019    Admit Date: 5/31/2019    Discharge Plan     Row Name 06/04/19 1348       Plan    Patient/Family in Agreement with Plan  yes    Plan Comments  Spoke with Adalgisa/ [facility], she has the referral for [facility] and it is pending.  Adalgisa is awaiting notfication from Mary.  Spoke with Kristie with [facility] and she is following for wound vac therapy if patient goes home with home health.  Patient will need rotational bed at skilled rehab and as well as home use.  Patient notified GATO Tapia he would prefer [facility] for DME.  ......................Marcelle Ardon RN        Discharge Codes    No documentation.             Marcelle Ardon RN

## 2019-06-05 NOTE — THERAPY TREATMENT NOTE
Acute Care - Physical Therapy Treatment Note  [facility]     Patient Name: Derrek Estrada  : 1940  MRN: [id number]  Today's Date: 2019  Onset of Illness/Injury or Date of Surgery: 19  Date of Referral to PT: 19  Referring Physician: Maximus    Admit Date: 2019    Visit Dx:    ICD-10-CM ICD-9-CM   1. Cellulitis of buttock L03.317 682.5   2. Fever in adult R50.9 780.60   3. Wound abscess T81.49XA 879.9   4. Impaired mobility Z74.09 799.89     Patient Active Problem List   Diagnosis   • Cholelithiasis   • DM (HgbA1c 5.9)   • Diverticulosis of intestine   • Hypercholesterolemia   • Hypertension   • Spinal stenosis   • Microalbuminuria   • Atrial fibrillation (CMS/HCC)   • Medicare annual wellness visit, subsequent   • Anemia   • Iron deficiency anemia   • DJD (degenerative joint disease)   • Impotence of organic origin   • Glaucoma   • Hiatal hernia   • Mild cognitive disorder   • Calculus of kidney   • Nocturia   • Chronic bilateral low back pain with bilateral sciatica   • Post laminectomy syndrome   • Routine health maintenance   • Weight loss   • Spinal stenosis of lumbar region with neurogenic claudication   • Spondylolisthesis of lumbar region   • Lumbar spine pain   • Metabolic encephalopathy   • Urinary retention with incomplete bladder emptying   • Macrocytosis without anemia   • Sundowning   • RBBB (right bundle branch block with left anterior fascicular block)   • Leg swelling   • Postural kyphosis of lumbar region   • H/O urinary retention   • Chronic anticoagulation-for Afib   • Fever and chills   • Cellulitis of buttock   • Type 2 diabetes mellitus, without long-term current use of insulin (CMS/HCC)   • Weakness of both lower extremities   • Lower extremity edema   • Wound of right buttock   • Sepsis affecting skin (CMS/HCC)   • MRSA bacteremia   • Pleural effusion       Therapy Treatment    Rehabilitation Treatment Summary     Row Name 19 1326             Treatment  Time/Intention    Discipline  physical therapist  -EE      Document Type  therapy note (daily note)  -EE      Subjective Information  complains of;pain  -EE      Mode of Treatment  physical therapy;individual therapy  -EE      Patient/Family Observations  Pt supine in bed in no acute distress.  -EE      Patient Effort  good  -EE      Existing Precautions/Restrictions  fall  -EE      Treatment Considerations/Comments  wound vac to wound on R glute  (Significant)   -EE      Recorded by [EE] Amy Molina, PT 06/05/19 1359      Row Name 06/05/19 1326             Vital Signs    Pre SpO2 (%)  97  -EE      O2 Delivery Pre Treatment  supplemental O2  -EE      Intra SpO2 (%)  92  -EE      O2 Delivery Intra Treatment  room air  -EE      Post SpO2 (%)  96  -EE      O2 Delivery Post Treatment  supplemental O2  -EE      Pre Patient Position  Supine  -EE      Intra Patient Position  Standing  -EE      Post Patient Position  Supine  -EE      Recorded by [EE] Amy Molina, PT 06/05/19 1359      Row Name 06/05/19 1326             Cognitive Assessment/Intervention- PT/OT    Orientation Status (Cognition)  oriented x 3  -EE      Follows Commands (Cognition)  follows one step commands;over 90% accuracy;repetition of directions required;verbal cues/prompting required  -EE      Cognitive Function (Cognitive)  safety deficit  -EE      Safety Deficit (Cognitive)  mild deficit;safety precautions awareness  -EE      Personal Safety Interventions  fall prevention program maintained;gait belt;muscle strengthening facilitated;nonskid shoes/slippers when out of bed;supervised activity  -EE      Recorded by [EE] Amy Molina, PT 06/05/19 1359      Row Name 06/05/19 1326             Safety Issues, Functional Mobility    Safety Issues Affecting Function (Mobility)  insight into deficits/self awareness;safety precaution awareness  -EE      Impairments Affecting Function (Mobility)  balance;endurance/activity tolerance;strength;pain  -EE       Recorded by [EE] Amy Molina, PT 06/05/19 1408      Row Name 06/05/19 1326             Bed Mobility Assessment/Treatment    Supine-Sit Douglas (Bed Mobility)  minimum assist (75% patient effort);2 person assist;verbal cues  -EE      Sit-Supine Douglas (Bed Mobility)  minimum assist (75% patient effort);2 person assist;verbal cues  -EE      Bed Mobility, Safety Issues  decreased use of arms for pushing/pulling;decreased use of legs for bridging/pushing  -EE      Assistive Device (Bed Mobility)  bed rails;head of bed elevated  -EE      Recorded by [EE] Amy Molina, PT 06/05/19 1408      Row Name 06/05/19 1326             Sit-Stand Transfer    Sit-Stand Douglas (Transfers)  minimum assist (75% patient effort);2 person assist;verbal cues  -EE      Assistive Device (Sit-Stand Transfers)  walker, front-wheeled  -EE      Recorded by [EE] Amy Molina, PT 06/05/19 1408      Row Name 06/05/19 1326             Stand-Sit Transfer    Stand-Sit Douglas (Transfers)  contact guard;2 person assist  -EE      Recorded by [EE] Amy Molina, PT 06/05/19 1408      Row Name 06/05/19 1326             Gait/Stairs Assessment/Training    Douglas Level (Gait)  contact guard;minimum assist (75% patient effort);2 person assist;verbal cues  -EE      Assistive Device (Gait)  walker, front-wheeled  -EE      Distance in Feet (Gait)  150  -EE      Deviations/Abnormal Patterns (Gait)  jody decreased;stride length decreased  -EE      Bilateral Gait Deviations  forward flexed posture  -EE      Comment (Gait/Stairs)  Pt drifiting to the R, min to mod A to guide walker. L lateral cervical flexion throughout; able to correct posture somewhat with cues.   -EE      Recorded by [EE] Amy Molina, PT 06/05/19 1408      Row Name 06/05/19 1326             Motor Skills Assessment/Interventions    Additional Documentation  Therapeutic Exercise (Group)  -EE      Recorded by [EE] Amy Molina, PT 06/05/19 1414      Row Name 06/05/19  1326             Therapeutic Exercise    Lower Extremity (Therapeutic Exercise)  LAQ (long arc quad), bilateral;marching while seated  -EE      Lower Extremity Range of Motion (Therapeutic Exercise)  ankle dorsiflexion/plantar flexion, bilateral  -EE      Exercise Type (Therapeutic Exercise)  AROM (active range of motion)  -EE      Position (Therapeutic Exercise)  supine  -EE      Sets/Reps (Therapeutic Exercise)  1/10  -EE      Comment (Therapeutic Exercise)  cervical rotation and lateral flexion to R for stretching of L scalenes, SCM, and upper trap  -EE      Recorded by [EE] Amy Molina, PT 06/05/19 1414      Row Name 06/05/19 1326             Positioning and Restraints    Pre-Treatment Position  in bed  -EE      Post Treatment Position  bed  -EE      In Bed  supine;call light within reach;encouraged to call for assist;exit alarm on;with family/caregiver;legs elevated;SCD pump applied  -EE      Recorded by [EE] Amy Molina, PT 06/05/19 1414      Row Name 06/05/19 1326             Pain Scale: Numbers Pre/Post-Treatment    Pain Scale: Numbers, Pretreatment  3/10  -EE      Pain Scale: Numbers, Post-Treatment  3/10  -EE      Pain Location - Side  Right  -EE      Pain Location - Orientation  posterior  -EE      Pain Location  hip  -EE      Pain Intervention(s)  Repositioned;Ambulation/increased activity  -EE      Recorded by [EE] Amy Molina, PT 06/05/19 1414      Row Name                Wound 01/09/19 0844 Other (See comments) back incision    Wound - Properties Group Date first assessed: 01/09/19 [JT] Time first assessed: 0844 [JT] Side: Other (See comments) [JT] Location: back [JT] Type: incision [JT] Recorded by:  [JT] Margie Bravo RN 01/09/19 0844    Row Name                Wound 06/01/19 0058 Right gluteal pressure injury    Wound - Properties Group Date first assessed: 06/01/19 [GC] Time first assessed: 0058 [GC] Present On Admission : yes [GC] Side: Right [GC] Location: gluteal [GC] Type: pressure  injury [GC2] Stage, Pressure Injury: unstageable [GC2] Recorded by:  [GC] Iza Stone RN 06/01/19 0059 [GC2] Iza Stone RN 06/01/19 0252    Row Name                Wound 04/01/19 Bilateral coccyx pressure injury    Wound - Properties Group Date first assessed: 04/01/19 [KN] Present On Admission : yes;picture taken [KN] Side: Bilateral [KN] Location: coccyx [KN] Type: pressure injury [KN] Stage, Pressure Injury: Stage 3 [KN] Recorded by:  [KN] Chery Yeager RN, CWON 06/04/19 1634    Row Name                NPWT (Negative Pressure Wound Therapy) 06/03/19 1100 right buttocks    NPWT (Negative Pressure Wound Therapy) - Properties Group Placement Date: 06/03/19 [KN] Placement Time: 1100 [KN] Location: right buttocks [KN] Recorded by:  [KN] Chery Yeager RN, CWON 06/03/19 1206    Row Name 06/05/19 1326             Outcome Summary/Treatment Plan (PT)    Anticipated Discharge Disposition (PT)  skilled nursing facility  -EE      Recorded by [EE] Amy Molina, PT 06/05/19 1414        User Key  (r) = Recorded By, (t) = Taken By, (c) = Cosigned By    Initials Name Effective Dates Discipline     Iza Stone RN 10/30/17 -  Nurse    EE Amy Molina, PT 04/03/18 -  PT    Chery Cosme RN, CWON 06/16/16 -  Nurse    Margie Sena RN 06/16/16 -  Nurse          Wound 01/09/19 0844 Other (See comments) back incision (Active)   Dressing Appearance no drainage;open to air 6/5/2019  8:35 AM   Closure Approximated;Open to air 6/5/2019  8:35 AM   Base clean;dry;pink;white 6/5/2019 12:50 AM   Periwound intact;dry;pink 6/5/2019 12:50 AM   Periwound Temperature warm 6/5/2019 12:50 AM   Periwound Skin Turgor soft 6/5/2019 12:50 AM   Drainage Amount none 6/5/2019 12:50 AM   Dressing Care, Wound open to air 6/4/2019  8:02 PM   Periwound Care, Wound dry periwound area maintained 6/5/2019 12:50 AM       Wound 06/01/19 0058 Right gluteal pressure injury (Active)   Dressing  Appearance dry;intact 6/5/2019  8:35 AM   Closure MARIELLE 6/5/2019  8:35 AM   Base dressing in place, unable to visualize 6/5/2019  8:35 AM   Drainage Amount none 6/5/2019  8:35 AM   Periwound Care, Wound dry periwound area maintained 6/5/2019 12:50 AM       Wound 04/01/19 Bilateral coccyx pressure injury (Active)   Dressing Appearance dry;intact 6/5/2019  4:40 AM   Closure MARIELLE 6/5/2019  8:35 AM   Base dressing in place, unable to visualize 6/5/2019  8:35 AM   Periwound pink 6/5/2019  4:40 AM   Periwound Temperature warm 6/5/2019  4:40 AM   Periwound Skin Turgor soft 6/5/2019  4:40 AM   Edges rolled/closed 6/5/2019  4:40 AM   Drainage Amount none 6/5/2019  8:35 AM   Dressing Care, Wound dressing changed 6/5/2019  4:40 AM   Periwound Care, Wound dry periwound area maintained 6/5/2019  4:40 AM       NPWT (Negative Pressure Wound Therapy) 06/03/19 1100 right buttocks (Active)   Therapy Setting continuous therapy 6/5/2019  8:35 AM   Dressing foam, black 6/5/2019  8:35 AM   Pressure Setting 125 mmHg 6/5/2019  8:35 AM           Physical Therapy Education     Title: PT OT SLP Therapies (In Progress)     Topic: Physical Therapy (In Progress)     Point: Mobility training (In Progress)     Learning Progress Summary           Patient Acceptance, E,D, VU,NR by EE at 6/5/2019  2:14 PM    Acceptance, E,D, NR by  at 6/4/2019  4:06 PM    Acceptance, E, NR by  at 6/2/2019  2:46 PM    Acceptance, E, VU by  at 6/1/2019  5:37 AM   Family Acceptance, E, NR by  at 6/2/2019  2:46 PM    Acceptance, E, VU by  at 6/1/2019  5:37 AM                   Point: Home exercise program (In Progress)     Learning Progress Summary           Patient Acceptance, E,D, VU,NR by EE at 6/5/2019  2:14 PM    Acceptance, E,D, NR by  at 6/4/2019  4:06 PM    Acceptance, E, NR by  at 6/2/2019  2:46 PM   Family Acceptance, E, NR by DANY at 6/2/2019  2:46 PM                   Point: Body mechanics (Done)     Learning Progress Summary           Patient  Acceptance, E,D, VU,NR by EE at 6/5/2019  2:14 PM    Acceptance, E,D, NR by EE at 6/4/2019  4:06 PM    Acceptance, E, VU by LL at 6/1/2019  5:37 AM   Family Acceptance, E, VU by LL at 6/1/2019  5:37 AM                   Point: Precautions (Done)     Learning Progress Summary           Patient Acceptance, E,D, VU,NR by EE at 6/5/2019  2:14 PM    Acceptance, E, VU by LL at 6/1/2019  5:37 AM   Family Acceptance, E, VU by LL at 6/1/2019  5:37 AM                               User Key     Initials Effective Dates Name Provider Type Discipline     04/03/18 -  Flaca Aleman, PT Physical Therapist PT     04/03/18 -  Amy Molina, PT Physical Therapist PT     06/16/16 -  Ksenia Lee, RN Registered Nurse Nurse                PT Recommendation and Plan  Anticipated Discharge Disposition (PT): skilled nursing facility  Therapy Frequency (PT Clinical Impression): daily  Outcome Summary/Treatment Plan (PT)  Anticipated Discharge Disposition (PT): skilled nursing facility  Plan of Care Reviewed With: patient  Progress: improving  Outcome Summary: Pt continues to demonstrate good progress with mobility. Able to tolerate increased gait distance today and requiring less assistance for transfers and balance. Instucted in gentle cervical ROM/stretches for L sided tightness. No new concerns; will continue to increase activity as tolerated.   Outcome Measures     Row Name 06/05/19 1400 06/04/19 1600 06/03/19 1100       How much help from another person do you currently need...    Turning from your back to your side while in flat bed without using bedrails?  3  -EE  3  -EE  2  -EE    Moving from lying on back to sitting on the side of a flat bed without bedrails?  2  -EE  2  -EE  2  -EE    Moving to and from a bed to a chair (including a wheelchair)?  3  -EE  2  -EE  1  -EE    Standing up from a chair using your arms (e.g., wheelchair, bedside chair)?  2  -EE  2  -EE  1  -EE    Climbing 3-5 steps with a railing?  1  -EE   1  -EE  1  -EE    To walk in hospital room?  3  -EE  2  -EE  1  -EE    AM-PAC 6 Clicks Score  14  -EE  12  -EE  8  -EE       Functional Assessment    Outcome Measure Options  AM-PAC 6 Clicks Basic Mobility (PT)  -EE  AM-PAC 6 Clicks Basic Mobility (PT)  -EE  AM-PAC 6 Clicks Basic Mobility (PT)  -EE      User Key  (r) = Recorded By, (t) = Taken By, (c) = Cosigned By    Initials Name Provider Type    Amy Ha PT Physical Therapist         Time Calculation:   PT Charges     Row Name 06/05/19 1423             Time Calculation    Start Time  1327  -EE      Stop Time  1347  -EE      Time Calculation (min)  20 min  -EE      PT Received On  06/05/19  -EE      PT - Next Appointment  06/06/19  -EE         Time Calculation- PT    Total Timed Code Minutes- PT  20 minute(s)  -EE        User Key  (r) = Recorded By, (t) = Taken By, (c) = Cosigned By    Initials Name Provider Type    Amy Ha, BALDEMAR Physical Therapist        Therapy Charges for Today     Code Description Service Date Service Provider Modifiers Qty    19349318262 HC PT THERAPEUTIC ACT EA 15 MIN 6/4/2019 Amy Molina, PT GP 1    89030814205 HC PT THER SUPP EA 15 MIN 6/4/2019 Amy Molina, PT GP 1    88549823163 HC PT THERAPEUTIC ACT EA 15 MIN 6/5/2019 Amy Molina, PT GP 1    25411069762 HC PT THER SUPP EA 15 MIN 6/5/2019 Amy Molina, PT GP 1          PT G-Codes  Outcome Measure Options: AM-PAC 6 Clicks Basic Mobility (PT)  AM-PAC 6 Clicks Score: 14    Amy Molina PT  6/5/2019

## 2019-06-05 NOTE — PLAN OF CARE
Problem: Patient Care Overview  Goal: Plan of Care Review  Outcome: Ongoing (interventions implemented as appropriate)   06/05/19 0598   Coping/Psychosocial   Plan of Care Reviewed With patient   Plan of Care Review   Progress improving   OTHER   Outcome Summary Pt febrile at beginning of shift. Placed call out to MD. No new orders. Tx w/ tylenol. afebrile at this time. VSS. IV ABX. IVF. Turning Q2H. Dressing changed on coccyx PI. Wound vac in place.      Goal: Individualization and Mutuality  Outcome: Ongoing (interventions implemented as appropriate)    Goal: Discharge Needs Assessment  Outcome: Ongoing (interventions implemented as appropriate)    Goal: Interprofessional Rounds/Family Conf  Outcome: Ongoing (interventions implemented as appropriate)      Problem: Fall Risk (Adult)  Goal: Absence of Fall  Outcome: Ongoing (interventions implemented as appropriate)      Problem: Skin Injury Risk (Adult)  Goal: Skin Health and Integrity  Outcome: Ongoing (interventions implemented as appropriate)      Problem: Pain, Acute (Adult)  Goal: Acceptable Pain Control/Comfort Level  Outcome: Ongoing (interventions implemented as appropriate)

## 2019-06-05 NOTE — NURSING NOTE
"   06/05/19 1430   Wound 06/01/19 0058 Right gluteal pressure injury   Date first assessed/Time first assessed: 06/01/19 0058   Present On Admission : yes  Side: Right  Location: gluteal  Type: pressure injury  Stage, Pressure Injury: unstageable   Dressing Appearance dry;intact   Closure None   Base clean;moist;slough;pink   Red (%), Wound Tissue Color 90   Yellow (%), Wound Tissue Color 10   Periwound intact;dry   Edges open   Drainage Characteristics/Odor serosanguineous   Drainage Amount small   Care, Wound cleansed with;sterile normal saline   Dressing Care, Wound dressing changed  (VAC drsg changed)   Periwound Care, Wound barrier film applied   Wound 04/01/19 Bilateral coccyx pressure injury   Date first assessed: 04/01/19   Present On Admission : yes;picture taken  Side: Bilateral  Location: coccyx  Type: pressure injury  Stage, Pressure Injury: Stage 3   Dressing Appearance dry;intact   Base clean;pink  (almost healed)   Periwound intact;dry   Wound Length (cm) 1 cm   Wound Width (cm) 0.5 cm   Drainage Amount none   Dressing Care, Wound (silicone border drsg intact)   NPWT (Negative Pressure Wound Therapy) 06/03/19 1100 right buttocks   Placement Date/Time: 06/03/19 1100   Location: right buttocks   Therapy Setting continuous therapy   Dressing foam, black   Pressure Setting 125 mmHg   Sponges Inserted 1   Sponges Removed 1   Tolerated drsg change well.  Wife at bedside; states the \"new wound\" was not the wound he was being treated for since April at [facility] [place] but that it \"appeared as a small scab while we were in [place] and then kept getting bigger so we then drove home for medical care\"    Original wound from April is the almost healed area at his coccyx, inner buttocks area.    "

## 2019-06-05 NOTE — PLAN OF CARE
Problem: Patient Care Overview  Goal: Plan of Care Review   06/05/19 9200   Coping/Psychosocial   Plan of Care Reviewed With patient   Plan of Care Review   Progress improving   OTHER   Outcome Summary Pt continues to demonstrate good progress with mobility. Able to tolerate increased gait distance today and requiring less assistance for transfers and balance. Instucted in gentle cervical ROM/stretches for L sided tightness. No new concerns; will continue to increase activity as tolerated.

## 2019-06-05 NOTE — PROGRESS NOTES
INFECTIOUS DISEASES PROGRESS NOTE    CC: f/u MRSA    S:   Feels okay   Had a fever yesterday but no new sx  No sig pain  No dysuria  Breathing okay  Edema much improved    O:  Physical Exam:  Temp:  [97.5 °F (36.4 °C)-101.2 °F (38.4 °C)] 97.5 °F (36.4 °C)  Heart Rate:  [61-76] 71  Resp:  [16-18] 16  BP: (116-140)/(67-85) 140/76  Physical Exam  No acute distress, abdomen is soft and nontender, wound VAC in place on right buttocks with no surrounding cellulitis.  Edema significantly improved     Diagnostics:    White count 5.68  Plts 265  Hemoglobin 8.5.  Hematocrit 29.  Creatinine 0.8    Microbiology  6/1/2019 blood cultures 2/2 MRSA  6/1/2019 wound culture MRSA  6/2/2019 BCx 2/2 NGTD     Assessment/Plan   1.  MRSA septicemia secondary to infected pressure ulcer of the right buttocks  2.  Diabetes mellitus type 2, continue glycemic control efforts to prevent/control infectious complications  3.  Left Pleural effusion, doubt infection  4. Extensive spinal hardware in place     No clear source of fever.  We will continue on the vancomycin and the rifampin.  Goal level of vancomycin is 15-20.  If he has additional fevers today then we need to repeat some blood cultures and get a chest x-ray.  If he remains afebrile over the next 24 hours then we may be able to let him be discharged tomorrow if okay with others    Mark Perez MD  9:45 AM  06/05/19

## 2019-06-05 NOTE — PROGRESS NOTES
LOS: 4 days     Name: Derrek Estrada  Age/Sex: 79 y.o. male  :  1940        PCP: Trey Stockton MD    Subjective   Feeling ok today pain is controlled and denies other new issues or complaints    General: No Fever or Chills, Cardiac: No Chest Pain or Palpitations, Resp: No Cough or SOA, GI: No Nausea, Vomiting, or Diarrhea and Other: No bleeding      amiodarone 200 mg Oral Daily   atorvastatin 10 mg Oral Nightly   brimonidine 1 drop Both Eyes BID   donepezil 5 mg Oral QAM   dorzolamide 1 drop Both Eyes BID   enalapril 10 mg Oral Q12H   enoxaparin 1 mg/kg Subcutaneous Q12H   furosemide 40 mg Oral Daily   gabapentin 300 mg Oral TID   insulin lispro 0-14 Units Subcutaneous 4x Daily With Meals & Nightly   latanoprost 1 drop Both Eyes Nightly   pantoprazole 40 mg Oral QAM   potassium chloride 20 mEq Oral Daily   rifAMPin 300 mg Oral Q12H   sodium chloride 3 mL Intravenous Q12H   sodium chloride 3 mL Intravenous Q12H   tamsulosin 0.4 mg Oral QAM   timolol 1 drop Both Eyes Daily   vancomycin 1,000 mg Intravenous Q8H       Pharmacy to dose vancomycin     sodium chloride 100 mL/hr Last Rate: 100 mL/hr (19 0608)       Objective   Vital Signs  Temp:  [97.5 °F (36.4 °C)-101.2 °F (38.4 °C)] 97.5 °F (36.4 °C)  Heart Rate:  [61-76] 71  Resp:  [16-18] 16  BP: (116-140)/(67-85) 140/76  Body mass index is 23.49 kg/m².    Intake/Output Summary (Last 24 hours) at 2019 1239  Last data filed at 2019 1152  Gross per 24 hour   Intake 540 ml   Output 2225 ml   Net -1685 ml       Physical Exam   Constitutional: He is oriented to person, place, and time. He appears well-developed and well-nourished.   HENT:   Head: Normocephalic and atraumatic.   Cardiovascular: Normal rate. An irregularly irregular rhythm present.   Pulmonary/Chest: Effort normal and breath sounds normal.   Abdominal: Soft. Bowel sounds are normal.   Neurological: He is alert and oriented to person, place, and time.   Skin: Skin is warm  and dry.   Nursing note and vitals reviewed.        Results Review:       I reviewed the patient's new clinical results.  Results from last 7 days   Lab Units 06/05/19  0333 06/04/19 0457 06/03/19  0452 06/02/19  0559 06/01/19  0753 06/01/19  0027   WBC 10*3/mm3 5.68 6.93 9.37 10.06 13.22* 10.33   HEMOGLOBIN g/dL 8.5* 8.4* 8.6* 7.9* 8.3* 8.6*   PLATELETS 10*3/mm3 265 257 254 224 245 242     Results from last 7 days   Lab Units 06/05/19  0333 06/04/19  0457 06/03/19  0452 06/02/19  0559 06/01/19  0528 06/01/19  0027   SODIUM mmol/L 145 143 141 144 143 144   POTASSIUM mmol/L 3.3* 3.9 3.9 4.3 4.1 3.7   CHLORIDE mmol/L 110* 109* 108* 109* 109* 108*   CO2 mmol/L 26.5 24.6 21.7* 25.2 24.5 25.0   BUN mg/dL 18 20 28* 33* 32* 32*   CREATININE mg/dL 0.80 0.71* 0.77 0.85 0.84 0.89   CALCIUM mg/dL 8.0* 7.9* 7.9* 7.8* 8.0* 8.0*   MAGNESIUM mg/dL 1.6  --   --   --   --   --    Estimated Creatinine Clearance: 91.7 mL/min (by C-G formula based on SCr of 0.8 mg/dL).      Assessment/Plan     MRSA bacteremia    Hypercholesterolemia    Hypertension    Atrial fibrillation (CMS/HCC)    Iron deficiency anemia    Mild cognitive disorder    Chronic bilateral low back pain with bilateral sciatica    Chronic anticoagulation-for Afib    Type 2 diabetes mellitus, without long-term current use of insulin (CMS/formerly [facility])    Weakness of both lower extremities    Lower extremity edema    Wound of right buttock    Sepsis affecting skin (CMS/formerly [facility])    Pleural effusion      PLAN  -Replace potassium  -Heart rate controlled  -Had a low-grade fever overnight.  Continue to monitor fever curve on current antibiotics.  Appreciate infectious diseases assistance.  -Discussed with pharmacy today.  Unfortunately all direct oral anticoagulants have this contraindication with rifampin.  Likely the only option will be to bridge to Coumadin if okay with cardiology.  However we may have had issues controlling his INR in the past.  Will need Rifampin for 4 weeks so we have  2 options 4 weeks of lovenox or bridge to coumadin.  I have reached out to Dr Newman for her opinion.      Disposition    Abbe Andrade MD  [facility]  06/05/19  12:39 PM

## 2019-06-05 NOTE — PROGRESS NOTES
"Pharmacokinetic Evaluation: Vancomycin IV  Patient: Derrek Estrada  Admission Date: 531  MRN: [id number]  : 1940    Day of vancomycin therapy:   Consult for Dr. Perez  Treating: MRSA bacteremia/ Rt buttock MRSA+ pressure ulcer wound infection s/p debridement  Goal trough: 15-20 mcg/mL     Current regimen: Vancomycin 1250 mg IV q12hrs  Other antimicrobials: Rifampin 300 mg po q12hrs    Relevant clinical data and objective history reviewed:  79 y.o. male 190.5 cm (75\") 85.2 kg (187 lb 14.4 oz)  Body mass index is 23.49 kg/m².  Results from last 7 days   Lab Units 19  0333 19  0457 19  0452   CREATININE mg/dL 0.80 0.71* 0.77     Estimated Creatinine Clearance: 91.7 mL/min (by C-G formula based on SCr of 0.8 mg/dL).    Lab Results   Component Value Date    WBC 5.68 2019    WBC 6.93 2019    WBC 9.37 2019     Temp:  [97.5 °F (36.4 °C)-101.2 °F (38.4 °C)] 97.5 °F (36.4 °C)  Heart Rate:  [61-76] 71  Resp:  [16-18] 16  BP: (116-140)/(67-85) 140/76    Intake/Output Summary (Last 24 hours) at 2019 0813  Last data filed at 2019 0500  Gross per 24 hour   Intake 60 ml   Output 1875 ml   Net -1815 ml     Baseline cultures/labs/radiology:     Lactic acid: 1.2   CRP: 20.54   ESR: 78   TTE: EF=58%. No vegetations   HgA1C: 5.70     Radiology:    CT pelvis: Bilateral nephrolithiasis, nonobstructing. Colonic diverticulosis.  On image 83 this area measures 8.4 x 1.8 cm. This may reflect a localized inflammatory process such as cellulitis. There is no soft tissue air or evidence of abscess.   CXR: Increasing left effusion.     Cultures:    Blood cx x2: MRSA   Anaerobic cx (Rt buttock wound): In process   Blood cx x2: NG x 24hrs           Culture results from last 30 days   Lab 19  1246   WOUND CX  (Rt buttock) Moderate growth (3+) Staphylococcus aureus, MRSA*      Assessment/Plan:   PMH: Acute hypoxemic respiratory failure, Acute " suppurative otitis media, Acute upper respiratory infection, Anemia, Arthritis, Cholelithiasis, Diabetes mellitus, Diverticulosis, Dry eyes, Earache, Erectile dysfunction, Edema, Elevated cholesterol, Screening for malignant neoplasm of prostate, Esophagitis, reflux, GERD, Glaucoma, Echocardiogram (09/18/2013), Hiatal hernia, transfusion, Hypercholesterolemia, Hyperlipidemia, Hypertension, Lumbar canal stenosis, Microalbuminuria, Mild cognitive impairment, Mitral valve prolapse, Nephrolithiasis, PAF, Pericardial effusion, Right bundle branch block with left anterior fascicular block, and Spinal stenosis.      HPI:  Admitted with large painful pressure wound on right buttock. OR on 6/1/19 for excisional debridement of right gluteal pressure ulcer wound     1. Vanc trough level this AM below desired range  2. Give additional vancomycin 1000 mg IV x1 this AM stat  3. Change vancomycin regimen to 1000 mg IV q8hrs  4. Will follow with vanc trough    Lab Results   Component Value Date    VANCO TROUGH 9.90 mcg/mL 06/05/2019 @ 03:33 am     Vancomycin IV Dosing & Levels History:  6/1 18:32 1500 mg x1  6/2 04:12, 16:35 1000 mg q12hrs  6/3 04:17              6/3 15:23 Trough: 15.80 mcg/mL  6/3 16:54 1250 mg q12hrs      6/4 04:45, 16:32   6/5 03:33 Trough: 9.90 mcg/mL  6/5 06:08 1250 mg  6/5 1000 mg x1 stat (additional dose)  6/5 1000 mg q8hrs     Thank you for your consult,    Geri Belcher [facility]

## 2019-06-06 NOTE — THERAPY TREATMENT NOTE
Acute Care - Physical Therapy Treatment Note  [facility]     Patient Name: Derrek Estrada  : 1940  MRN: [id number]  Today's Date: 2019  Onset of Illness/Injury or Date of Surgery: 19  Date of Referral to PT: 19  Referring Physician: Maximus    Admit Date: 2019    Visit Dx:    ICD-10-CM ICD-9-CM   1. Cellulitis of buttock L03.317 682.5   2. Fever in adult R50.9 780.60   3. Wound abscess T81.49XA 879.9   4. Impaired mobility Z74.09 799.89     Patient Active Problem List   Diagnosis   • Cholelithiasis   • DM (HgbA1c 5.9)   • Diverticulosis of intestine   • Hypercholesterolemia   • Hypertension   • Spinal stenosis   • Microalbuminuria   • Atrial fibrillation (CMS/HCC)   • Medicare annual wellness visit, subsequent   • Anemia   • Iron deficiency anemia   • DJD (degenerative joint disease)   • Impotence of organic origin   • Glaucoma   • Hiatal hernia   • Mild cognitive disorder   • Calculus of kidney   • Nocturia   • Chronic bilateral low back pain with bilateral sciatica   • Post laminectomy syndrome   • Routine health maintenance   • Weight loss   • Spinal stenosis of lumbar region with neurogenic claudication   • Spondylolisthesis of lumbar region   • Lumbar spine pain   • Metabolic encephalopathy   • Urinary retention with incomplete bladder emptying   • Macrocytosis without anemia   • Sundowning   • RBBB (right bundle branch block with left anterior fascicular block)   • Leg swelling   • Postural kyphosis of lumbar region   • H/O urinary retention   • Chronic anticoagulation-for Afib   • Fever and chills   • Cellulitis of buttock   • Type 2 diabetes mellitus, without long-term current use of insulin (CMS/HCC)   • Weakness of both lower extremities   • Lower extremity edema   • Wound of right buttock   • Sepsis affecting skin (CMS/HCC)   • MRSA bacteremia   • Pleural effusion       Therapy Treatment    Rehabilitation Treatment Summary     Row Name 19 1657             Treatment  Time/Intention    Discipline  physical therapist  -      Document Type  therapy note (daily note)  -ISABELA      Subjective Information  complains of;pain  -KH      Mode of Treatment  physical therapy  -KH      Patient/Family Observations  Pt in bed  -KH      Therapy Frequency (PT Clinical Impression)  daily  -KH      Patient Effort  good  -KH      Existing Precautions/Restrictions  fall  -KH      Treatment Considerations/Comments  wound vac  -KH      Recorded by [] Katarina Judd, PT 06/06/19 1701      Row Name 06/06/19 1657             Bed Mobility Assessment/Treatment    Bed Mobility Assessment/Treatment  rolling right  -KH      Rolling Left [place] (Bed Mobility)  minimum assist (75% patient effort)  -KH      Rolling Right [place] (Bed Mobility)  minimum assist (75% patient effort)  -KH      Supine-Sit [place] (Bed Mobility)  minimum assist (75% patient effort)  -KH      Sit-Supine [place] (Bed Mobility)  minimum assist (75% patient effort)  -KH      Recorded by [] Katarina Judd, PT 06/06/19 1701      Row Name 06/06/19 1657             Sit-Stand Transfer    Sit-Stand [place] (Transfers)  minimum assist (75% patient effort);2 person assist  -      Assistive Device (Sit-Stand Transfers)  walker, front-wheeled  -KH      Recorded by [ISABELA] Katarina Judd, PT 06/06/19 1701      Row Name 06/06/19 1657             Stand-Sit Transfer    Stand-Sit [place] (Transfers)  contact guard  -      Assistive Device (Stand-Sit Transfers)  walker, front-wheeled  -KH      Recorded by [] Katarina Judd, PT 06/06/19 1701      Row Name 06/06/19 1657             Gait/Stairs Assessment/Training    [place] Level (Gait)  contact guard;1 person to manage equipment;minimum assist (75% patient effort)  -ISABELA      Assistive Device (Gait)  walker, front-wheeled  -ISABELA      Distance in Feet (Gait)  150  -KH      Pattern (Gait)  step-through  -KH      Deviations/Abnormal  Patterns (Gait)  jody decreased;stride length decreased;gait speed decreased  -      Bilateral Gait Deviations  forward flexed posture  -KH      Left Sided Gait Deviations  leans left  -KH      Comment (Gait/Stairs)  pt veering to the R, heavy assist to steer walker as pt fatigued. 2 brief standing rest breaks.   -KH      Recorded by [] Katarina Judd, PT 06/06/19 1701      Row Name 06/06/19 1657             Therapeutic Exercise    Comment (Therapeutic Exercise)  cervical stretching  -KH      Recorded by [] Katarina Judd, PT 06/06/19 1701      Row Name 06/06/19 1657             Positioning and Restraints    Pre-Treatment Position  in bed  -KH      Post Treatment Position  bed  -KH      In Bed  fowlers;call light within reach;encouraged to call for assist;exit alarm on;with family/caregiver;notified nsg  -KH      Recorded by [] Katarina Judd, PT 06/06/19 1701      Row Name 06/06/19 1657             Pain Scale: Numbers Pre/Post-Treatment    Pain Scale: Numbers, Pretreatment  5/10  -KH      Pain Scale: Numbers, Post-Treatment  5/10  -KH      Pain Location - Side  Right  -KH      Pain Location - Orientation  posterior  -KH      Pain Location  hip  -KH      Pain Intervention(s)  Repositioned  -KH      Recorded by [ISABELA] Katarina Judd, PT 06/06/19 1701      Row Name                Wound 06/01/19 0058 Right gluteal pressure injury    Wound - Properties Group Date first assessed: 06/01/19 [GC] Time first assessed: 0058 [GC] Present On Admission : yes [GC] Side: Right [GC] Location: gluteal [GC] Type: pressure injury [GC2] Stage, Pressure Injury: unstageable [GC2] Recorded by:  [GC] Iza Stone RN 06/01/19 0059 [GC2] Iza Stone RN 06/01/19 0252    Row Name                Wound 04/01/19 Bilateral coccyx pressure injury    Wound - Properties Group Date first assessed: 04/01/19 [KN] Present On Admission : yes;picture taken [KN] Side: Bilateral  [KN] Location: coccyx [KN] Type: pressure injury [KN] Stage, Pressure Injury: Stage 3 [KN] Recorded by:  [KN] Chery Yeager RN, CWON 06/04/19 1634    Row Name                Wound 06/06/19 0034 Right upper;posterior arm abrasion    Wound - Properties Group Date first assessed: 06/06/19 [JR] Time first assessed: 0034 [JR] Side: Right [JR] Orientation: upper;posterior [JR] Location: arm [JR] Type: abrasion [JR] Additional Comments: s/p fall at home according to the pt. [JR] Recorded by:  [JR] Quinton Shelton RN 06/06/19 0530    Row Name                NPWT (Negative Pressure Wound Therapy) 06/03/19 1100 right buttocks    NPWT (Negative Pressure Wound Therapy) - Properties Group Placement Date: 06/03/19 [KN] Placement Time: 1100 [KN] Location: right buttocks [KN] Recorded by:  [KN] Chery Yeager RN, CWON 06/03/19 1206    Row Name 06/06/19 1657             Plan of Care Review    Plan of Care Reviewed With  patient  -      Recorded by [ISABELA] Katarina Judd, PT 06/06/19 1701      Row Name 06/06/19 1657             Outcome Summary/Treatment Plan (PT)    Anticipated Discharge Disposition (PT)  skilled nursing facility  -      Recorded by [ISABELA] Katarina Judd, PT 06/06/19 1701        User Key  (r) = Recorded By, (t) = Taken By, (c) = Cosigned By    Initials Name Effective Dates Discipline    Katarina Reyes, PT 02/05/19 -  PT    Iza Diaz RN 10/30/17 -  Nurse    Chery Cosme RN, CWON 06/16/16 -  Nurse    Quinton Torres RN 06/23/17 -  Nurse          Wound 06/01/19 0058 Right gluteal pressure injury (Active)   Dressing Appearance dry;intact 6/6/2019  2:18 PM   Closure Other (Comment) 6/6/2019  2:18 PM   Base dressing in place, unable to visualize 6/6/2019  2:18 PM   Periwound intact;pink;blanchable 6/6/2019  2:18 PM   Edges irregular 6/6/2019  2:18 PM   Drainage Characteristics/Odor serosanguineous 6/6/2019  2:18 PM   Drainage Amount scant 6/6/2019  2:18 PM    Dressing Care, Wound other (see comments) 6/6/2019 12:34 AM       Wound 04/01/19 Bilateral coccyx pressure injury (Active)   Dressing Appearance dry;intact;no drainage 6/6/2019  2:18 PM   Base other (see comments) 6/6/2019  2:18 PM   Periwound intact;pink;blanchable 6/6/2019  2:18 PM   Edges irregular 6/6/2019  2:18 PM   Drainage Characteristics/Odor serosanguineous 6/6/2019  2:18 PM   Drainage Amount scant 6/6/2019  2:18 PM   Care, Wound cleansed with;sterile normal saline 6/6/2019 12:34 AM   Dressing Care, Wound dressing changed;other (see comments) 6/6/2019 12:34 AM       Wound 06/06/19 0034 Right upper;posterior arm abrasion (Active)   Dressing Appearance dry;intact;no drainage 6/6/2019  2:18 PM   Base other (see comments) 6/6/2019  2:18 PM   Periwound intact;ecchymotic;pink;redness 6/6/2019  2:18 PM   Edges irregular 6/6/2019  2:18 PM   Drainage Characteristics/Odor serosanguineous 6/6/2019  2:18 PM   Drainage Amount small 6/6/2019  2:18 PM   Care, Wound cleansed with;sterile normal saline 6/6/2019 12:34 AM   Dressing Care, Wound dressing changed;other (see comments) 6/6/2019 12:34 AM       NPWT (Negative Pressure Wound Therapy) 06/03/19 1100 right buttocks (Active)   Therapy Setting continuous therapy 6/6/2019  8:35 AM   Dressing foam, black;transparent dressing 6/6/2019 12:34 AM   Pressure Setting 125 mmHg 6/6/2019 12:34 AM           Physical Therapy Education     Title: PT OT SLP Therapies (In Progress)     Topic: Physical Therapy (In Progress)     Point: Mobility training (In Progress)     Learning Progress Summary           Patient Acceptance, E, VU,NR by ISABELA at 6/6/2019  5:02 PM    Acceptance, E,D, VU,NR by EE at 6/5/2019  2:14 PM    Acceptance, E,D, NR by EE at 6/4/2019  4:06 PM    Acceptance, E, NR by DANY at 6/2/2019  2:46 PM    Acceptance, E, VU by ELLEN at 6/1/2019  5:37 AM   Family AcceptanceDARY NR by DANY at 6/2/2019  2:46 PM    AcceptanceDARY, VU by ELLEN at 6/1/2019  5:37 AM                   Point: Home  exercise program (In Progress)     Learning Progress Summary           Patient Acceptance, E, VU,NR by KH at 6/6/2019  5:02 PM    Acceptance, E,D, VU,NR by EE at 6/5/2019  2:14 PM    Acceptance, E,D, NR by EE at 6/4/2019  4:06 PM    Acceptance, E, NR by LH at 6/2/2019  2:46 PM   Family Acceptance, E, NR by LH at 6/2/2019  2:46 PM                   Point: Body mechanics (Done)     Learning Progress Summary           Patient Acceptance, E, VU,NR by KH at 6/6/2019  5:02 PM    Acceptance, E,TB, VU by SE at 6/6/2019  2:59 PM    Acceptance, E,D, VU,NR by EE at 6/5/2019  2:14 PM    Acceptance, E,D, NR by EE at 6/4/2019  4:06 PM    Acceptance, E, VU by LL at 6/1/2019  5:37 AM   Family Acceptance, E,TB, VU by SE at 6/6/2019  2:59 PM    Acceptance, E, VU by LL at 6/1/2019  5:37 AM                   Point: Precautions (Done)     Learning Progress Summary           Patient Acceptance, E, VU,NR by  at 6/6/2019  5:02 PM    Acceptance, E,TB, VU by SE at 6/6/2019  2:59 PM    Acceptance, E,D, VU,NR by EE at 6/5/2019  2:14 PM    Acceptance, E, VU by LL at 6/1/2019  5:37 AM   Family Acceptance, E,TB, VU by SE at 6/6/2019  2:59 PM    Acceptance, E, VU by LL at 6/1/2019  5:37 AM                               User Key     Initials Effective Dates Name Provider Type Discipline     02/05/19 -  Katarina Judd, PT Physical Therapist PT     04/03/18 -  Flaca Aleman, PT Physical Therapist PT     04/03/18 -  Amy Molina, PT Physical Therapist PT    LL 06/16/16 -  Ksenia Lee, RN Registered Nurse Nurse     06/16/16 -  Epley, Shannon, RN Registered Nurse Nurse                PT Recommendation and Plan  Anticipated Discharge Disposition (PT): skilled nursing facility  Therapy Frequency (PT Clinical Impression): daily  Outcome Summary/Treatment Plan (PT)  Anticipated Discharge Disposition (PT): skilled nursing facility  Plan of Care Reviewed With: patient  Outcome Summary: Pt agreeable to therapy, but fatigues  quickly with increased assistance need to steer walker. Multiple standing rest breaks needed and increased difficulty clearing his feet near the end of the session.   Outcome Measures     Row Name 06/06/19 1700 06/05/19 1400 06/04/19 1600       How much help from another person do you currently need...    Turning from your back to your side while in flat bed without using bedrails?  3  -KH  3  -EE  3  -EE    Moving from lying on back to sitting on the side of a flat bed without bedrails?  3  -KH  2  -EE  2  -EE    Moving to and from a bed to a chair (including a wheelchair)?  3  -KH  3  -EE  2  -EE    Standing up from a chair using your arms (e.g., wheelchair, bedside chair)?  3  -KH  2  -EE  2  -EE    Climbing 3-5 steps with a railing?  2  -KH  1  -EE  1  -EE    To walk in hospital room?  3  -KH  3  -EE  2  -EE    AM-PAC 6 Clicks Score  17  -  14  -EE  12  -EE       Functional Assessment    Outcome Measure Options  AM-PAC 6 Clicks Basic Mobility (PT)  -  AM-PAC 6 Clicks Basic Mobility (PT)  -EE  AM-PAC 6 Clicks Basic Mobility (PT)  -EE      User Key  (r) = Recorded By, (t) = Taken By, (c) = Cosigned By    Initials Name Provider Type    Katarina Reyes, PT Physical Therapist    EE Amy Molina, PT Physical Therapist         Time Calculation:   PT Charges     Row Name 06/06/19 1656             Time Calculation    Start Time  1633  -      Stop Time  1656  -      Time Calculation (min)  23 min  -      PT Received On  06/06/19  -ISABELA      PT - Next Appointment  06/07/19  -         Time Calculation- PT    Total Timed Code Minutes- PT  23 minute(s)  -        User Key  (r) = Recorded By, (t) = Taken By, (c) = Cosigned By    Initials Name Provider Type    Katarina Reyes, PT Physical Therapist        Therapy Charges for Today     Code Description Service Date Service Provider Modifiers Qty    [id number]  PT THER PROC EA 15 MIN 6/6/2019 Katarina Judd, PT GP 2    [id number]   PT THER SUPP EA 15 MIN 6/6/2019 Katarina Judd, PT GP 1          PT G-Codes  Outcome Measure Options: AM-PAC 6 Clicks Basic Mobility (PT)  AM-PAC 6 Clicks Score: 17    Katarina Judd, PT  6/6/2019

## 2019-06-06 NOTE — PLAN OF CARE
Problem: Patient Care Overview  Goal: Plan of Care Review  Outcome: Ongoing (interventions implemented as appropriate)   06/06/19 0559   Coping/Psychosocial   Plan of Care Reviewed With patient   Plan of Care Review   Progress improving   OTHER   Outcome Summary vitals stable. pt expressed pain. meds given per orders. PICC line and wound vac in place. will continue to monitor.        Problem: Fall Risk (Adult)  Goal: Absence of Fall  Outcome: Ongoing (interventions implemented as appropriate)      Problem: Skin Injury Risk (Adult)  Goal: Skin Health and Integrity  Outcome: Ongoing (interventions implemented as appropriate)      Problem: Pain, Acute (Adult)  Goal: Acceptable Pain Control/Comfort Level  Outcome: Ongoing (interventions implemented as appropriate)      Problem: Infection, Risk/Actual (Adult)  Goal: Identify Related Risk Factors and Signs and Symptoms  Outcome: Ongoing (interventions implemented as appropriate)    Goal: Infection Prevention/Resolution  Outcome: Ongoing (interventions implemented as appropriate)

## 2019-06-06 NOTE — PLAN OF CARE
Problem: Patient Care Overview  Goal: Plan of Care Review   06/06/19 1702   OTHER   Outcome Summary Pt agreeable to therapy, but fatigues quickly with increased assistance need to steer walker. Multiple standing rest breaks needed and increased difficulty clearing his feet near the end of the session.

## 2019-06-06 NOTE — PROGRESS NOTES
INFECTIOUS DISEASES PROGRESS NOTE    CC: f/u MRSA    S:   Feels better.  Tolerating antibiotics.  No fevers or chills or night sweats    O:  Physical Exam:  Temp:  [97.8 °F (36.6 °C)-98.8 °F (37.1 °C)] 98.3 °F (36.8 °C)  Heart Rate:  [61-75] 75  Resp:  [16-20] 18  BP: (127-190)/(68-89) 190/89  Physical Exam   Constitutional: He appears well-developed.   Pulmonary/Chest: Effort normal and breath sounds normal.   Musculoskeletal: He exhibits edema.   Neurological: He is alert.   Skin: Skin is warm and dry.   Psychiatric: He has a normal mood and affect. His behavior is normal.        Diagnostics:    WBC 5.86    H/H 8.4/27  Cr 0.6  glc     Microbiology  6/1/2019 blood cultures 2/2 MRSA  6/1/2019 wound culture MRSA  6/2/2019 BCx 2/2 NGTD     Assessment/Plan   1.  MRSA septicemia secondary to infected pressure ulcer of the right buttocks  2.  Diabetes mellitus type 2, continue glycemic control efforts to prevent/control infectious complications  3.  Left Pleural effusion, doubt infection  4. Extensive spinal hardware in place    Cont vancomycin for goal level of 15-20  Ideally would be able to use rifampin but we ok to hold this if the need for AC was imperative.   Planning four weeks of abx  AF, wbc normal and repeat cx ngtd.   OK to d/c from my end when okay with others.  Discussed with Dr. Andrade regarding above     Mark Perez MD  8:14 AM  06/06/19

## 2019-06-06 NOTE — PLAN OF CARE
Problem: Patient Care Overview  Goal: Plan of Care Review  Outcome: Ongoing (interventions implemented as appropriate)   06/06/19 0553 06/06/19 1016 06/06/19 1500   Coping/Psychosocial   Plan of Care Reviewed With --  patient --    Plan of Care Review   Progress improving --  --    OTHER   Outcome Summary --  --  patient could not be discharged today due to no bed ready. BP has been a little elevated today and I have not been able to wean him off O2. He has needed 2-3 liters of O2. Patient has stated that he does not feel well today.     Goal: Individualization and Mutuality  Outcome: Ongoing (interventions implemented as appropriate)    Goal: Discharge Needs Assessment  Outcome: Ongoing (interventions implemented as appropriate)    Goal: Interprofessional Rounds/Family Conf  Outcome: Ongoing (interventions implemented as appropriate)      Problem: Fall Risk (Adult)  Goal: Absence of Fall  Outcome: Ongoing (interventions implemented as appropriate)      Problem: Skin Injury Risk (Adult)  Goal: Skin Health and Integrity  Outcome: Ongoing (interventions implemented as appropriate)      Problem: Pain, Acute (Adult)  Goal: Acceptable Pain Control/Comfort Level  Outcome: Ongoing (interventions implemented as appropriate)      Problem: Infection, Risk/Actual (Adult)  Goal: Identify Related Risk Factors and Signs and Symptoms  Outcome: Ongoing (interventions implemented as appropriate)    Goal: Infection Prevention/Resolution  Outcome: Ongoing (interventions implemented as appropriate)

## 2019-06-06 NOTE — PROGRESS NOTES
Continued Stay Note  [facility]     Patient Name: Derrek Estrada  MRN: [id number]  Today's Date: 6/6/2019    Admit Date: 5/31/2019    Discharge Plan     Row Name 06/06/19 1710       Plan    Plan  Pre-cert for skilled rehab at [facility] obtained from [facility], bed is available 6/7 per [facility]    Patient/Family in Agreement with Plan  yes    Plan Comments  Spoke with Alvarez from [facility], he gave auth # [id number] for skilled rehab at [facility].  Adalgisa states there is a bed available for patient 6/7.  [facility] has been scheduled for first available at 6pm.  Packet with report and fax is in office........................Marcelle Ardon RN    Row Name 06/06/19 2225       Plan    Plan  Pre-cert for skilled rehab at [facility] remains pending.  Bed available 6/7 per [facility]    Patient/Family in Agreement with Plan  yes    Plan Comments  Informed spouse that pre-cert is still pending.  [facility]isa states that she will have a bed available 6/7. Spouse will transport if pt does not qualify for ambulance....................Marcelle Ardon RN        Discharge Codes    No documentation.       Expected Discharge Date and Time     Expected Discharge Date Expected Discharge Time    Jun 6, 2019             Marcelle Ardon RN

## 2019-06-06 NOTE — DISCHARGE SUMMARY
Patient Name: Derrek Estrada  : 1940  MRN: [id number]    Date of Admission: 2019  Date of Discharge:  2019  Primary Care Physician: Trey Stockton MD      Chief Complaint:   Fever and Wound Check      Discharge Diagnoses     Active Hospital Problems    Diagnosis  POA   • **MRSA bacteremia [R78.81]  Yes   • Pleural effusion [J90]  Yes   • Type 2 diabetes mellitus, without long-term current use of insulin (CMS/HCC) [E11.9]  Yes   • Weakness of both lower extremities [R29.898]  Yes   • Lower extremity edema [R60.0]  Yes   • Wound of right buttock [S31.819A]  Yes   • Sepsis affecting skin (CMS/HCC) [A41.9]  Yes   • Chronic anticoagulation-for Afib [Z79.01]  Not Applicable   • Chronic bilateral low back pain with bilateral sciatica [M54.42, M54.41, G89.29]  Yes   • Mild cognitive disorder [F09]  Yes   • Iron deficiency anemia [D50.9]  Yes   • Hypercholesterolemia [E78.00]  Yes   • Hypertension [I10]  Yes   • Atrial fibrillation (CMS/HCC) [I48.91]  Yes      Resolved Hospital Problems   No resolved problems to display.        Hospital Course     Mr. Estrada is a 79 y.o. male non-smoker with a history of DM2, and afib  who presented to [facility] initially complaining of large painful pressure wound on right buttock.  Please see the admitting history and physical for further details.  He was found to have large painful pressure wound on right buttock with surrounding cellulitis and concern for deeper infection and was admitted to the hospital for further evaluation and treatment.  Further work-up and evaluation found that he had MRSA both bacteremia and wound infection and cellulitis.  During the admission he was taken to the operating room for incision and drainage and debridement of this posterior pressure wound.  A wound VAC is currently in place and plan is to continue this method of wound treatment and following discharge.  Infectious diseases recommended 4 weeks of IV  vancomycin with oral rifampin.  The oral rifampin as needed as an adjunctive treatment given his extensive hardware within his back.  He has a PICC line placed in his right upper extremity for IV antibiotics.  Given the rifampin his anticoagulation is needed to be adjusted.  Direct oral anticoagulants interact with rifampin and therefore his Eliquis has been stopped.  Currently he is on subcu Lovenox with plans to transition to warfarin.  He will only need to be on the warfarin for the next 4 weeks while on the rifampin at that point can be switched back to Eliquis.  His diabetes has been relatively well controlled here in the hospital with correctional coverage.  Plan is to adjust his oral regimen at discharge.  Given his risk for coronary artery disease and other issues he is been placed on Jardiance and metformin.  I have stopped his Actos and glipizide.  The plan is been discussed between Dr. Calderón and Dr. Newman both agreed he stable for discharge to a skilled nursing facility today.  The plan was discussed with the family at the bedside today and all are in agreement with the plan as above.    Day of Discharge     HPI:   He is feeling okay did not rest well overnight but is ready to get out of the hospital.    Physical Exam:  Temp:  [97.8 °F (36.6 °C)-98.8 °F (37.1 °C)] 98.3 °F (36.8 °C)  Heart Rate:  [61-75] 75  Resp:  [16-20] 18  BP: (127-190)/(68-89) 190/89  Body mass index is 26.67 kg/m².  Physical Exam   Constitutional: He is oriented to person, place, and time. He appears well-developed and well-nourished.   HENT:   Head: Normocephalic and atraumatic.   Cardiovascular: Normal rate, regular rhythm and normal heart sounds.   Pulmonary/Chest: Effort normal and breath sounds normal.   Abdominal: Soft. Bowel sounds are normal.   Neurological: He is alert and oriented to person, place, and time.   Skin: Skin is warm and dry.   Wound VAC in place   Psychiatric: He has a normal mood and affect. His  behavior is normal.   Nursing note and vitals reviewed.      Consultants     Consult Orders (all) (From admission, onward)    Start     Ordered    06/04/19 0905  Inpatient IV Team Consult  Once     Comments:  Single lumen   Provider:  (Not yet assigned)    06/04/19 0904    06/03/19 1426  Inpatient Neurosurgery Consult  Once     Specialty:  Neurosurgery  Provider:  Jeison Redmond MD    06/03/19 1426    06/02/19 0921  Inpatient Cardiology Consult  Once     Specialty:  Cardiology  Provider:  Sherice Newman MD    06/02/19 0920    06/01/19 0912  Inpatient General Surgery Consult  Once     Specialty:  General Surgery  Provider:  Shanon Jackson MD    06/01/19 0912    06/01/19 0735  Inpatient Infectious Diseases Consult  Once     Specialty:  Infectious Diseases  Provider:  Janeth Garcia MD    06/01/19 0735    06/01/19 0422  Inpatient Case Management  Consult  Once     Provider:  (Not yet assigned)    06/01/19 0421    06/01/19 0151  [facility] (on-call MD unless specified)  Once     Specialty:  Internal Medicine  Provider:  (Not yet assigned)    06/01/19 0150        Consulting Physician(s)     Provider Relationship Specialty    Tom Miranda MD Consulting Physician General Surgery    Janeth Garcia MD Consulting Physician Infectious Diseases    Jeison Redmond MD Consulting Physician Neurosurgery    Abbe Andrade MD Consulting Physician Internal Medicine        Procedures     DEBRIDEMENT OF RIGHT GLUTEAL WOUND    Imaging Results (all)     Procedure Component Value Units Date/Time    IR reposition central line w fluoro [[id number]] Collected:  06/05/19 1319     Updated:  06/05/19 1326    Narrative:       EXAMINATION(S): FLUOROSCOPIC GUIDED LEFT ARM PICC LINE REPOSITION  THROUGH SAME VENOUS ACCESS     DATE: 06/05/2019      HISTORY: 79-year-old male with PICC line placement yesterday with PICC  line needs repositioned per radiology report; L03.317-Cellulitis of  buttock; R50.9-Fever,  unspecified; T81.49XA-Infection following a  procedure, other surgical site, initial encounter; Z74.09-Other reduced  mobility     DESCRIPTION OF PROCEDURE(S):    Maximum sterile barrier technique was  used including sterile cap, mask, gloves, gown and large sterile sheet  as well as pre-procedure hand washing and cutaneous antisepsis with 2 %  chlorhexidine. The arm was anesthetized with 1% Lidocaine solution. A  wire was advanced through the existing PICC line. The PICC line was  repositioned such that its tip was within the proximal right atrium near  its junction with the superior vena cava.  A fluoroscopic image of the  final PICC line position was obtained.  The catheter was flushed with  the heparinized saline and secured to the skin using a fastening device.          Impression:       Successful repositioning of a 4 [place] single lumen PICC  line using fluoroscopic guidance.     FLUOROSCOPY TIME: 0.55 minutes, 1 images.     This report was finalized on 6/5/2019 1:23 PM by Dr. Jordan Varghese MD.       XR Chest Post CVA Port [[id number]] Collected:  06/04/19 1600     Updated:  06/04/19 1607    Narrative:       XR CHEST POST CVA PORT-     INDICATIONS: PICC placement     TECHNIQUE: FRONTAL VIEW OF THE CHEST     COMPARISON: 06/02/2019     FINDINGS:     Patient is rotated towards the left. Left PICC extends to the left  subclavian vein via the cephalic vein then reverses course, tip at the  left axillary vein, advise repositioning the PICC. The heart size is  borderline. Increased hazy opacification of the left hemithorax, in the  lower right hemithorax, compatible with increased pleural effusions.  Likely atelectasis at the bases. No pneumothorax. Otherwise stable.       Impression:          As described.     Discussed by telephone with patient's nurse, Antonina, at 1600, 06/04/2019.           This report was finalized on 6/4/2019 4:04 PM by Dr. Alvarez Diaz M.D.       XR Chest PA & Lateral [[id number]]  Collected:  06/02/19 2033     Updated:  06/02/19 2222    Narrative:       PA AND LATERAL CHEST X-RAY     HISTORY: pleural effusion; L03.317-Cellulitis of buttock; R50.9-Fever,  unspecified; T81.49XA-Infection following a procedure, other surgical  site, initial encounter; Z74.09-Other reduced mobility     COMPARISON: 04/02/2019.     FINDINGS: PA and lateral views of the chest were obtained. The lungs are  well inflated. Left greater than right effusions are present. The larger  left effusion has increased fairly significantly. Hazy left lung opacity  likely some secondary atelectasis, superimposed pneumonia not excluded.  Heart is enlarged. Normal vascularity. Extensive postsurgical changes  noted in the thoracolumbar spine.             Impression:       Increasing left effusion.     This report was finalized on 6/2/2019 10:19 PM by Tod Turner M.D.       CT Pelvis With Contrast [[id number]] Collected:  06/01/19 0132     Updated:  06/01/19 0139    Narrative:       CT SCAN PELVIS WITH IV CONTRAST.     HISTORY: large right buttock wound     COMPARISON: None.     TECHNIQUE: Radiation dose reduction techniques were utilized, including  automated exposure control and exposure modulation based on body size.  Axial images were obtained from the iliac crest to the symphysis pubis  with IV contrast only.. Oral contrast was not administered per request.     FINDINGS :  In the included lower abdomen, nonobstructive renal calculi  are noted bilaterally. Visualized aorta is nonaneurysmal. Mild prostate  gland enlargement with some elevation of the urinary bladder base.  Numerous colonic diverticuli. GI tract not opacified for assessment but  the visualized portion is nonobstructive. There is artifact from  lumbosacral fusion hardware. There is diffuse infiltration of the  subcutaneous tissues most likely related to anasarca. There is a more  confluent focus of increased density dorsally, superficially at the  level of the  sacrum. On image 83 this area measures 8.4 x 1.8 cm. This  may reflect a localized inflammatory process such as cellulitis. There  is no soft tissue air or evidence of abscess.          Impression:       1. Bilateral nephrolithiasis, nonobstructing.  2. Colonic diverticulosis.  3. Subcutaneous edema and inflammatory changes as discussed.  4. Mild prostate gland enlargement           This report was finalized on 6/1/2019 1:36 AM by Tod Turner M.D.             Results for orders placed during the hospital encounter of 03/31/19   Duplex Venous Lower Extremity - Bilateral CAR    Narrative · Normal bilateral lower extremity venous duplex scan.          Results for orders placed during the hospital encounter of 05/31/19   Adult Transthoracic Echo Complete W/ Cont if Necessary Per Protocol    Narrative · Left ventricular systolic function is normal. Calculated EF = 58%  · Left ventricular diastolic function was indeterminate.  · Right ventricular cavity is mildly dilated.  · Mildly reduced right ventricular systolic function noted. There is   severe hypokinesis of the distal right ventricle.  · Left atrial cavity size is mild-to-moderately dilated.  · The agitated saline study is positive for a small to moderate PFO.  · There is mild bileaflet mitral valve prolapse.  · Mild mitral valve regurgitation is present.  · Mild tricuspid valve regurgitation is present.  · Calculated right ventricular systolic pressure from tricuspid   regurgitation is 48 mmHg.  · There is a small (<1cm) pericardial effusion. There is no evidence of   cardiac tamponade.  · There is a moderate size left pleural effusion.        Pertinent Labs     Results from last 7 days   Lab Units 06/06/19 0517 06/05/19 0333 06/04/19 0457 06/03/19  0452   WBC 10*3/mm3 5.86 5.68 6.93 9.37   HEMOGLOBIN g/dL 8.4* 8.5* 8.4* 8.6*   PLATELETS 10*3/mm3 291 265 257 254     Results from last 7 days   Lab Units 06/06/19 0517 06/05/19 0333 06/04/19  0457  06/03/19  0452   SODIUM mmol/L 143 145 143 141   POTASSIUM mmol/L 3.5 3.3* 3.9 3.9   CHLORIDE mmol/L 107 110* 109* 108*   CO2 mmol/L 27.8 26.5 24.6 21.7*   BUN mg/dL 17 18 20 28*   CREATININE mg/dL 0.62* 0.80 0.71* 0.77   GLUCOSE mg/dL 80 105* 78 115*   Estimated Creatinine Clearance: 104.2 mL/min (A) (by C-G formula based on SCr of 0.62 mg/dL (L)).  Results from last 7 days   Lab Units 06/06/19  0517 06/04/19  0457 06/03/19  0452 06/02/19  0559 06/01/19  0027   ALBUMIN g/dL 2.20* 2.20* 2.10* 2.40* 2.90*   BILIRUBIN mg/dL  --  0.4 0.2 0.2 0.3   ALK PHOS U/L  --  78 78 80 93   AST (SGOT) U/L  --  14 15 17 18   ALT (SGPT) U/L  --  10 11 11 12     Results from last 7 days   Lab Units 06/06/19  0517 06/05/19  0333 06/04/19  0457 06/03/19  0452 06/02/19  0559   CALCIUM mg/dL 7.6* 8.0* 7.9* 7.9* 7.8*   ALBUMIN g/dL 2.20*  --  2.20* 2.10* 2.40*   MAGNESIUM mg/dL 1.5* 1.6  --   --   --    PHOSPHORUS mg/dL 2.4*  --   --   --   --          Results from last 7 days   Lab Units 06/01/19  1111   SODIUM UR mmol/L 26   CREATININE UR mg/dL 87.5   OSMOLALITY UR mOsm/kg 473         Invalid input(s): LDLCALC  Results from last 7 days   Lab Units 06/02/19  1552 06/02/19  1421 06/01/19  1246 06/01/19  1052 06/01/19  0055 06/01/19  0027   BLOODCX  No growth at 3 days No growth at 3 days  --   --  Staphylococcus aureus, MRSA* Staphylococcus aureus, MRSA*   WOUNDCX   --   --  Moderate growth (3+) Staphylococcus aureus, MRSA*  --   --   --    MRSA SCREEN CX   --   --   --  No Methicillin Resistant Staphylococcus aureus isolated  --   --    BCIDPCR   --   --   --   --   --  Staphylococcus aureus. mecA (methicillin resistance gene) detected. Identification by BCID PCR.*       Test Results Pending at Discharge   None   Order Current Status    Anaerobic Culture - Wound, Buttock, Right Preliminary result    Blood Culture - Blood, Arm, Left Preliminary result    Blood Culture - Blood, Hand, Left Preliminary result          Discharge Details         Discharge Medications      New Medications      Instructions Start Date   Empagliflozin 10 MG tablet  Commonly known as:  JARDIANCE   10 mg, Oral, Daily      enoxaparin 100 MG/ML solution syringe  Commonly known as:  LOVENOX   1 mg/kg, Subcutaneous, Every 12 Hours      rifAMPin 300 MG capsule  Commonly known as:  RIFADIN   300 mg, Oral, Every 12 Hours Scheduled      vancomycin 1-5 GM/200ML-% IVPB  Commonly known as:  VANCOCIN   1,000 mg, Intravenous, Every 8 Hours      warfarin 5 MG tablet  Commonly known as:  COUMADIN   5 mg, Oral, Nightly         Changes to Medications      Instructions Start Date   enalapril 20 MG tablet  Commonly known as:  VASOTEC  What changed:    · how much to take  · when to take this   20 mg, Oral, 2 Times Daily      metFORMIN 500 MG tablet  Commonly known as:  GLUCOPHAGE  What changed:  when to take this   500 mg, Oral, 3 Times Daily         Continue These Medications      Instructions Start Date   amiodarone 200 MG tablet  Commonly known as:  PACERONE   200 mg, Oral, Daily      atorvastatin 10 MG tablet  Commonly known as:  LIPITOR   10 mg, Oral, Nightly      brimonidine 0.2 % ophthalmic solution  Commonly known as:  ALPHAGAN   1 drop, Both Eyes, 2 Times Daily      calcium carbonate 500 MG chewable tablet  Commonly known as:  TUMS   2 tablets, Oral, 3 Times Daily PRN      cholecalciferol 01437 units capsule  Commonly known as:  VITAMIN D3   2,000 Units, Oral, Daily      cyclobenzaprine 10 MG tablet  Commonly known as:  FLEXERIL   10 mg, Oral, 3 Times Daily PRN      donepezil 10 MG tablet  Commonly known as:  ARICEPT   5 mg, Oral, Every Morning      dorzolamide 2 % ophthalmic solution  Commonly known as:  TRUSOPT   1 drop, Both Eyes, 2 Times Daily      esomeprazole 40 MG capsule  Commonly known as:  nexIUM   40 mg, Oral, Daily      freestyle lancets   Use to test blood sugar daily      ACCU-CHEK FASTCLIX LANCETS misc   USE TO TEST TWICE DAILY      FREESTYLE LITE device   Use to test  blood once daily      ACCU-CHEK FRANCISCO SMARTVIEW w/Device kit   USE TO TEST BLOOD SUGAR TWICE DAILY      furosemide 40 MG tablet  Commonly known as:  LASIX   40 mg, Oral, Daily      gabapentin 100 MG capsule  Commonly known as:  NEURONTIN   300 mg, Oral, 3 Times Daily      Glucose Blood disk   1 Device, Injection, Daily, TEST BLOOD SUGAR ONCE DAILY AS DIRECTED      ONE TOUCH ULTRA TEST test strip  Generic drug:  glucose blood   USE ONCE DAILY AS DIRECTED      glucose blood test strip  Commonly known as:  FREESTYLE LITE   Use to test blood once daily      ACCU-CHEK SMARTVIEW test strip  Generic drug:  glucose blood   USE TO TEST TWICE DAILY      latanoprost 0.005 % ophthalmic solution  Commonly known as:  XALATAN   1 drop, Both Eyes, Nightly      oxyCODONE-acetaminophen  MG per tablet  Commonly known as:  PERCOCET   1 tablet, Oral, Every 4 Hours PRN      potassium chloride 10 MEQ CR capsule  Commonly known as:  MICRO-K   20 mEq, Oral, Daily      tamsulosin 0.4 MG capsule 24 hr capsule  Commonly known as:  FLOMAX   0.4 mg, Oral, Every Morning, 1-2 TABS AS NEEDED      timolol 0.5 % ophthalmic solution  Commonly known as:  TIMOPTIC   1 drop, Both Eyes, Daily         Stop These Medications    apixaban 5 MG tablet tablet  Commonly known as:  ELIQUIS     glimepiride 2 MG tablet  Commonly known as:  AMARYL     pioglitazone 45 MG tablet  Commonly known as:  ACTOS            Allergies   Allergen Reactions   • Beta Adrenergic Blockers Other (See Comments)     Bradycardia         Discharge Disposition:  Skilled Nursing Facility (DC - External)    Discharge Diet:  Diet Order   Procedures   • Diet Regular; Cardiac, Consistent Carbohydrate       Discharge Activity:   Activity Instructions     Activity as Tolerated            CODE STATUS:    Code Status and Medical Interventions:   Ordered at: 06/01/19 1019     Code Status:    CPR     Medical Interventions (Level of Support Prior to Arrest):    Full       Future Appointments    Date Time Provider Department Center   7/1/2019  1:20 PM Mark Perez MD [facility] ID [facility] None   7/2/2019  2:45 PM Jeison Redmond MD [facility] NS [facility] None   8/14/2019 10:30 AM LAB CHAIR 6 [facility]   8/21/2019 10:40 AM VITALS ONLY [facility]   8/21/2019 11:00 AM Geovanni Herron MD [facility]   8/21/2019 11:30 AM CHAIR 06 [facility] - MD  [facility]   8/21/2019  2:30 PM Sakina Martin, APRN [facility] None     Additional Instructions for the Follow-ups that You Need to Schedule     Discharge Follow-up with PCP   As directed       Currently Documented PCP:    Trey Stockton MD    PCP Phone Number:    [phone]     Follow Up Details:  4-6 weks         Discharge Follow-up with Specified Provider: Dr Newman; 1 Month   As directed      To:  Dr Newman    Follow Up:  1 Month           Follow-up Information     Trey Stockton MD .    Specialty:  Internal Medicine  Why:  4-6 weks  Contact information:  [address] [address]  [phone]                   Additional Instructions for the Follow-ups that You Need to Schedule     Discharge Follow-up with PCP   As directed       Currently Documented PCP:    Trey Stockton MD    PCP Phone Number:    [phone]     Follow Up Details:  4-6 weks         Discharge Follow-up with Specified Provider: Dr Newman; 1 Month   As directed      To:  Dr Newman    Follow Up:  1 Month           Time Spent on Discharge:  Greater than 30 minutes      Electronically signed by Abbe Andrade MD, 6/6/2019, 11:33 AM

## 2019-06-06 NOTE — PROGRESS NOTES
Continued Stay Note  [facility]     Patient Name: Derrek Estrada  MRN: [id number]  Today's Date: 6/6/2019    Admit Date: 5/31/2019    Discharge Plan     Row Name 06/06/19 6825       Plan    Plan  Pre-cert for skilled rehab at [facility] remains pending.  Bed available 6/7 per Ad[facility]isa    Patient/Family in Agreement with Plan  yes    Plan Comments  Informed spouse that pre-cert is still pending.  Adalgisa states that she will have a bed available 6/7. Spouse will transport if pt does not qualify for ambulance....................Marcelle Ardon RN    Row Name 06/06/19 4738       Plan    Plan  Skilled rehab with, IV medications, Wound Vac, and low air loss mattress, needs pre-cert    Patient/Family in Agreement with Plan  yes    Plan Comments  Adalgisa with [facility] started precert 6/5 for first choice [facility].  Bed is not available now today, and precert is pending.  Patient is in copay days.  Spouse is very disappointed with not being DC today to [facility].  She would like referrals to other facilities.  [facility], [facility] and [facility] liaisons notified of referrals.  Spouse now would like to have referrals for [facility] and [facility],  referrals made.          Discharge Codes    No documentation.       Expected Discharge Date and Time     Expected Discharge Date Expected Discharge Time    Jun 6, 2019             Marcelle Ardon RN

## 2019-06-06 NOTE — PROGRESS NOTES
[facility]    Physicians Statement of Medical Necessity for Ambulance Transportation    It is medically necessary for:    Patient Name: Derrek Estrada    Insurance Information:      To be transported by ambulance:    From (if nursing facility, specify level of care: skilled, residential, etc): [facility]    To (specify level of care if nursing facility): [facility]    Date of Service:     For dialysis patients state date dialysis began:     Diagnosis: MRSA Bacteremia    Past Medical/Surgical History:  Past Medical History:   Diagnosis Date   • Acute hypoxemic respiratory failure (CMS/[facility])    • Acute suppurative otitis media    • Acute upper respiratory infection    • Anemia    • Arthritis    • Cholelithiasis    • Diabetes mellitus (CMS/[facility])     TYPE 2   • Diverticulosis    • Dry eyes    • Earache    • ED (erectile dysfunction)    • Edema    • Elevated cholesterol    • Encounter for screening for malignant neoplasm of prostate    • Esophagitis, reflux    • GERD (gastroesophageal reflux disease)    • Glaucoma    • H/O echocardiogram 09/18/2013   • Health care maintenance    • Hiatal hernia    • History of EKG 10/06/2015   • History of Holter monitoring 09/16/2013   • History of transfusion    • Hypercholesterolemia    • Hyperlipidemia    • Hypertension    • Lumbar canal stenosis    • Microalbuminuria    • Mild cognitive impairment    • Mitral valve prolapse    • Nephrolithiasis    • PAF (paroxysmal atrial fibrillation) (CMS/[facility])    • Pericardial effusion    • 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   • Spinal stenosis       Past Surgical History:   Procedure Laterality Date   • AMPUTATION DIGIT Left 10/29/2016    Procedure: TENDON AND NERVE REPAIR OF  LEFT THUMB;  Surgeon: Zak Hanson MD;  Location: [facility];  Service:    • APPENDECTOMY     • ARTHRODESIS  10/28/2013    Spinal / Description: Repair spinal stenosis   • BACK SURGERY     • BLEPHAROPLASTY  2000,  05/2002   • CATARACT EXTRACTION     • COLONOSCOPY     • EYE SURGERY  2011    EYE MUSCLE SX   • FRACTURE SURGERY     • HERNIA REPAIR Right 03/2010    inguinal   • INCISION AND DRAINAGE PERIRECTAL ABSCESS Right 6/1/2019    Procedure: DEBRIDEMENT OF RIGHT GLUTEAL WOUND;  Surgeon: Tom Miranda MD;  Location: [facility];  Service: General   • LUMBAR DISCECTOMY FUSION INSTRUMENTATION N/A 1/29/2018    Procedure: L2 to L5 fusion with instrumentation and removal of implants L4 5;  Surgeon: Barron Knight MD;  Location: [facility] OR;  Service:    • LUMBAR DISCECTOMY FUSION INSTRUMENTATION N/A 1/9/2019    Procedure: T 11 to L3, L5-S1 fusion with T11 to S1 instrumentation, L4 pedicle subtraction osteotomy, Right L5-S1 interbody fusion with cage;  Surgeon: Barron Knight MD;  Location: [facility] OR;  Service: Orthopedic Spine   • LUMBAR FUSION  2018    Lumbar vertebral fusion   • LUMBAR LAMINECTOMY      10.28.13  bilat L4/5 lami with Nyla and Belen   • LUMBAR LAMINECTOMY DISCECTOMY DECOMPRESSION N/A 1/29/2018    Procedure: L2 to L4 laminectomy with a fusion by orthopedics;  Surgeon: Jeison Redmond MD;  Location: [facility];  Service:    • LUMBAR LAMINECTOMY DISCECTOMY DECOMPRESSION N/A 1/9/2019    Procedure: L4-L5, L5-S1 LAMINECTOMY WITH NEURAL LYSIS;  Surgeon: Jeison Redmond MD;  Location: [facility];  Service: Neurosurgery   • OTHER SURGICAL HISTORY  2008    Lithotomy   • TONSILLECTOMY     • TONSILLECTOMY     • VASECTOMY  09/2008        Current Objective Medical Evidence(including physical exam finding to support reason for limitations):    Requires oxygen  Unable to sit at 90 degree angle    Other: wound to right gluteal with wound vac therapy    Physician Signature:           (RN,NP,PA,CAN, Discharge Planner) Marcelle Ardon RN   Date/Time: 6/6/2019 3:53 PM       Printed Name:    __________________________________    [facility]   Phone: [phone] Phone: [phone]   Fax:  [phone] Fax: [phone]

## 2019-06-07 NOTE — PROGRESS NOTES
INFECTIOUS DISEASES PROGRESS NOTE    CC: f/u MRSA    S:   Buttock pain much improved  No f/c/ns  Tolerating abx    O:  Physical Exam:  Temp:  [98 °F (36.7 °C)-99.2 °F (37.3 °C)] 99.2 °F (37.3 °C)  Heart Rate:  [] 73  Resp:  [18-20] 20  BP: (144-189)/(74-79) 150/76  Physical Exam   Constitutional: He appears well-developed.   Pulmonary/Chest: Effort normal.   Abdominal: Soft. He exhibits no distension. There is no tenderness.   Neurological: He is alert.   Skin: Skin is warm and dry.   Psychiatric: He has a normal mood and affect. His behavior is normal.        Diagnostics:    Microbiology  6/1/2019 blood cultures 2/2 MRSA  6/1/2019 wound culture MRSA  6/2/2019 BCx 2/2 NGTD    vanc 19.1  glc     Assessment/Plan   1.  MRSA septicemia secondary to infected pressure ulcer of the right buttocks  2.  Diabetes mellitus type 2, continue glycemic control efforts to prevent/control infectious complications  3.  Left Pleural effusion, doubt infection  4. Extensive spinal hardware in place    Infectious issues stable and planning for subacute rehab today.  Cont on vancomycin for goal level of 15-20 and rifampin 300mg po q12 through 6/29  vanc changed to 1250mg IV q12 to facilitate outpatient administration  Will see CODI Perez MD  8:06 AM  06/07/19

## 2019-06-07 NOTE — PROGRESS NOTES
"Pharmacokinetic Consult - Vancomycin Dosing (Follow-up Note)    Derrek Estrada is on day 6 pharmacy to dose vancomycin for MRSA septicemia secondary to infected pressure ulcer of the right buttocks  Pharmacy dosing vancomycin per Dr Perez's request.   Goal trough: 15-20 mg/L     Relevant clinical data and objective history reviewed:  79 y.o. male 190.5 cm (75\") 85 kg (187 lb 8 oz)    Past Medical History:   Diagnosis Date   • Acute hypoxemic respiratory failure (CMS/HCC)    • Acute suppurative otitis media    • Acute upper respiratory infection    • Anemia    • Arthritis    • Cholelithiasis    • Diabetes mellitus (CMS/HCC)     TYPE 2   • Diverticulosis    • Dry eyes    • Earache    • ED (erectile dysfunction)    • Edema    • Elevated cholesterol    • Encounter for screening for malignant neoplasm of prostate    • Esophagitis, reflux    • GERD (gastroesophageal reflux disease)    • Glaucoma    • H/O echocardiogram 09/18/2013   • Health care maintenance    • Hiatal hernia    • History of EKG 10/06/2015   • History of Holter monitoring 09/16/2013   • History of transfusion    • Hypercholesterolemia    • Hyperlipidemia    • Hypertension    • Lumbar canal stenosis    • Microalbuminuria    • Mild cognitive impairment    • Mitral valve prolapse    • Nephrolithiasis    • PAF (paroxysmal atrial fibrillation) (CMS/HCC)    • Pericardial effusion    • 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   • Spinal stenosis      Creatinine   Date Value Ref Range Status   06/06/2019 0.62 (L) 0.76 - 1.27 mg/dL Final   06/05/2019 0.80 0.76 - 1.27 mg/dL Final   06/04/2019 0.71 (L) 0.76 - 1.27 mg/dL Final   03/28/2019 0.7 0.7 - 1.5 mg/dL Final   03/28/2019 0.7 0.7 - 1.5 mg/dL Final   03/27/2019 0.6 (L) 0.7 - 1.5 mg/dL Final   12/27/2018 0.80 0.60 - 1.30 mg/dL Final     Comment:     Serial Number: [id number]:  [id number]   12/30/2016 0.70 0.60 - 1.30 mg/dL Final     Comment:     Serial Number: [id number]    " : [id number]     BUN   Date Value Ref Range Status   06/06/2019 17 8 - 23 mg/dL Final   03/28/2019 17 7 - 20 mg/dL Final     Estimated Creatinine Clearance: 91.5 mL/min (A) (by C-G formula based on SCr of 0.62 mg/dL (L)).    Lab Results   Component Value Date    WBC 5.86 06/06/2019     Temp Readings from Last 3 Encounters:   06/06/19 98 °F (36.7 °C) (Oral)   05/06/19 98.6 °F (37 °C) (Oral)   04/22/19 98.1 °F (36.7 °C)     Baseline culture/source/susceptibility:   6/1/2019 blood cultures 2/2 MRSA  6/1/2019 wound culture MRSA  6/2/2019 BCx 2/2 NGTD    Anti-Infectives (From admission, onward)    Ordered     Dose/Rate Route Frequency Start Stop    06/06/19 1132  rifAMPin (RIFADIN) 300 MG capsule     Ordering Provider:  Abbe Andrade MD    300 mg Oral Every 12 Hours Scheduled 06/06/19 0000 07/01/19 2359    06/06/19 1132  vancomycin (VANCOCIN) 1-5 GM/200ML-% IVPB     Ordering Provider:  Abbe Andrade MD    1,000 mg  over 90 Minutes Intravenous Every 8 Hours 06/06/19 0000 06/15/19 2359    06/05/19 0826  vancomycin (VANCOCIN) in iso-osmotic dextrose IVPB 1 g (premix) 200 mL     Ordering Provider:  Mark Perez MD    1,000 mg  over 90 Minutes Intravenous Once 06/05/19 0915 06/05/19 1015    06/03/19 0804  rifAMPin (RIFADIN) capsule 300 mg     Geri Belcher [facility] reviewed the order on 06/05/19 1510.   Ordering Provider:  Mark Perez MD    300 mg Oral Every 12 Hours Scheduled 06/03/19 0900 07/01/19 0859    06/01/19 1530  vancomycin 1500 mg/500 mL 0.9% NS IVPB ([facility])     Ordering Provider:  Mark Perez MD    1,500 mg  over 150 Minutes Intravenous Once 06/01/19 1630 06/01/19 2102    06/01/19 1509  Pharmacy to dose vancomycin     O&#39;Samanta Moreno, [facility] reviewed the order on 06/02/19 0824.   Ordering Provider:  Mark Perez MD     Does not apply Continuous 06/01/19 1600 06/15/19 1559    06/01/19 0019  piperacillin-tazobactam (ZOSYN) 3.375 g in  iso-osmotic dextrose 50 ml (premix)     Ordering Provider:  Zoltan Barr MD    3.375 g Intravenous Once 06/01/19 0021 06/01/19 0205         Lab Results   Component Value Date    BERT 24.10 (H) 06/06/2019     Lab Results   Component Value Date    ALBERTO 19.10 06/06/2019     Vancomycin IV Dosing & Levels History:  6/1 18:32 1500 mg x1  6/2 04:12, 16:35 1000 mg q12hrs  6/3 04:17              6/3 15:23 Trough: 15.80 mcg/mL  6/3 16:54 1250 mg q12hrs      6/4 04:45, 16:32              6/5 03:33 Trough: 9.90 mcg/mL  6/5 06:08 1250 mg  6/5 08:45 1000 mg x1   6/5 17:39 1000 mg q8hrs   6/6 00:37 09:39               6/6 trough at 2115= 19.1 mcg/ml (~12 hours from last dose)     Assessment  1.  MRSA septicemia secondary to infected pressure ulcer of the right buttocks  2.  Diabetes mellitus type 2, continue glycemic control efforts to prevent/control infectious complications  3.  Left Pleural effusion, doubt infection  4. Extensive spinal hardware in place    Plan  Will change to vancomycin 1250mg IV q 12 hrs. Trough level with 4th dose  Plan is for 4 weeks of abx per ID    Pharmacy will continue to follow daily while on vancomycin and adjust as needed  Brit Whittington, PharmD, BCACP

## 2019-06-07 NOTE — DISCHARGE SUMMARY
Patient Name: Derrek Estrada  : 1940  MRN: [id number]    Date of Admission: 2019  Date of Discharge:  2019  Primary Care Physician: Trey Stockton MD      Chief Complaint:   Fever and Wound Check      Discharge Diagnoses     Active Hospital Problems    Diagnosis  POA   • **MRSA bacteremia [R78.81]  Yes   • Chronic diastolic CHF (congestive heart failure) (CMS/HCC) [I50.32]  Yes   • Pleural effusion [J90]  Yes   • Type 2 diabetes mellitus, without long-term current use of insulin (CMS/HCC) [E11.9]  Yes   • Weakness of both lower extremities [R29.898]  Yes   • Lower extremity edema [R60.0]  Yes   • Wound of right buttock [S31.819A]  Yes   • Sepsis affecting skin (CMS/[facility]) [A41.9]  Yes   • Chronic anticoagulation-for Afib [Z79.01]  Not Applicable   • Chronic bilateral low back pain with bilateral sciatica [M54.42, M54.41, G89.29]  Yes   • Mild cognitive disorder [F09]  Yes   • Iron deficiency anemia [D50.9]  Yes   • Hypercholesterolemia [E78.00]  Yes   • Hypertension [I10]  Yes   • Atrial fibrillation (CMS/HCC) [I48.91]  Yes      Resolved Hospital Problems   No resolved problems to display.        Hospital Course     Mr. Estrada is a 79 y.o. male non-smoker with a history of DM2, and afib  who presented to [facility] initially complaining of large painful pressure wound on right buttock.  Please see the admitting history and physical for further details.  He was found to have large painful pressure wound on right buttock with surrounding cellulitis and concern for deeper infection and was admitted to the hospital for further evaluation and treatment.  Further work-up and evaluation found that he had MRSA both bacteremia and wound infection and cellulitis.  During the admission he was taken to the operating room for incision and drainage and debridement of this posterior pressure wound.  A wound VAC is currently in place and plan is to continue this method of wound  treatment and following discharge.  Infectious diseases recommended 4 weeks of IV vancomycin with oral rifampin.  The oral rifampin as needed as an adjunctive treatment given his extensive hardware within his back.  He has a PICC line placed in his right upper extremity for IV antibiotics.  Given the rifampin his anticoagulation is needed to be adjusted.  Direct oral anticoagulants interact with rifampin and therefore his Eliquis has been stopped.  Currently he is on subcu Lovenox with plans to transition to warfarin.  He will only need to be on the warfarin for the next 4 weeks while on the rifampin at that point can be switched back to Eliquis.  His diabetes has been relatively well controlled here in the hospital with correctional coverage.  Plan is to adjust his oral regimen at discharge.  Given his risk for coronary artery disease and other issues he is been placed on Jardiance and metformin.  I have stopped his Actos and glipizide.  The plan is been discussed between Dr. Calderón and Dr. Newman both agreed he stable for discharge to a skilled nursing facility today.  The plan was discussed with the family at the bedside today and all are in agreement with the plan as above.  Patient kept additional night in the hospital awaiting transfer to a nursing home.    Day of Discharge     HPI:   He is feeling okay did not rest well overnight but is ready to get out of the hospital.    Physical Exam:  Temp:  [98 °F (36.7 °C)-99.2 °F (37.3 °C)] 99.2 °F (37.3 °C)  Heart Rate:  [] 73  Resp:  [18-20] 20  BP: (144-189)/(74-79) 150/76  Body mass index is 23.6 kg/m².  Physical Exam   Constitutional: He is oriented to person, place, and time. He appears well-developed and well-nourished.   HENT:   Head: Normocephalic and atraumatic.   Cardiovascular: Normal rate, regular rhythm and normal heart sounds.   Pulmonary/Chest: Effort normal and breath sounds normal.   Abdominal: Soft. Bowel sounds are normal.    Neurological: He is alert and oriented to person, place, and time.   Skin: Skin is warm and dry.   Wound VAC in place   Psychiatric: He has a normal mood and affect. His behavior is normal.   Nursing note and vitals reviewed.      Consultants     Consult Orders (all) (From admission, onward)    Start     Ordered    06/04/19 0905  Inpatient IV Team Consult  Once     Comments:  Single lumen   Provider:  (Not yet assigned)    06/04/19 0904    06/03/19 1426  Inpatient Neurosurgery Consult  Once     Specialty:  Neurosurgery  Provider:  Jeison Redmond MD    06/03/19 1426    06/02/19 0921  Inpatient Cardiology Consult  Once     Specialty:  Cardiology  Provider:  Sherice Newman MD    06/02/19 0920    06/01/19 0912  Inpatient General Surgery Consult  Once     Specialty:  General Surgery  Provider:  Shanno Jackson MD    06/01/19 0912    06/01/19 0735  Inpatient Infectious Diseases Consult  Once     Specialty:  Infectious Diseases  Provider:  Janeth Garcia MD    06/01/19 0735    06/01/19 0422  Inpatient Case Management  Consult  Once     Provider:  (Not yet assigned)    06/01/19 0421    06/01/19 0151  [facility] (on-call MD unless specified)  Once     Specialty:  Internal Medicine  Provider:  (Not yet assigned)    06/01/19 0150        Consulting Physician(s)     Provider Relationship Specialty    Tom Miranda MD Consulting Physician General Surgery    Janeth Garcia MD Consulting Physician Infectious Diseases    Abbe Andrade MD Consulting Physician Internal Medicine        Procedures     DEBRIDEMENT OF RIGHT GLUTEAL WOUND    Imaging Results (all)     Procedure Component Value Units Date/Time    IR reposition central line w fluoro [[id number]] Collected:  06/05/19 1319     Updated:  06/05/19 1326    Narrative:       EXAMINATION(S): FLUOROSCOPIC GUIDED LEFT ARM PICC LINE REPOSITION  THROUGH SAME VENOUS ACCESS     DATE: 06/05/2019      HISTORY: 79-year-old male with PICC line placement  yesterday with PICC  line needs repositioned per radiology report; L03.317-Cellulitis of  buttock; R50.9-Fever, unspecified; T81.49XA-Infection following a  procedure, other surgical site, initial encounter; Z74.09-Other reduced  mobility     DESCRIPTION OF PROCEDURE(S):    Maximum sterile barrier technique was  used including sterile cap, mask, gloves, gown and large sterile sheet  as well as pre-procedure hand washing and cutaneous antisepsis with 2 %  chlorhexidine. The arm was anesthetized with 1% Lidocaine solution. A  wire was advanced through the existing PICC line. The PICC line was  repositioned such that its tip was within the proximal right atrium near  its junction with the superior vena cava.  A fluoroscopic image of the  final PICC line position was obtained.  The catheter was flushed with  the heparinized saline and secured to the skin using a fastening device.          Impression:       Successful repositioning of a 4 Maori single lumen PICC  line using fluoroscopic guidance.     FLUOROSCOPY TIME: 0.55 minutes, 1 images.     This report was finalized on 6/5/2019 1:23 PM by Dr. Jordan Varghese MD.       XR Chest Post CVA Port [[id number]] Collected:  06/04/19 1600     Updated:  06/04/19 1607    Narrative:       XR CHEST POST CVA PORT-     INDICATIONS: PICC placement     TECHNIQUE: FRONTAL VIEW OF THE CHEST     COMPARISON: 06/02/2019     FINDINGS:     Patient is rotated towards the left. Left PICC extends to the left  subclavian vein via the cephalic vein then reverses course, tip at the  left axillary vein, advise repositioning the PICC. The heart size is  borderline. Increased hazy opacification of the left hemithorax, in the  lower right hemithorax, compatible with increased pleural effusions.  Likely atelectasis at the bases. No pneumothorax. Otherwise stable.       Impression:          As described.     Discussed by telephone with patient's nurse, Antonina, at 1600, 06/04/2019.           This report  was finalized on 6/4/2019 4:04 PM by Dr. Alvarez Diaz M.D.       XR Chest PA & Lateral [[id number]] Collected:  06/02/19 2033     Updated:  06/02/19 2222    Narrative:       PA AND LATERAL CHEST X-RAY     HISTORY: pleural effusion; L03.317-Cellulitis of buttock; R50.9-Fever,  unspecified; T81.49XA-Infection following a procedure, other surgical  site, initial encounter; Z74.09-Other reduced mobility     COMPARISON: 04/02/2019.     FINDINGS: PA and lateral views of the chest were obtained. The lungs are  well inflated. Left greater than right effusions are present. The larger  left effusion has increased fairly significantly. Hazy left lung opacity  likely some secondary atelectasis, superimposed pneumonia not excluded.  Heart is enlarged. Normal vascularity. Extensive postsurgical changes  noted in the thoracolumbar spine.             Impression:       Increasing left effusion.     This report was finalized on 6/2/2019 10:19 PM by Tod Turner M.D.       CT Pelvis With Contrast [[id number]] Collected:  06/01/19 0132     Updated:  06/01/19 0139    Narrative:       CT SCAN PELVIS WITH IV CONTRAST.     HISTORY: large right buttock wound     COMPARISON: None.     TECHNIQUE: Radiation dose reduction techniques were utilized, including  automated exposure control and exposure modulation based on body size.  Axial images were obtained from the iliac crest to the symphysis pubis  with IV contrast only.. Oral contrast was not administered per request.     FINDINGS :  In the included lower abdomen, nonobstructive renal calculi  are noted bilaterally. Visualized aorta is nonaneurysmal. Mild prostate  gland enlargement with some elevation of the urinary bladder base.  Numerous colonic diverticuli. GI tract not opacified for assessment but  the visualized portion is nonobstructive. There is artifact from  lumbosacral fusion hardware. There is diffuse infiltration of the  subcutaneous tissues most likely related to  anasarca. There is a more  confluent focus of increased density dorsally, superficially at the  level of the sacrum. On image 83 this area measures 8.4 x 1.8 cm. This  may reflect a localized inflammatory process such as cellulitis. There  is no soft tissue air or evidence of abscess.          Impression:       1. Bilateral nephrolithiasis, nonobstructing.  2. Colonic diverticulosis.  3. Subcutaneous edema and inflammatory changes as discussed.  4. Mild prostate gland enlargement           This report was finalized on 6/1/2019 1:36 AM by Tod Turner M.D.             Results for orders placed during the hospital encounter of 03/31/19   Duplex Venous Lower Extremity - Bilateral CAR    Narrative · Normal bilateral lower extremity venous duplex scan.          Results for orders placed during the hospital encounter of 05/31/19   Adult Transthoracic Echo Complete W/ Cont if Necessary Per Protocol    Narrative · Left ventricular systolic function is normal. Calculated EF = 58%  · Left ventricular diastolic function was indeterminate.  · Right ventricular cavity is mildly dilated.  · Mildly reduced right ventricular systolic function noted. There is   severe hypokinesis of the distal right ventricle.  · Left atrial cavity size is mild-to-moderately dilated.  · The agitated saline study is positive for a small to moderate PFO.  · There is mild bileaflet mitral valve prolapse.  · Mild mitral valve regurgitation is present.  · Mild tricuspid valve regurgitation is present.  · Calculated right ventricular systolic pressure from tricuspid   regurgitation is 48 mmHg.  · There is a small (<1cm) pericardial effusion. There is no evidence of   cardiac tamponade.  · There is a moderate size left pleural effusion.        Pertinent Labs     Results from last 7 days   Lab Units 06/06/19  0517 06/05/19  0333 06/04/19  0457 06/03/19  0452   WBC 10*3/mm3 5.86 5.68 6.93 9.37   HEMOGLOBIN g/dL 8.4* 8.5* 8.4* 8.6*   PLATELETS 10*3/mm3 291  265 257 254     Results from last 7 days   Lab Units 06/06/19 0517 06/05/19  0333 06/04/19  0457 06/03/19  0452   SODIUM mmol/L 143 145 143 141   POTASSIUM mmol/L 3.5 3.3* 3.9 3.9   CHLORIDE mmol/L 107 110* 109* 108*   CO2 mmol/L 27.8 26.5 24.6 21.7*   BUN mg/dL 17 18 20 28*   CREATININE mg/dL 0.62* 0.80 0.71* 0.77   GLUCOSE mg/dL 80 105* 78 115*   Estimated Creatinine Clearance: 92.1 mL/min (A) (by C-G formula based on SCr of 0.62 mg/dL (L)).  Results from last 7 days   Lab Units 06/06/19 0517 06/04/19 0457 06/03/19  0452 06/02/19  0559 06/01/19  0027   ALBUMIN g/dL 2.20* 2.20* 2.10* 2.40* 2.90*   BILIRUBIN mg/dL  --  0.4 0.2 0.2 0.3   ALK PHOS U/L  --  78 78 80 93   AST (SGOT) U/L  --  14 15 17 18   ALT (SGPT) U/L  --  10 11 11 12     Results from last 7 days   Lab Units 06/06/19 0517 06/05/19 0333 06/04/19 0457 06/03/19  0452 06/02/19  0559   CALCIUM mg/dL 7.6* 8.0* 7.9* 7.9* 7.8*   ALBUMIN g/dL 2.20*  --  2.20* 2.10* 2.40*   MAGNESIUM mg/dL 1.5* 1.6  --   --   --    PHOSPHORUS mg/dL 2.4*  --   --   --   --        Results from last 7 days   Lab Units 06/06/19  0517   PROBNP pg/mL 5,415.0*     Results from last 7 days   Lab Units 06/01/19  1111   SODIUM UR mmol/L 26   CREATININE UR mg/dL 87.5   OSMOLALITY UR mOsm/kg 473         Invalid input(s): LDLCALC  Results from last 7 days   Lab Units 06/02/19  1552 06/02/19  1421 06/01/19  1246 06/01/19  1052 06/01/19  0055 06/01/19  0027   BLOODCX  No growth at 4 days No growth at 4 days  --   --  Staphylococcus aureus, MRSA* Staphylococcus aureus, MRSA*   WOUNDCX   --   --  Moderate growth (3+) Staphylococcus aureus, MRSA*  --   --   --    MRSA SCREEN CX   --   --   --  No Methicillin Resistant Staphylococcus aureus isolated  --   --    BCIDPCR   --   --   --   --   --  Staphylococcus aureus. mecA (methicillin resistance gene) detected. Identification by BCID PCR.*       Test Results Pending at Discharge   None   Order Current Status    Blood Culture - Blood, Arm,  Left Preliminary result    Blood Culture - Blood, Hand, Left Preliminary result          Discharge Details        Discharge Medications      New Medications      Instructions Start Date   Empagliflozin 10 MG tablet  Commonly known as:  JARDIANCE   10 mg, Oral, Daily      enoxaparin 100 MG/ML solution syringe  Commonly known as:  LOVENOX   1 mg/kg, Subcutaneous, Every 12 Hours      rifAMPin 300 MG capsule  Commonly known as:  RIFADIN   300 mg, Oral, Every 12 Hours Scheduled      vancomycin 1-5 GM/200ML-% IVPB  Commonly known as:  VANCOCIN   1,000 mg, Intravenous, Every 8 Hours      warfarin 5 MG tablet  Commonly known as:  COUMADIN   5 mg, Oral, Nightly         Changes to Medications      Instructions Start Date   enalapril 20 MG tablet  Commonly known as:  VASOTEC  What changed:    · how much to take  · when to take this   20 mg, Oral, 2 Times Daily      metFORMIN 500 MG tablet  Commonly known as:  GLUCOPHAGE  What changed:  when to take this   500 mg, Oral, 3 Times Daily         Continue These Medications      Instructions Start Date   amiodarone 200 MG tablet  Commonly known as:  PACERONE   200 mg, Oral, Daily      atorvastatin 10 MG tablet  Commonly known as:  LIPITOR   10 mg, Oral, Nightly      brimonidine 0.2 % ophthalmic solution  Commonly known as:  ALPHAGAN   1 drop, Both Eyes, 2 Times Daily      calcium carbonate 500 MG chewable tablet  Commonly known as:  TUMS   2 tablets, Oral, 3 Times Daily PRN      cholecalciferol 92919 units capsule  Commonly known as:  VITAMIN D3   2,000 Units, Oral, Daily      cyclobenzaprine 10 MG tablet  Commonly known as:  FLEXERIL   10 mg, Oral, 3 Times Daily PRN      donepezil 10 MG tablet  Commonly known as:  ARICEPT   5 mg, Oral, Every Morning      dorzolamide 2 % ophthalmic solution  Commonly known as:  TRUSOPT   1 drop, Both Eyes, 2 Times Daily      esomeprazole 40 MG capsule  Commonly known as:  nexIUM   40 mg, Oral, Daily      freestyle lancets   Use to test blood sugar  daily      ACCU-CHEK FASTCLIX LANCETS misc   USE TO TEST TWICE DAILY      FREESTYLE LITE device   Use to test blood once daily      ACCU-CHEK FRANCISCO SMARTVIEW w/Device kit   USE TO TEST BLOOD SUGAR TWICE DAILY      furosemide 40 MG tablet  Commonly known as:  LASIX   40 mg, Oral, Daily      gabapentin 100 MG capsule  Commonly known as:  NEURONTIN   300 mg, Oral, 3 Times Daily      Glucose Blood disk   1 Device, Injection, Daily, TEST BLOOD SUGAR ONCE DAILY AS DIRECTED      ONE TOUCH ULTRA TEST test strip  Generic drug:  glucose blood   USE ONCE DAILY AS DIRECTED      glucose blood test strip  Commonly known as:  FREESTYLE LITE   Use to test blood once daily      ACCU-CHEK SMARTVIEW test strip  Generic drug:  glucose blood   USE TO TEST TWICE DAILY      latanoprost 0.005 % ophthalmic solution  Commonly known as:  XALATAN   1 drop, Both Eyes, Nightly      oxyCODONE-acetaminophen  MG per tablet  Commonly known as:  PERCOCET   1 tablet, Oral, Every 4 Hours PRN      potassium chloride 10 MEQ CR capsule  Commonly known as:  MICRO-K   20 mEq, Oral, Daily      tamsulosin 0.4 MG capsule 24 hr capsule  Commonly known as:  FLOMAX   0.4 mg, Oral, Every Morning, 1-2 TABS AS NEEDED      timolol 0.5 % ophthalmic solution  Commonly known as:  TIMOPTIC   1 drop, Both Eyes, Daily         Stop These Medications    apixaban 5 MG tablet tablet  Commonly known as:  ELIQUIS     glimepiride 2 MG tablet  Commonly known as:  AMARYL     pioglitazone 45 MG tablet  Commonly known as:  ACTOS            Allergies   Allergen Reactions   • Beta Adrenergic Blockers Other (See Comments)     Bradycardia         Discharge Disposition:  Skilled Nursing Facility (DC - External)    Discharge Diet:  Diet Order   Procedures   • Diet Regular; Cardiac, Consistent Carbohydrate       Discharge Activity:   Activity Instructions     Activity as Tolerated            CODE STATUS:    Code Status and Medical Interventions:   Ordered at: 06/01/19 1019     Code  Status:    CPR     Medical Interventions (Level of Support Prior to Arrest):    Full       Future Appointments   Date Time Provider Department Center   7/1/2019  1:20 PM Mark Perez MD [facility] ID [facility] None   7/2/2019  2:45 PM Jeison Redmond MD [facility] NS [facility] None   8/14/2019 10:30 AM LAB CHAIR 6 [facility] LAB [facility]   8/21/2019 10:40 AM VITALS ONLY [facility] LAB [facility]   8/21/2019 11:00 AM Geovanni Herron MD [facility]   8/21/2019 11:30 AM CHAIR 06 [facility] - MD BH INFUS [facility]   8/21/2019  2:30 PM Sakina Martin APRN [facility] CD [facility] None     Additional Instructions for the Follow-ups that You Need to Schedule     Discharge Follow-up with PCP   As directed       Currently Documented PCP:    Trey Stockton MD    PCP Phone Number:    [phone]     Follow Up Details:  4-6 weks         Discharge Follow-up with Specified Provider: Dr Newman; 1 Month   As directed      To:  Dr Newman    Follow Up:  1 Month           Follow-up Information     Trey Stockton MD .    Specialty:  Internal Medicine  Why:  4-6 weks  Contact information:  [address] [place] [address]  [phone]                   Additional Instructions for the Follow-ups that You Need to Schedule     Discharge Follow-up with PCP   As directed       Currently Documented PCP:    Trey Stockton MD    PCP Phone Number:    [phone]     Follow Up Details:  4-6 weks         Discharge Follow-up with Specified Provider: Dr Newman; 1 Month   As directed      To:  Dr Newman    Follow Up:  1 Month           Time Spent on Discharge:  Greater than 30 minutes      Electronically signed by Abbe Andrade MD, 6/7/2019, 9:25 AM

## 2019-06-07 NOTE — PROGRESS NOTES
Continued Stay Note  [facility]     Patient Name: Derrek Estrada  MRN: [id number]  Today's Date: 6/7/2019    Admit Date: 5/31/2019    Discharge Plan     Row Name 06/07/19 0913       Plan    Plan  [facility] today for skilled rehab, [facility] scheduled now for 1200    Patient/Family in Agreement with Plan  yes    Plan Comments  Spoke with Ad[facility], [facility] has both precert and bed today.  [facility] was able to move up trip to noon today.  Patient and spouse notified.  Packet with report and fax in office........................Marcelle Ardon RN        Discharge Codes    No documentation.       Expected Discharge Date and Time     Expected Discharge Date Expected Discharge Time    Jun 6, 2019             Marcelle Ardon RN

## 2019-06-07 NOTE — PLAN OF CARE
Problem: Patient Care Overview  Goal: Plan of Care Review  Outcome: Ongoing (interventions implemented as appropriate)   06/07/19 0126   Coping/Psychosocial   Plan of Care Reviewed With patient   Plan of Care Review   Progress no change   OTHER   Outcome Summary no acute events, no complaints, here overnight awaiting an available bed at rehab     Goal: Individualization and Mutuality  Outcome: Ongoing (interventions implemented as appropriate)    Goal: Discharge Needs Assessment  Outcome: Ongoing (interventions implemented as appropriate)    Goal: Interprofessional Rounds/Family Conf  Outcome: Ongoing (interventions implemented as appropriate)      Problem: Fall Risk (Adult)  Goal: Absence of Fall  Outcome: Ongoing (interventions implemented as appropriate)      Problem: Skin Injury Risk (Adult)  Goal: Skin Health and Integrity  Outcome: Ongoing (interventions implemented as appropriate)      Problem: Pain, Acute (Adult)  Goal: Acceptable Pain Control/Comfort Level  Outcome: Ongoing (interventions implemented as appropriate)

## 2019-06-10 NOTE — PROGRESS NOTES
Case Management Discharge Note    Final Note: [facility] skilled [facility]hab via ambulance    Destination      No service has been selected for the patient.      Durable Medical Equipment      No service has been selected for the patient.      Dialysis/Infusion      No service has been selected for the patient.      Home Medical Care      No service has been selected for the patient.      Therapy      No service has been selected for the patient.      Community Resources      No service has been selected for the patient.        Transportation Services  Ambulance: [facility]    Final Discharge Disposition Code: 03 - skilled nursing facility (SNF)

## 2019-06-17 PROBLEM — I50.32 CHRONIC DIASTOLIC CHF (CONGESTIVE HEART FAILURE) (HCC): Status: ACTIVE | Noted: 2019-01-01

## 2019-06-28 NOTE — TELEPHONE ENCOUNTER
Wife Silvia called and needs JARDIANCE for  (pt) he was discharged from rehab today and was given rx and he will be out soon.     Silvia said he has an appt with you on 07/19/2019.    If you need to call for more info please speak with Silvia. Home [phone] cell [phone]    [employer] discount #[phone].    Thank you.

## 2019-06-28 NOTE — TELEPHONE ENCOUNTER
Pt's wife called and wants to know if can switch from warfarin to eliquis?    From last hospital discharge notes 6/7/19      He has a PICC line placed in his right upper extremity for IV antibiotics.  Given the rifampin his anticoagulation is needed to be adjusted.  Direct oral anticoagulants interact with rifampin and therefore his Eliquis has been stopped.  Currently he is on subcu Lovenox with plans to transition to warfarin.  He will only need to be on the warfarin for the next 4 weeks while on the rifampin at that point can be switched back to Eliquis.      Please advise      Thanks  Faith PHAN

## 2019-07-01 NOTE — PROGRESS NOTES
cc: Previously was hospitalized in June 2017 for an infected right buttocks pressure ulcer which grew MRSA.  He also had MRSA bacteremia.  Dr. Miranda had debrided this and he is done well.  He denies any fevers or chills or night sweats.  He denies any missed doses from the antibiotics or side effects from the vancomycin.  His sugars have been well controlled ranging around 100.  In regards unfortunately his had a week's worth of cervical neck pain.  This is worse with movement.  No real loss of motor or sensation but it is extremely painful for him to hold his head up.  Plain films were obtained in his nursing facility and were negative he is back at home now and has a follow-up with Dr. Redmond of neurosurgery tomorrow     He continues to follow wound care for his buttocks lesion which is decreasing in size    Active Ambulatory Problems     Diagnosis Date Noted   • Cholelithiasis 02/02/2016   • DM (HgbA1c 5.9) 02/02/2016   • Diverticulosis of intestine 02/02/2016   • Hypercholesterolemia 02/02/2016   • Hypertension 02/02/2016   • Spinal stenosis 02/02/2016   • Microalbuminuria 02/02/2016   • Atrial fibrillation (CMS/[facility]) 02/02/2016   • Medicare annual wellness visit, subsequent 06/06/2016   • Anemia 06/07/2016   • Iron deficiency anemia 07/11/2016   • DJD (degenerative joint disease) 10/06/2016   • Impotence of organic origin 10/06/2016   • Glaucoma 10/06/2016   • Hiatal hernia 10/06/2016   • Mild cognitive disorder 10/06/2016   • Calculus of kidney 10/06/2016   • Nocturia 10/06/2016   • Chronic bilateral low back pain with bilateral sciatica 12/16/2016   • Post laminectomy syndrome 12/16/2016   • Routine health maintenance 02/09/2017   • Weight loss 03/06/2017   • Spinal stenosis of lumbar region with neurogenic claudication 12/15/2017   • Spondylolisthesis of lumbar region 12/15/2017   • Lumbar spine pain 01/29/2018   • Metabolic encephalopathy 01/30/2018   • Urinary retention with incomplete bladder emptying  01/31/2018   • Macrocytosis without anemia 01/31/2018   • Sundowning 02/01/2018   • 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   • Leg swelling 12/19/2018   • Postural kyphosis of lumbar region 12/28/2018   • H/O urinary retention 01/09/2019   • Chronic anticoagulation-for Afib 01/09/2019   • Fever and chills 04/01/2019   • Cellulitis of buttock 06/01/2019   • Type 2 diabetes mellitus, without long-term current use of insulin (CMS/HCC) 06/01/2019   • Weakness of both lower extremities 06/01/2019   • Lower extremity edema 06/01/2019   • Wound of right buttock 06/01/2019   • Sepsis affecting skin (CMS/HCC) 06/01/2019   • MRSA bacteremia 06/01/2019   • Pleural effusion 06/02/2019   • Chronic diastolic CHF (congestive heart failure) (CMS/HCC) 06/17/2019     Resolved Ambulatory Problems     Diagnosis Date Noted   • Cough 02/02/2016   • Acute URI 03/06/2017   • Atrial fibrillation, chronic (aka ATRIAL FIBRILLATION, CHRONIC) 07/31/2017     Past Medical History:   Diagnosis Date   • Acute hypoxemic respiratory failure (CMS/HCC)    • Acute suppurative otitis media    • Acute upper respiratory infection    • Anemia    • Arthritis    • Cholelithiasis    • Chronic diastolic CHF (congestive heart failure) (CMS/HCC) 6/17/2019   • Diabetes mellitus (CMS/HCC)    • Diverticulosis    • Dry eyes    • Earache    • ED (erectile dysfunction)    • Edema    • Elevated cholesterol    • Encounter for screening for malignant neoplasm of prostate    • Esophagitis, reflux    • GERD (gastroesophageal reflux disease)    • Glaucoma    • H/O echocardiogram 09/18/2013   • Health care maintenance    • Hiatal hernia    • History of EKG 10/06/2015   • History of Holter monitoring 09/16/2013   • History of transfusion    • Hypercholesterolemia    • Hyperlipidemia    • Hypertension    • Lumbar canal stenosis    • Microalbuminuria    • Mild cognitive impairment    • Mitral valve prolapse    • Nephrolithiasis    • PAF  "(paroxysmal atrial fibrillation) (CMS/HCC)    • Pericardial effusion    • 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   • Spinal stenosis          Review of Systems: All other reviewed and negative except as per HPI    Blood pressure 117/64, pulse 87, temperature 99.3 °F (37.4 °C), height 190.5 cm (75\").  GENERAL: Awake and alert, chronically ill-appearing but in no acute distress.   No cervical neck tenderness palpation  SKIN: Warm and dry without cutaneous eruptions   PSYCHIATRIC: Appropriate mood, affect, insight, and judgment.       DIAGNOSTICS:      Assessment and Plan  1.  MRSA septicemia secondary to infected pressure ulcer of the right buttocks  2.  Diabetes mellitus type 2, continue glycemic control efforts to prevent/control infectious complications  3. Neck pain, acute    Still very frail individual but his MRSA is improving.  His buttocks lesion is improving with wound care.  I think he is had enough vancomycin and rifampin.  We will discontinue these today.  I pulled his PICC line today in clinic.  He has some cervical neck pain which I think is probably musculoskeletal in nature.  Given that he was bacteremic with MRSA I think it be prudent to exclude discitis in that area and have ordered a cervical spine MRI with and without contrast.  He has neurosurgery follow-up tomorrow we will see if they have anything else to offer.  Otherwise he can follow-up with me pending the results of the MRI.  "

## 2019-07-01 NOTE — TELEPHONE ENCOUNTER
PT/INR 39.0/3.2    NO MISSED DOSES, 4MG DAILY     ORDER TO CONTINUE FOR SKILLED NURSING, SW, wound care, and  PT/INR     [facility]    [phone]

## 2019-07-02 PROBLEM — M54.2 NECK PAIN: Status: ACTIVE | Noted: 2019-01-01

## 2019-07-03 NOTE — TELEPHONE ENCOUNTER
----- Message from Nisha Dent MA sent at 7/2/2019 11:35 AM EDT -----  I attempted to call her today and her mailbox is still full.  Will try to call again.  ----- Message -----  From: Trey Stockton MD  Sent: 7/1/2019   4:37 PM  To: Nisha Dent MA    I attempted to call the visiting nurse twice.  Her voice mailbox is full and I cannot leave a message.  It was my understanding that once he finished the rifampin which she should be finished with today that he should come off of the warfarin and go back to [facility].  Please try to contact her tomorrow and see about this.  ----- Message -----  From: Nisha Dent MA  Sent: 7/1/2019  12:30 PM  To: Trey Stockton MD    PT/INR 39.0/3.2     NO MISSED DOSES, 4MG DAILY      ORDER TO CONTINUE FOR SKILLED NURSING, SW, wound care, and  PT/INR      [facility]     [phone]    Again I have tried to call the visiting nurse.  The voicemail is full.  I do not get an answer.  I did leave a message also at the patient's home regarding whether or not he is taking the rifampin.

## 2019-07-03 NOTE — TELEPHONE ENCOUNTER
----- Message from Marta Fernandes sent at 7/3/2019  8:06 AM EDT -----  PT'S WIFE IS CALLING TO ASK IF HE SHOULD BE TAKING GLIMIPERIDE 2MG?  SHE DOES NOT REMEMBER WHO TOOK HIM OFF OF IT OR WHY?  I ASKED HER IF IT WAS ON HIS MED LIST WHEN HE LEFT THE REHAB AND SHE SAID NO?  PLEASE ADVISE CALL [phone]  A message was left not to take the glimepiride.  Also asked about his rifampin.  I advised that if he is done with the rifampin he can go back to his Eliquis and does not need the warfarin.

## 2019-07-08 NOTE — TELEPHONE ENCOUNTER
FYI - Ladies.    Was able to leave voice message on Ifrah's voice mail to call the office regarding patient.    Thank you.    ----- Message from Danica Burns sent at 7/8/2019  9:56 AM EDT -----      ----- Message -----  From: Trey Stockton MD  Sent: 7/1/2019   4:37 PM  To: Nisha Dent MA    I attempted to call the visiting nurse twice.  Her voice mailbox is full and I cannot leave a message.  It was my understanding that once he finished the rifampin which she should be finished with today that he should come off of the warfarin and go back to [facility].  Please try to contact her tomorrow and see about this.  ----- Message -----  From: Nisha Dent MA  Sent: 7/1/2019  12:30 PM  To: Trey Stockton MD    PT/INR 39.0/3.2     NO MISSED DOSES, 4MG DAILY      ORDER TO CONTINUE FOR SKILLED NURSING, SW, wound care, and  PT/INR      [facility]     [phone]

## 2019-07-08 NOTE — TELEPHONE ENCOUNTER
----- Message from Marta Fernandes sent at 7/8/2019 10:20 AM EDT -----  THE INFECTIOUS DISEASE DR TOOK HIM OFF OF THE IV AND THE MED FOR MRSA. SHE DOES NOT KNOW THE NAME OF MED.    ALSO NEEDS NEW RX FOR:  #ACCU-CHECK FRANCISCO STRIPS TESTING 1QD #30  PHARM#[phone] [facility]  PT'S#[phone]    The patient is back on Eliquis.  Prescription was sent to the pharmacy.

## 2019-07-12 NOTE — TELEPHONE ENCOUNTER
Patient was seen 7/2/2019 by you. Ok to cancel his test we ordered since he will be seeing [facility]?

## 2019-07-12 NOTE — TELEPHONE ENCOUNTER
Mrs. Estrada clld to let Dr. Redmond know that her  is being admitted to [facility] today by Dr. CHAVEZ with [facility]. And he will be having an MRI at [facility] so he will not be having the MRI that office hellen for him he will be having sx wed.    Thank you,

## 2019-07-17 NOTE — TELEPHONE ENCOUNTER
MELANIEM for pt to contact the office to see if he plans on rescheduling the MRI that Dr. Perez ordered for him on 07/01/19

## 2019-07-22 NOTE — TELEPHONE ENCOUNTER
Pts wife called and stated that the MRI was canceled due to the patient is having surgery and that CT's had been ordered by Dr. Shaver.  They do not plan to reschedule MRI at this time.

## 2019-08-07 NOTE — TELEPHONE ENCOUNTER
Patient's wife calling because patient is in a rehab facility and will not be able to make appointment here next week because he is too weak and struggling to walk.  Wife wants to know what labs to be drawn at rehab center for Dr Herron . I instucted her to have them draw a CBC, BMP and iron profile   She V/U and has appointment to see Dr Herron on 08/21/19.     Instructed her that if facility has any problems with the labs to let us know and we  Can send them an order.  She V/U

## 2019-08-12 NOTE — TELEPHONE ENCOUNTER
Marv from [facility] called and said he got a fax for labs but did not get a time when to draw them. Please refax and call Marv at [phone] to let him know you sent it.    Thanks  Ilda Oakley RN

## 2019-08-12 NOTE — TELEPHONE ENCOUNTER
Ema ...[facility] called and they received the order for the labs but there was not a draw date on it-please refax

## 2019-08-12 NOTE — TELEPHONE ENCOUNTER
Pt is in rehab ([facility]). He has an appointment with [facility] on 8/21/19 for a 3 month. He will not be reji to make it. His wife is wanting to know if there is any labs that you want him to have done?    Wife #: [phone]    Thanks Ema

## 2019-08-13 NOTE — TELEPHONE ENCOUNTER
Called Marv at [facility] to inform him of normal lab results    Ilda Oakley RN  [facility] Triage Nurse

## 2019-08-21 ENCOUNTER — APPOINTMENT (OUTPATIENT)
Dept: ONCOLOGY | Facility: CLINIC | Age: 79
End: 2019-08-21

## 2019-08-21 ENCOUNTER — APPOINTMENT (OUTPATIENT)
Dept: ONCOLOGY | Facility: HOSPITAL | Age: 79
End: 2019-08-21

## 2019-08-21 ENCOUNTER — APPOINTMENT (OUTPATIENT)
Dept: LAB | Facility: HOSPITAL | Age: 79
End: 2019-08-21

## 2019-10-02 PROBLEM — I48.91 ATRIAL FIBRILLATION, UNSPECIFIED TYPE (HCC): Status: ACTIVE | Noted: 2019-10-02

## 2022-09-08 NOTE — NURSING NOTE
picc LINE IS NOT IN GOOD POSITION    discussed WITH DR MURRELL    Recommend for IR to reposition -- call was placed to XRAY  Unable to schedule for today.  Spoke with primary nurse Antonina.   Instructed to cap and not use PICC until after repositioning.    Detail Level: Detailed

## 2025-03-15 NOTE — TELEPHONE ENCOUNTER
Last INR was 1.2 while in hospital.   What is his current dose?   What did Dr Knight advise him of on dosage under his care?  Pt needs INR today if not able to answer these questions. MM   DISPLAY PLAN FREE TEXT

## (undated) DEVICE — GOWN,ECLIPSE,FABRIC-REINFORCED,X-LARGE: Brand: MEDLINE

## (undated) DEVICE — OCCLUSIVE GAUZE STRIP,3% BISMUTH TRIBROMOPHENATE IN PETROLATUM BLEND: Brand: XEROFORM

## (undated) DEVICE — MEDI-VAC YANKAUER SUCTION HANDLE W/BULBOUS TIP: Brand: CARDINAL HEALTH

## (undated) DEVICE — LOU MINOR PROCEDURE: Brand: MEDLINE INDUSTRIES, INC.

## (undated) DEVICE — 3.0MM NEURO (MATCH HEAD) LESS AGGRESSIVE

## (undated) DEVICE — SUT MNCRYL PLS ANTIB UD 4/0 PS2 18IN

## (undated) DEVICE — CONN TBG Y 5 IN 1 LF STRL

## (undated) DEVICE — 1010 S-DRAPE TOWEL DRAPE 10/BX: Brand: STERI-DRAPE™

## (undated) DEVICE — 1 ML TUBERCULIN SYRINGE REGULAR TIP: Brand: MONOJECT

## (undated) DEVICE — SYR CONTRL LUERLOK 10CC

## (undated) DEVICE — SOL NACL 0.9PCT 1000ML

## (undated) DEVICE — PAD,ABDOMINAL,8"X10",ST,LF: Brand: MEDLINE

## (undated) DEVICE — GLV SURG BIOGEL LTX PF 7 1/2

## (undated) DEVICE — ENCORE® LATEX ORTHO SIZE 8, STERILE LATEX POWDER-FREE SURGICAL GLOVE: Brand: ENCORE

## (undated) DEVICE — SUT VIC 1 OS8 27IN J699H

## (undated) DEVICE — GLV SURG SENSICARE W/ALOE PF LF 7.5 STRL

## (undated) DEVICE — DISPOSABLE IRRIGATION BIPOLAR CORD, M1000 TYPE: Brand: KIRWAN

## (undated) DEVICE — DRP MICROSCOPE 4 BINOCULAR CV 54X150IN

## (undated) DEVICE — SMOKE EVACUATION TUBING WITH 7/8 IN TO 1/4 IN REDUCER: Brand: BUFFALO FILTER

## (undated) DEVICE — GLV SURG BIOGEL LTX PF 7

## (undated) DEVICE — ADHS SKIN DERMABOND TOP ADVANCED

## (undated) DEVICE — CODMAN® SURGICAL PATTIES 1/2" X 3" (1.27CM X 7.62CM): Brand: CODMAN®

## (undated) DEVICE — TOTAL TRAY, 16FR 10ML SIL FOLEY, URN: Brand: MEDLINE

## (undated) DEVICE — Device

## (undated) DEVICE — DISPOSABLE BIPOLAR CABLE 12FT. (3.6M): Brand: KIRWAN

## (undated) DEVICE — PK ATS CUST W CARDIOTOMY RESEVOIR

## (undated) DEVICE — DRSNG GZ PETROLTM XEROFORM CURAD 1X8IN STRL

## (undated) DEVICE — CODMAN® SURGICAL PATTIES 3/4" X 3/4" (1.91CM X 1.91CM): Brand: CODMAN®

## (undated) DEVICE — DRP MICROSCP LEICA W/GLASS LENS

## (undated) DEVICE — TUBING, SUCTION, 1/4" X 20', STRAIGHT: Brand: MEDLINE INDUSTRIES, INC.

## (undated) DEVICE — SPNG LAP 18X18IN LF STRL PK/5

## (undated) DEVICE — PK NEURO SPINE 40

## (undated) DEVICE — APPL CHLORAPREP W/TINT 26ML ORNG

## (undated) DEVICE — DIFFUSER: Brand: CORE, MAESTRO

## (undated) DEVICE — SUT VIC 0 CT1 CR8 27IN JJ41G

## (undated) DEVICE — SPNG GZ WOVN 4X4IN 12PLY 10/BX STRL

## (undated) DEVICE — SPONGE,LAP,12"X12",XR,ST,5/PK,40PK/CS: Brand: MEDLINE

## (undated) DEVICE — APPL DURAPREP IODOPHOR APL 26ML

## (undated) DEVICE — HANDPIECE SET WITH COAXIAL HIGH FLOW TIP AND SUCTION TUBE: Brand: INTERPULSE

## (undated) DEVICE — NDL SPINE 20G 3 1/2 YEL STRL 1P/U

## (undated) DEVICE — SHEET, DRAPE, SPLIT, STERILE: Brand: MEDLINE

## (undated) DEVICE — BANDAGE,GAUZE,BULKEE II,4.5"X4.1YD,STRL: Brand: MEDLINE

## (undated) DEVICE — ANTIBACTERIAL UNDYED BRAIDED (POLYGLACTIN 910), SYNTHETIC ABSORBABLE SUTURE: Brand: COATED VICRYL

## (undated) DEVICE — BONE MARROW ASPIRATION NEEDLES ASPIRATOR KIT 3-HOLE 4 INCHES NDLE

## (undated) DEVICE — STPLR SKIN VISISTAT WD 35CT

## (undated) DEVICE — 6.0MM PRECISION ROUND

## (undated) DEVICE — OIL CARTRIDGE: Brand: CORE, MAESTRO

## (undated) DEVICE — MARKR SKIN W/RULR ULTRAFINETP

## (undated) DEVICE — PREMIUM WET SKIN PREP TRAY: Brand: MEDLINE INDUSTRIES, INC.

## (undated) DEVICE — OCCLUSIVE GAUZE STRIP OVERWRAP,3% BISMUTH TRIBROMOPHENATE IN PETROLATUM BLEND: Brand: XEROFORM